# Patient Record
Sex: MALE | Race: WHITE | NOT HISPANIC OR LATINO | URBAN - METROPOLITAN AREA
[De-identification: names, ages, dates, MRNs, and addresses within clinical notes are randomized per-mention and may not be internally consistent; named-entity substitution may affect disease eponyms.]

---

## 2017-07-10 ENCOUNTER — OUTPATIENT (OUTPATIENT)
Dept: OUTPATIENT SERVICES | Facility: HOSPITAL | Age: 72
LOS: 1 days | Discharge: HOME | End: 2017-07-10

## 2017-07-10 DIAGNOSIS — D72.829 ELEVATED WHITE BLOOD CELL COUNT, UNSPECIFIED: ICD-10-CM

## 2017-07-11 ENCOUNTER — RESULT REVIEW (OUTPATIENT)
Age: 72
End: 2017-07-11

## 2017-07-11 ENCOUNTER — OUTPATIENT (OUTPATIENT)
Dept: OUTPATIENT SERVICES | Facility: HOSPITAL | Age: 72
LOS: 1 days | End: 2017-07-11
Payer: MEDICARE

## 2017-07-11 ENCOUNTER — APPOINTMENT (OUTPATIENT)
Dept: HEMATOLOGY ONCOLOGY | Facility: CLINIC | Age: 72
End: 2017-07-11

## 2017-07-11 VITALS
HEIGHT: 70 IN | OXYGEN SATURATION: 96 % | HEART RATE: 72 BPM | WEIGHT: 315 LBS | RESPIRATION RATE: 16 BRPM | TEMPERATURE: 98.2 F | DIASTOLIC BLOOD PRESSURE: 98 MMHG | BODY MASS INDEX: 45.1 KG/M2 | SYSTOLIC BLOOD PRESSURE: 168 MMHG

## 2017-07-11 DIAGNOSIS — D72.820 LYMPHOCYTOSIS (SYMPTOMATIC): ICD-10-CM

## 2017-07-11 LAB
BASOPHILS # BLD AUTO: 0.1 K/UL — SIGNIFICANT CHANGE UP (ref 0–0.2)
BASOPHILS NFR BLD AUTO: 1 % — SIGNIFICANT CHANGE UP (ref 0–2)
BLD GP AB SCN SERPL QL: NEGATIVE — SIGNIFICANT CHANGE UP
EOSINOPHIL # BLD AUTO: 0.3 K/UL — SIGNIFICANT CHANGE UP (ref 0–0.5)
EOSINOPHIL NFR BLD AUTO: 4 % — SIGNIFICANT CHANGE UP (ref 0–6)
HCT VFR BLD CALC: 44.7 % — SIGNIFICANT CHANGE UP (ref 39–50)
HGB BLD-MCNC: 15 G/DL — SIGNIFICANT CHANGE UP (ref 13–17)
LYMPHOCYTES # BLD AUTO: 52 % — HIGH (ref 13–44)
LYMPHOCYTES # BLD AUTO: 7.7 K/UL — HIGH (ref 1–3.3)
MCHC RBC-ENTMCNC: 29.4 PG — SIGNIFICANT CHANGE UP (ref 27–34)
MCHC RBC-ENTMCNC: 33.6 G/DL — SIGNIFICANT CHANGE UP (ref 32–36)
MCV RBC AUTO: 87.4 FL — SIGNIFICANT CHANGE UP (ref 80–100)
MONOCYTES # BLD AUTO: 0.6 K/UL — SIGNIFICANT CHANGE UP (ref 0–0.9)
MONOCYTES NFR BLD AUTO: 5 % — SIGNIFICANT CHANGE UP (ref 2–14)
NEUTROPHILS # BLD AUTO: 5 K/UL — SIGNIFICANT CHANGE UP (ref 1.8–7.4)
NEUTROPHILS NFR BLD AUTO: 34 % — LOW (ref 43–77)
NEUTS BAND # BLD: 4 % — SIGNIFICANT CHANGE UP (ref 0–8)
PLAT MORPH BLD: NORMAL — SIGNIFICANT CHANGE UP
PLATELET # BLD AUTO: 174 K/UL — SIGNIFICANT CHANGE UP (ref 150–400)
RBC # BLD: 5.11 M/UL — SIGNIFICANT CHANGE UP (ref 4.2–5.8)
RBC # FLD: 13 % — SIGNIFICANT CHANGE UP (ref 10.3–14.5)
RBC BLD AUTO: SIGNIFICANT CHANGE UP
RH IG SCN BLD-IMP: POSITIVE — SIGNIFICANT CHANGE UP
WBC # BLD: 13.8 K/UL — HIGH (ref 3.8–10.5)
WBC # FLD AUTO: 13.8 K/UL — HIGH (ref 3.8–10.5)

## 2017-07-11 PROCEDURE — 86901 BLOOD TYPING SEROLOGIC RH(D): CPT

## 2017-07-11 PROCEDURE — 88189 FLOWCYTOMETRY/READ 16 & >: CPT

## 2017-07-11 PROCEDURE — 88271 CYTOGENETICS DNA PROBE: CPT

## 2017-07-11 PROCEDURE — 86900 BLOOD TYPING SEROLOGIC ABO: CPT

## 2017-07-11 PROCEDURE — 86850 RBC ANTIBODY SCREEN: CPT

## 2017-07-11 PROCEDURE — 88275 CYTOGENETICS 100-300: CPT

## 2017-07-11 PROCEDURE — 88237 TISSUE CULTURE BONE MARROW: CPT

## 2017-07-11 PROCEDURE — 88185 FLOWCYTOMETRY/TC ADD-ON: CPT

## 2017-07-11 PROCEDURE — 88184 FLOWCYTOMETRY/ TC 1 MARKER: CPT

## 2017-07-17 LAB — CHROM ANALY INTERPHASE BLD FISH-IMP: SIGNIFICANT CHANGE UP

## 2017-07-18 LAB — TM INTERPRETATION: SIGNIFICANT CHANGE UP

## 2017-07-19 LAB
ALBUMIN SERPL ELPH-MCNC: 4.4 G/DL
ALP BLD-CCNC: 67 U/L
ALT SERPL-CCNC: 26 U/L
ANION GAP SERPL CALC-SCNC: 15 MMOL/L
AST SERPL-CCNC: 19 U/L
B2 MICROGLOB SERPL-MCNC: 2.1 MG/L
BILIRUB SERPL-MCNC: 0.4 MG/DL
BUN SERPL-MCNC: 14 MG/DL
CALCIUM SERPL-MCNC: 10.1 MG/DL
CHLORIDE SERPL-SCNC: 102 MMOL/L
CO2 SERPL-SCNC: 25 MMOL/L
CREAT SERPL-MCNC: 0.86 MG/DL
DEPRECATED KAPPA LC FREE/LAMBDA SER: 1.21 RATIO
DEPRECATED KAPPA LC FREE/LAMBDA SER: 1.21 RATIO
GLUCOSE SERPL-MCNC: 120 MG/DL
IGA SER QL IEP: 85 MG/DL
IGG SER QL IEP: 568 MG/DL
IGM SER QL IEP: 45 MG/DL
KAPPA LC CSF-MCNC: 1.92 MG/DL
KAPPA LC CSF-MCNC: 1.92 MG/DL
KAPPA LC SERPL-MCNC: 2.33 MG/DL
KAPPA LC SERPL-MCNC: 2.33 MG/DL
LDH SERPL-CCNC: 141 U/L
M PROTEIN SPEC IFE-MCNC: NORMAL
POTASSIUM SERPL-SCNC: 5 MMOL/L
PROT SERPL-MCNC: 6.6 G/DL
SODIUM SERPL-SCNC: 142 MMOL/L

## 2017-07-21 DIAGNOSIS — D72.820 LYMPHOCYTOSIS (SYMPTOMATIC): ICD-10-CM

## 2017-08-10 ENCOUNTER — OUTPATIENT (OUTPATIENT)
Dept: OUTPATIENT SERVICES | Facility: HOSPITAL | Age: 72
LOS: 1 days | Discharge: HOME | End: 2017-08-10

## 2017-08-10 DIAGNOSIS — D72.820 LYMPHOCYTOSIS (SYMPTOMATIC): ICD-10-CM

## 2017-08-15 ENCOUNTER — APPOINTMENT (OUTPATIENT)
Dept: HEMATOLOGY ONCOLOGY | Facility: CLINIC | Age: 72
End: 2017-08-15
Payer: MEDICARE

## 2017-08-15 ENCOUNTER — RESULT REVIEW (OUTPATIENT)
Age: 72
End: 2017-08-15

## 2017-08-15 VITALS
DIASTOLIC BLOOD PRESSURE: 81 MMHG | HEIGHT: 71 IN | WEIGHT: 234.99 LBS | TEMPERATURE: 98.2 F | HEART RATE: 76 BPM | BODY MASS INDEX: 32.9 KG/M2 | SYSTOLIC BLOOD PRESSURE: 153 MMHG | RESPIRATION RATE: 17 BRPM | OXYGEN SATURATION: 96 %

## 2017-08-15 LAB
BASOPHILS # BLD AUTO: 0.2 K/UL — SIGNIFICANT CHANGE UP (ref 0–0.2)
BASOPHILS NFR BLD AUTO: 1.2 % — SIGNIFICANT CHANGE UP (ref 0–2)
EOSINOPHIL # BLD AUTO: 0.4 K/UL — SIGNIFICANT CHANGE UP (ref 0–0.5)
EOSINOPHIL NFR BLD AUTO: 3.2 % — SIGNIFICANT CHANGE UP (ref 0–6)
HCT VFR BLD CALC: 42.8 % — SIGNIFICANT CHANGE UP (ref 39–50)
HGB BLD-MCNC: 15.1 G/DL — SIGNIFICANT CHANGE UP (ref 13–17)
LYMPHOCYTES # BLD AUTO: 56.2 % — HIGH (ref 13–44)
LYMPHOCYTES # BLD AUTO: 7.3 K/UL — HIGH (ref 1–3.3)
MCHC RBC-ENTMCNC: 30.4 PG — SIGNIFICANT CHANGE UP (ref 27–34)
MCHC RBC-ENTMCNC: 35.2 G/DL — SIGNIFICANT CHANGE UP (ref 32–36)
MCV RBC AUTO: 86.4 FL — SIGNIFICANT CHANGE UP (ref 80–100)
MONOCYTES # BLD AUTO: 0.6 K/UL — SIGNIFICANT CHANGE UP (ref 0–0.9)
MONOCYTES NFR BLD AUTO: 4.4 % — SIGNIFICANT CHANGE UP (ref 2–14)
NEUTROPHILS # BLD AUTO: 4.5 K/UL — SIGNIFICANT CHANGE UP (ref 1.8–7.4)
NEUTROPHILS NFR BLD AUTO: 34.9 % — LOW (ref 43–77)
PLATELET # BLD AUTO: 168 K/UL — SIGNIFICANT CHANGE UP (ref 150–400)
RBC # BLD: 4.96 M/UL — SIGNIFICANT CHANGE UP (ref 4.2–5.8)
RBC # FLD: 12.9 % — SIGNIFICANT CHANGE UP (ref 10.3–14.5)
WBC # BLD: 12.9 K/UL — HIGH (ref 3.8–10.5)
WBC # FLD AUTO: 12.9 K/UL — HIGH (ref 3.8–10.5)

## 2017-08-15 PROCEDURE — 99215 OFFICE O/P EST HI 40 MIN: CPT

## 2017-09-24 ENCOUNTER — TRANSCRIPTION ENCOUNTER (OUTPATIENT)
Age: 72
End: 2017-09-24

## 2017-11-10 ENCOUNTER — OUTPATIENT (OUTPATIENT)
Dept: OUTPATIENT SERVICES | Facility: HOSPITAL | Age: 72
LOS: 1 days | Discharge: HOME | End: 2017-11-10

## 2017-11-10 DIAGNOSIS — D72.820 LYMPHOCYTOSIS (SYMPTOMATIC): ICD-10-CM

## 2017-11-10 DIAGNOSIS — D64.9 ANEMIA, UNSPECIFIED: ICD-10-CM

## 2017-11-14 ENCOUNTER — APPOINTMENT (OUTPATIENT)
Dept: HEMATOLOGY ONCOLOGY | Facility: CLINIC | Age: 72
End: 2017-11-14
Payer: MEDICARE

## 2017-11-14 VITALS
DIASTOLIC BLOOD PRESSURE: 76 MMHG | BODY MASS INDEX: 32.36 KG/M2 | RESPIRATION RATE: 16 BRPM | SYSTOLIC BLOOD PRESSURE: 146 MMHG | HEART RATE: 79 BPM | OXYGEN SATURATION: 94 % | TEMPERATURE: 98 F | WEIGHT: 231.99 LBS

## 2017-11-14 PROCEDURE — 99215 OFFICE O/P EST HI 40 MIN: CPT

## 2018-03-01 ENCOUNTER — OUTPATIENT (OUTPATIENT)
Dept: OUTPATIENT SERVICES | Facility: HOSPITAL | Age: 73
LOS: 1 days | Discharge: HOME | End: 2018-03-01

## 2018-03-01 DIAGNOSIS — D72.820 LYMPHOCYTOSIS (SYMPTOMATIC): ICD-10-CM

## 2018-03-06 ENCOUNTER — RESULT REVIEW (OUTPATIENT)
Age: 73
End: 2018-03-06

## 2018-03-06 ENCOUNTER — APPOINTMENT (OUTPATIENT)
Dept: HEMATOLOGY ONCOLOGY | Facility: CLINIC | Age: 73
End: 2018-03-06
Payer: MEDICARE

## 2018-03-06 VITALS
HEART RATE: 59 BPM | SYSTOLIC BLOOD PRESSURE: 116 MMHG | DIASTOLIC BLOOD PRESSURE: 67 MMHG | TEMPERATURE: 98 F | RESPIRATION RATE: 16 BRPM | WEIGHT: 233 LBS | OXYGEN SATURATION: 95 % | BODY MASS INDEX: 32.5 KG/M2

## 2018-03-06 LAB
BASOPHILS # BLD AUTO: 0.2 K/UL — SIGNIFICANT CHANGE UP (ref 0–0.2)
BASOPHILS NFR BLD AUTO: 1.6 % — SIGNIFICANT CHANGE UP (ref 0–2)
EOSINOPHIL # BLD AUTO: 0.3 K/UL — SIGNIFICANT CHANGE UP (ref 0–0.5)
EOSINOPHIL NFR BLD AUTO: 2.8 % — SIGNIFICANT CHANGE UP (ref 0–6)
HCT VFR BLD CALC: 40 % — SIGNIFICANT CHANGE UP (ref 39–50)
HGB BLD-MCNC: 13.6 G/DL — SIGNIFICANT CHANGE UP (ref 13–17)
LYMPHOCYTES # BLD AUTO: 5.5 K/UL — HIGH (ref 1–3.3)
LYMPHOCYTES # BLD AUTO: 51 % — HIGH (ref 13–44)
MCHC RBC-ENTMCNC: 29.2 PG — SIGNIFICANT CHANGE UP (ref 27–34)
MCHC RBC-ENTMCNC: 34.1 G/DL — SIGNIFICANT CHANGE UP (ref 32–36)
MCV RBC AUTO: 85.5 FL — SIGNIFICANT CHANGE UP (ref 80–100)
MONOCYTES # BLD AUTO: 0.7 K/UL — SIGNIFICANT CHANGE UP (ref 0–0.9)
MONOCYTES NFR BLD AUTO: 6.7 % — SIGNIFICANT CHANGE UP (ref 2–14)
NEUTROPHILS # BLD AUTO: 4.1 K/UL — SIGNIFICANT CHANGE UP (ref 1.8–7.4)
NEUTROPHILS NFR BLD AUTO: 37.8 % — LOW (ref 43–77)
PLATELET # BLD AUTO: 143 K/UL — LOW (ref 150–400)
RBC # BLD: 4.68 M/UL — SIGNIFICANT CHANGE UP (ref 4.2–5.8)
RBC # FLD: 13.2 % — SIGNIFICANT CHANGE UP (ref 10.3–14.5)
WBC # BLD: 10.8 K/UL — HIGH (ref 3.8–10.5)
WBC # FLD AUTO: 10.8 K/UL — HIGH (ref 3.8–10.5)

## 2018-03-06 PROCEDURE — 99214 OFFICE O/P EST MOD 30 MIN: CPT

## 2018-05-25 ENCOUNTER — OUTPATIENT (OUTPATIENT)
Dept: OUTPATIENT SERVICES | Facility: HOSPITAL | Age: 73
LOS: 1 days | Discharge: HOME | End: 2018-05-25
Payer: MEDICARE

## 2018-05-25 DIAGNOSIS — D72.820 LYMPHOCYTOSIS (SYMPTOMATIC): ICD-10-CM

## 2018-05-31 ENCOUNTER — RESULT REVIEW (OUTPATIENT)
Age: 73
End: 2018-05-31

## 2018-05-31 ENCOUNTER — APPOINTMENT (OUTPATIENT)
Dept: HEMATOLOGY ONCOLOGY | Facility: CLINIC | Age: 73
End: 2018-05-31
Payer: MEDICARE

## 2018-05-31 VITALS
DIASTOLIC BLOOD PRESSURE: 66 MMHG | BODY MASS INDEX: 31.66 KG/M2 | SYSTOLIC BLOOD PRESSURE: 133 MMHG | TEMPERATURE: 98.5 F | RESPIRATION RATE: 16 BRPM | WEIGHT: 227 LBS | OXYGEN SATURATION: 95 % | HEART RATE: 63 BPM

## 2018-05-31 LAB
ALBUMIN SERPL ELPH-MCNC: 4.5 G/DL
ALP BLD-CCNC: 57 U/L
ALT SERPL-CCNC: 22 U/L
ANION GAP SERPL CALC-SCNC: 12 MMOL/L
AST SERPL-CCNC: 18 U/L
BILIRUB SERPL-MCNC: 0.4 MG/DL
BUN SERPL-MCNC: 16 MG/DL
CALCIUM SERPL-MCNC: 10 MG/DL
CHLORIDE SERPL-SCNC: 104 MMOL/L
CO2 SERPL-SCNC: 24 MMOL/L
CREAT SERPL-MCNC: 0.9 MG/DL
EOSINOPHIL # BLD AUTO: 0.4 K/UL — SIGNIFICANT CHANGE UP (ref 0–0.5)
EOSINOPHIL NFR BLD AUTO: 4 % — SIGNIFICANT CHANGE UP (ref 0–6)
GLUCOSE SERPL-MCNC: 122 MG/DL
HCT VFR BLD CALC: 42.4 % — SIGNIFICANT CHANGE UP (ref 39–50)
HGB BLD-MCNC: 14.1 G/DL — SIGNIFICANT CHANGE UP (ref 13–17)
LDH SERPL-CCNC: 128 U/L
LYMPHOCYTES # BLD AUTO: 11.5 K/UL — HIGH (ref 1–3.3)
LYMPHOCYTES # BLD AUTO: 58 % — HIGH (ref 13–44)
MCHC RBC-ENTMCNC: 28.8 PG — SIGNIFICANT CHANGE UP (ref 27–34)
MCHC RBC-ENTMCNC: 33.2 G/DL — SIGNIFICANT CHANGE UP (ref 32–36)
MCV RBC AUTO: 86.6 FL — SIGNIFICANT CHANGE UP (ref 80–100)
MONOCYTES # BLD AUTO: 0.6 K/UL — SIGNIFICANT CHANGE UP (ref 0–0.9)
MONOCYTES NFR BLD AUTO: 5 % — SIGNIFICANT CHANGE UP (ref 2–14)
NEUTROPHILS # BLD AUTO: 4.7 K/UL — SIGNIFICANT CHANGE UP (ref 1.8–7.4)
NEUTROPHILS NFR BLD AUTO: 33 % — LOW (ref 43–77)
PLAT MORPH BLD: NORMAL — SIGNIFICANT CHANGE UP
PLATELET # BLD AUTO: 172 K/UL — SIGNIFICANT CHANGE UP (ref 150–400)
POTASSIUM SERPL-SCNC: 4.5 MMOL/L
PROT SERPL-MCNC: 6.4 G/DL
RBC # BLD: 4.9 M/UL — SIGNIFICANT CHANGE UP (ref 4.2–5.8)
RBC # FLD: 13.1 % — SIGNIFICANT CHANGE UP (ref 10.3–14.5)
RBC BLD AUTO: SIGNIFICANT CHANGE UP
SODIUM SERPL-SCNC: 140 MMOL/L
WBC # BLD: 17.3 K/UL — HIGH (ref 3.8–10.5)
WBC # FLD AUTO: 17.3 K/UL — HIGH (ref 3.8–10.5)

## 2018-05-31 PROCEDURE — 99214 OFFICE O/P EST MOD 30 MIN: CPT

## 2018-06-16 ENCOUNTER — APPOINTMENT (OUTPATIENT)
Dept: ORTHOPEDIC SURGERY | Facility: CLINIC | Age: 73
End: 2018-06-16
Payer: MEDICARE

## 2018-06-16 VITALS
HEART RATE: 62 BPM | WEIGHT: 227 LBS | BODY MASS INDEX: 32.5 KG/M2 | SYSTOLIC BLOOD PRESSURE: 117 MMHG | HEIGHT: 70 IN | DIASTOLIC BLOOD PRESSURE: 71 MMHG

## 2018-06-16 DIAGNOSIS — Z80.9 FAMILY HISTORY OF MALIGNANT NEOPLASM, UNSPECIFIED: ICD-10-CM

## 2018-06-16 DIAGNOSIS — E78.00 PURE HYPERCHOLESTEROLEMIA, UNSPECIFIED: ICD-10-CM

## 2018-06-16 DIAGNOSIS — C80.1 MALIGNANT (PRIMARY) NEOPLASM, UNSPECIFIED: ICD-10-CM

## 2018-06-16 DIAGNOSIS — Z86.39 PERSONAL HISTORY OF OTHER ENDOCRINE, NUTRITIONAL AND METABOLIC DISEASE: ICD-10-CM

## 2018-06-16 DIAGNOSIS — Z87.09 PERSONAL HISTORY OF OTHER DISEASES OF THE RESPIRATORY SYSTEM: ICD-10-CM

## 2018-06-16 DIAGNOSIS — Z82.61 FAMILY HISTORY OF ARTHRITIS: ICD-10-CM

## 2018-06-16 DIAGNOSIS — M19.90 UNSPECIFIED OSTEOARTHRITIS, UNSPECIFIED SITE: ICD-10-CM

## 2018-06-16 PROCEDURE — 99203 OFFICE O/P NEW LOW 30 MIN: CPT

## 2018-07-16 ENCOUNTER — LABORATORY RESULT (OUTPATIENT)
Age: 73
End: 2018-07-16

## 2018-07-16 ENCOUNTER — APPOINTMENT (OUTPATIENT)
Dept: ORTHOPEDIC SURGERY | Facility: CLINIC | Age: 73
End: 2018-07-16
Payer: MEDICARE

## 2018-07-16 ENCOUNTER — APPOINTMENT (OUTPATIENT)
Dept: SURGERY | Facility: CLINIC | Age: 73
End: 2018-07-16
Payer: MEDICARE

## 2018-07-16 VITALS
DIASTOLIC BLOOD PRESSURE: 81 MMHG | HEART RATE: 93 BPM | BODY MASS INDEX: 32.5 KG/M2 | HEIGHT: 70 IN | SYSTOLIC BLOOD PRESSURE: 151 MMHG | WEIGHT: 227 LBS

## 2018-07-16 VITALS — WEIGHT: 227 LBS | HEIGHT: 70 IN | BODY MASS INDEX: 32.5 KG/M2

## 2018-07-16 DIAGNOSIS — Z85.46 PERSONAL HISTORY OF MALIGNANT NEOPLASM OF PROSTATE: ICD-10-CM

## 2018-07-16 DIAGNOSIS — Z83.49 FAMILY HISTORY OF OTHER ENDOCRINE, NUTRITIONAL AND METABOLIC DISEASES: ICD-10-CM

## 2018-07-16 DIAGNOSIS — Z85.528 PERSONAL HISTORY OF OTHER MALIGNANT NEOPLASM OF KIDNEY: ICD-10-CM

## 2018-07-16 DIAGNOSIS — Z80.42 FAMILY HISTORY OF MALIGNANT NEOPLASM OF PROSTATE: ICD-10-CM

## 2018-07-16 PROCEDURE — 36415 COLL VENOUS BLD VENIPUNCTURE: CPT

## 2018-07-16 PROCEDURE — 99205 OFFICE O/P NEW HI 60 MIN: CPT | Mod: 25

## 2018-07-16 PROCEDURE — 99214 OFFICE O/P EST MOD 30 MIN: CPT

## 2018-07-16 RX ORDER — ROSUVASTATIN CALCIUM 40 MG/1
40 TABLET, FILM COATED ORAL
Refills: 0 | Status: ACTIVE | COMMUNITY

## 2018-07-16 RX ORDER — RAMIPRIL 10 MG/1
10 CAPSULE ORAL
Refills: 0 | Status: ACTIVE | COMMUNITY

## 2018-07-16 RX ORDER — METFORMIN HYDROCHLORIDE 500 MG/1
500 TABLET, COATED ORAL
Refills: 0 | Status: ACTIVE | COMMUNITY

## 2018-07-18 LAB
25(OH)D3 SERPL-MCNC: 37.4 NG/ML
CALCIT SERPL-MCNC: 569 PG/ML
CALCIUM SERPL-MCNC: 10.2 MG/DL
CALCIUM SERPL-MCNC: 10.2 MG/DL
CEA SERPL-MCNC: 46.2 NG/ML
PARATHYROID HORMONE INTACT: 41 PG/ML
T3 SERPL-MCNC: 97 NG/DL
T4 FREE SERPL-MCNC: 1.4 NG/DL
THYROGLOB AB SERPL-ACNC: <20 IU/ML
THYROGLOB SERPL-MCNC: 12.5 NG/ML
TSH SERPL-ACNC: 1.74 UIU/ML

## 2018-07-25 ENCOUNTER — RESULT REVIEW (OUTPATIENT)
Age: 73
End: 2018-07-25

## 2018-07-31 ENCOUNTER — FORM ENCOUNTER (OUTPATIENT)
Age: 73
End: 2018-07-31

## 2018-08-01 ENCOUNTER — RESULT REVIEW (OUTPATIENT)
Age: 73
End: 2018-08-01

## 2018-08-01 ENCOUNTER — APPOINTMENT (OUTPATIENT)
Dept: ULTRASOUND IMAGING | Facility: IMAGING CENTER | Age: 73
End: 2018-08-01
Payer: MEDICARE

## 2018-08-01 ENCOUNTER — OUTPATIENT (OUTPATIENT)
Dept: OUTPATIENT SERVICES | Facility: HOSPITAL | Age: 73
LOS: 1 days | End: 2018-08-01
Payer: MEDICARE

## 2018-08-01 DIAGNOSIS — C73 MALIGNANT NEOPLASM OF THYROID GLAND: ICD-10-CM

## 2018-08-01 PROCEDURE — 10022: CPT

## 2018-08-01 PROCEDURE — 88305 TISSUE EXAM BY PATHOLOGIST: CPT | Mod: 26

## 2018-08-01 PROCEDURE — 76942 ECHO GUIDE FOR BIOPSY: CPT | Mod: 26

## 2018-08-01 PROCEDURE — 88188 FLOWCYTOMETRY/READ 9-15: CPT

## 2018-08-01 PROCEDURE — 88172 CYTP DX EVAL FNA 1ST EA SITE: CPT

## 2018-08-01 PROCEDURE — 88173 CYTOPATH EVAL FNA REPORT: CPT | Mod: 26

## 2018-08-01 PROCEDURE — 76942 ECHO GUIDE FOR BIOPSY: CPT

## 2018-08-01 PROCEDURE — 88173 CYTOPATH EVAL FNA REPORT: CPT

## 2018-08-01 PROCEDURE — 88305 TISSUE EXAM BY PATHOLOGIST: CPT

## 2018-08-01 PROCEDURE — 88185 FLOWCYTOMETRY/TC ADD-ON: CPT

## 2018-08-01 PROCEDURE — 88184 FLOWCYTOMETRY/ TC 1 MARKER: CPT

## 2018-08-03 LAB
NON-GYNECOLOGICAL CYTOLOGY STUDY: SIGNIFICANT CHANGE UP
TM INTERPRETATION: SIGNIFICANT CHANGE UP

## 2018-08-06 ENCOUNTER — OUTPATIENT (OUTPATIENT)
Dept: OUTPATIENT SERVICES | Facility: HOSPITAL | Age: 73
LOS: 1 days | End: 2018-08-06
Payer: MEDICARE

## 2018-08-06 VITALS
TEMPERATURE: 98 F | SYSTOLIC BLOOD PRESSURE: 119 MMHG | HEART RATE: 64 BPM | HEIGHT: 68 IN | OXYGEN SATURATION: 97 % | DIASTOLIC BLOOD PRESSURE: 69 MMHG | WEIGHT: 232.37 LBS | RESPIRATION RATE: 14 BRPM

## 2018-08-06 VITALS — WEIGHT: 232.37 LBS | HEIGHT: 68 IN

## 2018-08-06 DIAGNOSIS — Z90.5 ACQUIRED ABSENCE OF KIDNEY: Chronic | ICD-10-CM

## 2018-08-06 DIAGNOSIS — Z98.890 OTHER SPECIFIED POSTPROCEDURAL STATES: Chronic | ICD-10-CM

## 2018-08-06 DIAGNOSIS — Z90.79 ACQUIRED ABSENCE OF OTHER GENITAL ORGAN(S): Chronic | ICD-10-CM

## 2018-08-06 DIAGNOSIS — Z96.651 PRESENCE OF RIGHT ARTIFICIAL KNEE JOINT: Chronic | ICD-10-CM

## 2018-08-06 DIAGNOSIS — C73 MALIGNANT NEOPLASM OF THYROID GLAND: ICD-10-CM

## 2018-08-06 DIAGNOSIS — Z01.818 ENCOUNTER FOR OTHER PREPROCEDURAL EXAMINATION: ICD-10-CM

## 2018-08-06 LAB
ALBUMIN SERPL ELPH-MCNC: 3.7 G/DL — SIGNIFICANT CHANGE UP (ref 3.3–5)
ALP SERPL-CCNC: 63 U/L — SIGNIFICANT CHANGE UP (ref 40–120)
ALT FLD-CCNC: 37 U/L DA — SIGNIFICANT CHANGE UP (ref 10–45)
ANION GAP SERPL CALC-SCNC: 6 MMOL/L — SIGNIFICANT CHANGE UP (ref 5–17)
AST SERPL-CCNC: 27 U/L — SIGNIFICANT CHANGE UP (ref 10–40)
BILIRUB SERPL-MCNC: 0.4 MG/DL — SIGNIFICANT CHANGE UP (ref 0.2–1.2)
BLD GP AB SCN SERPL QL: SIGNIFICANT CHANGE UP
BUN SERPL-MCNC: 16 MG/DL — SIGNIFICANT CHANGE UP (ref 7–23)
CALCIUM SERPL-MCNC: 9.7 MG/DL — SIGNIFICANT CHANGE UP (ref 8.4–10.5)
CHLORIDE SERPL-SCNC: 106 MMOL/L — SIGNIFICANT CHANGE UP (ref 96–108)
CO2 SERPL-SCNC: 27 MMOL/L — SIGNIFICANT CHANGE UP (ref 22–31)
CREAT SERPL-MCNC: 0.85 MG/DL — SIGNIFICANT CHANGE UP (ref 0.5–1.3)
GLUCOSE SERPL-MCNC: 122 MG/DL — HIGH (ref 70–99)
HCT VFR BLD CALC: 43.4 % — SIGNIFICANT CHANGE UP (ref 39–50)
HGB BLD-MCNC: 14 G/DL — SIGNIFICANT CHANGE UP (ref 13–17)
MCHC RBC-ENTMCNC: 28.5 PG — SIGNIFICANT CHANGE UP (ref 27–34)
MCHC RBC-ENTMCNC: 32.2 GM/DL — SIGNIFICANT CHANGE UP (ref 32–36)
MCV RBC AUTO: 88.3 FL — SIGNIFICANT CHANGE UP (ref 80–100)
PLATELET # BLD AUTO: 191 K/UL — SIGNIFICANT CHANGE UP (ref 150–400)
POTASSIUM SERPL-MCNC: 4.5 MMOL/L — SIGNIFICANT CHANGE UP (ref 3.5–5.3)
POTASSIUM SERPL-SCNC: 4.5 MMOL/L — SIGNIFICANT CHANGE UP (ref 3.5–5.3)
PROT SERPL-MCNC: 7 G/DL — SIGNIFICANT CHANGE UP (ref 6–8.3)
RBC # BLD: 4.92 M/UL — SIGNIFICANT CHANGE UP (ref 4.2–5.8)
RBC # FLD: 13.4 % — SIGNIFICANT CHANGE UP (ref 10.3–14.5)
SODIUM SERPL-SCNC: 139 MMOL/L — SIGNIFICANT CHANGE UP (ref 135–145)
WBC # BLD: 19.3 K/UL — HIGH (ref 3.8–10.5)
WBC # FLD AUTO: 19.3 K/UL — HIGH (ref 3.8–10.5)

## 2018-08-06 PROCEDURE — G0463: CPT

## 2018-08-06 PROCEDURE — 86850 RBC ANTIBODY SCREEN: CPT

## 2018-08-06 PROCEDURE — 86901 BLOOD TYPING SEROLOGIC RH(D): CPT

## 2018-08-06 PROCEDURE — 85027 COMPLETE CBC AUTOMATED: CPT

## 2018-08-06 PROCEDURE — 80053 COMPREHEN METABOLIC PANEL: CPT

## 2018-08-06 PROCEDURE — 86900 BLOOD TYPING SEROLOGIC ABO: CPT

## 2018-08-06 PROCEDURE — 36415 COLL VENOUS BLD VENIPUNCTURE: CPT

## 2018-08-06 RX ORDER — SODIUM CHLORIDE 9 MG/ML
1000 INJECTION, SOLUTION INTRAVENOUS
Qty: 0 | Refills: 0 | Status: DISCONTINUED | OUTPATIENT
Start: 2018-08-16 | End: 2018-08-21

## 2018-08-06 NOTE — H&P PST ADULT - PMH
Diabetes mellitus type 2 in obese    Hypertension, unspecified type    Leukocytosis, unspecified type  monitored for CLL  Malignant neoplasm of kidney parenchyma, right  s/p surgery  Malignant neoplasm of thyroid gland    Prostate cancer  2003, s/p prostatectomy, RT 2009 x 40 days  Sleep apnea, unspecified type    Spinal stenosis of lumbar region, unspecified whether neurogenic claudication present    Thyroid nodule

## 2018-08-06 NOTE — H&P PST ADULT - PROBLEM SELECTOR PLAN 1
Scheduled for total thyroidectomy w/central neck dissection on 8/16/18.    Pre-op labs and EKG ordered. Obtain medical clearance.  Follow directions regarding medication intake.

## 2018-08-06 NOTE — ASU PREOP CHECKLIST - HEIGHT IN FEET
5 Alternatives Discussed Intro (Do Not Add Period): I discussed alternative treatments to Mohs surgery and specifically discussed the risks and benefits of

## 2018-08-06 NOTE — H&P PST ADULT - PSH
H/O partial nephrectomy  right, 2009  H/O radical prostatectomy  2003  H/O ventral hernia repair  1997  History of strabismus surgery  b/l 1958  History of total knee replacement, right  2012, scoped 1998  S/P left knee arthroscopy  1998

## 2018-08-06 NOTE — H&P PST ADULT - FAMILY HISTORY
Father  Still living? No  Family history of prostate cancer in father, Age at diagnosis: Age Unknown     Mother  Still living? No  Family history of amyloidosis, Age at diagnosis: Age Unknown

## 2018-08-06 NOTE — H&P PST ADULT - HISTORY OF PRESENT ILLNESS
73 y/o male presents to PST.  Reports history of thyroid nodules. Diagnosed with thyroid cancer last month with routine biopsy. Scheduled for total thyroidectomy w/central neck dissection on 8/16/18.

## 2018-08-07 NOTE — CHART NOTE - NSCHARTNOTEFT_GEN_A_CORE
Abnormal labs result reviewed and discussed with Lesa at Dr Doran's office.  Report faxed over to the office.

## 2018-08-08 ENCOUNTER — CHART COPY (OUTPATIENT)
Age: 73
End: 2018-08-08

## 2018-08-08 NOTE — PROVIDER CONTACT NOTE (OTHER) - SITUATION
Patient WBC is 19.3, as per medical clearance pt has hx of CLL and labs were reviewed and no contraindication to surgery.

## 2018-08-08 NOTE — PROVIDER CONTACT NOTE (OTHER) - ACTION/TREATMENT ORDERED:
Phone call made to Dr. Downey made aware of the above situation and MD stated that medical clearance and pt is good for surgery.

## 2018-08-09 ENCOUNTER — CHART COPY (OUTPATIENT)
Age: 73
End: 2018-08-09

## 2018-08-15 ENCOUNTER — TRANSCRIPTION ENCOUNTER (OUTPATIENT)
Age: 73
End: 2018-08-15

## 2018-08-15 PROBLEM — M48.061 SPINAL STENOSIS, LUMBAR REGION WITHOUT NEUROGENIC CLAUDICATION: Chronic | Status: ACTIVE | Noted: 2018-08-06

## 2018-08-15 PROBLEM — E11.69 TYPE 2 DIABETES MELLITUS WITH OTHER SPECIFIED COMPLICATION: Chronic | Status: ACTIVE | Noted: 2018-08-06

## 2018-08-15 PROBLEM — C73 MALIGNANT NEOPLASM OF THYROID GLAND: Chronic | Status: ACTIVE | Noted: 2018-08-06

## 2018-08-15 PROBLEM — G47.30 SLEEP APNEA, UNSPECIFIED: Chronic | Status: ACTIVE | Noted: 2018-08-06

## 2018-08-15 PROBLEM — D72.829 ELEVATED WHITE BLOOD CELL COUNT, UNSPECIFIED: Chronic | Status: ACTIVE | Noted: 2018-08-06

## 2018-08-15 PROBLEM — I10 ESSENTIAL (PRIMARY) HYPERTENSION: Chronic | Status: ACTIVE | Noted: 2018-08-06

## 2018-08-15 PROBLEM — C64.1 MALIGNANT NEOPLASM OF RIGHT KIDNEY, EXCEPT RENAL PELVIS: Chronic | Status: ACTIVE | Noted: 2018-08-06

## 2018-08-15 PROBLEM — C61 MALIGNANT NEOPLASM OF PROSTATE: Chronic | Status: ACTIVE | Noted: 2018-08-06

## 2018-08-15 PROBLEM — E04.1 NONTOXIC SINGLE THYROID NODULE: Chronic | Status: ACTIVE | Noted: 2018-08-06

## 2018-08-16 ENCOUNTER — APPOINTMENT (OUTPATIENT)
Dept: SURGERY | Facility: HOSPITAL | Age: 73
End: 2018-08-16

## 2018-08-16 ENCOUNTER — OUTPATIENT (OUTPATIENT)
Dept: PREADMISSION | Facility: HOSPITAL | Age: 73
LOS: 1 days | End: 2018-08-16
Payer: MEDICARE

## 2018-08-16 ENCOUNTER — RESULT REVIEW (OUTPATIENT)
Age: 73
End: 2018-08-16

## 2018-08-16 VITALS
RESPIRATION RATE: 16 BRPM | TEMPERATURE: 98 F | HEIGHT: 68 IN | DIASTOLIC BLOOD PRESSURE: 77 MMHG | OXYGEN SATURATION: 97 % | SYSTOLIC BLOOD PRESSURE: 123 MMHG | WEIGHT: 232.37 LBS | HEART RATE: 56 BPM

## 2018-08-16 DIAGNOSIS — Z98.890 OTHER SPECIFIED POSTPROCEDURAL STATES: Chronic | ICD-10-CM

## 2018-08-16 DIAGNOSIS — C73 MALIGNANT NEOPLASM OF THYROID GLAND: ICD-10-CM

## 2018-08-16 DIAGNOSIS — Z90.5 ACQUIRED ABSENCE OF KIDNEY: Chronic | ICD-10-CM

## 2018-08-16 DIAGNOSIS — Z90.79 ACQUIRED ABSENCE OF OTHER GENITAL ORGAN(S): Chronic | ICD-10-CM

## 2018-08-16 DIAGNOSIS — Z96.651 PRESENCE OF RIGHT ARTIFICIAL KNEE JOINT: Chronic | ICD-10-CM

## 2018-08-16 PROCEDURE — 60252 REMOVAL OF THYROID: CPT | Mod: AS

## 2018-08-16 PROCEDURE — 88307 TISSUE EXAM BY PATHOLOGIST: CPT | Mod: 26

## 2018-08-16 PROCEDURE — 60252 REMOVAL OF THYROID: CPT

## 2018-08-16 RX ORDER — DEXTROSE 50 % IN WATER 50 %
12.5 SYRINGE (ML) INTRAVENOUS ONCE
Qty: 0 | Refills: 0 | Status: DISCONTINUED | OUTPATIENT
Start: 2018-08-17 | End: 2018-08-31

## 2018-08-16 RX ORDER — ONDANSETRON 8 MG/1
4 TABLET, FILM COATED ORAL ONCE
Qty: 0 | Refills: 0 | Status: COMPLETED | OUTPATIENT
Start: 2018-08-16 | End: 2018-08-16

## 2018-08-16 RX ORDER — SODIUM CHLORIDE 9 MG/ML
1000 INJECTION, SOLUTION INTRAVENOUS
Qty: 0 | Refills: 0 | Status: DISCONTINUED | OUTPATIENT
Start: 2018-08-17 | End: 2018-08-31

## 2018-08-16 RX ORDER — CALCITRIOL 0.5 UG/1
0.5 CAPSULE ORAL DAILY
Qty: 0 | Refills: 0 | Status: DISCONTINUED | OUTPATIENT
Start: 2018-08-16 | End: 2018-08-31

## 2018-08-16 RX ORDER — ATORVASTATIN CALCIUM 80 MG/1
80 TABLET, FILM COATED ORAL AT BEDTIME
Qty: 0 | Refills: 0 | Status: DISCONTINUED | OUTPATIENT
Start: 2018-08-16 | End: 2018-08-31

## 2018-08-16 RX ORDER — INSULIN LISPRO 100/ML
VIAL (ML) SUBCUTANEOUS
Qty: 0 | Refills: 0 | Status: DISCONTINUED | OUTPATIENT
Start: 2018-08-16 | End: 2018-08-31

## 2018-08-16 RX ORDER — METOCLOPRAMIDE HCL 10 MG
10 TABLET ORAL ONCE
Qty: 0 | Refills: 0 | Status: DISCONTINUED | OUTPATIENT
Start: 2018-08-16 | End: 2018-08-16

## 2018-08-16 RX ORDER — ACETAMINOPHEN 500 MG
1000 TABLET ORAL ONCE
Qty: 0 | Refills: 0 | Status: COMPLETED | OUTPATIENT
Start: 2018-08-16 | End: 2018-08-16

## 2018-08-16 RX ORDER — CHOLECALCIFEROL (VITAMIN D3) 125 MCG
1000 CAPSULE ORAL DAILY
Qty: 0 | Refills: 0 | Status: DISCONTINUED | OUTPATIENT
Start: 2018-08-16 | End: 2018-08-31

## 2018-08-16 RX ORDER — BENZOCAINE AND MENTHOL 5; 1 G/100ML; G/100ML
1 LIQUID ORAL
Qty: 0 | Refills: 0 | Status: DISCONTINUED | OUTPATIENT
Start: 2018-08-17 | End: 2018-08-31

## 2018-08-16 RX ORDER — DEXAMETHASONE 0.5 MG/5ML
8 ELIXIR ORAL ONCE
Qty: 0 | Refills: 0 | Status: COMPLETED | OUTPATIENT
Start: 2018-08-16 | End: 2018-08-16

## 2018-08-16 RX ORDER — SODIUM CHLORIDE 9 MG/ML
1000 INJECTION, SOLUTION INTRAVENOUS
Qty: 0 | Refills: 0 | Status: DISCONTINUED | OUTPATIENT
Start: 2018-08-16 | End: 2018-08-16

## 2018-08-16 RX ORDER — LISINOPRIL 2.5 MG/1
40 TABLET ORAL
Qty: 0 | Refills: 0 | Status: DISCONTINUED | OUTPATIENT
Start: 2018-08-16 | End: 2018-08-31

## 2018-08-16 RX ORDER — ACETAMINOPHEN 500 MG
650 TABLET ORAL EVERY 6 HOURS
Qty: 0 | Refills: 0 | Status: DISCONTINUED | OUTPATIENT
Start: 2018-08-16 | End: 2018-08-31

## 2018-08-16 RX ORDER — DEXTROSE 50 % IN WATER 50 %
15 SYRINGE (ML) INTRAVENOUS ONCE
Qty: 0 | Refills: 0 | Status: DISCONTINUED | OUTPATIENT
Start: 2018-08-17 | End: 2018-08-31

## 2018-08-16 RX ORDER — FUROSEMIDE 40 MG
40 TABLET ORAL DAILY
Qty: 0 | Refills: 0 | Status: DISCONTINUED | OUTPATIENT
Start: 2018-08-16 | End: 2018-08-31

## 2018-08-16 RX ORDER — GLUCAGON INJECTION, SOLUTION 0.5 MG/.1ML
1 INJECTION, SOLUTION SUBCUTANEOUS ONCE
Qty: 0 | Refills: 0 | Status: DISCONTINUED | OUTPATIENT
Start: 2018-08-17 | End: 2018-08-31

## 2018-08-16 RX ORDER — ASPIRIN/CALCIUM CARB/MAGNESIUM 324 MG
1 TABLET ORAL
Qty: 0 | Refills: 0 | COMMUNITY

## 2018-08-16 RX ORDER — TRAMADOL HYDROCHLORIDE 50 MG/1
50 TABLET ORAL EVERY 4 HOURS
Qty: 0 | Refills: 0 | Status: DISCONTINUED | OUTPATIENT
Start: 2018-08-16 | End: 2018-08-16

## 2018-08-16 RX ADMIN — CALCITRIOL 0.5 MICROGRAM(S): 0.5 CAPSULE ORAL at 14:52

## 2018-08-16 RX ADMIN — Medication 400 MILLIGRAM(S): at 19:10

## 2018-08-16 RX ADMIN — Medication 1000 MILLIGRAM(S): at 19:25

## 2018-08-16 RX ADMIN — TRAMADOL HYDROCHLORIDE 50 MILLIGRAM(S): 50 TABLET ORAL at 17:57

## 2018-08-16 RX ADMIN — Medication 40 MILLIGRAM(S): at 18:37

## 2018-08-16 RX ADMIN — TRAMADOL HYDROCHLORIDE 50 MILLIGRAM(S): 50 TABLET ORAL at 22:30

## 2018-08-16 RX ADMIN — Medication 2 TABLET(S): at 14:27

## 2018-08-16 RX ADMIN — Medication 6.25 MILLIGRAM(S): at 15:10

## 2018-08-16 RX ADMIN — Medication 101.6 MILLIGRAM(S): at 13:38

## 2018-08-16 RX ADMIN — ATORVASTATIN CALCIUM 80 MILLIGRAM(S): 80 TABLET, FILM COATED ORAL at 21:38

## 2018-08-16 RX ADMIN — ONDANSETRON 4 MILLIGRAM(S): 8 TABLET, FILM COATED ORAL at 13:10

## 2018-08-16 RX ADMIN — Medication 2 TABLET(S): at 21:37

## 2018-08-16 RX ADMIN — TRAMADOL HYDROCHLORIDE 50 MILLIGRAM(S): 50 TABLET ORAL at 17:00

## 2018-08-16 RX ADMIN — TRAMADOL HYDROCHLORIDE 50 MILLIGRAM(S): 50 TABLET ORAL at 21:38

## 2018-08-16 NOTE — BRIEF OPERATIVE NOTE - PROCEDURE
<<-----Click on this checkbox to enter Procedure Thyroidectomy, total  08/16/2018  with central node dissection  Active  CALLIE

## 2018-08-17 ENCOUNTER — TRANSCRIPTION ENCOUNTER (OUTPATIENT)
Age: 73
End: 2018-08-17

## 2018-08-17 VITALS
DIASTOLIC BLOOD PRESSURE: 68 MMHG | HEART RATE: 95 BPM | WEIGHT: 240.08 LBS | SYSTOLIC BLOOD PRESSURE: 149 MMHG | RESPIRATION RATE: 14 BRPM | TEMPERATURE: 98 F | OXYGEN SATURATION: 95 %

## 2018-08-17 LAB
CALCIUM SERPL-MCNC: 9.5 MG/DL — SIGNIFICANT CHANGE UP (ref 8.4–10.5)
HBA1C BLD-MCNC: 6.5 % — HIGH (ref 4–5.6)

## 2018-08-17 PROCEDURE — 60252 REMOVAL OF THYROID: CPT

## 2018-08-17 PROCEDURE — 83036 HEMOGLOBIN GLYCOSYLATED A1C: CPT

## 2018-08-17 PROCEDURE — 82310 ASSAY OF CALCIUM: CPT

## 2018-08-17 PROCEDURE — 88307 TISSUE EXAM BY PATHOLOGIST: CPT

## 2018-08-17 PROCEDURE — 82962 GLUCOSE BLOOD TEST: CPT

## 2018-08-17 RX ORDER — IBUPROFEN 200 MG
0 TABLET ORAL
Qty: 0 | Refills: 0 | COMMUNITY

## 2018-08-17 RX ORDER — CALCITRIOL 0.5 UG/1
1 CAPSULE ORAL
Qty: 0 | Refills: 0 | COMMUNITY
Start: 2018-08-17

## 2018-08-17 RX ORDER — CALCITRIOL 0.5 UG/1
1 CAPSULE ORAL
Qty: 60 | Refills: 0 | OUTPATIENT
Start: 2018-08-17 | End: 2018-09-15

## 2018-08-17 RX ORDER — ACETAMINOPHEN 500 MG
650 TABLET ORAL EVERY 4 HOURS
Qty: 0 | Refills: 0 | Status: DISCONTINUED | OUTPATIENT
Start: 2018-08-17 | End: 2018-08-31

## 2018-08-17 RX ADMIN — Medication 1000 UNIT(S): at 11:49

## 2018-08-17 RX ADMIN — Medication 650 MILLIGRAM(S): at 09:32

## 2018-08-17 RX ADMIN — Medication 2 TABLET(S): at 06:05

## 2018-08-17 RX ADMIN — Medication 40 MILLIGRAM(S): at 06:05

## 2018-08-17 RX ADMIN — Medication 650 MILLIGRAM(S): at 03:31

## 2018-08-17 RX ADMIN — LISINOPRIL 40 MILLIGRAM(S): 2.5 TABLET ORAL at 06:35

## 2018-08-17 RX ADMIN — Medication 650 MILLIGRAM(S): at 11:35

## 2018-08-17 RX ADMIN — Medication 650 MILLIGRAM(S): at 13:12

## 2018-08-17 RX ADMIN — CALCITRIOL 0.5 MICROGRAM(S): 0.5 CAPSULE ORAL at 12:21

## 2018-08-17 RX ADMIN — Medication 2 TABLET(S): at 13:12

## 2018-08-17 NOTE — DISCHARGE NOTE ADULT - MEDICATION SUMMARY - MEDICATIONS TO STOP TAKING
I will STOP taking the medications listed below when I get home from the hospital:    Motrin 400 mg oral tablet  -- for pain    Aspir 81 oral delayed release tablet  -- 1 tab(s) by mouth once a day

## 2018-08-17 NOTE — DISCHARGE NOTE ADULT - CARE PLAN
Principal Discharge DX:	Malignant neoplasm of thyroid gland  Goal:	to eliminate the disease process  Assessment and plan of treatment:	thyroid cancer s/p total thyroidectomy

## 2018-08-17 NOTE — PROGRESS NOTE ADULT - SUBJECTIVE AND OBJECTIVE BOX
Surgery PA note POD #1    s/p total thyroidectomy  T(C): 36.6 (08-17-18 @ 08:57), Max: 36.7 (08-16-18 @ 13:38)  HR: 95 (08-17-18 @ 08:57) (37 - 95)  BP: 149/68 (08-17-18 @ 08:57) (107/55 - 149/68)  RR: 14 (08-17-18 @ 08:57) (14 - 18)  SpO2: 95% (08-17-18 @ 08:57) (90% - 97%)    PE:  incision site clean and dry. steri strips intact  very little edema present  slight hoarseness of voice  Abdomen- soft, non tender, good bs, pt. tolerating po diet well neg. nausea  lungs - clear P- A  heart - normal sinus   negative calf tenderness, pt. ambulatory  urine output good  serum calcium - 9.5  Shane Marley drain-5ml

## 2018-08-17 NOTE — DISCHARGE NOTE ADULT - CARE PROVIDERS DIRECT ADDRESSES
[email or web address]

## 2018-08-17 NOTE — DISCHARGE NOTE ADULT - PATIENT PORTAL LINK FT
You can access the [facility] Patient Portal, offered by [facility], by registering with the following website: [email or web address]

## 2018-08-17 NOTE — DISCHARGE NOTE ADULT - HOSPITAL COURSE
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 pt. underwent total thyroidectomy with central node dissection. Pt. had uneventful post-op course. Pt. is tolerating po diet,pain managed , voiding and ambulating..Drain removed, calcium level is within normal limits. Pt. is discharged to home today. Post- op instructions given and pt. will follow up with Dr Navarrete on 8/22/18.

## 2018-08-17 NOTE — DISCHARGE NOTE ADULT - MEDICATION SUMMARY - MEDICATIONS TO TAKE
I will START or STAY ON the medications listed below when I get home from the hospital:    traMADol 50 mg oral tablet  -- 1 tab(s) by mouth every 4 hours for back pain  -- Indication: For pain    Tylenol 325 mg oral tablet  -- for back pain  -- Indication: For pain    ramipril 10 mg oral capsule  -- orally 2 times a day  -- Indication: For htn    metFORMIN 500 mg oral tablet  -- orally 4 times a day  -- Indication: For dm    Januvia 100 mg oral tablet  -- 1 tab(s) by mouth once a day  -- Indication: For dm    Crestor 20 mg oral tablet  -- 1 tab(s) by mouth once a day (at bedtime)  -- Indication: For cholesterol    diclofenac sodium 1% topical cream  -- for leg cramps  -- Indication: For leg cramps    furosemide 40 mg oral tablet  -- 1 tab(s) by mouth once a day  -- Indication: For duiretic    Fiber Choice 1.5 g oral tablet, chewable  -- 2 tab(s) by mouth 3 times a day  -- Indication: For supplement    selenium 100 mcg oral tablet  -- 1 tab(s) by mouth once a day  -- Indication: For supplement    Fish Oil 1000 mg oral capsule  -- 1 cap(s) by mouth 3 times a day  -- Indication: For supplement    calcium-vitamin D 500 mg-200 intl units oral tablet  -- 2 tab(s) by mouth 3 times a day  -- Indication: For supplement    calcitriol 0.5 mcg oral capsule  -- 1 cap(s) by mouth 2 times a day MDD:2  -- Indication: For supplement    Vitamin C 500 mg oral tablet  -- 1 tab(s) by mouth once a day  -- Indication: For supplement    Vitamin D3 1000 intl units oral tablet  -- 1 tab(s) by mouth once a day  -- Indication: For supplement

## 2018-08-17 NOTE — DISCHARGE NOTE ADULT - CARE PROVIDER_API CALL
Maite Navarrete), Surgery  [address] [place] [address]  Phone: [phone]  Fax: [phone]

## 2018-08-22 ENCOUNTER — APPOINTMENT (OUTPATIENT)
Dept: SURGERY | Facility: CLINIC | Age: 73
End: 2018-08-22
Payer: MEDICARE

## 2018-08-22 ENCOUNTER — LABORATORY RESULT (OUTPATIENT)
Age: 73
End: 2018-08-22

## 2018-08-22 PROCEDURE — 99024 POSTOP FOLLOW-UP VISIT: CPT

## 2018-08-22 PROCEDURE — 36415 COLL VENOUS BLD VENIPUNCTURE: CPT

## 2018-08-28 LAB
CALCIT SERPL-MCNC: <2 PG/ML
CEA SERPL-MCNC: 30.5 NG/ML
T3 SERPL-MCNC: 69 NG/DL
T4 FREE SERPL-MCNC: 1.1 NG/DL
THYROGLOB AB SERPL-ACNC: <20 IU/ML
THYROGLOB SERPL-MCNC: 12.5 NG/ML
TSH SERPL-ACNC: 4.36 UIU/ML

## 2018-08-29 ENCOUNTER — APPOINTMENT (OUTPATIENT)
Dept: SURGERY | Facility: CLINIC | Age: 73
End: 2018-08-29
Payer: MEDICARE

## 2018-08-29 PROCEDURE — 99024 POSTOP FOLLOW-UP VISIT: CPT

## 2018-09-05 ENCOUNTER — OUTPATIENT (OUTPATIENT)
Dept: OUTPATIENT SERVICES | Facility: HOSPITAL | Age: 73
LOS: 1 days | Discharge: HOME | End: 2018-09-05

## 2018-09-05 DIAGNOSIS — Z90.5 ACQUIRED ABSENCE OF KIDNEY: Chronic | ICD-10-CM

## 2018-09-05 DIAGNOSIS — Z98.890 OTHER SPECIFIED POSTPROCEDURAL STATES: Chronic | ICD-10-CM

## 2018-09-05 DIAGNOSIS — Z96.651 PRESENCE OF RIGHT ARTIFICIAL KNEE JOINT: Chronic | ICD-10-CM

## 2018-09-05 DIAGNOSIS — D72.820 LYMPHOCYTOSIS (SYMPTOMATIC): ICD-10-CM

## 2018-09-05 DIAGNOSIS — Z90.79 ACQUIRED ABSENCE OF OTHER GENITAL ORGAN(S): Chronic | ICD-10-CM

## 2018-09-06 ENCOUNTER — RESULT REVIEW (OUTPATIENT)
Age: 73
End: 2018-09-06

## 2018-09-06 ENCOUNTER — APPOINTMENT (OUTPATIENT)
Dept: HEMATOLOGY ONCOLOGY | Facility: CLINIC | Age: 73
End: 2018-09-06
Payer: MEDICARE

## 2018-09-06 VITALS
WEIGHT: 226.99 LBS | DIASTOLIC BLOOD PRESSURE: 82 MMHG | BODY MASS INDEX: 32.57 KG/M2 | RESPIRATION RATE: 16 BRPM | HEART RATE: 77 BPM | OXYGEN SATURATION: 97 % | SYSTOLIC BLOOD PRESSURE: 129 MMHG | TEMPERATURE: 98.1 F

## 2018-09-06 LAB
BASOPHILS # BLD AUTO: 0.2 K/UL — SIGNIFICANT CHANGE UP (ref 0–0.2)
EOSINOPHIL # BLD AUTO: 0.5 K/UL — SIGNIFICANT CHANGE UP (ref 0–0.5)
EOSINOPHIL NFR BLD AUTO: 2 % — SIGNIFICANT CHANGE UP (ref 0–6)
HCT VFR BLD CALC: 42 % — SIGNIFICANT CHANGE UP (ref 39–50)
HGB BLD-MCNC: 13.9 G/DL — SIGNIFICANT CHANGE UP (ref 13–17)
LYMPHOCYTES # BLD AUTO: 20.8 K/UL — HIGH (ref 1–3.3)
LYMPHOCYTES # BLD AUTO: 78 % — HIGH (ref 13–44)
MCHC RBC-ENTMCNC: 28.1 PG — SIGNIFICANT CHANGE UP (ref 27–34)
MCHC RBC-ENTMCNC: 33.2 G/DL — SIGNIFICANT CHANGE UP (ref 32–36)
MCV RBC AUTO: 84.8 FL — SIGNIFICANT CHANGE UP (ref 80–100)
MONOCYTES # BLD AUTO: 1 K/UL — HIGH (ref 0–0.9)
MONOCYTES NFR BLD AUTO: 5 % — SIGNIFICANT CHANGE UP (ref 2–14)
NEUTROPHILS # BLD AUTO: 6.3 K/UL — SIGNIFICANT CHANGE UP (ref 1.8–7.4)
NEUTROPHILS NFR BLD AUTO: 15 % — LOW (ref 43–77)
PLAT MORPH BLD: NORMAL — SIGNIFICANT CHANGE UP
PLATELET # BLD AUTO: 241 K/UL — SIGNIFICANT CHANGE UP (ref 150–400)
RBC # BLD: 4.95 M/UL — SIGNIFICANT CHANGE UP (ref 4.2–5.8)
RBC # FLD: 12.6 % — SIGNIFICANT CHANGE UP (ref 10.3–14.5)
RBC BLD AUTO: NORMAL — SIGNIFICANT CHANGE UP
WBC # BLD: 28.8 K/UL — HIGH (ref 3.8–10.5)
WBC # FLD AUTO: 28.8 K/UL — HIGH (ref 3.8–10.5)

## 2018-09-06 PROCEDURE — 99214 OFFICE O/P EST MOD 30 MIN: CPT

## 2018-09-11 ENCOUNTER — FORM ENCOUNTER (OUTPATIENT)
Age: 73
End: 2018-09-11

## 2018-09-12 ENCOUNTER — OUTPATIENT (OUTPATIENT)
Dept: OUTPATIENT SERVICES | Facility: HOSPITAL | Age: 73
LOS: 1 days | End: 2018-09-12
Payer: MEDICARE

## 2018-09-12 ENCOUNTER — APPOINTMENT (OUTPATIENT)
Dept: CT IMAGING | Facility: CLINIC | Age: 73
End: 2018-09-12
Payer: MEDICARE

## 2018-09-12 DIAGNOSIS — Z98.890 OTHER SPECIFIED POSTPROCEDURAL STATES: Chronic | ICD-10-CM

## 2018-09-12 DIAGNOSIS — Z90.5 ACQUIRED ABSENCE OF KIDNEY: Chronic | ICD-10-CM

## 2018-09-12 DIAGNOSIS — Z00.8 ENCOUNTER FOR OTHER GENERAL EXAMINATION: ICD-10-CM

## 2018-09-12 DIAGNOSIS — Z90.79 ACQUIRED ABSENCE OF OTHER GENITAL ORGAN(S): Chronic | ICD-10-CM

## 2018-09-12 DIAGNOSIS — Z96.651 PRESENCE OF RIGHT ARTIFICIAL KNEE JOINT: Chronic | ICD-10-CM

## 2018-09-12 PROCEDURE — 76376 3D RENDER W/INTRP POSTPROCES: CPT | Mod: 26

## 2018-09-12 PROCEDURE — 72131 CT LUMBAR SPINE W/O DYE: CPT | Mod: 26

## 2018-09-12 PROCEDURE — 72131 CT LUMBAR SPINE W/O DYE: CPT

## 2018-09-12 PROCEDURE — 76376 3D RENDER W/INTRP POSTPROCES: CPT

## 2018-09-25 ENCOUNTER — APPOINTMENT (OUTPATIENT)
Dept: SPINE | Facility: CLINIC | Age: 73
End: 2018-09-25
Payer: MEDICARE

## 2018-09-25 ENCOUNTER — OUTPATIENT (OUTPATIENT)
Dept: OUTPATIENT SERVICES | Facility: HOSPITAL | Age: 73
LOS: 1 days | End: 2018-09-25
Payer: MEDICARE

## 2018-09-25 ENCOUNTER — APPOINTMENT (OUTPATIENT)
Dept: RADIOLOGY | Facility: CLINIC | Age: 73
End: 2018-09-25
Payer: MEDICARE

## 2018-09-25 VITALS
HEART RATE: 51 BPM | HEIGHT: 70 IN | DIASTOLIC BLOOD PRESSURE: 78 MMHG | SYSTOLIC BLOOD PRESSURE: 145 MMHG | BODY MASS INDEX: 32.5 KG/M2 | WEIGHT: 227 LBS

## 2018-09-25 DIAGNOSIS — Z90.5 ACQUIRED ABSENCE OF KIDNEY: Chronic | ICD-10-CM

## 2018-09-25 DIAGNOSIS — Z98.890 OTHER SPECIFIED POSTPROCEDURAL STATES: Chronic | ICD-10-CM

## 2018-09-25 DIAGNOSIS — Z96.651 PRESENCE OF RIGHT ARTIFICIAL KNEE JOINT: Chronic | ICD-10-CM

## 2018-09-25 DIAGNOSIS — Z00.8 ENCOUNTER FOR OTHER GENERAL EXAMINATION: ICD-10-CM

## 2018-09-25 DIAGNOSIS — Z90.79 ACQUIRED ABSENCE OF OTHER GENITAL ORGAN(S): Chronic | ICD-10-CM

## 2018-09-25 PROCEDURE — 72082 X-RAY EXAM ENTIRE SPI 2/3 VW: CPT

## 2018-09-25 PROCEDURE — 72082 X-RAY EXAM ENTIRE SPI 2/3 VW: CPT | Mod: 26

## 2018-09-25 PROCEDURE — 99204 OFFICE O/P NEW MOD 45 MIN: CPT

## 2018-09-26 ENCOUNTER — APPOINTMENT (OUTPATIENT)
Dept: SPINE | Facility: CLINIC | Age: 73
End: 2018-09-26
Payer: MEDICARE

## 2018-09-26 PROCEDURE — 99214 OFFICE O/P EST MOD 30 MIN: CPT

## 2018-10-02 ENCOUNTER — CLINICAL ADVICE (OUTPATIENT)
Age: 73
End: 2018-10-02

## 2018-10-02 DIAGNOSIS — R11.0 NAUSEA: ICD-10-CM

## 2018-10-03 ENCOUNTER — APPOINTMENT (OUTPATIENT)
Dept: SPINE | Facility: CLINIC | Age: 73
End: 2018-10-03

## 2018-10-04 ENCOUNTER — OUTPATIENT (OUTPATIENT)
Dept: OUTPATIENT SERVICES | Facility: HOSPITAL | Age: 73
LOS: 1 days | End: 2018-10-04
Payer: MEDICARE

## 2018-10-04 VITALS
WEIGHT: 227.08 LBS | RESPIRATION RATE: 14 BRPM | OXYGEN SATURATION: 98 % | SYSTOLIC BLOOD PRESSURE: 161 MMHG | DIASTOLIC BLOOD PRESSURE: 84 MMHG | HEART RATE: 52 BPM | TEMPERATURE: 99 F | HEIGHT: 70 IN

## 2018-10-04 DIAGNOSIS — M51.35 OTHER INTERVERTEBRAL DISC DEGENERATION, THORACOLUMBAR REGION: ICD-10-CM

## 2018-10-04 DIAGNOSIS — Z29.9 ENCOUNTER FOR PROPHYLACTIC MEASURES, UNSPECIFIED: ICD-10-CM

## 2018-10-04 DIAGNOSIS — C73 MALIGNANT NEOPLASM OF THYROID GLAND: ICD-10-CM

## 2018-10-04 DIAGNOSIS — Z98.890 OTHER SPECIFIED POSTPROCEDURAL STATES: Chronic | ICD-10-CM

## 2018-10-04 DIAGNOSIS — E11.69 TYPE 2 DIABETES MELLITUS WITH OTHER SPECIFIED COMPLICATION: ICD-10-CM

## 2018-10-04 DIAGNOSIS — Z01.818 ENCOUNTER FOR OTHER PREPROCEDURAL EXAMINATION: ICD-10-CM

## 2018-10-04 DIAGNOSIS — Z90.79 ACQUIRED ABSENCE OF OTHER GENITAL ORGAN(S): Chronic | ICD-10-CM

## 2018-10-04 DIAGNOSIS — I10 ESSENTIAL (PRIMARY) HYPERTENSION: ICD-10-CM

## 2018-10-04 DIAGNOSIS — M53.2X5: ICD-10-CM

## 2018-10-04 DIAGNOSIS — Z96.651 PRESENCE OF RIGHT ARTIFICIAL KNEE JOINT: Chronic | ICD-10-CM

## 2018-10-04 DIAGNOSIS — E89.0 POSTPROCEDURAL HYPOTHYROIDISM: Chronic | ICD-10-CM

## 2018-10-04 DIAGNOSIS — Z90.5 ACQUIRED ABSENCE OF KIDNEY: Chronic | ICD-10-CM

## 2018-10-04 DIAGNOSIS — G47.30 SLEEP APNEA, UNSPECIFIED: ICD-10-CM

## 2018-10-04 LAB
ANION GAP SERPL CALC-SCNC: 15 MMOL/L — SIGNIFICANT CHANGE UP (ref 5–17)
BLD GP AB SCN SERPL QL: NEGATIVE — SIGNIFICANT CHANGE UP
BUN SERPL-MCNC: 19 MG/DL — SIGNIFICANT CHANGE UP (ref 7–23)
CALCIUM SERPL-MCNC: 10.2 MG/DL — SIGNIFICANT CHANGE UP (ref 8.4–10.5)
CHLORIDE SERPL-SCNC: 102 MMOL/L — SIGNIFICANT CHANGE UP (ref 96–108)
CO2 SERPL-SCNC: 23 MMOL/L — SIGNIFICANT CHANGE UP (ref 22–31)
CREAT SERPL-MCNC: 1 MG/DL — SIGNIFICANT CHANGE UP (ref 0.5–1.3)
GLUCOSE SERPL-MCNC: 137 MG/DL — HIGH (ref 70–99)
HBA1C BLD-MCNC: 6.9 % — HIGH (ref 4–5.6)
HCT VFR BLD CALC: 40 % — SIGNIFICANT CHANGE UP (ref 39–50)
HGB BLD-MCNC: 12.6 G/DL — LOW (ref 13–17)
MCHC RBC-ENTMCNC: 27.7 PG — SIGNIFICANT CHANGE UP (ref 27–34)
MCHC RBC-ENTMCNC: 31.5 GM/DL — LOW (ref 32–36)
MCV RBC AUTO: 87.9 FL — SIGNIFICANT CHANGE UP (ref 80–100)
PLATELET # BLD AUTO: 202 K/UL — SIGNIFICANT CHANGE UP (ref 150–400)
POTASSIUM SERPL-MCNC: 4.2 MMOL/L — SIGNIFICANT CHANGE UP (ref 3.5–5.3)
POTASSIUM SERPL-SCNC: 4.2 MMOL/L — SIGNIFICANT CHANGE UP (ref 3.5–5.3)
RBC # BLD: 4.55 M/UL — SIGNIFICANT CHANGE UP (ref 4.2–5.8)
RBC # FLD: 14.7 % — HIGH (ref 10.3–14.5)
RH IG SCN BLD-IMP: POSITIVE — SIGNIFICANT CHANGE UP
SODIUM SERPL-SCNC: 140 MMOL/L — SIGNIFICANT CHANGE UP (ref 135–145)
WBC # BLD: 20.92 K/UL — HIGH (ref 3.8–10.5)
WBC # FLD AUTO: 20.92 K/UL — HIGH (ref 3.8–10.5)

## 2018-10-04 PROCEDURE — 87641 MR-STAPH DNA AMP PROBE: CPT

## 2018-10-04 PROCEDURE — 87640 STAPH A DNA AMP PROBE: CPT

## 2018-10-04 PROCEDURE — 80048 BASIC METABOLIC PNL TOTAL CA: CPT

## 2018-10-04 PROCEDURE — 86901 BLOOD TYPING SEROLOGIC RH(D): CPT

## 2018-10-04 PROCEDURE — G0463: CPT

## 2018-10-04 PROCEDURE — 83036 HEMOGLOBIN GLYCOSYLATED A1C: CPT

## 2018-10-04 PROCEDURE — 86900 BLOOD TYPING SEROLOGIC ABO: CPT

## 2018-10-04 PROCEDURE — 86850 RBC ANTIBODY SCREEN: CPT

## 2018-10-04 PROCEDURE — 85027 COMPLETE CBC AUTOMATED: CPT

## 2018-10-04 RX ORDER — CEFAZOLIN SODIUM 1 G
2000 VIAL (EA) INJECTION ONCE
Qty: 0 | Refills: 0 | Status: DISCONTINUED | OUTPATIENT
Start: 2018-10-18 | End: 2018-10-20

## 2018-10-04 RX ORDER — ACETAMINOPHEN 500 MG
0 TABLET ORAL
Qty: 0 | Refills: 0 | COMMUNITY

## 2018-10-04 RX ORDER — FIBER
2 TABLET,CHEWABLE ORAL
Qty: 0 | Refills: 0 | COMMUNITY

## 2018-10-04 RX ORDER — SODIUM CHLORIDE 9 MG/ML
3 INJECTION INTRAMUSCULAR; INTRAVENOUS; SUBCUTANEOUS EVERY 8 HOURS
Qty: 0 | Refills: 0 | Status: DISCONTINUED | OUTPATIENT
Start: 2018-10-18 | End: 2018-10-18

## 2018-10-04 RX ORDER — LIDOCAINE HCL 20 MG/ML
0.2 VIAL (ML) INJECTION ONCE
Qty: 0 | Refills: 0 | Status: DISCONTINUED | OUTPATIENT
Start: 2018-10-18 | End: 2018-10-18

## 2018-10-04 RX ORDER — TRAMADOL HYDROCHLORIDE 50 MG/1
1 TABLET ORAL
Qty: 0 | Refills: 0 | COMMUNITY

## 2018-10-04 RX ORDER — METFORMIN HYDROCHLORIDE 850 MG/1
0 TABLET ORAL
Qty: 0 | Refills: 0 | COMMUNITY

## 2018-10-04 RX ORDER — DICLOFENAC SODIUM 30 MG/G
0 GEL TOPICAL
Qty: 0 | Refills: 0 | COMMUNITY

## 2018-10-04 RX ORDER — CHOLECALCIFEROL (VITAMIN D3) 125 MCG
1 CAPSULE ORAL
Qty: 0 | Refills: 0 | COMMUNITY

## 2018-10-04 NOTE — H&P PST ADULT - PROBLEM SELECTOR PLAN 5
A1c pending.  FS upon admission.  Patient instructed not to take Metformin for 24 hrs preop and Januvia last dose will be 10/16/18.

## 2018-10-04 NOTE — H&P PST ADULT - ASSESSMENT
CAPRINI SCORE [CLOT]    AGE RELATED RISK FACTORS                                                       MOBILITY RELATED FACTORS  [ ] Age 41-60 years                                            (1 Point)                  [ ] Bed rest                                                        (1 Point)  [ x] Age: 61-74 years                                           (2 Points)                 [ ] Plaster cast                                                   (2 Points)  [ ] Age= 75 years                                              (3 Points)                 [ ] Bed bound for more than 72 hours                 (2 Points)    DISEASE RELATED RISK FACTORS                                               GENDER SPECIFIC FACTORS  [ x] Edema in the lower extremities                       (1 Point)                  [ ] Pregnancy                                                     (1 Point)  [ ] Varicose veins                                               (1 Point)                  [ ] Post-partum < 6 weeks                                   (1 Point)             [ x] BMI > 25 Kg/m2                                            (1 Point)                  [ ] Hormonal therapy  or oral contraception          (1 Point)                 [ ] Sepsis (in the previous month)                        (1 Point)                  [ ] History of pregnancy complications                 (1 point)  [ ] Pneumonia or serious lung disease                                               [ ] Unexplained or recurrent                     (1 Point)           (in the previous month)                               (1 Point)  [ ] Abnormal pulmonary function test                     (1 Point)                 SURGERY RELATED RISK FACTORS  [ ] Acute myocardial infarction                              (1 Point)                 [ ]  Section                                             (1 Point)  [ ] Congestive heart failure (in the previous month)  (1 Point)               [ ] Minor surgery                                                  (1 Point)   [ ] Inflammatory bowel disease                             (1 Point)                 [ ] Arthroscopic surgery                                        (2 Points)  [ ] Central venous access                                      (2 Points)                [ x] General surgery lasting more than 45 minutes   (2 Points)       [ ] Stroke (in the previous month)                          (5 Points)               [ ] Elective arthroplasty                                         (5 Points)                                                                                                                                               HEMATOLOGY RELATED FACTORS                                                 TRAUMA RELATED RISK FACTORS  [ ] Prior episodes of VTE                                     (3 Points)                 [ ] Fracture of the hip, pelvis, or leg                       (5 Points)  [ ] Positive family history for VTE                         (3 Points)                 [ ] Acute spinal cord injury (in the previous month)  (5 Points)  [ ] Prothrombin 26548 A                                     (3 Points)                 [ ] Paralysis  (less than 1 month)                             (5 Points)  [ ] Factor V Leiden                                             (3 Points)                  [ ] Multiple Trauma within 1 month                        (5 Points)  [ ] Lupus anticoagulants                                     (3 Points)                                                           [ ] Anticardiolipin antibodies                               (3 Points)                                                       [ ] High homocysteine in the blood                      (3 Points)                                             [ ] Other congenital or acquired thrombophilia      (3 Points)                                                [ ] Heparin induced thrombocytopenia                  (3 Points)                                          Total Score [    5      ]

## 2018-10-04 NOTE — H&P PST ADULT - HISTORY OF PRESENT ILLNESS
Mr. Moore is a 72 year old man with PMH T2DM, HTN, HLD, FERNANDO on CPAP, Pneumonia, Prostate Cancer s/p removal 2003, thyroid cancer s/p thyroidectomy 8/2018, renal cancer s/p partial nephrectomy, CLL not active, former smoker Mr. Moore is a 72 year old man with PMH T2DM, HTN, HLD, FERNANDO on CPAP, Pneumonia, Prostate Cancer s/p removal 2003, thyroid cancer s/p thyroidectomy 8/2018, renal cancer s/p partial nephrectomy, CLL (not active), former smoker and osteoarthritis s/p R TKR 2012 who has developed severe left thigh pain which imaging revealed spinal stenosis and scoliosis and is now scheduled for L1-5 posterior lumbar fusion and T10-pelvis instrumentation and fusion on 10/18/2018.

## 2018-10-04 NOTE — H&P PST ADULT - PSH
H/O partial nephrectomy  right, 2009  H/O radical prostatectomy  2003  H/O thyroidectomy  2016  H/O ventral hernia repair  1997  History of strabismus surgery  b/l 1958  History of total knee replacement, right  2012, scoped 1998  S/P left knee arthroscopy  1998

## 2018-10-04 NOTE — H&P PST ADULT - PMH
CLL (chronic lymphocytic leukemia)    Diabetes mellitus type 2 in obese    Former smoker, stopped smoking many years ago    Hypertension, unspecified type    Leukocytosis, unspecified type  monitored for CLL  Malignant neoplasm of kidney parenchyma, right  s/p surgery  Malignant neoplasm of thyroid gland    Prostate cancer  2003, s/p prostatectomy, RT 2009 x 40 days  Sleep apnea, unspecified type    Spinal stenosis of lumbar region, unspecified whether neurogenic claudication present    Thyroid nodule

## 2018-10-05 LAB
MRSA PCR RESULT.: SIGNIFICANT CHANGE UP
S AUREUS DNA NOSE QL NAA+PROBE: DETECTED

## 2018-10-10 ENCOUNTER — MEDICATION RENEWAL (OUTPATIENT)
Age: 73
End: 2018-10-10

## 2018-10-12 ENCOUNTER — OUTPATIENT (OUTPATIENT)
Dept: OUTPATIENT SERVICES | Facility: HOSPITAL | Age: 73
LOS: 1 days | End: 2018-10-12
Payer: MEDICARE

## 2018-10-12 DIAGNOSIS — Z90.5 ACQUIRED ABSENCE OF KIDNEY: Chronic | ICD-10-CM

## 2018-10-12 DIAGNOSIS — Z98.890 OTHER SPECIFIED POSTPROCEDURAL STATES: Chronic | ICD-10-CM

## 2018-10-12 DIAGNOSIS — Z90.79 ACQUIRED ABSENCE OF OTHER GENITAL ORGAN(S): Chronic | ICD-10-CM

## 2018-10-12 DIAGNOSIS — E89.0 POSTPROCEDURAL HYPOTHYROIDISM: Chronic | ICD-10-CM

## 2018-10-12 DIAGNOSIS — Z96.651 PRESENCE OF RIGHT ARTIFICIAL KNEE JOINT: Chronic | ICD-10-CM

## 2018-10-16 ENCOUNTER — OUTPATIENT (OUTPATIENT)
Dept: OUTPATIENT SERVICES | Facility: HOSPITAL | Age: 73
LOS: 1 days | End: 2018-10-16
Payer: MEDICARE

## 2018-10-16 DIAGNOSIS — Z90.79 ACQUIRED ABSENCE OF OTHER GENITAL ORGAN(S): Chronic | ICD-10-CM

## 2018-10-16 DIAGNOSIS — Z98.890 OTHER SPECIFIED POSTPROCEDURAL STATES: Chronic | ICD-10-CM

## 2018-10-16 DIAGNOSIS — Z90.5 ACQUIRED ABSENCE OF KIDNEY: Chronic | ICD-10-CM

## 2018-10-16 DIAGNOSIS — E89.0 POSTPROCEDURAL HYPOTHYROIDISM: Chronic | ICD-10-CM

## 2018-10-16 DIAGNOSIS — Z96.651 PRESENCE OF RIGHT ARTIFICIAL KNEE JOINT: Chronic | ICD-10-CM

## 2018-10-18 ENCOUNTER — INPATIENT (INPATIENT)
Facility: HOSPITAL | Age: 73
LOS: 1 days | Discharge: HOME | DRG: 552 | End: 2018-10-20
Attending: NEUROLOGICAL SURGERY | Admitting: NEUROLOGICAL SURGERY
Payer: MEDICARE

## 2018-10-18 VITALS
TEMPERATURE: 99 F | OXYGEN SATURATION: 97 % | DIASTOLIC BLOOD PRESSURE: 82 MMHG | HEIGHT: 70 IN | SYSTOLIC BLOOD PRESSURE: 136 MMHG | HEART RATE: 72 BPM | WEIGHT: 227.08 LBS | RESPIRATION RATE: 20 BRPM

## 2018-10-18 DIAGNOSIS — Z90.79 ACQUIRED ABSENCE OF OTHER GENITAL ORGAN(S): Chronic | ICD-10-CM

## 2018-10-18 DIAGNOSIS — E89.0 POSTPROCEDURAL HYPOTHYROIDISM: Chronic | ICD-10-CM

## 2018-10-18 DIAGNOSIS — M51.35 OTHER INTERVERTEBRAL DISC DEGENERATION, THORACOLUMBAR REGION: ICD-10-CM

## 2018-10-18 DIAGNOSIS — Z98.890 OTHER SPECIFIED POSTPROCEDURAL STATES: Chronic | ICD-10-CM

## 2018-10-18 DIAGNOSIS — C91.90 LYMPHOID LEUKEMIA, UNSPECIFIED NOT HAVING ACHIEVED REMISSION: ICD-10-CM

## 2018-10-18 DIAGNOSIS — Z96.651 PRESENCE OF RIGHT ARTIFICIAL KNEE JOINT: Chronic | ICD-10-CM

## 2018-10-18 DIAGNOSIS — Z90.5 ACQUIRED ABSENCE OF KIDNEY: Chronic | ICD-10-CM

## 2018-10-18 DIAGNOSIS — R00.1 BRADYCARDIA, UNSPECIFIED: ICD-10-CM

## 2018-10-18 LAB
ANION GAP SERPL CALC-SCNC: 12 MMOL/L — SIGNIFICANT CHANGE UP (ref 5–17)
BUN SERPL-MCNC: 17 MG/DL — SIGNIFICANT CHANGE UP (ref 7–23)
CALCIUM SERPL-MCNC: 9.2 MG/DL — SIGNIFICANT CHANGE UP (ref 8.4–10.5)
CHLORIDE SERPL-SCNC: 105 MMOL/L — SIGNIFICANT CHANGE UP (ref 96–108)
CO2 SERPL-SCNC: 24 MMOL/L — SIGNIFICANT CHANGE UP (ref 22–31)
CREAT SERPL-MCNC: 0.85 MG/DL — SIGNIFICANT CHANGE UP (ref 0.5–1.3)
GLUCOSE BLDC GLUCOMTR-MCNC: 136 MG/DL — HIGH (ref 70–99)
GLUCOSE BLDC GLUCOMTR-MCNC: 142 MG/DL — HIGH (ref 70–99)
GLUCOSE BLDC GLUCOMTR-MCNC: 153 MG/DL — HIGH (ref 70–99)
GLUCOSE BLDC GLUCOMTR-MCNC: 155 MG/DL — HIGH (ref 70–99)
GLUCOSE SERPL-MCNC: 140 MG/DL — HIGH (ref 70–99)
HCT VFR BLD CALC: 34.1 % — LOW (ref 39–50)
HGB BLD-MCNC: 11.3 G/DL — LOW (ref 13–17)
MCHC RBC-ENTMCNC: 28.6 PG — SIGNIFICANT CHANGE UP (ref 27–34)
MCHC RBC-ENTMCNC: 33.2 GM/DL — SIGNIFICANT CHANGE UP (ref 32–36)
MCV RBC AUTO: 86.3 FL — SIGNIFICANT CHANGE UP (ref 80–100)
PLATELET # BLD AUTO: 181 K/UL — SIGNIFICANT CHANGE UP (ref 150–400)
POTASSIUM SERPL-MCNC: 4.2 MMOL/L — SIGNIFICANT CHANGE UP (ref 3.5–5.3)
POTASSIUM SERPL-SCNC: 4.2 MMOL/L — SIGNIFICANT CHANGE UP (ref 3.5–5.3)
RBC # BLD: 3.96 M/UL — LOW (ref 4.2–5.8)
RBC # FLD: 13.2 % — SIGNIFICANT CHANGE UP (ref 10.3–14.5)
SODIUM SERPL-SCNC: 141 MMOL/L — SIGNIFICANT CHANGE UP (ref 135–145)
TROPONIN T, HIGH SENSITIVITY RESULT: 42 NG/L — SIGNIFICANT CHANGE UP (ref 0–51)
WBC # BLD: 19.4 K/UL — HIGH (ref 3.8–10.5)
WBC # FLD AUTO: 19.4 K/UL — HIGH (ref 3.8–10.5)

## 2018-10-18 PROCEDURE — 72148 MRI LUMBAR SPINE W/O DYE: CPT | Mod: 26

## 2018-10-18 PROCEDURE — 93010 ELECTROCARDIOGRAM REPORT: CPT

## 2018-10-18 PROCEDURE — 93010 ELECTROCARDIOGRAM REPORT: CPT | Mod: 77

## 2018-10-18 RX ORDER — SENNA PLUS 8.6 MG/1
2 TABLET ORAL AT BEDTIME
Qty: 0 | Refills: 0 | Status: DISCONTINUED | OUTPATIENT
Start: 2018-10-18 | End: 2018-10-20

## 2018-10-18 RX ORDER — FUROSEMIDE 40 MG
40 TABLET ORAL DAILY
Qty: 0 | Refills: 0 | Status: DISCONTINUED | OUTPATIENT
Start: 2018-10-18 | End: 2018-10-20

## 2018-10-18 RX ORDER — LISINOPRIL 2.5 MG/1
40 TABLET ORAL DAILY
Qty: 0 | Refills: 0 | Status: DISCONTINUED | OUTPATIENT
Start: 2018-10-18 | End: 2018-10-20

## 2018-10-18 RX ORDER — DOCUSATE SODIUM 100 MG
100 CAPSULE ORAL THREE TIMES A DAY
Qty: 0 | Refills: 0 | Status: DISCONTINUED | OUTPATIENT
Start: 2018-10-18 | End: 2018-10-20

## 2018-10-18 RX ORDER — ONDANSETRON 8 MG/1
4 TABLET, FILM COATED ORAL ONCE
Qty: 0 | Refills: 0 | Status: COMPLETED | OUTPATIENT
Start: 2018-10-18 | End: 2018-10-18

## 2018-10-18 RX ORDER — ATORVASTATIN CALCIUM 80 MG/1
80 TABLET, FILM COATED ORAL AT BEDTIME
Qty: 0 | Refills: 0 | Status: DISCONTINUED | OUTPATIENT
Start: 2018-10-18 | End: 2018-10-20

## 2018-10-18 RX ORDER — HYDROMORPHONE HYDROCHLORIDE 2 MG/ML
4 INJECTION INTRAMUSCULAR; INTRAVENOUS; SUBCUTANEOUS EVERY 4 HOURS
Qty: 0 | Refills: 0 | Status: DISCONTINUED | OUTPATIENT
Start: 2018-10-18 | End: 2018-10-20

## 2018-10-18 RX ORDER — DEXTROSE 50 % IN WATER 50 %
25 SYRINGE (ML) INTRAVENOUS ONCE
Qty: 0 | Refills: 0 | Status: DISCONTINUED | OUTPATIENT
Start: 2018-10-18 | End: 2018-10-20

## 2018-10-18 RX ORDER — INSULIN LISPRO 100/ML
VIAL (ML) SUBCUTANEOUS AT BEDTIME
Qty: 0 | Refills: 0 | Status: DISCONTINUED | OUTPATIENT
Start: 2018-10-18 | End: 2018-10-20

## 2018-10-18 RX ORDER — ONDANSETRON 8 MG/1
1 TABLET, FILM COATED ORAL
Qty: 0 | Refills: 0 | COMMUNITY

## 2018-10-18 RX ORDER — HYDRALAZINE HCL 50 MG
5 TABLET ORAL ONCE
Qty: 0 | Refills: 0 | Status: COMPLETED | OUTPATIENT
Start: 2018-10-18 | End: 2018-10-18

## 2018-10-18 RX ORDER — ONDANSETRON 8 MG/1
4 TABLET, FILM COATED ORAL EVERY 6 HOURS
Qty: 0 | Refills: 0 | Status: DISCONTINUED | OUTPATIENT
Start: 2018-10-18 | End: 2018-10-20

## 2018-10-18 RX ORDER — ACETAMINOPHEN 500 MG
650 TABLET ORAL EVERY 6 HOURS
Qty: 0 | Refills: 0 | Status: DISCONTINUED | OUTPATIENT
Start: 2018-10-18 | End: 2018-10-20

## 2018-10-18 RX ORDER — DEXTROSE 50 % IN WATER 50 %
12.5 SYRINGE (ML) INTRAVENOUS ONCE
Qty: 0 | Refills: 0 | Status: DISCONTINUED | OUTPATIENT
Start: 2018-10-18 | End: 2018-10-20

## 2018-10-18 RX ORDER — SODIUM CHLORIDE 9 MG/ML
1000 INJECTION, SOLUTION INTRAVENOUS
Qty: 0 | Refills: 0 | Status: DISCONTINUED | OUTPATIENT
Start: 2018-10-18 | End: 2018-10-20

## 2018-10-18 RX ORDER — OXYCODONE HYDROCHLORIDE 5 MG/1
5 TABLET ORAL EVERY 4 HOURS
Qty: 0 | Refills: 0 | Status: DISCONTINUED | OUTPATIENT
Start: 2018-10-18 | End: 2018-10-20

## 2018-10-18 RX ORDER — DEXTROSE MONOHYDRATE, SODIUM CHLORIDE, AND POTASSIUM CHLORIDE 50; .745; 4.5 G/1000ML; G/1000ML; G/1000ML
1000 INJECTION, SOLUTION INTRAVENOUS
Qty: 0 | Refills: 0 | Status: DISCONTINUED | OUTPATIENT
Start: 2018-10-18 | End: 2018-10-19

## 2018-10-18 RX ORDER — GLUCAGON INJECTION, SOLUTION 0.5 MG/.1ML
1 INJECTION, SOLUTION SUBCUTANEOUS ONCE
Qty: 0 | Refills: 0 | Status: DISCONTINUED | OUTPATIENT
Start: 2018-10-18 | End: 2018-10-20

## 2018-10-18 RX ORDER — DEXTROSE 50 % IN WATER 50 %
15 SYRINGE (ML) INTRAVENOUS ONCE
Qty: 0 | Refills: 0 | Status: DISCONTINUED | OUTPATIENT
Start: 2018-10-18 | End: 2018-10-20

## 2018-10-18 RX ORDER — INSULIN LISPRO 100/ML
VIAL (ML) SUBCUTANEOUS
Qty: 0 | Refills: 0 | Status: DISCONTINUED | OUTPATIENT
Start: 2018-10-18 | End: 2018-10-20

## 2018-10-18 RX ORDER — LEVOTHYROXINE SODIUM 125 MCG
175 TABLET ORAL DAILY
Qty: 0 | Refills: 0 | Status: DISCONTINUED | OUTPATIENT
Start: 2018-10-18 | End: 2018-10-20

## 2018-10-18 RX ADMIN — ONDANSETRON 4 MILLIGRAM(S): 8 TABLET, FILM COATED ORAL at 14:30

## 2018-10-18 RX ADMIN — ATORVASTATIN CALCIUM 80 MILLIGRAM(S): 80 TABLET, FILM COATED ORAL at 22:59

## 2018-10-18 RX ADMIN — ONDANSETRON 4 MILLIGRAM(S): 8 TABLET, FILM COATED ORAL at 16:03

## 2018-10-18 RX ADMIN — ONDANSETRON 4 MILLIGRAM(S): 8 TABLET, FILM COATED ORAL at 12:13

## 2018-10-18 RX ADMIN — Medication 1: at 17:25

## 2018-10-18 RX ADMIN — Medication 650 MILLIGRAM(S): at 10:18

## 2018-10-18 RX ADMIN — Medication 650 MILLIGRAM(S): at 16:56

## 2018-10-18 RX ADMIN — DEXTROSE MONOHYDRATE, SODIUM CHLORIDE, AND POTASSIUM CHLORIDE 75 MILLILITER(S): 50; .745; 4.5 INJECTION, SOLUTION INTRAVENOUS at 09:30

## 2018-10-18 RX ADMIN — HYDROMORPHONE HYDROCHLORIDE 4 MILLIGRAM(S): 2 INJECTION INTRAMUSCULAR; INTRAVENOUS; SUBCUTANEOUS at 20:18

## 2018-10-18 RX ADMIN — Medication 5 MILLIGRAM(S): at 16:53

## 2018-10-18 RX ADMIN — HYDROMORPHONE HYDROCHLORIDE 4 MILLIGRAM(S): 2 INJECTION INTRAMUSCULAR; INTRAVENOUS; SUBCUTANEOUS at 12:00

## 2018-10-18 RX ADMIN — HYDROMORPHONE HYDROCHLORIDE 4 MILLIGRAM(S): 2 INJECTION INTRAMUSCULAR; INTRAVENOUS; SUBCUTANEOUS at 19:48

## 2018-10-18 RX ADMIN — Medication 40 MILLIGRAM(S): at 15:46

## 2018-10-18 RX ADMIN — Medication 650 MILLIGRAM(S): at 11:00

## 2018-10-18 RX ADMIN — Medication 5 MILLIGRAM(S): at 11:45

## 2018-10-18 RX ADMIN — ONDANSETRON 4 MILLIGRAM(S): 8 TABLET, FILM COATED ORAL at 22:12

## 2018-10-18 RX ADMIN — Medication 100 MILLIGRAM(S): at 22:59

## 2018-10-18 RX ADMIN — HYDROMORPHONE HYDROCHLORIDE 4 MILLIGRAM(S): 2 INJECTION INTRAMUSCULAR; INTRAVENOUS; SUBCUTANEOUS at 11:30

## 2018-10-18 RX ADMIN — DEXTROSE MONOHYDRATE, SODIUM CHLORIDE, AND POTASSIUM CHLORIDE 75 MILLILITER(S): 50; .745; 4.5 INJECTION, SOLUTION INTRAVENOUS at 22:12

## 2018-10-18 RX ADMIN — Medication 650 MILLIGRAM(S): at 16:25

## 2018-10-18 RX ADMIN — Medication 1: at 12:11

## 2018-10-18 NOTE — CONSULT NOTE ADULT - SUBJECTIVE AND OBJECTIVE BOX
Bradycardia with possible pause (04 Oct 2018 14:53)      HPI:  Mr. Moore is a 72 year old man with PMH T2DM, HTN, HLD, FERNANDO on CPAP, Pneumonia, Prostate Cancer s/p removal 2003, thyroid cancer s/p thyroidectomy 8/2018, renal cancer s/p partial nephrectomy, CLL (not active), former smoker and osteoarthritis s/p R TKR 2012 who has developed severe left thigh pain which imaging revealed spinal stenosis and scoliosis and is now scheduled for L1-5 posterior lumbar fusion and T10-pelvis instrumentation and fusion on 10/18/2018. After anesthesia induction and while having lines placed, Mr. Moore became bradycardic and may have had a significant pause. The event was not recorded on the monitor strip. He is now in the PACU and feels well with a HR in the 70's and in RSR. (04 Oct 2018 14:53)      PAST MEDICAL & SURGICAL HISTORY:  CLL (chronic lymphocytic leukemia)  Sleep apnea, unspecified type  Spinal stenosis of lumbar region, unspecified whether neurogenic claudication present  Malignant neoplasm of kidney parenchyma, right: s/p surgery  Thyroid nodule  Malignant neoplasm of thyroid gland  Prostate cancer: 2003, s/p prostatectomy, RT 2009 x 40 days  Hypertension, unspecified type  Diabetes mellitus type 2 in obese  H/O thyroidectomy: 2016  History of strabismus surgery: b/l 1958  S/P left knee arthroscopy: 1998  H/O ventral hernia repair: 1997  H/O radical prostatectomy: 2003  H/O partial nephrectomy: right, 2009  History of total knee replacement, right: 2012, scoped 1998      MEDICATIONS  (STANDING):  Crestor 20 mg daily  ceFAZolin   IVPB 2000 milliGRAM(s) IV Intermittent once  dextrose 5%. 1000 milliLiter(s) (50 mL/Hr) IV Continuous <Continuous>  dextrose 50% Injectable 12.5 Gram(s) IV Push once  dextrose 50% Injectable 25 Gram(s) IV Push once  dextrose 50% Injectable 25 Gram(s) IV Push once  docusate sodium 100 milliGRAM(s) Oral three times a day  furosemide    Tablet 40 milliGRAM(s) Oral daily  insulin lispro (HumaLOG) corrective regimen sliding scale   SubCutaneous three times a day before meals  insulin lispro (HumaLOG) corrective regimen sliding scale   SubCutaneous at bedtime  levothyroxine 175 MICROGram(s) Oral daily  Ramipril 10 mg BID  sodium chloride 0.9% with potassium chloride 20 mEq/L 1000 milliLiter(s) (75 mL/Hr) IV Continuous <Continuous>  calcitriol 0.5 mcg daily  Metformin  mg 2 tabs BID  Januvia 100 mg daily    MEDICATIONS  (PRN):  acetaminophen   Tablet .. 650 milliGRAM(s) Oral every 6 hours PRN Temp greater or equal to 38C (100.4F), Mild Pain (1 - 3)  dextrose 40% Gel 15 Gram(s) Oral once PRN Blood Glucose LESS THAN 70 milliGRAM(s)/deciliter  glucagon  Injectable 1 milliGRAM(s) IntraMuscular once PRN Glucose LESS THAN 70 milligrams/deciliter  HYDROmorphone   Tablet 4 milliGRAM(s) Oral every 4 hours PRN Severe Pain (7 - 10)  ondansetron   Disintegrating Tablet 4 milliGRAM(s) Oral every 6 hours PRN Nausea  ondansetron Injectable 4 milliGRAM(s) IV Push once PRN Nausea and/or Vomiting  oxyCODONE    IR 5 milliGRAM(s) Oral every 4 hours PRN Moderate Pain (4 - 6)  senna 2 Tablet(s) Oral at bedtime PRN Constipation      FAMILY HISTORY:  Family history of amyloidosis (Mother)  Family history of prostate cancer in father (Father)      SOCIAL HISTORY:    CIGARETTES: former smoker    REVIEW OF SYSTEMS: As per HPI, rest negative  	  Vital Signs Last 24 Hrs  T(C): 36.4 (18 Oct 2018 08:45), Max: 37 (18 Oct 2018 06:13)  T(F): 97.5 (18 Oct 2018 08:45), Max: 98.6 (18 Oct 2018 06:13)  HR: 76 (18 Oct 2018 10:00) (72 - 83)  BP: 161/83 (18 Oct 2018 10:00) (136/82 - 167/80)  BP(mean): 113 (18 Oct 2018 10:00) (101 - 115)  RR: 13 (18 Oct 2018 10:00) (13 - 20)  SpO2: 95% (18 Oct 2018 10:00) (95% - 97%)    PHYSICAL EXAM:    Constitutional: WD, WN in NAD  Neck: no JVD  Respiratory: clear lungs  Cardiovascular: RRR, no murmurs noted  Gastrointestinal: BS present, no masses, NT, ND  Extremities: no edema noted, Venodynes in place  Neurological: grossly nonfocal    TELEMETRY: RSR    ECG: RSR with first degree AV block    LABS: pending  FSG- 136    I&O's Summary    BNP  RADIOLOGY & ADDITIONAL STUDIES:  Office echo 8/2017- nl EF with mild LVH and mild MR  Office nuclear ETT 7/2017- nl perfusion and EF of 64%  Office Holter 6/2017- RSR with bradycardia to the 30's at night and pauses up to 2.5s at night    IMPRESSION:  Bradycardia with ? sig pause- maybe related to FERNANDO  HTN  DM  HLD  FERNANDO on CPAP  Hypothyroidism post thyroidectomy    RECOMMENDATIONS:  Telemetry  Serial ECG's and CE's  Continue current meds  If continued sig pauses- will need EP evaluation  Treatment for FERNANDO with CPAP

## 2018-10-18 NOTE — PHYSICAL THERAPY INITIAL EVALUATION ADULT - PRECAUTIONS/LIMITATIONS, REHAB EVAL
HISTORY CONTINUED: After anesthesia induction and while having lines placed, pt became bradycardic and may have had a significant pause. The event was not recorded on the monitor strip. Pt seen by cardiology./cardiac precautions HISTORY CONTINUED: After anesthesia induction and while having lines placed, pt became bradycardic and may have had a significant pause. The event was not recorded on the monitor strip. Pt seen by cardiology and EP./cardiac precautions

## 2018-10-18 NOTE — PHYSICAL THERAPY INITIAL EVALUATION ADULT - GENERAL OBSERVATIONS, REHAB EVAL
Pt kelle 35 min eval. Pt agreed to work with PT. Pt rec'd in bed, peripheral IV line, +tele, family at bedside and NAD. PT set up room, and then EP team came to speak with pt and family. Following meeting PT spoke with MD Lanza and KAMARI Barrett. From EP standpoint, pt ok to be OOB/ambulating with PT.

## 2018-10-18 NOTE — PHYSICAL THERAPY INITIAL EVALUATION ADULT - PERTINENT HX OF CURRENT PROBLEM, REHAB EVAL
71 y/o male with PMH T2DM, FERNANDO on CPAP, pneumonia, prostate cancer s/p removal 2003, thyroid cancer s/p thyroidectomy 8/2018, renal cancer s/p partial nephrectomy, CLL (not active), former smoker and osteoarthritis s/p R TKR 2012 who has developed severe L thigh pain which imaging revealed spinal stenosis and scoliosis. Pt scheduled for L1-L5 posterior lumbar fusion and T10-pelvis instrumentation and fusion. 73 y/o male with PMH T2DM, FERNANDO on CPAP, pneumonia, prostate cancer s/p removal 2003, thyroid cancer s/p thyroidectomy 8/2018, renal cancer s/p partial nephrectomy, CLL (not active), former smoker and osteoarthritis s/p R TKR 2012 who has developed severe L thigh pain which imaging revealed spinal stenosis and scoliosis. Pt scheduled for L1-L5 posterior lumbar fusion and T10-pelvis instrumentation and fusion. Surgery was aborted.

## 2018-10-18 NOTE — CONSULT NOTE ADULT - PROBLEM SELECTOR RECOMMENDATION 2
WBC 19.4 (afrebile)  h/o CLL (not active) follow with Dr. Mccabe ([facility])

## 2018-10-18 NOTE — CONSULT NOTE ADULT - SUBJECTIVE AND OBJECTIVE BOX
HPI:  Patient is a 72 year old man with PMH T2DM, HTN, HLD, FERNANDO on CPAP, Pneumonia, Prostate Cancer s/p removal 2003, thyroid cancer s/p thyroidectomy 8/2018, renal cancer s/p partial nephrectomy, CLL (not active),Spinal stenosis and scoliosis who presented today  scheduled for L1-5 posterior lumbar fusion and T10-pelvis instrumentation and fusion today.  After anesthesia induction noted to have episode sinus bradycardia HR 30 with a pause. No strips available for review. Pt seen and examined. Reports feeling dizzy and slight nauseousness who contributes symptoms after receiving narcotic for back pain, denies any chest pain, palpitations, or SOB. Pt is currently NSR, heart rate 70's. EP consulted for further management           PCP Dr. Samson Doran        PAST MEDICAL & SURGICAL HISTORY:  Former smoker, stopped smoking many years ago  CLL (chronic lymphocytic leukemia)  Sleep apnea, unspecified type  Spinal stenosis of lumbar region, unspecified whether neurogenic claudication present  Leukocytosis, unspecified type: monitored for CLL  Malignant neoplasm of kidney parenchyma, right: s/p surgery  Thyroid nodule  Malignant neoplasm of thyroid gland  Prostate cancer: 2003, s/p prostatectomy, RT 2009 x 40 days  Hypertension, unspecified type  Diabetes mellitus type 2 in obese  H/O thyroidectomy: 2016  History of strabismus surgery: b/l 1958  S/P left knee arthroscopy: 1998  H/O ventral hernia repair: 1997  H/O radical prostatectomy: 2003  H/O partial nephrectomy: right, 2009  History of total knee replacement, right: 2012, scoped 1998      ROS:  General: Pt denies recent weight loss/fever/chills  Neurological: denies numbness or  sensation loss  Cardiovascular: denies chest pain/palpitations/leg edema  Respiratory and Thorax: denies SOB/cough/wheezing  Gastrointestinal: denies abdominal pain/diarrhea/constipation/bloody stool  Genitourinary: denies urinary frequency/urgency/ dysuria  Hematologic: denies abnormal bleeding  	    	  	    MEDICATIONS  (STANDING):  atorvastatin 80 milliGRAM(s) Oral at bedtime  ceFAZolin   IVPB 2000 milliGRAM(s) IV Intermittent once  dextrose 5%. 1000 milliLiter(s) (50 mL/Hr) IV Continuous <Continuous>  dextrose 50% Injectable 12.5 Gram(s) IV Push once  dextrose 50% Injectable 25 Gram(s) IV Push once  dextrose 50% Injectable 25 Gram(s) IV Push once  docusate sodium 100 milliGRAM(s) Oral three times a day  furosemide    Tablet 40 milliGRAM(s) Oral daily  insulin lispro (HumaLOG) corrective regimen sliding scale   SubCutaneous three times a day before meals  insulin lispro (HumaLOG) corrective regimen sliding scale   SubCutaneous at bedtime  levothyroxine 175 MICROGram(s) Oral daily  lisinopril 40 milliGRAM(s) Oral daily  sodium chloride 0.9% with potassium chloride 20 mEq/L 1000 milliLiter(s) (75 mL/Hr) IV Continuous <Continuous>    MEDICATIONS  (PRN):  acetaminophen   Tablet .. 650 milliGRAM(s) Oral every 6 hours PRN Temp greater or equal to 38C (100.4F), Mild Pain (1 - 3)  dextrose 40% Gel 15 Gram(s) Oral once PRN Blood Glucose LESS THAN 70 milliGRAM(s)/deciliter  glucagon  Injectable 1 milliGRAM(s) IntraMuscular once PRN Glucose LESS THAN 70 milligrams/deciliter  HYDROmorphone   Tablet 4 milliGRAM(s) Oral every 4 hours PRN Severe Pain (7 - 10)  ondansetron   Disintegrating Tablet 4 milliGRAM(s) Oral every 6 hours PRN Nausea  oxyCODONE    IR 5 milliGRAM(s) Oral every 4 hours PRN Moderate Pain (4 - 6)  senna 2 Tablet(s) Oral at bedtime PRN Constipation      Allergies    codeine (Vomiting; Headache)  dogs, cats (Short breath)  dust (Rhinitis)  mold (Rhinitis)  morphine (Vomiting; Headache)  narcotic analgesics (Vomiting; Headache)    Intolerances        SOCIAL HISTORY:    FAMILY HISTORY:  Family history of amyloidosis (Mother)  Family history of prostate cancer in father (Father)      Vital Signs Last 24 Hrs  T(C): 36.5 (18 Oct 2018 13:15), Max: 37 (18 Oct 2018 06:13)  T(F): 97.7 (18 Oct 2018 13:15), Max: 98.6 (18 Oct 2018 06:13)  HR: 70 (18 Oct 2018 14:00) (68 - 90)  BP: 170/83 (18 Oct 2018 14:00) (136/82 - 180/86)  BP(mean): 119 (18 Oct 2018 14:00) (101 - 126)  RR: 17 (18 Oct 2018 14:00) (12 - 22)  SpO2: 96% (18 Oct 2018 14:00) (91% - 98%)    Physical Exam:  Neuro: Alert and oriented x3  Resp: Bilateral lung sounds clear  Cardiac: S1, S2  Abd: soft, non- tender, obese abdomen + BS x 4  Extremities: No edema noted      LABS:                        11.3   19.4  )-----------( 181      ( 18 Oct 2018 10:24 )             34.1     10-18    141  |  105  |  17  ----------------------------<  140<H>  4.2   |  24  |  0.85    Ca    9.2      18 Oct 2018 10:24 HPI:  Patient is a 72 year old man with PMH T2DM, HTN, HLD, FERNANDO on CPAP, Pneumonia, Prostate Cancer s/p removal 2003, thyroid cancer s/p thyroidectomy 8/2018, renal cancer s/p partial nephrectomy, CLL (not active),Spinal stenosis and scoliosis who presented today  scheduled for L1-5 posterior lumbar fusion and T10-pelvis instrumentation and fusion today.  After anesthesia induction with propofol noted to have episode sinus bradycardia HR 30 with a pause., surgery then aborted. No strips available for review. Pt seen and examined. Reports feeling dizzy and slight nauseousness who contributes symptoms after receiving narcotic for back pain, denies any chest pain, palpitations, or SOB. Per documentation from general cardiology pt had Holter in 201which revealed nocturnal jerry/ pauses episodes.  Pt is currently NSR, heart rate 70's. EP consulted for further management           PCP Dr. Samson Doran        PAST MEDICAL & SURGICAL HISTORY:  Former smoker, stopped smoking many years ago  CLL (chronic lymphocytic leukemia)  Sleep apnea, unspecified type  Spinal stenosis of lumbar region, unspecified whether neurogenic claudication present  Leukocytosis, unspecified type: monitored for CLL  Malignant neoplasm of kidney parenchyma, right: s/p surgery  Thyroid nodule  Malignant neoplasm of thyroid gland  Prostate cancer: 2003, s/p prostatectomy, RT 2009 x 40 days  Hypertension, unspecified type  Diabetes mellitus type 2 in obese  H/O thyroidectomy: 2016  History of strabismus surgery: b/l 1958  S/P left knee arthroscopy: 1998  H/O ventral hernia repair: 1997  H/O radical prostatectomy: 2003  H/O partial nephrectomy: right, 2009  History of total knee replacement, right: 2012, scoped 1998      ROS:  General: Pt denies recent weight loss/fever/chills  Neurological: denies numbness or  sensation loss  Cardiovascular: denies chest pain/palpitations/leg edema  Respiratory and Thorax: denies SOB/cough/wheezing  Gastrointestinal: denies abdominal pain/diarrhea/constipation/bloody stool  Genitourinary: denies urinary frequency/urgency/ dysuria  Hematologic: denies abnormal bleeding  	    	  	    MEDICATIONS  (STANDING):  atorvastatin 80 milliGRAM(s) Oral at bedtime  ceFAZolin   IVPB 2000 milliGRAM(s) IV Intermittent once  dextrose 5%. 1000 milliLiter(s) (50 mL/Hr) IV Continuous <Continuous>  dextrose 50% Injectable 12.5 Gram(s) IV Push once  dextrose 50% Injectable 25 Gram(s) IV Push once  dextrose 50% Injectable 25 Gram(s) IV Push once  docusate sodium 100 milliGRAM(s) Oral three times a day  furosemide    Tablet 40 milliGRAM(s) Oral daily  insulin lispro (HumaLOG) corrective regimen sliding scale   SubCutaneous three times a day before meals  insulin lispro (HumaLOG) corrective regimen sliding scale   SubCutaneous at bedtime  levothyroxine 175 MICROGram(s) Oral daily  lisinopril 40 milliGRAM(s) Oral daily  sodium chloride 0.9% with potassium chloride 20 mEq/L 1000 milliLiter(s) (75 mL/Hr) IV Continuous <Continuous>    MEDICATIONS  (PRN):  acetaminophen   Tablet .. 650 milliGRAM(s) Oral every 6 hours PRN Temp greater or equal to 38C (100.4F), Mild Pain (1 - 3)  dextrose 40% Gel 15 Gram(s) Oral once PRN Blood Glucose LESS THAN 70 milliGRAM(s)/deciliter  glucagon  Injectable 1 milliGRAM(s) IntraMuscular once PRN Glucose LESS THAN 70 milligrams/deciliter  HYDROmorphone   Tablet 4 milliGRAM(s) Oral every 4 hours PRN Severe Pain (7 - 10)  ondansetron   Disintegrating Tablet 4 milliGRAM(s) Oral every 6 hours PRN Nausea  oxyCODONE    IR 5 milliGRAM(s) Oral every 4 hours PRN Moderate Pain (4 - 6)  senna 2 Tablet(s) Oral at bedtime PRN Constipation      Allergies    codeine (Vomiting; Headache)  dogs, cats (Short breath)  dust (Rhinitis)  mold (Rhinitis)  morphine (Vomiting; Headache)  narcotic analgesics (Vomiting; Headache)    Intolerances        SOCIAL HISTORY:    FAMILY HISTORY:  Family history of amyloidosis (Mother)  Family history of prostate cancer in father (Father)      Vital Signs Last 24 Hrs  T(C): 36.5 (18 Oct 2018 13:15), Max: 37 (18 Oct 2018 06:13)  T(F): 97.7 (18 Oct 2018 13:15), Max: 98.6 (18 Oct 2018 06:13)  HR: 70 (18 Oct 2018 14:00) (68 - 90)  BP: 170/83 (18 Oct 2018 14:00) (136/82 - 180/86)  BP(mean): 119 (18 Oct 2018 14:00) (101 - 126)  RR: 17 (18 Oct 2018 14:00) (12 - 22)  SpO2: 96% (18 Oct 2018 14:00) (91% - 98%)    Physical Exam:  Neuro: Alert and oriented x3  Resp: Bilateral lung sounds clear  Cardiac: S1, S2  Abd: soft, non- tender, obese abdomen + BS x 4  Extremities: No edema noted      LABS:                        11.3   19.4  )-----------( 181      ( 18 Oct 2018 10:24 )             34.1     10-18    141  |  105  |  17  ----------------------------<  140<H>  4.2   |  24  |  0.85    Ca    9.2      18 Oct 2018 10:24 HPI:  Patient is a 72 year old man with PMH T2DM, HTN, HLD, FERNANDO on CPAP, Pneumonia, Prostate Cancer s/p removal 2003, thyroid cancer s/p thyroidectomy 8/2018, renal cancer s/p partial nephrectomy, CLL (not active),Spinal stenosis and scoliosis who presented today  scheduled for L1-5 posterior lumbar fusion and T10-pelvis instrumentation and fusion today.  After anesthesia induction with propofol noted to have episode sinus bradycardia HR 30 with a  4 second pause, surgery then aborted. No strips available for review. Pt seen and examined. Reports feeling dizzy and slight nauseousness who contributes symptoms after receiving narcotic for back pain, denies any chest pain, palpitations, or SOB. Per documentation from general cardiology pt had Holter in 201which revealed nocturnal jerry/ pauses episodes.  Pt is currently NSR, heart rate 70's. EP consulted for further management           PCP Dr. Samson Doran        PAST MEDICAL & SURGICAL HISTORY:  Former smoker, stopped smoking many years ago  CLL (chronic lymphocytic leukemia)  Sleep apnea, unspecified type  Spinal stenosis of lumbar region, unspecified whether neurogenic claudication present  Leukocytosis, unspecified type: monitored for CLL  Malignant neoplasm of kidney parenchyma, right: s/p surgery  Thyroid nodule  Malignant neoplasm of thyroid gland  Prostate cancer: 2003, s/p prostatectomy, RT 2009 x 40 days  Hypertension, unspecified type  Diabetes mellitus type 2 in obese  H/O thyroidectomy: 2016  History of strabismus surgery: b/l 1958  S/P left knee arthroscopy: 1998  H/O ventral hernia repair: 1997  H/O radical prostatectomy: 2003  H/O partial nephrectomy: right, 2009  History of total knee replacement, right: 2012, scoped 1998      ROS:  General: Pt denies recent weight loss/fever/chills  Neurological: denies numbness or  sensation loss  Cardiovascular: denies chest pain/palpitations/leg edema  Respiratory and Thorax: denies SOB/cough/wheezing  Gastrointestinal: denies abdominal pain/diarrhea/constipation/bloody stool  Genitourinary: denies urinary frequency/urgency/ dysuria  Hematologic: denies abnormal bleeding  	    	  	    MEDICATIONS  (STANDING):  atorvastatin 80 milliGRAM(s) Oral at bedtime  ceFAZolin   IVPB 2000 milliGRAM(s) IV Intermittent once  dextrose 5%. 1000 milliLiter(s) (50 mL/Hr) IV Continuous <Continuous>  dextrose 50% Injectable 12.5 Gram(s) IV Push once  dextrose 50% Injectable 25 Gram(s) IV Push once  dextrose 50% Injectable 25 Gram(s) IV Push once  docusate sodium 100 milliGRAM(s) Oral three times a day  furosemide    Tablet 40 milliGRAM(s) Oral daily  insulin lispro (HumaLOG) corrective regimen sliding scale   SubCutaneous three times a day before meals  insulin lispro (HumaLOG) corrective regimen sliding scale   SubCutaneous at bedtime  levothyroxine 175 MICROGram(s) Oral daily  lisinopril 40 milliGRAM(s) Oral daily  sodium chloride 0.9% with potassium chloride 20 mEq/L 1000 milliLiter(s) (75 mL/Hr) IV Continuous <Continuous>    MEDICATIONS  (PRN):  acetaminophen   Tablet .. 650 milliGRAM(s) Oral every 6 hours PRN Temp greater or equal to 38C (100.4F), Mild Pain (1 - 3)  dextrose 40% Gel 15 Gram(s) Oral once PRN Blood Glucose LESS THAN 70 milliGRAM(s)/deciliter  glucagon  Injectable 1 milliGRAM(s) IntraMuscular once PRN Glucose LESS THAN 70 milligrams/deciliter  HYDROmorphone   Tablet 4 milliGRAM(s) Oral every 4 hours PRN Severe Pain (7 - 10)  ondansetron   Disintegrating Tablet 4 milliGRAM(s) Oral every 6 hours PRN Nausea  oxyCODONE    IR 5 milliGRAM(s) Oral every 4 hours PRN Moderate Pain (4 - 6)  senna 2 Tablet(s) Oral at bedtime PRN Constipation      Allergies    codeine (Vomiting; Headache)  dogs, cats (Short breath)  dust (Rhinitis)  mold (Rhinitis)  morphine (Vomiting; Headache)  narcotic analgesics (Vomiting; Headache)    Intolerances        SOCIAL HISTORY:    FAMILY HISTORY:  Family history of amyloidosis (Mother)  Family history of prostate cancer in father (Father)      Vital Signs Last 24 Hrs  T(C): 36.5 (18 Oct 2018 13:15), Max: 37 (18 Oct 2018 06:13)  T(F): 97.7 (18 Oct 2018 13:15), Max: 98.6 (18 Oct 2018 06:13)  HR: 70 (18 Oct 2018 14:00) (68 - 90)  BP: 170/83 (18 Oct 2018 14:00) (136/82 - 180/86)  BP(mean): 119 (18 Oct 2018 14:00) (101 - 126)  RR: 17 (18 Oct 2018 14:00) (12 - 22)  SpO2: 96% (18 Oct 2018 14:00) (91% - 98%)    Physical Exam:  Neuro: Alert and oriented x3  Resp: Bilateral lung sounds clear  Cardiac: S1, S2  Abd: soft, non- tender, obese abdomen + BS x 4  Extremities: No edema noted      LABS:                        11.3   19.4  )-----------( 181      ( 18 Oct 2018 10:24 )             34.1     10-18    141  |  105  |  17  ----------------------------<  140<H>  4.2   |  24  |  0.85    Ca    9.2      18 Oct 2018 10:24

## 2018-10-18 NOTE — PHYSICAL THERAPY INITIAL EVALUATION ADULT - ADDITIONAL COMMENTS
Pt states he lives in a condo with spouse with a step to enter and a flight of steps to bedroom/bathroom (+handrail). Pt states prior to admission being independent with all functional mobility and ADLs. Pt states he was working and driving prior to admission. Pt states he owns a straight cane but doesn't use it. Pt states he has a walk in shower with a grab bar.

## 2018-10-18 NOTE — CONSULT NOTE ADULT - PROBLEM SELECTOR RECOMMENDATION 9
Tele: Currently reveals SR, HR 70's  Review of EKG reveals  - Hold any AV renea blocker  - Check TSH in am   - Continue with closely telemetry monitoring. Tele: Currently reveals SR w 1HB, HR 70- 80's, Review of EKG reveals SR w/ 1HB and RBBB   - Hold any AV renea blockers  - Check TSH in am   - Continue with closely telemetry monitoring.  - Discussed with patient and family members at bedside the concern for conduction disease. Keep pt NPO p MN for EP study tomorrow wit Dr. Lanza, risks and benefits explained.

## 2018-10-18 NOTE — PHYSICAL THERAPY INITIAL EVALUATION ADULT - PLANNED THERAPY INTERVENTIONS, PT EVAL
gait training/strengthening/transfer training/GOAL: Pt will negotiate 9 steps with unilateral handrail in a step-to pattern independently in 2 weeks

## 2018-10-18 NOTE — CONSULT NOTE ADULT - ATTENDING COMMENTS
seen and examined with NP. I agree with H & P, A & P.    No pause available for review.  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  I suggested a conduction study to better evaluated.  Should this be OK it would be acceptable to proceed with the surgery with atropine if needed.

## 2018-10-18 NOTE — CONSULT NOTE ADULT - ASSESSMENT
72 year old man with PMH T2DM, HTN, HLD, FERNANDO on CPAP, , Prostate Cancer s/p removal 2003, thyroid cancer s/p thyroidectomy 8/2018, renal cancer s/p partial nephrectomy, CLL (not active),Spinal stenosis and scoliosis who presented for L1-5 posterior lumbar fusion and T10-pelvis instrumentation and fusion today.  After anesthesia induction noted to have episode sinus bradycardia HR 30 with a pause. No strips available for review. EP consulted for further management. 72 year old man with PMH T2DM, HTN, HLD, FERNANDO on CPAP, , Prostate Cancer s/p removal 2003, thyroid cancer s/p thyroidectomy 8/2018, renal cancer s/p partial nephrectomy, CLL (not active),Spinal stenosis and scoliosis who presented for L1-5 posterior lumbar fusion and T10-pelvis instrumentation and fusion today.  After anesthesia induction noted to have episode sinus bradycardia HR 30 with a  4 second pause. No strips available for review. EP consulted for further management.

## 2018-10-18 NOTE — PHYSICAL THERAPY INITIAL EVALUATION ADULT - GAIT TRAINING, PT EVAL
GOAL: Pt will ambulate 200 feet with least restrictive device as appropriate independently in 2 weeks

## 2018-10-19 DIAGNOSIS — I45.10 UNSPECIFIED RIGHT BUNDLE-BRANCH BLOCK: ICD-10-CM

## 2018-10-19 LAB
BLD GP AB SCN SERPL QL: NEGATIVE — SIGNIFICANT CHANGE UP
GLUCOSE BLDC GLUCOMTR-MCNC: 113 MG/DL — HIGH (ref 70–99)
GLUCOSE BLDC GLUCOMTR-MCNC: 122 MG/DL — HIGH (ref 70–99)
GLUCOSE BLDC GLUCOMTR-MCNC: 140 MG/DL — HIGH (ref 70–99)
RH IG SCN BLD-IMP: POSITIVE — SIGNIFICANT CHANGE UP

## 2018-10-19 PROCEDURE — 93602 INTRA-ATRIAL RECORDING: CPT | Mod: 26

## 2018-10-19 PROCEDURE — 93610 INTRA-ATRIAL PACING: CPT | Mod: 26

## 2018-10-19 PROCEDURE — 70450 CT HEAD/BRAIN W/O DYE: CPT | Mod: 26

## 2018-10-19 PROCEDURE — 93600 BUNDLE OF HIS RECORDING: CPT | Mod: 26

## 2018-10-19 RX ORDER — ONDANSETRON 8 MG/1
4 TABLET, FILM COATED ORAL ONCE
Qty: 0 | Refills: 0 | Status: COMPLETED | OUTPATIENT
Start: 2018-10-19 | End: 2018-10-19

## 2018-10-19 RX ORDER — TRAMADOL HYDROCHLORIDE 50 MG/1
25 TABLET ORAL ONCE
Qty: 0 | Refills: 0 | Status: DISCONTINUED | OUTPATIENT
Start: 2018-10-19 | End: 2018-10-19

## 2018-10-19 RX ORDER — DEXTROSE MONOHYDRATE, SODIUM CHLORIDE, AND POTASSIUM CHLORIDE 50; .745; 4.5 G/1000ML; G/1000ML; G/1000ML
1000 INJECTION, SOLUTION INTRAVENOUS
Qty: 0 | Refills: 0 | Status: DISCONTINUED | OUTPATIENT
Start: 2018-10-19 | End: 2018-10-20

## 2018-10-19 RX ORDER — HYDRALAZINE HCL 50 MG
5 TABLET ORAL ONCE
Qty: 0 | Refills: 0 | Status: COMPLETED | OUTPATIENT
Start: 2018-10-19 | End: 2018-10-19

## 2018-10-19 RX ORDER — ACETAMINOPHEN 500 MG
1000 TABLET ORAL ONCE
Qty: 0 | Refills: 0 | Status: COMPLETED | OUTPATIENT
Start: 2018-10-19 | End: 2018-10-19

## 2018-10-19 RX ADMIN — Medication 1000 MILLIGRAM(S): at 09:55

## 2018-10-19 RX ADMIN — TRAMADOL HYDROCHLORIDE 25 MILLIGRAM(S): 50 TABLET ORAL at 10:06

## 2018-10-19 RX ADMIN — Medication 5 MILLIGRAM(S): at 14:25

## 2018-10-19 RX ADMIN — Medication 100 MILLIGRAM(S): at 04:43

## 2018-10-19 RX ADMIN — ONDANSETRON 4 MILLIGRAM(S): 8 TABLET, FILM COATED ORAL at 08:45

## 2018-10-19 RX ADMIN — Medication 5 MILLIGRAM(S): at 15:56

## 2018-10-19 RX ADMIN — Medication 175 MICROGRAM(S): at 04:43

## 2018-10-19 RX ADMIN — LISINOPRIL 40 MILLIGRAM(S): 2.5 TABLET ORAL at 04:43

## 2018-10-19 RX ADMIN — Medication 650 MILLIGRAM(S): at 04:43

## 2018-10-19 RX ADMIN — Medication 40 MILLIGRAM(S): at 04:43

## 2018-10-19 RX ADMIN — TRAMADOL HYDROCHLORIDE 25 MILLIGRAM(S): 50 TABLET ORAL at 15:00

## 2018-10-19 RX ADMIN — Medication 400 MILLIGRAM(S): at 08:45

## 2018-10-19 RX ADMIN — TRAMADOL HYDROCHLORIDE 25 MILLIGRAM(S): 50 TABLET ORAL at 15:56

## 2018-10-19 RX ADMIN — Medication 100 MILLIGRAM(S): at 14:58

## 2018-10-19 RX ADMIN — DEXTROSE MONOHYDRATE, SODIUM CHLORIDE, AND POTASSIUM CHLORIDE 75 MILLILITER(S): 50; .745; 4.5 INJECTION, SOLUTION INTRAVENOUS at 20:57

## 2018-10-19 RX ADMIN — Medication 100 MILLIGRAM(S): at 20:57

## 2018-10-19 RX ADMIN — ONDANSETRON 4 MILLIGRAM(S): 8 TABLET, FILM COATED ORAL at 16:00

## 2018-10-19 RX ADMIN — TRAMADOL HYDROCHLORIDE 25 MILLIGRAM(S): 50 TABLET ORAL at 10:30

## 2018-10-19 RX ADMIN — Medication 650 MILLIGRAM(S): at 05:13

## 2018-10-19 RX ADMIN — Medication 1 TABLET(S): at 14:57

## 2018-10-19 RX ADMIN — Medication 1 TABLET(S): at 15:00

## 2018-10-19 RX ADMIN — ATORVASTATIN CALCIUM 80 MILLIGRAM(S): 80 TABLET, FILM COATED ORAL at 20:57

## 2018-10-19 NOTE — PROGRESS NOTE ADULT - PROBLEM SELECTOR PLAN 1
RBBB > 150 ms with sinus bradycardia HR 30 with a  4 second pause during anesthesia induction, c/b 1st degree AV delay  no pauses noted on telemetry over night, continue to monitor telemetry   NPO today for EPS to assess extent of conduction disease, vs vagally mediated bradycardia.  Hold any AV renea blockers  check TSH  continue NPO for EP study today RBBB > 150 ms with sinus bradycardia HR 30 with a  4 second pause during anesthesia induction, c/b 1st degree AV delay  no pauses noted on telemetry over night, continue to monitor telemetry   NPO today for EPS to assess extent of conduction disease, vs vagally mediated bradycardia.  Hold any AV renea blockers  check TSH RBBB > 150 ms with sinus bradycardia HR 30 with a  4 second pause during anesthesia induction, c/b 1st degree AV delay  no pauses noted on telemetry over night, continue to monitor telemetry   NPO today for EPS to assess extent of conduction disease, vs vagally mediated bradycardia.  Hold any AV renea blockers  check TSH  [id number]

## 2018-10-19 NOTE — PROGRESS NOTE ADULT - ASSESSMENT
Mr. Moore is a 72 year old man with PMH T2DM, HTN, HLD, FERNANDO on CPAP, Pneumonia, Prostate Cancer s/p removal 2003, thyroid cancer s/p thyroidectomy 8/2018, renal cancer s/p partial nephrectomy, CLL (not active), former smoker and osteoarthritis s/p R TKR 2012 who has developed severe left thigh pain which imaging revealed spinal stenosis and scoliosis and is now scheduled for L1-5 posterior lumbar fusion and T10-pelvis instrumentation and fusion on 10/18/2018. (04 Oct 2018 14:53). On 10/18/18 after anesthesia induction and while having lines placed, Mr. Moore became bradycardic and may had a significant pause. The event was not recorded on the monitor strip. He is now in 2 DSU, denies chest pain and vitals are stable, HR in the 70s. Cardiology is following, recommends serial EKGs, continue on Tele, current meds and EP evaluation. Evaluated by EP, tele currently reveals SR w 1HB, Review of EKG reveals SR w/ 1HB and RBBB, hold any AV renea blockers, keep pt NPO p MN for EP study today to assess extent of conduction disease, vs vagally mediated bradycardia.    Plan:  - PRN pain control (Tylenol, Dilaudid, Oxy IR PRN)  - Monitor headaches and nausea --> given IV Tylenol and Zofran  - IVF   - Cardiology following, follow up recommendations and clearance for surgery   - EP following, EP study today to assess extent of conduction disease, vs vagally mediated bradycardia  - Continue home meds (Furosemide, Lisinopril, Atorvastatin and Levothyroxine)  - DM: continue ISS  - Diabetic diet  - Bowel regimen   - DVT ppx   - consider Fioricet if headache persists   - Pending cardiology clearance and EP study and surgery date based on above work up      Will discuss with Dr. Harsh RODRIGUEZC # [id number]

## 2018-10-19 NOTE — PROGRESS NOTE ADULT - SUBJECTIVE AND OBJECTIVE BOX
Subjective: Pt. with a HA since anesthesia yesterday. He has had  nausea and vomiting with the HA. No pauses on telemetry overnight.  He has a first degree AV block and RBBB which are new since his last ECG in  September.     MEDICATIONS  (STANDING):  atorvastatin 80 milliGRAM(s) Oral at bedtime  ceFAZolin   IVPB 2000 milliGRAM(s) IV Intermittent once  dextrose 5%. 1000 milliLiter(s) (50 mL/Hr) IV Continuous <Continuous>  dextrose 50% Injectable 12.5 Gram(s) IV Push once  dextrose 50% Injectable 25 Gram(s) IV Push once  dextrose 50% Injectable 25 Gram(s) IV Push once  docusate sodium 100 milliGRAM(s) Oral three times a day  furosemide    Tablet 40 milliGRAM(s) Oral daily  insulin lispro (HumaLOG) corrective regimen sliding scale   SubCutaneous three times a day before meals  insulin lispro (HumaLOG) corrective regimen sliding scale   SubCutaneous at bedtime  levothyroxine 175 MICROGram(s) Oral daily  lisinopril 40 milliGRAM(s) Oral daily  sodium chloride 0.9% with potassium chloride 20 mEq/L 1000 milliLiter(s) (75 mL/Hr) IV Continuous <Continuous>    MEDICATIONS  (PRN):  acetaminophen   Tablet .. 650 milliGRAM(s) Oral every 6 hours PRN Temp greater or equal to 38C (100.4F), Mild Pain (1 - 3)  dextrose 40% Gel 15 Gram(s) Oral once PRN Blood Glucose LESS THAN 70 milliGRAM(s)/deciliter  glucagon  Injectable 1 milliGRAM(s) IntraMuscular once PRN Glucose LESS THAN 70 milligrams/deciliter  HYDROmorphone   Tablet 4 milliGRAM(s) Oral every 4 hours PRN Severe Pain (7 - 10)  ondansetron   Disintegrating Tablet 4 milliGRAM(s) Oral every 6 hours PRN Nausea  oxyCODONE    IR 5 milliGRAM(s) Oral every 4 hours PRN Moderate Pain (4 - 6)  senna 2 Tablet(s) Oral at bedtime PRN Constipation      Vital Signs Last 24 Hrs  T(C): 36.9 (19 Oct 2018 04:31), Max: 36.9 (19 Oct 2018 04:31)  T(F): 98.4 (19 Oct 2018 04:31), Max: 98.4 (19 Oct 2018 04:31)  HR: 70 (19 Oct 2018 07:34) (66 - 90)  BP: 169/84 (19 Oct 2018 07:34) (137/69 - 180/86)  BP(mean): 120 (18 Oct 2018 15:00) (105 - 126)  RR: 18 (19 Oct 2018 04:31) (12 - 22)  SpO2: 96% (19 Oct 2018 06:20) (91% - 98%)  PHYSICAL EXAM:      Constitutional: WD, WN  Neck: no JVD  Respiratory: clear lungs  Cardiovascular: RRR, 1/6 ALFONSO  Extremities: trace new edema  Neurological: grossly nonfocal    TELEMETRY: RSR with blocked PAC's. no pauses    ECG:< from: 12 Lead ECG (10.18.18 @ 08:57) >  Ventricular Rate 81 BPM    Atrial Rate 81 BPM    P-R Interval 234 ms    QRS Duration 122 ms    Q-T Interval 362 ms    QTC Calculation(Bezet) 420 ms    P Axis 72 degrees    R Axis 13 degrees    T Axis -15 degrees    Diagnosis Line SINUS RHYTHM WITH 1ST DEGREE A-V BLOCK  RIGHT BUNDLE BRANCH BLOCK  Possible left atrial enlargement  CANNOT RULE OUT INFERIOR INFARCT , AGE UNDETERMINED  ABNORMAL ECG    Confirmed by MD AMOS, HAN ([id number]) on 10/18/2018 5:18:59 PM    < end of copied text >        LABS:                        11.3   19.4  )-----------( 181      ( 18 Oct 2018 10:24 )             34.1     10-18    141  |  105  |  17  ----------------------------<  140<H>  4.2   |  24  |  0.85    Ca    9.2      18 Oct 2018 10:24              I&O's Summary    18 Oct 2018 07:01  -  19 Oct 2018 07:00  --------------------------------------------------------  IN: 1595 mL / OUT: 2000 mL / NET: -405 mL      BNP  RADIOLOGY & ADDITIONAL STUDIES:    IMPRESSION:  Possible SSS or high grade AV block with pause seen yesterday  New RBBB and first degree AV block  DM  HLD  Spinal stenosis  HA  FERNANDO    RECOMMENDATIONS:  Appreciate EP consult- will have conduction study today to assess need for PPM  Continue current meds  CPAP for FERNANDO  Pain management for back pain and HA- ? migraine- consider use of Fiorecet

## 2018-10-19 NOTE — PROGRESS NOTE ADULT - SUBJECTIVE AND OBJECTIVE BOX
HPI: c/o of HA, no change since yesterday, missed coffee and needs to have BM, states he gets HA from Pain medications    MEDICATIONS  (STANDING):  atorvastatin 80 milliGRAM(s) Oral at bedtime  ceFAZolin   IVPB 2000 milliGRAM(s) IV Intermittent once  dextrose 5%. 1000 milliLiter(s) (50 mL/Hr) IV Continuous <Continuous>  dextrose 50% Injectable 12.5 Gram(s) IV Push once  dextrose 50% Injectable 25 Gram(s) IV Push once  dextrose 50% Injectable 25 Gram(s) IV Push once  docusate sodium 100 milliGRAM(s) Oral three times a day  furosemide    Tablet 40 milliGRAM(s) Oral daily  insulin lispro (HumaLOG) corrective regimen sliding scale   SubCutaneous three times a day before meals  insulin lispro (HumaLOG) corrective regimen sliding scale   SubCutaneous at bedtime  levothyroxine 175 MICROGram(s) Oral daily  lisinopril 40 milliGRAM(s) Oral daily  sodium chloride 0.9% with potassium chloride 20 mEq/L 1000 milliLiter(s) (75 mL/Hr) IV Continuous <Continuous>    MEDICATIONS  (PRN):  acetaminophen   Tablet .. 650 milliGRAM(s) Oral every 6 hours PRN Temp greater or equal to 38C (100.4F), Mild Pain (1 - 3)  dextrose 40% Gel 15 Gram(s) Oral once PRN Blood Glucose LESS THAN 70 milliGRAM(s)/deciliter  glucagon  Injectable 1 milliGRAM(s) IntraMuscular once PRN Glucose LESS THAN 70 milligrams/deciliter  HYDROmorphone   Tablet 4 milliGRAM(s) Oral every 4 hours PRN Severe Pain (7 - 10)  ondansetron   Disintegrating Tablet 4 milliGRAM(s) Oral every 6 hours PRN Nausea  oxyCODONE    IR 5 milliGRAM(s) Oral every 4 hours PRN Moderate Pain (4 - 6)  senna 2 Tablet(s) Oral at bedtime PRN Constipation    Allergies    codeine (Vomiting; Headache)  dogs, cats (Short breath)  dust (Rhinitis)  mold (Rhinitis)  morphine (Vomiting; Headache)  narcotic analgesics (Vomiting; Headache)    Intolerances    REVIEW OF SYSTEM:    Constitutional: denies fever, chills, fatigue  Neuro: + headache, denies numbness, weakness, dizziness  Resp: denies cough, wheezing, shortness of breath  CVS: denies chest pain, palpitations, leg swelling  GI: denies abdominal pain, nausea, vomiting, diarrhea   : denies dysuria, frequency, incontinence  Skin: denies itching, burning, rashes, or lesions   Msk: + back pain     Vital Signs Last 24 Hrs  T(C): 36.9 (19 Oct 2018 04:31), Max: 36.9 (19 Oct 2018 04:31)  T(F): 98.4 (19 Oct 2018 04:31), Max: 98.4 (19 Oct 2018 04:31)  HR: 72 (19 Oct 2018 10:23) (66 - 77)  BP: 152/67 (19 Oct 2018 09:54) (137/69 - 180/86)  BP(mean): 120 (18 Oct 2018 15:00) (105 - 126)  RR: 18 (19 Oct 2018 04:31) (16 - 20)  SpO2: 96% (19 Oct 2018 10:23) (92% - 98%)    Physical Exam:  General : obese white male  no acute distress  Neuro : Alert and oriented x 3, no focal deficits  HEENT : Sclera clear,  neck supple  Lungs: Clear to Ascultation diminished , no wheezing , rales or rhonchi   Cardiovascular : + 1 +2, RRR, no murmurs, no rubs  GI : obese abdomen soft, NT, + BS   : no suprapubic tenderness  Extremities : No edema, + 2 DP and +2 PT, feet warm   Skin : warm dry    TELE: 60 -70 SR [id number] degree AV delay  EKG:    LABS:                         11.3   19.4  )-----------( 181      ( 18 Oct 2018 10:24 )             34.1     10-18    141  |  105  |  17  ----------------------------<  140<H>  4.2   |  24  |  0.85    Ca    9.2      18 Oct 2018 10:24    RADIOLOGY & ADDITIONAL TESTS:

## 2018-10-19 NOTE — PROGRESS NOTE ADULT - ASSESSMENT
72 year old man with PMH T2DM, HTN, HLD, FERNANDO on CPAP, Prostate Cancer s/p removal 2003, thyroid cancer s/p thyroidectomy 8/2018, renal cancer s/p partial nephrectomy, CLL (not active), Spinal stenosis and scoliosis who presented for L1-5 posterior lumbar fusion and T10-pelvis instrumentation and fusion today.  After anesthesia induction noted to have episode sinus bradycardia HR 30 with a  4 second pause. Surgery canceled, No strips available for review. EP consulted for further management prior to rescheduling lumbar surgery.

## 2018-10-19 NOTE — PROGRESS NOTE ADULT - SUBJECTIVE AND OBJECTIVE BOX
Pre-op Diagnosis:  "bradycardia"    Post-op Diagnosis: same    Procedure: EPS/conduction    Electrophysiologist: Pool    Anesthesia:  local    Access: RFV    Description: HV= 48 ms.  AV block in the AV node at 520 ms    Complications: none    EBL: < 10 cc    Plan:  No PPM.  Bradycardia most likely vagally mediated. 	No further EPS evaluation prior to his noncardiac surgery.

## 2018-10-19 NOTE — PROGRESS NOTE ADULT - SUBJECTIVE AND OBJECTIVE BOX
SUBJECTIVE: Patient seen and examined this morning, no acute events overnight, c/o back pain and headache, mild nausea probably due to narcotics. IV Tylenol, Zofran and IVF ordered. Pt was scheduled for L1-5 posterior lumbar fusion and T10-pelvis instrumentation and fusion on 10/18/2018. After anesthesia induction and while having lines placed, Mr. Moore became bradycardic and may had a significant pause. The event was not recorded on the monitor strip. He is now in 2 DSU, denies chest pain and vitals are stable, HR in the 70s. Cardiology is following, recommends serial EKGs, continue on Tele, current meds and EP evaluation. Evaluated by EP, tele currently reveals SR w 1HB, Review of EKG reveals SR w/ 1HB and RBBB, hold any AV renea blockers, keep pt NPO p MN for EP study today to assess extent of conduction disease, vs vagally mediated bradycardia.      Vital Signs Last 24 Hrs  T(C): 36.9 (19 Oct 2018 04:31), Max: 36.9 (19 Oct 2018 04:31)  T(F): 98.4 (19 Oct 2018 04:31), Max: 98.4 (19 Oct 2018 04:31)  HR: 70 (19 Oct 2018 11:05) (66 - 77)  BP: 157/79 (19 Oct 2018 11:05) (137/69 - 180/86)  BP(mean): 120 (18 Oct 2018 15:00) (105 - 125)  RR: 18 (19 Oct 2018 04:31) (16 - 19)  SpO2: 96% (19 Oct 2018 10:23) (92% - 98%)    PHYSICAL EXAM:    General: No Acute Distress     Neurological: Awake, alert oriented to person, place and time, Following Commands, PERRL, EOMI, Face Symmetrical, Speech Fluent, Moving all extremities, Muscle Strength normal in all four extremities 5/5 except LLE 4/5  No Drift, Sensation to Light Touch Intact    Pulmonary: Clear to Auscultation, No Rales, No Rhonchi, No Wheezes     Cardiovascular: S1, S2, Regular Rate and Rhythm     Gastrointestinal: Soft, Nontender, Nondistended     Incision: None    LABS:                        11.3   19.4  )-----------( 181      ( 18 Oct 2018 10:24 )             34.1    10-18    141  |  105  |  17  ----------------------------<  140<H>  4.2   |  24  |  0.85    Ca    9.2      18 Oct 2018 10:24      Hemoglobin A1C, Whole Blood: 6.9 % (10-04 @ 18:32)      10-18 @ 07:01  -  10-19 @ 07:00  --------------------------------------------------------  IN: 1595 mL / OUT: 2000 mL / NET: -405 mL      DRAINS: None    MEDICATIONS:  Antibiotics:  ceFAZolin   IVPB 2000 milliGRAM(s) IV Intermittent once    Neuro:  acetaminophen   Tablet .. 650 milliGRAM(s) Oral every 6 hours PRN Temp greater or equal to 38C (100.4F), Mild Pain (1 - 3)  HYDROmorphone   Tablet 4 milliGRAM(s) Oral every 4 hours PRN Severe Pain (7 - 10)  ondansetron   Disintegrating Tablet 4 milliGRAM(s) Oral every 6 hours PRN Nausea  oxyCODONE    IR 5 milliGRAM(s) Oral every 4 hours PRN Moderate Pain (4 - 6)    Cardiac:  furosemide    Tablet 40 milliGRAM(s) Oral daily  lisinopril 40 milliGRAM(s) Oral daily    Pulm:    GI/:  docusate sodium 100 milliGRAM(s) Oral three times a day  senna 2 Tablet(s) Oral at bedtime PRN Constipation    Other:   atorvastatin 80 milliGRAM(s) Oral at bedtime  dextrose 40% Gel 15 Gram(s) Oral once PRN Blood Glucose LESS THAN 70 milliGRAM(s)/deciliter  dextrose 5%. 1000 milliLiter(s) IV Continuous <Continuous>  dextrose 50% Injectable 12.5 Gram(s) IV Push once  dextrose 50% Injectable 25 Gram(s) IV Push once  dextrose 50% Injectable 25 Gram(s) IV Push once  glucagon  Injectable 1 milliGRAM(s) IntraMuscular once PRN Glucose LESS THAN 70 milligrams/deciliter  insulin lispro (HumaLOG) corrective regimen sliding scale   SubCutaneous three times a day before meals  insulin lispro (HumaLOG) corrective regimen sliding scale   SubCutaneous at bedtime  levothyroxine 175 MICROGram(s) Oral daily  sodium chloride 0.9% with potassium chloride 20 mEq/L 1000 milliLiter(s) IV Continuous <Continuous>    DIET: [x] Carb controlled diet  [] CCD [] Renal [] Puree [] Dysphagia [] Tube Feeds:     IMAGING:

## 2018-10-20 ENCOUNTER — TRANSCRIPTION ENCOUNTER (OUTPATIENT)
Age: 73
End: 2018-10-20

## 2018-10-20 VITALS
OXYGEN SATURATION: 96 % | DIASTOLIC BLOOD PRESSURE: 72 MMHG | RESPIRATION RATE: 18 BRPM | TEMPERATURE: 98 F | SYSTOLIC BLOOD PRESSURE: 130 MMHG | HEART RATE: 72 BPM

## 2018-10-20 LAB
ANION GAP SERPL CALC-SCNC: 9 MMOL/L — SIGNIFICANT CHANGE UP (ref 5–17)
APTT BLD: 27.1 SEC — LOW (ref 27.5–37.4)
BUN SERPL-MCNC: 12 MG/DL — SIGNIFICANT CHANGE UP (ref 7–23)
CALCIUM SERPL-MCNC: 9.6 MG/DL — SIGNIFICANT CHANGE UP (ref 8.4–10.5)
CHLORIDE SERPL-SCNC: 102 MMOL/L — SIGNIFICANT CHANGE UP (ref 96–108)
CO2 SERPL-SCNC: 26 MMOL/L — SIGNIFICANT CHANGE UP (ref 22–31)
CREAT SERPL-MCNC: 0.89 MG/DL — SIGNIFICANT CHANGE UP (ref 0.5–1.3)
GLUCOSE BLDC GLUCOMTR-MCNC: 125 MG/DL — HIGH (ref 70–99)
GLUCOSE BLDC GLUCOMTR-MCNC: 132 MG/DL — HIGH (ref 70–99)
GLUCOSE SERPL-MCNC: 128 MG/DL — HIGH (ref 70–99)
HCT VFR BLD CALC: 37 % — LOW (ref 39–50)
HGB BLD-MCNC: 11.6 G/DL — LOW (ref 13–17)
INR BLD: 1.06 RATIO — SIGNIFICANT CHANGE UP (ref 0.88–1.16)
MCHC RBC-ENTMCNC: 27.8 PG — SIGNIFICANT CHANGE UP (ref 27–34)
MCHC RBC-ENTMCNC: 31.4 GM/DL — LOW (ref 32–36)
MCV RBC AUTO: 88.7 FL — SIGNIFICANT CHANGE UP (ref 80–100)
PLATELET # BLD AUTO: 208 K/UL — SIGNIFICANT CHANGE UP (ref 150–400)
POTASSIUM SERPL-MCNC: 4.2 MMOL/L — SIGNIFICANT CHANGE UP (ref 3.5–5.3)
POTASSIUM SERPL-SCNC: 4.2 MMOL/L — SIGNIFICANT CHANGE UP (ref 3.5–5.3)
PROTHROM AB SERPL-ACNC: 12 SEC — SIGNIFICANT CHANGE UP (ref 10–13.1)
RBC # BLD: 4.17 M/UL — LOW (ref 4.2–5.8)
RBC # FLD: 14.9 % — HIGH (ref 10.3–14.5)
SODIUM SERPL-SCNC: 137 MMOL/L — SIGNIFICANT CHANGE UP (ref 135–145)
TSH SERPL-MCNC: 1.84 UIU/ML — SIGNIFICANT CHANGE UP (ref 0.27–4.2)
WBC # BLD: 22.12 K/UL — HIGH (ref 3.8–10.5)
WBC # FLD AUTO: 22.12 K/UL — HIGH (ref 3.8–10.5)

## 2018-10-20 PROCEDURE — 93010 ELECTROCARDIOGRAM REPORT: CPT

## 2018-10-20 PROCEDURE — 99232 SBSQ HOSP IP/OBS MODERATE 35: CPT

## 2018-10-20 RX ORDER — HYDROMORPHONE HYDROCHLORIDE 2 MG/ML
1 INJECTION INTRAMUSCULAR; INTRAVENOUS; SUBCUTANEOUS
Qty: 20 | Refills: 0 | OUTPATIENT
Start: 2018-10-20 | End: 2018-10-24

## 2018-10-20 RX ADMIN — HYDROMORPHONE HYDROCHLORIDE 4 MILLIGRAM(S): 2 INJECTION INTRAMUSCULAR; INTRAVENOUS; SUBCUTANEOUS at 03:50

## 2018-10-20 RX ADMIN — HYDROMORPHONE HYDROCHLORIDE 4 MILLIGRAM(S): 2 INJECTION INTRAMUSCULAR; INTRAVENOUS; SUBCUTANEOUS at 04:20

## 2018-10-20 RX ADMIN — DEXTROSE MONOHYDRATE, SODIUM CHLORIDE, AND POTASSIUM CHLORIDE 75 MILLILITER(S): 50; .745; 4.5 INJECTION, SOLUTION INTRAVENOUS at 03:52

## 2018-10-20 RX ADMIN — LISINOPRIL 40 MILLIGRAM(S): 2.5 TABLET ORAL at 05:12

## 2018-10-20 RX ADMIN — Medication 1 TABLET(S): at 03:50

## 2018-10-20 RX ADMIN — Medication 1 TABLET(S): at 04:20

## 2018-10-20 RX ADMIN — Medication 175 MICROGRAM(S): at 05:12

## 2018-10-20 RX ADMIN — Medication 40 MILLIGRAM(S): at 05:12

## 2018-10-20 RX ADMIN — Medication 100 MILLIGRAM(S): at 05:12

## 2018-10-20 NOTE — PROGRESS NOTE ADULT - ASSESSMENT
Mr. Moore is a 72 year old man with PMH T2DM, HTN, HLD, FERNANDO on CPAP, Pneumonia, Prostate Cancer s/p removal 2003, thyroid cancer s/p thyroidectomy 8/2018, renal cancer s/p partial nephrectomy, CLL (not active), former smoker and osteoarthritis s/p R TKR 2012 who has developed severe left thigh pain which imaging revealed spinal stenosis and scoliosis and is now scheduled for L1-5 posterior lumbar fusion and T10-pelvis instrumentation and fusion on 10/18/2018. (04 Oct 2018 14:53). On 10/18/18 after anesthesia induction and while having lines placed, Mr. Moore became bradycardic and may had a significant pause. The event was not recorded on the monitor strip. He is now in 2 DSU, denies chest pain and vitals are stable, HR in the 70s. Cardiology is following, recommends serial EKGs, continue on Tele, current meds and EP evaluation. Evaluated by EP, tele currently reveals SR w 1HB, Review of EKG reveals SR w/ 1HB and RBBB, hold any AV renea blockers, keep pt NPO p MN for EP study today to assess extent of conduction disease, vs vagally mediated bradycardia.    Plan:  - PRN pain control (Tylenol, Dilaudid, Oxy IR PRN)  - Monitor headaches and nausea  - IVF   - Cardiology following, follow up recommendations and clearance for surgery   - EP following- No further EPS evaluation prior to surgery  - Continue home meds (Furosemide, Lisinopril, Atorvastatin and Levothyroxine)  - DM: continue ISS  - Diabetic diet  - Bowel regimen   - DVT ppx   - consider Fioricet if headache persists   - Plan for T10-Pelvis on Monday Mr. Moore is a 72 year old man with PMH T2DM, HTN, HLD, FERNANDO on CPAP, Pneumonia, Prostate Cancer s/p removal 2003, thyroid cancer s/p thyroidectomy 8/2018, renal cancer s/p partial nephrectomy, CLL (not active), former smoker and osteoarthritis s/p R TKR 2012 who has developed severe left thigh pain which imaging revealed spinal stenosis and scoliosis and is now scheduled for L1-5 posterior lumbar fusion and T10-pelvis instrumentation and fusion on 10/18/2018. (04 Oct 2018 14:53). On 10/18/18 after anesthesia induction and while having lines placed, Mr. Moore became bradycardic and may had a significant pause. The event was not recorded on the monitor strip. He is now in 2 DSU, denies chest pain and vitals are stable, HR in the 70s. Cardiology is following, recommends serial EKGs, continue on Tele, current meds and EP evaluation. Evaluated by EP, tele currently reveals SR w 1HB, Review of EKG reveals SR w/ 1HB and RBBB, hold any AV renea blockers, keep pt NPO p MN for EP study today to assess extent of conduction disease, vs vagally mediated bradycardia.    Plan:  - PRN pain control (Tylenol, Dilaudid, Oxy IR PRN)  - Monitor headaches and nausea  - IVF   - Cardiology following, follow up recommendations and clearance for surgery   - EP following- No further EPS evaluation prior to surgery  - Continue home meds (Furosemide, Lisinopril, Atorvastatin and Levothyroxine)  - DM: continue ISS  - Diabetic diet  - Bowel regimen   - DVT ppx   - consider Fioricet if headache persists   - Patient to be discharged today and follow up for surgery on Saturday

## 2018-10-20 NOTE — PROGRESS NOTE ADULT - SUBJECTIVE AND OBJECTIVE BOX
SUBJECTIVE: Patient seen and examined this morning, no acute events overnight, c/o back pain and headache, mild nausea probably due to narcotics. IV Tylenol, Zofran and IVF ordered. Pt was scheduled for L1-5 posterior lumbar fusion and T10-pelvis instrumentation and fusion on 10/18/2018. After anesthesia induction and while having lines placed, Mr. Moore became bradycardic and may had a significant pause. The event was not recorded on the monitor strip. He is now in 2 DSU, denies chest pain and vitals are stable, HR in the 70s. Cardiology is following, recommends serial EKGs, continue on Tele, current meds and EP evaluation. Evaluated by EP, tele currently reveals SR w 1HB, Review of EKG reveals SR w/ 1HB and RBBB, hold any AV renea blockers, keep pt NPO p MN for EP study today to assess extent of conduction disease, vs vagally mediated bradycardia.    Vital Signs Last 24 Hrs  T(C): 36.6 (20 Oct 2018 04:35), Max: 37.1 (19 Oct 2018 20:14)  T(F): 97.8 (20 Oct 2018 04:35), Max: 98.8 (19 Oct 2018 20:14)  HR: 74 (20 Oct 2018 06:53) (70 - 75)  BP: 151/77 (20 Oct 2018 04:35) (123/65 - 164/86)  BP(mean): --  RR: 18 (20 Oct 2018 04:35) (16 - 18)  SpO2: 95% (20 Oct 2018 06:53) (92% - 97%)    PHYSICAL EXAM:    General: No Acute Distress     Neurological: Awake, alert oriented to person, place and time, Following Commands, PERRL, EOMI, Face Symmetrical, Speech Fluent, Moving all extremities, Muscle Strength normal in all four extremities 5/5 except LLE 4/5  No Drift, Sensation to Light Touch Intact    Pulmonary: Clear to Auscultation, No Rales, No Rhonchi, No Wheezes     Cardiovascular: S1, S2, Regular Rate and Rhythm     Gastrointestinal: Soft, Nontender, Nondistended     Incision: None    LABS:                        11.3   19.4  )-----------( 181      ( 18 Oct 2018 10:24 )             34.1    10-18    141  |  105  |  17  ----------------------------<  140<H>  4.2   |  24  |  0.85    Ca    9.2      18 Oct 2018 10:24      Hemoglobin A1C, Whole Blood: 6.9 % (10-04 @ 18:32)      10-18 @ 07:01  -  10-19 @ 07:00  --------------------------------------------------------  IN: 1595 mL / OUT: 2000 mL / NET: -405 mL      DRAINS: None    MEDICATIONS:  Antibiotics:  ceFAZolin   IVPB 2000 milliGRAM(s) IV Intermittent once    Neuro:  acetaminophen   Tablet .. 650 milliGRAM(s) Oral every 6 hours PRN Temp greater or equal to 38C (100.4F), Mild Pain (1 - 3)  HYDROmorphone   Tablet 4 milliGRAM(s) Oral every 4 hours PRN Severe Pain (7 - 10)  ondansetron   Disintegrating Tablet 4 milliGRAM(s) Oral every 6 hours PRN Nausea  oxyCODONE    IR 5 milliGRAM(s) Oral every 4 hours PRN Moderate Pain (4 - 6)    Cardiac:  furosemide    Tablet 40 milliGRAM(s) Oral daily  lisinopril 40 milliGRAM(s) Oral daily    Pulm:    GI/:  docusate sodium 100 milliGRAM(s) Oral three times a day  senna 2 Tablet(s) Oral at bedtime PRN Constipation    Other:   atorvastatin 80 milliGRAM(s) Oral at bedtime  dextrose 40% Gel 15 Gram(s) Oral once PRN Blood Glucose LESS THAN 70 milliGRAM(s)/deciliter  dextrose 5%. 1000 milliLiter(s) IV Continuous <Continuous>  dextrose 50% Injectable 12.5 Gram(s) IV Push once  dextrose 50% Injectable 25 Gram(s) IV Push once  dextrose 50% Injectable 25 Gram(s) IV Push once  glucagon  Injectable 1 milliGRAM(s) IntraMuscular once PRN Glucose LESS THAN 70 milligrams/deciliter  insulin lispro (HumaLOG) corrective regimen sliding scale   SubCutaneous three times a day before meals  insulin lispro (HumaLOG) corrective regimen sliding scale   SubCutaneous at bedtime  levothyroxine 175 MICROGram(s) Oral daily  sodium chloride 0.9% with potassium chloride 20 mEq/L 1000 milliLiter(s) IV Continuous <Continuous>    DIET: [x] Carb controlled diet  [] CCD [] Renal [] Puree [] Dysphagia [] Tube Feeds:     IMAGING:

## 2018-10-20 NOTE — DISCHARGE NOTE ADULT - PATIENT PORTAL LINK FT
You can access the [facility] Portal, offered by [facility], by registering with the following website: [email or web address]

## 2018-10-20 NOTE — DISCHARGE NOTE ADULT - ADDITIONAL INSTRUCTIONS
Your surgery is scheduled for Saturday 10/27 at 7:30am. Please follow up with Dr. House's office to confirm your scheduled operation.

## 2018-10-20 NOTE — DISCHARGE NOTE ADULT - HOSPITAL COURSE
Patient was admitted via SDA on 10/18 for elective lumbar fusion. Upon induction of anesthesia patient experienced 2-4 second pronounced bradycardia and operation was cancelled for further cardiac work up. Patient underwent conduction study with EP which did not reveal any concerning abnormalities. He was discharged on 10/20 in stable condition to follow up with surgery with Dr. House in 1 week.

## 2018-10-20 NOTE — DISCHARGE NOTE ADULT - CARE PROVIDER_API CALL
Cecilia House (DO), Neurological Surgery  [address]  [place] [address]  Phone: [phone]  Fax: [phone]

## 2018-10-20 NOTE — DISCHARGE NOTE ADULT - MEDICATION SUMMARY - MEDICATIONS TO TAKE
I will START or STAY ON the medications listed below when I get home from the hospital:    butalbital/acetaminophen/caffeine 50 mg-325 mg-40 mg oral capsule  -- 1 cap(s) by mouth 3 times a day, As Needed -for headache   -- Do not drink alcoholic beverages when taking this medication.  May cause drowsiness.  Alcohol may intensify this effect.  Use care when operating dangerous machinery.  This product contains acetaminophen.  Do not use  with any other product containing acetaminophen to prevent possible liver damage.    -- Indication: For Migraine    Dilaudid 2 mg oral tablet  -- 1 tab(s) by mouth every 6 hours, As Needed -for moderate pain MDD:4 tabs  -- Caution federal law prohibits the transfer of this drug to any person other  than the person for whom it was prescribed.  May cause drowsiness.  Alcohol may intensify this effect.  Use care when operating dangerous machinery.  This prescription cannot be refilled.  Using more of this medication than prescribed may cause serious breathing problems.    -- Indication: For Back pain    Tylenol 8 Hour 650 mg oral tablet, extended release  -- 2 tab(s) by mouth every 8 hours, As Needed  -- Indication: For Back pain    HYDROmorphone 4 mg oral tablet  -- 1 tab(s) by mouth every 4 hours  -- Indication: For Back pain    aspirin 81 mg oral delayed release tablet  -- 1 tab(s) by mouth once a day  -- Indication: For CAD    ramipril 10 mg oral capsule  -- orally 2 times a day  -- Indication: For Hypertension    Januvia 100 mg oral tablet  -- 1 tab(s) by mouth once a day  -- Indication: For Diabetes    metFORMIN 500 mg oral tablet  -- 1 tab(s) by mouth 2 times a day with breakfast and with lunch  -- Indication: For Diabetes    metFORMIN 500 mg oral tablet  -- 2 tab(s) by mouth once a day at dinner  -- Indication: For Diabetes    Crestor 20 mg oral tablet  -- 1 tab(s) by mouth once a day (at bedtime)  -- Indication: For Hyperlipidemia    furosemide 40 mg oral tablet  -- 1 tab(s) by mouth once a day  -- Indication: For Hypertension    selenium 100 mcg oral tablet  -- 1 tab(s) by mouth once a day  -- Indication: For Nutrition    Fish Oil 1000 mg oral capsule  -- 1 cap(s) by mouth 3 times a day  -- Indication: For Nutrition    Synthroid 175 mcg (0.175 mg) oral tablet  -- 1 tab(s) by mouth once a day  -- Indication: For H/O thyroidectomy    calcium-vitamin D 500 mg-200 intl units oral tablet  -- 2 tab(s) by mouth 3 times a day  -- Indication: For Nutrition    calcitriol 0.5 mcg oral capsule  -- 1 cap(s) by mouth 2 times a day MDD:2  -- Indication: For Nutrition    Vitamin C 500 mg oral tablet  -- 1 tab(s) by mouth once a day  -- Indication: For Nutrition

## 2018-10-20 NOTE — PROGRESS NOTE ADULT - SUBJECTIVE AND OBJECTIVE BOX
72 year old man with PMH T2DM, HTN, HLD, FERNANDO on CPAP, Prostate Cancer s/p removal 2003, thyroid cancer s/p thyroidectomy 8/2018, renal cancer s/p partial nephrectomy, CLL (not active), Spinal stenosis and scoliosis who presented for L1-5 posterior lumbar fusion and T10-pelvis instrumentation and fusion today.  After anesthesia induction noted to have episode sinus bradycardia HR 30 with a  4 second pause. Surgery canceled, No strips available for review. EP consulted for further management prior to rescheduling lumbar surgery.    right Groin flat no hematoma or bleeding   Post procedure teaching done with patient    Plan:  No PPM.  Bradycardia most likely vagally mediated. no further EPS evaluation prior to his noncardiac surgery.  clear from EP perspective for discharge  today  [phone] 72 year old man with PMH T2DM, HTN, HLD, FERNANDO on CPAP, Prostate Cancer s/p removal 2003, thyroid cancer s/p thyroidectomy 8/2018, renal cancer s/p partial nephrectomy, CLL (not active), Spinal stenosis and scoliosis who presented for L1-5 posterior lumbar fusion and T10-pelvis instrumentation and fusion today.  After anesthesia induction noted to have episode sinus bradycardia HR 30 with a  4 second pause. Surgery canceled, No strips available for review. EP consulted for further management prior to rescheduling lumbar surgery.    telemetry SR 1st degree AVB, RBBB 60 -80's   right Groin flat no hematoma or bleeding   Post procedure teaching done with patient    Plan:  No PPM.  Bradycardia most likely vagally mediated. NO further EPS evaluation prior to his noncardiac surgery.  clear from EP perspective for discharge  today  [phone]

## 2018-10-20 NOTE — DISCHARGE NOTE ADULT - CARE PLAN
Principal Discharge DX:	Spinal stenosis of lumbar region, unspecified whether neurogenic claudication present  Goal:	Back surgery  Assessment and plan of treatment:	Follow up on Saturday 10/27 for your scheduled operation

## 2018-10-23 PROBLEM — Z87.891 PERSONAL HISTORY OF NICOTINE DEPENDENCE: Chronic | Status: ACTIVE | Noted: 2018-10-04

## 2018-10-23 PROBLEM — C91.90 LYMPHOID LEUKEMIA, UNSPECIFIED NOT HAVING ACHIEVED REMISSION: Chronic | Status: ACTIVE | Noted: 2018-10-04

## 2018-10-26 ENCOUNTER — TRANSCRIPTION ENCOUNTER (OUTPATIENT)
Age: 73
End: 2018-10-26

## 2018-10-26 RX ORDER — CEFAZOLIN SODIUM 1 G
2000 VIAL (EA) INJECTION ONCE
Qty: 0 | Refills: 0 | Status: DISCONTINUED | OUTPATIENT
Start: 2018-10-27 | End: 2018-10-31

## 2018-10-27 ENCOUNTER — APPOINTMENT (OUTPATIENT)
Dept: SPINE | Facility: HOSPITAL | Age: 73
End: 2018-10-27
Payer: MEDICARE

## 2018-10-27 ENCOUNTER — INPATIENT (INPATIENT)
Facility: HOSPITAL | Age: 73
LOS: 5 days | Discharge: NURSING, REHAB OR LONG-TERM CARE | DRG: 456 | End: 2018-11-02
Attending: NEUROLOGICAL SURGERY | Admitting: NEUROLOGICAL SURGERY
Payer: MEDICARE

## 2018-10-27 VITALS
HEIGHT: 70 IN | SYSTOLIC BLOOD PRESSURE: 148 MMHG | DIASTOLIC BLOOD PRESSURE: 75 MMHG | WEIGHT: 227.08 LBS | RESPIRATION RATE: 20 BRPM | TEMPERATURE: 98 F | OXYGEN SATURATION: 96 % | HEART RATE: 76 BPM

## 2018-10-27 DIAGNOSIS — Z98.890 OTHER SPECIFIED POSTPROCEDURAL STATES: Chronic | ICD-10-CM

## 2018-10-27 DIAGNOSIS — M48.06 SPINAL STENOSIS, LUMBAR REGION: ICD-10-CM

## 2018-10-27 DIAGNOSIS — Z90.79 ACQUIRED ABSENCE OF OTHER GENITAL ORGAN(S): Chronic | ICD-10-CM

## 2018-10-27 DIAGNOSIS — Z90.5 ACQUIRED ABSENCE OF KIDNEY: Chronic | ICD-10-CM

## 2018-10-27 DIAGNOSIS — Z96.651 PRESENCE OF RIGHT ARTIFICIAL KNEE JOINT: Chronic | ICD-10-CM

## 2018-10-27 DIAGNOSIS — E89.0 POSTPROCEDURAL HYPOTHYROIDISM: Chronic | ICD-10-CM

## 2018-10-27 DIAGNOSIS — M48.061 SPINAL STENOSIS, LUMBAR REGION WITHOUT NEUROGENIC CLAUDICATION: ICD-10-CM

## 2018-10-27 LAB
ANION GAP SERPL CALC-SCNC: 17 MMOL/L — SIGNIFICANT CHANGE UP (ref 5–17)
APTT BLD: 22.2 SEC — LOW (ref 27.5–37.4)
BLD GP AB SCN SERPL QL: NEGATIVE — SIGNIFICANT CHANGE UP
BUN SERPL-MCNC: 18 MG/DL — SIGNIFICANT CHANGE UP (ref 7–23)
CALCIUM SERPL-MCNC: 8.5 MG/DL — SIGNIFICANT CHANGE UP (ref 8.4–10.5)
CHLORIDE SERPL-SCNC: 103 MMOL/L — SIGNIFICANT CHANGE UP (ref 96–108)
CO2 SERPL-SCNC: 15 MMOL/L — LOW (ref 22–31)
CREAT SERPL-MCNC: 1.25 MG/DL — SIGNIFICANT CHANGE UP (ref 0.5–1.3)
GAS PNL BLDA: SIGNIFICANT CHANGE UP
GLUCOSE BLDC GLUCOMTR-MCNC: 123 MG/DL — HIGH (ref 70–99)
GLUCOSE SERPL-MCNC: 281 MG/DL — HIGH (ref 70–99)
HCT VFR BLD CALC: 36.7 % — LOW (ref 39–50)
HGB BLD-MCNC: 12 G/DL — LOW (ref 13–17)
INR BLD: 1.24 RATIO — HIGH (ref 0.88–1.16)
MAGNESIUM SERPL-MCNC: 1.7 MG/DL — SIGNIFICANT CHANGE UP (ref 1.6–2.6)
MCHC RBC-ENTMCNC: 28.3 PG — SIGNIFICANT CHANGE UP (ref 27–34)
MCHC RBC-ENTMCNC: 32.7 GM/DL — SIGNIFICANT CHANGE UP (ref 32–36)
MCV RBC AUTO: 86.3 FL — SIGNIFICANT CHANGE UP (ref 80–100)
PHOSPHATE SERPL-MCNC: 4.8 MG/DL — HIGH (ref 2.5–4.5)
PLATELET # BLD AUTO: 228 K/UL — SIGNIFICANT CHANGE UP (ref 150–400)
POTASSIUM SERPL-MCNC: 6.9 MMOL/L — CRITICAL HIGH (ref 3.5–5.3)
POTASSIUM SERPL-SCNC: 6.9 MMOL/L — CRITICAL HIGH (ref 3.5–5.3)
PROTHROM AB SERPL-ACNC: 13.4 SEC — HIGH (ref 9.8–12.7)
RBC # BLD: 4.25 M/UL — SIGNIFICANT CHANGE UP (ref 4.2–5.8)
RBC # FLD: 13.7 % — SIGNIFICANT CHANGE UP (ref 10.3–14.5)
RH IG SCN BLD-IMP: POSITIVE — SIGNIFICANT CHANGE UP
SODIUM SERPL-SCNC: 135 MMOL/L — SIGNIFICANT CHANGE UP (ref 135–145)
WBC # BLD: 67.3 K/UL — CRITICAL HIGH (ref 3.8–10.5)
WBC # FLD AUTO: 67.3 K/UL — CRITICAL HIGH (ref 3.8–10.5)

## 2018-10-27 PROCEDURE — 72131 CT LUMBAR SPINE W/O DYE: CPT | Mod: 26

## 2018-10-27 PROCEDURE — 22633 ARTHRD CMBN 1NTRSPC LUMBAR: CPT

## 2018-10-27 PROCEDURE — 22634 ARTHRD CMBN 1NTRSPC EA ADDL: CPT

## 2018-10-27 PROCEDURE — 71045 X-RAY EXAM CHEST 1 VIEW: CPT | Mod: 26

## 2018-10-27 PROCEDURE — 93010 ELECTROCARDIOGRAM REPORT: CPT

## 2018-10-27 PROCEDURE — 22614 ARTHRD PST TQ 1NTRSPC EA ADD: CPT

## 2018-10-27 PROCEDURE — 99291 CRITICAL CARE FIRST HOUR: CPT

## 2018-10-27 PROCEDURE — 22853 INSJ BIOMECHANICAL DEVICE: CPT

## 2018-10-27 PROCEDURE — 72128 CT CHEST SPINE W/O DYE: CPT | Mod: 26

## 2018-10-27 PROCEDURE — 22848 INSERT PELV FIXATION DEVICE: CPT

## 2018-10-27 PROCEDURE — 22843 INSERT SPINE FIXATION DEVICE: CPT

## 2018-10-27 PROCEDURE — 74018 RADEX ABDOMEN 1 VIEW: CPT | Mod: 26

## 2018-10-27 RX ORDER — SODIUM CHLORIDE 9 MG/ML
1000 INJECTION, SOLUTION INTRAVENOUS
Qty: 0 | Refills: 0 | Status: DISCONTINUED | OUTPATIENT
Start: 2018-10-27 | End: 2018-11-02

## 2018-10-27 RX ORDER — HYDROMORPHONE HYDROCHLORIDE 2 MG/ML
1 INJECTION INTRAMUSCULAR; INTRAVENOUS; SUBCUTANEOUS
Qty: 0 | Refills: 0 | COMMUNITY

## 2018-10-27 RX ORDER — FUROSEMIDE 40 MG
20 TABLET ORAL ONCE
Qty: 0 | Refills: 0 | Status: COMPLETED | OUTPATIENT
Start: 2018-10-27 | End: 2018-10-27

## 2018-10-27 RX ORDER — ATORVASTATIN CALCIUM 80 MG/1
80 TABLET, FILM COATED ORAL AT BEDTIME
Qty: 0 | Refills: 0 | Status: DISCONTINUED | OUTPATIENT
Start: 2018-10-27 | End: 2018-11-02

## 2018-10-27 RX ORDER — GLUCAGON INJECTION, SOLUTION 0.5 MG/.1ML
1 INJECTION, SOLUTION SUBCUTANEOUS ONCE
Qty: 0 | Refills: 0 | Status: DISCONTINUED | OUTPATIENT
Start: 2018-10-27 | End: 2018-11-02

## 2018-10-27 RX ORDER — DEXTROSE 50 % IN WATER 50 %
25 SYRINGE (ML) INTRAVENOUS ONCE
Qty: 0 | Refills: 0 | Status: DISCONTINUED | OUTPATIENT
Start: 2018-10-27 | End: 2018-11-02

## 2018-10-27 RX ORDER — ONDANSETRON 8 MG/1
4 TABLET, FILM COATED ORAL EVERY 6 HOURS
Qty: 0 | Refills: 0 | Status: DISCONTINUED | OUTPATIENT
Start: 2018-10-27 | End: 2018-10-30

## 2018-10-27 RX ORDER — DEXTROSE 50 % IN WATER 50 %
15 SYRINGE (ML) INTRAVENOUS ONCE
Qty: 0 | Refills: 0 | Status: DISCONTINUED | OUTPATIENT
Start: 2018-10-27 | End: 2018-11-02

## 2018-10-27 RX ORDER — FENTANYL CITRATE 50 UG/ML
0.5 INJECTION INTRAVENOUS
Qty: 5000 | Refills: 0 | Status: DISCONTINUED | OUTPATIENT
Start: 2018-10-27 | End: 2018-10-28

## 2018-10-27 RX ORDER — DOCUSATE SODIUM 100 MG
100 CAPSULE ORAL THREE TIMES A DAY
Qty: 0 | Refills: 0 | Status: DISCONTINUED | OUTPATIENT
Start: 2018-10-27 | End: 2018-11-01

## 2018-10-27 RX ORDER — INSULIN HUMAN 100 [IU]/ML
10 INJECTION, SOLUTION SUBCUTANEOUS ONCE
Qty: 0 | Refills: 0 | Status: DISCONTINUED | OUTPATIENT
Start: 2018-10-27 | End: 2018-10-27

## 2018-10-27 RX ORDER — CALCIUM GLUCONATE 100 MG/ML
2 VIAL (ML) INTRAVENOUS ONCE
Qty: 0 | Refills: 0 | Status: DISCONTINUED | OUTPATIENT
Start: 2018-10-27 | End: 2018-10-27

## 2018-10-27 RX ORDER — VASOPRESSIN 20 [USP'U]/ML
0.04 INJECTION INTRAVENOUS
Qty: 100 | Refills: 0 | Status: DISCONTINUED | OUTPATIENT
Start: 2018-10-27 | End: 2018-10-28

## 2018-10-27 RX ORDER — FUROSEMIDE 40 MG
20 TABLET ORAL ONCE
Qty: 0 | Refills: 0 | Status: DISCONTINUED | OUTPATIENT
Start: 2018-10-27 | End: 2018-10-27

## 2018-10-27 RX ORDER — FENTANYL CITRATE 50 UG/ML
0.5 INJECTION INTRAVENOUS
Qty: 2500 | Refills: 0 | Status: DISCONTINUED | OUTPATIENT
Start: 2018-10-27 | End: 2018-10-27

## 2018-10-27 RX ORDER — SODIUM CHLORIDE 9 MG/ML
3 INJECTION INTRAMUSCULAR; INTRAVENOUS; SUBCUTANEOUS EVERY 8 HOURS
Qty: 0 | Refills: 0 | Status: DISCONTINUED | OUTPATIENT
Start: 2018-10-27 | End: 2018-10-27

## 2018-10-27 RX ORDER — DEXTROSE 50 % IN WATER 50 %
12.5 SYRINGE (ML) INTRAVENOUS ONCE
Qty: 0 | Refills: 0 | Status: DISCONTINUED | OUTPATIENT
Start: 2018-10-27 | End: 2018-11-02

## 2018-10-27 RX ORDER — INSULIN LISPRO 100/ML
VIAL (ML) SUBCUTANEOUS
Qty: 0 | Refills: 0 | Status: DISCONTINUED | OUTPATIENT
Start: 2018-10-27 | End: 2018-10-28

## 2018-10-27 RX ORDER — DEXMEDETOMIDINE HYDROCHLORIDE IN 0.9% SODIUM CHLORIDE 4 UG/ML
0.1 INJECTION INTRAVENOUS
Qty: 200 | Refills: 0 | Status: DISCONTINUED | OUTPATIENT
Start: 2018-10-27 | End: 2018-10-28

## 2018-10-27 RX ORDER — CEFAZOLIN SODIUM 1 G
1000 VIAL (EA) INJECTION EVERY 8 HOURS
Qty: 0 | Refills: 0 | Status: COMPLETED | OUTPATIENT
Start: 2018-10-27 | End: 2018-10-28

## 2018-10-27 RX ORDER — ALBUTEROL 90 UG/1
2.5 AEROSOL, METERED ORAL ONCE
Qty: 0 | Refills: 0 | Status: DISCONTINUED | OUTPATIENT
Start: 2018-10-27 | End: 2018-10-27

## 2018-10-27 RX ORDER — SENNA PLUS 8.6 MG/1
2 TABLET ORAL AT BEDTIME
Qty: 0 | Refills: 0 | Status: DISCONTINUED | OUTPATIENT
Start: 2018-10-27 | End: 2018-10-30

## 2018-10-27 RX ORDER — LISINOPRIL 2.5 MG/1
40 TABLET ORAL
Qty: 0 | Refills: 0 | Status: DISCONTINUED | OUTPATIENT
Start: 2018-10-27 | End: 2018-10-28

## 2018-10-27 RX ORDER — SODIUM CHLORIDE 9 MG/ML
500 INJECTION INTRAMUSCULAR; INTRAVENOUS; SUBCUTANEOUS ONCE
Qty: 0 | Refills: 0 | Status: DISCONTINUED | OUTPATIENT
Start: 2018-10-27 | End: 2018-10-28

## 2018-10-27 RX ORDER — DEXTROSE 50 % IN WATER 50 %
25 SYRINGE (ML) INTRAVENOUS ONCE
Qty: 0 | Refills: 0 | Status: DISCONTINUED | OUTPATIENT
Start: 2018-10-27 | End: 2018-10-27

## 2018-10-27 RX ORDER — DEXTROSE MONOHYDRATE, SODIUM CHLORIDE, AND POTASSIUM CHLORIDE 50; .745; 4.5 G/1000ML; G/1000ML; G/1000ML
1000 INJECTION, SOLUTION INTRAVENOUS
Qty: 0 | Refills: 0 | Status: DISCONTINUED | OUTPATIENT
Start: 2018-10-27 | End: 2018-10-28

## 2018-10-27 RX ORDER — HYDROMORPHONE HYDROCHLORIDE 2 MG/ML
0.5 INJECTION INTRAMUSCULAR; INTRAVENOUS; SUBCUTANEOUS
Qty: 0 | Refills: 0 | Status: DISCONTINUED | OUTPATIENT
Start: 2018-10-27 | End: 2018-10-28

## 2018-10-27 RX ORDER — NALOXONE HYDROCHLORIDE 4 MG/.1ML
0.1 SPRAY NASAL
Qty: 0 | Refills: 0 | Status: DISCONTINUED | OUTPATIENT
Start: 2018-10-27 | End: 2018-11-02

## 2018-10-27 RX ORDER — INSULIN LISPRO 100/ML
VIAL (ML) SUBCUTANEOUS
Qty: 0 | Refills: 0 | Status: DISCONTINUED | OUTPATIENT
Start: 2018-10-27 | End: 2018-10-27

## 2018-10-27 RX ORDER — PANTOPRAZOLE SODIUM 20 MG/1
40 TABLET, DELAYED RELEASE ORAL DAILY
Qty: 0 | Refills: 0 | Status: DISCONTINUED | OUTPATIENT
Start: 2018-10-27 | End: 2018-10-28

## 2018-10-27 RX ORDER — LEVOTHYROXINE SODIUM 125 MCG
175 TABLET ORAL DAILY
Qty: 0 | Refills: 0 | Status: DISCONTINUED | OUTPATIENT
Start: 2018-10-27 | End: 2018-11-02

## 2018-10-27 RX ORDER — HYDROMORPHONE HYDROCHLORIDE 2 MG/ML
30 INJECTION INTRAMUSCULAR; INTRAVENOUS; SUBCUTANEOUS
Qty: 0 | Refills: 0 | Status: DISCONTINUED | OUTPATIENT
Start: 2018-10-27 | End: 2018-10-28

## 2018-10-27 RX ORDER — FUROSEMIDE 40 MG
40 TABLET ORAL DAILY
Qty: 0 | Refills: 0 | Status: DISCONTINUED | OUTPATIENT
Start: 2018-10-27 | End: 2018-10-28

## 2018-10-27 RX ORDER — NOREPINEPHRINE BITARTRATE/D5W 8 MG/250ML
0.05 PLASTIC BAG, INJECTION (ML) INTRAVENOUS
Qty: 8 | Refills: 0 | Status: DISCONTINUED | OUTPATIENT
Start: 2018-10-27 | End: 2018-10-30

## 2018-10-27 RX ORDER — ONDANSETRON 8 MG/1
4 TABLET, FILM COATED ORAL EVERY 6 HOURS
Qty: 0 | Refills: 0 | Status: DISCONTINUED | OUTPATIENT
Start: 2018-10-27 | End: 2018-10-29

## 2018-10-27 RX ORDER — ALBUMIN HUMAN 25 %
250 VIAL (ML) INTRAVENOUS ONCE
Qty: 0 | Refills: 0 | Status: COMPLETED | OUTPATIENT
Start: 2018-10-27 | End: 2018-10-28

## 2018-10-27 RX ORDER — ACETAMINOPHEN 500 MG
650 TABLET ORAL EVERY 6 HOURS
Qty: 0 | Refills: 0 | Status: DISCONTINUED | OUTPATIENT
Start: 2018-10-27 | End: 2018-11-02

## 2018-10-27 RX ORDER — CALCIUM GLUCONATE 100 MG/ML
1 VIAL (ML) INTRAVENOUS
Qty: 0 | Refills: 0 | Status: COMPLETED | OUTPATIENT
Start: 2018-10-27 | End: 2018-10-28

## 2018-10-27 RX ADMIN — Medication 20 MILLIGRAM(S): at 23:51

## 2018-10-27 RX ADMIN — FENTANYL CITRATE 50 MICROGRAM(S): 50 INJECTION INTRAVENOUS at 21:30

## 2018-10-27 RX ADMIN — Medication 9.66 MICROGRAM(S)/KG/MIN: at 22:48

## 2018-10-27 RX ADMIN — FENTANYL CITRATE 50 MICROGRAM(S): 50 INJECTION INTRAVENOUS at 20:30

## 2018-10-27 RX ADMIN — Medication 200 GRAM(S): at 22:47

## 2018-10-27 RX ADMIN — FENTANYL CITRATE 50 MICROGRAM(S): 50 INJECTION INTRAVENOUS at 21:45

## 2018-10-27 RX ADMIN — FENTANYL CITRATE 2.58 MICROGRAM(S)/KG/HR: 50 INJECTION INTRAVENOUS at 22:48

## 2018-10-27 RX ADMIN — FENTANYL CITRATE 50 MICROGRAM(S): 50 INJECTION INTRAVENOUS at 20:45

## 2018-10-27 RX ADMIN — Medication 100 MILLIGRAM(S): at 22:51

## 2018-10-27 NOTE — PROGRESS NOTE ADULT - ASSESSMENT
ASSESSMENT/PLAN:    NEURO:  Activity: [] mobilize as tolerated [] Bedrest [] PT [] OT [] PMNR    PULM:    CV:  SBP goal    RENAL:  Fluids:    GI:  Diet:  GI prophylaxis [] not indicated [] PPI [] other:  Bowel regimen [] colace [] senna [] other:    ENDO:   Goal euglycemia (-180)    HEME/ONC:  VTE prophylaxis: [] SCDs [] chemoprophylaxis [] hold chemoprophylaxis due to: [] high risk of DVT/PE on admission due to:    ID:    MISC:    SOCIAL/FAMILY:  [] awaiting [] updated at bedside [] family meeting    CODE STATUS:  [] Full Code [] DNR [] DNI [] Palliative/Comfort Care    DISPOSITION:  [] ICU [] Stroke Unit [] Floor [] EMU [] RCU [] PCU    [] Patient is at high risk of neurologic deterioration/death due to:     Time seen:  Time spent: ___ [] critical care minutes    Contact: [phone] ASSESSMENT/PLAN:    NEURO:  Activity: [] mobilize as tolerated [] Bedrest [] PT [] OT [] PMNR    PULM:    CV:  SBP goal    RENAL:  Fluids:    GI:  Diet:  GI prophylaxis [] not indicated [] PPI [] other:  Bowel regimen [] colace [] senna [] other:    ENDO:   Goal euglycemia (-180)    HEME/ONC:  VTE prophylaxis: [] SCDs [] chemoprophylaxis [] hold chemoprophylaxis due to: [] high risk of DVT/PE on admission due to:    ID:    MISC:    SOCIAL/FAMILY:  [] awaiting [] updated at bedside [] family meeting    CODE STATUS:  [] Full Code [] DNR [] DNI [] Palliative/Comfort Care    DISPOSITION:  [] ICU [] Stroke Unit [] Floor [] EMU [] RCU [] PCU    [] Patient is at high risk of neurologic deterioration/death due to:     Time seen:  Time spent: 35 [x] critical care minutes    Contact: [phone] ASSESSMENT/PLAN:    NEURO:  Pain control  Upright x-rays (on the floor)  Monitor surgical drain output  Cerebrospinal fluid leak - flat [id number]  Activity: [] mobilize as tolerated [x] Bedrest [] PT [] OT [] PMNR    PULM:  Wean to extubate  CXR, ABG    CV:  SBP MAP >65mmHg, SBP <160 mmHg  1st degree AV block; monitor as has had documented pauses    RENAL:  Fluids: Judicious fluid use    GI:  Diet: NPO for possible extubation in AM  GI prophylaxis [] not indicated [x] PPI [] other:  Bowel regimen [x] colace [x] senna [] other:    ENDO:   Goal euglycemia (-180)    HEME/ONC:  VTE prophylaxis: [x] SCDs [] chemoprophylaxis [x] hold chemoprophylaxis due to: fresh post-op [] high risk of DVT/PE on admission due to:    ID:  Machelle-op antibiotics     SOCIAL/FAMILY:  [] awaiting [x] updated at bedside [] family meeting    CODE STATUS:  [x] Full Code [] DNR [] DNI [] Palliative/Comfort Care    DISPOSITION:  [x] ICU [] Stroke Unit [] Floor [] EMU [] RCU [] PCU    [x] Patient is at high risk of neurologic deterioration/death due to: hemorrhagic hock    Time spent: 35 [x] critical care minutes    Contact: [phone] ASSESSMENT/PLAN: spinal stenosis/degenerative scoliosis    NEURO:  Pain control  Upright x-rays (on the floor)  Monitor surgical drain output  Cerebrospinal fluid leak - flat [id number]  Activity: [] mobilize as tolerated [x] Bedrest [] PT [] OT [] PMNR    PULM:  Wean to extubate  CXR, ABG    CV:  SBP MAP >65mmHg, SBP <160 mmHg  1st degree AV block; monitor as has had documented pauses  Wean off pressors    RENAL:  Fluids: Judicious fluid use    GI:  Diet: NPO for possible extubation in AM  GI prophylaxis [] not indicated [x] PPI [] other:  Bowel regimen [x] colace [x] senna [] other:    ENDO:   Goal euglycemia (-180)  Hypothyroidism: continue supplementation    HEME/ONC:  VTE prophylaxis: [x] SCDs [] chemoprophylaxis [x] hold chemoprophylaxis due to: fresh post-op [x] high risk of DVT/PE on admission due to: limited mobility due to spinal disorder    ID:  Machelle-op antibiotics     SOCIAL/FAMILY:  [] awaiting [x] updated at bedside [] family meeting    CODE STATUS:  [x] Full Code [] DNR [] DNI [] Palliative/Comfort Care    DISPOSITION:  [x] ICU [] Stroke Unit [] Floor [] EMU [] RCU [] PCU    [x] Patient is at high risk of neurologic deterioration/death due to: hemorrhagic hock    Time spent: 35 [x] critical care minutes    Contact: [phone]

## 2018-10-27 NOTE — PROVIDER CONTACT NOTE (OTHER) - BACKGROUND
Pt admitted from OR s/p L5-S1 fusion and T10-pelvis fusion with instrumentation. Pt has a history of HTN, DM, FERNANDO, CLL.

## 2018-10-27 NOTE — PROGRESS NOTE ADULT - SUBJECTIVE AND OBJECTIVE BOX
SUMMARY:    OVERNIGHT EVENTS:     ADMISSION SCORES:   GCS: HH: MF: NIHSS: ICH Score:    SEDATION SCORES:  RASS: CAM-ICU:     REVIEW OF SYSTEMS:    VITALS: [] Reviewed    IMAGING/DATA: [] Reviewed    IVF FLUIDS/MEDICATIONS: [] Reviewed    ALLERGIES: Allergies    codeine (Vomiting; Headache)  dogs, cats (Short breath)  dust (Rhinitis)  mold (Rhinitis)  morphine (Vomiting; Headache)  narcotic analgesics (Vomiting; Headache)    Intolerances        DEVICES:   [] Restraints [] PIVs [] ET tube [] central line [] PICC [] arterial line [] spears [] NGT/OGT [] EVD [] LD [] DEANA/HMV [] LiCOX [] ICP monitor [] Trach [] PEG [] Chest Tube [] other:    EXAMINATION:  General: No acute distress  HEENT: Anicteric sclerae  Cardiac: [id number]  Lungs: Clear  Abdomen: Soft, non-tender, +BS  Extremities: No c/c/e  Skin/Incision Site: Clean, dry and intact  Neurologic: Awake, alert, fully oriented, follows commands, PERRL, VFFtc, EOMI, face symmetric, tongue midline, no drift, full strength SUMMARY:  Patient is a 72 year old man with PMH T2DM, HTN, HLD, FERNANDO on CPAP, Pneumonia, Prostate Cancer s/p removal 2003, thyroid cancer s/p thyroidectomy 8/2018, renal cancer s/p partial nephrectomy, CLL (not active),Spinal stenosis and scoliosis who presented today  scheduled for L1-5 posterior lumbar fusion and T10-pelvis instrumentation and fusion today.  After anesthesia induction with propofol noted to have episode sinus bradycardia HR 30 with a  4 second pause, surgery then aborted. No strips available for review. Pt seen and examined. Reports feeling dizzy and slight nauseousness who contributes symptoms after receiving narcotic for back pain, denies any chest pain, palpitations, or SOB. Per documentation from general cardiology pt had Holter in 201which revealed nocturnal jerry/ pauses episodes.  Pt is currently NSR, heart rate 70's. EP consulted for further management   OVERNIGHT EVENTS:     ADMISSION SCORES:   GCS: HH: MF: NIHSS: ICH Score:    SEDATION SCORES:  RASS: CAM-ICU:     REVIEW OF SYSTEMS:    VITALS: [] Reviewed    IMAGING/DATA: [] Reviewed    IVF FLUIDS/MEDICATIONS: [] Reviewed    ALLERGIES: Allergies    codeine (Vomiting; Headache)  dogs, cats (Short breath)  dust (Rhinitis)  mold (Rhinitis)  morphine (Vomiting; Headache)  narcotic analgesics (Vomiting; Headache)    Intolerances        DEVICES:   [] Restraints [] PIVs [] ET tube [] central line [] PICC [] arterial line [] spears [] NGT/OGT [] EVD [] LD [] DEANA/HMV [] LiCOX [] ICP monitor [] Trach [] PEG [] Chest Tube [] other:    EXAMINATION:  General: No acute distress  HEENT: Anicteric sclerae  Cardiac: [id number]  Lungs: Clear  Abdomen: Soft, non-tender, +BS  Extremities: No c/c/e  Skin/Incision Site: Clean, dry and intact  Neurologic: Awake, alert, fully oriented, follows commands, PERRL, VFFtc, EOMI, face symmetric, tongue midline, no drift, full strength SUMMARY:  Patient is a 72 year old man with PMH T2DM, HTN, HLD, FERNANDO on CPAP, Pneumonia, CLL Prostate Cancer s/p removal 2003, thyroid cancer s/p thyroidectomy 8/2018, renal cancer s/p partial nephrectomy, CLL (not active),Spinal stenosis and scoliosis who presented today  scheduled for L1-5 posterior lumbar fusion and T10-pelvis instrumentation and fusion today.  After anesthesia induction with propofol noted to have episode sinus bradycardia HR 30 with a  4 second pause, surgery then aborted. No strips available for review. Pt seen and examined. Reports feeling dizzy and slight nauseousness who contributes symptoms after receiving narcotic for back pain, denies any chest pain, palpitations, or SOB. Per documentation from general cardiology pt had Holter in 201which revealed nocturnal jerry/ pauses episodes.  Pt is currently NSR, heart rate 70's. EP consulted for further management   No pause available for review.  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  I suggested a conduction study to better evaluated.  Should this be OK it would be acceptable to proceed with the surgery with atropine if needed.   OVERNIGHT EVENTS:     ADMISSION SCORES:   GCS: HH: MF: NIHSS: ICH Score:    SEDATION SCORES:  RASS: CAM-ICU:     REVIEW OF SYSTEMS:    VITALS: [] Reviewed    IMAGING/DATA: [] Reviewed    IVF FLUIDS/MEDICATIONS: [] Reviewed    ALLERGIES: Allergies    codeine (Vomiting; Headache)  dogs, cats (Short breath)  dust (Rhinitis)  mold (Rhinitis)  morphine (Vomiting; Headache)  narcotic analgesics (Vomiting; Headache)    Intolerances        DEVICES:   [] Restraints [] PIVs [] ET tube [] central line [] PICC [] arterial line [] spears [] NGT/OGT [] EVD [] LD [] DEANA/HMV [] LiCOX [] ICP monitor [] Trach [] PEG [] Chest Tube [] other:    EXAMINATION:  General: No acute distress  HEENT: Anicteric sclerae  Cardiac: [id number]  Lungs: Clear  Abdomen: Soft, non-tender, +BS  Extremities: No c/c/e  Skin/Incision Site: Clean, dry and intact  Neurologic: Awake, alert, fully oriented, follows commands, PERRL, VFFtc, EOMI, face symmetric, tongue midline, no drift, full strength SUMMARY:  Patient is a 72 year old man with PMH T2DM, HTN, HLD, FERNANDO on CPAP, Pneumonia, CLL Prostate Cancer s/p removal 2003, thyroid cancer s/p thyroidectomy 8/2018, renal cancer s/p partial nephrectomy, CLL (not active),Spinal stenosis and scoliosis who presented today  scheduled for L1-5 posterior lumbar fusion and T10-pelvis instrumentation and fusion 10/18/18.  After anesthesia induction with propofol noted to have episode sinus bradycardia HR 30 with a  4 second pause, surgery then aborted. No strips available for review. Pt seen and examined. Reports feeling dizzy and slight nauseousness who contributes symptoms after receiving narcotic for back pain, denies any chest pain, palpitations, or SOB. Per documentation from general cardiology pt had Holter in 201which revealed nocturnal jerry/ pauses episodes.  Pt is currently NSR, heart rate 70's. EP consulted for further management   No pause available for review.  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  I suggested a conduction study to better evaluated.  Should this be OK it would be acceptable to proceed with the surgery with atropine if needed.   OVERNIGHT EVENTS:     ADMISSION SCORES:   GCS: HH: MF: NIHSS: ICH Score:    SEDATION SCORES:  RASS: CAM-ICU:     REVIEW OF SYSTEMS:    VITALS: [] Reviewed    IMAGING/DATA: [] Reviewed    IVF FLUIDS/MEDICATIONS: [] Reviewed    ALLERGIES: Allergies    codeine (Vomiting; Headache)  dogs, cats (Short breath)  dust (Rhinitis)  mold (Rhinitis)  morphine (Vomiting; Headache)  narcotic analgesics (Vomiting; Headache)    Intolerances        DEVICES:   [] Restraints [] PIVs [] ET tube [] central line [] PICC [] arterial line [] spears [] NGT/OGT [] EVD [] LD [] DEANA/HMV [] LiCOX [] ICP monitor [] Trach [] PEG [] Chest Tube [] other:    EXAMINATION:  General: No acute distress  HEENT: Anicteric sclerae  Cardiac: [id number]  Lungs: Clear  Abdomen: Soft, non-tender, +BS  Extremities: No c/c/e  Skin/Incision Site: Clean, dry and intact  Neurologic: Awake, alert, fully oriented, follows commands, PERRL, VFFtc, EOMI, face symmetric, tongue midline, no drift, full strength SUMMARY:  72 year-old man with history of T2DM, HTN, HLD, FERNANDO on CPAP, Pneumonia, CLL, Prostate Cancer s/p resection 2003, thyroid cancer s/p thyroidectomy 8/2018, renal cancer s/p partial nephrectomy and spinal stenosis with acquired scoliosis who initially presented 10/18/18 for L1-5 posterior lumbar fusion and T10-pelvis instrumentation and fusion but case was aborted due to 4 second pause after induction with propofol who returned 10/27/18 for surgery. Of note, he had a prior Holter monitoring study that revealed nocturnal bradycardia with pauses. 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 On 10/27/18, he underwent placement of temporary pacer and L1-S1 transforaminal interbody fusion, T10-pelvis instrumented fusion and Plastics assisted closure. Operative course was notable for cerebrospinal fluid leak which was primarily repaired and EBL of 1500cc. He received 2 units PRBC intra-op. Post-operatively, he had a ~40 minutes episode of hypotension to SBP in the 50smmHg, for which he was placed on pressor support and given an additional 2 units PRBC. He was taken for CT scan which excluded retroperitoneal hematoma.     OVERNIGHT EVENTS:   Left intubated. Admitted to [facility].     VITALS/IMAGING/DATA/IVF FLUIDS/MEDICATIONS: [x] Reviewed    ALLERGIES: Allergies  codeine (Vomiting; Headache)  dogs, cats (Short breath)  dust (Rhinitis)  mold (Rhinitis)  morphine (Vomiting; Headache)  narcotic analgesics (Vomiting; Headache)    DEVICES:   [] Restraints [] PIVs [] ET tube [] central line [] PICC [] arterial line [] spears [] NGT/OGT [] EVD [] LD [] DEANA/HMV [] LiCOX [] ICP monitor [] Trach [] PEG [] Chest Tube [] other:    EXAMINATION:  General: No acute distress  HEENT: Anicteric sclerae  Cardiac: [id number]  Lungs: Clear  Abdomen: Soft, non-tender, +BS  Extremities: No c/c/e  Skin/Incision Site: Clean, dry and intact  Neurologic: SUMMARY:  72 year-old man with history of T2DM, HTN, HLD, FERNANDO on CPAP, Pneumonia, CLL, Prostate Cancer s/p resection 2003, thyroid cancer s/p thyroidectomy 8/2018, renal cancer s/p partial nephrectomy and spinal stenosis with acquired scoliosis who initially presented 10/18/18 for L1-5 posterior lumbar fusion and T10-pelvis instrumentation and fusion but case was aborted due to 4 second pause after induction with propofol who returned 10/27/18 for surgery. Of note, he had a prior Holter monitoring study that revealed nocturnal bradycardia with pauses. 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 On 10/27/18, he underwent placement of temporary pacer and L1-S1 transforaminal interbody fusion, T10-pelvis instrumented fusion and Plastics assisted closure. Operative course was notable for bradycardia responsive to glycopyrrolate, cerebrospinal fluid leak which was primarily repaired and EBL of 1500cc. He received 2 units PRBC intra-op. Post-operatively, he had a ~40 minutes episode of hypotension to SBP in the 50smmHg, for which he was placed on pressor support and given an additional 2 units PRBC. He was taken for CT scan which excluded retroperitoneal hematoma.     OVERNIGHT EVENTS:   Left intubated. Admitted to [facility].     VITALS/IMAGING/DATA/IVF FLUIDS/MEDICATIONS: [x] Reviewed    ALLERGIES: Allergies  codeine (Vomiting; Headache)  dogs, cats (Short breath)  dust (Rhinitis)  mold (Rhinitis)  morphine (Vomiting; Headache)  narcotic analgesics (Vomiting; Headache)    DEVICES:   [] Restraints [] PIVs [] ET tube [] central line [] PICC [] arterial line [] spears [] NGT/OGT [] EVD [] LD [] DEANA/HMV [] LiCOX [] ICP monitor [] Trach [] PEG [] Chest Tube [] other:    EXAMINATION:  General: No acute distress  HEENT: Anicteric sclerae  Cardiac: [id number]  Lungs: Clear  Abdomen: Soft, non-tender, +BS  Extremities: No c/c/e  Skin/Incision Site: Clean, dry and intact  Neurologic: SUMMARY:  72 year-old man with history of T2DM, HTN, HLD, FERNANDO on CPAP, Pneumonia, CLL, Prostate Cancer s/p resection 2003, thyroid cancer s/p thyroidectomy 8/2018, renal cancer s/p partial nephrectomy and spinal stenosis with acquired scoliosis who initially presented 10/18/18 for L1-5 posterior lumbar fusion and T10-pelvis instrumentation and fusion but case was aborted due to 4 second pause after induction with propofol who returned 10/27/18 for surgery. Of note, he had a prior Holter monitoring study that revealed nocturnal bradycardia with pauses. 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 On 10/27/18, he underwent placement of temporary pacer and L1-S1 transforaminal interbody fusion, T10-pelvis instrumented fusion and Plastics assisted closure. Operative course was notable for bradycardia responsive to glycopyrrolate, cerebrospinal fluid leak which was primarily repaired and EBL of 1500cc. He received 2 units PRBC intra-op. Post-operatively, he had a ~40 minutes episode of hypotension to SBP in the 50smmHg, for which he was placed on pressor support and given an additional 2 units PRBC.    OVERNIGHT EVENTS:   Left intubated. Admitted to [facility]. Hypotensive requiring additional pressor support. Atrial fibrillation.     VITALS/IMAGING/DATA/IVF FLUIDS/MEDICATIONS: [x] Reviewed    DEVICES:   [x] Restraints [] PIVs [x] ET tube [x] R IJ introducer with pacing wires [] PICC [x] arterial line [x] spears [x] OGT [] EVD [] LD [x] DEANA x 3 [] LiCOX [] ICP monitor [] Trach [] PEG [] Chest Tube [] other:    EXAMINATION:  General: In pain but no acute distress  HEENT: Anicteric sclerae  Cardiac: [id number]  Lungs: Decreased BS at bases  Abdomen: Soft, distended, +BS  Extremities: No c/c/e  Skin/Incision Site: Clean, dry and intact  Neurologic: Eyes open spontaneously, regards, follows commands with prompting, quickly agitated, PERRL, breathes above vent set rate, follows commands, arms at least 4/5, legs at least 2/5, unable to cooperate with confrontational testing SUMMARY:  72 year-old man with history of T2DM, HTN, HLD, FERNANDO on CPAP, Pneumonia, CLL, Prostate Cancer s/p resection 2003, thyroid cancer s/p thyroidectomy 8/2018, renal cancer s/p partial nephrectomy and spinal stenosis with acquired scoliosis who initially presented 10/18/18 for L1-5 posterior lumbar fusion and T10-pelvis instrumentation and fusion but case was aborted due to 4 second pause after induction with propofol who returned 10/27/18 for surgery. Of note, he had a prior Holter monitoring study that revealed nocturnal bradycardia with pauses. 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 On 10/27/18, he underwent placement of temporary pacer and L1-S1 transforaminal interbody fusion, T10-pelvis instrumented fusion and Plastics assisted closure. Operative course was notable for bradycardia responsive to glycopyrrolate, cerebrospinal fluid leak which was primarily repaired and EBL of 1500cc. He received 2 units PRBC intra-op. Post-operatively, he had a ~40 minutes episode of hypotension to SBP in the 50smmHg, for which he was placed on pressor support and given an additional 2 units PRBC.    OVERNIGHT EVENTS:   Left intubated. Admitted to [facility]. Hypotensive requiring additional pressor support.     VITALS/IMAGING/DATA/IVF FLUIDS/MEDICATIONS: [x] Reviewed    DEVICES:   [x] Restraints [] PIVs [x] ET tube [x] R IJ introducer with pacing wires [] PICC [x] arterial line [x] spears [x] OGT [] EVD [] LD [x] DEANA x 3 [] LiCOX [] ICP monitor [] Trach [] PEG [] Chest Tube [] other:    EXAMINATION:  General: In pain but no acute distress  HEENT: Anicteric sclerae  Cardiac: [id number]  Lungs: Decreased BS at bases  Abdomen: Soft, distended, +BS  Extremities: No c/c/e  Skin/Incision Site: Clean, dry and intact  Neurologic: Eyes open spontaneously, regards, follows commands with prompting, quickly agitated, PERRL, breathes above vent set rate, follows commands, arms at least 4/5, legs at least 2/5, unable to cooperate with confrontational testing

## 2018-10-28 LAB
ALBUMIN SERPL ELPH-MCNC: 2.9 G/DL — LOW (ref 3.3–5)
ALP SERPL-CCNC: 48 U/L — SIGNIFICANT CHANGE UP (ref 40–120)
ALT FLD-CCNC: 14 U/L — SIGNIFICANT CHANGE UP (ref 10–45)
ANION GAP SERPL CALC-SCNC: 13 MMOL/L — SIGNIFICANT CHANGE UP (ref 5–17)
ANION GAP SERPL CALC-SCNC: 14 MMOL/L — SIGNIFICANT CHANGE UP (ref 5–17)
ANION GAP SERPL CALC-SCNC: 9 MMOL/L — SIGNIFICANT CHANGE UP (ref 5–17)
AST SERPL-CCNC: 34 U/L — SIGNIFICANT CHANGE UP (ref 10–40)
BASE EXCESS BLDA CALC-SCNC: -4.3 MMOL/L — LOW (ref -2–2)
BILIRUB DIRECT SERPL-MCNC: <0.1 MG/DL — SIGNIFICANT CHANGE UP (ref 0–0.2)
BILIRUB INDIRECT FLD-MCNC: >0.2 MG/DL — SIGNIFICANT CHANGE UP (ref 0.2–1)
BILIRUB SERPL-MCNC: 0.3 MG/DL — SIGNIFICANT CHANGE UP (ref 0.2–1.2)
BUN SERPL-MCNC: 21 MG/DL — SIGNIFICANT CHANGE UP (ref 7–23)
BUN SERPL-MCNC: 23 MG/DL — SIGNIFICANT CHANGE UP (ref 7–23)
BUN SERPL-MCNC: 24 MG/DL — HIGH (ref 7–23)
BUN SERPL-MCNC: 24 MG/DL — HIGH (ref 7–23)
BUN SERPL-MCNC: 26 MG/DL — HIGH (ref 7–23)
CALCIUM SERPL-MCNC: 8.6 MG/DL — SIGNIFICANT CHANGE UP (ref 8.4–10.5)
CALCIUM SERPL-MCNC: 8.7 MG/DL — SIGNIFICANT CHANGE UP (ref 8.4–10.5)
CALCIUM SERPL-MCNC: 8.7 MG/DL — SIGNIFICANT CHANGE UP (ref 8.4–10.5)
CALCIUM SERPL-MCNC: 8.9 MG/DL — SIGNIFICANT CHANGE UP (ref 8.4–10.5)
CALCIUM SERPL-MCNC: 9.2 MG/DL — SIGNIFICANT CHANGE UP (ref 8.4–10.5)
CHLORIDE SERPL-SCNC: 106 MMOL/L — SIGNIFICANT CHANGE UP (ref 96–108)
CHLORIDE SERPL-SCNC: 108 MMOL/L — SIGNIFICANT CHANGE UP (ref 96–108)
CHLORIDE SERPL-SCNC: 111 MMOL/L — HIGH (ref 96–108)
CO2 BLDA-SCNC: 21 MMOL/L — LOW (ref 22–30)
CO2 SERPL-SCNC: 18 MMOL/L — LOW (ref 22–31)
CO2 SERPL-SCNC: 18 MMOL/L — LOW (ref 22–31)
CO2 SERPL-SCNC: 19 MMOL/L — LOW (ref 22–31)
CO2 SERPL-SCNC: 20 MMOL/L — LOW (ref 22–31)
CO2 SERPL-SCNC: 21 MMOL/L — LOW (ref 22–31)
CREAT SERPL-MCNC: 1.21 MG/DL — SIGNIFICANT CHANGE UP (ref 0.5–1.3)
CREAT SERPL-MCNC: 1.44 MG/DL — HIGH (ref 0.5–1.3)
CREAT SERPL-MCNC: 1.48 MG/DL — HIGH (ref 0.5–1.3)
CREAT SERPL-MCNC: 1.53 MG/DL — HIGH (ref 0.5–1.3)
CREAT SERPL-MCNC: 1.59 MG/DL — HIGH (ref 0.5–1.3)
GAS PNL BLDA: SIGNIFICANT CHANGE UP
GAS PNL BLDA: SIGNIFICANT CHANGE UP
GLUCOSE BLDC GLUCOMTR-MCNC: 198 MG/DL — HIGH (ref 70–99)
GLUCOSE BLDC GLUCOMTR-MCNC: 210 MG/DL — HIGH (ref 70–99)
GLUCOSE BLDC GLUCOMTR-MCNC: 220 MG/DL — HIGH (ref 70–99)
GLUCOSE BLDC GLUCOMTR-MCNC: 237 MG/DL — HIGH (ref 70–99)
GLUCOSE BLDC GLUCOMTR-MCNC: 238 MG/DL — HIGH (ref 70–99)
GLUCOSE BLDC GLUCOMTR-MCNC: 251 MG/DL — HIGH (ref 70–99)
GLUCOSE BLDC GLUCOMTR-MCNC: 274 MG/DL — HIGH (ref 70–99)
GLUCOSE BLDC GLUCOMTR-MCNC: 285 MG/DL — HIGH (ref 70–99)
GLUCOSE SERPL-MCNC: 233 MG/DL — HIGH (ref 70–99)
GLUCOSE SERPL-MCNC: 261 MG/DL — HIGH (ref 70–99)
GLUCOSE SERPL-MCNC: 261 MG/DL — HIGH (ref 70–99)
GLUCOSE SERPL-MCNC: 295 MG/DL — HIGH (ref 70–99)
GLUCOSE SERPL-MCNC: 310 MG/DL — HIGH (ref 70–99)
HCO3 BLDA-SCNC: 20 MMOL/L — LOW (ref 21–29)
HCT VFR BLD CALC: 30.9 % — LOW (ref 39–50)
HCT VFR BLD CALC: 37.9 % — LOW (ref 39–50)
HGB BLD-MCNC: 10.6 G/DL — LOW (ref 13–17)
HGB BLD-MCNC: 12.4 G/DL — LOW (ref 13–17)
HOROWITZ INDEX BLDA+IHG-RTO: 40 — SIGNIFICANT CHANGE UP
MAGNESIUM SERPL-MCNC: 1.8 MG/DL — SIGNIFICANT CHANGE UP (ref 1.6–2.6)
MAGNESIUM SERPL-MCNC: 1.8 MG/DL — SIGNIFICANT CHANGE UP (ref 1.6–2.6)
MCHC RBC-ENTMCNC: 28 PG — SIGNIFICANT CHANGE UP (ref 27–34)
MCHC RBC-ENTMCNC: 29.7 PG — SIGNIFICANT CHANGE UP (ref 27–34)
MCHC RBC-ENTMCNC: 32.7 GM/DL — SIGNIFICANT CHANGE UP (ref 32–36)
MCHC RBC-ENTMCNC: 34.5 GM/DL — SIGNIFICANT CHANGE UP (ref 32–36)
MCV RBC AUTO: 85.7 FL — SIGNIFICANT CHANGE UP (ref 80–100)
MCV RBC AUTO: 86.2 FL — SIGNIFICANT CHANGE UP (ref 80–100)
PCO2 BLDA: 36 MMHG — SIGNIFICANT CHANGE UP (ref 32–46)
PH BLDA: 7.36 — SIGNIFICANT CHANGE UP (ref 7.35–7.45)
PHOSPHATE SERPL-MCNC: 2.2 MG/DL — LOW (ref 2.5–4.5)
PHOSPHATE SERPL-MCNC: 3.8 MG/DL — SIGNIFICANT CHANGE UP (ref 2.5–4.5)
PLATELET # BLD AUTO: 154 K/UL — SIGNIFICANT CHANGE UP (ref 150–400)
PLATELET # BLD AUTO: 203 K/UL — SIGNIFICANT CHANGE UP (ref 150–400)
PO2 BLDA: 150 MMHG — HIGH (ref 74–108)
POTASSIUM SERPL-MCNC: 4.9 MMOL/L — SIGNIFICANT CHANGE UP (ref 3.5–5.3)
POTASSIUM SERPL-MCNC: 4.9 MMOL/L — SIGNIFICANT CHANGE UP (ref 3.5–5.3)
POTASSIUM SERPL-MCNC: 5.1 MMOL/L — SIGNIFICANT CHANGE UP (ref 3.5–5.3)
POTASSIUM SERPL-MCNC: 5.5 MMOL/L — HIGH (ref 3.5–5.3)
POTASSIUM SERPL-MCNC: 5.5 MMOL/L — HIGH (ref 3.5–5.3)
POTASSIUM SERPL-SCNC: 4.9 MMOL/L — SIGNIFICANT CHANGE UP (ref 3.5–5.3)
POTASSIUM SERPL-SCNC: 4.9 MMOL/L — SIGNIFICANT CHANGE UP (ref 3.5–5.3)
POTASSIUM SERPL-SCNC: 5.1 MMOL/L — SIGNIFICANT CHANGE UP (ref 3.5–5.3)
POTASSIUM SERPL-SCNC: 5.5 MMOL/L — HIGH (ref 3.5–5.3)
POTASSIUM SERPL-SCNC: 5.5 MMOL/L — HIGH (ref 3.5–5.3)
PROT SERPL-MCNC: 5 G/DL — LOW (ref 6–8.3)
RBC # BLD: 3.58 M/UL — LOW (ref 4.2–5.8)
RBC # BLD: 4.42 M/UL — SIGNIFICANT CHANGE UP (ref 4.2–5.8)
RBC # FLD: 13.5 % — SIGNIFICANT CHANGE UP (ref 10.3–14.5)
RBC # FLD: 13.9 % — SIGNIFICANT CHANGE UP (ref 10.3–14.5)
SAO2 % BLDA: 99 % — HIGH (ref 92–96)
SODIUM SERPL-SCNC: 137 MMOL/L — SIGNIFICANT CHANGE UP (ref 135–145)
SODIUM SERPL-SCNC: 138 MMOL/L — SIGNIFICANT CHANGE UP (ref 135–145)
SODIUM SERPL-SCNC: 139 MMOL/L — SIGNIFICANT CHANGE UP (ref 135–145)
SODIUM SERPL-SCNC: 140 MMOL/L — SIGNIFICANT CHANGE UP (ref 135–145)
SODIUM SERPL-SCNC: 141 MMOL/L — SIGNIFICANT CHANGE UP (ref 135–145)
TROPONIN T, HIGH SENSITIVITY RESULT: 76 NG/L — HIGH (ref 0–51)
TROPONIN T, HIGH SENSITIVITY RESULT: 87 NG/L — HIGH (ref 0–51)
WBC # BLD: 48.6 K/UL — CRITICAL HIGH (ref 3.8–10.5)
WBC # BLD: 66.2 K/UL — CRITICAL HIGH (ref 3.8–10.5)
WBC # FLD AUTO: 48.6 K/UL — CRITICAL HIGH (ref 3.8–10.5)
WBC # FLD AUTO: 66.2 K/UL — CRITICAL HIGH (ref 3.8–10.5)

## 2018-10-28 PROCEDURE — 99292 CRITICAL CARE ADDL 30 MIN: CPT

## 2018-10-28 PROCEDURE — 93010 ELECTROCARDIOGRAM REPORT: CPT | Mod: 76

## 2018-10-28 PROCEDURE — 99291 CRITICAL CARE FIRST HOUR: CPT

## 2018-10-28 RX ORDER — ACETAMINOPHEN 500 MG
1000 TABLET ORAL ONCE
Qty: 0 | Refills: 0 | Status: COMPLETED | OUTPATIENT
Start: 2018-10-28 | End: 2018-10-28

## 2018-10-28 RX ORDER — CHLORHEXIDINE GLUCONATE 213 G/1000ML
1 SOLUTION TOPICAL
Qty: 0 | Refills: 0 | Status: DISCONTINUED | OUTPATIENT
Start: 2018-10-28 | End: 2018-11-02

## 2018-10-28 RX ORDER — ENOXAPARIN SODIUM 100 MG/ML
40 INJECTION SUBCUTANEOUS
Qty: 0 | Refills: 0 | Status: DISCONTINUED | OUTPATIENT
Start: 2018-10-28 | End: 2018-11-02

## 2018-10-28 RX ORDER — HYDROMORPHONE HYDROCHLORIDE 2 MG/ML
0.5 INJECTION INTRAMUSCULAR; INTRAVENOUS; SUBCUTANEOUS
Qty: 0 | Refills: 0 | Status: DISCONTINUED | OUTPATIENT
Start: 2018-10-28 | End: 2018-10-29

## 2018-10-28 RX ORDER — PANTOPRAZOLE SODIUM 20 MG/1
40 TABLET, DELAYED RELEASE ORAL DAILY
Qty: 0 | Refills: 0 | Status: DISCONTINUED | OUTPATIENT
Start: 2018-10-28 | End: 2018-10-29

## 2018-10-28 RX ORDER — CALCIUM GLUCONATE 100 MG/ML
2 VIAL (ML) INTRAVENOUS ONCE
Qty: 0 | Refills: 0 | Status: DISCONTINUED | OUTPATIENT
Start: 2018-10-28 | End: 2018-10-28

## 2018-10-28 RX ORDER — SODIUM CHLORIDE 9 MG/ML
500 INJECTION INTRAMUSCULAR; INTRAVENOUS; SUBCUTANEOUS ONCE
Qty: 0 | Refills: 0 | Status: COMPLETED | OUTPATIENT
Start: 2018-10-28 | End: 2018-10-28

## 2018-10-28 RX ORDER — INSULIN HUMAN 100 [IU]/ML
10 INJECTION, SOLUTION SUBCUTANEOUS ONCE
Qty: 0 | Refills: 0 | Status: COMPLETED | OUTPATIENT
Start: 2018-10-28 | End: 2018-10-28

## 2018-10-28 RX ORDER — DIAZEPAM 5 MG
5 TABLET ORAL EVERY 8 HOURS
Qty: 0 | Refills: 0 | Status: DISCONTINUED | OUTPATIENT
Start: 2018-10-28 | End: 2018-10-28

## 2018-10-28 RX ORDER — FENTANYL CITRATE 50 UG/ML
50 INJECTION INTRAVENOUS ONCE
Qty: 0 | Refills: 0 | Status: DISCONTINUED | OUTPATIENT
Start: 2018-10-28 | End: 2018-10-27

## 2018-10-28 RX ORDER — HYDROMORPHONE HYDROCHLORIDE 2 MG/ML
1 INJECTION INTRAMUSCULAR; INTRAVENOUS; SUBCUTANEOUS EVERY 6 HOURS
Qty: 0 | Refills: 0 | Status: DISCONTINUED | OUTPATIENT
Start: 2018-10-28 | End: 2018-10-29

## 2018-10-28 RX ORDER — DEXMEDETOMIDINE HYDROCHLORIDE IN 0.9% SODIUM CHLORIDE 4 UG/ML
0.1 INJECTION INTRAVENOUS
Qty: 200 | Refills: 0 | Status: DISCONTINUED | OUTPATIENT
Start: 2018-10-28 | End: 2018-10-30

## 2018-10-28 RX ORDER — FENTANYL CITRATE 50 UG/ML
25 INJECTION INTRAVENOUS
Qty: 0 | Refills: 0 | Status: DISCONTINUED | OUTPATIENT
Start: 2018-10-28 | End: 2018-10-28

## 2018-10-28 RX ORDER — CALCIUM GLUCONATE 100 MG/ML
2 VIAL (ML) INTRAVENOUS ONCE
Qty: 0 | Refills: 0 | Status: COMPLETED | OUTPATIENT
Start: 2018-10-28 | End: 2018-10-28

## 2018-10-28 RX ORDER — FENTANYL CITRATE 50 UG/ML
50 INJECTION INTRAVENOUS ONCE
Qty: 0 | Refills: 0 | Status: DISCONTINUED | OUTPATIENT
Start: 2018-10-28 | End: 2018-10-28

## 2018-10-28 RX ORDER — SODIUM CHLORIDE 9 MG/ML
1000 INJECTION INTRAMUSCULAR; INTRAVENOUS; SUBCUTANEOUS
Qty: 0 | Refills: 0 | Status: DISCONTINUED | OUTPATIENT
Start: 2018-10-28 | End: 2018-10-28

## 2018-10-28 RX ORDER — HYDROMORPHONE HYDROCHLORIDE 2 MG/ML
0.5 INJECTION INTRAMUSCULAR; INTRAVENOUS; SUBCUTANEOUS ONCE
Qty: 0 | Refills: 0 | Status: DISCONTINUED | OUTPATIENT
Start: 2018-10-28 | End: 2018-10-28

## 2018-10-28 RX ORDER — INSULIN HUMAN 100 [IU]/ML
3 INJECTION, SOLUTION SUBCUTANEOUS
Qty: 100 | Refills: 0 | Status: DISCONTINUED | OUTPATIENT
Start: 2018-10-28 | End: 2018-10-29

## 2018-10-28 RX ORDER — DIAZEPAM 5 MG
5 TABLET ORAL EVERY 8 HOURS
Qty: 0 | Refills: 0 | Status: DISCONTINUED | OUTPATIENT
Start: 2018-10-28 | End: 2018-10-29

## 2018-10-28 RX ORDER — FUROSEMIDE 40 MG
20 TABLET ORAL DAILY
Qty: 0 | Refills: 0 | Status: DISCONTINUED | OUTPATIENT
Start: 2018-10-28 | End: 2018-10-29

## 2018-10-28 RX ORDER — SODIUM CHLORIDE 9 MG/ML
1000 INJECTION INTRAMUSCULAR; INTRAVENOUS; SUBCUTANEOUS
Qty: 0 | Refills: 0 | Status: DISCONTINUED | OUTPATIENT
Start: 2018-10-28 | End: 2018-11-01

## 2018-10-28 RX ORDER — DEXTROSE 50 % IN WATER 50 %
50 SYRINGE (ML) INTRAVENOUS ONCE
Qty: 0 | Refills: 0 | Status: COMPLETED | OUTPATIENT
Start: 2018-10-28 | End: 2018-10-28

## 2018-10-28 RX ADMIN — FENTANYL CITRATE 25 MICROGRAM(S): 50 INJECTION INTRAVENOUS at 14:05

## 2018-10-28 RX ADMIN — Medication 200 GRAM(S): at 09:16

## 2018-10-28 RX ADMIN — HYDROMORPHONE HYDROCHLORIDE 0.5 MILLIGRAM(S): 2 INJECTION INTRAMUSCULAR; INTRAVENOUS; SUBCUTANEOUS at 15:11

## 2018-10-28 RX ADMIN — Medication 100 MILLIGRAM(S): at 13:03

## 2018-10-28 RX ADMIN — SODIUM CHLORIDE 1000 MILLILITER(S): 9 INJECTION INTRAMUSCULAR; INTRAVENOUS; SUBCUTANEOUS at 23:50

## 2018-10-28 RX ADMIN — FENTANYL CITRATE 25 MICROGRAM(S): 50 INJECTION INTRAVENOUS at 12:07

## 2018-10-28 RX ADMIN — HYDROMORPHONE HYDROCHLORIDE 0.5 MILLIGRAM(S): 2 INJECTION INTRAMUSCULAR; INTRAVENOUS; SUBCUTANEOUS at 19:54

## 2018-10-28 RX ADMIN — CHLORHEXIDINE GLUCONATE 1 APPLICATION(S): 213 SOLUTION TOPICAL at 22:13

## 2018-10-28 RX ADMIN — HYDROMORPHONE HYDROCHLORIDE 0.5 MILLIGRAM(S): 2 INJECTION INTRAMUSCULAR; INTRAVENOUS; SUBCUTANEOUS at 23:30

## 2018-10-28 RX ADMIN — INSULIN HUMAN 10 UNIT(S): 100 INJECTION, SOLUTION SUBCUTANEOUS at 09:28

## 2018-10-28 RX ADMIN — Medication 400 MILLIGRAM(S): at 07:30

## 2018-10-28 RX ADMIN — SODIUM CHLORIDE 75 MILLILITER(S): 9 INJECTION INTRAMUSCULAR; INTRAVENOUS; SUBCUTANEOUS at 09:38

## 2018-10-28 RX ADMIN — Medication 100 MILLIGRAM(S): at 05:07

## 2018-10-28 RX ADMIN — Medication 9.66 MICROGRAM(S)/KG/MIN: at 09:19

## 2018-10-28 RX ADMIN — HYDROMORPHONE HYDROCHLORIDE 0.5 MILLIGRAM(S): 2 INJECTION INTRAMUSCULAR; INTRAVENOUS; SUBCUTANEOUS at 20:10

## 2018-10-28 RX ADMIN — FENTANYL CITRATE 2.58 MICROGRAM(S)/KG/HR: 50 INJECTION INTRAVENOUS at 09:16

## 2018-10-28 RX ADMIN — Medication 3: at 06:08

## 2018-10-28 RX ADMIN — HYDROMORPHONE HYDROCHLORIDE 0.5 MILLIGRAM(S): 2 INJECTION INTRAMUSCULAR; INTRAVENOUS; SUBCUTANEOUS at 23:45

## 2018-10-28 RX ADMIN — Medication 50 MILLILITER(S): at 09:15

## 2018-10-28 RX ADMIN — FENTANYL CITRATE 25 MICROGRAM(S): 50 INJECTION INTRAVENOUS at 14:20

## 2018-10-28 RX ADMIN — Medication 175 MICROGRAM(S): at 06:08

## 2018-10-28 RX ADMIN — INSULIN HUMAN 3 UNIT(S)/HR: 100 INJECTION, SOLUTION SUBCUTANEOUS at 21:29

## 2018-10-28 RX ADMIN — Medication 200 GRAM(S): at 00:04

## 2018-10-28 RX ADMIN — SODIUM CHLORIDE 75 MILLILITER(S): 9 INJECTION INTRAMUSCULAR; INTRAVENOUS; SUBCUTANEOUS at 05:06

## 2018-10-28 RX ADMIN — DEXMEDETOMIDINE HYDROCHLORIDE IN 0.9% SODIUM CHLORIDE 2.58 MICROGRAM(S)/KG/HR: 4 INJECTION INTRAVENOUS at 23:51

## 2018-10-28 RX ADMIN — Medication 1000 MILLIGRAM(S): at 07:45

## 2018-10-28 RX ADMIN — Medication 2: at 12:53

## 2018-10-28 RX ADMIN — HYDROMORPHONE HYDROCHLORIDE 0.5 MILLIGRAM(S): 2 INJECTION INTRAMUSCULAR; INTRAVENOUS; SUBCUTANEOUS at 15:26

## 2018-10-28 RX ADMIN — PANTOPRAZOLE SODIUM 40 MILLIGRAM(S): 20 TABLET, DELAYED RELEASE ORAL at 12:56

## 2018-10-28 RX ADMIN — FENTANYL CITRATE 25 MICROGRAM(S): 50 INJECTION INTRAVENOUS at 11:52

## 2018-10-28 RX ADMIN — ENOXAPARIN SODIUM 40 MILLIGRAM(S): 100 INJECTION SUBCUTANEOUS at 17:26

## 2018-10-28 RX ADMIN — Medication 2: at 18:00

## 2018-10-28 RX ADMIN — Medication 125 MILLILITER(S): at 00:03

## 2018-10-28 RX ADMIN — HYDROMORPHONE HYDROCHLORIDE 30 MILLILITER(S): 2 INJECTION INTRAMUSCULAR; INTRAVENOUS; SUBCUTANEOUS at 16:01

## 2018-10-28 NOTE — PROGRESS NOTE ADULT - ASSESSMENT
ASSESSMENT/PLAN: spinal stenosis/degenerative scoliosis    NEURO:  Pain control, anxiolytic  Upright x-rays (on the floor)  Monitor surgical drain output  Activity: [x] mobilize as tolerated [] Bedrest [] PT [] OT [] PMNR    PULM:  Monitor for stridor and aspiration    CV:  SBP MAP >65mmHg, SBP <160 mmHg  1st degree AV block; monitor as has had documented pauses  Wean off pressors    RENAL:  Fluids: Judicious fluid use    GI:  Diet: Dysphagia screen  GI prophylaxis [] not indicated [x] PPI [] other:  Bowel regimen [x] colace [x] senna [] other:    ENDO:   Goal euglycemia (-180)  Hypothyroidism: continue supplementation    HEME/ONC:  VTE prophylaxis: [x] SCDs [x] chemoprophylaxis [x] high risk of DVT/PE on admission due to: limited mobility due to spinal disorder    ID:  Monitor for fever    SOCIAL/FAMILY:  [] awaiting [x] updated at bedside [] family meeting    CODE STATUS:  [x] Full Code [] DNR [] DNI [] Palliative/Comfort Care    DISPOSITION:  [x] ICU [] Stroke Unit [] Floor [] EMU [] RCU [] PCU    [x] Patient is at high risk of neurologic deterioration/death due to: hemorrhagic shock    Time spent: 35 [x] critical care minutes    Contact: [phone] ASSESSMENT/PLAN: spinal stenosis/degenerative scoliosis    NEURO:  Pain control, anxiolytic  Upright x-rays (on the floor)  Monitor surgical drain output  Activity: [x] mobilize as tolerated [] Bedrest [] PT [] OT [] PMNR    PULM:  Monitor for stridor and aspiration    CV:  SBP MAP >65mmHg, SBP <160 mmHg  1st degree AV block; monitor as has had documented pauses  Wean off pressors    RENAL:  Fluids: Judicious fluid use    GI:  Diet: Dysphagia screen  GI prophylaxis [] not indicated [x] PPI [] other:  Bowel regimen [x] colace [x] senna [] other:    ENDO:   Goal euglycemia (-180)  Hypothyroidism: continue supplementation  Hyperglycemia on insulin gtt    HEME/ONC:  VTE prophylaxis: [x] SCDs [x] chemoprophylaxis [x] high risk of DVT/PE on admission due to: limited mobility due to spinal disorder    ID:  Monitor for fever    SOCIAL/FAMILY:  [] awaiting [x] updated at bedside [] family meeting    CODE STATUS:  [x] Full Code [] DNR [] DNI [] Palliative/Comfort Care    DISPOSITION:  [x] ICU [] Stroke Unit [] Floor [] EMU [] RCU [] PCU    [x] Patient is at high risk of neurologic deterioration/death due to: hemorrhagic shock    Time spent: 35 [x] critical care minutes    Contact: [phone]

## 2018-10-28 NOTE — CONSULT NOTE ADULT - SUBJECTIVE AND OBJECTIVE BOX
CHIEF COMPLAINT:Patient is a 72y old  Male who presents with a chief complaint of Scoliosis (28 Oct 2018 00:22)      HISTORY OF PRESENT ILLNESS:    72 male with history as below s/p   T10 to pelvis instrumentation, L1 to S1 TLIFs	  ROS is limited as pt currently sedated on ventilator.      PAST MEDICAL & SURGICAL HISTORY:  Former smoker, stopped smoking many years ago  CLL (chronic lymphocytic leukemia)  Sleep apnea, unspecified type  Spinal stenosis of lumbar region, unspecified whether neurogenic claudication present  Leukocytosis, unspecified type: monitored for CLL  Malignant neoplasm of kidney parenchyma, right: s/p surgery  Thyroid nodule  Malignant neoplasm of thyroid gland  Prostate cancer: 2003, s/p prostatectomy, RT 2009 x 40 days  Hypertension, unspecified type  Diabetes mellitus type 2 in obese  H/O thyroidectomy: 2016  History of strabismus surgery: b/l 1958  S/P left knee arthroscopy: 1998  H/O ventral hernia repair: 1997  H/O radical prostatectomy: 2003  H/O partial nephrectomy: right, 2009  History of total knee replacement, right: 2012, scoped 1998          MEDICATIONS:  furosemide    Tablet 40 milliGRAM(s) Oral daily  norepinephrine Infusion 0.05 MICROgram(s)/kG/Min IV Continuous <Continuous>    ceFAZolin   IVPB 2000 milliGRAM(s) IV Intermittent once  ceFAZolin   IVPB 1000 milliGRAM(s) IV Intermittent every 8 hours      acetaminophen   Tablet .. 650 milliGRAM(s) Oral every 6 hours PRN  acetaminophen 325 mG/butalbital 50 mG/caffeine 40 mG 1 Tablet(s) Oral every 8 hours PRN  fentaNYL   Infusion 0.5 MICROgram(s)/kG/Hr IV Continuous <Continuous>  HYDROmorphone PCA (1 mG/mL) 30 milliLiter(s) PCA Continuous PCA Continuous  HYDROmorphone PCA (1 mG/mL) Rescue Clinician Bolus 0.5 milliGRAM(s) IV Push every 15 minutes PRN  ondansetron Injectable 4 milliGRAM(s) IV Push every 6 hours PRN  ondansetron Injectable 4 milliGRAM(s) IV Push every 6 hours PRN    docusate sodium 100 milliGRAM(s) Oral three times a day  pantoprazole    Tablet 40 milliGRAM(s) Oral daily  senna 2 Tablet(s) Oral at bedtime PRN    atorvastatin 80 milliGRAM(s) Oral at bedtime  dextrose 40% Gel 15 Gram(s) Oral once PRN  dextrose 50% Injectable 12.5 Gram(s) IV Push once  dextrose 50% Injectable 25 Gram(s) IV Push once  dextrose 50% Injectable 25 Gram(s) IV Push once  glucagon  Injectable 1 milliGRAM(s) IntraMuscular once PRN  insulin lispro (HumaLOG) corrective regimen sliding scale   SubCutaneous three times a day before meals  levothyroxine 175 MICROGram(s) Oral daily    chlorhexidine 4% Liquid 1 Application(s) Topical <User Schedule>  dextrose 5%. 1000 milliLiter(s) IV Continuous <Continuous>  sodium chloride 0.9%. 1000 milliLiter(s) IV Continuous <Continuous>      FAMILY HISTORY:  Family history of amyloidosis (Mother)  Family history of prostate cancer in father (Father)      Non-contributory    SOCIAL HISTORY:    No tobacco, drugs or etoh    Allergies    codeine (Vomiting; Headache)  dogs, cats (Short breath)  dust (Rhinitis)  mold (Rhinitis)  morphine (Vomiting; Headache)  narcotic analgesics (Vomiting; Headache)    Intolerances    	    REVIEW OF SYSTEMS:  as above  The rest of the 14 points ROS reviewed and except above they are unremarkable.        PHYSICAL EXAM:  T(C): 37.5 (10-28-18 @ 03:00), Max: 37.5 (10-28-18 @ 01:15)  HR: 83 (10-28-18 @ 07:00) (65 - 98)  BP: 104/71 (10-27-18 @ 20:10) (104/71 - 104/71)  RR: 16 (10-28-18 @ 07:00) (11 - 43)  SpO2: 100% (10-28-18 @ 07:00) (98% - 100%)  Wt(kg): --  I&O's Summary    27 Oct 2018 07:01  -  28 Oct 2018 07:00  --------------------------------------------------------  IN: 1724.1 mL / OUT: 1260 mL / NET: 464.1 mL        Appearance: on vent 	  Cardiovascular: Normal S1 S2,    Murmur:   Neck: JVP limited to be evaluated   Respiratory: Lungs few rhonchi   Gastrointestinal:  Soft  Skin: normal   Neuro: limited as pt on vent     LABS/DATA:    TELEMETRY: 	  junctional rhythm , apcs , occasional sinus   ECG:  	   	  CARDIAC MARKERS:                        87 <<== 10-28-18 @ 03:00                76 <<== 10-28-18 @ 00:05                              12.4   66.2  )-----------( 203      ( 28 Oct 2018 06:22 )             37.9     10-28    138  |  106  |  21  ----------------------------<  310<H>  5.5<H>   |  18<L>  |  1.44<H>    Ca    8.7      28 Oct 2018 03:00  Phos  4.8     10-27  Mg     1.7     10-27      proBNP:   Lipid Profile:   HgA1c:   TSH:

## 2018-10-28 NOTE — AIRWAY REMOVAL NOTE  ADULT & PEDS - ARTIFICAL AIRWAY REMOVAL COMMENTS
[place] order for extubation verified.  The patient was identified by full name and birth day compared to the identification band. Present during the procedure was  Adwoa lagunas RN. Pt successfully extubated and was placed on AM 39% no distress noted.
[facility] order for extubation verified.  The patient was identified by full name and birth day compared to the identification band. Present during the procedure was Adwoa Rico RN. Pt is successfully extubated and placed on AM 30 % no distress noted.

## 2018-10-28 NOTE — PROGRESS NOTE ADULT - SUBJECTIVE AND OBJECTIVE BOX
SUMMARY:  72 year-old man with history of T2DM, HTN, HLD, FERNANDO on CPAP, Pneumonia, CLL, Prostate Cancer s/p resection 2003, thyroid cancer s/p thyroidectomy 8/2018, renal cancer s/p partial nephrectomy and spinal stenosis with acquired scoliosis who initially presented 10/18/18 for L1-5 posterior lumbar fusion and T10-pelvis instrumentation and fusion but case was aborted due to 4 second pause after induction with propofol who returned 10/27/18 for surgery. Of note, he had a prior Holter monitoring study that revealed nocturnal bradycardia with pauses. 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 On 10/27/18, he underwent placement of temporary pacer and L1-S1 transforaminal interbody fusion, T10-pelvis instrumented fusion and Plastics assisted closure. Operative course was notable for bradycardia responsive to glycopyrrolate, cerebrospinal fluid leak which was primarily repaired and EBL of 1500cc. He received 2 units PRBC intra-op. Post-operatively, he had a ~40 minutes episode of hypotension to SBP in the 50smmHg, for which he was placed on pressor support and given an additional 2 units PRBC. Upon admission to [facility], still hypotensive and at one point required triple pressor support. Extubated 10/28/18.    OVERNIGHT EVENTS:   Extubated. Anxious.     VITALS/IMAGING/DATA/IVF FLUIDS/MEDICATIONS: [x] Reviewed    DEVICES:   [x] Restraints [] PIVs [x] ET tube [x] R IJ introducer with pacing wires [] PICC [x] arterial line [x] spears [x] OGT [] EVD [] LD [x] DEANA x 3 [] LiCOX [] ICP monitor [] Trach [] PEG [] Chest Tube [] other:    EXAMINATION:  General: In pain but no acute distress  HEENT: Anicteric sclerae  Cardiac: [id number]  Lungs: Decreased BS at bases  Abdomen: Soft, distended, +BS  Extremities: No c/c/e  Skin/Incision Site: Clean, dry and intact  Neurologic: Eyes open spontaneously, regards, follows commands, anxious, PERRL, follows commands, UE 4/5, LE 3/5 SUMMARY:  72 year-old man with history of T2DM, HTN, HLD, FERNANDO on CPAP, Pneumonia, CLL, Prostate Cancer s/p resection 2003, thyroid cancer s/p thyroidectomy 8/2018, renal cancer s/p partial nephrectomy and spinal stenosis with acquired scoliosis who initially presented 10/18/18 for L1-5 posterior lumbar fusion and T10-pelvis instrumentation and fusion but case was aborted due to 4 second pause after induction with propofol who returned 10/27/18 for surgery. Of note, he had a prior Holter monitoring study that revealed nocturnal bradycardia with pauses. 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 On 10/27/18, he underwent placement of temporary pacer and L1-S1 transforaminal interbody fusion, T10-pelvis instrumented fusion and Plastics assisted closure. Operative course was notable for bradycardia responsive to glycopyrrolate, cerebrospinal fluid leak which was primarily repaired and EBL of 1500cc. He received 2 units PRBC intra-op. Post-operatively, he had a ~40 minutes episode of hypotension to SBP in the 50smmHg, for which he was placed on pressor support and given an additional 2 units PRBC. Upon admission to [facility]SC[facility], still hypotensive and at one point required triple pressor support. Extubated 10/28/18.    OVERNIGHT EVENTS:   Extubated. Anxious. Hyperglycemic, placed on insulin gtt.    VITALS/IMAGING/DATA/IVF FLUIDS/MEDICATIONS: [x] Reviewed    DEVICES:   [x] Restraints [] PIVs [x] ET tube [x] R IJ introducer with pacing wires [] PICC [x] arterial line [x] spears [x] OGT [] EVD [] LD [x] DEANA x 3 [] LiCOX [] ICP monitor [] Trach [] PEG [] Chest Tube [] other:    EXAMINATION:  General: In pain but no acute distress  HEENT: Anicteric sclerae  Cardiac: [id number]  Lungs: Decreased BS at bases  Abdomen: Soft, distended, +BS  Extremities: No c/c/e  Skin/Incision Site: Clean, dry and intact  Neurologic: Eyes open spontaneously, regards, follows commands, anxious, PERRL, follows commands, UE 4/5, LE 3/5

## 2018-10-28 NOTE — PROGRESS NOTE ADULT - ASSESSMENT
ASSESSMENT/PLAN: spinal stenosis/degenerative scoliosis    NEURO:  no longer flat-HOB upright; hold fentanyl; use fentayl prn for pain  Monitor surgical drain output  Activity: [] mobilize as tolerated [x] Bedrest [] PT [] OT [] PMNR    PULM:  Wean to extubate todau; CPAP now; check ABG in 2hrs with RSBI; CXR this am clear    CV:  SBP MAP >65mmHg, SBP <160 mmHg  1st degree AV block; monitor as has had documented pauses  Wean off pressors-hold Lasix for now    RENAL:  Fluids: NS @75hr    GI:  Diet: NPO for possible extubation today  GI prophylaxis [] not indicated [x] PPI [] other:  Bowel regimen [x] colace [x] senna [] other:    ENDO:   Goal euglycemia (-180)  Hypothyroidism: continue supplementation    HEME/ONC:  VTE prophylaxis: [x] SCDs [] chemoprophylaxis-start lovenox [] hold chemoprophylaxis due to:  [x] high risk of DVT/PE on admission due to: limited mobility due to spinal disorder    ID:  febrile this am-postop fever    SOCIAL/FAMILY:  [x] awaiting [] updated at bedside [] family meeting    CODE STATUS:  [x] Full Code [] DNR [] DNI [] Palliative/Comfort Care    DISPOSITION:  [x] ICU [] Stroke Unit [] Floor [] EMU [] RCU [] PCU    [x] Patient is at high risk of neurologic deterioration/death due to: hemorrhagic hock    Time spent: 45 [x] critical care minutes    Contact: [phone]

## 2018-10-28 NOTE — CHART NOTE - NSCHARTNOTEFT_GEN_A_CORE
Pt's cardiologist is Samson Doran  I put a call to their service awaiting call back  please have their team follow up pt   thanks

## 2018-10-28 NOTE — PROGRESS NOTE ADULT - SUBJECTIVE AND OBJECTIVE BOX
Vital Signs Last 24 Hrs  T(C): 36.7 (27 Oct 2018 23:00), Max: 36.9 (27 Oct 2018 06:12)  T(F): 98 (27 Oct 2018 23:00), Max: 98.4 (27 Oct 2018 06:12)  HR: 90 (28 Oct 2018 00:15) (65 - 96)  BP: 104/71 (27 Oct 2018 20:10) (104/71 - 148/75)  BP(mean): 76 (27 Oct 2018 20:10) (76 - 76)  RR: 18 (28 Oct 2018 00:15) (14 - 43)  SpO2: 100% (28 Oct 2018 00:00) (96% - 100%)    EXAMINATION:  Neurologic: Eyes open spontaneously, regards, follows commands with prompting, quickly agitated, PERRL, breathes above vent set rate, follows commands, MAN antigravity but agitated

## 2018-10-28 NOTE — PROGRESS NOTE ADULT - SUBJECTIVE AND OBJECTIVE BOX
HPI:    SURGERY: Laminectomy    PAST MEDICAL HISTORY: Former smoker, stopped smoking many years ago  CLL (chronic lymphocytic leukemia)  Sleep apnea, unspecified type  Spinal stenosis of lumbar region, unspecified whether neurogenic claudication present  Leukocytosis, unspecified type  Malignant neoplasm of kidney parenchyma, right  Thyroid nodule  Malignant neoplasm of thyroid gland  Prostate cancer  Hypertension, unspecified type  Diabetes mellitus type 2 in obese    PAST SURGICAL HISTORY: H/O thyroidectomy  History of strabismus surgery  S/P left knee arthroscopy  H/O ventral hernia repair  H/O radical prostatectomy  H/O partial nephrectomy  History of total knee replacement, right    FAMILY HISTORY:  Family history of amyloidosis (Mother)  Family history of prostate cancer in father (Father)    ALLERGIES: codeine (Vomiting; Headache)  dogs, cats (Short breath)  dust (Rhinitis)  mold (Rhinitis)  morphine (Vomiting; Headache)  narcotic analgesics (Vomiting; Headache)    **************************************  **************************************    OVERNIGHT EVENTS: [x] None    ROS  Unobtainable due to mental status[x] Negative []  Positives:    ADMISSION SCORES: GCS: HH: MF: NIHSS: RASS: CAM-ICU: ICP:    ICU Vital Signs Last 24 Hrs  T(C): 38.2 (28 Oct 2018 09:00), Max: 38.3 (28 Oct 2018 07:00)  T(F): 100.7 (28 Oct 2018 09:00), Max: 101 (28 Oct 2018 07:00)  HR: 88 (28 Oct 2018 11:05) (42 - 193)  BP: 109/56 (28 Oct 2018 09:24) (104/71 - 109/56)  BP(mean): 74 (28 Oct 2018 09:24) (74 - 76)  ABP: 110/57 (28 Oct 2018 11:05) (96/54 - 171/84)  ABP(mean): 73 (28 Oct 2018 11:05) (65 - 112)  RR: 8 (28 Oct 2018 11:05) (8 - 43)  SpO2: 97% (28 Oct 2018 11:05) (97% - 100%)     10-27 @ 07:01  -  10-28 @ 07:00  --------------------------------------------------------  IN: 1724.1 mL / OUT: 1260 mL / NET: 464.1 mL    10-28 @ 07:01  -  10-28 @ 11:17  --------------------------------------------------------  IN: 614.7 mL / OUT: 165 mL / NET: 449.7 mL       Mode: CPAP with PS  FiO2: 30  PEEP: 5  PS: 5  MAP: 7  PIP: 13      DEVICES: [] Restraints [] DEANA/HMV []LD [] ET tube [] Trach [] Chest Tube [] A-line [] Ortiz [] NGT [] Rectal Tube [] EVD [] CVL  [] ICP/LiCOx    NEUROIMAGING:     EEG REPORT:     MEDICATIONS:  acetaminophen   Tablet .. 650 milliGRAM(s) Oral every 6 hours PRN  acetaminophen 325 mG/butalbital 50 mG/caffeine 40 mG 1 Tablet(s) Oral every 8 hours PRN  atorvastatin 80 milliGRAM(s) Oral at bedtime  ceFAZolin   IVPB 2000 milliGRAM(s) IV Intermittent once  ceFAZolin   IVPB 1000 milliGRAM(s) IV Intermittent every 8 hours  chlorhexidine 4% Liquid 1 Application(s) Topical <User Schedule>  dextrose 40% Gel 15 Gram(s) Oral once PRN  dextrose 5%. 1000 milliLiter(s) IV Continuous <Continuous>  dextrose 50% Injectable 12.5 Gram(s) IV Push once  dextrose 50% Injectable 25 Gram(s) IV Push once  dextrose 50% Injectable 25 Gram(s) IV Push once  docusate sodium 100 milliGRAM(s) Oral three times a day  fentaNYL   Infusion 0.5 MICROgram(s)/kG/Hr IV Continuous <Continuous>  furosemide   Injectable 20 milliGRAM(s) IV Push daily  glucagon  Injectable 1 milliGRAM(s) IntraMuscular once PRN  HYDROmorphone PCA (1 mG/mL) 30 milliLiter(s) PCA Continuous PCA Continuous  HYDROmorphone PCA (1 mG/mL) Rescue Clinician Bolus 0.5 milliGRAM(s) IV Push every 15 minutes PRN  insulin lispro (HumaLOG) corrective regimen sliding scale   SubCutaneous three times a day before meals  levothyroxine 175 MICROGram(s) Oral daily  naloxone Injectable 0.1 milliGRAM(s) IV Push every 3 minutes PRN  norepinephrine Infusion 0.05 MICROgram(s)/kG/Min IV Continuous <Continuous>  ondansetron Injectable 4 milliGRAM(s) IV Push every 6 hours PRN  ondansetron Injectable 4 milliGRAM(s) IV Push every 6 hours PRN  pantoprazole  Injectable 40 milliGRAM(s) IV Push daily  senna 2 Tablet(s) Oral at bedtime PRN  sodium chloride 0.9%. 1000 milliLiter(s) IV Continuous <Continuous>      PHYSICAL EXAM:  General: obtuneded, intubated on fentanyl  HEENT: Anicteric sclerae  Cardiac: [id number]  Lungs: Decreased BS at bases  Abdomen: Soft, distended, +BS  Extremities: No c/c/e  Skin/Incision Site: Clean, dry and intact  Neurologic: Eyes open spontaneously, not follwoing commands; no motor on fentanyl      LABS:                        12.4   66.2  )-----------( 203      ( 28 Oct 2018 06:22 )             37.9    10-28    137  |  106  |  24<H>  ----------------------------<  295<H>  5.5<H>   |  18<L>  |  1.53<H>    Ca    8.6      28 Oct 2018 07:58  Phos  3.8     10-28  Mg     1.8     10-28     ABG - ( 27 Oct 2018 22:32 )  pH, Arterial: 7.36  pH, Blood: x     /  pCO2: 33    /  pO2: 104   / HCO3: 18    / Base Excess: -6.0  /  SaO2: 97

## 2018-10-28 NOTE — PROGRESS NOTE ADULT - ASSESSMENT
ASSESSMENT/PLAN:    NEURO:  Pain control  Upright x-rays (on the floor)  Monitor surgical drain output  Cerebrospinal fluid leak - flat [id number]  Activity: [] mobilize as tolerated [x] Bedrest [] PT [] OT [] PMNR    PULM:  Wean to extubate  CXR, ABG    CV:  SBP MAP >65mmHg, SBP <160 mmHg  1st degree AV block; monitor as has had documented pauses    RENAL:  Fluids: Judicious fluid use    GI:  Diet: NPO for possible extubation in AM  GI prophylaxis [] not indicated [x] PPI [] other:  Bowel regimen [x] colace [x] senna [] other:    ENDO:   Goal euglycemia (-180)    HEME/ONC:  VTE prophylaxis: [x] SCDs [] chemoprophylaxis [x] hold chemoprophylaxis due to: fresh post-op [] high risk of DVT/PE on admission due to:    ID:  Machelle-op antibiotics     SOCIAL/FAMILY:  [] awaiting [x] updated at bedside [] family meeting    CODE STATUS:  [x] Full Code [] DNR [] DNI [] Palliative/Comfort Care    DISPOSITION:  [x] ICU [] Stroke Unit [] Floor [] EMU [] RCU [] PCU    [x] Patient is at high risk of neurologic deterioration/death due to: hemorrhagic hock    Time spent: 35 [x] critical care minutes    Contact: [phone]

## 2018-10-29 DIAGNOSIS — I10 ESSENTIAL (PRIMARY) HYPERTENSION: ICD-10-CM

## 2018-10-29 DIAGNOSIS — C73 MALIGNANT NEOPLASM OF THYROID GLAND: ICD-10-CM

## 2018-10-29 DIAGNOSIS — E78.5 HYPERLIPIDEMIA, UNSPECIFIED: ICD-10-CM

## 2018-10-29 DIAGNOSIS — E11.65 TYPE 2 DIABETES MELLITUS WITH HYPERGLYCEMIA: ICD-10-CM

## 2018-10-29 DIAGNOSIS — E03.9 HYPOTHYROIDISM, UNSPECIFIED: ICD-10-CM

## 2018-10-29 DIAGNOSIS — E89.0 POSTPROCEDURAL HYPOTHYROIDISM: ICD-10-CM

## 2018-10-29 LAB
ANION GAP SERPL CALC-SCNC: 9 MMOL/L — SIGNIFICANT CHANGE UP (ref 5–17)
BUN SERPL-MCNC: 19 MG/DL — SIGNIFICANT CHANGE UP (ref 7–23)
CALCIUM SERPL-MCNC: 8.4 MG/DL — SIGNIFICANT CHANGE UP (ref 8.4–10.5)
CHLORIDE SERPL-SCNC: 114 MMOL/L — HIGH (ref 96–108)
CO2 SERPL-SCNC: 20 MMOL/L — LOW (ref 22–31)
CREAT SERPL-MCNC: 0.82 MG/DL — SIGNIFICANT CHANGE UP (ref 0.5–1.3)
GLUCOSE BLDC GLUCOMTR-MCNC: 112 MG/DL — HIGH (ref 70–99)
GLUCOSE BLDC GLUCOMTR-MCNC: 123 MG/DL — HIGH (ref 70–99)
GLUCOSE BLDC GLUCOMTR-MCNC: 123 MG/DL — HIGH (ref 70–99)
GLUCOSE BLDC GLUCOMTR-MCNC: 127 MG/DL — HIGH (ref 70–99)
GLUCOSE BLDC GLUCOMTR-MCNC: 128 MG/DL — HIGH (ref 70–99)
GLUCOSE BLDC GLUCOMTR-MCNC: 141 MG/DL — HIGH (ref 70–99)
GLUCOSE BLDC GLUCOMTR-MCNC: 143 MG/DL — HIGH (ref 70–99)
GLUCOSE BLDC GLUCOMTR-MCNC: 148 MG/DL — HIGH (ref 70–99)
GLUCOSE BLDC GLUCOMTR-MCNC: 154 MG/DL — HIGH (ref 70–99)
GLUCOSE BLDC GLUCOMTR-MCNC: 157 MG/DL — HIGH (ref 70–99)
GLUCOSE BLDC GLUCOMTR-MCNC: 163 MG/DL — HIGH (ref 70–99)
GLUCOSE BLDC GLUCOMTR-MCNC: 163 MG/DL — HIGH (ref 70–99)
GLUCOSE BLDC GLUCOMTR-MCNC: 164 MG/DL — HIGH (ref 70–99)
GLUCOSE BLDC GLUCOMTR-MCNC: 173 MG/DL — HIGH (ref 70–99)
GLUCOSE BLDC GLUCOMTR-MCNC: 177 MG/DL — HIGH (ref 70–99)
GLUCOSE BLDC GLUCOMTR-MCNC: 179 MG/DL — HIGH (ref 70–99)
GLUCOSE SERPL-MCNC: 153 MG/DL — HIGH (ref 70–99)
HCT VFR BLD CALC: 24.9 % — LOW (ref 39–50)
HGB BLD-MCNC: 8.2 G/DL — LOW (ref 13–17)
MAGNESIUM SERPL-MCNC: 2 MG/DL — SIGNIFICANT CHANGE UP (ref 1.6–2.6)
MCHC RBC-ENTMCNC: 29 PG — SIGNIFICANT CHANGE UP (ref 27–34)
MCHC RBC-ENTMCNC: 33.1 GM/DL — SIGNIFICANT CHANGE UP (ref 32–36)
MCV RBC AUTO: 87.6 FL — SIGNIFICANT CHANGE UP (ref 80–100)
PHOSPHATE SERPL-MCNC: 2.1 MG/DL — LOW (ref 2.5–4.5)
PLATELET # BLD AUTO: 117 K/UL — LOW (ref 150–400)
POTASSIUM SERPL-MCNC: 4.9 MMOL/L — SIGNIFICANT CHANGE UP (ref 3.5–5.3)
POTASSIUM SERPL-SCNC: 4.9 MMOL/L — SIGNIFICANT CHANGE UP (ref 3.5–5.3)
RBC # BLD: 2.84 M/UL — LOW (ref 4.2–5.8)
RBC # FLD: 13.7 % — SIGNIFICANT CHANGE UP (ref 10.3–14.5)
SODIUM SERPL-SCNC: 143 MMOL/L — SIGNIFICANT CHANGE UP (ref 135–145)
WBC # BLD: 20.6 K/UL — HIGH (ref 3.8–10.5)
WBC # FLD AUTO: 20.6 K/UL — HIGH (ref 3.8–10.5)

## 2018-10-29 PROCEDURE — 99223 1ST HOSP IP/OBS HIGH 75: CPT | Mod: GC

## 2018-10-29 PROCEDURE — 99291 CRITICAL CARE FIRST HOUR: CPT

## 2018-10-29 PROCEDURE — 99292 CRITICAL CARE ADDL 30 MIN: CPT

## 2018-10-29 RX ORDER — HYDROMORPHONE HYDROCHLORIDE 2 MG/ML
1 INJECTION INTRAMUSCULAR; INTRAVENOUS; SUBCUTANEOUS
Qty: 0 | Refills: 0 | Status: DISCONTINUED | OUTPATIENT
Start: 2018-10-29 | End: 2018-10-30

## 2018-10-29 RX ORDER — POLYETHYLENE GLYCOL 3350 17 G/17G
17 POWDER, FOR SOLUTION ORAL
Qty: 0 | Refills: 0 | Status: DISCONTINUED | OUTPATIENT
Start: 2018-10-29 | End: 2018-11-01

## 2018-10-29 RX ORDER — HYDROMORPHONE HYDROCHLORIDE 2 MG/ML
1 INJECTION INTRAMUSCULAR; INTRAVENOUS; SUBCUTANEOUS EVERY 4 HOURS
Qty: 0 | Refills: 0 | Status: DISCONTINUED | OUTPATIENT
Start: 2018-10-29 | End: 2018-10-29

## 2018-10-29 RX ORDER — DIAZEPAM 5 MG
5 TABLET ORAL EVERY 8 HOURS
Qty: 0 | Refills: 0 | Status: DISCONTINUED | OUTPATIENT
Start: 2018-10-29 | End: 2018-10-30

## 2018-10-29 RX ORDER — HYDROMORPHONE HYDROCHLORIDE 2 MG/ML
0.5 INJECTION INTRAMUSCULAR; INTRAVENOUS; SUBCUTANEOUS
Qty: 0 | Refills: 0 | Status: DISCONTINUED | OUTPATIENT
Start: 2018-10-29 | End: 2018-10-30

## 2018-10-29 RX ORDER — HYDROMORPHONE HYDROCHLORIDE 2 MG/ML
2 INJECTION INTRAMUSCULAR; INTRAVENOUS; SUBCUTANEOUS EVERY 4 HOURS
Qty: 0 | Refills: 0 | Status: DISCONTINUED | OUTPATIENT
Start: 2018-10-29 | End: 2018-10-29

## 2018-10-29 RX ORDER — INSULIN LISPRO 100/ML
VIAL (ML) SUBCUTANEOUS EVERY 6 HOURS
Qty: 0 | Refills: 0 | Status: DISCONTINUED | OUTPATIENT
Start: 2018-10-29 | End: 2018-10-30

## 2018-10-29 RX ORDER — HYDROMORPHONE HYDROCHLORIDE 2 MG/ML
2 INJECTION INTRAMUSCULAR; INTRAVENOUS; SUBCUTANEOUS
Qty: 0 | Refills: 0 | Status: DISCONTINUED | OUTPATIENT
Start: 2018-10-29 | End: 2018-10-29

## 2018-10-29 RX ORDER — INSULIN GLARGINE 100 [IU]/ML
20 INJECTION, SOLUTION SUBCUTANEOUS AT BEDTIME
Qty: 0 | Refills: 0 | Status: DISCONTINUED | OUTPATIENT
Start: 2018-10-29 | End: 2018-10-31

## 2018-10-29 RX ORDER — INSULIN LISPRO 100/ML
VIAL (ML) SUBCUTANEOUS EVERY 4 HOURS
Qty: 0 | Refills: 0 | Status: DISCONTINUED | OUTPATIENT
Start: 2018-10-29 | End: 2018-10-29

## 2018-10-29 RX ORDER — ACETAMINOPHEN 500 MG
1000 TABLET ORAL ONCE
Qty: 0 | Refills: 0 | Status: COMPLETED | OUTPATIENT
Start: 2018-10-29 | End: 2018-10-29

## 2018-10-29 RX ORDER — CLONAZEPAM 1 MG
0.5 TABLET ORAL AT BEDTIME
Qty: 0 | Refills: 0 | Status: DISCONTINUED | OUTPATIENT
Start: 2018-10-29 | End: 2018-10-30

## 2018-10-29 RX ADMIN — ATORVASTATIN CALCIUM 80 MILLIGRAM(S): 80 TABLET, FILM COATED ORAL at 22:10

## 2018-10-29 RX ADMIN — HYDROMORPHONE HYDROCHLORIDE 1 MILLIGRAM(S): 2 INJECTION INTRAMUSCULAR; INTRAVENOUS; SUBCUTANEOUS at 18:15

## 2018-10-29 RX ADMIN — Medication 5 MILLIGRAM(S): at 22:10

## 2018-10-29 RX ADMIN — ENOXAPARIN SODIUM 40 MILLIGRAM(S): 100 INJECTION SUBCUTANEOUS at 18:12

## 2018-10-29 RX ADMIN — HYDROMORPHONE HYDROCHLORIDE 1 MILLIGRAM(S): 2 INJECTION INTRAMUSCULAR; INTRAVENOUS; SUBCUTANEOUS at 20:25

## 2018-10-29 RX ADMIN — Medication 2: at 23:54

## 2018-10-29 RX ADMIN — Medication 9.66 MICROGRAM(S)/KG/MIN: at 18:52

## 2018-10-29 RX ADMIN — HYDROMORPHONE HYDROCHLORIDE 0.5 MILLIGRAM(S): 2 INJECTION INTRAMUSCULAR; INTRAVENOUS; SUBCUTANEOUS at 08:48

## 2018-10-29 RX ADMIN — HYDROMORPHONE HYDROCHLORIDE 1 MILLIGRAM(S): 2 INJECTION INTRAMUSCULAR; INTRAVENOUS; SUBCUTANEOUS at 18:00

## 2018-10-29 RX ADMIN — HYDROMORPHONE HYDROCHLORIDE 0.5 MILLIGRAM(S): 2 INJECTION INTRAMUSCULAR; INTRAVENOUS; SUBCUTANEOUS at 03:15

## 2018-10-29 RX ADMIN — Medication 650 MILLIGRAM(S): at 22:52

## 2018-10-29 RX ADMIN — DEXMEDETOMIDINE HYDROCHLORIDE IN 0.9% SODIUM CHLORIDE 2.58 MICROGRAM(S)/KG/HR: 4 INJECTION INTRAVENOUS at 18:54

## 2018-10-29 RX ADMIN — HYDROMORPHONE HYDROCHLORIDE 2 MILLIGRAM(S): 2 INJECTION INTRAMUSCULAR; INTRAVENOUS; SUBCUTANEOUS at 13:15

## 2018-10-29 RX ADMIN — HYDROMORPHONE HYDROCHLORIDE 0.5 MILLIGRAM(S): 2 INJECTION INTRAMUSCULAR; INTRAVENOUS; SUBCUTANEOUS at 08:33

## 2018-10-29 RX ADMIN — CHLORHEXIDINE GLUCONATE 1 APPLICATION(S): 213 SOLUTION TOPICAL at 22:10

## 2018-10-29 RX ADMIN — Medication 62.5 MILLIMOLE(S): at 00:51

## 2018-10-29 RX ADMIN — SODIUM CHLORIDE 75 MILLILITER(S): 9 INJECTION INTRAMUSCULAR; INTRAVENOUS; SUBCUTANEOUS at 05:34

## 2018-10-29 RX ADMIN — HYDROMORPHONE HYDROCHLORIDE 0.5 MILLIGRAM(S): 2 INJECTION INTRAMUSCULAR; INTRAVENOUS; SUBCUTANEOUS at 16:05

## 2018-10-29 RX ADMIN — HYDROMORPHONE HYDROCHLORIDE 0.5 MILLIGRAM(S): 2 INJECTION INTRAMUSCULAR; INTRAVENOUS; SUBCUTANEOUS at 03:30

## 2018-10-29 RX ADMIN — Medication 175 MICROGRAM(S): at 05:33

## 2018-10-29 RX ADMIN — Medication 400 MILLIGRAM(S): at 14:20

## 2018-10-29 RX ADMIN — HYDROMORPHONE HYDROCHLORIDE 1 MILLIGRAM(S): 2 INJECTION INTRAMUSCULAR; INTRAVENOUS; SUBCUTANEOUS at 20:40

## 2018-10-29 RX ADMIN — Medication 1000 MILLIGRAM(S): at 14:45

## 2018-10-29 RX ADMIN — HYDROMORPHONE HYDROCHLORIDE 0.5 MILLIGRAM(S): 2 INJECTION INTRAMUSCULAR; INTRAVENOUS; SUBCUTANEOUS at 15:47

## 2018-10-29 RX ADMIN — Medication 5 MILLIGRAM(S): at 02:55

## 2018-10-29 RX ADMIN — INSULIN GLARGINE 20 UNIT(S): 100 INJECTION, SOLUTION SUBCUTANEOUS at 22:47

## 2018-10-29 RX ADMIN — Medication 2: at 14:37

## 2018-10-29 RX ADMIN — HYDROMORPHONE HYDROCHLORIDE 2 MILLIGRAM(S): 2 INJECTION INTRAMUSCULAR; INTRAVENOUS; SUBCUTANEOUS at 12:54

## 2018-10-29 RX ADMIN — Medication 9.66 MICROGRAM(S)/KG/MIN: at 05:34

## 2018-10-29 NOTE — CONSULT NOTE ADULT - SUBJECTIVE AND OBJECTIVE BOX
Endocrine Hx :  A 72 year old man with PMH T2DM (HbA1c 6.9) HTN, HLD, FERNANDO on CPAP, Pneumonia, Prostate Cancer s/p removal 2003, thyroid cancer s/p thyroidectomy 8/2018, renal cancer s/p partial nephrectomy, CLL (not active), former smoker and osteoarthritis s/p R TKR 2012, spinal stenosis with acquired scoliosis who initially presented 10/18/18 for L1-5 posterior lumbar fusion and T10-pelvis instrumentation and fusion but case was aborted due to 4 second pause after induction with propofol. On 10/27/18, he underwent placement of temporary pacer and L1-S1 transforaminal interbody fusion, T10-pelvis instrumented fusion and plastics assisted closure.  Post-operatively, Pt requires pressor support, on Precedex Extubated 10/28/18.      DM  o/p [facility] - Nikolas Rodriguez, endocrine [phone]      Thyroid cancer      PAST MEDICAL & SURGICAL HISTORY:  Former smoker, stopped smoking many years ago  CLL (chronic lymphocytic leukemia)  Sleep apnea, unspecified type  Spinal stenosis of lumbar region, unspecified whether neurogenic claudication present  Leukocytosis, unspecified type: monitored for CLL  Malignant neoplasm of kidney parenchyma, right: s/p surgery  Thyroid nodule  Malignant neoplasm of thyroid gland  Prostate cancer: 2003, s/p prostatectomy, RT 2009 x 40 days  Hypertension, unspecified type  Diabetes mellitus type 2 in obese  H/O thyroidectomy: 2016  History of strabismus surgery: b/l 1958  S/P left knee arthroscopy: 1998  H/O ventral hernia repair: 1997  H/O radical prostatectomy: 2003  H/O partial nephrectomy: right, 2009  History of total knee replacement, right: 2012, scoped 1998      FAMILY HISTORY:  Family history of amyloidosis (Mother)  Family history of prostate cancer in father (Father)      Social History:  Tobacco  ETOH  Illicits  Occupation    Outpatient Medications:    MEDICATIONS  (STANDING):  acetaminophen  IVPB .. 1000 milliGRAM(s) IV Intermittent once  atorvastatin 80 milliGRAM(s) Oral at bedtime  ceFAZolin   IVPB 2000 milliGRAM(s) IV Intermittent once  chlorhexidine 4% Liquid 1 Application(s) Topical <User Schedule>  dexmedetomidine Infusion 0.1 MICROgram(s)/kG/Hr (2.575 mL/Hr) IV Continuous <Continuous>  dextrose 5%. 1000 milliLiter(s) (50 mL/Hr) IV Continuous <Continuous>  dextrose 50% Injectable 12.5 Gram(s) IV Push once  dextrose 50% Injectable 25 Gram(s) IV Push once  dextrose 50% Injectable 25 Gram(s) IV Push once  diazepam    Tablet 5 milliGRAM(s) Oral every 8 hours  docusate sodium 100 milliGRAM(s) Oral three times a day  enoxaparin Injectable 40 milliGRAM(s) SubCutaneous <User Schedule>  insulin lispro (HumaLOG) corrective regimen sliding scale   SubCutaneous every 4 hours  levothyroxine 175 MICROGram(s) Oral daily  norepinephrine Infusion 0.05 MICROgram(s)/kG/Min (9.656 mL/Hr) IV Continuous <Continuous>  sodium chloride 0.9%. 1000 milliLiter(s) (75 mL/Hr) IV Continuous <Continuous>    MEDICATIONS  (PRN):  acetaminophen   Tablet .. 650 milliGRAM(s) Oral every 6 hours PRN Temp greater or equal to 38C (100.4F), Mild Pain (1 - 3)  acetaminophen 325 mG/butalbital 50 mG/caffeine 40 mG 1 Tablet(s) Oral every 8 hours PRN HA  dextrose 40% Gel 15 Gram(s) Oral once PRN Blood Glucose LESS THAN 70 milliGRAM(s)/deciliter  glucagon  Injectable 1 milliGRAM(s) IntraMuscular once PRN Glucose LESS THAN 70 milligrams/deciliter  HYDROmorphone  Injectable 1 milliGRAM(s) IV Push every 2 hours PRN Severe Pain (7 - 10)  HYDROmorphone  Injectable 0.5 milliGRAM(s) IV Push every 2 hours PRN Moderate Pain (4 - 6)  naloxone Injectable 0.1 milliGRAM(s) IV Push every 3 minutes PRN For ANY of the following changes in patient status:  A. RR LESS THAN 10 breaths per minute, B. Oxygen saturation LESS THAN 90%, C. Sedation score of 6  ondansetron Injectable 4 milliGRAM(s) IV Push every 6 hours PRN Nausea and/or Vomiting  senna 2 Tablet(s) Oral at bedtime PRN Constipation      Allergies    codeine (Vomiting; Headache)  dogs, cats (Short breath)  dust (Rhinitis)  mold (Rhinitis)  morphine (Vomiting; Headache)  narcotic analgesics (Vomiting; Headache)    Intolerances      Review of Systems:  Constitutional: No fever, good appetite/po intake  Eyes: No blurry vision, diplopia  Neuro: No tremors  HEENT: No pain  Cardiovascular: No chest pain, palpitations  Respiratory: No SOB, no cough  GI: No nausea, vomiting,   : No dysuria, hematuria  Skin: no rash  Psych: no depression  Endocrine: no polyuria, polydipsia  Hem/lymph: no swelling  Osteoporosis: no fractures    ALL OTHER SYSTEMS REVIEWED AND NEGATIVE    UNABLE TO OBTAIN    PHYSICAL EXAM:  VITALS: T(C): 37.8 (10-29-18 @ 11:00)  T(F): 100.1 (10-29-18 @ 11:00), Max: 100.1 (10-29-18 @ 11:00)  HR: 73 (10-29-18 @ 13:30) (72 - 111)  BP: --  RR:  (10 - 22)  SpO2:  (93% - 100%)  Wt(kg): -- 103kg  GENERAL: sedated, minimally responsive to verbal stimuli  HEENT:  Atraumatic, Normocephalic  Back ; surgical dressing  RESPIRATORY: Clear to auscultation bilaterally; No rales, rhonchi, wheezing, or rubs  CARDIOVASCULAR: Regular rate and rhythm; No murmurs; no peripheral edema, 2+ peripheral pulses  GI: Soft, nontender, non distended, normal bowel sounds  Ext : No cyanosis, no clubbing  SKIN: Dry, intact, No rashes or lesions  NEURO: sedated        POCT Blood Glucose.: 163 mg/dL (10-29-18 @ 14:14)  POCT Blood Glucose.: 127 mg/dL (10-29-18 @ 12:07)  POCT Blood Glucose.: 123 mg/dL (10-29-18 @ 11:04)  POCT Blood Glucose.: 123 mg/dL (10-29-18 @ 10:03)  POCT Blood Glucose.: 112 mg/dL (10-29-18 @ 09:01)  POCT Blood Glucose.: 128 mg/dL (10-29-18 @ 08:04)  POCT Blood Glucose.: 143 mg/dL (10-29-18 @ 06:58)  POCT Blood Glucose.: 141 mg/dL (10-29-18 @ 06:18)  POCT Blood Glucose.: 148 mg/dL (10-29-18 @ 05:19)  POCT Blood Glucose.: 157 mg/dL (10-29-18 @ 04:13)  POCT Blood Glucose.: 154 mg/dL (10-29-18 @ 03:01)  POCT Blood Glucose.: 163 mg/dL (10-29-18 @ 02:15)  POCT Blood Glucose.: 173 mg/dL (10-29-18 @ 01:09)  POCT Blood Glucose.: 179 mg/dL (10-29-18 @ 00:14)    POCT Blood Glucose.: 198 mg/dL (10-28-18 @ 23:05)  POCT Blood Glucose.: 210 mg/dL (10-28-18 @ 22:03)  POCT Blood Glucose.: 220 mg/dL (10-28-18 @ 21:26)  POCT Blood Glucose.: 238 mg/dL (10-28-18 @ 17:24)  POCT Blood Glucose.: 237 mg/dL (10-28-18 @ 12:44)  POCT Blood Glucose.: 251 mg/dL (10-28-18 @ 09:31)  POCT Blood Glucose.: 285 mg/dL (10-28-18 @ 06:05)  POCT Blood Glucose.: 274 mg/dL (10-28-18 @ 06:03)    POCT Blood Glucose.: 123 mg/dL (10-27-18 @ 05:47)                            10.6   48.6  )-----------( 154      ( 28 Oct 2018 21:46 )             30.9       10-28    141  |  111<H>  |  23  ----------------------------<  233<H>  4.9   |  21<L>  |  1.21    EGFR if : 69  EGFR if non : 60    Ca    8.7      10-28  Mg     1.8     10-28  Phos  2.2     10-28    TPro  5.0<L>  /  Alb  2.9<L>  /  TBili  0.3  /  DBili  <0.1  /  AST  34  /  ALT  14  /  AlkPhos  48  10-28      Thyroid Function Tests:  10-20 @ 07:08 TSH 1.84 FreeT4 -- T3 -- Anti TPO -- Anti Thyroglobulin Ab -- TSI --      Hemoglobin A1C, Whole Blood: 6.9 % <H> [4.0 - 5.6] (10-04-18 @ 18:32)  Hemoglobin A1C, Whole Blood: 6.5 % <H> [4.0 - 5.6] (08-17-18 @ 06:47) Hospital course :  A 72 year old man with PMH T2DM (HbA1c 6.9) HTN, HLD, FERNANDO on CPAP, Pneumonia, Prostate Cancer s/p removal 2003, thyroid cancer s/p thyroidectomy 8/2018, renal cancer s/p partial nephrectomy, CLL (not active), former smoker and osteoarthritis s/p R TKR 2012, spinal stenosis with acquired scoliosis who initially presented 10/18/18 for L1-5 posterior lumbar fusion and T10-pelvis instrumentation and fusion but case was aborted due to 4 second pause after induction with propofol. On 10/27/18, he underwent placement of temporary pacer and L1-S1 transforaminal interbody fusion, T10-pelvis instrumented fusion and plastics assisted closure.  Post-operatively, Pt requires pressor support, on Precedex, Extubated 10/28/18.    Endocrine Hx:  Pt is currently sedated, called family member Bobo [phone], left a VM. Called outpatient Endocrinology office - Dr. Nikolas Rodriguez [phone] to obtain information. Pt with T2DM (HbA1c 6.9, c/n DM nephropathy) for unknown duration. His current regimen includes Metformin 1000 mg BID, Januvia 100 mg OD. He takes crestor 20 mg PO QHS for HLD, takes ramipril 10 mg BID PO and HCTZ 50 mg PO OD for HTN.  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 He is currently on LT4 175 mcg PO OD for post operative hypothyroidism.     PAST MEDICAL & SURGICAL HISTORY:  Former smoker, stopped smoking many years ago  CLL (chronic lymphocytic leukemia)  Sleep apnea, unspecified type  Spinal stenosis of lumbar region, unspecified whether neurogenic claudication present  Leukocytosis, unspecified type: monitored for CLL  Malignant neoplasm of kidney parenchyma, right: s/p surgery  Thyroid nodule  Malignant neoplasm of thyroid gland  Prostate cancer: 2003, s/p prostatectomy, RT 2009 x 40 days  Hypertension, unspecified type  Diabetes mellitus type 2 in obese  H/O thyroidectomy: 2016  History of strabismus surgery: b/l 1958  S/P left knee arthroscopy: 1998  H/O ventral hernia repair: 1997  H/O radical prostatectomy: 2003  H/O partial nephrectomy: right, 2009  History of total knee replacement, right: 2012, scoped 1998      FAMILY HISTORY:  Family history of amyloidosis (Mother)  Family history of prostate cancer in father (Father)      Social History:  as per outpatient records  Tobacco - Former smoker, stopped smoking many years ago  ETOH- occasional use     MEDICATIONS  (STANDING):  acetaminophen  IVPB .. 1000 milliGRAM(s) IV Intermittent once  atorvastatin 80 milliGRAM(s) Oral at bedtime  ceFAZolin   IVPB 2000 milliGRAM(s) IV Intermittent once  chlorhexidine 4% Liquid 1 Application(s) Topical <User Schedule>  dexmedetomidine Infusion 0.1 MICROgram(s)/kG/Hr (2.575 mL/Hr) IV Continuous <Continuous>  dextrose 5%. 1000 milliLiter(s) (50 mL/Hr) IV Continuous <Continuous>  dextrose 50% Injectable 12.5 Gram(s) IV Push once  dextrose 50% Injectable 25 Gram(s) IV Push once  dextrose 50% Injectable 25 Gram(s) IV Push once  diazepam    Tablet 5 milliGRAM(s) Oral every 8 hours  docusate sodium 100 milliGRAM(s) Oral three times a day  enoxaparin Injectable 40 milliGRAM(s) SubCutaneous <User Schedule>  insulin lispro (HumaLOG) corrective regimen sliding scale   SubCutaneous every 4 hours  levothyroxine 175 MICROGram(s) Oral daily  norepinephrine Infusion 0.05 MICROgram(s)/kG/Min (9.656 mL/Hr) IV Continuous <Continuous>  sodium chloride 0.9%. 1000 milliLiter(s) (75 mL/Hr) IV Continuous <Continuous>    MEDICATIONS  (PRN):  acetaminophen   Tablet .. 650 milliGRAM(s) Oral every 6 hours PRN Temp greater or equal to 38C (100.4F), Mild Pain (1 - 3)  acetaminophen 325 mG/butalbital 50 mG/caffeine 40 mG 1 Tablet(s) Oral every 8 hours PRN HA  dextrose 40% Gel 15 Gram(s) Oral once PRN Blood Glucose LESS THAN 70 milliGRAM(s)/deciliter  glucagon  Injectable 1 milliGRAM(s) IntraMuscular once PRN Glucose LESS THAN 70 milligrams/deciliter  HYDROmorphone  Injectable 1 milliGRAM(s) IV Push every 2 hours PRN Severe Pain (7 - 10)  HYDROmorphone  Injectable 0.5 milliGRAM(s) IV Push every 2 hours PRN Moderate Pain (4 - 6)  naloxone Injectable 0.1 milliGRAM(s) IV Push every 3 minutes PRN For ANY of the following changes in patient status:  A. RR LESS THAN 10 breaths per minute, B. Oxygen saturation LESS THAN 90%, C. Sedation score of 6  ondansetron Injectable 4 milliGRAM(s) IV Push every 6 hours PRN Nausea and/or Vomiting  senna 2 Tablet(s) Oral at bedtime PRN Constipation      Allergies  codeine (Vomiting; Headache)  dogs, cats (Short breath)  dust (Rhinitis)  mold (Rhinitis)  morphine (Vomiting; Headache)  narcotic analgesics (Vomiting; Headache)    Review of Systems:  UNABLE TO OBTAIN    PHYSICAL EXAM:  VITALS: T(C): 37.8 (10-29-18 @ 11:00)  T(F): 100.1 (10-29-18 @ 11:00), Max: 100.1 (10-29-18 @ 11:00)  HR: 73 (10-29-18 @ 13:30) (72 - 111)  BP: -- 102/70  RR:  (10 - 22)  SpO2:  (93% - 100%)  Wt(kg): -- 103kg  GENERAL: sedated, minimally responsive to verbal stimuli  HEENT:  Atraumatic, Normocephalic  Thyroid - well healed thyroidectomy scar without palpable tissue or LN  Back ; surgical dressing  RESPIRATORY: Clear to auscultation bilaterally; No rales, rhonchi, wheezing, or rubs  CARDIOVASCULAR: Regular rate and rhythm; No murmurs; no peripheral edema, 2+ peripheral pulses  GI: Soft, nontender, non distended, normal bowel sounds  Ext : No cyanosis, no clubbing  SKIN: Dry, intact, No rashes or lesions  NEURO: sedated        POCT Blood Glucose.: 163 mg/dL (10-29-18 @ 14:14)  POCT Blood Glucose.: 127 mg/dL (10-29-18 @ 12:07)  POCT Blood Glucose.: 123 mg/dL (10-29-18 @ 11:04)  POCT Blood Glucose.: 123 mg/dL (10-29-18 @ 10:03)  POCT Blood Glucose.: 112 mg/dL (10-29-18 @ 09:01)  POCT Blood Glucose.: 128 mg/dL (10-29-18 @ 08:04)  POCT Blood Glucose.: 143 mg/dL (10-29-18 @ 06:58)  POCT Blood Glucose.: 141 mg/dL (10-29-18 @ 06:18)  POCT Blood Glucose.: 148 mg/dL (10-29-18 @ 05:19)  POCT Blood Glucose.: 157 mg/dL (10-29-18 @ 04:13)  POCT Blood Glucose.: 154 mg/dL (10-29-18 @ 03:01)  POCT Blood Glucose.: 163 mg/dL (10-29-18 @ 02:15)  POCT Blood Glucose.: 173 mg/dL (10-29-18 @ 01:09)  POCT Blood Glucose.: 179 mg/dL (10-29-18 @ 00:14)    POCT Blood Glucose.: 198 mg/dL (10-28-18 @ 23:05)  POCT Blood Glucose.: 210 mg/dL (10-28-18 @ 22:03)  POCT Blood Glucose.: 220 mg/dL (10-28-18 @ 21:26)  POCT Blood Glucose.: 238 mg/dL (10-28-18 @ 17:24)  POCT Blood Glucose.: 237 mg/dL (10-28-18 @ 12:44)  POCT Blood Glucose.: 251 mg/dL (10-28-18 @ 09:31)  POCT Blood Glucose.: 285 mg/dL (10-28-18 @ 06:05)  POCT Blood Glucose.: 274 mg/dL (10-28-18 @ 06:03)    POCT Blood Glucose.: 123 mg/dL (10-27-18 @ 05:47)                            10.6   48.6  )-----------( 154      ( 28 Oct 2018 21:46 )             30.9       10-28    141  |  111<H>  |  23  ----------------------------<  233<H>  4.9   |  21<L>  |  1.21    EGFR if : 69  EGFR if non : 60    Ca    8.7      10-28  Mg     1.8     10-28  Phos  2.2     10-28    TPro  5.0<L>  /  Alb  2.9<L>  /  TBili  0.3  /  DBili  <0.1  /  AST  34  /  ALT  14  /  AlkPhos  48  10-28      Thyroid Function Tests:  10-20 @ 07:08 TSH 1.84 FreeT4 -- T3 -- Anti TPO -- Anti Thyroglobulin Ab -- TSI --      Hemoglobin A1C, Whole Blood: 6.9 % <H> [4.0 - 5.6] (10-04-18 @ 18:32)  Hemoglobin A1C, Whole Blood: 6.5 % <H> [4.0 - 5.6] (08-17-18 @ 06:47) Hospital course :  A 72 year old man with PMH T2DM (HbA1c 6.9) HTN, HLD, FERNANDO on CPAP, Pneumonia, Prostate Cancer s/p removal 2003, thyroid cancer s/p thyroidectomy 8/2018, renal cancer s/p partial nephrectomy, CLL (not active), former smoker and osteoarthritis s/p R TKR 2012, spinal stenosis with acquired scoliosis who initially presented 10/18/18 for L1-5 posterior lumbar fusion and T10-pelvis instrumentation and fusion but case was aborted due to 4 second pause after induction with propofol. On 10/27/18, he underwent placement of temporary pacer and L1-S1 transforaminal interbody fusion, T10-pelvis instrumented fusion and plastics assisted closure.  Post-operatively, Pt requires pressor support, on Precedex, Extubated 10/28/18.    Endocrine Hx:  Pt is currently sedated, called family member Bobo [phone], left a VM. Called outpatient Endocrinology office - Dr. Nikolas Rodriguez [phone] to obtain information. Pt with T2DM (HbA1c 6.9, c/n DM nephropathy) for unknown duration. His current regimen includes Metformin 1000 mg BID, Januvia 100 mg OD. He takes crestor 20 mg PO QHS for HLD, takes ramipril 10 mg BID PO and HCTZ 50 mg PO OD for HTN.  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 He is currently on LT4 175 mcg PO OD for post operative hypothyroidism.     PAST MEDICAL & SURGICAL HISTORY:  Former smoker, stopped smoking many years ago  CLL (chronic lymphocytic leukemia)  Sleep apnea, unspecified type  Spinal stenosis of lumbar region, unspecified whether neurogenic claudication present  Leukocytosis, unspecified type: monitored for CLL  Malignant neoplasm of kidney parenchyma, right: s/p surgery  Thyroid nodule  Malignant neoplasm of thyroid gland  Prostate cancer: 2003, s/p prostatectomy, RT 2009 x 40 days  Hypertension, unspecified type  Diabetes mellitus type 2 in obese  H/O thyroidectomy: 2016  History of strabismus surgery: b/l 1958  S/P left knee arthroscopy: 1998  H/O ventral hernia repair: 1997  H/O radical prostatectomy: 2003  H/O partial nephrectomy: right, 2009  History of total knee replacement, right: 2012, scoped 1998      FAMILY HISTORY:  Family history of amyloidosis (Mother)  Family history of prostate cancer in father (Father)      Social History:  as per outpatient records  Tobacco - Former smoker, stopped smoking many years ago  ETOH- occasional use     MEDICATIONS  (STANDING):  acetaminophen  IVPB .. 1000 milliGRAM(s) IV Intermittent once  atorvastatin 80 milliGRAM(s) Oral at bedtime  ceFAZolin   IVPB 2000 milliGRAM(s) IV Intermittent once  chlorhexidine 4% Liquid 1 Application(s) Topical <User Schedule>  dexmedetomidine Infusion 0.1 MICROgram(s)/kG/Hr (2.575 mL/Hr) IV Continuous <Continuous>  dextrose 5%. 1000 milliLiter(s) (50 mL/Hr) IV Continuous <Continuous>  dextrose 50% Injectable 12.5 Gram(s) IV Push once  dextrose 50% Injectable 25 Gram(s) IV Push once  dextrose 50% Injectable 25 Gram(s) IV Push once  diazepam    Tablet 5 milliGRAM(s) Oral every 8 hours  docusate sodium 100 milliGRAM(s) Oral three times a day  enoxaparin Injectable 40 milliGRAM(s) SubCutaneous <User Schedule>  insulin lispro (HumaLOG) corrective regimen sliding scale   SubCutaneous every 4 hours  levothyroxine 175 MICROGram(s) Oral daily  norepinephrine Infusion 0.05 MICROgram(s)/kG/Min (9.656 mL/Hr) IV Continuous <Continuous>  sodium chloride 0.9%. 1000 milliLiter(s) (75 mL/Hr) IV Continuous <Continuous>    MEDICATIONS  (PRN):  acetaminophen   Tablet .. 650 milliGRAM(s) Oral every 6 hours PRN Temp greater or equal to 38C (100.4F), Mild Pain (1 - 3)  acetaminophen 325 mG/butalbital 50 mG/caffeine 40 mG 1 Tablet(s) Oral every 8 hours PRN HA  dextrose 40% Gel 15 Gram(s) Oral once PRN Blood Glucose LESS THAN 70 milliGRAM(s)/deciliter  glucagon  Injectable 1 milliGRAM(s) IntraMuscular once PRN Glucose LESS THAN 70 milligrams/deciliter  HYDROmorphone  Injectable 1 milliGRAM(s) IV Push every 2 hours PRN Severe Pain (7 - 10)  HYDROmorphone  Injectable 0.5 milliGRAM(s) IV Push every 2 hours PRN Moderate Pain (4 - 6)  naloxone Injectable 0.1 milliGRAM(s) IV Push every 3 minutes PRN For ANY of the following changes in patient status:  A. RR LESS THAN 10 breaths per minute, B. Oxygen saturation LESS THAN 90%, C. Sedation score of 6  ondansetron Injectable 4 milliGRAM(s) IV Push every 6 hours PRN Nausea and/or Vomiting  senna 2 Tablet(s) Oral at bedtime PRN Constipation      Allergies  codeine (Vomiting; Headache)  dogs, cats (Short breath)  dust (Rhinitis)  mold (Rhinitis)  morphine (Vomiting; Headache)  narcotic analgesics (Vomiting; Headache)    Review of Systems:  UNABLE TO OBTAIN    PHYSICAL EXAM:  VITALS: T(C): 37.8 (10-29-18 @ 11:00)  T(F): 100.1 (10-29-18 @ 11:00), Max: 100.1 (10-29-18 @ 11:00)  HR: 73 (10-29-18 @ 13:30) (72 - 111)  BP: -- 102/70  RR:  (10 - 22)  SpO2:  (93% - 100%)  Wt(kg): -- 103kg  GENERAL: sedated, minimally responsive to verbal stimuli  HEENT:  Atraumatic, Normocephalic  Thyroid - well healed thyroidectomy scar without palpable tissue or LN  Back ; surgical bandage   RESPIRATORY: Clear to auscultation bilaterally; No rales, rhonchi, wheezing, or rubs  CARDIOVASCULAR: Regular rate and rhythm; No murmurs; no peripheral edema, 2+ peripheral pulses  GI: Soft, nontender, non distended, normal bowel sounds  Ext : No cyanosis, no clubbing  SKIN: Dry, intact, No rashes or lesions  NEURO: sedated        POCT Blood Glucose.: 163 mg/dL (10-29-18 @ 14:14)  POCT Blood Glucose.: 127 mg/dL (10-29-18 @ 12:07)  POCT Blood Glucose.: 123 mg/dL (10-29-18 @ 11:04)  POCT Blood Glucose.: 123 mg/dL (10-29-18 @ 10:03)  POCT Blood Glucose.: 112 mg/dL (10-29-18 @ 09:01)  POCT Blood Glucose.: 128 mg/dL (10-29-18 @ 08:04)  POCT Blood Glucose.: 143 mg/dL (10-29-18 @ 06:58)  POCT Blood Glucose.: 141 mg/dL (10-29-18 @ 06:18)  POCT Blood Glucose.: 148 mg/dL (10-29-18 @ 05:19)  POCT Blood Glucose.: 157 mg/dL (10-29-18 @ 04:13)  POCT Blood Glucose.: 154 mg/dL (10-29-18 @ 03:01)  POCT Blood Glucose.: 163 mg/dL (10-29-18 @ 02:15)  POCT Blood Glucose.: 173 mg/dL (10-29-18 @ 01:09)  POCT Blood Glucose.: 179 mg/dL (10-29-18 @ 00:14)    POCT Blood Glucose.: 198 mg/dL (10-28-18 @ 23:05)  POCT Blood Glucose.: 210 mg/dL (10-28-18 @ 22:03)  POCT Blood Glucose.: 220 mg/dL (10-28-18 @ 21:26)  POCT Blood Glucose.: 238 mg/dL (10-28-18 @ 17:24)  POCT Blood Glucose.: 237 mg/dL (10-28-18 @ 12:44)  POCT Blood Glucose.: 251 mg/dL (10-28-18 @ 09:31)  POCT Blood Glucose.: 285 mg/dL (10-28-18 @ 06:05)  POCT Blood Glucose.: 274 mg/dL (10-28-18 @ 06:03)    POCT Blood Glucose.: 123 mg/dL (10-27-18 @ 05:47)                            10.6   48.6  )-----------( 154      ( 28 Oct 2018 21:46 )             30.9       10-28    141  |  111<H>  |  23  ----------------------------<  233<H>  4.9   |  21<L>  |  1.21    EGFR if : 69  EGFR if non : 60    Ca    8.7      10-28  Mg     1.8     10-28  Phos  2.2     10-28    TPro  5.0<L>  /  Alb  2.9<L>  /  TBili  0.3  /  DBili  <0.1  /  AST  34  /  ALT  14  /  AlkPhos  48  10-28      Thyroid Function Tests:  10-20 @ 07:08 TSH 1.84 FreeT4 -- T3 -- Anti TPO -- Anti Thyroglobulin Ab -- TSI --      Hemoglobin A1C, Whole Blood: 6.9 % <H> [4.0 - 5.6] (10-04-18 @ 18:32)  Hemoglobin A1C, Whole Blood: 6.5 % <H> [4.0 - 5.6] (08-17-18 @ 06:47) Hospital course :  A 72 year old man with PMH T2DM (HbA1c 6.9) HTN, HLD, FERNANDO on CPAP, Pneumonia, Prostate Cancer s/p removal 2003, thyroid cancer s/p thyroidectomy 8/2018, renal cancer s/p partial nephrectomy, CLL (not active), former smoker and osteoarthritis s/p R TKR 2012, spinal stenosis with acquired scoliosis who initially presented 10/18/18 for L1-5 posterior lumbar fusion and T10-pelvis instrumentation and fusion but case was aborted due to 4 second pause after induction with propofol. On 10/27/18, he underwent placement of temporary pacer and L1-S1 transforaminal interbody fusion, T10-pelvis instrumented fusion and plastics assisted closure.  Post-operatively, Pt requires pressor support, on Precedex, Extubated 10/28/18.    Endocrine Hx:  Pt is currently sedated, called family member Bobo [phone], left a VM. Called outpatient Endocrinology office - Dr. Nikolas Rodriguez [phone] to obtain information. Pt with T2DM (HbA1c 6.9, c/n DM nephropathy) for unknown duration. His current regimen includes Metformin 1000 mg BID, Januvia 100 mg OD. He takes crestor 20 mg PO QHS for HLD, takes ramipril 10 mg BID PO and HCTZ 50 mg PO OD for HTN.  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 He is currently on LT4 175 mcg PO OD for post operative hypothyroidism.     PAST MEDICAL & SURGICAL HISTORY:  Former smoker, stopped smoking many years ago  CLL (chronic lymphocytic leukemia)  Sleep apnea, unspecified type  Spinal stenosis of lumbar region, unspecified whether neurogenic claudication present  Leukocytosis, unspecified type: monitored for CLL  Malignant neoplasm of kidney parenchyma, right: s/p surgery  Thyroid nodule  Malignant neoplasm of thyroid gland  Prostate cancer: 2003, s/p prostatectomy, RT 2009 x 40 days  Hypertension, unspecified type  Diabetes mellitus type 2 in obese  H/O thyroidectomy: 2016  History of strabismus surgery: b/l 1958  S/P left knee arthroscopy: 1998  H/O ventral hernia repair: 1997  H/O radical prostatectomy: 2003  H/O partial nephrectomy: right, 2009  History of total knee replacement, right: 2012, scoped 1998      FAMILY HISTORY:  Family history of amyloidosis (Mother)  Family history of prostate cancer in father (Father)      Social History:  as per outpatient records  Tobacco - Former smoker, stopped smoking many years ago  ETOH- occasional use     MEDICATIONS  (STANDING):  acetaminophen  IVPB .. 1000 milliGRAM(s) IV Intermittent once  atorvastatin 80 milliGRAM(s) Oral at bedtime  ceFAZolin   IVPB 2000 milliGRAM(s) IV Intermittent once  chlorhexidine 4% Liquid 1 Application(s) Topical <User Schedule>  dexmedetomidine Infusion 0.1 MICROgram(s)/kG/Hr (2.575 mL/Hr) IV Continuous <Continuous>  dextrose 5%. 1000 milliLiter(s) (50 mL/Hr) IV Continuous <Continuous>  dextrose 50% Injectable 12.5 Gram(s) IV Push once  dextrose 50% Injectable 25 Gram(s) IV Push once  dextrose 50% Injectable 25 Gram(s) IV Push once  diazepam    Tablet 5 milliGRAM(s) Oral every 8 hours  docusate sodium 100 milliGRAM(s) Oral three times a day  enoxaparin Injectable 40 milliGRAM(s) SubCutaneous <User Schedule>  insulin lispro (HumaLOG) corrective regimen sliding scale   SubCutaneous every 4 hours  levothyroxine 175 MICROGram(s) Oral daily  norepinephrine Infusion 0.05 MICROgram(s)/kG/Min (9.656 mL/Hr) IV Continuous <Continuous>  sodium chloride 0.9%. 1000 milliLiter(s) (75 mL/Hr) IV Continuous <Continuous>    MEDICATIONS  (PRN):  acetaminophen   Tablet .. 650 milliGRAM(s) Oral every 6 hours PRN Temp greater or equal to 38C (100.4F), Mild Pain (1 - 3)  acetaminophen 325 mG/butalbital 50 mG/caffeine 40 mG 1 Tablet(s) Oral every 8 hours PRN HA  dextrose 40% Gel 15 Gram(s) Oral once PRN Blood Glucose LESS THAN 70 milliGRAM(s)/deciliter  glucagon  Injectable 1 milliGRAM(s) IntraMuscular once PRN Glucose LESS THAN 70 milligrams/deciliter  HYDROmorphone  Injectable 1 milliGRAM(s) IV Push every 2 hours PRN Severe Pain (7 - 10)  HYDROmorphone  Injectable 0.5 milliGRAM(s) IV Push every 2 hours PRN Moderate Pain (4 - 6)  naloxone Injectable 0.1 milliGRAM(s) IV Push every 3 minutes PRN For ANY of the following changes in patient status:  A. RR LESS THAN 10 breaths per minute, B. Oxygen saturation LESS THAN 90%, C. Sedation score of 6  ondansetron Injectable 4 milliGRAM(s) IV Push every 6 hours PRN Nausea and/or Vomiting  senna 2 Tablet(s) Oral at bedtime PRN Constipation      Allergies  codeine (Vomiting; Headache)  dogs, cats (Short breath)  dust (Rhinitis)  mold (Rhinitis)  morphine (Vomiting; Headache)  narcotic analgesics (Vomiting; Headache)    Review of Systems:  UNABLE TO OBTAIN    PHYSICAL EXAM:  VITALS: T(C): 37.8 (10-29-18 @ 11:00)  T(F): 100.1 (10-29-18 @ 11:00), Max: 100.1 (10-29-18 @ 11:00)  HR: 73 (10-29-18 @ 13:30) (72 - 111)  BP: -- 102/70  RR:  (10 - 22)  SpO2:  (93% - 100%)  Wt(kg): -- 103kg  GENERAL: sedated, minimally responsive to verbal stimuli  HEENT:  Atraumatic, Normocephalic  Thyroid - well healed thyroidectomy scar without palpable tissue or LN  Back ; surgical bandage   RESPIRATORY: Clear to auscultation bilaterally; No rales, rhonchi, wheezing, or rubs  CARDIOVASCULAR: Regular rate and rhythm; No murmurs; no peripheral edema, 2+ peripheral pulses  GI: Soft, nontender, non distended, normal bowel sounds  Ext : No cyanosis, no clubbing  SKIN: Dry, intact, No rashes or lesions  NEURO: sedated        POCT Blood Glucose.: 163 mg/dL (10-29-18 @ 14:14)  POCT Blood Glucose.: 127 mg/dL (10-29-18 @ 12:07)  POCT Blood Glucose.: 123 mg/dL (10-29-18 @ 11:04)  POCT Blood Glucose.: 123 mg/dL (10-29-18 @ 10:03)  POCT Blood Glucose.: 112 mg/dL (10-29-18 @ 09:01)  POCT Blood Glucose.: 128 mg/dL (10-29-18 @ 08:04)  POCT Blood Glucose.: 143 mg/dL (10-29-18 @ 06:58)  POCT Blood Glucose.: 141 mg/dL (10-29-18 @ 06:18)  POCT Blood Glucose.: 148 mg/dL (10-29-18 @ 05:19)  POCT Blood Glucose.: 157 mg/dL (10-29-18 @ 04:13)  POCT Blood Glucose.: 154 mg/dL (10-29-18 @ 03:01)  POCT Blood Glucose.: 163 mg/dL (10-29-18 @ 02:15)  POCT Blood Glucose.: 173 mg/dL (10-29-18 @ 01:09)  POCT Blood Glucose.: 179 mg/dL (10-29-18 @ 00:14)    POCT Blood Glucose.: 198 mg/dL (10-28-18 @ 23:05)  POCT Blood Glucose.: 210 mg/dL (10-28-18 @ 22:03)  POCT Blood Glucose.: 220 mg/dL (10-28-18 @ 21:26)  POCT Blood Glucose.: 238 mg/dL (10-28-18 @ 17:24)  POCT Blood Glucose.: 237 mg/dL (10-28-18 @ 12:44)  POCT Blood Glucose.: 251 mg/dL (10-28-18 @ 09:31)  POCT Blood Glucose.: 285 mg/dL (10-28-18 @ 06:05)  POCT Blood Glucose.: 274 mg/dL (10-28-18 @ 06:03)    POCT Blood Glucose.: 123 mg/dL (10-27-18 @ 05:47)                            10.6   48.6  )-----------( 154      ( 28 Oct 2018 21:46 )             30.9       10-28    141  |  111<H>  |  23  ----------------------------<  233<H>  4.9   |  21<L>  |  1.21    EGFR if : 69  EGFR if non : 60    Ca    8.7      10-28  Mg     1.8     10-28  Phos  2.2     10-28    TPro  5.0<L>  /  Alb  2.9<L>  /  TBili  0.3  /  DBili  <0.1  /  AST  34  /  ALT  14  /  AlkPhos  48  10-28      Thyroid Function Tests:  10-20 @ 07:08 TSH 1.84   Hemoglobin A1C, Whole Blood: 6.9 % <H> [4.0 - 5.6] (10-04-18 @ 18:32)  Hemoglobin A1C, Whole Blood: 6.5 % <H> [4.0 - 5.6] (08-17-18 @ 06:47)

## 2018-10-29 NOTE — PHYSICAL THERAPY INITIAL EVALUATION ADULT - PRECAUTIONS/LIMITATIONS, REHAB EVAL
spinal precautions/Operative course was notable for bradycardia responsive to glycopyrrolate, cerebrospinal fluid leak which was primarily repaired and EBL of 1500cc. Post-operatively, he had a ~40 minutes episode of hypotension to SBP in the 50smmHg, for which he was placed on pressor support and given an additional 2 units PRBC. Upon admission to NSCU, still hypotensive and at one point required triple pressor support. Extubated 10/28/18./fall precautions

## 2018-10-29 NOTE — PROVIDER CONTACT NOTE (CRITICAL VALUE NOTIFICATION) - ACTION/TREATMENT ORDERED:
Pt has CLL continue to monitor.
Continue to monitor.
Pt has CLL. Continue to monitor the pt.
Repeat in one hour
Retest potassium with an ABG. Continue to monitor.

## 2018-10-29 NOTE — PROVIDER CONTACT NOTE (CHANGE IN STATUS NOTIFICATION) - ASSESSMENT
Patient able to follow commands, but does not answer orientation questions. Patient perseverates and is very restless.

## 2018-10-29 NOTE — PROGRESS NOTE ADULT - SUBJECTIVE AND OBJECTIVE BOX
SUMMARY:  72 year-old man with history of T2DM, HTN, HLD, FERNANDO on CPAP, Pneumonia, CLL, Prostate Cancer s/p resection 2003, thyroid cancer s/p thyroidectomy 8/2018, renal cancer s/p partial nephrectomy and spinal stenosis with acquired scoliosis who initially presented 10/18/18 for L1-5 posterior lumbar fusion and T10-pelvis instrumentation and fusion but case was aborted due to 4 second pause after induction with propofol who returned 10/27/18 for surgery. Of note, he had a prior Holter monitoring study that revealed nocturnal bradycardia with pauses. 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 On 10/27/18, he underwent placement of temporary pacer and L1-S1 transforaminal interbody fusion, T10-pelvis instrumented fusion and Plastics assisted closure. Operative course was notable for bradycardia responsive to glycopyrrolate, cerebrospinal fluid leak which was primarily repaired and EBL of 1500cc. He received 2 units PRBC intra-op. Post-operatively, he had a ~40 minutes episode of hypotension to SBP in the 50smmHg, for which he was placed on pressor support and given an additional 2 units PRBC. Upon admission to NSCU, still hypotensive and at one point required triple pressor support. Extubated 10/28/18.    OVERNIGHT EVENTS:   aggitated/delerious     VITALS/IMAGING/DATA/IVF FLUIDS/MEDICATIONS: [x] Reviewed    DEVICES:   [x] Restraints [] PIVs [x] ET tube [x] R IJ introducer with pacing wires [] PICC [x] arterial line [x] spears [x] OGT [] EVD [] LD [x] DEANA x 3 [] LiCOX [] ICP monitor [] Trach [] PEG [] Chest Tube [] other:    EXAMINATION:  General: In pain but no acute distress  HEENT: Anicteric sclerae  Cardiac: [id number]  Lungs: Decreased BS at bases  Abdomen: Soft, distended, +BS  Extremities: No c/c/e  Skin/Incision Site: Clean, dry and intact  Neurologic: Eyes open spontaneously, regards, follows commands, anxious, PERRL, follows commands, UE 4/5, LE 3/5

## 2018-10-29 NOTE — PROGRESS NOTE ADULT - SUBJECTIVE AND OBJECTIVE BOX
SUMMARY:  72 year-old man with history of T2DM, HTN, HLD, FERNANDO on CPAP, Pneumonia, CLL, Prostate Cancer s/p resection 2003, thyroid cancer s/p thyroidectomy 8/2018, renal cancer s/p partial nephrectomy and spinal stenosis with acquired scoliosis who initially presented 10/18/18 for L1-5 posterior lumbar fusion and T10-pelvis instrumentation and fusion but case was aborted due to 4 second pause after induction with propofol who returned 10/27/18 for surgery. Of note, he had a prior Holter monitoring study that revealed nocturnal bradycardia with pauses. 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 On 10/27/18, he underwent placement of temporary pacer and L1-S1 transforaminal interbody fusion, T10-pelvis instrumented fusion and Plastics assisted closure. Operative course was notable for bradycardia responsive to glycopyrrolate, cerebrospinal fluid leak which was primarily repaired and EBL of 1500cc. He received 2 units PRBC intra-op. Post-operatively, he had a ~40 minutes episode of hypotension to SBP in the 50smmHg, for which he was placed on pressor support and given an additional 2 units PRBC. Upon admission to NSCU, still hypotensive and at one point required triple pressor support. Extubated 10/28/18.    OVERNIGHT EVENTS:   Transitioned off insulin gtt but maintains on pressor support/     VITALS/IMAGING/DATA/IVF FLUIDS/MEDICATIONS: [x] Reviewed    EXAMINATION:  General: In pain but no acute distress  HEENT: Anicteric sclerae  Cardiac: [id number]  Lungs: Decreased BS at bases  Abdomen: Soft, distended, +BS  Extremities: No c/c/e  Skin/Incision Site: Clean, dry and intact  Neurologic: Eyes open spontaneously, regards, follows commands, anxious, PERRL, follows commands, UE 4/5, LE 3/5

## 2018-10-29 NOTE — PROGRESS NOTE ADULT - ASSESSMENT
ASSESSMENT/PLAN: spinal stenosis/degenerative scoliosis    NEURO:  Pain control, anxiolytic  Upright x-rays (on the floor)  Monitor surgical drain output  Agitation, delirium precautions, trial of clonazepam  Activity: [x] mobilize as tolerated [] Bedrest [] PT [] OT [] PMNR    PULM:  Monitor for stridor and aspiration    CV:  SBP MAP >65mmHg, SBP <160 mmHg  1st degree AV block; monitor as has had documented pauses  Wean off pressors    RENAL:  Fluids: Judicious fluid use    GI:  Diet: Dysphagia screen  GI prophylaxis [] not indicated [x] PPI [] other:  Bowel regimen [x] colace [x] senna [] other:    ENDO:   Goal euglycemia (-180)  Hypothyroidism: continue supplementation  Lantus    HEME/ONC:  Drop in H/H, will transfuse 2 more units PRBC which will also provide volume and aid in weaning of pressors  VTE prophylaxis: [x] SCDs [x] chemoprophylaxis [x] high risk of DVT/PE on admission due to: limited mobility due to spinal disorder    ID:  Monitor for fever    SOCIAL/FAMILY:  [] awaiting [x] updated at bedside [] family meeting    CODE STATUS:  [x] Full Code [] DNR [] DNI [] Palliative/Comfort Care    DISPOSITION:  [x] ICU [] Stroke Unit [] Floor [] EMU [] RCU [] PCU    [x] Patient is at high risk of neurologic deterioration/death due to: hemorrhagic shock    Time spent: 35 [x] critical care minutes    Contact: [phone]

## 2018-10-29 NOTE — PROGRESS NOTE ADULT - ASSESSMENT
72M s/p T10-pelvis fusion     - Q1hr neurocheck  - Pacer per cardiology  - trend h/h, plts, caogs  - Standing xray when to floor  - PT and aggresive mobilization  - Monitor drain output  - pain control

## 2018-10-29 NOTE — PHYSICAL THERAPY INITIAL EVALUATION ADULT - GENERAL OBSERVATIONS, REHAB EVAL
Pt rec'd supine in bed lethargic & drowsy, +IV, +tele, +BP cuff, +cont pulse ox, + DEANA drain x 2 and +4L of O2 via NC

## 2018-10-29 NOTE — PROVIDER CONTACT NOTE (CRITICAL VALUE NOTIFICATION) - BACKGROUND
L5-S5 fusion T10 pelms instrumentation and fusion
Pt admitted for spinal fusion. Pt has CLL.
Pt admitted from OR s/p L5-S1 fusion and T10-pelvis instrumentation and fusion. Pt has CLL, htn, DM, FERNANDO.
Pt admitted to NSCU from OR s/p L5-S1 fusion and T10 - pelvis instrumentation and fusion. Pt has DM, HTN, FERNANDO, CLL.
Pt has CLL

## 2018-10-29 NOTE — PROGRESS NOTE ADULT - SUBJECTIVE AND OBJECTIVE BOX
Pt seen and examined  AVSS NAD    thoraco/lumbar incision healing well CDI with nylons  no erythema, no drainage  JPx 3 ss    A/P: COntinue to monitor drain outputs  antibiotics while drains are in place  will follow

## 2018-10-29 NOTE — PROVIDER CONTACT NOTE (CRITICAL VALUE NOTIFICATION) - RECOMMENDATIONS
Pt has CLL continue to monitor.
Continue to monitor the pt.
Continue to monitor.
Repeat
Retest potassium with an ABG. Continue to monitor.

## 2018-10-29 NOTE — PHYSICAL THERAPY INITIAL EVALUATION ADULT - PERTINENT HX OF CURRENT PROBLEM, REHAB EVAL
72 year-old man who initially presented 10/18/18 for L1-5 posterior lumbar fusion and T10-pelvis instrumentation and fusion but case was aborted due to 4 second pause after induction with propofol. Evaluation after aborted surgery revealed significant conduction disease with a wide RBBB and first degree AV delay. On 10/27/18, he underwent placement of temporary pacer and L1-S1 transforaminal interbody fusion, T10-pelvis instrumented fusion and Plastics assisted closure.

## 2018-10-29 NOTE — PROVIDER CONTACT NOTE (CRITICAL VALUE NOTIFICATION) - ASSESSMENT
Patient VSS, no chest pain noted. No change in ECG. Patient is resting comfortably.
Pt remains intubated on full vent support. Opens eyes to voice and follows commands on all four extremities.
Pt remains on full vent support and follows commands.
Pt remains on full vent support. Pt is able to follow commands.
Pt admitted for spinal fusion.

## 2018-10-29 NOTE — PROGRESS NOTE ADULT - SUBJECTIVE AND OBJECTIVE BOX
Visit Summary: Patient seen and evaluated at bedside.    Overnight Events: none    Exam:  T(C): 36.7 (10-28-18 @ 19:00), Max: 38.3 (10-28-18 @ 07:00)  HR: 91 (10-29-18 @ 00:45) (42 - 193)  BP: 109/56 (10-28-18 @ 09:24) (109/56 - 109/56)  RR: 15 (10-29-18 @ 00:45) (2 - 113)  SpO2: 93% (10-29-18 @ 00:45) (93% - 100%)  Wt(kg): --    AOx3, FC, PERRL, EOMI, no facial   BUE 4+/5 no drift, BLE 4-/5 exam limited by pain  SILT  no clonus                          10.6   48.6  )-----------( 154      ( 28 Oct 2018 21:46 )             30.9     10-28    141  |  111<H>  |  23  ----------------------------<  233<H>  4.9   |  21<L>  |  1.21    Ca    8.7      28 Oct 2018 21:46  Phos  2.2     10-28  Mg     1.8     10-28    TPro  5.0<L>  /  Alb  2.9<L>  /  TBili  0.3  /  DBili  <0.1  /  AST  34  /  ALT  14  /  AlkPhos  48  10-28  PT/INR - ( 27 Oct 2018 21:34 )   PT: 13.4 sec;   INR: 1.24 ratio         PTT - ( 27 Oct 2018 21:34 )  PTT:22.2 sec

## 2018-10-29 NOTE — CHART NOTE - NSCHARTNOTEFT_GEN_A_CORE
rhythm in sinus now   junctional rhythm likely due to hyperkalemia  pt's cardiolgist dr [facility] is to take over today   will sign off

## 2018-10-29 NOTE — CONSULT NOTE ADULT - PROBLEM SELECTOR RECOMMENDATION 2
c/w LT4 175 mcg daily PO  TSH - 1.84  TSH goal ~ 0.1-0.2  outpatient TFTs every 3 months  pt will follow outpatient Endo c/w LT4 175 mcg daily PO  TSH - 1.84  outpatient TFTs every 3 months  pt will follow outpatient Endo

## 2018-10-29 NOTE — PHYSICAL THERAPY INITIAL EVALUATION ADULT - ADDITIONAL COMMENTS
Pt lives in [place] w/ wife, 1 SHANICE, 3-4 steps to main floor +HR, 7 steps to bedroom +HR. PLOF: Independent w/ all functional mobility and ADL's w/ no AD.

## 2018-10-29 NOTE — PROGRESS NOTE ADULT - ASSESSMENT
ASSESSMENT/PLAN: spinal stenosis/degenerative scoliosis    NEURO:  Pain control, anxiolytic, will add Dilaudid 1 mg IV PRN q 2 hrs for pain, and Morphine 20 mg ER   Upright x-rays (on the floor)  Monitor surgical drain output  Activity: [x] mobilize as tolerated [] Bedrest [] PT [] OT [] PMNR    PULM:  Monitor for stridor and aspiration    CV:  SBP MAP >65mmHg, SBP <160 mmHg  1st degree AV block; monitor as has had documented pauses  Wean off pressors    RENAL:  Fluids: Judicious fluid use    GI:  Diet: Dysphagia screen  GI prophylaxis [] not indicated [x] PPI [] other:  Bowel regimen [x] colace [x] senna [] other:    ENDO:   Goal euglycemia (-180)  Hypothyroidism: continue supplementation  Hyperglycemia on insulin gtt    HEME/ONC:  VTE prophylaxis: [x] SCDs [x] chemoprophylaxis [x] high risk of DVT/PE on admission due to: limited mobility due to spinal disorder    ID:  Monitor for fever    SOCIAL/FAMILY:  [] awaiting [x] updated at bedside [] family meeting    CODE STATUS:  [x] Full Code [] DNR [] DNI [] Palliative/Comfort Care    DISPOSITION:  [x] ICU [] Stroke Unit [] Floor [] EMU [] RCU [] PCU    [x] Patient is at high risk of neurologic deterioration/death due to: hemorrhagic shock    Time spent: 35 [x] critical care minutes    Contact: [phone]

## 2018-10-30 DIAGNOSIS — M48.061 SPINAL STENOSIS, LUMBAR REGION WITHOUT NEUROGENIC CLAUDICATION: ICD-10-CM

## 2018-10-30 DIAGNOSIS — Z51.5 ENCOUNTER FOR PALLIATIVE CARE: ICD-10-CM

## 2018-10-30 DIAGNOSIS — K59.03 DRUG INDUCED CONSTIPATION: ICD-10-CM

## 2018-10-30 DIAGNOSIS — R52 PAIN, UNSPECIFIED: ICD-10-CM

## 2018-10-30 LAB
GLUCOSE BLDC GLUCOMTR-MCNC: 118 MG/DL — HIGH (ref 70–99)
GLUCOSE BLDC GLUCOMTR-MCNC: 118 MG/DL — HIGH (ref 70–99)
GLUCOSE BLDC GLUCOMTR-MCNC: 123 MG/DL — HIGH (ref 70–99)
GLUCOSE BLDC GLUCOMTR-MCNC: 148 MG/DL — HIGH (ref 70–99)
GLUCOSE BLDC GLUCOMTR-MCNC: 177 MG/DL — HIGH (ref 70–99)
HCT VFR BLD CALC: 28.1 % — LOW (ref 39–50)
HCT VFR BLD CALC: 28.9 % — LOW (ref 39–50)
HGB BLD-MCNC: 9.3 G/DL — LOW (ref 13–17)
HGB BLD-MCNC: 9.6 G/DL — LOW (ref 13–17)
MCHC RBC-ENTMCNC: 29.6 PG — SIGNIFICANT CHANGE UP (ref 27–34)
MCHC RBC-ENTMCNC: 29.9 PG — SIGNIFICANT CHANGE UP (ref 27–34)
MCHC RBC-ENTMCNC: 33.1 GM/DL — SIGNIFICANT CHANGE UP (ref 32–36)
MCHC RBC-ENTMCNC: 33.3 GM/DL — SIGNIFICANT CHANGE UP (ref 32–36)
MCV RBC AUTO: 88.9 FL — SIGNIFICANT CHANGE UP (ref 80–100)
MCV RBC AUTO: 90.1 FL — SIGNIFICANT CHANGE UP (ref 80–100)
PLATELET # BLD AUTO: 105 K/UL — LOW (ref 150–400)
PLATELET # BLD AUTO: 148 K/UL — LOW (ref 150–400)
RBC # BLD: 3.11 M/UL — LOW (ref 4.2–5.8)
RBC # BLD: 3.26 M/UL — LOW (ref 4.2–5.8)
RBC # FLD: 13.5 % — SIGNIFICANT CHANGE UP (ref 10.3–14.5)
RBC # FLD: 14 % — SIGNIFICANT CHANGE UP (ref 10.3–14.5)
WBC # BLD: 17.3 K/UL — HIGH (ref 3.8–10.5)
WBC # BLD: 22.8 K/UL — HIGH (ref 3.8–10.5)
WBC # FLD AUTO: 17.3 K/UL — HIGH (ref 3.8–10.5)
WBC # FLD AUTO: 22.8 K/UL — HIGH (ref 3.8–10.5)

## 2018-10-30 PROCEDURE — 99223 1ST HOSP IP/OBS HIGH 75: CPT

## 2018-10-30 PROCEDURE — 99233 SBSQ HOSP IP/OBS HIGH 50: CPT | Mod: GC

## 2018-10-30 PROCEDURE — 99291 CRITICAL CARE FIRST HOUR: CPT

## 2018-10-30 PROCEDURE — 99292 CRITICAL CARE ADDL 30 MIN: CPT

## 2018-10-30 RX ORDER — OXYCODONE HYDROCHLORIDE 5 MG/1
40 TABLET ORAL EVERY 12 HOURS
Qty: 0 | Refills: 0 | Status: DISCONTINUED | OUTPATIENT
Start: 2018-10-30 | End: 2018-10-30

## 2018-10-30 RX ORDER — HYDROMORPHONE HYDROCHLORIDE 2 MG/ML
1 INJECTION INTRAMUSCULAR; INTRAVENOUS; SUBCUTANEOUS
Qty: 0 | Refills: 0 | Status: DISCONTINUED | OUTPATIENT
Start: 2018-10-30 | End: 2018-11-02

## 2018-10-30 RX ORDER — CLONAZEPAM 1 MG
1 TABLET ORAL
Qty: 0 | Refills: 0 | Status: DISCONTINUED | OUTPATIENT
Start: 2018-10-30 | End: 2018-10-30

## 2018-10-30 RX ORDER — INSULIN LISPRO 100/ML
VIAL (ML) SUBCUTANEOUS
Qty: 0 | Refills: 0 | Status: DISCONTINUED | OUTPATIENT
Start: 2018-10-30 | End: 2018-11-02

## 2018-10-30 RX ORDER — DIAZEPAM 5 MG
5 TABLET ORAL EVERY 8 HOURS
Qty: 0 | Refills: 0 | Status: DISCONTINUED | OUTPATIENT
Start: 2018-10-30 | End: 2018-11-02

## 2018-10-30 RX ORDER — ONDANSETRON 8 MG/1
4 TABLET, FILM COATED ORAL EVERY 6 HOURS
Qty: 0 | Refills: 0 | Status: DISCONTINUED | OUTPATIENT
Start: 2018-10-30 | End: 2018-10-31

## 2018-10-30 RX ORDER — HYDROMORPHONE HYDROCHLORIDE 2 MG/ML
4 INJECTION INTRAMUSCULAR; INTRAVENOUS; SUBCUTANEOUS
Qty: 0 | Refills: 0 | Status: DISCONTINUED | OUTPATIENT
Start: 2018-10-30 | End: 2018-11-01

## 2018-10-30 RX ORDER — OXYCODONE HYDROCHLORIDE 5 MG/1
10 TABLET ORAL EVERY 4 HOURS
Qty: 0 | Refills: 0 | Status: DISCONTINUED | OUTPATIENT
Start: 2018-10-30 | End: 2018-10-30

## 2018-10-30 RX ORDER — METOCLOPRAMIDE HCL 10 MG
10 TABLET ORAL ONCE
Qty: 0 | Refills: 0 | Status: COMPLETED | OUTPATIENT
Start: 2018-10-30 | End: 2018-10-30

## 2018-10-30 RX ORDER — INSULIN LISPRO 100/ML
VIAL (ML) SUBCUTANEOUS AT BEDTIME
Qty: 0 | Refills: 0 | Status: DISCONTINUED | OUTPATIENT
Start: 2018-10-30 | End: 2018-11-02

## 2018-10-30 RX ORDER — ACETAMINOPHEN 500 MG
1000 TABLET ORAL ONCE
Qty: 0 | Refills: 0 | Status: COMPLETED | OUTPATIENT
Start: 2018-10-30 | End: 2018-10-30

## 2018-10-30 RX ORDER — HYDROMORPHONE HYDROCHLORIDE 2 MG/ML
10 INJECTION INTRAMUSCULAR; INTRAVENOUS; SUBCUTANEOUS EVERY 6 HOURS
Qty: 0 | Refills: 0 | Status: DISCONTINUED | OUTPATIENT
Start: 2018-10-30 | End: 2018-10-31

## 2018-10-30 RX ORDER — HYDROMORPHONE HYDROCHLORIDE 2 MG/ML
2 INJECTION INTRAMUSCULAR; INTRAVENOUS; SUBCUTANEOUS
Qty: 0 | Refills: 0 | Status: DISCONTINUED | OUTPATIENT
Start: 2018-10-30 | End: 2018-11-02

## 2018-10-30 RX ORDER — OXYCODONE HYDROCHLORIDE 5 MG/1
5 TABLET ORAL EVERY 4 HOURS
Qty: 0 | Refills: 0 | Status: DISCONTINUED | OUTPATIENT
Start: 2018-10-30 | End: 2018-10-30

## 2018-10-30 RX ORDER — INSULIN LISPRO 100/ML
3 VIAL (ML) SUBCUTANEOUS
Qty: 0 | Refills: 0 | Status: DISCONTINUED | OUTPATIENT
Start: 2018-10-30 | End: 2018-10-31

## 2018-10-30 RX ORDER — SENNA PLUS 8.6 MG/1
2 TABLET ORAL AT BEDTIME
Qty: 0 | Refills: 0 | Status: DISCONTINUED | OUTPATIENT
Start: 2018-10-30 | End: 2018-11-01

## 2018-10-30 RX ADMIN — HYDROMORPHONE HYDROCHLORIDE 1 MILLIGRAM(S): 2 INJECTION INTRAMUSCULAR; INTRAVENOUS; SUBCUTANEOUS at 12:00

## 2018-10-30 RX ADMIN — SODIUM CHLORIDE 75 MILLILITER(S): 9 INJECTION INTRAMUSCULAR; INTRAVENOUS; SUBCUTANEOUS at 06:09

## 2018-10-30 RX ADMIN — ONDANSETRON 4 MILLIGRAM(S): 8 TABLET, FILM COATED ORAL at 17:18

## 2018-10-30 RX ADMIN — HYDROMORPHONE HYDROCHLORIDE 1 MILLIGRAM(S): 2 INJECTION INTRAMUSCULAR; INTRAVENOUS; SUBCUTANEOUS at 07:45

## 2018-10-30 RX ADMIN — HYDROMORPHONE HYDROCHLORIDE 10 MILLIGRAM(S): 2 INJECTION INTRAMUSCULAR; INTRAVENOUS; SUBCUTANEOUS at 17:17

## 2018-10-30 RX ADMIN — Medication 1000 MILLIGRAM(S): at 04:15

## 2018-10-30 RX ADMIN — HYDROMORPHONE HYDROCHLORIDE 1 MILLIGRAM(S): 2 INJECTION INTRAMUSCULAR; INTRAVENOUS; SUBCUTANEOUS at 10:45

## 2018-10-30 RX ADMIN — Medication 1 MILLIGRAM(S): at 05:40

## 2018-10-30 RX ADMIN — POLYETHYLENE GLYCOL 3350 17 GRAM(S): 17 POWDER, FOR SOLUTION ORAL at 17:18

## 2018-10-30 RX ADMIN — Medication 5 MILLIGRAM(S): at 13:56

## 2018-10-30 RX ADMIN — HYDROMORPHONE HYDROCHLORIDE 0.5 MILLIGRAM(S): 2 INJECTION INTRAMUSCULAR; INTRAVENOUS; SUBCUTANEOUS at 00:30

## 2018-10-30 RX ADMIN — Medication 100 MILLIGRAM(S): at 13:56

## 2018-10-30 RX ADMIN — Medication 175 MICROGRAM(S): at 05:41

## 2018-10-30 RX ADMIN — Medication 100 MILLIGRAM(S): at 22:37

## 2018-10-30 RX ADMIN — ONDANSETRON 4 MILLIGRAM(S): 8 TABLET, FILM COATED ORAL at 05:40

## 2018-10-30 RX ADMIN — Medication 62.5 MILLIMOLE(S): at 01:00

## 2018-10-30 RX ADMIN — SENNA PLUS 2 TABLET(S): 8.6 TABLET ORAL at 22:37

## 2018-10-30 RX ADMIN — HYDROMORPHONE HYDROCHLORIDE 1 MILLIGRAM(S): 2 INJECTION INTRAMUSCULAR; INTRAVENOUS; SUBCUTANEOUS at 15:00

## 2018-10-30 RX ADMIN — ATORVASTATIN CALCIUM 80 MILLIGRAM(S): 80 TABLET, FILM COATED ORAL at 22:37

## 2018-10-30 RX ADMIN — INSULIN GLARGINE 20 UNIT(S): 100 INJECTION, SOLUTION SUBCUTANEOUS at 23:11

## 2018-10-30 RX ADMIN — Medication 5 MILLIGRAM(S): at 23:10

## 2018-10-30 RX ADMIN — HYDROMORPHONE HYDROCHLORIDE 1 MILLIGRAM(S): 2 INJECTION INTRAMUSCULAR; INTRAVENOUS; SUBCUTANEOUS at 14:00

## 2018-10-30 RX ADMIN — HYDROMORPHONE HYDROCHLORIDE 10 MILLIGRAM(S): 2 INJECTION INTRAMUSCULAR; INTRAVENOUS; SUBCUTANEOUS at 18:00

## 2018-10-30 RX ADMIN — ONDANSETRON 4 MILLIGRAM(S): 8 TABLET, FILM COATED ORAL at 23:18

## 2018-10-30 RX ADMIN — ENOXAPARIN SODIUM 40 MILLIGRAM(S): 100 INJECTION SUBCUTANEOUS at 17:57

## 2018-10-30 RX ADMIN — HYDROMORPHONE HYDROCHLORIDE 1 MILLIGRAM(S): 2 INJECTION INTRAMUSCULAR; INTRAVENOUS; SUBCUTANEOUS at 09:02

## 2018-10-30 RX ADMIN — Medication 10 MILLIGRAM(S): at 19:00

## 2018-10-30 RX ADMIN — Medication 400 MILLIGRAM(S): at 04:00

## 2018-10-30 RX ADMIN — ONDANSETRON 4 MILLIGRAM(S): 8 TABLET, FILM COATED ORAL at 12:23

## 2018-10-30 RX ADMIN — Medication 2: at 06:08

## 2018-10-30 RX ADMIN — POLYETHYLENE GLYCOL 3350 17 GRAM(S): 17 POWDER, FOR SOLUTION ORAL at 05:41

## 2018-10-30 RX ADMIN — CHLORHEXIDINE GLUCONATE 1 APPLICATION(S): 213 SOLUTION TOPICAL at 23:10

## 2018-10-30 RX ADMIN — HYDROMORPHONE HYDROCHLORIDE 0.5 MILLIGRAM(S): 2 INJECTION INTRAMUSCULAR; INTRAVENOUS; SUBCUTANEOUS at 00:15

## 2018-10-30 NOTE — PROGRESS NOTE ADULT - ASSESSMENT
ASSESSMENT/PLAN: spinal stenosis/degenerative scoliosis    NEURO:  Pain control, valium, palliative care following for pain management  Upright x-rays (on the floor)  Monitor surgical drain output  Agitation, delirium precautions  Activity: [x] mobilize as tolerated [] Bedrest [] PT [] OT [] PMNR    PULM:  Monitor for stridor and aspiration    CV:  SBP MAP >65mmHg, SBP <160 mmHg  1st degree AV block; monitor as has had documented pauses    RENAL:  Fluids: IVF    GI:  Diet: Dysphagia screen, advance as tolerated  GI prophylaxis [] not indicated [x] PPI [] other:  Bowel regimen [x] colace [x] senna [] other:    ENDO:   Goal euglycemia (-180)  Hypothyroidism: continue supplementation  Lantus    HEME/ONC:  monitor H/H, s/p 2U intra op, total 5U post op pRBC transfusion  VTE prophylaxis: [x] SCDs [x] chemoprophylaxis [x] high risk of DVT/PE on admission due to: limited mobility due to spinal disorder    ID:  Monitor for fever    SOCIAL/FAMILY:  [] awaiting [x] updated at bedside [] family meeting    CODE STATUS:  [x] Full Code [] DNR [] DNI [] Palliative/Comfort Care    DISPOSITION:  [x] ICU [] Stroke Unit [] Floor [] EMU [] RCU [] PCU    [x] Patient is at high risk of neurologic deterioration/death due to: hemorrhagic shock    Time spent: 35 [x] critical care minutes    Contact: [phone]

## 2018-10-30 NOTE — PROGRESS NOTE ADULT - PROBLEM SELECTOR PLAN 5
c/w atorvastatin 80 mg PO QHS.    Nataliia Bills  Endocrinology Fellow   Pager from 9am-5pm- [phone]; from 5pm-9am call [phone].

## 2018-10-30 NOTE — CONSULT NOTE ADULT - ASSESSMENT
junctional rhythm   likely induced by hyperkalemia  repeat stat labs and treat hyperkalemia  monitor on tele  echo  consider EP eval     DM  Monitor finger stick. Insulin coverage. Diabetic education and Diabetic diet. Consider nutrition consultation.    DM  Monitor finger stick. Insulin coverage.
72 year old male with uncontrolled T2DM (HbA1c - 6.9, c/b DM nephropathy + microalbuminuria, HTN, HLD, underwent placement of temporary pacer and L1-S1 transforaminal interbody fusion, T10-pelvis instrumented fusion and plastics assisted closure.  POD -2, Post-operatively, Pt requires pressor support, on Precedex, Extubated 10/28/18.
73 y/o male s/p  L1-S1 transforaminal interbody fusion, T10-pelvis instrumented fusion with Plastics assist with closure called for acute post op spinal pain.

## 2018-10-30 NOTE — CONSULT NOTE ADULT - PROBLEM SELECTOR RECOMMENDATION 3
Monitor BM's  Patient states BM today  Add Miralax PRN Monitor BM's  Patient states BM today but none recorded since admission  Add Miralax   QD/PRN

## 2018-10-30 NOTE — CONSULT NOTE ADULT - PROBLEM SELECTOR RECOMMENDATION 4
Met with patient and wife.   Opioid history taken,  [facility]Stop checked ref# [id number]  Pain better controlled, will continue to follow for efficacy of  PO conversion.
pt will follow outpatient Endo   outpatient TFTs, Calcitonin, US thyroid

## 2018-10-30 NOTE — PROGRESS NOTE ADULT - SUBJECTIVE AND OBJECTIVE BOX
SUMMARY:  72 year-old man with history of T2DM, HTN, HLD, FERNANDO on CPAP, Pneumonia, CLL, Prostate Cancer s/p resection 2003, thyroid cancer s/p thyroidectomy 8/2018, renal cancer s/p partial nephrectomy and spinal stenosis with acquired scoliosis who initially presented 10/18/18 for L1-5 posterior lumbar fusion and T10-pelvis instrumentation and fusion but case was aborted due to 4 second pause after induction with propofol who returned 10/27/18 for surgery. Of note, he had a prior Holter monitoring study that revealed nocturnal bradycardia with pauses. 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 On 10/27/18, he underwent placement of temporary pacer and L1-S1 transforaminal interbody fusion, T10-pelvis instrumented fusion and Plastics assisted closure. Operative course was notable for bradycardia responsive to glycopyrrolate, cerebrospinal fluid leak which was primarily repaired and EBL of 1500cc. He received 2 units PRBC intra-op. Post-operatively, he had a ~40 minutes episode of hypotension to SBP in the 50smmHg, for which he was placed on pressor support and given an additional 2 units PRBC. Upon admission to NSCU, still hypotensive and at one point required triple pressor support. Extubated 10/28/18.    OVERNIGHT EVENTS:   aggitated/delerious     Overnight Events:     ROS: negative [] unable to obtain as patient is comatose/intubated/aphasic []     VITALS:   T(C): 36.7 (10-30-18 @ 02:20), Max: 38.2 (10-29-18 @ 19:00)  HR: 59 (10-30-18 @ 06:45) (55 - 92)  BP: 106/58 (10-30-18 @ 06:45) (103/65 - 133/95)  RR: 17 (10-30-18 @ 06:45) (9 - 28)  SpO2: 97% (10-30-18 @ 06:45) (94% - 100%)    10-29-18 @ 07:01  -  10-30-18 @ 07:00  --------------------------------------------------------  IN: 3609.8 mL / OUT: 2070 mL / NET: 1539.8 mL      LABS:  ABG - ( 28 Oct 2018 13:35 )  pH, Arterial: 7.36  pH, Blood: x     /  pCO2: 36    /  pO2: 150   / HCO3: 20    / Base Excess: -4.3  /  SaO2: 99                 9.3    17.3  )-----------( 105      ( 30 Oct 2018 04:11 )             28.1     10-29    143  |  114<H>  |  19  ----------------------------<  153<H>  4.9   |  20<L>  |  0.82    Ca    8.4      29 Oct 2018 20:21  Phos  2.1     10-29  Mg     2.0     10-29    TPro  5.0<L>  /  Alb  2.9<L>  /  TBili  0.3  /  DBili  <0.1  /  AST  34  /  ALT  14  /  AlkPhos  48  10-28      MEDS:  MEDICATIONS  (STANDING):  atorvastatin 80 milliGRAM(s) Oral at bedtime  ceFAZolin   IVPB 2000 milliGRAM(s) IV Intermittent once  chlorhexidine 4% Liquid 1 Application(s) Topical <User Schedule>  dexmedetomidine Infusion 0.1 MICROgram(s)/kG/Hr (2.575 mL/Hr) IV Continuous <Continuous>  dextrose 5%. 1000 milliLiter(s) (50 mL/Hr) IV Continuous <Continuous>  dextrose 50% Injectable 12.5 Gram(s) IV Push once  dextrose 50% Injectable 25 Gram(s) IV Push once  dextrose 50% Injectable 25 Gram(s) IV Push once  diazepam    Tablet 5 milliGRAM(s) Oral every 8 hours  docusate sodium 100 milliGRAM(s) Oral three times a day  enoxaparin Injectable 40 milliGRAM(s) SubCutaneous <User Schedule>  insulin glargine Injectable (LANTUS) 20 Unit(s) SubCutaneous at bedtime  insulin lispro (HumaLOG) corrective regimen sliding scale   SubCutaneous every 6 hours  levothyroxine 175 MICROGram(s) Oral daily  norepinephrine Infusion 0.05 MICROgram(s)/kG/Min (9.656 mL/Hr) IV Continuous <Continuous>  polyethylene glycol 3350 17 Gram(s) Oral two times a day  sodium chloride 0.9%. 1000 milliLiter(s) (75 mL/Hr) IV Continuous <Continuous>    EXAMINATION:  General: In pain but no acute distress  HEENT: Anicteric sclerae  Cardiac: [id number]  Lungs: Decreased BS at bases  Abdomen: Soft, distended, +BS  Extremities: No c/c/e  Skin/Incision Site: Clean, dry and intact  Neurologic: Eyes open spontaneously, regards, follows commands, anxious, PERRL, follows commands, UE 4/5, LE 3/5 SUMMARY:  72 year-old man with history of T2DM, HTN, HLD, FERNANDO on CPAP, Pneumonia, CLL, Prostate Cancer s/p resection 2003, thyroid cancer s/p thyroidectomy 8/2018, renal cancer s/p partial nephrectomy and spinal stenosis with acquired scoliosis who initially presented 10/18/18 for L1-5 posterior lumbar fusion and T10-pelvis instrumentation and fusion but case was aborted due to 4 second pause after induction with propofol who returned 10/27/18 for surgery. Of note, he had a prior Holter monitoring study that revealed nocturnal bradycardia with pauses. 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 On 10/27/18, he underwent placement of temporary pacer and L1-S1 transforaminal interbody fusion, T10-pelvis instrumented fusion and Plastics assisted closure. Operative course was notable for bradycardia responsive to glycopyrrolate, cerebrospinal fluid leak which was primarily repaired and EBL of 1500cc. He received 2 units PRBC intra-op. Post-operatively, he had a ~40 minutes episode of hypotension to SBP in the 50smmHg, for which he was placed on pressor support and given an additional 2 units PRBC. Upon admission to NSCU, still hypotensive and at one point required triple pressor support. Extubated 10/28/18.    Overnight Events:  Was able to be titrated off levophed, and Klonopin.     ROS: Negative unless specified.     VITALS:   T(C): 36.7 (10-30-18 @ 02:20), Max: 38.2 (10-29-18 @ 19:00)  HR: 59 (10-30-18 @ 06:45) (55 - 92)  BP: 106/58 (10-30-18 @ 06:45) (103/65 - 133/95)  RR: 17 (10-30-18 @ 06:45) (9 - 28)  SpO2: 97% (10-30-18 @ 06:45) (94% - 100%)    10-29-18 @ 07:01  -  10-30-18 @ 07:00  --------------------------------------------------------  IN: 3609.8 mL / OUT: 2070 mL / NET: 1539.8 mL      LABS:  ABG - ( 28 Oct 2018 13:35 )  pH, Arterial: 7.36  pH, Blood: x     /  pCO2: 36    /  pO2: 150   / HCO3: 20    / Base Excess: -4.3  /  SaO2: 99                 9.3    17.3  )-----------( 105      ( 30 Oct 2018 04:11 )             28.1     10-29    143  |  114<H>  |  19  ----------------------------<  153<H>  4.9   |  20<L>  |  0.82    Ca    8.4      29 Oct 2018 20:21  Phos  2.1     10-29  Mg     2.0     10-29    TPro  5.0<L>  /  Alb  2.9<L>  /  TBili  0.3  /  DBili  <0.1  /  AST  34  /  ALT  14  /  AlkPhos  48  10-28      MEDS:  MEDICATIONS  (STANDING):  atorvastatin 80 milliGRAM(s) Oral at bedtime  ceFAZolin   IVPB 2000 milliGRAM(s) IV Intermittent once  chlorhexidine 4% Liquid 1 Application(s) Topical <User Schedule>  dexmedetomidine Infusion 0.1 MICROgram(s)/kG/Hr (2.575 mL/Hr) IV Continuous <Continuous>  dextrose 5%. 1000 milliLiter(s) (50 mL/Hr) IV Continuous <Continuous>  dextrose 50% Injectable 12.5 Gram(s) IV Push once  dextrose 50% Injectable 25 Gram(s) IV Push once  dextrose 50% Injectable 25 Gram(s) IV Push once  diazepam    Tablet 5 milliGRAM(s) Oral every 8 hours  docusate sodium 100 milliGRAM(s) Oral three times a day  enoxaparin Injectable 40 milliGRAM(s) SubCutaneous <User Schedule>  insulin glargine Injectable (LANTUS) 20 Unit(s) SubCutaneous at bedtime  insulin lispro (HumaLOG) corrective regimen sliding scale   SubCutaneous every 6 hours  levothyroxine 175 MICROGram(s) Oral daily  norepinephrine Infusion 0.05 MICROgram(s)/kG/Min (9.656 mL/Hr) IV Continuous <Continuous>  polyethylene glycol 3350 17 Gram(s) Oral two times a day  sodium chloride 0.9%. 1000 milliLiter(s) (75 mL/Hr) IV Continuous <Continuous>    EXAMINATION:  General: In pain but no acute distress  HEENT: Anicteric sclerae  Cardiac: [id number]  Lungs: Decreased BS at bases  Abdomen: Soft, distended, +BS  Extremities: No c/c/e  Skin/Incision Site: Clean, dry and intact  Neurologic: Eyes open spontaneously, regards, follows commands, anxious, PERRL, follows commands, UE 4/5, LE 3/5

## 2018-10-30 NOTE — PROGRESS NOTE ADULT - SUBJECTIVE AND OBJECTIVE BOX
Chief Complaint/Follow-up on:     Subjective:    MEDICATIONS  (STANDING):  atorvastatin 80 milliGRAM(s) Oral at bedtime  ceFAZolin   IVPB 2000 milliGRAM(s) IV Intermittent once  chlorhexidine 4% Liquid 1 Application(s) Topical <User Schedule>  dextrose 5%. 1000 milliLiter(s) (50 mL/Hr) IV Continuous <Continuous>  dextrose 50% Injectable 12.5 Gram(s) IV Push once  dextrose 50% Injectable 25 Gram(s) IV Push once  dextrose 50% Injectable 25 Gram(s) IV Push once  diazepam    Tablet 5 milliGRAM(s) Oral every 8 hours  docusate sodium 100 milliGRAM(s) Oral three times a day  enoxaparin Injectable 40 milliGRAM(s) SubCutaneous <User Schedule>  insulin glargine Injectable (LANTUS) 20 Unit(s) SubCutaneous at bedtime  insulin lispro (HumaLOG) corrective regimen sliding scale   SubCutaneous every 6 hours  levothyroxine 175 MICROGram(s) Oral daily  polyethylene glycol 3350 17 Gram(s) Oral two times a day  senna 2 Tablet(s) Oral at bedtime  sodium chloride 0.9%. 1000 milliLiter(s) (75 mL/Hr) IV Continuous <Continuous>    MEDICATIONS  (PRN):  acetaminophen   Tablet .. 650 milliGRAM(s) Oral every 6 hours PRN Temp greater or equal to 38C (100.4F), Mild Pain (1 - 3)  acetaminophen 325 mG/butalbital 50 mG/caffeine 40 mG 1 Tablet(s) Oral every 8 hours PRN HA  clonazePAM Tablet 1 milliGRAM(s) Oral two times a day PRN agitation/anxiety  dextrose 40% Gel 15 Gram(s) Oral once PRN Blood Glucose LESS THAN 70 milliGRAM(s)/deciliter  glucagon  Injectable 1 milliGRAM(s) IntraMuscular once PRN Glucose LESS THAN 70 milligrams/deciliter  HYDROmorphone  Injectable 1 milliGRAM(s) IV Push every 2 hours PRN Severe Pain (7 - 10)  HYDROmorphone  Injectable 0.5 milliGRAM(s) IV Push every 2 hours PRN Moderate Pain (4 - 6)  naloxone Injectable 0.1 milliGRAM(s) IV Push every 3 minutes PRN For ANY of the following changes in patient status:  A. RR LESS THAN 10 breaths per minute, B. Oxygen saturation LESS THAN 90%, C. Sedation score of 6  ondansetron Injectable 4 milliGRAM(s) IV Push every 6 hours PRN Nausea and/or Vomiting      PHYSICAL EXAM:  VITALS: T(C): 36.6 (10-30-18 @ 11:00)  T(F): 97.8 (10-30-18 @ 11:00), Max: 100.8 (10-29-18 @ 19:00)  HR: 84 (10-30-18 @ 11:00) (55 - 84)  BP: 122/68 (10-30-18 @ 11:00) (100/59 - 133/95)  RR:  (9 - 28)  SpO2:  (94% - 100%)  Wt(kg): --  GENERAL: NAD, well-groomed, well-developed  EYES: No proptosis, no injection  HEENT:  Atraumatic, Normocephalic, moist mucous membranes  THYROID: Normal size, no palpable nodules  RESPIRATORY: Clear to auscultation bilaterally; No rales, rhonchi, wheezing, or rubs  CARDIOVASCULAR: Regular rate and rhythm; No murmurs; no peripheral edema  GI: Soft, nontender, non distended, normal bowel sounds  CUSHING'S SIGNS: no striae    POCT Blood Glucose.: 118 mg/dL (10-30-18 @ 11:35)  POCT Blood Glucose.: 177 mg/dL (10-30-18 @ 06:01)  POCT Blood Glucose.: 177 mg/dL (10-29-18 @ 23:52)  POCT Blood Glucose.: 164 mg/dL (10-29-18 @ 22:40)  POCT Blood Glucose.: 148 mg/dL (10-29-18 @ 17:53)  POCT Blood Glucose.: 163 mg/dL (10-29-18 @ 14:14)  POCT Blood Glucose.: 127 mg/dL (10-29-18 @ 12:07)  POCT Blood Glucose.: 123 mg/dL (10-29-18 @ 11:04)  POCT Blood Glucose.: 123 mg/dL (10-29-18 @ 10:03)  POCT Blood Glucose.: 112 mg/dL (10-29-18 @ 09:01)  POCT Blood Glucose.: 128 mg/dL (10-29-18 @ 08:04)  POCT Blood Glucose.: 143 mg/dL (10-29-18 @ 06:58)  POCT Blood Glucose.: 141 mg/dL (10-29-18 @ 06:18)  POCT Blood Glucose.: 148 mg/dL (10-29-18 @ 05:19)  POCT Blood Glucose.: 157 mg/dL (10-29-18 @ 04:13)  POCT Blood Glucose.: 154 mg/dL (10-29-18 @ 03:01)  POCT Blood Glucose.: 163 mg/dL (10-29-18 @ 02:15)  POCT Blood Glucose.: 173 mg/dL (10-29-18 @ 01:09)  POCT Blood Glucose.: 179 mg/dL (10-29-18 @ 00:14)  POCT Blood Glucose.: 198 mg/dL (10-28-18 @ 23:05)  POCT Blood Glucose.: 210 mg/dL (10-28-18 @ 22:03)  POCT Blood Glucose.: 220 mg/dL (10-28-18 @ 21:26)  POCT Blood Glucose.: 238 mg/dL (10-28-18 @ 17:24)  POCT Blood Glucose.: 237 mg/dL (10-28-18 @ 12:44)  POCT Blood Glucose.: 251 mg/dL (10-28-18 @ 09:31)  POCT Blood Glucose.: 285 mg/dL (10-28-18 @ 06:05)  POCT Blood Glucose.: 274 mg/dL (10-28-18 @ 06:03)    10-29    143  |  114<H>  |  19  ----------------------------<  153<H>  4.9   |  20<L>  |  0.82    EGFR if : 103  EGFR if non : 89    Ca    8.4      10-29  Mg     2.0     10-29  Phos  2.1     10-29    TPro  5.0<L>  /  Alb  2.9<L>  /  TBili  0.3  /  DBili  <0.1  /  AST  34  /  ALT  14  /  AlkPhos  48  10-28          Thyroid Function Tests:  10-20 @ 07:08 TSH 1.84 FreeT4 -- T3 -- Anti TPO -- Anti Thyroglobulin Ab -- TSI --      Hemoglobin A1C, Whole Blood: 6.9 % <H> [4.0 - 5.6] (10-04-18 @ 18:32)  Hemoglobin A1C, Whole Blood: 6.5 % <H> [4.0 - 5.6] (08-17-18 @ 06:47) Chief Complaint/Follow-up on:   f/u on T2DM, Post operative hypothyroidism, Thyroid cancer    Subjective:  Pt was seen and examined at bedside, Pt now more awake, off sedation, off pressors. Pt's BG are at goal today, He is going start eating his meals as he can tolerate.     ROS   Gen : + back pain, diaphoresis  RS- No cough, No SOB  GI - + nausea, + constipation, No vomiting, no diarrhea  CVS - no chest pain, no palpitations    MEDICATIONS  (STANDING):  atorvastatin 80 milliGRAM(s) Oral at bedtime  ceFAZolin   IVPB 2000 milliGRAM(s) IV Intermittent once  chlorhexidine 4% Liquid 1 Application(s) Topical <User Schedule>  dextrose 5%. 1000 milliLiter(s) (50 mL/Hr) IV Continuous <Continuous>  dextrose 50% Injectable 12.5 Gram(s) IV Push once  dextrose 50% Injectable 25 Gram(s) IV Push once  dextrose 50% Injectable 25 Gram(s) IV Push once  diazepam    Tablet 5 milliGRAM(s) Oral every 8 hours  docusate sodium 100 milliGRAM(s) Oral three times a day  enoxaparin Injectable 40 milliGRAM(s) SubCutaneous <User Schedule>  insulin glargine Injectable (LANTUS) 20 Unit(s) SubCutaneous at bedtime  insulin lispro (HumaLOG) corrective regimen sliding scale   SubCutaneous every 6 hours  levothyroxine 175 MICROGram(s) Oral daily  polyethylene glycol 3350 17 Gram(s) Oral two times a day  senna 2 Tablet(s) Oral at bedtime  sodium chloride 0.9%. 1000 milliLiter(s) (75 mL/Hr) IV Continuous <Continuous>    MEDICATIONS  (PRN):  acetaminophen   Tablet .. 650 milliGRAM(s) Oral every 6 hours PRN Temp greater or equal to 38C (100.4F), Mild Pain (1 - 3)  acetaminophen 325 mG/butalbital 50 mG/caffeine 40 mG 1 Tablet(s) Oral every 8 hours PRN HA  clonazePAM Tablet 1 milliGRAM(s) Oral two times a day PRN agitation/anxiety  dextrose 40% Gel 15 Gram(s) Oral once PRN Blood Glucose LESS THAN 70 milliGRAM(s)/deciliter  glucagon  Injectable 1 milliGRAM(s) IntraMuscular once PRN Glucose LESS THAN 70 milligrams/deciliter  HYDROmorphone  Injectable 1 milliGRAM(s) IV Push every 2 hours PRN Severe Pain (7 - 10)  HYDROmorphone  Injectable 0.5 milliGRAM(s) IV Push every 2 hours PRN Moderate Pain (4 - 6)  naloxone Injectable 0.1 milliGRAM(s) IV Push every 3 minutes PRN For ANY of the following changes in patient status:  A. RR LESS THAN 10 breaths per minute, B. Oxygen saturation LESS THAN 90%, C. Sedation score of 6  ondansetron Injectable 4 milliGRAM(s) IV Push every 6 hours PRN Nausea and/or Vomiting      PHYSICAL EXAM:  VITALS: T(C): 36.6 (10-30-18 @ 11:00)  T(F): 97.8 (10-30-18 @ 11:00), Max: 100.8 (10-29-18 @ 19:00)  HR: 84 (10-30-18 @ 11:00) (55 - 84)  BP: 122/68 (10-30-18 @ 11:00) (100/59 - 133/95)  RR:  (9 - 28)  SpO2:  (94% - 100%)  Wt(kg): -- 103kg  GENERAL: In mild distress due to back pain  RESPIRATORY: Clear to auscultation bilaterally; No rales, rhonchi, wheezing, or rubs  CARDIOVASCULAR: Regular rate and rhythm; No murmurs; no peripheral edema, 2+ peripheral pulses  GI: Soft, nontender, non distended, normal bowel sounds      POCT Blood Glucose.: 118 mg/dL (10-30-18 @ 11:35)  POCT Blood Glucose.: 177 mg/dL (10-30-18 @ 06:01) 2 units humalog    POCT Blood Glucose.: 177 mg/dL (10-29-18 @ 23:52) 2 units humalog  POCT Blood Glucose.: 164 mg/dL (10-29-18 @ 22:40) 20 units lantus   POCT Blood Glucose.: 148 mg/dL (10-29-18 @ 17:53)  POCT Blood Glucose.: 163 mg/dL (10-29-18 @ 14:14) 2 units humalog  POCT Blood Glucose.: 127 mg/dL (10-29-18 @ 12:07)  POCT Blood Glucose.: 123 mg/dL (10-29-18 @ 11:04)  POCT Blood Glucose.: 123 mg/dL (10-29-18 @ 10:03)  POCT Blood Glucose.: 112 mg/dL (10-29-18 @ 09:01)  POCT Blood Glucose.: 128 mg/dL (10-29-18 @ 08:04)  POCT Blood Glucose.: 143 mg/dL (10-29-18 @ 06:58)  POCT Blood Glucose.: 141 mg/dL (10-29-18 @ 06:18)  POCT Blood Glucose.: 148 mg/dL (10-29-18 @ 05:19)  POCT Blood Glucose.: 157 mg/dL (10-29-18 @ 04:13)  POCT Blood Glucose.: 154 mg/dL (10-29-18 @ 03:01)  POCT Blood Glucose.: 163 mg/dL (10-29-18 @ 02:15)  POCT Blood Glucose.: 173 mg/dL (10-29-18 @ 01:09)  POCT Blood Glucose.: 179 mg/dL (10-29-18 @ 00:14)  POCT Blood Glucose.: 198 mg/dL (10-28-18 @ 23:05)  POCT Blood Glucose.: 210 mg/dL (10-28-18 @ 22:03)  POCT Blood Glucose.: 220 mg/dL (10-28-18 @ 21:26)  POCT Blood Glucose.: 238 mg/dL (10-28-18 @ 17:24)  POCT Blood Glucose.: 237 mg/dL (10-28-18 @ 12:44)  POCT Blood Glucose.: 251 mg/dL (10-28-18 @ 09:31)  POCT Blood Glucose.: 285 mg/dL (10-28-18 @ 06:05)  POCT Blood Glucose.: 274 mg/dL (10-28-18 @ 06:03)    10-29    143  |  114<H>  |  19  ----------------------------<  153<H>  4.9   |  20<L>  |  0.82    EGFR if : 103  EGFR if non : 89    Ca    8.4      10-29  Mg     2.0     10-29  Phos  2.1     10-29    TPro  5.0<L>  /  Alb  2.9<L>  /  TBili  0.3  /  DBili  <0.1  /  AST  34  /  ALT  14  /  AlkPhos  48  10-28    Thyroid Function Tests:  10-20 @ 07:08 TSH 1.84    Hemoglobin A1C, Whole Blood: 6.9 % <H> [4.0 - 5.6] (10-04-18 @ 18:32)  Hemoglobin A1C, Whole Blood: 6.5 % <H> [4.0 - 5.6] (08-17-18 @ 06:47) Chief Complaint/Follow-up on:   f/u on T2DM, Post operative hypothyroidism, Thyroid cancer    Subjective:  Pt was seen and examined at bedside, Pt now more awake, off sedation, off pressors. Pt's BG are at goal today, He is going start eating his meals as he can tolerate.     ROS   Gen : + back pain, diaphoresis  RS- No cough, No SOB  GI - + nausea, + constipation, No vomiting, no diarrhea  CVS - no chest pain, no palpitations    MEDICATIONS  (STANDING):  atorvastatin 80 milliGRAM(s) Oral at bedtime  ceFAZolin   IVPB 2000 milliGRAM(s) IV Intermittent once  chlorhexidine 4% Liquid 1 Application(s) Topical <User Schedule>  dextrose 5%. 1000 milliLiter(s) (50 mL/Hr) IV Continuous <Continuous>  dextrose 50% Injectable 12.5 Gram(s) IV Push once  dextrose 50% Injectable 25 Gram(s) IV Push once  dextrose 50% Injectable 25 Gram(s) IV Push once  diazepam    Tablet 5 milliGRAM(s) Oral every 8 hours  docusate sodium 100 milliGRAM(s) Oral three times a day  enoxaparin Injectable 40 milliGRAM(s) SubCutaneous <User Schedule>  insulin glargine Injectable (LANTUS) 20 Unit(s) SubCutaneous at bedtime  insulin lispro (HumaLOG) corrective regimen sliding scale   SubCutaneous every 6 hours  levothyroxine 175 MICROGram(s) Oral daily  polyethylene glycol 3350 17 Gram(s) Oral two times a day  senna 2 Tablet(s) Oral at bedtime  sodium chloride 0.9%. 1000 milliLiter(s) (75 mL/Hr) IV Continuous <Continuous>    MEDICATIONS  (PRN):  acetaminophen   Tablet .. 650 milliGRAM(s) Oral every 6 hours PRN Temp greater or equal to 38C (100.4F), Mild Pain (1 - 3)  acetaminophen 325 mG/butalbital 50 mG/caffeine 40 mG 1 Tablet(s) Oral every 8 hours PRN HA  clonazePAM Tablet 1 milliGRAM(s) Oral two times a day PRN agitation/anxiety  dextrose 40% Gel 15 Gram(s) Oral once PRN Blood Glucose LESS THAN 70 milliGRAM(s)/deciliter  glucagon  Injectable 1 milliGRAM(s) IntraMuscular once PRN Glucose LESS THAN 70 milligrams/deciliter  HYDROmorphone  Injectable 1 milliGRAM(s) IV Push every 2 hours PRN Severe Pain (7 - 10)  HYDROmorphone  Injectable 0.5 milliGRAM(s) IV Push every 2 hours PRN Moderate Pain (4 - 6)  naloxone Injectable 0.1 milliGRAM(s) IV Push every 3 minutes PRN For ANY of the following changes in patient status:  A. RR LESS THAN 10 breaths per minute, B. Oxygen saturation LESS THAN 90%, C. Sedation score of 6  ondansetron Injectable 4 milliGRAM(s) IV Push every 6 hours PRN Nausea and/or Vomiting      PHYSICAL EXAM:  VITALS: T(C): 36.6 (10-30-18 @ 11:00)  T(F): 97.8 (10-30-18 @ 11:00), Max: 100.8 (10-29-18 @ 19:00)  HR: 84 (10-30-18 @ 11:00) (55 - 84)  BP: 122/68 (10-30-18 @ 11:00) (100/59 - 133/95)  RR:  (9 - 28)  SpO2:  (94% - 100%)  Wt(kg): -- 103kg  GENERAL: In mild distress due to back pain  HEENT: NC/AT, +wearing glasses  RESPIRATORY: Clear to auscultation bilaterally; No rales, rhonchi, wheezing, or rubs  CARDIOVASCULAR: Regular rate and rhythm; No murmurs; no peripheral edema, 2+ peripheral pulses  GI: Soft, nontender, non distended, normal bowel sounds      POCT Blood Glucose.: 118 mg/dL (10-30-18 @ 11:35)  POCT Blood Glucose.: 177 mg/dL (10-30-18 @ 06:01) 2 units humalog    POCT Blood Glucose.: 177 mg/dL (10-29-18 @ 23:52) 2 units humalog  POCT Blood Glucose.: 164 mg/dL (10-29-18 @ 22:40) 20 units lantus   POCT Blood Glucose.: 148 mg/dL (10-29-18 @ 17:53)  POCT Blood Glucose.: 163 mg/dL (10-29-18 @ 14:14) 2 units humalog  POCT Blood Glucose.: 127 mg/dL (10-29-18 @ 12:07)  POCT Blood Glucose.: 123 mg/dL (10-29-18 @ 11:04)  POCT Blood Glucose.: 123 mg/dL (10-29-18 @ 10:03)  POCT Blood Glucose.: 112 mg/dL (10-29-18 @ 09:01)  POCT Blood Glucose.: 128 mg/dL (10-29-18 @ 08:04)  POCT Blood Glucose.: 143 mg/dL (10-29-18 @ 06:58)  POCT Blood Glucose.: 141 mg/dL (10-29-18 @ 06:18)  POCT Blood Glucose.: 148 mg/dL (10-29-18 @ 05:19)  POCT Blood Glucose.: 157 mg/dL (10-29-18 @ 04:13)  POCT Blood Glucose.: 154 mg/dL (10-29-18 @ 03:01)  POCT Blood Glucose.: 163 mg/dL (10-29-18 @ 02:15)  POCT Blood Glucose.: 173 mg/dL (10-29-18 @ 01:09)  POCT Blood Glucose.: 179 mg/dL (10-29-18 @ 00:14)  POCT Blood Glucose.: 198 mg/dL (10-28-18 @ 23:05)  POCT Blood Glucose.: 210 mg/dL (10-28-18 @ 22:03)  POCT Blood Glucose.: 220 mg/dL (10-28-18 @ 21:26)  POCT Blood Glucose.: 238 mg/dL (10-28-18 @ 17:24)  POCT Blood Glucose.: 237 mg/dL (10-28-18 @ 12:44)  POCT Blood Glucose.: 251 mg/dL (10-28-18 @ 09:31)  POCT Blood Glucose.: 285 mg/dL (10-28-18 @ 06:05)  POCT Blood Glucose.: 274 mg/dL (10-28-18 @ 06:03)    10-29    143  |  114<H>  |  19  ----------------------------<  153<H>  4.9   |  20<L>  |  0.82    EGFR if : 103  EGFR if non : 89    Ca    8.4      10-29  Mg     2.0     10-29  Phos  2.1     10-29    TPro  5.0<L>  /  Alb  2.9<L>  /  TBili  0.3  /  DBili  <0.1  /  AST  34  /  ALT  14  /  AlkPhos  48  10-28    Thyroid Function Tests:  10-20 @ 07:08 TSH 1.84    Hemoglobin A1C, Whole Blood: 6.9 % <H> [4.0 - 5.6] (10-04-18 @ 18:32)  Hemoglobin A1C, Whole Blood: 6.5 % <H> [4.0 - 5.6] (08-17-18 @ 06:47)

## 2018-10-30 NOTE — CONSULT NOTE ADULT - PROBLEM SELECTOR RECOMMENDATION 9
Met with patient for the first time on 10/29, post op day 2 with intractable pain.  Began IV dilaudid 1 mg q 2 hours severe pain, .5 mg iv dilaudid for mild/mod pain with good results.  Utilized total of 7.5mg IV dilaudid over 24 hours.  Converted today to PO dilaudid 10 mg q 6 hours with  prn available.   Will leave IV dilaudid for total 4 doses while PO is being titrated.   I Stop reviewed,  has utilized Dilaudid PO and Tramadol in the past.   Tramadol may be restarted upon clearance from NSX team.  Pt reports severe headaches and intractable nausea with OXY/MOrphine.  Please avoid these agents
While inpatient   - BG goal 100-180  - currently BG at goal  - Pt was briefly on insulin drip yesterday night   - consider changing to  moderate dose Humalog correction scale q 6 h (currently pt is on q 4 h)  - will consider adding basal insulin when if BG goes above goal and bolus insulin when pt starts eating and more awake  discharge plan   Pt will continue with oral medication metformin 1000 mg BID PO, Januvia 100 mg PO OD and will follow up  with outpatient endo Dr. Nikolas Bills  Endocrinology Fellow   Pager from 9am-5pm- [phone]; from 5pm-9am call [phone]

## 2018-10-30 NOTE — CONSULT NOTE ADULT - SUBJECTIVE AND OBJECTIVE BOX
Patient is a 72y old  Male who presents with a chief complaint of Scoliosis (30 Oct 2018 07:15)      HPI:  71 yo male HTN DM2, FERNANDO on CPAP, post op L1-5 posterior lumbar fusion and T10 pelvis instrumentation.  Course was complicated by hypotenson  which required pressor support (initally with norepinephrine, neosynephrine and vaspressin), which were tapered off (norepi dc'd last night) and a junctional rhythm  most likely 2/2 hyperkalemia.  Pt now in RSR last K 5.5.  Pt also with delirium now oriented x 2.  No c/o cp or sob  PAST MEDICAL & SURGICAL HISTORY:  Former smoker, stopped smoking many years ago  CLL (chronic lymphocytic leukemia)  Sleep apnea, unspecified type  Spinal stenosis of lumbar region, unspecified whether neurogenic claudication present  Leukocytosis, unspecified type: monitored for CLL  Malignant neoplasm of kidney parenchyma, right: s/p surgery  Thyroid nodule  Malignant neoplasm of thyroid gland  Prostate cancer: , s/p prostatectomy, RT 2009 x 40 days  Hypertension, unspecified type  Diabetes mellitus type 2 in obese  H/O thyroidectomy:   History of strabismus surgery: b/l   S/P left knee arthroscopy:   H/O ventral hernia repair:   H/O radical prostatectomy:   H/O partial nephrectomy: right,   History of total knee replacement, right: , scoped       Allergies    codeine (Vomiting; Headache)  dogs, cats (Short breath)  dust (Rhinitis)  mold (Rhinitis)  morphine (Vomiting; Headache)  narcotic analgesics (Vomiting; Headache)    Intolerances    Home Medications:   * Patient Currently Takes Medications as of 04-Oct-2018 16:20 documented in Structured Notes  · 	calcitriol 0.5 mcg oral capsule: Last Dose Taken:  , 1 cap(s) orally 2 times a day MDD:2  · 	calcium-vitamin D 500 mg-200 intl units oral tablet: Last Dose Taken:  , 2 tab(s) orally 3 times a day  · 	metFORMIN 500 mg oral tablet: 1 tab(s) orally 2 times a day with breakfast and with lunch  · 	metFORMIN 500 mg oral tablet: 2 tab(s) orally once a day at dinner  · 	Synthroid 175 mcg (0.175 mg) oral tablet: 1 tab(s) orally once a day  · 	Tylenol 8 Hour 650 mg oral tablet, extended release: 2 tab(s) orally every 8 hours, As Needed  · 	HYDROmorphone 4 mg oral tablet: Last Dose Taken:  , 1 tab(s) orally every 4 hours  · 	ondansetron 4 mg oral tablet, disintegratin tab(s) orally 3 times a day, As Needed  · 	aspirin 81 mg oral delayed release tablet: Last Dose Taken:  , 1 tab(s) orally once a day  · 	Januvia 100 mg oral tablet: Last Dose Taken:  , 1 tab(s) orally once a day  · 	Crestor 20 mg oral tablet: Last Dose Taken:  , 1 tab(s) orally once a day (at bedtime)  · 	ramipril 10 mg oral capsule: Last Dose Taken:  , orally 2 times a day  · 	furosemide 40 mg oral tablet: Last Dose Taken:  , 1 tab(s) orally once a day  · 	Fish Oil 1000 mg oral capsule: Last Dose Taken:  , 1 cap(s) orally 3 times a day  · 	selenium 100 mcg oral tablet: Last Dose Taken:  , 1 tab(s) orally once a day  · 	Vitamin C 500 mg oral tablet: Last Dose Taken:  , 1 tab(s) orally once a day        MEDICATIONS  (STANDING):  atorvastatin 80 milliGRAM(s) Oral at bedtime  ceFAZolin   IVPB 2000 milliGRAM(s) IV Intermittent once  chlorhexidine 4% Liquid 1 Application(s) Topical <User Schedule>  dexmedetomidine Infusion 0.1 MICROgram(s)/kG/Hr (2.575 mL/Hr) IV Continuous <Continuous>  dextrose 5%. 1000 milliLiter(s) (50 mL/Hr) IV Continuous <Continuous>  dextrose 50% Injectable 12.5 Gram(s) IV Push once  dextrose 50% Injectable 25 Gram(s) IV Push once  dextrose 50% Injectable 25 Gram(s) IV Push once  diazepam    Tablet 5 milliGRAM(s) Oral every 8 hours  docusate sodium 100 milliGRAM(s) Oral three times a day  enoxaparin Injectable 40 milliGRAM(s) SubCutaneous <User Schedule>  insulin glargine Injectable (LANTUS) 20 Unit(s) SubCutaneous at bedtime  insulin lispro (HumaLOG) corrective regimen sliding scale   SubCutaneous every 6 hours  levothyroxine 175 MICROGram(s) Oral daily  norepinephrine Infusion 0.05 MICROgram(s)/kG/Min (9.656 mL/Hr) IV Continuous <Continuous>  polyethylene glycol 3350 17 Gram(s) Oral two times a day  sodium chloride 0.9%. 1000 milliLiter(s) (75 mL/Hr) IV Continuous <Continuous>    MEDICATIONS  (PRN):  acetaminophen   Tablet .. 650 milliGRAM(s) Oral every 6 hours PRN Temp greater or equal to 38C (100.4F), Mild Pain (1 - 3)  acetaminophen 325 mG/butalbital 50 mG/caffeine 40 mG 1 Tablet(s) Oral every 8 hours PRN HA  clonazePAM Tablet 1 milliGRAM(s) Oral two times a day PRN agitation/anxiety  dextrose 40% Gel 15 Gram(s) Oral once PRN Blood Glucose LESS THAN 70 milliGRAM(s)/deciliter  glucagon  Injectable 1 milliGRAM(s) IntraMuscular once PRN Glucose LESS THAN 70 milligrams/deciliter  HYDROmorphone  Injectable 1 milliGRAM(s) IV Push every 2 hours PRN Severe Pain (7 - 10)  HYDROmorphone  Injectable 0.5 milliGRAM(s) IV Push every 2 hours PRN Moderate Pain (4 - 6)  naloxone Injectable 0.1 milliGRAM(s) IV Push every 3 minutes PRN For ANY of the following changes in patient status:  A. RR LESS THAN 10 breaths per minute, B. Oxygen saturation LESS THAN 90%, C. Sedation score of 6  ondansetron Injectable 4 milliGRAM(s) IV Push every 6 hours PRN Nausea and/or Vomiting  senna 2 Tablet(s) Oral at bedtime PRN Constipation      Social History:  · Marital Status	Domestic partner	  · Occupation	semi-retired,  CPA	  · Lives With	significant other	    Substance Use History:  · Substance Use	caffeine	  · Caffeine Type	coffee	  · Caffeine Amount/Frequency	1-2 cups/cans per day	  · Caffeine Withdrawal Pattern	na	    Tobacco Usage:  · Tobacco Usage: Former smoker	  · Tobacco Type: cigarettes	  · Last Tobacco Use (dd-mmm-yy): 1993	  · Number of Packs per Day: 2	  · Number of yrs: 25	  · Pack yrs: 51	      FAMILY HISTORY:  Family history of amyloidosis (Mother)  Family history of prostate cancer in father (Father)    ROS as above other systems neg        Vital Signs Last 24 Hrs  T(C): 36.4 (30 Oct 2018 07:00), Max: 38.2 (29 Oct 2018 19:00)  T(F): 97.5 (30 Oct 2018 07:00), Max: 100.8 (29 Oct 2018 19:00)  HR: 79 (30 Oct 2018 10:00) (55 - 81)  BP: 118/64 (30 Oct 2018 09:00) (100/59 - 133/95)  BP(mean): 79 (30 Oct 2018 09:00) (69 - 108)  RR: 15 (30 Oct 2018 10:00) (9 - 28)  SpO2: 99% (30 Oct 2018 10:00) (94% - 100%)  Daily     Daily   I&O's Detail    29 Oct 2018 07:01  -  30 Oct 2018 07:00  --------------------------------------------------------  IN:    dexmedetomidine Infusion: 471.3 mL    insulin Infusion: 4.5 mL    IV PiggyBack: 250 mL    norepinephrine Infusion: 159 mL    Oral Fluid: 400 mL    Packed Red Blood Cells: 600 mL    sodium chloride 0.9%.: 1725 mL  Total IN: 3609.8 mL    OUT:    Bulb: 190 mL    Bulb: 190 mL    Bulb: 140 mL    Indwelling Catheter - Urethral: 450 mL    Intermittent Catheterization - Urethral: 1100 mL  Total OUT: 2070 mL    Total NET: 1539.8 mL      30 Oct 2018 07:01  -  30 Oct 2018 10:33  --------------------------------------------------------  IN:    dexmedetomidine Infusion: 5.2 mL    norepinephrine Infusion: 19.3 mL    Oral Fluid: 100 mL    sodium chloride 0.9%.: 150 mL  Total IN: 274.5 mL    OUT:  Total OUT: 0 mL    Total NET: 274.5 mL          Physical Exam  Pt c/o back pain, no cp or sob, Ox@  Neck without JVD  Lungs dec BS bases  Cor s1s2 2/6 taina rusb  Abd soft  Ext without edema venodynes  Pulses +2 DP\  Neuro without focal deficit    LABS          CBC Full  -  ( 30 Oct 2018 04:11 )  WBC Count : 17.3 K/uL  Hemoglobin : 9.3 g/dL  Hematocrit : 28.1 %  Platelet Count - Automated : 105 K/uL  Mean Cell Volume : 90.1 fl  Mean Cell Hemoglobin : 29.9 pg  Mean Cell Hemoglobin Concentration : 33.1 gm/dL  Auto Neutrophil # : x  Auto Lymphocyte # : x  Auto Monocyte # : x  Auto Eosinophil # : x  Auto Basophil # : x  Auto Neutrophil % : x  Auto Lymphocyte % : x  Auto Monocyte % : x  Auto Eosinophil % : x  Auto Basophil % : x    10-29    143  |  114<H>  |  19  ----------------------------<  153<H>  4.9   |  20<L>  |  0.82    Ca    8.4      29 Oct 2018 20:21  Phos  2.1     10-29  Mg     2.0     10-29    TPro  5.0<L>  /  Alb  2.9<L>  /  TBili  0.3  /  DBili  <0.1  /  AST  34  /  ALT  14  /  AlkPhos  48  10-28    CM RSR          Assessment and Plan  71 yo male HTN DM2, FERNANDO on CPAP, post op L1-5 posterior lumbar fusion and T10 pelvis instrumentation.  Course was complicated by hypotenson  which required pressor support (initally with norepinephrine, neosynephrine and vaspressin), which were tapered off (norepi dc'd last night) and a junctional rhythm  most likely 2/2 hyperkalemia.  Pt now in RSR last K 5.5.  Pt also with delirium now oriented x 2.  No c/o cp or sob  Pt now Hemodynamically stable, normokalemic in RSR  Agree with current rx

## 2018-10-30 NOTE — CONSULT NOTE ADULT - SUBJECTIVE AND OBJECTIVE BOX
HPI:    72 year-old man with history of T2DM, HTN, HLD, FERNANDO on CPAP, Pneumonia, CLL, Prostate Cancer s/p resection 2003, thyroid cancer s/p thyroidectomy 8/2018, renal cancer s/p partial nephrectomy and spinal stenosis with acquired scoliosis who initially presented 10/18/18 for L1-5 posterior lumbar fusion and T10-pelvis instrumentation and fusion but case was aborted due to 4 second pause after induction with propofol who returned 10/27/18 for surgery. Of note, he had a prior Holter monitoring study that revealed nocturnal bradycardia with pauses. 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 On 10/27/18, he underwent placement of temporary pacer and L1-S1 transforaminal interbody fusion, T10-pelvis instrumented fusion and Plastics assisted closure. Operative course was notable for bradycardia responsive to glycopyrrolate, cerebrospinal fluid leak which was primarily repaired and EBL of 1500cc. He received 2 units PRBC intra-op. Post-operatively, he had a ~40 minutes episode of hypotension to SBP in the 50smmHg, for which he was placed on pressor support and given an additional 2 units PRBC.      PERTINENT PM/SXH:   Former smoker, stopped smoking many years ago  CLL (chronic lymphocytic leukemia)  Sleep apnea, unspecified type  Spinal stenosis of lumbar region, unspecified whether neurogenic claudication present  Leukocytosis, unspecified type  Malignant neoplasm of kidney parenchyma, right  Thyroid nodule  Malignant neoplasm of thyroid gland  Prostate cancer  Hypertension, unspecified type  Diabetes mellitus type 2 in obese    H/O thyroidectomy  History of strabismus surgery  S/P left knee arthroscopy  H/O ventral hernia repair  H/O radical prostatectomy  H/O partial nephrectomy  History of total knee replacement, right    FAMILY HISTORY:  Family history of amyloidosis (Mother)  Family history of prostate cancer in father (Father)    ITEMS NOT CHECKED ARE NOT PRESENT    SOCIAL HISTORY:   Significant other/partner:  [x ]  Children:  [ ]  Anabaptist/Spirituality: Voodoo  Substance hx:  [ ]   Tobacco hx:  [ ]   Alcohol hx: [ ]   Home Opioid hx:  [x ] I-Stop Reference No: [id number]  Living Situation: [x ]Home  [ ]Long term care  [ ]Rehab [ ]Other    ADVANCE DIRECTIVES:    DNR  MOLST  [ ]  Living Will  [ ]   DECISION MAKER(s):  [ ] Health Care Proxy(s)  [x ] Surrogate(s)  [ ] Guardian           Name(s): Phone Number(s):  Yana Broussard [phone]  BASELINE (I)ADL(s) (prior to admission):  [place]: [ ]Total  [ x] Moderate [ ]Dependent    Allergies    codeine (Vomiting; Headache)  dogs, cats (Short breath)  dust (Rhinitis)  mold (Rhinitis)  morphine (Vomiting; Headache)  narcotic analgesics (Vomiting; Headache)    Intolerances    MEDICATIONS  (STANDING):  atorvastatin 80 milliGRAM(s) Oral at bedtime  ceFAZolin   IVPB 2000 milliGRAM(s) IV Intermittent once  chlorhexidine 4% Liquid 1 Application(s) Topical <User Schedule>  dextrose 5%. 1000 milliLiter(s) (50 mL/Hr) IV Continuous <Continuous>  dextrose 50% Injectable 12.5 Gram(s) IV Push once  dextrose 50% Injectable 25 Gram(s) IV Push once  dextrose 50% Injectable 25 Gram(s) IV Push once  diazepam    Tablet 5 milliGRAM(s) Oral every 8 hours  docusate sodium 100 milliGRAM(s) Oral three times a day  enoxaparin Injectable 40 milliGRAM(s) SubCutaneous <User Schedule>  HYDROmorphone   Tablet 10 milliGRAM(s) Oral every 6 hours  insulin glargine Injectable (LANTUS) 20 Unit(s) SubCutaneous at bedtime  insulin lispro (HumaLOG) corrective regimen sliding scale   SubCutaneous every 6 hours  levothyroxine 175 MICROGram(s) Oral daily  polyethylene glycol 3350 17 Gram(s) Oral two times a day  senna 2 Tablet(s) Oral at bedtime  sodium chloride 0.9%. 1000 milliLiter(s) (75 mL/Hr) IV Continuous <Continuous>    MEDICATIONS  (PRN):  acetaminophen   Tablet .. 650 milliGRAM(s) Oral every 6 hours PRN Temp greater or equal to 38C (100.4F), Mild Pain (1 - 3)  acetaminophen 325 mG/butalbital 50 mG/caffeine 40 mG 1 Tablet(s) Oral every 8 hours PRN HA  clonazePAM Tablet 1 milliGRAM(s) Oral two times a day PRN agitation/anxiety  dextrose 40% Gel 15 Gram(s) Oral once PRN Blood Glucose LESS THAN 70 milliGRAM(s)/deciliter  glucagon  Injectable 1 milliGRAM(s) IntraMuscular once PRN Glucose LESS THAN 70 milligrams/deciliter  HYDROmorphone   Tablet 4 milliGRAM(s) Oral every 3 hours PRN moderate severe pain  HYDROmorphone   Tablet 2 milliGRAM(s) Oral every 3 hours PRN miild mod pain  HYDROmorphone  Injectable 1 milliGRAM(s) IV Push every 2 hours PRN breakthrough severe pain  naloxone Injectable 0.1 milliGRAM(s) IV Push every 3 minutes PRN For ANY of the following changes in patient status:  A. RR LESS THAN 10 breaths per minute, B. Oxygen saturation LESS THAN 90%, C. Sedation score of 6  ondansetron Injectable 4 milliGRAM(s) IV Push every 6 hours PRN Nausea and/or Vomiting    PRESENT SYMPTOMS: [ ]Unable to obtain due to poor mentation   Source if other than patient:  [ ]Family   [ ]Team     Pain (Impact on QOL):  8-10  Location -         Minimal acceptable level (0-10 scale):                    Aggrevating factors -movement	  Quality - ranging from sharp, burning to dull and achy post op  Radiation - down buttocks  Severity (0-10 scale) -  8-10  Timing -    PAIN AD Score:     [email or web address] (press ctrl +  left click to view)    Dyspnea:                           [ ]Mild [ ]Moderate [ ]Severe  Anxiety:                             [ ]Mild [ x]Moderate [ ]Severe  Fatigue:                             [ ]Mild [x ]Moderate [ ]Severe  Nausea:                             [ ]Mild [ ]Moderate [ ]Severe  Loss of appetite:              [ ]Mild [ ]Moderate [ ]Severe  Constipation:                    [ ]Mild [ ]Moderate [ ]Severe    Other Symptoms:  [ ]All other review of systems negative     Karnofsky Performance Score/Palliative Performance Status Version 2:    40     %  PHYSICAL EXAM:  Vital Signs Last 24 Hrs  T(C): 36.6 (30 Oct 2018 11:00), Max: 38.2 (29 Oct 2018 19:00)  T(F): 97.8 (30 Oct 2018 11:00), Max: 100.8 (29 Oct 2018 19:00)  HR: 85 (30 Oct 2018 12:00) (55 - 85)  BP: 128/72 (30 Oct 2018 12:00) (100/59 - 133/95)  BP(mean): 84 (30 Oct 2018 12:00) (69 - 108)  RR: 17 (30 Oct 2018 12:00) (9 - 28)  SpO2: 99% (30 Oct 2018 12:00) (94% - 100%) I&O's Summary    29 Oct 2018 07:01  -  30 Oct 2018 07:00  --------------------------------------------------------  IN: 3609.8 mL / OUT: 2070 mL / NET: 1539.8 mL    30 Oct 2018 07:01  -  30 Oct 2018 13:07  --------------------------------------------------------  IN: 889.5 mL / OUT: 425 mL / NET: 464.5 mL    GENERAL:  [ ]Alert  [ ]Oriented x   [ ]Lethargic  [ ]Cachexia  [ ]Unarousable  [ ]Verbal  [ ]Non-Verbal  Behavioral:   [ ] Anxiety  [ ] Delirium [ ] Agitation [ ] Other  HEENT:  [ ]Normal   [ ]Dry mouth   [ ]ET Tube/Trach  [ ]Oral lesions  PULMONARY:   [ ]Clear [ ]Tachypnea  [ ]Audible excessive secretions   [ ]Rhonchi        [ ]Right [ ]Left [ ]Bilateral  [ ]Crackles        [ ]Right [ ]Left [ ]Bilateral  [ ]Wheezing     [ ]Right [ ]Left [ ]Bilateral  CARDIOVASCULAR:    [ ]Regular [ ]Irregular [ ]Tachy  [ ]Odilon [ ]Murmur [ ]Other  GASTROINTESTINAL:  [ ]Soft  [ ]Distended   [ ]+BS  [ ]Non tender [ ]Tender  [ ]PEG [ ]OGT/ NGT  Last BM:   GENITOURINARY:  [ ]Normal [ ] Incontinent   [ ]Oliguria/Anuria   [ ][place]  MUSCULOSKELETAL:   [ ]Normal   [ ]Weakness  [ ]Bed/Wheelchair bound [ ]Edema  NEUROLOGIC:   [ ]No focal deficits  [ ] Cognitive impairment  [ ] Dysphagia [ ]Dysarthria [ ] Paresis [ ]Other   SKIN:   [ ]Normal   [ ]Pressure ulcer(s)  [ ]Rash    CRITICAL CARE:	  [ ] Shock Present  [ ]Septic [ ]Cardiogenic [ ]Neurologic [ ]Hypovolemic  [ ]  Vasopressors [ ]  Inotropes   [ ] Respiratory failure present  [ ] Acute  [ ] Chronic [ ] Hypoxic  [ ] Hypercarbic [ ] Other  [ ] Other organ failure     LABS:                        9.3    17.3  )-----------( 105      ( 30 Oct 2018 04:11 )             28.1   10-29    143  |  114<H>  |  19  ----------------------------<  153<H>  4.9   |  20<L>  |  0.82    Ca    8.4      29 Oct 2018 20:21  Phos  2.1     10-29  Mg     2.0     10-29    TPro  5.0<L>  /  Alb  2.9<L>  /  TBili  0.3  /  DBili  <0.1  /  AST  34  /  ALT  14  /  AlkPhos  48  10-28        RADIOLOGY & ADDITIONAL STUDIES:    PROTEIN CALORIE MALNUTRITION:   [ ] PPSV2 < or = to 30% [ ] significant weight loss  [ ] poor nutritional intake [ ] catabolic state [ ] anasarca     Albumin, Serum: 2.9 g/dL (10-28-18 @ 21:46)  Artificial Nutrition [ ]     REFERRALS:   [ ]Chaplaincy  [ ] Hospice  [ ]Child Life  [ ]Social Work  [ ]Case management [ ]Holistic Therapy   Goals of Care Discussion Document:

## 2018-10-30 NOTE — PROGRESS NOTE ADULT - PROBLEM SELECTOR PLAN 1
While inpatient   - BG goal 100-180  - currently BG at goal  - Pt will start PO intake, will add bolus insulin based on how much he is tolerating and how much is his requirements through correction scale  - c/w moderate dose Humalog correction scale q 6 h   -c/w 20 units of lantus sq QHS  discharge plan -  Pt will continue with oral medication metformin 1000 mg BID PO, Januvia 100 mg PO OD and will follow up  with outpatient endo Dr. Nikolas Rodriguez While inpatient   - BG goal 100-180  - currently BG at goal  - start humalog sq 3 units TID ac as pt started tolerating PO   - c/w moderate dose Humalog correction scale ac & hs  -c/w 20 units of lantus sq QHS  discharge plan -  Pt will continue with oral medication metformin 1000 mg BID PO, Januvia 100 mg PO OD and will follow up  with outpatient endo Dr. Nikolas Rodriguez

## 2018-10-30 NOTE — PROGRESS NOTE ADULT - ASSESSMENT
72 year old male with uncontrolled T2DM (HbA1c - 6.9, c/b DM nephropathy + microalbuminuria, HTN, HLD, underwent placement of temporary pacer and L1-S1 transforaminal interbody fusion, T10-pelvis instrumented fusion and plastics assisted closure.  POD -3, Extubated 10/28/18, off pressors and sedation. BG are at goal today, will start eating from today lunch.

## 2018-10-30 NOTE — PROGRESS NOTE ADULT - SUBJECTIVE AND OBJECTIVE BOX
Visit Summary: Patient seen and evaluated at bedside.    Overnight Events: none    Exam:  T(C): 36.7 (10-30-18 @ 01:15), Max: 38.2 (10-29-18 @ 19:00)  HR: 61 (10-30-18 @ 01:30) (61 - 93)  BP: 117/67 (10-29-18 @ 23:05) (117/67 - 117/67)  RR: 18 (10-30-18 @ 01:30) (9 - 28)  SpO2: 97% (10-30-18 @ 01:30) (94% - 100%)  Wt(kg): --    Awake, Alert, just stating he is anxious and in pain, regards, follows commands, anxious, PERRL, follows commands, UE 4/5, LE 3/5, pain limited and limited by patient compliance                        8.2    20.6  )-----------( 117      ( 29 Oct 2018 20:21 )             24.9     10-29    143  |  114<H>  |  19  ----------------------------<  153<H>  4.9   |  20<L>  |  0.82    Ca    8.4      29 Oct 2018 20:21  Phos  2.1     10-29  Mg     2.0     10-29    TPro  5.0<L>  /  Alb  2.9<L>  /  TBili  0.3  /  DBili  <0.1  /  AST  34  /  ALT  14  /  AlkPhos  48  10-28

## 2018-10-31 DIAGNOSIS — E11.29 TYPE 2 DIABETES MELLITUS WITH OTHER DIABETIC KIDNEY COMPLICATION: ICD-10-CM

## 2018-10-31 LAB
ANION GAP SERPL CALC-SCNC: 8 MMOL/L — SIGNIFICANT CHANGE UP (ref 5–17)
APTT BLD: 26.4 SEC — LOW (ref 27.5–36.3)
BUN SERPL-MCNC: 19 MG/DL — SIGNIFICANT CHANGE UP (ref 7–23)
CALCIUM SERPL-MCNC: 8.6 MG/DL — SIGNIFICANT CHANGE UP (ref 8.4–10.5)
CHLORIDE SERPL-SCNC: 112 MMOL/L — HIGH (ref 96–108)
CO2 SERPL-SCNC: 23 MMOL/L — SIGNIFICANT CHANGE UP (ref 22–31)
CREAT SERPL-MCNC: 0.68 MG/DL — SIGNIFICANT CHANGE UP (ref 0.5–1.3)
GLUCOSE BLDC GLUCOMTR-MCNC: 117 MG/DL — HIGH (ref 70–99)
GLUCOSE BLDC GLUCOMTR-MCNC: 123 MG/DL — HIGH (ref 70–99)
GLUCOSE BLDC GLUCOMTR-MCNC: 95 MG/DL — SIGNIFICANT CHANGE UP (ref 70–99)
GLUCOSE BLDC GLUCOMTR-MCNC: 95 MG/DL — SIGNIFICANT CHANGE UP (ref 70–99)
GLUCOSE SERPL-MCNC: 126 MG/DL — HIGH (ref 70–99)
INR BLD: 1.1 RATIO — SIGNIFICANT CHANGE UP (ref 0.88–1.16)
MAGNESIUM SERPL-MCNC: 2 MG/DL — SIGNIFICANT CHANGE UP (ref 1.6–2.6)
PHOSPHATE SERPL-MCNC: 1.9 MG/DL — LOW (ref 2.5–4.5)
POTASSIUM SERPL-MCNC: 4.1 MMOL/L — SIGNIFICANT CHANGE UP (ref 3.5–5.3)
POTASSIUM SERPL-SCNC: 4.1 MMOL/L — SIGNIFICANT CHANGE UP (ref 3.5–5.3)
PROTHROM AB SERPL-ACNC: 12.6 SEC — SIGNIFICANT CHANGE UP (ref 10–12.9)
SODIUM SERPL-SCNC: 143 MMOL/L — SIGNIFICANT CHANGE UP (ref 135–145)

## 2018-10-31 PROCEDURE — 71045 X-RAY EXAM CHEST 1 VIEW: CPT | Mod: 26

## 2018-10-31 PROCEDURE — 99221 1ST HOSP IP/OBS SF/LOW 40: CPT

## 2018-10-31 PROCEDURE — 99233 SBSQ HOSP IP/OBS HIGH 50: CPT

## 2018-10-31 PROCEDURE — 99232 SBSQ HOSP IP/OBS MODERATE 35: CPT

## 2018-10-31 RX ORDER — LACTULOSE 10 G/15ML
20 SOLUTION ORAL EVERY 4 HOURS
Qty: 0 | Refills: 0 | Status: DISCONTINUED | OUTPATIENT
Start: 2018-10-31 | End: 2018-11-01

## 2018-10-31 RX ORDER — FUROSEMIDE 40 MG
20 TABLET ORAL ONCE
Qty: 0 | Refills: 0 | Status: COMPLETED | OUTPATIENT
Start: 2018-10-31 | End: 2018-10-31

## 2018-10-31 RX ORDER — INSULIN GLARGINE 100 [IU]/ML
16 INJECTION, SOLUTION SUBCUTANEOUS AT BEDTIME
Qty: 0 | Refills: 0 | Status: DISCONTINUED | OUTPATIENT
Start: 2018-10-31 | End: 2018-11-02

## 2018-10-31 RX ORDER — HYDROMORPHONE HYDROCHLORIDE 2 MG/ML
6 INJECTION INTRAMUSCULAR; INTRAVENOUS; SUBCUTANEOUS EVERY 6 HOURS
Qty: 0 | Refills: 0 | Status: DISCONTINUED | OUTPATIENT
Start: 2018-10-31 | End: 2018-11-01

## 2018-10-31 RX ADMIN — Medication 175 MICROGRAM(S): at 05:19

## 2018-10-31 RX ADMIN — HYDROMORPHONE HYDROCHLORIDE 10 MILLIGRAM(S): 2 INJECTION INTRAMUSCULAR; INTRAVENOUS; SUBCUTANEOUS at 00:23

## 2018-10-31 RX ADMIN — Medication 85 MILLIMOLE(S): at 01:23

## 2018-10-31 RX ADMIN — ENOXAPARIN SODIUM 40 MILLIGRAM(S): 100 INJECTION SUBCUTANEOUS at 17:44

## 2018-10-31 RX ADMIN — Medication 100 MILLIGRAM(S): at 13:58

## 2018-10-31 RX ADMIN — HYDROMORPHONE HYDROCHLORIDE 6 MILLIGRAM(S): 2 INJECTION INTRAMUSCULAR; INTRAVENOUS; SUBCUTANEOUS at 18:27

## 2018-10-31 RX ADMIN — ATORVASTATIN CALCIUM 80 MILLIGRAM(S): 80 TABLET, FILM COATED ORAL at 22:37

## 2018-10-31 RX ADMIN — ONDANSETRON 4 MILLIGRAM(S): 8 TABLET, FILM COATED ORAL at 12:24

## 2018-10-31 RX ADMIN — HYDROMORPHONE HYDROCHLORIDE 10 MILLIGRAM(S): 2 INJECTION INTRAMUSCULAR; INTRAVENOUS; SUBCUTANEOUS at 12:15

## 2018-10-31 RX ADMIN — HYDROMORPHONE HYDROCHLORIDE 10 MILLIGRAM(S): 2 INJECTION INTRAMUSCULAR; INTRAVENOUS; SUBCUTANEOUS at 00:53

## 2018-10-31 RX ADMIN — LACTULOSE 20 GRAM(S): 10 SOLUTION ORAL at 17:44

## 2018-10-31 RX ADMIN — HYDROMORPHONE HYDROCHLORIDE 10 MILLIGRAM(S): 2 INJECTION INTRAMUSCULAR; INTRAVENOUS; SUBCUTANEOUS at 09:30

## 2018-10-31 RX ADMIN — ONDANSETRON 4 MILLIGRAM(S): 8 TABLET, FILM COATED ORAL at 05:19

## 2018-10-31 RX ADMIN — POLYETHYLENE GLYCOL 3350 17 GRAM(S): 17 POWDER, FOR SOLUTION ORAL at 05:19

## 2018-10-31 RX ADMIN — Medication 100 MILLIGRAM(S): at 05:19

## 2018-10-31 RX ADMIN — LACTULOSE 20 GRAM(S): 10 SOLUTION ORAL at 13:57

## 2018-10-31 RX ADMIN — HYDROMORPHONE HYDROCHLORIDE 10 MILLIGRAM(S): 2 INJECTION INTRAMUSCULAR; INTRAVENOUS; SUBCUTANEOUS at 12:45

## 2018-10-31 RX ADMIN — INSULIN GLARGINE 16 UNIT(S): 100 INJECTION, SOLUTION SUBCUTANEOUS at 22:38

## 2018-10-31 RX ADMIN — Medication 20 MILLIGRAM(S): at 10:45

## 2018-10-31 RX ADMIN — SODIUM CHLORIDE 75 MILLILITER(S): 9 INJECTION INTRAMUSCULAR; INTRAVENOUS; SUBCUTANEOUS at 12:26

## 2018-10-31 RX ADMIN — HYDROMORPHONE HYDROCHLORIDE 6 MILLIGRAM(S): 2 INJECTION INTRAMUSCULAR; INTRAVENOUS; SUBCUTANEOUS at 18:57

## 2018-10-31 RX ADMIN — CHLORHEXIDINE GLUCONATE 1 APPLICATION(S): 213 SOLUTION TOPICAL at 22:37

## 2018-10-31 RX ADMIN — ONDANSETRON 4 MILLIGRAM(S): 8 TABLET, FILM COATED ORAL at 17:44

## 2018-10-31 RX ADMIN — POLYETHYLENE GLYCOL 3350 17 GRAM(S): 17 POWDER, FOR SOLUTION ORAL at 18:36

## 2018-10-31 RX ADMIN — HYDROMORPHONE HYDROCHLORIDE 10 MILLIGRAM(S): 2 INJECTION INTRAMUSCULAR; INTRAVENOUS; SUBCUTANEOUS at 08:45

## 2018-10-31 NOTE — PROGRESS NOTE ADULT - PROBLEM SELECTOR PLAN 1
patient on ATC dilaudid, but currently lethargic.  Recommend decreasing dose to 6mg PO dilaudid q6h ATC (please ensure when ordering that this is scheduled for when next dose would be due, i.e. 6 hours after last dose provided).  Continue current PRN regimen  Will monitor breakthrough needs and titrate as needed. Discussed with patient, NSCU team, and partner at bedside. Contact information provided to family

## 2018-10-31 NOTE — DIETITIAN INITIAL EVALUATION ADULT. - OTHER INFO
Pt seen for length of stay on NSCU. Reports UBW of 227 pounds, denied any recent changes in weight PTA. Noted admit weight 226.6 pounds. Denies history of chewing/swallowing difficulties. Denies nausea/emesis currently reports does report pain. No BM thus far

## 2018-10-31 NOTE — PROGRESS NOTE ADULT - SUBJECTIVE AND OBJECTIVE BOX
SUBJECTIVE AND OBJECTIVE: Patient lethargic today, barely able to hold conversation. States still has pain at times, but not able to describe quality, location, radiation or other aspects.  He has not required any breakthrough doses of medication in the last 24 hours. He continues on dilaudid 10mg q6h ATC.     INTERVAL HPI/OVERNIGHT EVENTS: no acute overnight events.      DNR on chart:   Allergies    codeine (Vomiting; Headache)  dogs, cats (Short breath)  dust (Rhinitis)  mold (Rhinitis)  morphine (Vomiting; Headache)  narcotic analgesics (Vomiting; Headache)    Intolerances    MEDICATIONS  (STANDING):  atorvastatin 80 milliGRAM(s) Oral at bedtime  ceFAZolin   IVPB 2000 milliGRAM(s) IV Intermittent once  chlorhexidine 4% Liquid 1 Application(s) Topical <User Schedule>  dextrose 5%. 1000 milliLiter(s) (50 mL/Hr) IV Continuous <Continuous>  dextrose 50% Injectable 12.5 Gram(s) IV Push once  dextrose 50% Injectable 25 Gram(s) IV Push once  dextrose 50% Injectable 25 Gram(s) IV Push once  docusate sodium 100 milliGRAM(s) Oral three times a day  enoxaparin Injectable 40 milliGRAM(s) SubCutaneous <User Schedule>  HYDROmorphone   Tablet 10 milliGRAM(s) Oral every 6 hours  insulin glargine Injectable (LANTUS) 16 Unit(s) SubCutaneous at bedtime  insulin lispro (HumaLOG) corrective regimen sliding scale   SubCutaneous three times a day before meals  insulin lispro (HumaLOG) corrective regimen sliding scale   SubCutaneous at bedtime  lactulose Syrup 20 Gram(s) Oral every 4 hours  levothyroxine 175 MICROGram(s) Oral daily  ondansetron Injectable 4 milliGRAM(s) IV Push every 6 hours  polyethylene glycol 3350 17 Gram(s) Oral two times a day  senna 2 Tablet(s) Oral at bedtime  sodium chloride 0.9%. 1000 milliLiter(s) (75 mL/Hr) IV Continuous <Continuous>    MEDICATIONS  (PRN):  acetaminophen   Tablet .. 650 milliGRAM(s) Oral every 6 hours PRN Temp greater or equal to 38C (100.4F), Mild Pain (1 - 3)  acetaminophen 325 mG/butalbital 50 mG/caffeine 40 mG 1 Tablet(s) Oral every 8 hours PRN HA  dextrose 40% Gel 15 Gram(s) Oral once PRN Blood Glucose LESS THAN 70 milliGRAM(s)/deciliter  diazepam    Tablet 5 milliGRAM(s) Oral every 8 hours PRN muscle spasm  glucagon  Injectable 1 milliGRAM(s) IntraMuscular once PRN Glucose LESS THAN 70 milligrams/deciliter  HYDROmorphone   Tablet 4 milliGRAM(s) Oral every 3 hours PRN moderate severe pain  HYDROmorphone   Tablet 2 milliGRAM(s) Oral every 3 hours PRN miild mod pain  HYDROmorphone  Injectable 1 milliGRAM(s) IV Push every 2 hours PRN breakthrough severe pain  naloxone Injectable 0.1 milliGRAM(s) IV Push every 3 minutes PRN For ANY of the following changes in patient status:  A. RR LESS THAN 10 breaths per minute, B. Oxygen saturation LESS THAN 90%, C. Sedation score of 6      ITEMS UNCHECKED ARE NOT PRESENT    PRESENT SYMPTOMS: [ ]Unable to obtain due to poor mentation   Source if other than patient:  [x ]Family   [ ]Team     Pain (Impact on QOL):  yes  Location: back  Minimal acceptable level (0-10 scale):                   Aggrevating factors: movement, PT  Quality:  Radiation:  Severity (0-10 scale):    Timing:    Dyspnea:                           [ ]Mild [ ]Moderate [ ]Severe  Anxiety:                             [ ]Mild [ ]Moderate [ ]Severe  Fatigue:                             [ ]Mild [ ]Moderate [ ]Severe  Nausea:                             [ ]Mild [ ]Moderate [ ]Severe  Loss of appetite:              [ ]Mild [ ]Moderate [ ]Severe  Constipation:                    [ ]Mild [ ]Moderate [ ]Severe    PAIN AD Score:	  [email or web address] (Ctrl + left click to view)    Other Symptoms:  [ ]All other review of systems negative     Karnofsky Performance Score/Palliative Performance Status Version 2:      30   %  PHYSICAL EXAM:  Vital Signs Last 24 Hrs  T(C): 36.4 (31 Oct 2018 11:00), Max: 36.8 (30 Oct 2018 15:00)  T(F): 97.5 (31 Oct 2018 11:00), Max: 98.2 (30 Oct 2018 15:00)  HR: 74 (31 Oct 2018 11:46) (70 - 88)  BP: 122/76 (31 Oct 2018 11:46) (122/76 - 156/95)  BP(mean): 91 (31 Oct 2018 11:46) (83 - 105)  RR: 14 (31 Oct 2018 11:00) (12 - 18)  SpO2: 100% (31 Oct 2018 11:46) (96% - 100%) I&O's Summary    30 Oct 2018 07:01  -  31 Oct 2018 07:00  --------------------------------------------------------  IN: 2974.5 mL / OUT: 1770 mL / NET: 1204.5 mL    31 Oct 2018 07:01  -  31 Oct 2018 14:54  --------------------------------------------------------  IN: 690 mL / OUT: 675 mL / NET: 15 mL     GENERAL:  [ ]Alert  [ ]Oriented x   [ x]Lethargic  [ ]Cachexia  [ ]Unarousable  [ ]Verbal  [ ]Non-Verbal  Behavioral:   [ ] Anxiety  [ ] Delirium [ ] Agitation [ ] Other  HEENT:  [ x]Normal   [ ]Dry mouth   [ ]ET Tube/Trach  [ ]Oral lesions  PULMONARY:   [ x]Clear [ ]Tachypnea  [ ]Audible excessive secretions   [ ]Rhonchi        [ ]Right [ ]Left [ ]Bilateral  [ ]Crackles        [ ]Right [ ]Left [ ]Bilateral  [ ]Wheezing     [ ]Right [ ]Left [ ]Bilateral  CARDIOVASCULAR:    [x ]Regular [ ]Irregular [ ]Tachy  [ ]Odilon [ ]Murmur [ ]Other  GASTROINTESTINAL:  [x ]Soft  [ ]Distended   [x ]+BS  [ ]Non tender [ ]Tender  [ ]PEG [ ]OGT/ NGT   Last BM: RN documentation reviewed   GENITOURINARY:  [x ]Normal [ ] Incontinent   [ ]Oliguria/Anuria   [ ][place]  MUSCULOSKELETAL:   [ ]Normal   [x ]Weakness  [ ]Bed/Wheelchair bound [ ]Edema  NEUROLOGIC:   [x ]No focal deficits  [ ] Cognitive impairment  [ ] Dysphagia [ ]Dysarthria [ ] Paresis [ ]Other   SKIN:   [x ]Normal   [ ]Pressure ulcer(s)  [ ]Rash    CRITICAL CARE:  [ ] Shock Present  [ ]Septic [ ]Cardiogenic [ ]Neurologic [ ]Hypovolemic  [ ]  Vasopressors [ ]  Inotropes   [ ] Respiratory failure present  [ ] Acute  [ ] Chronic [ ] Hypoxic  [ ] Hypercarbic [ ] Other  [ ] Other organ failure     LABS:                        9.6    22.8  )-----------( 148      ( 30 Oct 2018 23:39 )             28.9   10-30    143  |  112<H>  |  19  ----------------------------<  126<H>  4.1   |  23  |  0.68    Ca    8.6      30 Oct 2018 23:39  Phos  1.9     10-30  Mg     2.0     10-30    PT/INR - ( 30 Oct 2018 23:39 )   PT: 12.6 sec;   INR: 1.10 ratio    PTT - ( 30 Oct 2018 23:39 )  PTT:26.4 sec      RADIOLOGY & ADDITIONAL STUDIES: no new images for review    Protein Calorie Malnutrition:  [x ] PPSV2 < or = 30%  [ ] significant weight loss [x ] poor nutritional intake [ ] anasarca [ ] catabolic state Albumin, Serum: 2.9 g/dL (10-28-18 @ 21:46)  Artificial Nutrition [ ]     REFERRALS:   [ ]Chaplaincy  [ ] Hospice  [ ]Child Life  [ x]Social Work  [ ]Case management [ ]Holistic Therapy   Goals of Care Document: none

## 2018-10-31 NOTE — DIETITIAN INITIAL EVALUATION ADULT. - DIET TYPE
consistent carbohydrate clear liquid/renal replacement pts:no protein restr,no conc K & phos, low sodium

## 2018-10-31 NOTE — DIETITIAN INITIAL EVALUATION ADULT. - FACTORS AFF FOOD INTAKE
persistent nausea/vomiting/Pt passed dysphagia screen on 10/29. Per discussion with RN, pt started on diet last night (consistent, renal diet per chart); however, was experiencing nausea & vomited x2 therefore made NPO. Diet to be advanced to clears this morning. Discussed sipping on clears slowly once pt receives tray and reviewed usual diet progression.

## 2018-10-31 NOTE — PROGRESS NOTE ADULT - SUBJECTIVE AND OBJECTIVE BOX
Pt seen and examined. Deep DEANA was removed by neuro sx  NAD    thoraco/lumbar incision healing well CDI with nylons  no erythema, no palpable collections, no drainage  JPx2 cc ss    A/P: Continue to monitor drain outputs  abx while drains are in place  will follow

## 2018-10-31 NOTE — PROGRESS NOTE ADULT - SUBJECTIVE AND OBJECTIVE BOX
PRINCESS [id number]      Drain type: [ ]SD []SG [x] DEANA [] HMV [] Lumbar drain [] EVD [] ICP [place] [] Abd drain (x)Located in the deep tissue area    Patient's position while drain removed: On his right side with HOB about 10 degrees    [ x ] Patient tolerated well [x] No complications [] complications:     Exit Site secured with: [] __ staples [] __ suture (please specify how many of each)     Additional Info: No suture or staple needed, new dressing was applied

## 2018-10-31 NOTE — DIETITIAN INITIAL EVALUATION ADULT. - NS AS NUTRI INTERV ED CONTENT
Encouraged pt to sip on clears slowly to promote tolerance & discussed usual diet progression. Follow-up with diet education as appropriate

## 2018-10-31 NOTE — CONSULT NOTE ADULT - ATTENDING COMMENTS
Seen and examined with resident. Agree with note.   Patient with gait dysfunction S/P lumbar fusion.  Patient will need acute rehabilitation when stable.
Pt seen and examined. Agree with note as above with addendum: defer MTC care to the outpatient. Per his partner, he had negative genetic testing. His insulin gtt was stopped without overlapping basal insulin. Give Lantus 20 units sq qhs now and cover with humalog mod scale q6.  Nicole Carpenter MD  [phone]

## 2018-10-31 NOTE — PROGRESS NOTE ADULT - ASSESSMENT
72 year old male with uncontrolled T2DM (HbA1c - 6.9, c/b DM nephropathy + microalbuminuria, HTN, HLD, underwent placement of temporary pacer and L1-S1 transforaminal interbody fusion, T10-pelvis instrumented fusion and plastics assisted closure (10/27). BG values tightly controlled on current regimen w/poor PO intake. Team cut premeal insulin and decreased Lantus today prior to my visit. BG goal (100-180mg/dl).

## 2018-10-31 NOTE — CONSULT NOTE ADULT - SUBJECTIVE AND OBJECTIVE BOX
HPI:  72M with history of T2DM, HTN, HLD, FERNANDO on CPAP, Pneumonia, CLL, Prostate Cancer s/p resection 2003, thyroid cancer s/p thyroidectomy 8/2018, renal cancer s/p partial nephrectomy and spinal stenosis with acquired scoliosis who initially presented 10/18/18 for L1-5 posterior lumbar fusion and T10-pelvis instrumentation and fusion but case was aborted due to 4 second pause after induction with propofol who returned 10/27/18 for surgery. Of note, he had a prior Holter monitoring study that revealed nocturnal bradycardia with pauses. 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 On 10/27/18, he underwent placement of temporary pacer and L1-S1 transforaminal interbody fusion, T10-pelvis instrumented fusion and Plastics assisted closure. Operative course was notable for bradycardia responsive to glycopyrrolate, cerebrospinal fluid leak which was primarily repaired and EBL of 1500cc. He received 2 units PRBC intra-op. Post-operatively, he had a ~40 minutes episode of hypotension to SBP in the 50smmHg, for which he was placed on pressor support and given an additional 2 units PRBC.      REVIEW OF SYSTEMS  Constitutional - No fever, No weight loss, No fatigue  HEENT - No eye pain, No visual disturbances, No difficulty hearing, No tinnitus, No vertigo, No neck pain  Respiratory - No cough, No wheezing, No shortness of breath  Cardiovascular - No chest pain, No palpitations  Gastrointestinal - No abdominal pain, No nausea, No vomiting, No diarrhea, No constipation  Genitourinary - No dysuria, No frequency, No hematuria, No incontinence  Neurological - No headaches, No memory loss, No loss of strength, No numbness, No tremors  Skin - No itching, No rashes, No lesions   Endocrine - No temperature intolerance  Musculoskeletal - No joint pain, No joint swelling, No muscle pain  Psychiatric - No depression, No anxiety    PAST MEDICAL & SURGICAL HISTORY  Former smoker, stopped smoking many years ago  CLL (chronic lymphocytic leukemia)  Sleep apnea, unspecified type  Spinal stenosis of lumbar region, unspecified whether neurogenic claudication present  Leukocytosis, unspecified type  Malignant neoplasm of kidney parenchyma, right  Thyroid nodule  Malignant neoplasm of thyroid gland  Prostate cancer  Hypertension, unspecified type  Diabetes mellitus type 2 in obese  H/O thyroidectomy  History of strabismus surgery  S/P left knee arthroscopy  H/O ventral hernia repair  H/O radical prostatectomy  H/O partial nephrectomy  History of total knee replacement, right      FUNCTIONAL HISTORY  Pt lives in [place] w/ wife, 1 SHANICE, 3-4 steps to main floor +HR, 7 steps to bedroom +HR. PLOF: Independent w/ all functional mobility and ADL's w/ no AD.    CURRENT FUNCTIONAL STATUS  Bed mobility: mod assist  Transfers: mod assist  Ambulation mod A    FAMILY HISTORY   Family history of amyloidosis (Mother)  Family history of prostate cancer in father (Father)      RECENT LABS/IMAGING    MRI 10/18  FINDINGS:   Redemonstration of a mild to moderate dextroscoliosis of the lumbar   spine, the apex is at L2-L3. Grade 1 retrolistheses of L1 on L2, L2 on   L3, L3 and L4, and L4 and L5. Near diffuse disc space narrowing in the   lumbar region spares the L5-S1 disc space. The conus is normal in size,   position, and signal characteristics, ending at T12.    T7-T8: Disc bulge deforms the ventral thecal sac. Bilateral facet   hypertrophy. Mild bilateral neural foraminal narrowing.    T8-T9: Mild broad-based disc protrusion deforms the ventral thecal sac.   Bilateral facet hypertrophy and mild bilateral neural foraminal narrowing.    T9-T10: Disc bulge deforms the ventral thecal sac. Bilateral facet   hypertrophy. Moderate right and mild left neural foraminal narrowing.    T10-T11: Bilateral facet hypertrophy. No spinal canal stenosis or neural   foraminal narrowing.    T11-T12: Bilateral facet hypertrophy. No spinal canal stenosis or neural   foraminal narrowing.    T12-L1: Bilateral facet hypertrophy. No spinal canal stenosis or neural   foraminal narrowing.    L1-L2: Uncovering of the intervertebral disc. Left greater than right   facet hypertrophy. Mild to moderate thecal sac compression and effacement   of the left lateral recess. Mild right and severe left neural foraminal   narrowing.    L2-L3: Uncovering of the intervertebral disc. Left greater than right   facet hypertrophy. Mild thecal sac compression. Effacement of the left   lateral recess. Mild right and moderate left neural foraminal narrowing.    L3-L4: Uncovering of the intervertebral disc and bilateral   facet/ligamentous hypertrophy. Mild to moderate thecal sac compression.   Effacement of the bilateral lateral recesses. Moderate to severe left and   mild to moderate right neural foraminal narrowing.    L4-L5: Uncovering of the intervertebral disc and bilateral facet   hypertrophy. Mild thecal sac compression. Mild to moderate left and   moderate right neural foraminal narrowing.    L5-S1: Disc bulge asymmetric to the right. Bilateral facet hypertrophy.   Posterior displacement of the descending right S1 nerve root and mild   thecal sac compression. Moderate to severe right neural foraminal   narrowing.    CBC Full  -  ( 30 Oct 2018 23:39 )  WBC Count : 22.8 K/uL  Hemoglobin : 9.6 g/dL  Hematocrit : 28.9 %  Platelet Count - Automated : 148 K/uL  Mean Cell Volume : 88.9 fl  Mean Cell Hemoglobin : 29.6 pg  Mean Cell Hemoglobin Concentration : 33.3 gm/dL    10-30    143  |  112<H>  |  19  ----------------------------<  126<H>  4.1   |  23  |  0.68    Ca    8.6      30 Oct 2018 23:39  Phos  1.9     10-30  Mg     2.0     10-30          VITALS  T(C): 36.4 (10-31-18 @ 07:00), Max: 36.8 (10-30-18 @ 15:00)  HR: 74 (10-31-18 @ 11:46) (70 - 88)  BP: 122/76 (10-31-18 @ 11:46) (122/76 - 156/95)  RR: 14 (10-31-18 @ 11:00) (12 - 18)  SpO2: 100% (10-31-18 @ 11:46) (96% - 100%)  Wt(kg): --    ALLERGIES  codeine (Vomiting; Headache)  dogs, cats (Short breath)  dust (Rhinitis)  mold (Rhinitis)  morphine (Vomiting; Headache)  narcotic analgesics (Vomiting; Headache)      MEDICATIONS   acetaminophen   Tablet .. 650 milliGRAM(s) Oral every 6 hours PRN  acetaminophen 325 mG/butalbital 50 mG/caffeine 40 mG 1 Tablet(s) Oral every 8 hours PRN  atorvastatin 80 milliGRAM(s) Oral at bedtime  ceFAZolin   IVPB 2000 milliGRAM(s) IV Intermittent once  chlorhexidine 4% Liquid 1 Application(s) Topical <User Schedule>  dextrose 40% Gel 15 Gram(s) Oral once PRN  dextrose 5%. 1000 milliLiter(s) IV Continuous <Continuous>  dextrose 50% Injectable 12.5 Gram(s) IV Push once  dextrose 50% Injectable 25 Gram(s) IV Push once  dextrose 50% Injectable 25 Gram(s) IV Push once  diazepam    Tablet 5 milliGRAM(s) Oral every 8 hours PRN  docusate sodium 100 milliGRAM(s) Oral three times a day  enoxaparin Injectable 40 milliGRAM(s) SubCutaneous <User Schedule>  glucagon  Injectable 1 milliGRAM(s) IntraMuscular once PRN  HYDROmorphone   Tablet 10 milliGRAM(s) Oral every 6 hours  HYDROmorphone   Tablet 4 milliGRAM(s) Oral every 3 hours PRN  HYDROmorphone   Tablet 2 milliGRAM(s) Oral every 3 hours PRN  HYDROmorphone  Injectable 1 milliGRAM(s) IV Push every 2 hours PRN  insulin glargine Injectable (LANTUS) 16 Unit(s) SubCutaneous at bedtime  insulin lispro (HumaLOG) corrective regimen sliding scale   SubCutaneous three times a day before meals  insulin lispro (HumaLOG) corrective regimen sliding scale   SubCutaneous at bedtime  lactulose Syrup 20 Gram(s) Oral every 4 hours  levothyroxine 175 MICROGram(s) Oral daily  naloxone Injectable 0.1 milliGRAM(s) IV Push every 3 minutes PRN  ondansetron Injectable 4 milliGRAM(s) IV Push every 6 hours  polyethylene glycol 3350 17 Gram(s) Oral two times a day  senna 2 Tablet(s) Oral at bedtime  sodium chloride 0.9%. 1000 milliLiter(s) IV Continuous <Continuous>      ----------------------------------------------------------------------------------------  PHYSICAL EXAM  Constitutional - NAD, Comfortable  HEENT - NCAT, EOMI  Neck - Supple, No limited ROM  Chest - CTA bilaterally, No wheeze, No rhonchi, No crackles  Cardiovascular - RRR, S1S2, No murmurs  Abdomen - BS+, Soft, NTND  Extremities - No C/C/E, No calf tenderness   Neurologic Exam -                    Cognitive - Awake, Alert, AAO to self, place, date, year, situation     Communication - Fluent, No dysarthria     Cranial Nerves - CN 2-12 intact     Motor - No focal deficits                    LEFT    UE - ShAB 5/5, EF 5/5, EE 5/5, WE 5/5,  5/5                    RIGHT UE - ShAB 5/5, EF 5/5, EE 5/5, WE 5/5,  5/5                    LEFT    LE - HF 5/5, KE 5/5, DF 5/5, PF 5/5                    RIGHT LE - HF 5/5, KE 5/5, DF 5/5, PF 5/5        Sensory - Intact to LT     Reflexes - DTR Intact, No primitive reflexive     Coordination - FTN intact     OculoVestibular - No saccades, No nystagmus, VOR         Balance - WNL Static  Psychiatric - Mood stable, Affect WNL  ----------------------------------------------------------------------------------------  ASSESSMENT/PLAN HPI:  72M with history of T2DM, HTN, HLD, FERNANDO on CPAP, Pneumonia, CLL, Prostate Cancer s/p resection 2003, thyroid cancer s/p thyroidectomy 8/2018, renal cancer s/p partial nephrectomy and spinal stenosis with acquired scoliosis who initially presented 10/18/18 for L1-5 posterior lumbar fusion and T10-pelvis instrumentation and fusion but case was aborted due to 4 second pause after induction with propofol who returned 10/27/18 for surgery. Of note, he had a prior Holter monitoring study that revealed nocturnal bradycardia with pauses. 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 On 10/27/18, he underwent placement of temporary pacer and L1-S1 transforaminal interbody fusion, T10-pelvis instrumented fusion and Plastics assisted closure. Operative course was notable for bradycardia responsive to glycopyrrolate, cerebrospinal fluid leak which was primarily repaired and EBL of 1500cc. He received 2 units PRBC intra-op. Post-operatively, he had a ~40 minutes episode of hypotension to SBP in the 50smmHg, for which he was placed on pressor support and given an additional 2 units PRBC.  On current encounter patient complains of fatigue and back pain and weakness.  Patient denies bowel or bladder dysfunction, sensory impairment.      REVIEW OF SYSTEMS  Constitutional - No fever, No weight loss, + fatigue  HEENT - No eye pain, No visual disturbances, No difficulty hearing, No tinnitus, No vertigo, no  neck pain  Respiratory - No cough, No wheezing, No shortness of breath  Cardiovascular - No chest pain, No palpitations  Gastrointestinal - No abdominal pain, No nausea, No vomiting, No diarrhea, No constipation  Genitourinary - No dysuria, No frequency, No hematuria, No incontinence  Neurological - No headaches, No memory loss, + loss of strength, No numbness, No tremors  Skin - No itching, No rashes, No lesions   Endocrine - No temperature intolerance  Musculoskeletal - + joint pain, No joint swelling, + muscle pain  Psychiatric - No depression, No anxiety    PAST MEDICAL & SURGICAL HISTORY  Former smoker, stopped smoking many years ago  CLL (chronic lymphocytic leukemia)  Sleep apnea, unspecified type  Spinal stenosis of lumbar region, unspecified whether neurogenic claudication present  Leukocytosis, unspecified type  Malignant neoplasm of kidney parenchyma, right  Thyroid nodule  Malignant neoplasm of thyroid gland  Prostate cancer  Hypertension, unspecified type  Diabetes mellitus type 2 in obese  H/O thyroidectomy  History of strabismus surgery  S/P left knee arthroscopy  H/O ventral hernia repair  H/O radical prostatectomy  H/O partial nephrectomy  History of total knee replacement, right      FUNCTIONAL HISTORY  Pt lives in [place] w/ wife, 1 SHANICE, 3-4 steps to main floor +HR, 7 steps to bedroom +HR. PLOF: Independent w/ all functional mobility and ADL's w/ no AD.    CURRENT FUNCTIONAL STATUS  Bed mobility: mod assist  Transfers: mod assist  Ambulation mod A    FAMILY HISTORY   Family history of amyloidosis (Mother)  Family history of prostate cancer in father (Father)      RECENT LABS/IMAGING    MRI 10/18  FINDINGS:   Redemonstration of a mild to moderate dextroscoliosis of the lumbar   spine, the apex is at L2-L3. Grade 1 retrolistheses of L1 on L2, L2 on   L3, L3 and L4, and L4 and L5. Near diffuse disc space narrowing in the   lumbar region spares the L5-S1 disc space. The conus is normal in size,   position, and signal characteristics, ending at T12.    T7-T8: Disc bulge deforms the ventral thecal sac. Bilateral facet   hypertrophy. Mild bilateral neural foraminal narrowing.    T8-T9: Mild broad-based disc protrusion deforms the ventral thecal sac.   Bilateral facet hypertrophy and mild bilateral neural foraminal narrowing.    T9-T10: Disc bulge deforms the ventral thecal sac. Bilateral facet   hypertrophy. Moderate right and mild left neural foraminal narrowing.    T10-T11: Bilateral facet hypertrophy. No spinal canal stenosis or neural   foraminal narrowing.    T11-T12: Bilateral facet hypertrophy. No spinal canal stenosis or neural   foraminal narrowing.    T12-L1: Bilateral facet hypertrophy. No spinal canal stenosis or neural   foraminal narrowing.    L1-L2: Uncovering of the intervertebral disc. Left greater than right   facet hypertrophy. Mild to moderate thecal sac compression and effacement   of the left lateral recess. Mild right and severe left neural foraminal   narrowing.    L2-L3: Uncovering of the intervertebral disc. Left greater than right   facet hypertrophy. Mild thecal sac compression. Effacement of the left   lateral recess. Mild right and moderate left neural foraminal narrowing.    L3-L4: Uncovering of the intervertebral disc and bilateral   facet/ligamentous hypertrophy. Mild to moderate thecal sac compression.   Effacement of the bilateral lateral recesses. Moderate to severe left and   mild to moderate right neural foraminal narrowing.    L4-L5: Uncovering of the intervertebral disc and bilateral facet   hypertrophy. Mild thecal sac compression. Mild to moderate left and   moderate right neural foraminal narrowing.    L5-S1: Disc bulge asymmetric to the right. Bilateral facet hypertrophy.   Posterior displacement of the descending right S1 nerve root and mild   thecal sac compression. Moderate to severe right neural foraminal   narrowing.    CBC Full  -  ( 30 Oct 2018 23:39 )  WBC Count : 22.8 K/uL  Hemoglobin : 9.6 g/dL  Hematocrit : 28.9 %  Platelet Count - Automated : 148 K/uL  Mean Cell Volume : 88.9 fl  Mean Cell Hemoglobin : 29.6 pg  Mean Cell Hemoglobin Concentration : 33.3 gm/dL    10-30    143  |  112<H>  |  19  ----------------------------<  126<H>  4.1   |  23  |  0.68    Ca    8.6      30 Oct 2018 23:39  Phos  1.9     10-30  Mg     2.0     10-30          VITALS  T(C): 36.4 (10-31-18 @ 07:00), Max: 36.8 (10-30-18 @ 15:00)  HR: 74 (10-31-18 @ 11:46) (70 - 88)  BP: 122/76 (10-31-18 @ 11:46) (122/76 - 156/95)  RR: 14 (10-31-18 @ 11:00) (12 - 18)  SpO2: 100% (10-31-18 @ 11:46) (96% - 100%)  Wt(kg): --    ALLERGIES  codeine (Vomiting; Headache)  dogs, cats (Short breath)  dust (Rhinitis)  mold (Rhinitis)  morphine (Vomiting; Headache)  narcotic analgesics (Vomiting; Headache)      MEDICATIONS   acetaminophen   Tablet .. 650 milliGRAM(s) Oral every 6 hours PRN  acetaminophen 325 mG/butalbital 50 mG/caffeine 40 mG 1 Tablet(s) Oral every 8 hours PRN  atorvastatin 80 milliGRAM(s) Oral at bedtime  ceFAZolin   IVPB 2000 milliGRAM(s) IV Intermittent once  chlorhexidine 4% Liquid 1 Application(s) Topical <User Schedule>  dextrose 40% Gel 15 Gram(s) Oral once PRN  dextrose 5%. 1000 milliLiter(s) IV Continuous <Continuous>  dextrose 50% Injectable 12.5 Gram(s) IV Push once  dextrose 50% Injectable 25 Gram(s) IV Push once  dextrose 50% Injectable 25 Gram(s) IV Push once  diazepam    Tablet 5 milliGRAM(s) Oral every 8 hours PRN  docusate sodium 100 milliGRAM(s) Oral three times a day  enoxaparin Injectable 40 milliGRAM(s) SubCutaneous <User Schedule>  glucagon  Injectable 1 milliGRAM(s) IntraMuscular once PRN  HYDROmorphone   Tablet 10 milliGRAM(s) Oral every 6 hours  HYDROmorphone   Tablet 4 milliGRAM(s) Oral every 3 hours PRN  HYDROmorphone   Tablet 2 milliGRAM(s) Oral every 3 hours PRN  HYDROmorphone  Injectable 1 milliGRAM(s) IV Push every 2 hours PRN  insulin glargine Injectable (LANTUS) 16 Unit(s) SubCutaneous at bedtime  insulin lispro (HumaLOG) corrective regimen sliding scale   SubCutaneous three times a day before meals  insulin lispro (HumaLOG) corrective regimen sliding scale   SubCutaneous at bedtime  lactulose Syrup 20 Gram(s) Oral every 4 hours  levothyroxine 175 MICROGram(s) Oral daily  naloxone Injectable 0.1 milliGRAM(s) IV Push every 3 minutes PRN  ondansetron Injectable 4 milliGRAM(s) IV Push every 6 hours  polyethylene glycol 3350 17 Gram(s) Oral two times a day  senna 2 Tablet(s) Oral at bedtime  sodium chloride 0.9%. 1000 milliLiter(s) IV Continuous <Continuous>      ----------------------------------------------------------------------------------------  PHYSICAL EXAM  Constitutional - NAD, Comfortable, nasal cannula  HEENT - NCAT, EOMI  Neck - Supple, No limited ROM  Chest - CTA bilaterally, No wheeze, No rhonchi, No crackles  Cardiovascular - RRR, S1S2, No murmurs  Abdomen - BS+, Soft, NTND  Extremities - No C/C/E, No calf tenderness   Neurologic Exam -                    Cognitive - Awake, Alert, AAO to self, place, date, year, situation     Communication - Fluent, No dysarthria     Cranial Nerves - CN 2-12 intact     Motor -                     LEFT    UE - 4/5                    RIGHT UE - 4/5                    LEFT    LE - HF 4/5, KE 4/5, DF 5/5, PF 5/5                    RIGHT LE - HF 4/5, KE 4/5, DF 5/5, PF 5/5        Sensory - Intact to LT     Reflexes - DTR Intact, No primitive reflexive     Coordination - FTN poor bilaterally       ----------------------------------------------------------------------------------------  ASSESSMENT/PLAN  72 M with acquired scoliosis, spinal stenosis s/p L1-S1 transforaminal interbody fusion now with weakness, gait abnormality, balance impairment    PT - Bed mobility, transfers, ambulation, assistive device use, rom strengthening, stretching, rom  OT - ADLs, fine motor  Precautions - fall, spinal  dvt PPX - lovenox as per primary team  Dispo - Acute rehab, patient will tolerate 3 hours of PT/OT/SLP with 24 hour nursing and physician care.  Plan discussed with family at bedside. Cc: Difficulty walking    HPI:  72M with history of T2DM, HTN, HLD, FERNANDO on CPAP, Pneumonia, CLL, Prostate Cancer s/p resection 2003, thyroid cancer s/p thyroidectomy 8/2018, renal cancer s/p partial nephrectomy and spinal stenosis with acquired scoliosis who initially presented 10/18/18 for L1-5 posterior lumbar fusion and T10-pelvis instrumentation and fusion but case was aborted due to 4 second pause after induction with propofol who returned 10/27/18 for surgery. Of note, he had a prior Holter monitoring study that revealed nocturnal bradycardia with pauses. 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 On 10/27/18, he underwent placement of temporary pacer and L1-S1 transforaminal interbody fusion, T10-pelvis instrumented fusion and Plastics assisted closure. Operative course was notable for bradycardia responsive to glycopyrrolate, cerebrospinal fluid leak which was primarily repaired and EBL of 1500cc. He received 2 units PRBC intra-op. Post-operatively, he had a ~40 minutes episode of hypotension to SBP in the 50smmHg, for which he was placed on pressor support and given an additional 2 units PRBC.  On current encounter patient complains of fatigue and back pain and weakness.  Patient denies bowel or bladder dysfunction, sensory impairment.      REVIEW OF SYSTEMS  Constitutional - No fever, No weight loss, + fatigue  HEENT - No eye pain, No visual disturbances, No difficulty hearing, No tinnitus, No vertigo, no  neck pain  Respiratory - No cough, No wheezing, No shortness of breath  Cardiovascular - No chest pain, No palpitations  Gastrointestinal - No abdominal pain, No nausea, No vomiting, No diarrhea, No constipation  Genitourinary - No dysuria, No frequency, No hematuria, No incontinence  Neurological - No headaches, No memory loss, + loss of strength, No numbness, No tremors  Skin - No itching, No rashes, No lesions   Endocrine - No temperature intolerance  Musculoskeletal - + joint pain, No joint swelling, + muscle pain  Psychiatric - No depression, No anxiety    PAST MEDICAL & SURGICAL HISTORY  Former smoker, stopped smoking many years ago  CLL (chronic lymphocytic leukemia)  Sleep apnea, unspecified type  Spinal stenosis of lumbar region, unspecified whether neurogenic claudication present  Leukocytosis, unspecified type  Malignant neoplasm of kidney parenchyma, right  Thyroid nodule  Malignant neoplasm of thyroid gland  Prostate cancer  Hypertension, unspecified type  Diabetes mellitus type 2 in obese  H/O thyroidectomy  History of strabismus surgery  S/P left knee arthroscopy  H/O ventral hernia repair  H/O radical prostatectomy  H/O partial nephrectomy  History of total knee replacement, right      FUNCTIONAL HISTORY  Pt lives in  w/ wife, 1 SHANICE, 3-4 steps to main floor +HR, 7 steps to bedroom +HR. PLOF: Independent w/ all functional mobility and ADL's w/ no AD.    CURRENT FUNCTIONAL STATUS  Bed mobility: mod assist  Transfers: mod assist  Ambulation mod A    FAMILY HISTORY   Family history of amyloidosis (Mother)  Family history of prostate cancer in father (Father)      RECENT LABS/IMAGING    MRI 10/18  FINDINGS:   Redemonstration of a mild to moderate dextroscoliosis of the lumbar   spine, the apex is at L2-L3. Grade 1 retrolistheses of L1 on L2, L2 on   L3, L3 and L4, and L4 and L5. Near diffuse disc space narrowing in the   lumbar region spares the L5-S1 disc space. The conus is normal in size,   position, and signal characteristics, ending at T12.    T7-T8: Disc bulge deforms the ventral thecal sac. Bilateral facet   hypertrophy. Mild bilateral neural foraminal narrowing.    T8-T9: Mild broad-based disc protrusion deforms the ventral thecal sac.   Bilateral facet hypertrophy and mild bilateral neural foraminal narrowing.    T9-T10: Disc bulge deforms the ventral thecal sac. Bilateral facet   hypertrophy. Moderate right and mild left neural foraminal narrowing.    T10-T11: Bilateral facet hypertrophy. No spinal canal stenosis or neural   foraminal narrowing.    T11-T12: Bilateral facet hypertrophy. No spinal canal stenosis or neural   foraminal narrowing.    T12-L1: Bilateral facet hypertrophy. No spinal canal stenosis or neural   foraminal narrowing.    L1-L2: Uncovering of the intervertebral disc. Left greater than right   facet hypertrophy. Mild to moderate thecal sac compression and effacement   of the left lateral recess. Mild right and severe left neural foraminal   narrowing.    L2-L3: Uncovering of the intervertebral disc. Left greater than right   facet hypertrophy. Mild thecal sac compression. Effacement of the left   lateral recess. Mild right and moderate left neural foraminal narrowing.    L3-L4: Uncovering of the intervertebral disc and bilateral   facet/ligamentous hypertrophy. Mild to moderate thecal sac compression.   Effacement of the bilateral lateral recesses. Moderate to severe left and   mild to moderate right neural foraminal narrowing.    L4-L5: Uncovering of the intervertebral disc and bilateral facet   hypertrophy. Mild thecal sac compression. Mild to moderate left and   moderate right neural foraminal narrowing.    L5-S1: Disc bulge asymmetric to the right. Bilateral facet hypertrophy.   Posterior displacement of the descending right S1 nerve root and mild   thecal sac compression. Moderate to severe right neural foraminal   narrowing.    CBC Full  -  ( 30 Oct 2018 23:39 )  WBC Count : 22.8 K/uL  Hemoglobin : 9.6 g/dL  Hematocrit : 28.9 %  Platelet Count - Automated : 148 K/uL  Mean Cell Volume : 88.9 fl  Mean Cell Hemoglobin : 29.6 pg  Mean Cell Hemoglobin Concentration : 33.3 gm/dL    10-30    143  |  112<H>  |  19  ----------------------------<  126<H>  4.1   |  23  |  0.68    Ca    8.6      30 Oct 2018 23:39  Phos  1.9     10-30  Mg     2.0     10-30          VITALS  T(C): 36.4 (10-31-18 @ 07:00), Max: 36.8 (10-30-18 @ 15:00)  HR: 74 (10-31-18 @ 11:46) (70 - 88)  BP: 122/76 (10-31-18 @ 11:46) (122/76 - 156/95)  RR: 14 (10-31-18 @ 11:00) (12 - 18)  SpO2: 100% (10-31-18 @ 11:46) (96% - 100%)  Wt(kg): --    ALLERGIES  codeine (Vomiting; Headache)  dogs, cats (Short breath)  dust (Rhinitis)  mold (Rhinitis)  morphine (Vomiting; Headache)  narcotic analgesics (Vomiting; Headache)      MEDICATIONS   acetaminophen   Tablet .. 650 milliGRAM(s) Oral every 6 hours PRN  acetaminophen 325 mG/butalbital 50 mG/caffeine 40 mG 1 Tablet(s) Oral every 8 hours PRN  atorvastatin 80 milliGRAM(s) Oral at bedtime  ceFAZolin   IVPB 2000 milliGRAM(s) IV Intermittent once  chlorhexidine 4% Liquid 1 Application(s) Topical <User Schedule>  dextrose 40% Gel 15 Gram(s) Oral once PRN  dextrose 5%. 1000 milliLiter(s) IV Continuous <Continuous>  dextrose 50% Injectable 12.5 Gram(s) IV Push once  dextrose 50% Injectable 25 Gram(s) IV Push once  dextrose 50% Injectable 25 Gram(s) IV Push once  diazepam    Tablet 5 milliGRAM(s) Oral every 8 hours PRN  docusate sodium 100 milliGRAM(s) Oral three times a day  enoxaparin Injectable 40 milliGRAM(s) SubCutaneous <User Schedule>  glucagon  Injectable 1 milliGRAM(s) IntraMuscular once PRN  HYDROmorphone   Tablet 10 milliGRAM(s) Oral every 6 hours  HYDROmorphone   Tablet 4 milliGRAM(s) Oral every 3 hours PRN  HYDROmorphone   Tablet 2 milliGRAM(s) Oral every 3 hours PRN  HYDROmorphone  Injectable 1 milliGRAM(s) IV Push every 2 hours PRN  insulin glargine Injectable (LANTUS) 16 Unit(s) SubCutaneous at bedtime  insulin lispro (HumaLOG) corrective regimen sliding scale   SubCutaneous three times a day before meals  insulin lispro (HumaLOG) corrective regimen sliding scale   SubCutaneous at bedtime  lactulose Syrup 20 Gram(s) Oral every 4 hours  levothyroxine 175 MICROGram(s) Oral daily  naloxone Injectable 0.1 milliGRAM(s) IV Push every 3 minutes PRN  ondansetron Injectable 4 milliGRAM(s) IV Push every 6 hours  polyethylene glycol 3350 17 Gram(s) Oral two times a day  senna 2 Tablet(s) Oral at bedtime  sodium chloride 0.9%. 1000 milliLiter(s) IV Continuous <Continuous>      ----------------------------------------------------------------------------------------  PHYSICAL EXAM  Constitutional - NAD, Comfortable, nasal cannula  HEENT - NCAT, EOMI  Neck - Supple, No limited ROM  Chest - CTA bilaterally, No wheeze, No rhonchi, No crackles  Cardiovascular - RRR, S1S2, No murmurs  Abdomen - BS+, Soft, NTND  Extremities - No C/C/E, No calf tenderness   Neurologic Exam -                    Cognitive - Awake, Alert, AAO to self, place, date, year, situation     Communication - Fluent, No dysarthria     Cranial Nerves - CN 2-12 intact     Motor -                     LEFT    UE - 4/5                    RIGHT UE - 4/5                    LEFT    LE - HF 4/5, KE 4/5, DF 5/5, PF 5/5                    RIGHT LE - HF 4/5, KE 4/5, DF 5/5, PF 5/5        Sensory - Intact to LT     Reflexes - DTR Intact, No primitive reflexive     Coordination - FTN poor bilaterally       ----------------------------------------------------------------------------------------  ASSESSMENT/PLAN  72 M with acquired scoliosis, spinal stenosis s/p L1-S1 transforaminal interbody fusion now with weakness, gait abnormality, balance impairment    PT - Bed mobility, transfers, ambulation, assistive device use, rom strengthening, stretching, rom  OT - ADLs, fine motor  Precautions - fall, spinal  Pain- hydromorphone, valium  dvt PPX - lovenox as per primary team  Dispo - Acute rehab, patient will tolerate 3 hours of PT/OT/SLP with 24 hour nursing and physician care.  Plan discussed with family at bedside.

## 2018-10-31 NOTE — CONSULT NOTE ADULT - CONSULT REASON
Assess for rehab needs
Junctional rhythm, hyperkalemi
T2DM, Post operative hypothyroidism, Thyroid cancer
abnormal rhythm
ACUTE POST OP SPINAL PAIN

## 2018-10-31 NOTE — PROGRESS NOTE ADULT - ASSESSMENT
ASSESSMENT/PLAN: spinal stenosis/degenerative scoliosis    NEURO:  Pain management consult  Upright x-rays (on the floor)  Monitor surgical drain output  Activity: [x] mobilize as tolerated [] Bedrest [] PT [] OT [] PMNR    PULM:  Monitor for stridor and aspiration    CV:  SBP MAP >65mmHg, SBP <160 mmHg  1st degree AV block; monitor as has had documented pauses  off pressors    RENAL:  Fluids: On NS @ 75 ml/hr    GI:  Diet: Dysphagia screen, and advance as tolerated  GI prophylaxis [] not indicated [x] PPI [] other:  Bowel regimen [x] colace [x] senna [] other:    ENDO:   Goal euglycemia (-180)  Hypothyroidism: continue supplementation  Hyperglycemia on insulin gtt    HEME/ONC:  VTE prophylaxis: [x] SCDs [x] chemoprophylaxis [x] high risk of DVT/PE on admission due to: limited mobility due to spinal disorder    ID:  Monitor for fever    SOCIAL/FAMILY:  [] awaiting [x] updated at bedside [] family meeting    CODE STATUS:  [x] Full Code [] DNR [] DNI [] Palliative/Comfort Care    DISPOSITION:  [] ICU [] Stroke Unit [x] Floor [] EMU [] RCU [] PCU    [x] Patient is at high risk of neurologic deterioration/death due to: hemorrhagic shock    Time spent: 45 [x] critical care minutes    Contact: [phone] ASSESSMENT/PLAN: spinal stenosis/degenerative scoliosis    NEURO:  Pain management consult  Upright x-rays (on the floor)  Monitor surgical drain output  Activity: [x] mobilize as tolerated [] Bedrest [] PT [] OT [] PMNR    PULM:  Monitor for stridor and aspiration    CV:  SBP MAP >65mmHg, SBP <160 mmHg  1st degree AV block; monitor as has had documented pauses  off pressors    RENAL:  Fluids: On NS @ 75 ml/hr    GI:  Diet: Dysphagia screen, and advance as tolerated  GI prophylaxis [] not indicated [x] PPI [] other:  Bowel regimen [x] colace [x] senna [] other:    ENDO:   Goal euglycemia (-180)  Hypothyroidism: continue supplementation  Hyperglycemia on insulin gtt    HEME/ONC:  VTE prophylaxis: [x] SCDs [x] chemoprophylaxis [x] high risk of DVT/PE on admission due to: limited mobility due to spinal disorder    ID:  Monitor for fever    SOCIAL/FAMILY:  [] awaiting [x] updated at bedside [] family meeting    CODE STATUS:  [x] Full Code [] DNR [] DNI [] Palliative/Comfort Care    DISPOSITION:  [] ICU [] Stroke Unit [x] Floor [] EMU [] RCU [] PCU

## 2018-10-31 NOTE — DIETITIAN INITIAL EVALUATION ADULT. - ADHERENCE
fair/PMH includes T2DM, HTN, renal CA S/P partial nephrectomy (noted elevated K+ on admission). States he tries to avoid sweetened beverages for DM management & checks his fingersticks 1x daily in the morning with usual BG readings ranging from 113-123mg/dL. Noted HgbA1c 6.9% (10/4/18). States he takes metformin & januvia for BG management. States he also tries to watch his salt intake & avoids high K foods but could not recall any foods high in K. States his wife knows what foods he needs to avoid. Reports NKFA. States he takes fish oil, vitamin C, selenium, Ca + vitamin D, and a fiber supplement daily.

## 2018-10-31 NOTE — PROGRESS NOTE ADULT - SUBJECTIVE AND OBJECTIVE BOX
Diabetes Follow up note:  Interval Hx:  72 year old male w/controlled T2DM (on orals PTA)     Review of Systems:  General:  GI: Tolerating POs without any N/V/D/ABD PAIN.  CV: No CP/SOB  ENDO: No S&Sx of hypoglycemia  MEDS:  atorvastatin 80 milliGRAM(s) Oral at bedtime    insulin glargine Injectable (LANTUS) 16 Unit(s) SubCutaneous at bedtime  insulin lispro (HumaLOG) corrective regimen sliding scale   SubCutaneous three times a day before meals  insulin lispro (HumaLOG) corrective regimen sliding scale   SubCutaneous at bedtime  levothyroxine 175 MICROGram(s) Oral daily    ceFAZolin   IVPB 2000 milliGRAM(s) IV Intermittent once    Allergies    codeine (Vomiting; Headache)  dogs, cats (Short breath)  dust (Rhinitis)  mold (Rhinitis)  morphine (Vomiting; Headache)  narcotic analgesics (Vomiting; Headache)      PE:  General:  Vital Signs Last 24 Hrs  T(C): 36.7 (31 Oct 2018 15:00), Max: 36.7 (30 Oct 2018 19:00)  T(F): 98 (31 Oct 2018 15:00), Max: 98.1 (30 Oct 2018 19:00)  HR: 74 (31 Oct 2018 15:00) (70 - 88)  BP: 143/71 (31 Oct 2018 15:00) (122/76 - 156/95)  BP(mean): 91 (31 Oct 2018 11:46) (91 - 105)  RR: 15 (31 Oct 2018 15:00) (12 - 18)  SpO2: 98% (31 Oct 2018 15:00) (97% - 100%)  Abd: Soft, NT,ND,   Extremities: Warm  Neuro: A&O X3    LABS:    POCT Blood Glucose.: 95 mg/dL (10-31-18 @ 12:21)  POCT Blood Glucose.: 95 mg/dL (10-31-18 @ 08:35)  POCT Blood Glucose.: 123 mg/dL (10-30-18 @ 22:39)  POCT Blood Glucose.: 118 mg/dL (10-30-18 @ 17:21)  POCT Blood Glucose.: 118 mg/dL (10-30-18 @ 11:35)  POCT Blood Glucose.: 177 mg/dL (10-30-18 @ 06:01)  POCT Blood Glucose.: 177 mg/dL (10-29-18 @ 23:52)  POCT Blood Glucose.: 164 mg/dL (10-29-18 @ 22:40)  POCT Blood Glucose.: 148 mg/dL (10-29-18 @ 17:53)  POCT Blood Glucose.: 163 mg/dL (10-29-18 @ 14:14)  POCT Blood Glucose.: 127 mg/dL (10-29-18 @ 12:07)  POCT Blood Glucose.: 123 mg/dL (10-29-18 @ 11:04)  POCT Blood Glucose.: 123 mg/dL (10-29-18 @ 10:03)  POCT Blood Glucose.: 112 mg/dL (10-29-18 @ 09:01)  POCT Blood Glucose.: 128 mg/dL (10-29-18 @ 08:04)  POCT Blood Glucose.: 143 mg/dL (10-29-18 @ 06:58)  POCT Blood Glucose.: 141 mg/dL (10-29-18 @ 06:18)  POCT Blood Glucose.: 148 mg/dL (10-29-18 @ 05:19)  POCT Blood Glucose.: 157 mg/dL (10-29-18 @ 04:13)  POCT Blood Glucose.: 154 mg/dL (10-29-18 @ 03:01)  POCT Blood Glucose.: 163 mg/dL (10-29-18 @ 02:15)  POCT Blood Glucose.: 173 mg/dL (10-29-18 @ 01:09)  POCT Blood Glucose.: 179 mg/dL (10-29-18 @ 00:14)  POCT Blood Glucose.: 198 mg/dL (10-28-18 @ 23:05)  POCT Blood Glucose.: 210 mg/dL (10-28-18 @ 22:03)  POCT Blood Glucose.: 220 mg/dL (10-28-18 @ 21:26)  POCT Blood Glucose.: 238 mg/dL (10-28-18 @ 17:24)                            9.6    22.8  )-----------( 148      ( 30 Oct 2018 23:39 )             28.9       10-30    143  |  112<H>  |  19  ----------------------------<  126<H>  4.1   |  23  |  0.68    Ca    8.6      30 Oct 2018 23:39  Phos  1.9     10-30  Mg     2.0     10-30        Thyroid Function Tests:  10-20 @ 07:08 TSH 1.84 FreeT4 -- T3 -- Anti TPO -- Anti Thyroglobulin Ab -- TSI --      Hemoglobin A1C, Whole Blood: 6.9 % <H> [4.0 - 5.6] (10-04-18 @ 18:32)  Hemoglobin A1C, Whole Blood: 6.5 % <H> [4.0 - 5.6] (08-17-18 @ 06:47)            Contact number: licha [phone] or [phone] Diabetes Follow up note:  Interval Hx:  72 year old male w/controlled T2DM (on orals PTA) s/p placement of temporary pacer and L1-S1 transforaminal interbody fusion, T10-pelvis instrumented fusion and Plastics assisted closure (10/27) c/b post-op pain management    Review of Systems:  General:  GI: Tolerating POs without any N/V/D/ABD PAIN.  CV: No CP/SOB  ENDO: No S&Sx of hypoglycemia  MEDS:  atorvastatin 80 milliGRAM(s) Oral at bedtime    insulin glargine Injectable (LANTUS) 16 Unit(s) SubCutaneous at bedtime  insulin lispro (HumaLOG) corrective regimen sliding scale   SubCutaneous three times a day before meals  insulin lispro (HumaLOG) corrective regimen sliding scale   SubCutaneous at bedtime  levothyroxine 175 MICROGram(s) Oral daily    ceFAZolin   IVPB 2000 milliGRAM(s) IV Intermittent once    Allergies    codeine (Vomiting; Headache)  dogs, cats (Short breath)  dust (Rhinitis)  mold (Rhinitis)  morphine (Vomiting; Headache)  narcotic analgesics (Vomiting; Headache)      PE:  General:  Vital Signs Last 24 Hrs  T(C): 36.7 (31 Oct 2018 15:00), Max: 36.7 (30 Oct 2018 19:00)  T(F): 98 (31 Oct 2018 15:00), Max: 98.1 (30 Oct 2018 19:00)  HR: 74 (31 Oct 2018 15:00) (70 - 88)  BP: 143/71 (31 Oct 2018 15:00) (122/76 - 156/95)  BP(mean): 91 (31 Oct 2018 11:46) (91 - 105)  RR: 15 (31 Oct 2018 15:00) (12 - 18)  SpO2: 98% (31 Oct 2018 15:00) (97% - 100%)  Abd: Soft, NT,ND,   Extremities: Warm  Neuro: A&O X3    LABS:    POCT Blood Glucose.: 95 mg/dL (10-31-18 @ 12:21)  POCT Blood Glucose.: 95 mg/dL (10-31-18 @ 08:35)  POCT Blood Glucose.: 123 mg/dL (10-30-18 @ 22:39)  POCT Blood Glucose.: 118 mg/dL (10-30-18 @ 17:21)  POCT Blood Glucose.: 118 mg/dL (10-30-18 @ 11:35)  POCT Blood Glucose.: 177 mg/dL (10-30-18 @ 06:01)  POCT Blood Glucose.: 177 mg/dL (10-29-18 @ 23:52)  POCT Blood Glucose.: 164 mg/dL (10-29-18 @ 22:40)  POCT Blood Glucose.: 148 mg/dL (10-29-18 @ 17:53)  POCT Blood Glucose.: 163 mg/dL (10-29-18 @ 14:14)  POCT Blood Glucose.: 127 mg/dL (10-29-18 @ 12:07)  POCT Blood Glucose.: 123 mg/dL (10-29-18 @ 11:04)  POCT Blood Glucose.: 123 mg/dL (10-29-18 @ 10:03)  POCT Blood Glucose.: 112 mg/dL (10-29-18 @ 09:01)  POCT Blood Glucose.: 128 mg/dL (10-29-18 @ 08:04)  POCT Blood Glucose.: 143 mg/dL (10-29-18 @ 06:58)  POCT Blood Glucose.: 141 mg/dL (10-29-18 @ 06:18)  POCT Blood Glucose.: 148 mg/dL (10-29-18 @ 05:19)  POCT Blood Glucose.: 157 mg/dL (10-29-18 @ 04:13)  POCT Blood Glucose.: 154 mg/dL (10-29-18 @ 03:01)  POCT Blood Glucose.: 163 mg/dL (10-29-18 @ 02:15)  POCT Blood Glucose.: 173 mg/dL (10-29-18 @ 01:09)  POCT Blood Glucose.: 179 mg/dL (10-29-18 @ 00:14)  POCT Blood Glucose.: 198 mg/dL (10-28-18 @ 23:05)  POCT Blood Glucose.: 210 mg/dL (10-28-18 @ 22:03)  POCT Blood Glucose.: 220 mg/dL (10-28-18 @ 21:26)  POCT Blood Glucose.: 238 mg/dL (10-28-18 @ 17:24)                            9.6    22.8  )-----------( 148      ( 30 Oct 2018 23:39 )             28.9       10-30    143  |  112<H>  |  19  ----------------------------<  126<H>  4.1   |  23  |  0.68    Ca    8.6      30 Oct 2018 23:39  Phos  1.9     10-30  Mg     2.0     10-30        Thyroid Function Tests:  10-20 @ 07:08 TSH 1.84 FreeT4 -- T3 -- Anti TPO -- Anti Thyroglobulin Ab -- TSI --      Hemoglobin A1C, Whole Blood: 6.9 % <H> [4.0 - 5.6] (10-04-18 @ 18:32)  Hemoglobin A1C, Whole Blood: 6.5 % <H> [4.0 - 5.6] (08-17-18 @ 06:47)            Contact number: licha [phone] or [phone] Diabetes Follow up note:  Interval Hx:  72 year old male w/controlled T2DM (on orals PTA) s/p placement of temporary pacer and L1-S1 transforaminal interbody fusion, T10-pelvis instrumented fusion and Plastics assisted closure (10/27) c/b post-op pain management. BG at breakfast and lunch in 90's today. Pt seen at bedside w/wife present. Sleepy at time of visit after pain meds. Wife endorses only having some juice today on clear liquid diet and poor appetite.     Review of Systems:  General: no complaints.   GI: Tolerating POs without any N/V/D/ABD PAIN.  CV: No CP/SOB  ENDO: No S&Sx of hypoglycemia  MEDS:  atorvastatin 80 milliGRAM(s) Oral at bedtime    insulin glargine Injectable (LANTUS) 16 Unit(s) SubCutaneous at bedtime  insulin lispro (HumaLOG) corrective regimen sliding scale   SubCutaneous three times a day before meals  insulin lispro (HumaLOG) corrective regimen sliding scale   SubCutaneous at bedtime  levothyroxine 175 MICROGram(s) Oral daily    ceFAZolin   IVPB 2000 milliGRAM(s) IV Intermittent once    Allergies    codeine (Vomiting; Headache)  dogs, cats (Short breath)  dust (Rhinitis)  mold (Rhinitis)  morphine (Vomiting; Headache)  narcotic analgesics (Vomiting; Headache)      PE:  General: Male sitting in chair. NAD.   Vital Signs Last 24 Hrs  T(C): 36.7 (31 Oct 2018 15:00), Max: 36.7 (30 Oct 2018 19:00)  T(F): 98 (31 Oct 2018 15:00), Max: 98.1 (30 Oct 2018 19:00)  HR: 74 (31 Oct 2018 15:00) (70 - 88)  BP: 143/71 (31 Oct 2018 15:00) (122/76 - 156/95)  BP(mean): 91 (31 Oct 2018 11:46) (91 - 105)  RR: 15 (31 Oct 2018 15:00) (12 - 18)  SpO2: 98% (31 Oct 2018 15:00) (97% - 100%)  CV: S1, S2. NSR on monitor.   Abd: Soft, NT,ND, Obese.   Extremities: Warm. no edema noted.   Neuro: A&O X3    LABS:    POCT Blood Glucose.: 95 mg/dL (10-31-18 @ 12:21)  POCT Blood Glucose.: 95 mg/dL (10-31-18 @ 08:35)  POCT Blood Glucose.: 123 mg/dL (10-30-18 @ 22:39)  POCT Blood Glucose.: 118 mg/dL (10-30-18 @ 17:21)  POCT Blood Glucose.: 118 mg/dL (10-30-18 @ 11:35)  POCT Blood Glucose.: 177 mg/dL (10-30-18 @ 06:01)  POCT Blood Glucose.: 177 mg/dL (10-29-18 @ 23:52)  POCT Blood Glucose.: 164 mg/dL (10-29-18 @ 22:40)  POCT Blood Glucose.: 148 mg/dL (10-29-18 @ 17:53)  POCT Blood Glucose.: 163 mg/dL (10-29-18 @ 14:14)  POCT Blood Glucose.: 127 mg/dL (10-29-18 @ 12:07)  POCT Blood Glucose.: 123 mg/dL (10-29-18 @ 11:04)  POCT Blood Glucose.: 123 mg/dL (10-29-18 @ 10:03)  POCT Blood Glucose.: 112 mg/dL (10-29-18 @ 09:01)  POCT Blood Glucose.: 128 mg/dL (10-29-18 @ 08:04)  POCT Blood Glucose.: 143 mg/dL (10-29-18 @ 06:58)  POCT Blood Glucose.: 141 mg/dL (10-29-18 @ 06:18)  POCT Blood Glucose.: 148 mg/dL (10-29-18 @ 05:19)  POCT Blood Glucose.: 157 mg/dL (10-29-18 @ 04:13)  POCT Blood Glucose.: 154 mg/dL (10-29-18 @ 03:01)  POCT Blood Glucose.: 163 mg/dL (10-29-18 @ 02:15)  POCT Blood Glucose.: 173 mg/dL (10-29-18 @ 01:09)  POCT Blood Glucose.: 179 mg/dL (10-29-18 @ 00:14)  POCT Blood Glucose.: 198 mg/dL (10-28-18 @ 23:05)  POCT Blood Glucose.: 210 mg/dL (10-28-18 @ 22:03)  POCT Blood Glucose.: 220 mg/dL (10-28-18 @ 21:26)  POCT Blood Glucose.: 238 mg/dL (10-28-18 @ 17:24)                            9.6    22.8  )-----------( 148      ( 30 Oct 2018 23:39 )             28.9       10-30    143  |  112<H>  |  19  ----------------------------<  126<H>  4.1   |  23  |  0.68    Ca    8.6      30 Oct 2018 23:39  Phos  1.9     10-30  Mg     2.0     10-30        Thyroid Function Tests:  10-20 @ 07:08 TSH 1.84 FreeT4 -- T3 -- Anti TPO -- Anti Thyroglobulin Ab -- TSI --      Hemoglobin A1C, Whole Blood: 6.9 % <H> [4.0 - 5.6] (10-04-18 @ 18:32)  Hemoglobin A1C, Whole Blood: 6.5 % <H> [4.0 - 5.6] (08-17-18 @ 06:47)            Contact number: licha [phone] or [phone]

## 2018-10-31 NOTE — DIETITIAN INITIAL EVALUATION ADULT. - ENERGY NEEDS
Ht 70 inches Wt 226.6 pounds BMI 32.5 Kg/m^2   pounds +/- 10%; 137% IBW  Edema: none Skin: no pressure injuries per nursing flow sheet   Other pertinent information: 73 yo male with PMH of T2DM, HTN, HLD, FERNANDO on cPAP, PNA, prostate CA S/P removal, thyroid CA S/P thyroidectomy, renal CA S/P partial nephrectomy, CLL (not active), former smoker, OA S/P R TKR admitted for planned procedure 2/2 spinal stenosis + scoliosis. Now S/P "placement of temporary pacer and L1-S1 transforaminal interbody fusion, T10-pelvis instrumented fusion and Plastics assisted closure" on 10/27. Post-op course notable for pain management

## 2018-10-31 NOTE — PROGRESS NOTE ADULT - SUBJECTIVE AND OBJECTIVE BOX
SUMMARY:  72 year-old man with history of T2DM, HTN, HLD, FERNANDO on CPAP, Pneumonia, CLL, Prostate Cancer s/p resection 2003, thyroid cancer s/p thyroidectomy 8/2018, renal cancer s/p partial nephrectomy and spinal stenosis with acquired scoliosis who initially presented 10/18/18 for L1-5 posterior lumbar fusion and T10-pelvis instrumentation and fusion but case was aborted due to 4 second pause after induction with propofol who returned 10/27/18 for surgery. Of note, he had a prior Holter monitoring study that revealed nocturnal bradycardia with pauses. 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 On 10/27/18, he underwent placement of temporary pacer and L1-S1 transforaminal interbody fusion, T10-pelvis instrumented fusion and Plastics assisted closure. Operative course was notable for bradycardia responsive to glycopyrrolate, cerebrospinal fluid leak which was primarily repaired and EBL of 1500cc. He received 2 units PRBC intra-op. Post-operatively, he had a ~40 minutes episode of hypotension to SBP in the 50smmHg, for which he was placed on pressor support and given an additional 2 units PRBC. Upon admission to [facility], still hypotensive and at one point required triple pressor support. Extubated 10/28/18.    Overnight Events:  pain controlled with PO Dilaudid     ROS: Negative unless specified.     VITALS/LABS/MEDICATIONS REVIEWED     EXAMINATION:  General: In pain but no acute distress  HEENT: Anicteric sclerae  Cardiac: [id number]  Lungs: Decreased BS at bases  Abdomen: Soft, distended, +BS  Extremities: No c/c/e  Skin/Incision Site: Clean, dry and intact  Neurologic: Eyes open spontaneously, regards, follows commands, anxious, PERRL, follows commands, UE 4/5, LE 3/5

## 2018-10-31 NOTE — DIETITIAN INITIAL EVALUATION ADULT. - ORAL INTAKE PTA
Pt endorsed a very good appetite & PO intakes PTA. States his wife typically does the food shopping & meal preparation./good

## 2018-10-31 NOTE — PROGRESS NOTE ADULT - ASSESSMENT
73 y/o male s/p  L1-S1 transforaminal interbody fusion, T10-pelvis instrumented fusion with Plastics assist with closure called for acute post op spinal pain.

## 2018-10-31 NOTE — PROGRESS NOTE ADULT - SUBJECTIVE AND OBJECTIVE BOX
Visit Summary: Patient seen and evaluated at bedside.    Overnight Events: none    Exam:  T(C): 36.6 (10-30-18 @ 23:00), Max: 36.8 (10-30-18 @ 15:00)  HR: 79 (10-30-18 @ 23:00) (55 - 88)  BP: 150/73 (10-30-18 @ 23:00) (100/59 - 150/73)  RR: 16 (10-30-18 @ 23:00) (13 - 22)  SpO2: 96% (10-30-18 @ 15:00) (94% - 99%)  Wt(kg): --    EOS, complaining of pain and anxious, FC  BUE 4/5  BLE 4-/5 pain and effort limited                        9.6    22.8  )-----------( 148      ( 30 Oct 2018 23:39 )             28.9     10-30    143  |  112<H>  |  19  ----------------------------<  126<H>  4.1   |  23  |  0.68    Ca    8.6      30 Oct 2018 23:39  Phos  1.9     10-30  Mg     2.0     10-30    PT/INR - ( 30 Oct 2018 23:39 )   PT: 12.6 sec;   INR: 1.10 ratio         PTT - ( 30 Oct 2018 23:39 )  PTT:26.4 sec

## 2018-10-31 NOTE — DIETITIAN INITIAL EVALUATION ADULT. - PERTINENT MEDS FT
NaCl 0.9% @ 75cc/hr, dilaudid, Humalog Corrective Scale, humalog 3units @ meals, zofran, senna, lantus 20units, miralax, colace, synthroid

## 2018-10-31 NOTE — PROGRESS NOTE ADULT - PROBLEM SELECTOR PLAN 1
-test BG AC/HS  -Agree w/decrease of Lantus 16 units QHS. If BG less than 120mg/dl at bedtime, may further reduce to 12 units tonight  -c/w Humalog moderate correction scale AC and Mod HS scale for now. Will likely require low dose premeal once PO intake improves.   Pt will continue with oral medication metformin 1000 mg BID PO, Januvia 100 mg PO OD and will follow up  with outpatient endo Dr. Nikolas Rodriguez.

## 2018-10-31 NOTE — PROGRESS NOTE ADULT - ASSESSMENT
72M s/p T10-pelvis fusion     - Q4hr neurocheck  - Standing xray when to floor  - PT and aggresive mobilization  - Monitor drain output  - pain control per palliative pain team

## 2018-11-01 DIAGNOSIS — R11.0 NAUSEA: ICD-10-CM

## 2018-11-01 LAB
GLUCOSE BLDC GLUCOMTR-MCNC: 104 MG/DL — HIGH (ref 70–99)
GLUCOSE BLDC GLUCOMTR-MCNC: 112 MG/DL — HIGH (ref 70–99)
GLUCOSE BLDC GLUCOMTR-MCNC: 114 MG/DL — HIGH (ref 70–99)
GLUCOSE BLDC GLUCOMTR-MCNC: 134 MG/DL — HIGH (ref 70–99)
GLUCOSE BLDC GLUCOMTR-MCNC: 169 MG/DL — HIGH (ref 70–99)

## 2018-11-01 PROCEDURE — 99233 SBSQ HOSP IP/OBS HIGH 50: CPT

## 2018-11-01 PROCEDURE — 99232 SBSQ HOSP IP/OBS MODERATE 35: CPT

## 2018-11-01 RX ORDER — ONDANSETRON 8 MG/1
4 TABLET, FILM COATED ORAL EVERY 6 HOURS
Qty: 0 | Refills: 0 | Status: DISCONTINUED | OUTPATIENT
Start: 2018-11-01 | End: 2018-11-02

## 2018-11-01 RX ORDER — HYDROMORPHONE HYDROCHLORIDE 2 MG/ML
4 INJECTION INTRAMUSCULAR; INTRAVENOUS; SUBCUTANEOUS EVERY 4 HOURS
Qty: 0 | Refills: 0 | Status: DISCONTINUED | OUTPATIENT
Start: 2018-11-01 | End: 2018-11-02

## 2018-11-01 RX ORDER — INSULIN GLARGINE 100 [IU]/ML
8 INJECTION, SOLUTION SUBCUTANEOUS AT BEDTIME
Qty: 0 | Refills: 0 | Status: DISCONTINUED | OUTPATIENT
Start: 2018-11-01 | End: 2018-11-02

## 2018-11-01 RX ORDER — HYDROMORPHONE HYDROCHLORIDE 2 MG/ML
4 INJECTION INTRAMUSCULAR; INTRAVENOUS; SUBCUTANEOUS EVERY 6 HOURS
Qty: 0 | Refills: 0 | Status: DISCONTINUED | OUTPATIENT
Start: 2018-11-01 | End: 2018-11-02

## 2018-11-01 RX ORDER — TAMSULOSIN HYDROCHLORIDE 0.4 MG/1
0.4 CAPSULE ORAL AT BEDTIME
Qty: 0 | Refills: 0 | Status: DISCONTINUED | OUTPATIENT
Start: 2018-11-01 | End: 2018-11-02

## 2018-11-01 RX ADMIN — HYDROMORPHONE HYDROCHLORIDE 6 MILLIGRAM(S): 2 INJECTION INTRAMUSCULAR; INTRAVENOUS; SUBCUTANEOUS at 00:33

## 2018-11-01 RX ADMIN — INSULIN GLARGINE 8 UNIT(S): 100 INJECTION, SOLUTION SUBCUTANEOUS at 23:57

## 2018-11-01 RX ADMIN — HYDROMORPHONE HYDROCHLORIDE 6 MILLIGRAM(S): 2 INJECTION INTRAMUSCULAR; INTRAVENOUS; SUBCUTANEOUS at 06:57

## 2018-11-01 RX ADMIN — ENOXAPARIN SODIUM 40 MILLIGRAM(S): 100 INJECTION SUBCUTANEOUS at 17:29

## 2018-11-01 RX ADMIN — HYDROMORPHONE HYDROCHLORIDE 6 MILLIGRAM(S): 2 INJECTION INTRAMUSCULAR; INTRAVENOUS; SUBCUTANEOUS at 06:27

## 2018-11-01 RX ADMIN — HYDROMORPHONE HYDROCHLORIDE 4 MILLIGRAM(S): 2 INJECTION INTRAMUSCULAR; INTRAVENOUS; SUBCUTANEOUS at 11:57

## 2018-11-01 RX ADMIN — HYDROMORPHONE HYDROCHLORIDE 4 MILLIGRAM(S): 2 INJECTION INTRAMUSCULAR; INTRAVENOUS; SUBCUTANEOUS at 12:30

## 2018-11-01 RX ADMIN — HYDROMORPHONE HYDROCHLORIDE 4 MILLIGRAM(S): 2 INJECTION INTRAMUSCULAR; INTRAVENOUS; SUBCUTANEOUS at 18:35

## 2018-11-01 RX ADMIN — ONDANSETRON 4 MILLIGRAM(S): 8 TABLET, FILM COATED ORAL at 18:34

## 2018-11-01 RX ADMIN — TAMSULOSIN HYDROCHLORIDE 0.4 MILLIGRAM(S): 0.4 CAPSULE ORAL at 21:18

## 2018-11-01 RX ADMIN — Medication 2: at 11:59

## 2018-11-01 RX ADMIN — ATORVASTATIN CALCIUM 80 MILLIGRAM(S): 80 TABLET, FILM COATED ORAL at 21:18

## 2018-11-01 RX ADMIN — Medication 175 MICROGRAM(S): at 06:32

## 2018-11-01 RX ADMIN — HYDROMORPHONE HYDROCHLORIDE 6 MILLIGRAM(S): 2 INJECTION INTRAMUSCULAR; INTRAVENOUS; SUBCUTANEOUS at 01:03

## 2018-11-01 RX ADMIN — HYDROMORPHONE HYDROCHLORIDE 4 MILLIGRAM(S): 2 INJECTION INTRAMUSCULAR; INTRAVENOUS; SUBCUTANEOUS at 18:23

## 2018-11-01 RX ADMIN — HYDROMORPHONE HYDROCHLORIDE 4 MILLIGRAM(S): 2 INJECTION INTRAMUSCULAR; INTRAVENOUS; SUBCUTANEOUS at 23:58

## 2018-11-01 RX ADMIN — ONDANSETRON 4 MILLIGRAM(S): 8 TABLET, FILM COATED ORAL at 23:57

## 2018-11-01 NOTE — PROGRESS NOTE ADULT - SUBJECTIVE AND OBJECTIVE BOX
HPI:  Pt. feeling well without c/o cp or sob, Ortiz placed for urinary retention  PAST MEDICAL & SURGICAL HISTORY:  Former smoker, stopped smoking many years ago  CLL (chronic lymphocytic leukemia)  Sleep apnea, unspecified type  Spinal stenosis of lumbar region, unspecified whether neurogenic claudication present  Leukocytosis, unspecified type: monitored for CLL  Malignant neoplasm of kidney parenchyma, right: s/p surgery  Thyroid nodule  Malignant neoplasm of thyroid gland  Prostate cancer: 2003, s/p prostatectomy, RT 2009 x 40 days  Hypertension, unspecified type  Diabetes mellitus type 2 in obese  H/O thyroidectomy: 2016  History of strabismus surgery: b/l 1958  S/P left knee arthroscopy: 1998  H/O ventral hernia repair: 1997  H/O radical prostatectomy: 2003  H/O partial nephrectomy: right, 2009  History of total knee replacement, right: 2012, scoped 1998      Allergies    codeine (Vomiting; Headache)  dogs, cats (Short breath)  dust (Rhinitis)  mold (Rhinitis)  morphine (Vomiting; Headache)  narcotic analgesics (Vomiting; Headache)    Intolerances          MEDICATIONS  (STANDING):  atorvastatin 80 milliGRAM(s) Oral at bedtime  chlorhexidine 4% Liquid 1 Application(s) Topical <User Schedule>  dextrose 5%. 1000 milliLiter(s) (50 mL/Hr) IV Continuous <Continuous>  dextrose 50% Injectable 12.5 Gram(s) IV Push once  dextrose 50% Injectable 25 Gram(s) IV Push once  dextrose 50% Injectable 25 Gram(s) IV Push once  docusate sodium 100 milliGRAM(s) Oral three times a day  enoxaparin Injectable 40 milliGRAM(s) SubCutaneous <User Schedule>  HYDROmorphone   Tablet 6 milliGRAM(s) Oral every 6 hours  insulin glargine Injectable (LANTUS) 16 Unit(s) SubCutaneous at bedtime  insulin lispro (HumaLOG) corrective regimen sliding scale   SubCutaneous three times a day before meals  insulin lispro (HumaLOG) corrective regimen sliding scale   SubCutaneous at bedtime  lactulose Syrup 20 Gram(s) Oral every 4 hours  levothyroxine 175 MICROGram(s) Oral daily  polyethylene glycol 3350 17 Gram(s) Oral two times a day  senna 2 Tablet(s) Oral at bedtime  sodium chloride 0.9%. 1000 milliLiter(s) (75 mL/Hr) IV Continuous <Continuous>  tamsulosin 0.4 milliGRAM(s) Oral at bedtime    MEDICATIONS  (PRN):  acetaminophen   Tablet .. 650 milliGRAM(s) Oral every 6 hours PRN Temp greater or equal to 38C (100.4F), Mild Pain (1 - 3)  acetaminophen 325 mG/butalbital 50 mG/caffeine 40 mG 1 Tablet(s) Oral every 8 hours PRN HA  dextrose 40% Gel 15 Gram(s) Oral once PRN Blood Glucose LESS THAN 70 milliGRAM(s)/deciliter  diazepam    Tablet 5 milliGRAM(s) Oral every 8 hours PRN muscle spasm  glucagon  Injectable 1 milliGRAM(s) IntraMuscular once PRN Glucose LESS THAN 70 milligrams/deciliter  HYDROmorphone   Tablet 4 milliGRAM(s) Oral every 3 hours PRN moderate severe pain  HYDROmorphone   Tablet 2 milliGRAM(s) Oral every 3 hours PRN miild mod pain  HYDROmorphone  Injectable 1 milliGRAM(s) IV Push every 2 hours PRN breakthrough severe pain  naloxone Injectable 0.1 milliGRAM(s) IV Push every 3 minutes PRN For ANY of the following changes in patient status:  A. RR LESS THAN 10 breaths per minute, B. Oxygen saturation LESS THAN 90%, C. Sedation score of 6            Vital Signs Last 24 Hrs  T(C): 36.6 (01 Nov 2018 08:41), Max: 36.7 (31 Oct 2018 15:00)  T(F): 97.9 (01 Nov 2018 08:41), Max: 98 (31 Oct 2018 15:00)  HR: 79 (01 Nov 2018 08:41) (70 - 79)  BP: 156/81 (01 Nov 2018 08:41) (122/76 - 156/81)  BP(mean): 100 (31 Oct 2018 19:00) (91 - 100)  RR: 18 (01 Nov 2018 08:41) (13 - 18)  SpO2: 97% (01 Nov 2018 08:41) (92% - 100%)  Daily     Daily   I&O's Detail    31 Oct 2018 07:01  -  01 Nov 2018 07:00  --------------------------------------------------------  IN:    Oral Fluid: 360 mL    sodium chloride 0.9%.: 1125 mL  Total IN: 1485 mL    OUT:    Bulb: 95 mL    Bulb: 210 mL    Indwelling Catheter - Urethral: 550 mL    Intermittent Catheterization - Urethral: 650 mL    Voided: 600 mL  Total OUT: 2105 mL    Total NET: -620 mL        Physical Exam  Pt c/o back pain, no cp or sob, Ox@  Neck without JVD  Lungs dec BS bases  Cor s1s2 2/6 taina rusb  Abd soft  Ext without edema venodynes  Pulses +2 DP\parNeuro without focal deficit    LABS  PT/INR - ( 30 Oct 2018 23:39 )   PT: 12.6 sec;   INR: 1.10 ratio         PTT - ( 30 Oct 2018 23:39 )  PTT:26.4 sec        CBC Full  -  ( 30 Oct 2018 23:39 )  WBC Count : 22.8 K/uL  Hemoglobin : 9.6 g/dL  Hematocrit : 28.9 %  Platelet Count - Automated : 148 K/uL  Mean Cell Volume : 88.9 fl  Mean Cell Hemoglobin : 29.6 pg  Mean Cell Hemoglobin Concentration : 33.3 gm/dL  Auto Neutrophil # : x  Auto Lymphocyte # : x  Auto Monocyte # : x  Auto Eosinophil # : x  Auto Basophil # : x  Auto Neutrophil % : x  Auto Lymphocyte % : x  Auto Monocyte % : x  Auto Eosinophil % : x  Auto BasophPil % : x    10-30    143  |  112<H>  |  19  ----------------------------<  126<H>  4.1   |  23  |  0.68    Ca    8.6      30 Oct 2018 23:39  Phos  1.9     10-30  Mg     2.0     10-30      Assessment and Plan:  71 yo male HTN DM2, FERNANDO on CPAP, post op L1-5 posterior lumbar fusion and T10 pelvis instrumentation.  Course was complicated by hypotenson  which required pressor support (initally with norepinephrine, neosynephrine and vaspressin), which were tapered off (norepi dc'd last night) and a junctional rhythm  most likely 2/2 hyperkalemia.  Pt now in RSR last K 5.5.  Pt also with delirium now oriented x 2.  No c/o cp or sob  Pt now Hemodynamically stable, normokalemic in RSR  Agree with current rx

## 2018-11-01 NOTE — PROGRESS NOTE ADULT - SUBJECTIVE AND OBJECTIVE BOX
SUBJECTIVE:  Doing well w/o complaints, except sl HA.  Cooperative, no confusion. +Loose BM.    OVERNIGHT EVENTS: Loose stool    Vital Signs Last 24 Hrs  T(C): 36.6 (01 Nov 2018 08:41), Max: 36.7 (31 Oct 2018 15:00)  T(F): 97.9 (01 Nov 2018 08:41), Max: 98 (31 Oct 2018 15:00)  HR: 79 (01 Nov 2018 08:41) (70 - 79)  BP: 156/81 (01 Nov 2018 08:41) (122/76 - 156/81)  BP(mean): 100 (31 Oct 2018 19:00) (91 - 100)  RR: 18 (01 Nov 2018 08:41) (13 - 18)  SpO2: 97% (01 Nov 2018 08:41) (92% - 100%)  IVF: [ X] IVL [ ] NS+K@   DIET: [ ] Regular [ X] CCD Clears [ ] Renal [ ] Puree [ ] Dysphagia [ ] Tube Feeds:   PCA: [ ] YES [ X] NO   CAMILO: [ X] YES-Replaced 11/1  BM: [X ] YES-mult loose stool     DRAINS: [ X] JPx2 =95 & 210cc clear to pinkish fluid (cc/24h) [ ] HMV (cc/24h)    PHYSICAL EXAM:    Constitutional: No Acute Distress     Neurological: AAOx3, Following Commands, UE 4/5, LE 4+/5.                                        Sensation: [X] intact to light touch  [] decreased:     Pulmonary: Clear to Auscultation, No rales, No rhonchi, No wheezes     Cardiovascular: S1, S2, Regular rate and rhythm     Gastrointestinal: Soft, Non-tender, Non-distended     Extremities: No calf tenderness     Incision: DEANA x2 with clear to pinkish output. Aqualcel dsg on.  CDI. Flat.    LABS:                        9.6    22.8  )-----------( 148      ( 30 Oct 2018 23:39 )             28.9    10-30    143  |  112<H>  |  19  ----------------------------<  126<H>  4.1   |  23  |  0.68    Ca    8.6      30 Oct 2018 23:39  Phos  1.9     10-30  Mg     2.0     10-30    PT/INR - ( 30 Oct 2018 23:39 )   PT: 12.6 sec;   INR: 1.10 ratio    PTT - ( 30 Oct 2018 23:39 )  PTT:26.4 sec    IMAGING: < from: Xray Chest 1 View- PORTABLE-Urgent (10.31.18 @ 13:15) >  Poor inspiratory effort. The heart is slightly enlarged. The lungs appear   to be clear. Endotracheal tube and NG tube were removed. A central line   seen on the right and the tip is in the superior vena cava. No   pneumothorax. Orthopedic hardware is seen in the thoracolumbar spine.    MEDICATIONS  (STANDING):  atorvastatin 80 milliGRAM(s) Oral at bedtime  chlorhexidine 4% Liquid 1 Application(s) Topical <User Schedule>  dextrose 5%. 1000 milliLiter(s) (50 mL/Hr) IV Continuous <Continuous>  dextrose 50% Injectable 12.5 Gram(s) IV Push once  dextrose 50% Injectable 25 Gram(s) IV Push once  dextrose 50% Injectable 25 Gram(s) IV Push once  enoxaparin Injectable 40 milliGRAM(s) SubCutaneous <User Schedule>  HYDROmorphone   Tablet 6 milliGRAM(s) Oral every 6 hours  insulin glargine Injectable (LANTUS) 16 Unit(s) SubCutaneous at bedtime  insulin lispro (HumaLOG) corrective regimen sliding scale   SubCutaneous three times a day before meals  insulin lispro (HumaLOG) corrective regimen sliding scale   SubCutaneous at bedtime  levothyroxine 175 MICROGram(s) Oral daily  sodium chloride 0.9%. 1000 milliLiter(s) (75 mL/Hr) IV Continuous <Continuous>  tamsulosin 0.4 milliGRAM(s) Oral at bedtime    MEDICATIONS  (PRN):  acetaminophen   Tablet .. 650 milliGRAM(s) Oral every 6 hours PRN Temp greater or equal to 38C (100.4F), Mild Pain (1 - 3)  acetaminophen 325 mG/butalbital 50 mG/caffeine 40 mG 1 Tablet(s) Oral every 8 hours PRN HA  dextrose 40% Gel 15 Gram(s) Oral once PRN Blood Glucose LESS THAN 70 milliGRAM(s)/deciliter  diazepam    Tablet 5 milliGRAM(s) Oral every 8 hours PRN muscle spasm  glucagon  Injectable 1 milliGRAM(s) IntraMuscular once PRN Glucose LESS THAN 70 milligrams/deciliter  HYDROmorphone   Tablet 4 milliGRAM(s) Oral every 3 hours PRN moderate severe pain  HYDROmorphone   Tablet 2 milliGRAM(s) Oral every 3 hours PRN miild mod pain  HYDROmorphone  Injectable 1 milliGRAM(s) IV Push every 2 hours PRN breakthrough severe pain  naloxone Injectable 0.1 milliGRAM(s) IV Push every 3 minutes PRN For ANY of the following changes in patient status:  A. RR LESS THAN 10 breaths per minute, B. Oxygen saturation LESS THAN 90%, C. Sedation score of 6

## 2018-11-01 NOTE — PROGRESS NOTE ADULT - PROBLEM SELECTOR PLAN 1
patient on ATC dilaudid, but remained lethargic despite decreasing dose.  Recommend decreasing dose to 4mg PO dilaudid q6h ATC (please ensure when ordering that this is scheduled for when next dose would be due, i.e. 6 hours after last dose provided). Hold for sedation.  Continue current PRN regimen  Will monitor breakthrough needs and titrate as needed. Discussed with patient, NSCU team, RN and partner at bedside.

## 2018-11-01 NOTE — PROGRESS NOTE ADULT - ASSESSMENT
PROCEDURE: Adm 10/27 L1-S1 TLIF, T10-pelvis instrum and fusion, CSF leak repaired     POD#5    PLAN:  Neuro: Tx from NSCU 10/31. DEANA Drains x2 DC'd 11/1 AM. Loose BM-hold stool softeners. IVL Now. Ortiz Replaced 11/1-monitor and DC to TOV. Start Flomax now. Will DC Dilaudid 6mg per family request-pt sleepy. Standing Xray-P.  Anemia improving-Ck CBC, BMP AM.  Inc activity/OOB.     Cardio/K. Doran-Course was complicated by hypotension which required pressor support (initally with norepinephrine, neosynephrine and vaspressin), which were tapered off (norepi dc'd last night) and a junctional rhythm most likely 2/2 hyperkalemia.  Pt now in RSR last K 4.1. No c/o cp or sob. Pt now Hemodynamically stable, normokalemic in RSR. Agree with current rx.    Plastic surg-following.    Palliative care- Problem: Acute pain.  Plan: patient on ATC dilaudid, but currently lethargic.  Recommend decreasing dose to 6mg PO dilaudid q6h ATC (please ensure when ordering that this is scheduled for when next dose would be due, i.e. 6 hours after last dose provided). Continue current PRN regimen Will monitor breakthrough needs and titrate as needed. Discussed with patient, NSCU team, and partner at bedside. Contact information provided to family.  Problem: Constipation due to opioid therapy.  Plan: Would increase senna to 2 tabs BID. Continue miralax at current dose. If not contraindicated, if no BM would provide dulcolax suppository. Problem: Palliative care encounter.  Plan: ongoing pain management. will f/u 11/1. Please call with questions.     Respiratory: Patient instructed to use incentive spirometer [ X] YES [ ] NO              DVT ppx: [X ] SQL [ ] SQH and Venodynes [ ] Left [ ] Right [ X] Bilateral    Discharge Planning:  The patient was evaluated by PT/PMR and recommended S. Acute Rehab.  OT eval-P.      Assessment:  Please Check When Present   []  GCS  E   V  M     Heart Failure: []Acute, [] acute on chronic , []chronic  Heart Failure:  [] Diastolic (HFpEF), [] Systolic (HFrEF), []Combined (HFpEF and HFrEF), [] RHF, [] Pulm HTN, [] Other    [] CLIVE, [] ATN, [] AIN, [] other  [] CKD1, [] CKD2, [] CKD 3, [] CKD 4, [] CKD 5, []ESRD    Encephalopathy: [] Metabolic, [] Hepatic, [] toxic, [] Neurological, [] Other    Abnormal Nurtitional Status: [] malnurtition (see nutrition note), [ ]underweight: BMI < 19, [] morbid obesity: BMI >40, [] Cachexia    [] Sepsis  [] hypovolemic shock,[] cardiogenic shock, [] hemorrhagic shock, [] neuogenic shock  [] Acute Respiratory Failure  []Cerebral edema, [] Brain compression/ herniation,   [] Functional quadriplegia  [X] Acute blood loss anemia

## 2018-11-01 NOTE — PROGRESS NOTE ADULT - SUBJECTIVE AND OBJECTIVE BOX
Diabetes Follow up note:  Interval Hx:  72 year old male w/controlled T2DM (on orals PTA) s/p placement of temporary pacer and L1-S1 transforaminal interbody fusion, T10-pelvis instrumented fusion and Plastics assisted closure (10/27) c/b post-op pain management and CSF leak. BG values at goal on present insulin regimen. Pt seen at bedside w/wife present. Endorses improved pain and PO intake today. Had multiple large BM yesterday. Plan is for discharge to acute rehab when cleared.     Review of Systems:  General: no complaints. Sleepy on/off on pain meds.   GI: Tolerating POs without any N/V/D/ABD PAIN.  CV: No CP/SOB  ENDO: No S&Sx of hypoglycemia  MEDS:  atorvastatin 80 milliGRAM(s) Oral at bedtime    insulin glargine Injectable (LANTUS) 16 Unit(s) SubCutaneous at bedtime  insulin lispro (HumaLOG) corrective regimen sliding scale   SubCutaneous three times a day before meals  insulin lispro (HumaLOG) corrective regimen sliding scale   SubCutaneous at bedtime  levothyroxine 175 MICROGram(s) Oral daily      Allergies    codeine (Vomiting; Headache)  dogs, cats (Short breath)  dust (Rhinitis)  mold (Rhinitis)  morphine (Vomiting; Headache)  narcotic analgesics (Vomiting; Headache)        PE:  General: Male lying in bed. NAD.   Vital Signs Last 24 Hrs  T(C): 36.7 (01 Nov 2018 14:12), Max: 36.7 (01 Nov 2018 14:12)  T(F): 98 (01 Nov 2018 14:12), Max: 98 (01 Nov 2018 14:12)  HR: 71 (01 Nov 2018 14:12) (71 - 79)  BP: 149/70 (01 Nov 2018 14:12) (132/76 - 156/81)  BP(mean): 100 (31 Oct 2018 19:00) (100 - 100)  RR: 18 (01 Nov 2018 14:12) (13 - 18)  SpO2: 93% (01 Nov 2018 14:12) (92% - 100%)  CV: S1, S2. no murmures  Abd: Soft, NT,ND, Central adiposity  Extremities: Warm. + 1 LE pedal/ankle edema  Neuro: Awake. Appropriate to situation.     LABS:    POCT Blood Glucose.: 169 mg/dL (11-01-18 @ 11:52)  POCT Blood Glucose.: 112 mg/dL (11-01-18 @ 09:02)  POCT Blood Glucose.: 117 mg/dL (10-31-18 @ 22:36)  POCT Blood Glucose.: 123 mg/dL (10-31-18 @ 17:41)  POCT Blood Glucose.: 95 mg/dL (10-31-18 @ 12:21)  POCT Blood Glucose.: 95 mg/dL (10-31-18 @ 08:35)  POCT Blood Glucose.: 123 mg/dL (10-30-18 @ 22:39)  POCT Blood Glucose.: 118 mg/dL (10-30-18 @ 17:21)  POCT Blood Glucose.: 118 mg/dL (10-30-18 @ 11:35)  POCT Blood Glucose.: 177 mg/dL (10-30-18 @ 06:01)  POCT Blood Glucose.: 177 mg/dL (10-29-18 @ 23:52)  POCT Blood Glucose.: 164 mg/dL (10-29-18 @ 22:40)  POCT Blood Glucose.: 148 mg/dL (10-29-18 @ 17:53)                            9.6    22.8  )-----------( 148      ( 30 Oct 2018 23:39 )             28.9       10-30    143  |  112<H>  |  19  ----------------------------<  126<H>  4.1   |  23  |  0.68    Ca    8.6      30 Oct 2018 23:39  Phos  1.9     10-30  Mg     2.0     10-30        Thyroid Function Tests:  10-20 @ 07:08 TSH 1.84 FreeT4 -- T3 -- Anti TPO -- Anti Thyroglobulin Ab -- TSI --      Hemoglobin A1C, Whole Blood: 6.9 % <H> [4.0 - 5.6] (10-04-18 @ 18:32)  Hemoglobin A1C, Whole Blood: 6.5 % <H> [4.0 - 5.6] (08-17-18 @ 06:47)            Contact number: licha [phone] or [phone]

## 2018-11-01 NOTE — PROGRESS NOTE ADULT - PROBLEM SELECTOR PLAN 1
-test BG AC/HS  -c/w Lantus 16 units QHS  -c/w Humalog moderate correction scale AC and Mod HS scale  -Discharge plan: Can restart Metformin 1gm BID and Januvia 100mg daily at rehab. No need for insulin at rehab facility.   follow up  with outpatient endo Dr. Nikolas Rodriguez.

## 2018-11-01 NOTE — PROGRESS NOTE ADULT - ASSESSMENT
72 year old male with uncontrolled T2DM (HbA1c - 6.9, c/b DM nephropathy + microalbuminuria, HTN, HLD, underwent placement of temporary pacer and L1-S1 transforaminal interbody fusion, T10-pelvis instrumented fusion and plastics assisted closure (10/27). BG values at goal on present insulin regimen. Tolerating POs w/improving intake. Discharge planning to acute rehab. Creatinine stabilized during hospitalization-no contraindication to restarting Oral DM meds upon discharge. BG goal (100-180mg/dl).

## 2018-11-01 NOTE — PROGRESS NOTE ADULT - SUBJECTIVE AND OBJECTIVE BOX
SUBJECTIVE AND OBJECTIVE:  INTERVAL HPI/OVERNIGHT EVENTS:    DNR on chart:   Allergies    codeine (Vomiting; Headache)  dogs, cats (Short breath)  dust (Rhinitis)  mold (Rhinitis)  morphine (Vomiting; Headache)  narcotic analgesics (Vomiting; Headache)    Intolerances    MEDICATIONS  (STANDING):  atorvastatin 80 milliGRAM(s) Oral at bedtime  chlorhexidine 4% Liquid 1 Application(s) Topical <User Schedule>  dextrose 5%. 1000 milliLiter(s) (50 mL/Hr) IV Continuous <Continuous>  dextrose 50% Injectable 12.5 Gram(s) IV Push once  dextrose 50% Injectable 25 Gram(s) IV Push once  dextrose 50% Injectable 25 Gram(s) IV Push once  enoxaparin Injectable 40 milliGRAM(s) SubCutaneous <User Schedule>  HYDROmorphone   Tablet 4 milliGRAM(s) Oral every 6 hours  insulin glargine Injectable (LANTUS) 16 Unit(s) SubCutaneous at bedtime  insulin lispro (HumaLOG) corrective regimen sliding scale   SubCutaneous three times a day before meals  insulin lispro (HumaLOG) corrective regimen sliding scale   SubCutaneous at bedtime  levothyroxine 175 MICROGram(s) Oral daily  ondansetron    Tablet 4 milliGRAM(s) Oral every 6 hours  tamsulosin 0.4 milliGRAM(s) Oral at bedtime    MEDICATIONS  (PRN):  acetaminophen   Tablet .. 650 milliGRAM(s) Oral every 6 hours PRN Temp greater or equal to 38C (100.4F), Mild Pain (1 - 3)  acetaminophen 325 mG/butalbital 50 mG/caffeine 40 mG 1 Tablet(s) Oral every 8 hours PRN HA  dextrose 40% Gel 15 Gram(s) Oral once PRN Blood Glucose LESS THAN 70 milliGRAM(s)/deciliter  diazepam    Tablet 5 milliGRAM(s) Oral every 8 hours PRN muscle spasm  glucagon  Injectable 1 milliGRAM(s) IntraMuscular once PRN Glucose LESS THAN 70 milligrams/deciliter  HYDROmorphone   Tablet 2 milliGRAM(s) Oral every 3 hours PRN miild mod pain  HYDROmorphone   Tablet 4 milliGRAM(s) Oral every 4 hours PRN Severe Pain (7 - 10)  HYDROmorphone  Injectable 1 milliGRAM(s) IV Push every 2 hours PRN breakthrough severe pain  naloxone Injectable 0.1 milliGRAM(s) IV Push every 3 minutes PRN For ANY of the following changes in patient status:  A. RR LESS THAN 10 breaths per minute, B. Oxygen saturation LESS THAN 90%, C. Sedation score of 6      ITEMS UNCHECKED ARE NOT PRESENT    PRESENT SYMPTOMS: [ ]Unable to obtain due to poor mentation   Source if other than patient:  [ ]Family   [ ]Team     Pain (Impact on QOL):    Location:  Minimal acceptable level (0-10 scale):                   Aggrevating factors:  Quality:  Radiation:  Severity (0-10 scale):    Timing:    Dyspnea:                           [ ]Mild [ ]Moderate [ ]Severe  Anxiety:                             [ ]Mild [ ]Moderate [ ]Severe  Fatigue:                             [ ]Mild [ ]Moderate [ ]Severe  Nausea:                             [ ]Mild [ ]Moderate [ ]Severe  Loss of appetite:              [ ]Mild [ ]Moderate [ ]Severe  Constipation:                    [ ]Mild [ ]Moderate [ ]Severe    PAIN AD Score:	  [email or web address] (Ctrl + left click to view)    Other Symptoms:  [ ]All other review of systems negative     Karnofsky Performance Score/Palliative Performance Status Version 2:         %  PHYSICAL EXAM:  Vital Signs Last 24 Hrs  T(C): 36.6 (01 Nov 2018 08:41), Max: 36.7 (31 Oct 2018 15:00)  T(F): 97.9 (01 Nov 2018 08:41), Max: 98 (31 Oct 2018 15:00)  HR: 79 (01 Nov 2018 08:41) (74 - 79)  BP: 156/81 (01 Nov 2018 08:41) (132/76 - 156/81)  BP(mean): 100 (31 Oct 2018 19:00) (100 - 100)  RR: 18 (01 Nov 2018 08:41) (13 - 18)  SpO2: 97% (01 Nov 2018 08:41) (92% - 100%) I&O's Summary    31 Oct 2018 07:01  -  01 Nov 2018 07:00  --------------------------------------------------------  IN: 1485 mL / OUT: 2105 mL / NET: -620 mL    01 Nov 2018 07:01  -  01 Nov 2018 13:47  --------------------------------------------------------  IN: 200 mL / OUT: 0 mL / NET: 200 mL     GENERAL:  [ ]Alert  [ ]Oriented x   [ ]Lethargic  [ ]Cachexia  [ ]Unarousable  [ ]Verbal  [ ]Non-Verbal  Behavioral:   [ ] Anxiety  [ ] Delirium [ ] Agitation [ ] Other  HEENT:  [ ]Normal   [ ]Dry mouth   [ ]ET Tube/Trach  [ ]Oral lesions  PULMONARY:   [ ]Clear [ ]Tachypnea  [ ]Audible excessive secretions   [ ]Rhonchi        [ ]Right [ ]Left [ ]Bilateral  [ ]Crackles        [ ]Right [ ]Left [ ]Bilateral  [ ]Wheezing     [ ]Right [ ]Left [ ]Bilateral  CARDIOVASCULAR:    [ ]Regular [ ]Irregular [ ]Tachy  [ ]Odilon [ ]Murmur [ ]Other  GASTROINTESTINAL:  [ ]Soft  [ ]Distended   [ ]+BS  [ ]Non tender [ ]Tender  [ ]PEG [ ]OGT/ NGT   Last BM:    GENITOURINARY:  [ ]Normal [ ] Incontinent   [ ]Oliguria/Anuria   [ ]Ortiz  MUSCULOSKELETAL:   [ ]Normal   [ ]Weakness  [ ]Bed/Wheelchair bound [ ]Edema  NEUROLOGIC:   [ ]No focal deficits  [ ] Cognitive impairment  [ ] Dysphagia [ ]Dysarthria [ ] Paresis [ ]Other   SKIN:   [ ]Normal   [ ]Pressure ulcer(s)  [ ]Rash    CRITICAL CARE:  [ ] Shock Present  [ ]Septic [ ]Cardiogenic [ ]Neurologic [ ]Hypovolemic  [ ]  Vasopressors [ ]  Inotropes   [ ] Respiratory failure present  [ ] Acute  [ ] Chronic [ ] Hypoxic  [ ] Hypercarbic [ ] Other  [ ] Other organ failure     LABS:                        9.6    22.8  )-----------( 148      ( 30 Oct 2018 23:39 )             28.9   10-30    143  |  112<H>  |  19  ----------------------------<  126<H>  4.1   |  23  |  0.68    Ca    8.6      30 Oct 2018 23:39  Phos  1.9     10-30  Mg     2.0     10-30    PT/INR - ( 30 Oct 2018 23:39 )   PT: 12.6 sec;   INR: 1.10 ratio         PTT - ( 30 Oct 2018 23:39 )  PTT:26.4 sec      RADIOLOGY & ADDITIONAL STUDIES:    Protein Calorie Malnutrition:  [ ] PPSV2 < or = 30%  [ ] significant weight loss [ ] poor nutritional intake [ ] amasarca [ ] catabolic state Albumin, Serum: 2.9 g/dL (10-28-18 @ 21:46)  Artificial Nutrition [ ]     REFERRALS:   [ ]Chaplaincy  [ ] Hospice  [ ]Child Life  [ ]Social Work  [ ]Case management [ ]Holistic Therapy   Goals of Care Document: SUBJECTIVE AND OBJECTIVE: patient now complaining of abdominal pain, worse than back pain, and only has pain in back at surgical site. He was still somnolent overnight despite decreasing dose of dilaudid from 10mg PO Q6H ATC to 6MG PO Q6H ATC. ATC dosing discontinued by primary team this morning. patient has received x1 breakthrough dose of 4 milligrams. Patient also complaining of being on liquid diet and is hoping that it can be advanced.   Had BMs yesterday.    INTERVAL HPI/OVERNIGHT EVENTS: overnight remained somnolent per girlfriend at bedside.     DNR on chart:   Allergies    codeine (Vomiting; Headache)  dogs, cats (Short breath)  dust (Rhinitis)  mold (Rhinitis)  morphine (Vomiting; Headache)  narcotic analgesics (Vomiting; Headache)    Intolerances    MEDICATIONS  (STANDING):  atorvastatin 80 milliGRAM(s) Oral at bedtime  chlorhexidine 4% Liquid 1 Application(s) Topical <User Schedule>  dextrose 5%. 1000 milliLiter(s) (50 mL/Hr) IV Continuous <Continuous>  dextrose 50% Injectable 12.5 Gram(s) IV Push once  dextrose 50% Injectable 25 Gram(s) IV Push once  dextrose 50% Injectable 25 Gram(s) IV Push once  enoxaparin Injectable 40 milliGRAM(s) SubCutaneous <User Schedule>  HYDROmorphone   Tablet 4 milliGRAM(s) Oral every 6 hours  insulin glargine Injectable (LANTUS) 16 Unit(s) SubCutaneous at bedtime  insulin lispro (HumaLOG) corrective regimen sliding scale   SubCutaneous three times a day before meals  insulin lispro (HumaLOG) corrective regimen sliding scale   SubCutaneous at bedtime  levothyroxine 175 MICROGram(s) Oral daily  ondansetron    Tablet 4 milliGRAM(s) Oral every 6 hours  tamsulosin 0.4 milliGRAM(s) Oral at bedtime    MEDICATIONS  (PRN):  acetaminophen   Tablet .. 650 milliGRAM(s) Oral every 6 hours PRN Temp greater or equal to 38C (100.4F), Mild Pain (1 - 3)  acetaminophen 325 mG/butalbital 50 mG/caffeine 40 mG 1 Tablet(s) Oral every 8 hours PRN HA  dextrose 40% Gel 15 Gram(s) Oral once PRN Blood Glucose LESS THAN 70 milliGRAM(s)/deciliter  diazepam    Tablet 5 milliGRAM(s) Oral every 8 hours PRN muscle spasm  glucagon  Injectable 1 milliGRAM(s) IntraMuscular once PRN Glucose LESS THAN 70 milligrams/deciliter  HYDROmorphone   Tablet 2 milliGRAM(s) Oral every 3 hours PRN miild mod pain  HYDROmorphone   Tablet 4 milliGRAM(s) Oral every 4 hours PRN Severe Pain (7 - 10)  HYDROmorphone  Injectable 1 milliGRAM(s) IV Push every 2 hours PRN breakthrough severe pain  naloxone Injectable 0.1 milliGRAM(s) IV Push every 3 minutes PRN For ANY of the following changes in patient status:  A. RR LESS THAN 10 breaths per minute, B. Oxygen saturation LESS THAN 90%, C. Sedation score of 6      ITEMS UNCHECKED ARE NOT PRESENT    PRESENT SYMPTOMS: [ ]Unable to obtain due to poor mentation   Source if other than patient:  [ ]Family   [ ]Team     Pain (Impact on QOL):  abdominal  Location: diffuse, mainly LLQ  Minimal acceptable level (0-10 scale):          5         Aggrevating factors: movement  Quality: sharp  Radiation: none  Severity (0-10 scale):  10  Timing: constant    Dyspnea:                           [ ]Mild [ ]Moderate [ ]Severe  Anxiety:                             [ ]Mild [ ]Moderate [ ]Severe  Fatigue:                             [ x]Mild [ ]Moderate [ ]Severe  Nausea:                             [x ]Mild [ ]Moderate [ ]Severe  Loss of appetite:              [ ]Mild [ ]Moderate [ ]Severe  Constipation:                    [ ]Mild [ ]Moderate [ ]Severe    PAIN AD Score:	  [email or web address] (Ctrl + left click to view)    Other Symptoms:  [x ]All other review of systems negative     Karnofsky Performance Score/Palliative Performance Status Version 2:    40-50  %  PHYSICAL EXAM:  Vital Signs Last 24 Hrs  T(C): 36.6 (01 Nov 2018 08:41), Max: 36.7 (31 Oct 2018 15:00)  T(F): 97.9 (01 Nov 2018 08:41), Max: 98 (31 Oct 2018 15:00)  HR: 79 (01 Nov 2018 08:41) (74 - 79)  BP: 156/81 (01 Nov 2018 08:41) (132/76 - 156/81)  BP(mean): 100 (31 Oct 2018 19:00) (100 - 100)  RR: 18 (01 Nov 2018 08:41) (13 - 18)  SpO2: 97% (01 Nov 2018 08:41) (92% - 100%) I&O's Summary    31 Oct 2018 07:01  -  01 Nov 2018 07:00  --------------------------------------------------------  IN: 1485 mL / OUT: 2105 mL / NET: -620 mL    01 Nov 2018 07:01  -  01 Nov 2018 13:47  --------------------------------------------------------  IN: 200 mL / OUT: 0 mL / NET: 200 mL     GENERAL:  [x ]Alert  [x ]Oriented x3   [ ]Lethargic  [ ]Cachexia  [ ]Unarousable  [ ]Verbal  [ ]Non-Verbal  Behavioral:   [ ] Anxiety  [ ] Delirium [ ] Agitation [ ] Other  HEENT:  [x ]Normal   [ ]Dry mouth   [ ]ET Tube/Trach  [ ]Oral lesions  PULMONARY:   [x ]Clear [ ]Tachypnea  [ ]Audible excessive secretions   [ ]Rhonchi        [ ]Right [ ]Left [ ]Bilateral  [ ]Crackles        [ ]Right [ ]Left [ ]Bilateral  [ ]Wheezing     [ ]Right [ ]Left [ ]Bilateral  CARDIOVASCULAR:    [x ]Regular [ ]Irregular [ ]Tachy  [ ]Odilon [ ]Murmur [ ]Other  GASTROINTESTINAL:  [ ]Soft  [x ]Distended   [x ]+BS  [ ]Non tender [ ]Tender  [ ]PEG [ ]OGT/ NGT   Last BM: 10/31   GENITOURINARY: retaining  [ ]Normal [ ] Incontinent   [ ]Oliguria/Anuria   [ ]Ortiz  MUSCULOSKELETAL:   [ ]Normal   [ x]Weakness  [ ]Bed/Wheelchair bound [ ]Edema  NEUROLOGIC:   [x ]No focal deficits  [ ] Cognitive impairment  [ ] Dysphagia [ ]Dysarthria [ ] Paresis [ ]Other   SKIN: surgical scar on back  [x ]Normal   [ ]Pressure ulcer(s)  [ ]Rash    CRITICAL CARE: none  [ ] Shock Present  [ ]Septic [ ]Cardiogenic [ ]Neurologic [ ]Hypovolemic  [ ]  Vasopressors [ ]  Inotropes   [ ] Respiratory failure present  [ ] Acute  [ ] Chronic [ ] Hypoxic  [ ] Hypercarbic [ ] Other  [ ] Other organ failure     LABS:                        9.6    22.8  )-----------( 148      ( 30 Oct 2018 23:39 )             28.9   10-30    143  |  112<H>  |  19  ----------------------------<  126<H>  4.1   |  23  |  0.68    Ca    8.6      30 Oct 2018 23:39  Phos  1.9     10-30  Mg     2.0     10-30    PT/INR - ( 30 Oct 2018 23:39 )   PT: 12.6 sec;   INR: 1.10 ratio    PTT - ( 30 Oct 2018 23:39 )  PTT:26.4 sec    RADIOLOGY & ADDITIONAL STUDIES: no new imaging studies    Protein Calorie Malnutrition:  [ ] PPSV2 < or = 30%  [ ] significant weight loss [x ] poor nutritional intake [ ] anasarca [ ] catabolic state Albumin, Serum: 2.9 g/dL (10-28-18 @ 21:46)  Artificial Nutrition [ ]     REFERRALS:   [ ]Chaplaincy  [ ] Hospice  [ ]Child Life  [ ]Social Work  [x ]Case management [ ]Holistic Therapy   Goals of Care Document: none

## 2018-11-02 ENCOUNTER — INPATIENT (INPATIENT)
Facility: HOSPITAL | Age: 73
LOS: 13 days | Discharge: HOME HEALTH | DRG: 949 | End: 2018-11-16
Attending: PHYSICAL MEDICINE & REHABILITATION | Admitting: PHYSICAL MEDICINE & REHABILITATION
Payer: MEDICARE

## 2018-11-02 ENCOUNTER — TRANSCRIPTION ENCOUNTER (OUTPATIENT)
Age: 73
End: 2018-11-02

## 2018-11-02 VITALS
SYSTOLIC BLOOD PRESSURE: 158 MMHG | HEART RATE: 84 BPM | TEMPERATURE: 98 F | RESPIRATION RATE: 18 BRPM | DIASTOLIC BLOOD PRESSURE: 82 MMHG | OXYGEN SATURATION: 96 %

## 2018-11-02 VITALS
DIASTOLIC BLOOD PRESSURE: 83 MMHG | OXYGEN SATURATION: 95 % | TEMPERATURE: 98 F | HEART RATE: 75 BPM | RESPIRATION RATE: 15 BRPM | SYSTOLIC BLOOD PRESSURE: 163 MMHG

## 2018-11-02 DIAGNOSIS — E89.0 POSTPROCEDURAL HYPOTHYROIDISM: Chronic | ICD-10-CM

## 2018-11-02 DIAGNOSIS — M48.061 SPINAL STENOSIS, LUMBAR REGION WITHOUT NEUROGENIC CLAUDICATION: ICD-10-CM

## 2018-11-02 DIAGNOSIS — Z90.79 ACQUIRED ABSENCE OF OTHER GENITAL ORGAN(S): Chronic | ICD-10-CM

## 2018-11-02 DIAGNOSIS — Z98.890 OTHER SPECIFIED POSTPROCEDURAL STATES: Chronic | ICD-10-CM

## 2018-11-02 DIAGNOSIS — Z90.5 ACQUIRED ABSENCE OF KIDNEY: Chronic | ICD-10-CM

## 2018-11-02 DIAGNOSIS — Z96.651 PRESENCE OF RIGHT ARTIFICIAL KNEE JOINT: Chronic | ICD-10-CM

## 2018-11-02 LAB
ANION GAP SERPL CALC-SCNC: 11 MMOL/L — SIGNIFICANT CHANGE UP (ref 5–17)
BUN SERPL-MCNC: 9 MG/DL — SIGNIFICANT CHANGE UP (ref 7–23)
CALCIUM SERPL-MCNC: 9.1 MG/DL — SIGNIFICANT CHANGE UP (ref 8.4–10.5)
CHLORIDE SERPL-SCNC: 105 MMOL/L — SIGNIFICANT CHANGE UP (ref 96–108)
CO2 SERPL-SCNC: 26 MMOL/L — SIGNIFICANT CHANGE UP (ref 22–31)
CREAT SERPL-MCNC: 0.69 MG/DL — SIGNIFICANT CHANGE UP (ref 0.5–1.3)
GLUCOSE BLDC GLUCOMTR-MCNC: 126 MG/DL — HIGH (ref 70–99)
GLUCOSE BLDC GLUCOMTR-MCNC: 214 MG/DL — HIGH (ref 70–99)
GLUCOSE SERPL-MCNC: 135 MG/DL — HIGH (ref 70–99)
HCT VFR BLD CALC: 36.2 % — LOW (ref 39–50)
HGB BLD-MCNC: 10.3 G/DL — LOW (ref 13–17)
MCHC RBC-ENTMCNC: 28.5 GM/DL — LOW (ref 32–36)
MCHC RBC-ENTMCNC: 28.9 PG — SIGNIFICANT CHANGE UP (ref 27–34)
MCV RBC AUTO: 101.7 FL — HIGH (ref 80–100)
PLATELET # BLD AUTO: 191 K/UL — SIGNIFICANT CHANGE UP (ref 150–400)
POTASSIUM SERPL-MCNC: 4 MMOL/L — SIGNIFICANT CHANGE UP (ref 3.5–5.3)
POTASSIUM SERPL-SCNC: 4 MMOL/L — SIGNIFICANT CHANGE UP (ref 3.5–5.3)
RBC # BLD: 3.56 M/UL — LOW (ref 4.2–5.8)
RBC # FLD: 16.9 % — HIGH (ref 10.3–14.5)
SODIUM SERPL-SCNC: 142 MMOL/L — SIGNIFICANT CHANGE UP (ref 135–145)
WBC # BLD: 20.08 K/UL — HIGH (ref 3.8–10.5)
WBC # FLD AUTO: 20.08 K/UL — HIGH (ref 3.8–10.5)

## 2018-11-02 PROCEDURE — 99232 SBSQ HOSP IP/OBS MODERATE 35: CPT

## 2018-11-02 PROCEDURE — 99233 SBSQ HOSP IP/OBS HIGH 50: CPT

## 2018-11-02 RX ORDER — ASPIRIN/CALCIUM CARB/MAGNESIUM 324 MG
1 TABLET ORAL
Qty: 0 | Refills: 0 | COMMUNITY

## 2018-11-02 RX ORDER — INSULIN LISPRO 100/ML
VIAL (ML) SUBCUTANEOUS AT BEDTIME
Qty: 0 | Refills: 0 | Status: DISCONTINUED | OUTPATIENT
Start: 2018-11-02 | End: 2018-11-11

## 2018-11-02 RX ORDER — TAMSULOSIN HYDROCHLORIDE 0.4 MG/1
1 CAPSULE ORAL
Qty: 0 | Refills: 0 | DISCHARGE
Start: 2018-11-02

## 2018-11-02 RX ORDER — DIAZEPAM 5 MG
5 TABLET ORAL EVERY 8 HOURS
Qty: 0 | Refills: 0 | Status: DISCONTINUED | OUTPATIENT
Start: 2018-11-02 | End: 2018-11-02

## 2018-11-02 RX ORDER — HYDROMORPHONE HYDROCHLORIDE 2 MG/ML
4 INJECTION INTRAMUSCULAR; INTRAVENOUS; SUBCUTANEOUS EVERY 6 HOURS
Qty: 0 | Refills: 0 | Status: DISCONTINUED | OUTPATIENT
Start: 2018-11-02 | End: 2018-11-09

## 2018-11-02 RX ORDER — METFORMIN HYDROCHLORIDE 850 MG/1
2 TABLET ORAL
Qty: 0 | Refills: 0 | COMMUNITY

## 2018-11-02 RX ORDER — SODIUM CHLORIDE 9 MG/ML
1000 INJECTION, SOLUTION INTRAVENOUS
Qty: 0 | Refills: 0 | Status: DISCONTINUED | OUTPATIENT
Start: 2018-11-02 | End: 2018-11-16

## 2018-11-02 RX ORDER — DEXTROSE 50 % IN WATER 50 %
15 SYRINGE (ML) INTRAVENOUS ONCE
Qty: 0 | Refills: 0 | Status: DISCONTINUED | OUTPATIENT
Start: 2018-11-02 | End: 2018-11-16

## 2018-11-02 RX ORDER — LEVOTHYROXINE SODIUM 125 MCG
175 TABLET ORAL DAILY
Qty: 0 | Refills: 0 | Status: DISCONTINUED | OUTPATIENT
Start: 2018-11-02 | End: 2018-11-16

## 2018-11-02 RX ORDER — LISINOPRIL 2.5 MG/1
10 TABLET ORAL DAILY
Qty: 0 | Refills: 0 | Status: DISCONTINUED | OUTPATIENT
Start: 2018-11-02 | End: 2018-11-02

## 2018-11-02 RX ORDER — ACETAMINOPHEN 500 MG
2 TABLET ORAL
Qty: 0 | Refills: 0 | COMMUNITY
Start: 2018-11-02

## 2018-11-02 RX ORDER — DEXTROSE 50 % IN WATER 50 %
25 SYRINGE (ML) INTRAVENOUS ONCE
Qty: 0 | Refills: 0 | Status: DISCONTINUED | OUTPATIENT
Start: 2018-11-02 | End: 2018-11-16

## 2018-11-02 RX ORDER — SELENIOUS ACID 40 UG/ML
1 INJECTION, SOLUTION INTRAVENOUS
Qty: 0 | Refills: 0 | COMMUNITY

## 2018-11-02 RX ORDER — GLUCAGON INJECTION, SOLUTION 0.5 MG/.1ML
1 INJECTION, SOLUTION SUBCUTANEOUS ONCE
Qty: 0 | Refills: 0 | Status: DISCONTINUED | OUTPATIENT
Start: 2018-11-02 | End: 2018-11-16

## 2018-11-02 RX ORDER — CYCLOBENZAPRINE HYDROCHLORIDE 10 MG/1
5 TABLET, FILM COATED ORAL THREE TIMES A DAY
Qty: 0 | Refills: 0 | Status: DISCONTINUED | OUTPATIENT
Start: 2018-11-02 | End: 2018-11-13

## 2018-11-02 RX ORDER — ACETAMINOPHEN 500 MG
2 TABLET ORAL
Qty: 0 | Refills: 0 | COMMUNITY

## 2018-11-02 RX ORDER — INSULIN LISPRO 100/ML
VIAL (ML) SUBCUTANEOUS
Qty: 0 | Refills: 0 | Status: DISCONTINUED | OUTPATIENT
Start: 2018-11-02 | End: 2018-11-11

## 2018-11-02 RX ORDER — HYDROMORPHONE HYDROCHLORIDE 2 MG/ML
1 INJECTION INTRAMUSCULAR; INTRAVENOUS; SUBCUTANEOUS
Qty: 0 | Refills: 0 | COMMUNITY
Start: 2018-11-02

## 2018-11-02 RX ORDER — TAMSULOSIN HYDROCHLORIDE 0.4 MG/1
0.4 CAPSULE ORAL AT BEDTIME
Qty: 0 | Refills: 0 | Status: DISCONTINUED | OUTPATIENT
Start: 2018-11-02 | End: 2018-11-16

## 2018-11-02 RX ORDER — ONDANSETRON 8 MG/1
4 TABLET, FILM COATED ORAL EVERY 6 HOURS
Qty: 0 | Refills: 0 | Status: DISCONTINUED | OUTPATIENT
Start: 2018-11-02 | End: 2018-11-16

## 2018-11-02 RX ORDER — ACETAMINOPHEN 500 MG
650 TABLET ORAL EVERY 6 HOURS
Qty: 0 | Refills: 0 | Status: DISCONTINUED | OUTPATIENT
Start: 2018-11-02 | End: 2018-11-16

## 2018-11-02 RX ORDER — CHLORHEXIDINE GLUCONATE 213 G/1000ML
1 SOLUTION TOPICAL
Qty: 0 | Refills: 0 | Status: DISCONTINUED | OUTPATIENT
Start: 2018-11-02 | End: 2018-11-02

## 2018-11-02 RX ORDER — INSULIN GLARGINE 100 [IU]/ML
12 INJECTION, SOLUTION SUBCUTANEOUS AT BEDTIME
Qty: 0 | Refills: 0 | Status: DISCONTINUED | OUTPATIENT
Start: 2018-11-02 | End: 2018-11-16

## 2018-11-02 RX ORDER — LISINOPRIL 2.5 MG/1
1 TABLET ORAL
Qty: 0 | Refills: 0 | DISCHARGE
Start: 2018-11-02

## 2018-11-02 RX ORDER — ENOXAPARIN SODIUM 100 MG/ML
40 INJECTION SUBCUTANEOUS
Qty: 0 | Refills: 0 | Status: DISCONTINUED | OUTPATIENT
Start: 2018-11-02 | End: 2018-11-16

## 2018-11-02 RX ORDER — LISINOPRIL 2.5 MG/1
10 TABLET ORAL DAILY
Qty: 0 | Refills: 0 | Status: DISCONTINUED | OUTPATIENT
Start: 2018-11-02 | End: 2018-11-16

## 2018-11-02 RX ORDER — METFORMIN HYDROCHLORIDE 850 MG/1
1 TABLET ORAL
Qty: 0 | Refills: 0 | COMMUNITY

## 2018-11-02 RX ORDER — ASCORBIC ACID 60 MG
1 TABLET,CHEWABLE ORAL
Qty: 0 | Refills: 0 | COMMUNITY

## 2018-11-02 RX ORDER — RAMIPRIL 5 MG
0 CAPSULE ORAL
Qty: 0 | Refills: 0 | COMMUNITY

## 2018-11-02 RX ORDER — DIAZEPAM 5 MG
1 TABLET ORAL
Qty: 0 | Refills: 0 | COMMUNITY
Start: 2018-11-02

## 2018-11-02 RX ORDER — OMEGA-3 ACID ETHYL ESTERS 1 G
1 CAPSULE ORAL
Qty: 0 | Refills: 0 | COMMUNITY

## 2018-11-02 RX ORDER — METFORMIN HYDROCHLORIDE 850 MG/1
1 TABLET ORAL
Qty: 30 | Refills: 0
Start: 2018-11-02 | End: 2018-12-01

## 2018-11-02 RX ORDER — ATORVASTATIN CALCIUM 80 MG/1
80 TABLET, FILM COATED ORAL AT BEDTIME
Qty: 0 | Refills: 0 | Status: DISCONTINUED | OUTPATIENT
Start: 2018-11-02 | End: 2018-11-16

## 2018-11-02 RX ORDER — DEXTROSE 50 % IN WATER 50 %
12.5 SYRINGE (ML) INTRAVENOUS ONCE
Qty: 0 | Refills: 0 | Status: DISCONTINUED | OUTPATIENT
Start: 2018-11-02 | End: 2018-11-16

## 2018-11-02 RX ADMIN — HYDROMORPHONE HYDROCHLORIDE 4 MILLIGRAM(S): 2 INJECTION INTRAMUSCULAR; INTRAVENOUS; SUBCUTANEOUS at 12:37

## 2018-11-02 RX ADMIN — TAMSULOSIN HYDROCHLORIDE 0.4 MILLIGRAM(S): 0.4 CAPSULE ORAL at 22:12

## 2018-11-02 RX ADMIN — ONDANSETRON 4 MILLIGRAM(S): 8 TABLET, FILM COATED ORAL at 12:36

## 2018-11-02 RX ADMIN — Medication 4: at 12:37

## 2018-11-02 RX ADMIN — HYDROMORPHONE HYDROCHLORIDE 4 MILLIGRAM(S): 2 INJECTION INTRAMUSCULAR; INTRAVENOUS; SUBCUTANEOUS at 20:30

## 2018-11-02 RX ADMIN — ONDANSETRON 4 MILLIGRAM(S): 8 TABLET, FILM COATED ORAL at 19:58

## 2018-11-02 RX ADMIN — HYDROMORPHONE HYDROCHLORIDE 4 MILLIGRAM(S): 2 INJECTION INTRAMUSCULAR; INTRAVENOUS; SUBCUTANEOUS at 00:28

## 2018-11-02 RX ADMIN — HYDROMORPHONE HYDROCHLORIDE 4 MILLIGRAM(S): 2 INJECTION INTRAMUSCULAR; INTRAVENOUS; SUBCUTANEOUS at 05:07

## 2018-11-02 RX ADMIN — INSULIN GLARGINE 12 UNIT(S): 100 INJECTION, SOLUTION SUBCUTANEOUS at 22:12

## 2018-11-02 RX ADMIN — ATORVASTATIN CALCIUM 80 MILLIGRAM(S): 80 TABLET, FILM COATED ORAL at 22:12

## 2018-11-02 RX ADMIN — HYDROMORPHONE HYDROCHLORIDE 4 MILLIGRAM(S): 2 INJECTION INTRAMUSCULAR; INTRAVENOUS; SUBCUTANEOUS at 05:37

## 2018-11-02 RX ADMIN — Medication 175 MICROGRAM(S): at 05:07

## 2018-11-02 RX ADMIN — LISINOPRIL 10 MILLIGRAM(S): 2.5 TABLET ORAL at 22:12

## 2018-11-02 RX ADMIN — ONDANSETRON 4 MILLIGRAM(S): 8 TABLET, FILM COATED ORAL at 05:07

## 2018-11-02 RX ADMIN — HYDROMORPHONE HYDROCHLORIDE 4 MILLIGRAM(S): 2 INJECTION INTRAMUSCULAR; INTRAVENOUS; SUBCUTANEOUS at 19:57

## 2018-11-02 RX ADMIN — ENOXAPARIN SODIUM 40 MILLIGRAM(S): 100 INJECTION SUBCUTANEOUS at 22:12

## 2018-11-02 NOTE — H&P ADULT - NSHPLABSRESULTS_GEN_ALL_CORE
10.3   20.08 )-----------( 191      ( 02 Nov 2018 10:23 )             36.2   11-02    142  |  105  |  9   ----------------------------<  135<H>  4.0   |  26  |  0.69    Ca    9.1      02 Nov 2018 07:40        MRI 10/18  FINDINGS:   Redemonstration of a mild to moderate dextroscoliosis of the lumbar   spine, the apex is at L2-L3. Grade 1 retrolistheses of L1 on L2, L2 on   L3, L3 and L4, and L4 and L5. Near diffuse disc space narrowing in the   lumbar region spares the L5-S1 disc space. The conus is normal in size,   position, and signal characteristics, ending at T12.    T7-T8: Disc bulge deforms the ventral thecal sac. Bilateral facet   hypertrophy. Mild bilateral neural foraminal narrowing.    T8-T9: Mild broad-based disc protrusion deforms the ventral thecal sac.   Bilateral facet hypertrophy and mild bilateral neural foraminal narrowing.    T9-T10: Disc bulge deforms the ventral thecal sac. Bilateral facet   hypertrophy. Moderate right and mild left neural foraminal narrowing.    T10-T11: Bilateral facet hypertrophy. No spinal canal stenosis or neural   foraminal narrowing.    T11-T12: Bilateral facet hypertrophy. No spinal canal stenosis or neural   foraminal narrowing.    T12-L1: Bilateral facet hypertrophy. No spinal canal stenosis or neural   foraminal narrowing.    L1-L2: Uncovering of the intervertebral disc. Left greater than right   facet hypertrophy. Mild to moderate thecal sac compression and effacement   of the left lateral recess. Mild right and severe left neural foraminal   narrowing.    L2-L3: Uncovering of the intervertebral disc. Left greater than right   facet hypertrophy. Mild thecal sac compression. Effacement of the left   lateral recess. Mild right and moderate left neural foraminal narrowing.    L3-L4: Uncovering of the intervertebral disc and bilateral   facet/ligamentous hypertrophy. Mild to moderate thecal sac compression.   Effacement of the bilateral lateral recesses. Moderate to severe left and   mild to moderate right neural foraminal narrowing.    L4-L5: Uncovering of the intervertebral disc and bilateral facet   hypertrophy. Mild thecal sac compression. Mild to moderate left and   moderate right neural foraminal narrowing.    L5-S1: Disc bulge asymmetric to the right. Bilateral facet hypertrophy.   Posterior displacement of the descending right S1 nerve root and mild   thecal sac compression. Moderate to severe right neural foraminal   narrowing. 10.3   20.08 )-----------( 191      ( 02 Nov 2018 10:23 )             36.2   11-02    142  |  105  |  9   ----------------------------<  135<H>  4.0   |  26  |  0.69    Ca    9.1      02 Nov 2018 07:40                          10.1   25.1  )-----------( 224      ( 03 Nov 2018 06:50 )             31.8   11-03    141  |  105  |  13  ----------------------------<  145<H>  4.1   |  31  |  0.79    Ca    8.6      03 Nov 2018 06:50    TPro  5.2<L>  /  Alb  2.0<L>  /  TBili  0.3  /  DBili  x   /  AST  22  /  ALT  34  /  AlkPhos  65  11-03    CAPILLARY BLOOD GLUCOSE      POCT Blood Glucose.: 193 mg/dL (03 Nov 2018 11:48)  POCT Blood Glucose.: 139 mg/dL (03 Nov 2018 08:11)  POCT Blood Glucose.: 160 mg/dL (02 Nov 2018 21:59)        MRI 10/18  FINDINGS:   Redemonstration of a mild to moderate dextroscoliosis of the lumbar   spine, the apex is at L2-L3. Grade 1 retrolistheses of L1 on L2, L2 on   L3, L3 and L4, and L4 and L5. Near diffuse disc space narrowing in the   lumbar region spares the L5-S1 disc space. The conus is normal in size,   position, and signal characteristics, ending at T12.    T7-T8: Disc bulge deforms the ventral thecal sac. Bilateral facet   hypertrophy. Mild bilateral neural foraminal narrowing.    T8-T9: Mild broad-based disc protrusion deforms the ventral thecal sac.   Bilateral facet hypertrophy and mild bilateral neural foraminal narrowing.    T9-T10: Disc bulge deforms the ventral thecal sac. Bilateral facet   hypertrophy. Moderate right and mild left neural foraminal narrowing.    T10-T11: Bilateral facet hypertrophy. No spinal canal stenosis or neural   foraminal narrowing.    T11-T12: Bilateral facet hypertrophy. No spinal canal stenosis or neural   foraminal narrowing.    T12-L1: Bilateral facet hypertrophy. No spinal canal stenosis or neural   foraminal narrowing.    L1-L2: Uncovering of the intervertebral disc. Left greater than right   facet hypertrophy. Mild to moderate thecal sac compression and effacement   of the left lateral recess. Mild right and severe left neural foraminal   narrowing.    L2-L3: Uncovering of the intervertebral disc. Left greater than right   facet hypertrophy. Mild thecal sac compression. Effacement of the left   lateral recess. Mild right and moderate left neural foraminal narrowing.    L3-L4: Uncovering of the intervertebral disc and bilateral   facet/ligamentous hypertrophy. Mild to moderate thecal sac compression.   Effacement of the bilateral lateral recesses. Moderate to severe left and   mild to moderate right neural foraminal narrowing.    L4-L5: Uncovering of the intervertebral disc and bilateral facet   hypertrophy. Mild thecal sac compression. Mild to moderate left and   moderate right neural foraminal narrowing.    L5-S1: Disc bulge asymmetric to the right. Bilateral facet hypertrophy.   Posterior displacement of the descending right S1 nerve root and mild   thecal sac compression. Moderate to severe right neural foraminal   narrowing.

## 2018-11-02 NOTE — PROGRESS NOTE ADULT - SUBJECTIVE AND OBJECTIVE BOX
Diabetes Follow up note:  Interval Hx:  72 year old male w/controlled T2DM (on orals PTA) s/p placement of temporary pacer and L1-S1 transforaminal interbody fusion, T10-pelvis instrumented fusion and Plastics assisted closure (10/27) c/b post-op pain management and CSF leak.      Review of Systems:  General:  GI: Tolerating POs without any N/V/D/ABD PAIN.  CV: No CP/SOB  ENDO: No S&Sx of hypoglycemia  MEDS:  atorvastatin 80 milliGRAM(s) Oral at bedtime    insulin glargine Injectable (LANTUS) 16 Unit(s) SubCutaneous at bedtime  insulin glargine Injectable (LANTUS) 8 Unit(s) SubCutaneous at bedtime  insulin lispro (HumaLOG) corrective regimen sliding scale   SubCutaneous three times a day before meals  insulin lispro (HumaLOG) corrective regimen sliding scale   SubCutaneous at bedtime  levothyroxine 175 MICROGram(s) Oral daily      Allergies    codeine (Vomiting; Headache)  dogs, cats (Short breath)  dust (Rhinitis)  mold (Rhinitis)  morphine (Vomiting; Headache)  narcotic analgesics (Vomiting; Headache)        PE:  General:  Vital Signs Last 24 Hrs  T(C): 36.7 (02 Nov 2018 08:42), Max: 36.8 (01 Nov 2018 21:07)  T(F): 98.1 (02 Nov 2018 08:42), Max: 98.3 (01 Nov 2018 21:07)  HR: 87 (02 Nov 2018 11:42) (70 - 87)  BP: 147/78 (02 Nov 2018 11:42) (147/78 - 169/77)  BP(mean): --  RR: 18 (02 Nov 2018 08:42) (18 - 18)  SpO2: 97% (02 Nov 2018 11:42) (92% - 98%)  Abd: Soft, NT,ND,   Extremities: Warm  Neuro: A&O X3    LABS:    POCT Blood Glucose.: 214 mg/dL (11-02-18 @ 11:57)  POCT Blood Glucose.: 126 mg/dL (11-02-18 @ 08:09)  POCT Blood Glucose.: 114 mg/dL (11-01-18 @ 23:35)  POCT Blood Glucose.: 104 mg/dL (11-01-18 @ 22:03)  POCT Blood Glucose.: 134 mg/dL (11-01-18 @ 17:00)  POCT Blood Glucose.: 169 mg/dL (11-01-18 @ 11:52)  POCT Blood Glucose.: 112 mg/dL (11-01-18 @ 09:02)  POCT Blood Glucose.: 117 mg/dL (10-31-18 @ 22:36)  POCT Blood Glucose.: 123 mg/dL (10-31-18 @ 17:41)  POCT Blood Glucose.: 95 mg/dL (10-31-18 @ 12:21)  POCT Blood Glucose.: 95 mg/dL (10-31-18 @ 08:35)  POCT Blood Glucose.: 123 mg/dL (10-30-18 @ 22:39)  POCT Blood Glucose.: 118 mg/dL (10-30-18 @ 17:21)                            10.3   20.08 )-----------( 191      ( 02 Nov 2018 10:23 )             36.2       11-02    142  |  105  |  9   ----------------------------<  135<H>  4.0   |  26  |  0.69    Ca    9.1      02 Nov 2018 07:40        Thyroid Function Tests:  10-20 @ 07:08 TSH 1.84 FreeT4 -- T3 -- Anti TPO -- Anti Thyroglobulin Ab -- TSI --      Hemoglobin A1C, Whole Blood: 6.9 % <H> [4.0 - 5.6] (10-04-18 @ 18:32)  Hemoglobin A1C, Whole Blood: 6.5 % <H> [4.0 - 5.6] (08-17-18 @ 06:47)            Contact number: licha [phone] or [phone] Diabetes Follow up note:  Interval Hx:  72 year old male w/controlled T2DM (on orals PTA) s/p placement of temporary pacer and L1-S1 transforaminal interbody fusion, T10-pelvis instrumented fusion and Plastics assisted closure (10/27) c/b post-op pain management and CSF leak. BG values running mostly at goal. Lantus cut by 50% last night by team. Pt having dressing changed at bedside when present. Pt going to rehab today per team. Reports good appetite, oob to chair and was able to ambulate earlier w/OT.       Review of Systems:  General: No complaints.   GI: Tolerating POs without any N/V/D/ABD PAIN.  CV: No CP/SOB  ENDO: No S&Sx of hypoglycemia  MEDS:  atorvastatin 80 milliGRAM(s) Oral at bedtime    insulin glargine Injectable (LANTUS) 16 Unit(s) SubCutaneous at bedtime  insulin glargine Injectable (LANTUS) 8 Unit(s) SubCutaneous at bedtime  insulin lispro (HumaLOG) corrective regimen sliding scale   SubCutaneous three times a day before meals  insulin lispro (HumaLOG) corrective regimen sliding scale   SubCutaneous at bedtime  levothyroxine 175 MICROGram(s) Oral daily      Allergies    codeine (Vomiting; Headache)  dogs, cats (Short breath)  dust (Rhinitis)  mold (Rhinitis)  morphine (Vomiting; Headache)  narcotic analgesics (Vomiting; Headache)        PE:  General: Male sitting in chair. NAD.   Vital Signs Last 24 Hrs  T(C): 36.7 (02 Nov 2018 08:42), Max: 36.8 (01 Nov 2018 21:07)  T(F): 98.1 (02 Nov 2018 08:42), Max: 98.3 (01 Nov 2018 21:07)  HR: 87 (02 Nov 2018 11:42) (70 - 87)  BP: 147/78 (02 Nov 2018 11:42) (147/78 - 169/77)  BP(mean): --  RR: 18 (02 Nov 2018 08:42) (18 - 18)  SpO2: 97% (02 Nov 2018 11:42) (92% - 98%)  Abd: Soft, NT,ND,   Extremities: Warm  Neuro: A&O X3    LABS:    POCT Blood Glucose.: 214 mg/dL (11-02-18 @ 11:57)  POCT Blood Glucose.: 126 mg/dL (11-02-18 @ 08:09)  POCT Blood Glucose.: 114 mg/dL (11-01-18 @ 23:35)  POCT Blood Glucose.: 104 mg/dL (11-01-18 @ 22:03)  POCT Blood Glucose.: 134 mg/dL (11-01-18 @ 17:00)  POCT Blood Glucose.: 169 mg/dL (11-01-18 @ 11:52)  POCT Blood Glucose.: 112 mg/dL (11-01-18 @ 09:02)  POCT Blood Glucose.: 117 mg/dL (10-31-18 @ 22:36)  POCT Blood Glucose.: 123 mg/dL (10-31-18 @ 17:41)  POCT Blood Glucose.: 95 mg/dL (10-31-18 @ 12:21)  POCT Blood Glucose.: 95 mg/dL (10-31-18 @ 08:35)  POCT Blood Glucose.: 123 mg/dL (10-30-18 @ 22:39)  POCT Blood Glucose.: 118 mg/dL (10-30-18 @ 17:21)                            10.3   20.08 )-----------( 191      ( 02 Nov 2018 10:23 )             36.2       11-02    142  |  105  |  9   ----------------------------<  135<H>  4.0   |  26  |  0.69    Ca    9.1      02 Nov 2018 07:40        Thyroid Function Tests:  10-20 @ 07:08 TSH 1.84 FreeT4 -- T3 -- Anti TPO -- Anti Thyroglobulin Ab -- TSI --      Hemoglobin A1C, Whole Blood: 6.9 % <H> [4.0 - 5.6] (10-04-18 @ 18:32)  Hemoglobin A1C, Whole Blood: 6.5 % <H> [4.0 - 5.6] (08-17-18 @ 06:47)            Contact number: licha [phone] or [phone]

## 2018-11-02 NOTE — PROGRESS NOTE ADULT - PROBLEM SELECTOR PLAN 3
off pressors, does not require home BP meds, monitor BP closely
Would increase senna to 2 tabs BID  Continue miralax at current dose  If not contraindicated, if no BM would provide dulcolax suppository
continue bowel regimen while on opiate therapy
Would increase senna to 2 tabs BID  Continue miralax at current dose  If not contraindicated, if no BM would provide dulcolax suppository.

## 2018-11-02 NOTE — PROGRESS NOTE ADULT - REASON FOR ADMISSION
S/p T10-Pelvis fusion
spinal surgery
spinal surgery
Scoliosis
surgical intervention
spinal surgery

## 2018-11-02 NOTE — PROGRESS NOTE ADULT - SUBJECTIVE AND OBJECTIVE BOX
Pt seen and examined. States he is feeling well. Incisional pain well controlled. Denies pain radiating into B/L LE. Denies numbness or tingling. Denies Fever, chills, CP, SOB, abdominal pain or N/V/D. Denies h/a. OOB and ambulated with PT.     OVERNIGHT EVENTS:  Vital Signs Last 24 Hrs  T(C): 36.7 (02 Nov 2018 08:42), Max: 36.8 (01 Nov 2018 21:07)  T(F): 98.1 (02 Nov 2018 08:42), Max: 98.3 (01 Nov 2018 21:07)  HR: 87 (02 Nov 2018 11:42) (70 - 87)  BP: 147/78 (02 Nov 2018 11:42) (147/78 - 169/77)  BP(mean): --  RR: 18 (02 Nov 2018 08:42) (18 - 18)  SpO2: 97% (02 Nov 2018 11:42) (92% - 98%)    PHYSICAL EXAM:  Gen: A&0x3. NAD Speech clear and coherent  CVS: RRR  Lungs: non labored respirations  Dressing removed. Throacolumbar incision with sutures C/D/i, no erythema, no fluctuance, no induration, no drainage, no signs of infection. Aquacell dressing applied.   B/L LE: warm. no edema. calves soft and NTTP   Neurological: FC     Motor exam:         [x] Upper extremity              Delt        Bicep      Tricep     HG                                               R         5/5        5/5         5/5          5/5                                               L          5/5        5/5         5/5          5/5         [x] Lower extremity             HF         KF          KE         PF          DF                                               R        5/5        5/5        5/5       5/5        5/5                                               L         5/5        5/5       5/5        5/5        5/5                                                   Sensation: [x] intact to light touch  [] decreased:                           10.3   20.08 )-----------( 191      ( 02 Nov 2018 10:23 )             36.2     11-02    142  |  105  |  9   ----------------------------<  135<H>  4.0   |  26  |  0.69    Ca    9.1      02 Nov 2018 07:40              CAPILLARY BLOOD GLUCOSE      POCT Blood Glucose.: 214 mg/dL (02 Nov 2018 11:57)  POCT Blood Glucose.: 126 mg/dL (02 Nov 2018 08:09)  POCT Blood Glucose.: 114 mg/dL (01 Nov 2018 23:35)  POCT Blood Glucose.: 104 mg/dL (01 Nov 2018 22:03)  POCT Blood Glucose.: 134 mg/dL (01 Nov 2018 17:00)    Assessment:  72F POD # 6 s/p L1-S1 TLIF with T10-pelvis instrumentation and fusion c/s CSF leak with intraop repair and plastic surgery closure, stable for discharge to acute rehab     Please Check When Present   []  GCS  E   V  M     Heart Failure: []Acute, [] acute on chronic , []chronic  Heart Failure:  [] Diastolic (HFpEF), [] Systolic (HFrEF), []Combined (HFpEF and HFrEF), [] RHF, [] Pulm HTN, [] Other    [] CLIVE, [] ATN, [] AIN, [] other  [] CKD1, [] CKD2, [] CKD 3, [] CKD 4, [] CKD 5, []ESRD    Encephalopathy: [] Metabolic, [] Hepatic, [] toxic, [] Neurological, [] Other    Abnormal Nurtitional Status: [] malnurtition (see nutrition note), [ ]underweight: BMI < 19, [] morbid obesity: BMI >40, [] Cachexia    [] Sepsis  [] hypovolemic shock,[] cardiogenic shock, [] hemorrhagic shock, [] neuogenic shock  [] Acute Respiratory Failure  []Cerebral edema, [] Brain compression/ herniation,   [] Functional quadriplegia  [] Acute blood loss anemia    1. Patient is stable for discharge to acute rehab. Patient baseline WBC is about 20 per patient , patient has been afebrile and asymptomatic. Discussed with Dr. House that patient is unable to stand for standing Xrays per patient and physical therapy. Per Dr. House ok to discharge to acute rehab without xrays. Patient to follow up outpatient with Dr. House and can resume home ASA. Patient to follow up [facility] plastic surgery for suture removal. Patient to have TOV performed at rehab once on flomax for 5-7 days started on 11/1, d/c with spears. Discussed discharge with Dr. Doran ( cards) who recommended to continue lisinopril 10mg once daily and to have patient follow up with him upon discharge from rehab. Discussed discharge with endocrine and palliative, recs appreciated.     above D/w DR. House

## 2018-11-02 NOTE — DISCHARGE NOTE ADULT - HOSPITAL COURSE
Patient presented for elective spinal surgery. Upon induction of anesthesia patient became bradycardic and a 4 second pause. The event was not recorded on the monitor strip. Cardiology was consulted and the patient was asymptomatic with normal HR. 70s. Cardiology followed the patient and  recommended serial EKGs, to continue on Tele, current meds and EP evaluation. Patient was evaluated by EP, Review of EKG revealed SR w/ first degree AV delay and RBBB, it was recommended to hold any AV renea blockers, and to keep pt NPO p MN for EP study today to assess extent of conduction disease, vs vagally mediated bradycardia. Pt underwent conduction study which revealed that patient bradycardia was likely vagally mediated and did not require PPM. Pt was cleared for OR by EP. Pt was taken tot he OR on 10/27, underwent L1-S1 TLIF and T10-pelvis instrumentation and fusion c/b CSF leak with intraop repair and plastic surgery closure of incision. Received 1u PRBC intraop for EBL 1500cc. Post op in the NSCU patient was hypotensive, placed on pressor support and transfused an additional 2u PRBC. Pt was subsequently weaned off pressor support.  Pt was extubated on post op day one and subsequently transferred from the NSCU POD # 4. both surgical DEANA drains were removed once outputs decreased.  Pt laid flat x 24 hours secondary to the CSF. Patient was treated for hyperkalemia postop. Pt outpatient cardiologist Dr. Doran followed the patient post operatively. on POD # 6 patient was started on Lisinopril 10mg once daily which he will titate in his office upon follow up. Endocrine was also following the patient for adequate glucose control. Palliative care was consulted for pain management with change in pain regimen and improvement in patients pain control. Patient leukocytosis continued to downtrend and patient remained afebrile without signs of infection. At baseline patient WBC is around 20. Patient was evaluated by PT and OT and recommended for acute rehab. Pt failed TOV on 11/1 , spears was replaced and patient was started on flomax, patient will need a TOV in acute rehab in 5-7 days from starting flomax. On day of discharge patient is OOB and ambulating with assistance, incisional pain well controlled and VSS.

## 2018-11-02 NOTE — PROGRESS NOTE ADULT - PROBLEM SELECTOR PLAN 5
patient pain well controlled. discussed with patient, girlfriend and PA. We will sign off as patient going to rehab. Please ensure discharge paperwork highlights that patient is on around the clock dilaudid that will likely need to be off titrated as pain improves. Hold for sedation.

## 2018-11-02 NOTE — PROGRESS NOTE ADULT - PROBLEM SELECTOR PLAN 2
c/w LT4 175 mcg daily PO  TSH - 1.84  outpatient TFTs every 3 months  pt will follow outpatient Endo.   -discussed w/wife and Neuro soo PURI  pager: [phone].
management per NSCU; possible transfer to post surgical floor for ongoing PT.  Being evaluated for inpatient acute rehab
management per neurosurgical team.  likely d/c to acute rehab
c/w LT4 175 mcg daily PO  TSH - 1.84  outpatient TFTs every 3 months  discussed w/pt and team  pager: [phone]
management per neurosurgical team.  Being evaluated for inpatient acute rehab
c/w LT4 175 mcg daily PO  TSH - 1.84  outpatient TFTs every 3 months  pt will follow outpatient Endo.   -discussed w/wife and Neuro soo PURI  pager: [phone]
c/w LT4 175 mcg daily PO  TSH - 1.84  outpatient TFTs every 3 months  pt will follow outpatient Endo.

## 2018-11-02 NOTE — OCCUPATIONAL THERAPY INITIAL EVALUATION ADULT - ADL RETRAINING, OT EVAL
Patient will dress lower body with minimal assistance, AE as needed in 2 weeks. Patient will dress upper body independently within 2 weeks

## 2018-11-02 NOTE — OCCUPATIONAL THERAPY INITIAL EVALUATION ADULT - LIVES WITH, PROFILE
spouse/Pt lives in a pvt home with spouse. 1 flight to bed and bath. 1 step to enter. +Stall. Owns straight cane

## 2018-11-02 NOTE — PROGRESS NOTE ADULT - PROBLEM SELECTOR PROBLEM 1
Acute pain
Acute pain
Spinal stenosis of lumbar region, unspecified whether neurogenic claudication present
Type 2 diabetes mellitus with microalbuminuria, without long-term current use of insulin
Acute pain
Type 2 diabetes mellitus with microalbuminuria, without long-term current use of insulin
Type 2 diabetes mellitus with microalbuminuria, without long-term current use of insulin
Type 2 diabetes mellitus with hyperglycemia, unspecified whether long term insulin use

## 2018-11-02 NOTE — DISCHARGE NOTE ADULT - PLAN OF CARE
increase mobility 1. Participate in physical therapy program to increase mobility.  2. Patient is on standing pain medication, titrate pain medication regimen as patients pain improves. Hold for sedation.   3. Return to the ER for worsening pain, new extremity weakness, fever or chills, redness around your incision, pus draining from your incision.  4. Keep the aquacell dressing clean, dry and intact to your back until Monday. On Monday remove the dressing and you may shower, avoiding direct streams of water onto your incision, do not scrub, do not soak in water, pat dry when finished. Do not apply any lotions or creams to your incision.   4. Outpatient follow up with Dr. Dunn ( plastic surgery) [phone] within one week for suture removal.   Outpatient follow up with Dr. House ( neurosurgery) in two weeks.  5. patient failed trial of void and spears catheter was reinserted on 11/1 and patient was started on flomax. Please remove spears in 5-7 days post starting flomax for another trial of void. 1. Resume Metformin and Januvia  2. monitor fingersticks with meals and bedtime  3. outpatient follow up with Dr. Rodriguez upon discharge from [facility] for your diabetes and your thyroid stable. normal WBC is around 20. Patient has been afebrile with no signs of infection. Continue Lisinopril 10mg daily as blood pressure allows. Outpatient follow up with cardiologist Dr. Doran upon discharge from [facility]. 1. Participate in physical therapy program to increase mobility.  2. Patient is on standing pain medication, titrate pain medication regimen as patients pain improves. Hold for sedation.   3. Return to the ER for worsening pain, new extremity weakness, fever or chills, redness around your incision, pus draining from your incision.  4. Keep the aquacell dressing clean, dry and intact to your back until Monday. On Monday remove the dressing and you may shower, avoiding direct streams of water onto your incision, do not scrub, do not soak in water, pat dry when finished. Do not apply any lotions or creams to your incision.   4. Outpatient follow up with Dr. Dunn ( plastic surgery) [phone] within one week for suture removal.   Outpatient follow up with Dr. House ( neurosurgery) in two weeks.  5. patient failed trial of void and spears catheter was reinserted on 11/1 and patient was started on flomax. Please remove spears in 5-7 days post starting flomax for another trial of void.  6. Follow up with Dr. House when to resume home aspirin 81mg. 1. Participate in physical therapy program to increase mobility.  2. Patient is on standing pain medication, titrate pain medication regimen as patients pain improves. Hold for sedation.   3. Return to the ER for worsening pain, new extremity weakness, fever or chills, redness around your incision, pus draining from your incision.  4. Keep the aquacell dressing clean, dry and intact to your back until Monday. On Monday remove the dressing and you may shower, avoiding direct streams of water onto your incision, do not scrub, do not soak in water, pat dry when finished. Do not apply any lotions or creams to your incision.   4. Outpatient follow up with Dr. Dunn ( plastic surgery) [phone] within one week for suture removal.   Outpatient follow up with Dr. House ( neurosurgery) in two weeks.  5. patient failed trial of void and spears catheter was reinserted on 11/1 and patient was started on flomax. Please remove spears in 5-7 days post starting flomax for another trial of void.  6. resume home aspirin 81mg.

## 2018-11-02 NOTE — PROGRESS NOTE ADULT - PROBLEM SELECTOR PROBLEM 3
Hypertension, unspecified type
Constipation due to opioid therapy

## 2018-11-02 NOTE — DISCHARGE NOTE ADULT - CARE PROVIDERS DIRECT ADDRESSES
[email or web address],DirectAddress_Unknown,DirectAddress_Unknown,DirectAddress_Unknown

## 2018-11-02 NOTE — OCCUPATIONAL THERAPY INITIAL EVALUATION ADULT - PRECAUTIONS/LIMITATIONS, REHAB EVAL
Operative course was notable for bradycardia responsive to glycopyrrolate, cerebrospinal fluid leak which was primarily repaired and EBL of 1500cc. Post-operatively, he had a ~40 minutes episode of hypotension to SBP in the 50smmHg, for which he was placed on pressor support and given an additional 2 units PRBC. Upon admission to NSCU, still hypotensive and at one point required triple pressor support. Extubated 10/28/18/spinal precautions/fall precautions/surgical precautions

## 2018-11-02 NOTE — H&P ADULT - ASSESSMENT
73 yo male HTN DM2, FERNANDO on CPAP, post op L1-5 posterior lumbar fusion and T10 pelvis instrumentation.  Course was complicated by hypotension  which required pressor support (initally with norepinephrine, neosynephrine and vaspressin)  with Gait Instability, ADL impairments and Functional impairments.      COMORBIDITES/ACTIVE MEDICAL ISSUES   #L1-L5 posterior lumbar fusion and T10 pelvis instrumentation  -Pain control with dilaudid  -Cyclobenzaprine and diazepam PRN  -WBAT    #Hypotension and bradycardia post-op, junctional rhythm likely 2/2 hyperkalemia resolved, nos NSR  - Lisinopril    #Hypothyroidism  -c/w levothyroxine    #DM  -Glargine  -SNEHAL    #BPH  -tamsulosin        Gait Instability, ADL impairments and Functional impairments: start Comprehensive Rehab Program of PT/OT       Pain Mgmt - Tylenol PRN, Oxycodone PRN  GI/Bowel Mgmt -  Continent c/w Colace, Senna,  Dulcolax PRN, Miralax PRN,  /Bladder Mgmt - Continent, PVR    FEN   - Diet - Consistent Carb  [CCHO, DASH/TLC]      Precautions / PROPHYLAXIS:   - Falls, Cardiac, Spinal,   - ortho: Weight bearing status: WBAT  - Lungs: Aspiration, Incentive Spirometer   - Pressure injury/Skin: Turn Q2hrs while in bed, OOB to Chair, PT/OT    - DVT: Lovenox, SCDs, TEDs     MEDICAL PROGNOSIS: GOOD            REHAB POTENTIAL: GOOD             ESTIMATED DISPOSITION: HOME WITH HOME CARE            ELOS: 10-14 Days   EXPECTED THERAPY:     P.T. 1hr/day       O.T. 1hr/day      S.L.P. 1hr/day     P&O Unnecessary     EXP FREQUENCY: 5 days per 7 day period     PRESCREEN COMPARISION:   I have reviewed the prescreen information and I have found no relevant changes between the preadmission screening and my post admission evaluation     RATIONALE FOR INPATIENT ADMISSION - Patient demonstrates the following: (check all that apply)  [X] Medically appropriate for rehabilitation admission  [X] Has attainable rehab goals with an appropriate initial discharge plan  [X] Has rehabilitation potential (expected to make a significant improvement within a reasonable period of time)   [X] Requires close medical management by a rehab physician, rehab nursing care, Hospitalist and comprehensive interdisciplinary team (including PT, OT, & or SLP, Prosthetics and Orthotics) 73 yo male HTN DM2, FERNANDO on CPAP, post op L1-5 posterior lumbar fusion and T10 pelvis instrumentation.  Course was complicated by hypotension  which required pressor support (initally with norepinephrine, neosynephrine and vaspressin)  with Gait Instability, ADL impairments and Functional impairments.      COMORBIDITES/ACTIVE MEDICAL ISSUES   #L1-L5 posterior lumbar fusion and T10 pelvis instrumentation  -Pain control with dilaudid  -Cyclobenzaprine and diazepam PRN  -WBAT    #Hypotension and bradycardia post-op, junctional rhythm likely 2/2 hyperkalemia resolved, nos NSR  - Lisinopril    #Hypothyroidism  -c/w levothyroxine    #DM  -Glargine  -SNEHAL    #BPH  -tamsulosin    #Urinary retention  -spears in place, remove spears in AM; straight cath > 350ml        Gait Instability, ADL impairments and Functional impairments: start Comprehensive Rehab Program of PT/OT       Pain Mgmt - Tylenol PRN, Dilaudid  GI/Bowel Mgmt -  Continent c/w Colace, Senna,  Dulcolax suppository PRN, Miralax,  /Bladder Mgmt - Continent, PVR q6h after removing spears in AM- straight cath > 350ml    FEN   - Diet - Consistent Carb  [CCHO, DASH/TLC]    Vitamin C 500 mg bid    Precautions / PROPHYLAXIS:   - Falls, Cardiac, Spinal,   - ortho: Weight bearing status: WBAT  - Lungs: Aspiration, Incentive Spirometer   - Pressure injury/Skin: Turn Q2hrs while in bed, OOB to Chair, PT/OT    - DVT: Lovenox, SCDs, TEDs     MEDICAL PROGNOSIS: GOOD            REHAB POTENTIAL: GOOD             ESTIMATED DISPOSITION: HOME WITH HOME CARE            ELOS: 10-14 Days   EXPECTED THERAPY:     P.T. 1.5 hr/day       O.T. 1.5 hr/day      S.L.P. 0     P&O Unnecessary     EXP FREQUENCY: 5 days per 7 day period     PRESCREEN COMPARISION:   I have reviewed the prescreen information and I have found no relevant changes between the preadmission screening and my post admission evaluation     RATIONALE FOR INPATIENT ADMISSION - Patient demonstrates the following: (check all that apply)  [X] Medically appropriate for rehabilitation admission  [X] Has attainable rehab goals with an appropriate initial discharge plan  [X] Has rehabilitation potential (expected to make a significant improvement within a reasonable period of time)   [X] Requires close medical management by a rehab physician, rehab nursing care, Hospitalist and comprehensive interdisciplinary team (including PT, OT, & or SLP, Prosthetics and Orthotics)      Case discussed with Dr. Gonzalez 71 yo male HTN DM2, FERNANDO on CPAP, post op L1-5 posterior lumbar fusion and T10 pelvis instrumentation.  Course was complicated by hypotension  which required pressor support (initially with norepinephrine, neosynephrine and vasopressin)  with Gait Instability, ADL impairments and Functional impairments.      COMORBIDITIES/ACTIVE MEDICAL ISSUES   #L1-L5 posterior lumbar fusion and T10 pelvis instrumentation  -Pain control with dilaudid  -Cyclobenzaprine and diazepam PRN  -WBAT  -spinal precautions  -Outpatient follow up with Dr. Dunn ( plastic surgery) [phone] within one week for suture removal.     #HTN.  Hypotension and bradycardia post-op, junctional rhythm likely 2/2 hyperkalemia resolved, nos NSR  - Lisinopril    #Hypothyroidism  -c/w levothyroxine    #DM-HbA1C 6.9 on 10/4  -Glargine  -SNEHAL  -resume home dose of Metformin 1000mg bid (also was on Januvia 100mg daily)  -Consistent carb diet    #BPH  -tamsulosin    #Urinary retention  -spears was in place, removed  spears this AM; straight cath > 350ml.  PVR this am 2cc    #Bowel regimen  -Colace, Miralax, Senna.  Dulc supp prn.  Add prunes daily        Gait Instability, ADL impairments and Functional impairments: start Comprehensive Rehab Program of PT/OT       Pain Mgmt - Tylenol PRN, Dilaudid  GI/Bowel Mgmt -  Continent c/w Colace, Senna,  Dulcolax suppository PRN, Miralax,  /Bladder Mgmt - Continent, PVR q6h after removing spears in AM- straight cath > 350ml    FEN   - Diet - Consistent Carb  [CCHO, DASH/TLC]    Vitamin C 500 mg bid    Precautions / PROPHYLAXIS:   - Falls, Cardiac, Spinal,   - ortho: Weight bearing status: WBAT  - Lungs: Aspiration, Incentive Spirometer   - Pressure injury/Skin: Turn Q2hrs while in bed, OOB to Chair, PT/OT    - DVT: Lovenox, SCDs, TEDs     MEDICAL PROGNOSIS: GOOD            REHAB POTENTIAL: GOOD             ESTIMATED DISPOSITION: HOME WITH HOME CARE            ELOS: 10-14 Days   EXPECTED THERAPY:     P.T. 1.5 hr/day       O.T. 1.5 hr/day      S.L.P. 0     P&O Unnecessary     EXP FREQUENCY: 5 days per 7 day period     PRESCREEN COMPARISION:   I have reviewed the prescreen information and I have found no relevant changes between the preadmission screening and my post admission evaluation     RATIONALE FOR INPATIENT ADMISSION - Patient demonstrates the following: (check all that apply)  [X] Medically appropriate for rehabilitation admission  [X] Has attainable rehab goals with an appropriate initial discharge plan  [X] Has rehabilitation potential (expected to make a significant improvement within a reasonable period of time)   [X] Requires close medical management by a rehab physician, rehab nursing care, Hospitalist and comprehensive interdisciplinary team (including PT, OT, & or SLP, Prosthetics and Orthotics)      Case discussed with Dr. Gonzalez 73 yo male HTN DM2, FERNANDO on CPAP, post op L1-5 posterior lumbar fusion and T10 pelvis instrumentation.  Course was complicated by hypotension  which required pressor support (initially with norepinephrine, neosynephrine and vasopressin)  with Gait Instability, ADL impairments and Functional impairments.      COMORBIDITIES/ACTIVE MEDICAL ISSUES   #L1-L5 posterior lumbar fusion and T10 pelvis instrumentation  -Pain control with dilaudid  -Cyclobenzaprine and diazepam PRN  -WBAT  -spinal precautions  -Outpatient follow up with Dr. Dunn ( plastic surgery) [phone] within one week for suture removal.   - 11/5 remove the dressing and may shower, avoiding direct streams of water onto your incision, do not scrub, do not soak in water, pat dry when finished    #HTN.  Hypotension and bradycardia post-op, junctional rhythm likely 2/2 hyperkalemia resolved, nos NSR  - Lisinopril    #Hypothyroidism  -c/w levothyroxine    #DM-HbA1C 6.9 on 10/4  -Glargine  -SNEHAL  -resume home dose of Metformin 1000mg bid (also was on Januvia 100mg daily)  -Consistent carb diet    #BPH  -tamsulosin    #Urinary retention  -spears was in place, removed  spears this AM; straight cath > 350ml.  PVR this am 2cc    #Leukocytosis-Known; monitored for CLL  +h/o CA of kidney, prostate, thyroid  -Afebrile  -monitor cbc.  CBC 19.3 on 8/6  -consider heme consult    #Bowel regimen  -Colace, Miralax, Senna.  Dulc supp prn.  Add prunes daily        Gait Instability, ADL impairments and Functional impairments: start Comprehensive Rehab Program of PT/OT       Pain Mgmt - Tylenol PRN, Dilaudid  GI/Bowel Mgmt -  Continent c/w Colace, Senna,  Dulcolax suppository PRN, Miralax,  /Bladder Mgmt - Continent, PVR q6h after removing spears in AM- straight cath > 350ml    FEN   - Diet - Consistent Carb  [CCHO, DASH/TLC]    Vitamin C 500 mg bid    Precautions / PROPHYLAXIS:   - Falls, Cardiac, Spinal,   - ortho: Weight bearing status: WBAT  - Lungs: Aspiration, Incentive Spirometer   - Pressure injury/Skin: Turn Q2hrs while in bed, OOB to Chair, PT/OT    - DVT: Lovenox, SCDs, TEDs     MEDICAL PROGNOSIS: GOOD            REHAB POTENTIAL: GOOD             ESTIMATED DISPOSITION: HOME WITH HOME CARE            ELOS: 10-14 Days   EXPECTED THERAPY:     P.T. 1.5 hr/day       O.T. 1.5 hr/day      S.L.P. 0     P&O Unnecessary     EXP FREQUENCY: 5 days per 7 day period     PRESCREEN COMPARISION:   I have reviewed the prescreen information and I have found no relevant changes between the preadmission screening and my post admission evaluation     RATIONALE FOR INPATIENT ADMISSION - Patient demonstrates the following: (check all that apply)  [X] Medically appropriate for rehabilitation admission  [X] Has attainable rehab goals with an appropriate initial discharge plan  [X] Has rehabilitation potential (expected to make a significant improvement within a reasonable period of time)   [X] Requires close medical management by a rehab physician, rehab nursing care, Hospitalist and comprehensive interdisciplinary team (including PT, OT, & or SLP, Prosthetics and Orthotics)      Case discussed with Dr. Gonzalez

## 2018-11-02 NOTE — PROGRESS NOTE ADULT - PROBLEM SELECTOR PLAN 1
Patient pain well controlled on current regimen. dilaudid 4mg po q6h ATC and has prn doses available. Would continue same regimen; will likely need to be off titrated while at rehab as post operative pain improves and mobility improves. Discussed with patient, girlfriend and PA.

## 2018-11-02 NOTE — H&P ADULT - NSHPPHYSICALEXAM_GEN_ALL_CORE
Vital Signs Last 24 Hrs  T(C): 36.7 (02 Nov 2018 18:49), Max: 36.8 (01 Nov 2018 21:07)  T(F): 98 (02 Nov 2018 18:49), Max: 98.3 (01 Nov 2018 21:07)  HR: 75 (02 Nov 2018 18:49) (70 - 87)  BP: 163/83 (02 Nov 2018 18:49) (147/78 - 169/77)  BP(mean): --  RR: 15 (02 Nov 2018 18:49) (15 - 18)  SpO2: 95% (02 Nov 2018 18:49) (92% - 98%)      PHYSICAL EXAM  Constitutional - NAD, Comfortable, nasal cannula  HEENT - NCAT, EOMI  Neck - Supple, No limited ROM  Chest - CTA bilaterally, No wheeze, No rhonchi, No crackles  Cardiovascular - RRR, S1S2, No murmurs  Abdomen - BS+, Soft, NTND  Extremities - No C/C/E, No calf tenderness   Neurologic Exam -                    Cognitive - Awake, Alert, AAO to self, place, date, year, situation     Communication - Fluent, No dysarthria     Cranial Nerves - CN 2-12 intact     Motor -                     LEFT    UE - 4/5                    RIGHT UE - 4/5                    LEFT    LE - HF 4/5, KE 4/5, DF 5/5, PF 5/5                    RIGHT LE - HF 4/5, KE 4/5, DF 5/5, PF 5/5        Sensory - Intact to LT     Reflexes - DTR Intact, No primitive reflexive     Coordination - FTN poor bilaterally Vital Signs Last 24 Hrs  T(C): 36.7 (02 Nov 2018 18:49), Max: 36.8 (01 Nov 2018 21:07)  T(F): 98 (02 Nov 2018 18:49), Max: 98.3 (01 Nov 2018 21:07)  HR: 75 (02 Nov 2018 18:49) (70 - 87)  BP: 163/83 (02 Nov 2018 18:49) (147/78 - 169/77)  BP(mean): --  RR: 15 (02 Nov 2018 18:49) (15 - 18)  SpO2: 95% (02 Nov 2018 18:49) (92% - 98%)    Vital Signs Last 24 Hrs  T(C): 37 (03 Nov 2018 08:48), Max: 37.3 (02 Nov 2018 21:33)  T(F): 98.6 (03 Nov 2018 08:48), Max: 99.1 (02 Nov 2018 21:33)  HR: 63 (03 Nov 2018 08:48) (63 - 80)  BP: 149/72 (03 Nov 2018 08:48) (149/72 - 163/83)  BP(mean): --  RR: 14 (03 Nov 2018 08:48) (14 - 15)  SpO2: 100% (03 Nov 2018 08:48) (95% - 100%)      PHYSICAL EXAM  Constitutional - NAD, Comfortable, nasal cannula  HEENT - NCAT, EOMI  Neck - Supple, No limited ROM  Chest - CTA bilaterally, No wheeze, No rhonchi, No crackles  Cardiovascular - RRR, S1S2, No murmurs  Abdomen - BS+, Soft, NTND  Extremities - No C/C/E, No calf tenderness   Neurologic Exam -                    Cognitive - Awake, Alert, AAO to self, place, date, year, situation     Communication - Fluent, No dysarthria     Cranial Nerves - CN 2-12 intact     Motor -                     LEFT    UE - 4/5                    RIGHT UE - 4/5                    LEFT    LE - HF 4/5, KE 4/5, DF 5/5, PF 5/5                    RIGHT LE - HF 4/5, KE 4/5, DF 5/5, PF 5/5        Sensory - Intact to LT     Reflexes - DTR Intact, No primitive reflexive     Coordination - FTN poor bilaterally  Back incision with aquacel dressings CDI.  No erythema or edema noted

## 2018-11-02 NOTE — PROGRESS NOTE ADULT - SUBJECTIVE AND OBJECTIVE BOX
Pt seen and examined. DEANA x2  removed by neuro sx-- per pt and pts wife  NAD    thoraco/lumbar incision healing well CDI with nylons  no erythema, no palpable collections, no drainage    A/P: Continue care per primary team  Cont dressing changes prn drainage  F/u with Dr. Clinton in 3 weeks for suture removal. Call for appt [phone]

## 2018-11-02 NOTE — DISCHARGE NOTE ADULT - CARE PLAN
Principal Discharge DX:	Spinal stenosis of lumbar region, unspecified whether neurogenic claudication present  Goal:	increase mobility  Assessment and plan of treatment:	1. Participate in physical therapy program to increase mobility.  2. Patient is on standing pain medication, titrate pain medication regimen as patients pain improves. Hold for sedation.   3. Return to the ER for worsening pain, new extremity weakness, fever or chills, redness around your incision, pus draining from your incision.  4. Keep the aquacell dressing clean, dry and intact to your back until Monday. On Monday remove the dressing and you may shower, avoiding direct streams of water onto your incision, do not scrub, do not soak in water, pat dry when finished. Do not apply any lotions or creams to your incision.   4. Outpatient follow up with Dr. Dunn ( plastic surgery) [phone] within one week for suture removal.   Outpatient follow up with Dr. House ( neurosurgery) in two weeks.  5. patient failed trial of void and spears catheter was reinserted on 11/1 and patient was started on flomax. Please remove spears in 5-7 days post starting flomax for another trial of void.  Secondary Diagnosis:	Type 2 diabetes mellitus with hyperglycemia, unspecified whether long term insulin use  Assessment and plan of treatment:	1. Resume Metformin and Januvia  2. monitor fingersticks with meals and bedtime  3. outpatient follow up with Dr. Rodriguez upon discharge from [facility] for your diabetes and your thyroid  Secondary Diagnosis:	CLL (chronic lymphocytic leukemia)  Assessment and plan of treatment:	stable. normal WBC is around 20. Patient has been afebrile with no signs of infection.  Secondary Diagnosis:	Hypertension, unspecified type  Assessment and plan of treatment:	Continue Lisinopril 10mg daily as blood pressure allows. Outpatient follow up with cardiologist Dr. Doran upon discharge from [facility]. Principal Discharge DX:	Spinal stenosis of lumbar region, unspecified whether neurogenic claudication present  Goal:	increase mobility  Assessment and plan of treatment:	1. Participate in physical therapy program to increase mobility.  2. Patient is on standing pain medication, titrate pain medication regimen as patients pain improves. Hold for sedation.   3. Return to the ER for worsening pain, new extremity weakness, fever or chills, redness around your incision, pus draining from your incision.  4. Keep the aquacell dressing clean, dry and intact to your back until Monday. On Monday remove the dressing and you may shower, avoiding direct streams of water onto your incision, do not scrub, do not soak in water, pat dry when finished. Do not apply any lotions or creams to your incision.   4. Outpatient follow up with Dr. Dunn ( plastic surgery) [phone] within one week for suture removal.   Outpatient follow up with Dr. House ( neurosurgery) in two weeks.  5. patient failed trial of void and spears catheter was reinserted on 11/1 and patient was started on flomax. Please remove spears in 5-7 days post starting flomax for another trial of void.  6. Follow up with Dr. House when to resume home aspirin 81mg.  Secondary Diagnosis:	Type 2 diabetes mellitus with hyperglycemia, unspecified whether long term insulin use  Assessment and plan of treatment:	1. Resume Metformin and Januvia  2. monitor fingersticks with meals and bedtime  3. outpatient follow up with Dr. Rodriguez upon discharge from [facility] for your diabetes and your thyroid  Secondary Diagnosis:	CLL (chronic lymphocytic leukemia)  Assessment and plan of treatment:	stable. normal WBC is around 20. Patient has been afebrile with no signs of infection.  Secondary Diagnosis:	Hypertension, unspecified type  Assessment and plan of treatment:	Continue Lisinopril 10mg daily as blood pressure allows. Outpatient follow up with cardiologist Dr. Doran upon discharge from [facility]. Principal Discharge DX:	Spinal stenosis of lumbar region, unspecified whether neurogenic claudication present  Goal:	increase mobility  Assessment and plan of treatment:	1. Participate in physical therapy program to increase mobility.  2. Patient is on standing pain medication, titrate pain medication regimen as patients pain improves. Hold for sedation.   3. Return to the ER for worsening pain, new extremity weakness, fever or chills, redness around your incision, pus draining from your incision.  4. Keep the aquacell dressing clean, dry and intact to your back until Monday. On Monday remove the dressing and you may shower, avoiding direct streams of water onto your incision, do not scrub, do not soak in water, pat dry when finished. Do not apply any lotions or creams to your incision.   4. Outpatient follow up with Dr. Dunn ( plastic surgery) [phone] within one week for suture removal.   Outpatient follow up with Dr. House ( neurosurgery) in two weeks.  5. patient failed trial of void and spears catheter was reinserted on 11/1 and patient was started on flomax. Please remove spears in 5-7 days post starting flomax for another trial of void.  6. resume home aspirin 81mg.  Secondary Diagnosis:	Type 2 diabetes mellitus with hyperglycemia, unspecified whether long term insulin use  Assessment and plan of treatment:	1. Resume Metformin and Januvia  2. monitor fingersticks with meals and bedtime  3. outpatient follow up with Dr. Rodriguez upon discharge from [facility] for your diabetes and your thyroid  Secondary Diagnosis:	CLL (chronic lymphocytic leukemia)  Assessment and plan of treatment:	stable. normal WBC is around 20. Patient has been afebrile with no signs of infection.  Secondary Diagnosis:	Hypertension, unspecified type  Assessment and plan of treatment:	Continue Lisinopril 10mg daily as blood pressure allows. Outpatient follow up with cardiologist Dr. Doran upon discharge from [facility].

## 2018-11-02 NOTE — PROGRESS NOTE ADULT - PROBLEM SELECTOR PLAN 4
pt will follow outpatient Endo   outpatient TFTs, Calcitonin, TG, CEA, US thyroid.
continue zofran with administration of ATC dilaudid to prevent nausea.
ongoing pain management. will f/u 11/1. Please call with questions
patient requesting advancement of diet which may help with taking opiates.  Recommend addition of zofran q6h with dilaudid to help.

## 2018-11-02 NOTE — PROGRESS NOTE ADULT - SUBJECTIVE AND OBJECTIVE BOX
SUBJECTIVE AND OBJECTIVE:  INTERVAL HPI/OVERNIGHT EVENTS:    DNR on chart:   Allergies    codeine (Vomiting; Headache)  dogs, cats (Short breath)  dust (Rhinitis)  mold (Rhinitis)  morphine (Vomiting; Headache)  narcotic analgesics (Vomiting; Headache)    Intolerances    MEDICATIONS  (STANDING):  atorvastatin 80 milliGRAM(s) Oral at bedtime  chlorhexidine 4% Liquid 1 Application(s) Topical <User Schedule>  dextrose 5%. 1000 milliLiter(s) (50 mL/Hr) IV Continuous <Continuous>  dextrose 50% Injectable 12.5 Gram(s) IV Push once  dextrose 50% Injectable 25 Gram(s) IV Push once  dextrose 50% Injectable 25 Gram(s) IV Push once  enoxaparin Injectable 40 milliGRAM(s) SubCutaneous <User Schedule>  HYDROmorphone   Tablet 4 milliGRAM(s) Oral every 6 hours  insulin glargine Injectable (LANTUS) 16 Unit(s) SubCutaneous at bedtime  insulin glargine Injectable (LANTUS) 8 Unit(s) SubCutaneous at bedtime  insulin lispro (HumaLOG) corrective regimen sliding scale   SubCutaneous three times a day before meals  insulin lispro (HumaLOG) corrective regimen sliding scale   SubCutaneous at bedtime  levothyroxine 175 MICROGram(s) Oral daily  lisinopril 10 milliGRAM(s) Oral daily  ondansetron    Tablet 4 milliGRAM(s) Oral every 6 hours  tamsulosin 0.4 milliGRAM(s) Oral at bedtime    MEDICATIONS  (PRN):  acetaminophen   Tablet .. 650 milliGRAM(s) Oral every 6 hours PRN Temp greater or equal to 38C (100.4F), Mild Pain (1 - 3)  acetaminophen 325 mG/butalbital 50 mG/caffeine 40 mG 1 Tablet(s) Oral every 8 hours PRN HA  dextrose 40% Gel 15 Gram(s) Oral once PRN Blood Glucose LESS THAN 70 milliGRAM(s)/deciliter  diazepam    Tablet 5 milliGRAM(s) Oral every 8 hours PRN muscle spasm  glucagon  Injectable 1 milliGRAM(s) IntraMuscular once PRN Glucose LESS THAN 70 milligrams/deciliter  HYDROmorphone   Tablet 2 milliGRAM(s) Oral every 3 hours PRN miild mod pain  HYDROmorphone   Tablet 4 milliGRAM(s) Oral every 4 hours PRN Severe Pain (7 - 10)  HYDROmorphone  Injectable 1 milliGRAM(s) IV Push every 2 hours PRN breakthrough severe pain  naloxone Injectable 0.1 milliGRAM(s) IV Push every 3 minutes PRN For ANY of the following changes in patient status:  A. RR LESS THAN 10 breaths per minute, B. Oxygen saturation LESS THAN 90%, C. Sedation score of 6      ITEMS UNCHECKED ARE NOT PRESENT    PRESENT SYMPTOMS: [ ]Unable to obtain due to poor mentation   Source if other than patient:  [ ]Family   [ ]Team     Pain (Impact on QOL):    Location:  Minimal acceptable level (0-10 scale):                   Aggrevating factors:  Quality:  Radiation:  Severity (0-10 scale):    Timing:    Dyspnea:                           [ ]Mild [ ]Moderate [ ]Severe  Anxiety:                             [ ]Mild [ ]Moderate [ ]Severe  Fatigue:                             [ ]Mild [ ]Moderate [ ]Severe  Nausea:                             [ ]Mild [ ]Moderate [ ]Severe  Loss of appetite:              [ ]Mild [ ]Moderate [ ]Severe  Constipation:                    [ ]Mild [ ]Moderate [ ]Severe    PAIN AD Score:	  [email or web address] (Ctrl + left click to view)    Other Symptoms:  [ ]All other review of systems negative     Karnofsky Performance Score/Palliative Performance Status Version 2:         %  PHYSICAL EXAM:  Vital Signs Last 24 Hrs  T(C): 36.7 (02 Nov 2018 08:42), Max: 36.8 (01 Nov 2018 21:07)  T(F): 98.1 (02 Nov 2018 08:42), Max: 98.3 (01 Nov 2018 21:07)  HR: 87 (02 Nov 2018 11:42) (70 - 87)  BP: 147/78 (02 Nov 2018 11:42) (147/78 - 169/77)  BP(mean): --  RR: 18 (02 Nov 2018 08:42) (18 - 18)  SpO2: 97% (02 Nov 2018 11:42) (92% - 98%) I&O's Summary    01 Nov 2018 07:01  -  02 Nov 2018 07:00  --------------------------------------------------------  IN: 750 mL / OUT: 900 mL / NET: -150 mL    02 Nov 2018 07:01  -  02 Nov 2018 12:34  --------------------------------------------------------  IN: 180 mL / OUT: 0 mL / NET: 180 mL     GENERAL:  [ ]Alert  [ ]Oriented x   [ ]Lethargic  [ ]Cachexia  [ ]Unarousable  [ ]Verbal  [ ]Non-Verbal  Behavioral:   [ ] Anxiety  [ ] Delirium [ ] Agitation [ ] Other  HEENT:  [ ]Normal   [ ]Dry mouth   [ ]ET Tube/Trach  [ ]Oral lesions  PULMONARY:   [ ]Clear [ ]Tachypnea  [ ]Audible excessive secretions   [ ]Rhonchi        [ ]Right [ ]Left [ ]Bilateral  [ ]Crackles        [ ]Right [ ]Left [ ]Bilateral  [ ]Wheezing     [ ]Right [ ]Left [ ]Bilateral  CARDIOVASCULAR:    [ ]Regular [ ]Irregular [ ]Tachy  [ ]Odilon [ ]Murmur [ ]Other  GASTROINTESTINAL:  [ ]Soft  [ ]Distended   [ ]+BS  [ ]Non tender [ ]Tender  [ ]PEG [ ]OGT/ NGT   Last BM:    GENITOURINARY:  [ ]Normal [ ] Incontinent   [ ]Oliguria/Anuria   [ ]Ortiz  MUSCULOSKELETAL:   [ ]Normal   [ ]Weakness  [ ]Bed/Wheelchair bound [ ]Edema  NEUROLOGIC:   [ ]No focal deficits  [ ] Cognitive impairment  [ ] Dysphagia [ ]Dysarthria [ ] Paresis [ ]Other   SKIN:   [ ]Normal   [ ]Pressure ulcer(s)  [ ]Rash    CRITICAL CARE:  [ ] Shock Present  [ ]Septic [ ]Cardiogenic [ ]Neurologic [ ]Hypovolemic  [ ]  Vasopressors [ ]  Inotropes   [ ] Respiratory failure present  [ ] Acute  [ ] Chronic [ ] Hypoxic  [ ] Hypercarbic [ ] Other  [ ] Other organ failure     LABS:                        10.3   20.08 )-----------( 191      ( 02 Nov 2018 10:23 )             36.2   11-02    142  |  105  |  9   ----------------------------<  135<H>  4.0   |  26  |  0.69    Ca    9.1      02 Nov 2018 07:40          RADIOLOGY & ADDITIONAL STUDIES:    Protein Calorie Malnutrition:  [ ] PPSV2 < or = 30%  [ ] significant weight loss [ ] poor nutritional intake [ ] amasarca [ ] catabolic state Albumin, Serum: 2.9 g/dL (10-28-18 @ 21:46)  Artificial Nutrition [ ]     REFERRALS:   [ ]Chaplaincy  [ ] Hospice  [ ]Child Life  [ ]Social Work  [ ]Case management [ ]Holistic Therapy   Goals of Care Document: SUBJECTIVE AND OBJECTIVE: Patient more awake, alert, sitting in chair waiting for lunch. In good spirits. states pain is well controlled     INTERVAL HPI/OVERNIGHT EVENTS: no acute overnight events.     DNR on chart:   Allergies    codeine (Vomiting; Headache)  dogs, cats (Short breath)  dust (Rhinitis)  mold (Rhinitis)  morphine (Vomiting; Headache)  narcotic analgesics (Vomiting; Headache)    Intolerances    MEDICATIONS  (STANDING):  atorvastatin 80 milliGRAM(s) Oral at bedtime  chlorhexidine 4% Liquid 1 Application(s) Topical <User Schedule>  dextrose 5%. 1000 milliLiter(s) (50 mL/Hr) IV Continuous <Continuous>  dextrose 50% Injectable 12.5 Gram(s) IV Push once  dextrose 50% Injectable 25 Gram(s) IV Push once  dextrose 50% Injectable 25 Gram(s) IV Push once  enoxaparin Injectable 40 milliGRAM(s) SubCutaneous <User Schedule>  HYDROmorphone   Tablet 4 milliGRAM(s) Oral every 6 hours  insulin glargine Injectable (LANTUS) 16 Unit(s) SubCutaneous at bedtime  insulin glargine Injectable (LANTUS) 8 Unit(s) SubCutaneous at bedtime  insulin lispro (HumaLOG) corrective regimen sliding scale   SubCutaneous three times a day before meals  insulin lispro (HumaLOG) corrective regimen sliding scale   SubCutaneous at bedtime  levothyroxine 175 MICROGram(s) Oral daily  lisinopril 10 milliGRAM(s) Oral daily  ondansetron    Tablet 4 milliGRAM(s) Oral every 6 hours  tamsulosin 0.4 milliGRAM(s) Oral at bedtime    MEDICATIONS  (PRN):  acetaminophen   Tablet .. 650 milliGRAM(s) Oral every 6 hours PRN Temp greater or equal to 38C (100.4F), Mild Pain (1 - 3)  acetaminophen 325 mG/butalbital 50 mG/caffeine 40 mG 1 Tablet(s) Oral every 8 hours PRN HA  dextrose 40% Gel 15 Gram(s) Oral once PRN Blood Glucose LESS THAN 70 milliGRAM(s)/deciliter  diazepam    Tablet 5 milliGRAM(s) Oral every 8 hours PRN muscle spasm  glucagon  Injectable 1 milliGRAM(s) IntraMuscular once PRN Glucose LESS THAN 70 milligrams/deciliter  HYDROmorphone   Tablet 2 milliGRAM(s) Oral every 3 hours PRN miild mod pain  HYDROmorphone   Tablet 4 milliGRAM(s) Oral every 4 hours PRN Severe Pain (7 - 10)  HYDROmorphone  Injectable 1 milliGRAM(s) IV Push every 2 hours PRN breakthrough severe pain  naloxone Injectable 0.1 milliGRAM(s) IV Push every 3 minutes PRN For ANY of the following changes in patient status:  A. RR LESS THAN 10 breaths per minute, B. Oxygen saturation LESS THAN 90%, C. Sedation score of 6      ITEMS UNCHECKED ARE NOT PRESENT    PRESENT SYMPTOMS: [ ]Unable to obtain due to poor mentation   Source if other than patient:  [ ]Family   [ ]Team     Pain (Impact on QOL):  at surgical site  Location:back  Minimal acceptable level (0-10 scale):   5                Aggrevating factors: in and out of bed  Quality: sharp  Radiation:  Severity (0-10 scale):  8  Timing: with transfers    Dyspnea:                           [ ]Mild [ ]Moderate [ ]Severe  Anxiety:                             [ ]Mild [ ]Moderate [ ]Severe  Fatigue:                             [ ]Mild [ ]Moderate [ ]Severe  Nausea:                             [ x]Mild [ ]Moderate [ ]Severe  Loss of appetite:              [ ]Mild [ ]Moderate [ ]Severe  Constipation:                    [ ]Mild [ ]Moderate [ ]Severe    PAIN AD Score:	  [email or web address] (Ctrl + left click to view)    Other Symptoms:  [ ]All other review of systems negative     Karnofsky Performance Score/Palliative Performance Status Version 2:       40-50  %  PHYSICAL EXAM:  Vital Signs Last 24 Hrs  T(C): 36.7 (02 Nov 2018 08:42), Max: 36.8 (01 Nov 2018 21:07)  T(F): 98.1 (02 Nov 2018 08:42), Max: 98.3 (01 Nov 2018 21:07)  HR: 87 (02 Nov 2018 11:42) (70 - 87)  BP: 147/78 (02 Nov 2018 11:42) (147/78 - 169/77)  BP(mean): --  RR: 18 (02 Nov 2018 08:42) (18 - 18)  SpO2: 97% (02 Nov 2018 11:42) (92% - 98%) I&O's Summary    01 Nov 2018 07:01  -  02 Nov 2018 07:00  --------------------------------------------------------  IN: 750 mL / OUT: 900 mL / NET: -150 mL    02 Nov 2018 07:01  -  02 Nov 2018 12:34  --------------------------------------------------------  IN: 180 mL / OUT: 0 mL / NET: 180 mL     GENERAL:  [ x]Alert  [x]Oriented x3   [ ]Lethargic  [ ]Cachexia  [ ]Unarousable  [ ]Verbal  [ ]Non-Verbal  Behavioral:   [ ] Anxiety  [ ] Delirium [ ] Agitation [ ] Other  HEENT:  [ x]Normal   [ ]Dry mouth   [ ]ET Tube/Trach  [ ]Oral lesions  PULMONARY:   [x ]Clear [ ]Tachypnea  [ ]Audible excessive secretions   [ ]Rhonchi        [ ]Right [ ]Left [ ]Bilateral  [ ]Crackles        [ ]Right [ ]Left [ ]Bilateral  [ ]Wheezing     [ ]Right [ ]Left [ ]Bilateral  CARDIOVASCULAR:    [x ]Regular [ ]Irregular [ ]Tachy  [ ]Odilon [ ]Murmur [ ]Other  GASTROINTESTINAL:  [x ]Soft  [ ]Distended   [ x]+BS  [ ]Non tender [ ]Tender  [ ]PEG [ ]OGT/ NGT   Last BM: 10/31   GENITOURINARY:  [x ]Normal [ ] Incontinent   [ ]Oliguria/Anuria   [ ]Ortiz  MUSCULOSKELETAL:   [ ]Normal   [ x]Weakness  [ ]Bed/Wheelchair bound [ ]Edema  NEUROLOGIC:   [ x]No focal deficits  [ ] Cognitive impairment  [ ] Dysphagia [ ]Dysarthria [ ] Paresis [ ]Other   SKIN: healing surgical scar on back  [ x]Normal   [ ]Pressure ulcer(s)  [ ]Rash    CRITICAL CARE: none  [ ] Shock Present  [ ]Septic [ ]Cardiogenic [ ]Neurologic [ ]Hypovolemic  [ ]  Vasopressors [ ]  Inotropes   [ ] Respiratory failure present  [ ] Acute  [ ] Chronic [ ] Hypoxic  [ ] Hypercarbic [ ] Other  [ ] Other organ failure     LABS:                        10.3   20.08 )-----------( 191      ( 02 Nov 2018 10:23 )             36.2   11-02    142  |  105  |  9   ----------------------------<  135<H>  4.0   |  26  |  0.69    Ca    9.1      02 Nov 2018 07:40      RADIOLOGY & ADDITIONAL STUDIES: no new imaging studies    Protein Calorie Malnutrition: none  [ ] PPSV2 < or = 30%  [ ] significant weight loss [ ] poor nutritional intake [ ] anasarca [ ] catabolic state Albumin, Serum: 2.9 g/dL (10-28-18 @ 21:46)  Artificial Nutrition [ ]     REFERRALS:  none  Goals of Care Document: none

## 2018-11-02 NOTE — H&P ADULT - NSHPREVIEWOFSYSTEMS_GEN_ALL_CORE
REVIEW OF SYSTEMS  Constitutional: No fever, No Chills, No fatigue  HEENT: No eye pain, No visual disturbances, No difficulty hearing  Pulm: No cough,  No shortness of breath  Cardio: No chest pain, No palpitations  GI:  No abdominal pain, No nausea, No vomiting, No diarrhea, No constipation  : No dysuria, No frequency, No hematuria  Neuro: No headaches, No memory loss, No loss of strength, No numbness, No tremors  Skin: No itching, No rashes, No lesions   Endo: No temperature intolerance  MSK: No joint pain, No joint swelling, No muscle pain, No Neck or back pain  Psych:  No depression, No anxiety REVIEW OF SYSTEMS  Constitutional: No fever, No Chills, No fatigue  HEENT: No eye pain, No visual disturbances, No difficulty hearing  Pulm: No cough,  No shortness of breath  Cardio: No chest pain, No palpitations  GI:  No abdominal pain, No nausea, No vomiting, No diarrhea, No constipation  Last BM 11/1  : No dysuria, No frequency, No hematuria  Ortiz removed today-voiding   Neuro: No headaches, No memory loss, No loss of strength, No numbness, No tremors  Skin: No itching, No rashes, No lesions   Endo: No temperature intolerance  MSK: No joint pain, No joint swelling, No muscle pain, No Neck or back pain  Psych:  No depression, No anxiety

## 2018-11-02 NOTE — H&P ADULT - HISTORY OF PRESENT ILLNESS
72M with history of T2DM, HTN, HLD, FERNANDO on CPAP, Pneumonia, CLL, Prostate Cancer s/p resection 2003, thyroid cancer s/p thyroidectomy 8/2018, renal cancer s/p partial nephrectomy and spinal stenosis with acquired scoliosis who initially presented 10/18/18 for L1-5 posterior lumbar fusion and T10-pelvis instrumentation and fusion but case was aborted due to 4 second pause after induction with propofol who returned 10/27/18 for surgery. Of note, he had a prior Holter monitoring study that revealed nocturnal bradycardia with pauses. 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 On 10/27/18, he underwent placement of temporary pacer and L1-S1 transforaminal interbody fusion, T10-pelvis instrumented fusion and Plastics assisted closure. Operative course was notable for bradycardia responsive to glycopyrrolate, cerebrospinal fluid leak which was primarily repaired and EBL of 1500cc. He received 2 units PRBC intra-op. Post-operatively, he had a ~40 minutes episode of hypotension to SBP in the 50smmHg, for which he was placed on pressor support and given an additional 2 units PRBC.  On current encounter patient complains of fatigue and back pain and weakness.  Patient denies bowel or bladder dysfunction, sensory impairment.

## 2018-11-02 NOTE — H&P ADULT - ATTENDING COMMENTS
Agree with above.  Pt is a 71 yo male HTN DM2, FERNANDO on CPAP, post op L1-5 posterior lumbar fusion and T10 pelvis instrumentation presenting with Gait Instability, ADL impairments and Functional impairments.  Pt requires comprehensive rehab with physician management of comorbidities to achieve mod I and for safe discharge to home

## 2018-11-02 NOTE — DISCHARGE NOTE ADULT - PATIENT PORTAL LINK FT
You can access the [facility] Patient Portal, offered by [facility], by registering with the following website: [email or web address]

## 2018-11-02 NOTE — DISCHARGE NOTE ADULT - SECONDARY DIAGNOSIS.
Type 2 diabetes mellitus with hyperglycemia, unspecified whether long term insulin use CLL (chronic lymphocytic leukemia) Hypertension, unspecified type

## 2018-11-02 NOTE — PROGRESS NOTE ADULT - SUBJECTIVE AND OBJECTIVE BOX
HPI:  Pt feeling well without complaint  PAST MEDICAL & SURGICAL HISTORY:  Former smoker, stopped smoking many years ago  CLL (chronic lymphocytic leukemia)  Sleep apnea, unspecified type  Spinal stenosis of lumbar region, unspecified whether neurogenic claudication present  Leukocytosis, unspecified type: monitored for CLL  Malignant neoplasm of kidney parenchyma, right: s/p surgery  Thyroid nodule  Malignant neoplasm of thyroid gland  Prostate cancer: 2003, s/p prostatectomy, RT 2009 x 40 days  Hypertension, unspecified type  Diabetes mellitus type 2 in obese  H/O thyroidectomy: 2016  History of strabismus surgery: b/l 1958  S/P left knee arthroscopy: 1998  H/O ventral hernia repair: 1997  H/O radical prostatectomy: 2003  H/O partial nephrectomy: right, 2009  History of total knee replacement, right: 2012, scoped 1998      Allergies    codeine (Vomiting; Headache)  dogs, cats (Short breath)  dust (Rhinitis)  mold (Rhinitis)  morphine (Vomiting; Headache)  narcotic analgesics (Vomiting; Headache)    Intolerances          MEDICATIONS  (STANDING):  atorvastatin 80 milliGRAM(s) Oral at bedtime  chlorhexidine 4% Liquid 1 Application(s) Topical <User Schedule>  dextrose 5%. 1000 milliLiter(s) (50 mL/Hr) IV Continuous <Continuous>  dextrose 50% Injectable 12.5 Gram(s) IV Push once  dextrose 50% Injectable 25 Gram(s) IV Push once  dextrose 50% Injectable 25 Gram(s) IV Push once  enoxaparin Injectable 40 milliGRAM(s) SubCutaneous <User Schedule>  HYDROmorphone   Tablet 4 milliGRAM(s) Oral every 6 hours  insulin glargine Injectable (LANTUS) 16 Unit(s) SubCutaneous at bedtime  insulin glargine Injectable (LANTUS) 8 Unit(s) SubCutaneous at bedtime  insulin lispro (HumaLOG) corrective regimen sliding scale   SubCutaneous three times a day before meals  insulin lispro (HumaLOG) corrective regimen sliding scale   SubCutaneous at bedtime  levothyroxine 175 MICROGram(s) Oral daily  ondansetron    Tablet 4 milliGRAM(s) Oral every 6 hours  tamsulosin 0.4 milliGRAM(s) Oral at bedtime    MEDICATIONS  (PRN):  acetaminophen   Tablet .. 650 milliGRAM(s) Oral every 6 hours PRN Temp greater or equal to 38C (100.4F), Mild Pain (1 - 3)  acetaminophen 325 mG/butalbital 50 mG/caffeine 40 mG 1 Tablet(s) Oral every 8 hours PRN HA  dextrose 40% Gel 15 Gram(s) Oral once PRN Blood Glucose LESS THAN 70 milliGRAM(s)/deciliter  diazepam    Tablet 5 milliGRAM(s) Oral every 8 hours PRN muscle spasm  glucagon  Injectable 1 milliGRAM(s) IntraMuscular once PRN Glucose LESS THAN 70 milligrams/deciliter  HYDROmorphone   Tablet 2 milliGRAM(s) Oral every 3 hours PRN miild mod pain  HYDROmorphone   Tablet 4 milliGRAM(s) Oral every 4 hours PRN Severe Pain (7 - 10)  HYDROmorphone  Injectable 1 milliGRAM(s) IV Push every 2 hours PRN breakthrough severe pain  naloxone Injectable 0.1 milliGRAM(s) IV Push every 3 minutes PRN For ANY of the following changes in patient status:  A. RR LESS THAN 10 breaths per minute, B. Oxygen saturation LESS THAN 90%, C. Sedation score of 6            Vital Signs Last 24 Hrs  T(C): 36.7 (02 Nov 2018 08:42), Max: 36.8 (01 Nov 2018 21:07)  T(F): 98.1 (02 Nov 2018 08:42), Max: 98.3 (01 Nov 2018 21:07)  HR: 70 (02 Nov 2018 08:42) (70 - 83)  BP: 156/84 (02 Nov 2018 08:42) (149/70 - 169/77)  BP(mean): --  RR: 18 (02 Nov 2018 08:42) (18 - 18)  SpO2: 98% (02 Nov 2018 08:42) (92% - 98%)  Daily     Daily   I&O's Detail    01 Nov 2018 07:01  -  02 Nov 2018 07:00  --------------------------------------------------------  IN:    Oral Fluid: 750 mL  Total IN: 750 mL    OUT:    Indwelling Catheter - Urethral: 900 mL  Total OUT: 900 mL    Total NET: -150 mL          Physical Exam  Pt without c/o nad  Neck without JVD  Lungs dec BS bases  Cor s1s2 2/6 taina rusb  Abd soft  Ext without edema   Pulses +2 DPwithout focal deficit  LABS            11-02    142  |  105  |  9   ----------------------------<  135<H>  4.0   |  26  |  0.69    Ca    9.1      02 Nov 2018 07:40          Assessment and Plan:  73 yo male HTN DM2, FERNANDO on CPAP, post op L1-5 posterior lumbar fusion and T10 pelvis instrumentation.  Course was complicated by hypotenson  which required pressor support (initally with norepinephrine, neosynephrine and vaspressin), which were tapered off (norepi dc'd last night) and a junctional rhythm  most likely 2/2 hyperkalemia.  Pt now in RSR No c/o cp or sob  Pt now Hemodynamically stable, normokalemic in RSR  restart ACE inhibitor lisinopril at 10mg daily and will titrate as tolerated and check bmp

## 2018-11-02 NOTE — OCCUPATIONAL THERAPY INITIAL EVALUATION ADULT - ADDITIONAL COMMENTS
CT Thoracic spine 10/27- Status post lower thoracic and lumbar spine fusion with hardware. Expected overlying soft tissue surgical changes.  CT Lumbar 10/27- Patient is status post lower thoracic and lumbar spine fusion with rods and interpedicular screws and intervertebral disc spacers from L1 through S1 with pelvic stabilization. There are L1-L4 laminectomies. The hardware is intact with no periprosthetic lucency or fracture. There is multilevel intervertebral disc space loss and anterior endplate osteophyte formation. There is sclerosis of the L2-L4 vertebral bodies, likely due to degenerative disease. There is no acute compression deformity of the thoracolumbar spine.

## 2018-11-02 NOTE — DISCHARGE NOTE ADULT - NS AS ACTIVITY OBS
Walking-Outdoors allowed/Walking-Indoors allowed/Participate in physical therapy program. Stairs and ambulating with assistance/Stairs allowed/Do not drive or operate machinery/No Heavy lifting/straining

## 2018-11-02 NOTE — OCCUPATIONAL THERAPY INITIAL EVALUATION ADULT - DIAGNOSIS, OT EVAL
Patient with decreased ADL status and impairments with functional mobility due to Patient with decreased ADL status and impairments with functional mobility due to decreased balance, ROM, strength

## 2018-11-02 NOTE — DISCHARGE NOTE ADULT - CARE PROVIDER_API CALL
Cecilia House (DO), Neurological Surgery  [address]  [address]  [place] [address]  Phone: [phone]  Fax: [phone]    Samson Doran (MD), Cardiovascular Disease; Internal Medicine  [address]  [place] [address]  Phone: [phone]  Fax: [phone]    Nikolas Rodriguez (DO), EndocrinologyMetabDiabetes; Internal Medicine  [address] [address]  Phone: [phone]  Fax: [phone]    Abelardo Lozoya), Surgery  [address] [place] [address]  Phone: [phone]  Fax: [phone]

## 2018-11-02 NOTE — PROGRESS NOTE ADULT - PROVIDER SPECIALTY LIST ADULT
Cardiology
Cardiology
Endocrinology
NSICU
Neurosurgery
Palliative Care
Palliative Care
Plastic Surgery
Endocrinology
Neurosurgery
Palliative Care

## 2018-11-02 NOTE — H&P ADULT - NSHPSOCIALHISTORY_GEN_ALL_CORE
FUNCTIONAL HISTORY  Pt lives in [place] w/ wife, 1 SHANICE, 3-4 steps to main floor +HR, 7 steps to bedroom +HR. PLOF: Independent w/ all functional mobility and ADL's w/ no AD.    CURRENT FUNCTIONAL STATUS  Bed mobility: mod assist  Transfers: mod assist  Ambulation mod A

## 2018-11-02 NOTE — PROGRESS NOTE ADULT - PROBLEM SELECTOR PLAN 1
If remains Inpatient:  -test BG AC/HS  -Give Lantus 12 units QHS  -c/w Humalog low correction scale AC and Low HS scale  -Discharge plan: Can restart Metformin 1gm BID and Januvia 100mg daily at rehab. No need for insulin at rehab facility.   follow up  with outpatient endo Dr. Nikolas Rodriguez in January

## 2018-11-02 NOTE — PROGRESS NOTE ADULT - PROBLEM SELECTOR PROBLEM 2
Spinal stenosis of lumbar region, unspecified whether neurogenic claudication present
Postoperative hypothyroidism
Spinal stenosis of lumbar region, unspecified whether neurogenic claudication present
Postoperative hypothyroidism
Spinal stenosis of lumbar region, unspecified whether neurogenic claudication present
Postoperative hypothyroidism
Postoperative hypothyroidism

## 2018-11-02 NOTE — DISCHARGE NOTE ADULT - PAIN PRESENT
7/23/2018         RE: Markell Ramsey  [address] [id number]        Dear Colleague,    Thank you for referring your patient, Markell Rmasey, to the [facility]. Please see a copy of my visit note below.    SUBJECTIVE:                                                               Markell Ramsey presents today to our Allergy Clinic at [facility]  for a follow up visit.    As you know, he is a 57-year-old male with moderate persistent asthma, allergic rhinitis, recurrent sinus infection/bronchitis and decreased IgM.      November 2017:Normal CH 50. Normal total IgE.  Normal total IgG.  Normal IgG subclasses.   Normal total IgA level. Decreased IgM level on multiple occasions. Normal T and B cells (flow cytometry).  20/22 protective pneumococcal antibody levels. Protective tetanus antibody level.      SPT in 2007 was positive to molds, tree pollen, grass mix, and ragweed. IDs were positive for cat, roaches,  dog, mite. Aspergillus mold, and marsh elder. Started by Dr. Whittington on allergen IT in 2007,  [facility] Whittington [facility] to practice before. He received allergen immunotherapy on and off until 2010, which he found partially helpful.       Sinus CT in 2013, with mild-moderate sinus disease, and DNS on the left. The patient confirms nasal congestion L>R.      IMPRESSION:  Mild/moderate mucosal thickening in the paranasal  sinuses as described above.  Slight worsening of the right maxillary  sinus and worsening of the right frontal sinus since the previous  exam, but clearing of the left sphenoid sinus.    His rhinitis symptoms were pretty well controlled with flunisolide 1-2 sprays twice daily, azelastine as needed and montelukast 10 mg by mouth daily.   Yesterday he had a nasal bleed that lasted 40 minutes. No trauma. He has this problem couple of times a month.   Otherwise, has occasional nasal drip.   The patient denies clear rhinorrhea,  nasal itch, stuffiness, sneezing or interval sinusitis symptoms of fever, facial pain or purulent rhinorrhea.    Regarding his asthma, he has been  7/7 days compliant with his controller medications (breo 200/25 mcg 1 puff once daily and montelukast 10 mg by mouth daily). Markell  is using albuterol HFA  less than twice per week for rescue from chest symptoms. He is waking up less than twice per month due to chest symptoms.  There has been no use of oral steroids since last visit. No ED/PCP/urgent care/other specialist visits for asthma flare since the last visit. The patient denies current chest tightness, cough, wheeze or SOB.      Patient Active Problem List   Diagnosis     Gastroesophageal reflux disease without esophagitis     Non morbid obesity due to excess calories     CARDIOVASCULAR SCREENING; LDL GOAL LESS THAN 160     IgM deficiency (H)     Moderate persistent asthma, uncomplicated     Essential hypertension     Hyperlipidemia LDL goal <130     DNS (deviated nasal septum)     Other chronic rhinitis       Past Medical History:   Diagnosis Date     Acute bronchitis      Allergic rhinitis, cause unspecified 9/23/2008     Allergic rhinitis, unspecified allergic rhinitis type 9/23/2008     Problem list name updated by automated process. Provider to review     Esophageal reflux      Pneumonia 2/24/2013    MRSA, required hospitalization     Unspecified asthma(493.90)       Problem (# of Occurrences) Relation (Name,Age of Onset)    Allergies (1) Mother: asthma    Cancer (1) Father: lung    Respiratory (3) Sister: asthma, Son (Wisam): Upper Resp infections, Son (David): asthma        Past Surgical History:   Procedure Laterality Date     C APPENDECTOMY  08/2006     SURGICAL HISTORY OF -   2/23/06    L4-5 microdiscectomy     TONSILLECTOMY & ADENOIDECTOMY       Social History     Social History     Marital status:      Spouse name: N/A     Number of children: N/A     Years of education: N/A     Social History Main  Topics     Smoking status: Never Smoker     Smokeless tobacco: Never Used     Alcohol use Yes      Comment: rare     Drug use: No     Sexual activity: Not Asked     Other Topics Concern     None     Social History Narrative    November 3, 2017        ENVIRONMENTAL HISTORY: [place] The home is heated with a forced air and gas furnace. They does have central air conditioning. The patient's bedroom is furnished with hard naseem in bedroom, allergen mattress cover and allergen pillowcase cover. No pets inside the house. There is no history of cockroach or mice infestation. There are no smokers in the house.  The house does not have a damp basement.                Review of Systems   Constitutional: Negative for activity change, fatigue and fever.   HENT: Positive for nosebleeds and postnasal drip. Negative for congestion, dental problem, ear pain, facial swelling, rhinorrhea, sinus pressure and sneezing.    Eyes: Negative for discharge, redness and itching.   Respiratory: Negative for cough, chest tightness, shortness of breath and wheezing.    Cardiovascular: Negative for chest pain.   Gastrointestinal: Negative for diarrhea, nausea and vomiting.   Musculoskeletal: Negative for arthralgias, joint swelling and myalgias.   Skin: Negative for rash.   Neurological: Negative for headaches.   Hematological: Negative for adenopathy.   Psychiatric/Behavioral: Negative for behavioral problems and self-injury.           Current Outpatient Prescriptions:      albuterol (2.5 MG/3ML) 0.083% nebulizer solution, Take 1 vial (2.5 mg) by nebulization every 4 hours as needed for shortness of breath / dyspnea, Disp: 30 vial, Rfl: 11     albuterol (VENTOLIN HFA) 108 (90 BASE) MCG/ACT Inhaler, Inhale 2 puffs into the lungs every 4 hours as needed Take for shortness of breath, cough or wheeze. Take before exercise., Disp: 1 Inhaler, Rfl: 11     atorvastatin (LIPITOR) 10 MG tablet, TAKE 1  TABLET BY MOUTH DAILY, Disp: 90 tablet, Rfl: 0     azelastine (ASTELIN) 0.1 % spray, Spray 1 spray into both nostrils 2 times daily, Disp: 30 mL, Rfl: 3     flunisolide (NASALIDE) 25 MCG/ACT (0.025%) SOLN spray, Spray 2 sprays into both nostrils 2 times daily, Disp: 1 Bottle, Rfl: 3     fluticasone-vilanterol (BREO ELLIPTA) 100-25 MCG/INH oral inhaler, Inhale 1 puff into the lungs daily, Disp: 1 Inhaler, Rfl: 3     fluticasone-vilanterol (BREO ELLIPTA) 200-25 MCG/INH oral inhaler, Inhale 1 puff into the lungs daily, Disp: 1 Inhaler, Rfl: 3     ipratropium - albuterol 0.5 mg/2.5 mg/3 mL (DUONEB) 0.5-2.5 (3) MG/3ML neb solution, Take 1 vial (3 mLs) by nebulization every 6 hours as needed for shortness of breath / dyspnea or wheezing, Disp: 30 vial, Rfl: 1     lisinopril-hydrochlorothiazide (PRINZIDE/ZESTORETIC) 10-12.5 MG per tablet, Take 1 tablet by mouth daily, Disp: 90 tablet, Rfl: 1     montelukast (SINGULAIR) 10 MG tablet, Take 1 tablet (10 mg) by mouth At Bedtime, Disp: 90 tablet, Rfl: 3     omeprazole (PRILOSEC) 20 MG capsule, Take 1 capsule by mouth 2 times daily. (Patient taking differently: Take 20 mg by mouth daily ), Disp: 180 capsule, Rfl: 3     Respiratory Therapy Supplies (NEBULIZER/TUBING/MOUTHPIECE) KIT, use with nebulizer, Disp: 1 kit, Rfl: 11     beclomethasone HFA (QVAR REDIHALER) 80 MCG/ACT AERB inhaler, Inhale 2 puffs into the lungs 3 times daily In the yellow and red zones only of asthma action plan (Patient not taking: Reported on 7/23/2018), Disp: 1 Inhaler, Rfl: 3     [DISCONTINUED] montelukast (SINGULAIR) 10 MG tablet, Take 1 tablet (10 mg) by mouth At Bedtime, Disp: 90 tablet, Rfl: 3  Immunization History   Administered Date(s) Administered     Influenza (H1N1) 01/11/2010     Influenza (IIV3) PF 10/29/2008, 08/26/2009, 10/05/2010     Influenza Vaccine IM 3yrs+ 4 Valent IIV4 10/23/2015, 09/21/2017     Pneumococcal 23 valent 01/29/2016     TDAP Vaccine (Adacel) 11/18/2008     No Known  Allergies  OBJECTIVE:                                                                 /70 (BP Location: Left arm, Patient Position: Sitting, Cuff Size: Adult Large)  Pulse 80  Temp 97.1  F (36.2  C) (Oral)  Resp 16  Wt 105.4 kg (232 lb 5.8 oz)  SpO2 97%  BMI 34.3 kg/m2        Physical Exam   Constitutional: No distress.   obese   HENT:   Head: Normocephalic and atraumatic.   Right Ear: Tympanic membrane, external ear and ear canal normal.   Left Ear: Tympanic membrane, external ear and ear canal normal.   Nose: Rhinorrhea (mucoid) and septal deviation (Septal spur on the left noted.) present. No mucosal edema.   Mouth/Throat: Oropharynx is clear and moist and mucous membranes are normal. No oropharyngeal exudate, posterior oropharyngeal edema or posterior oropharyngeal erythema.   Superficial scab on the left side of the septum   Eyes: Conjunctivae are normal. Right eye exhibits no discharge. Left eye exhibits no discharge.   Neck: Normal range of motion.   Cardiovascular: Normal rate, regular rhythm and normal heart sounds.    No murmur heard.  Pulmonary/Chest: Effort normal. No respiratory distress. He has no wheezes. He has no rales.        Musculoskeletal: Normal range of motion.   Neurological: He is alert.   Skin: Skin is warm. He is not diaphoretic.   Psychiatric: Affect normal.   Nursing note and vitals reviewed.      WORKUP:   SPIROMETRY       FVC 4.08L (88% of predicted).     FEV1 3.30L (91% of predicted).     FEV1/FVC 81%     FEF 25%-75%  2.94L/s (93% of predicted).    Asthma Control Test (ACT) total score: 25     ASSESSMENT/PLAN:     Problem List Items Addressed This Visit        Respiratory    1. Moderate persistent asthma, uncomplicated - Primary (Chronic)  Currently well controlled with Breo Ellipta 200/25 mcg 1 puff once daily, montelukast 10 mg by mouth once daily and albuterol inhaler on as needed basis.  -Stepdown to Breo Ellipta 100/25 mcg 1 puff once daily.  Continue montelukast and  albuterol as is.  Will follow up in 6 weeks to monitor symptom control.    Relevant Medications    fluticasone-vilanterol (BREO ELLIPTA) 100-25 MCG/INH oral inhaler    montelukast (SINGULAIR) 10 MG tablet    Other Relevant Orders    Spirometry, Breathing Capacity (Completed)    2. Other chronic rhinitis  Currently well controlled with flunisolide, azelastine, and montelukast.  -Continue as is.    Relevant Medications        montelukast (SINGULAIR) 10 MG tablet      Other Visit Diagnoses     3. Epistaxis      --Point the tip of the nasal sprays to the same side eye.  If he continues having it, consider ENT evaluation, and recommend PCP to evaluate for bleeding disorder.          Follow up in 6 weeks or sooner if needed.    Thank you for allowing us to participate in the care of this patient. Please feel free to contact us if there are any questions or concerns about the patient.    Disclaimer: This note consists of symbols derived from keyboarding, dictation and/or voice recognition software. As a result, there may be errors in the script that have gone undetected. Please consider this when interpreting information found in this chart.    Jose Angel Leiva MD, FACI  Allergy, Asthma and Immunology  [place], [place] and [facility]      Again, thank you for allowing me to participate in the care of your patient.        Sincerely,        Jose Angel Leiva MD     No

## 2018-11-02 NOTE — PATIENT PROFILE ADULT - STATED REASON FOR ADMISSION
Patient is 72 y.o male; came from [facility] with PMH of spinal stenosis and s/p lami

## 2018-11-02 NOTE — PROGRESS NOTE ADULT - ASSESSMENT
72 year old male with uncontrolled T2DM (HbA1c - 6.9, c/b DM nephropathy + microalbuminuria, HTN, HLD, underwent placement of temporary pacer and L1-S1 transforaminal interbody fusion, T10-pelvis instrumented fusion and plastics assisted closure (10/27). Tolerating POs. Pt to be discharged to acute rehab on oral DM meds.

## 2018-11-02 NOTE — DISCHARGE NOTE ADULT - MEDICATION SUMMARY - MEDICATIONS TO TAKE
I will START or STAY ON the medications listed below when I get home from the hospital:    acetaminophen 325 mg oral tablet  -- 2 tab(s) by mouth every 6 hours, As needed, Temp greater or equal to 38C (100.4F), Mild Pain (1 - 3)  -- Indication: For Mild pain     butalbital/acetaminophen/caffeine 50 mg-325 mg-40 mg oral tablet  -- 1 tab(s) by mouth every 8 hours, As needed, HA  -- Indication: For Headache    HYDROmorphone 4 mg oral tablet  -- 1 tab(s) by mouth every 6 hours  -- Indication: For incisional pain     lisinopril 10 mg oral tablet  -- 1 tab(s) by mouth once a day  -- Indication: For HTN    tamsulosin 0.4 mg oral capsule  -- 1 cap(s) by mouth once a day (at bedtime)  -- Indication: For URINARY RETENTION    diazePAM 5 mg oral tablet  -- 1 tab(s) by mouth every 8 hours, As needed, muscle spasm  -- Indication: For MUSCLE SPASM    metFORMIN 1000 mg oral tablet  -- 1 tab(s) by mouth 2 times a day -for dizziness DM  -- Indication: For dm    Januvia 100 mg oral tablet  -- 1 tab(s) by mouth once a day  -- Indication: For dm    Crestor 20 mg oral tablet  -- 1 tab(s) by mouth once a day (at bedtime)  -- Indication: For HLD (hyperlipidemia)    furosemide 40 mg oral tablet  -- 1 tab(s) by mouth once a day  -- Indication: For HTN    Synthroid 175 mcg (0.175 mg) oral tablet  -- 1 tab(s) by mouth once a day  -- Indication: For Thyroid cancer I will START or STAY ON the medications listed below when I get home from the hospital:    acetaminophen 325 mg oral tablet  -- 2 tab(s) by mouth every 6 hours, As needed, Temp greater or equal to 38C (100.4F), Mild Pain (1 - 3)  -- Indication: For Mild pain     butalbital/acetaminophen/caffeine 50 mg-325 mg-40 mg oral tablet  -- 1 tab(s) by mouth every 8 hours, As needed, HA  -- Indication: For Headache    HYDROmorphone 4 mg oral tablet  -- 1 tab(s) by mouth every 6 hours  -- Indication: For incisional pain     aspirin 81 mg oral tablet  -- 1 tab(s) by mouth once a day  indication: CAD  -- Indication: For Cad    lisinopril 10 mg oral tablet  -- 1 tab(s) by mouth once a day  -- Indication: For HTN    tamsulosin 0.4 mg oral capsule  -- 1 cap(s) by mouth once a day (at bedtime)  -- Indication: For URINARY RETENTION    diazePAM 5 mg oral tablet  -- 1 tab(s) by mouth every 8 hours, As needed, muscle spasm  -- Indication: For MUSCLE SPASM    metFORMIN 1000 mg oral tablet  -- 1 tab(s) by mouth 2 times a day -for dizziness DM  -- Indication: For dm    Januvia 100 mg oral tablet  -- 1 tab(s) by mouth once a day  -- Indication: For dm    Crestor 20 mg oral tablet  -- 1 tab(s) by mouth once a day (at bedtime)  -- Indication: For HLD (hyperlipidemia)    furosemide 40 mg oral tablet  -- 1 tab(s) by mouth once a day  -- Indication: For HTN    Synthroid 175 mcg (0.175 mg) oral tablet  -- 1 tab(s) by mouth once a day  -- Indication: For Thyroid cancer

## 2018-11-03 LAB
ALBUMIN SERPL ELPH-MCNC: 2 G/DL — LOW (ref 3.3–5)
ALP SERPL-CCNC: 65 U/L — SIGNIFICANT CHANGE UP (ref 40–120)
ALT FLD-CCNC: 34 U/L DA — SIGNIFICANT CHANGE UP (ref 10–45)
ANION GAP SERPL CALC-SCNC: 5 MMOL/L — SIGNIFICANT CHANGE UP (ref 5–17)
AST SERPL-CCNC: 22 U/L — SIGNIFICANT CHANGE UP (ref 10–40)
BASOPHILS NFR BLD AUTO: 1 % — SIGNIFICANT CHANGE UP (ref 0–2)
BILIRUB SERPL-MCNC: 0.3 MG/DL — SIGNIFICANT CHANGE UP (ref 0.2–1.2)
BUN SERPL-MCNC: 13 MG/DL — SIGNIFICANT CHANGE UP (ref 7–23)
CALCIUM SERPL-MCNC: 8.6 MG/DL — SIGNIFICANT CHANGE UP (ref 8.4–10.5)
CHLORIDE SERPL-SCNC: 105 MMOL/L — SIGNIFICANT CHANGE UP (ref 96–108)
CO2 SERPL-SCNC: 31 MMOL/L — SIGNIFICANT CHANGE UP (ref 22–31)
CREAT SERPL-MCNC: 0.79 MG/DL — SIGNIFICANT CHANGE UP (ref 0.5–1.3)
EOSINOPHIL NFR BLD AUTO: 3 % — SIGNIFICANT CHANGE UP (ref 0–6)
GLUCOSE SERPL-MCNC: 145 MG/DL — HIGH (ref 70–99)
HCT VFR BLD CALC: 31.8 % — LOW (ref 39–50)
HGB BLD-MCNC: 10.1 G/DL — LOW (ref 13–17)
LYMPHOCYTES # BLD AUTO: 19 % — SIGNIFICANT CHANGE UP (ref 13–44)
MCHC RBC-ENTMCNC: 28.8 PG — SIGNIFICANT CHANGE UP (ref 27–34)
MCHC RBC-ENTMCNC: 31.9 GM/DL — LOW (ref 32–36)
MCV RBC AUTO: 90.3 FL — SIGNIFICANT CHANGE UP (ref 80–100)
MONOCYTES NFR BLD AUTO: 6 % — SIGNIFICANT CHANGE UP (ref 2–14)
NEUTROPHILS NFR BLD AUTO: 64 % — SIGNIFICANT CHANGE UP (ref 43–77)
PLATELET # BLD AUTO: 224 K/UL — SIGNIFICANT CHANGE UP (ref 150–400)
POTASSIUM SERPL-MCNC: 4.1 MMOL/L — SIGNIFICANT CHANGE UP (ref 3.5–5.3)
POTASSIUM SERPL-SCNC: 4.1 MMOL/L — SIGNIFICANT CHANGE UP (ref 3.5–5.3)
PREALB SERPL-MCNC: 13 MG/DL — LOW (ref 20–40)
PROT SERPL-MCNC: 5.2 G/DL — LOW (ref 6–8.3)
RBC # BLD: 3.52 M/UL — LOW (ref 4.2–5.8)
RBC # FLD: 14.1 % — SIGNIFICANT CHANGE UP (ref 10.3–14.5)
SODIUM SERPL-SCNC: 141 MMOL/L — SIGNIFICANT CHANGE UP (ref 135–145)
WBC # BLD: 25.1 K/UL — HIGH (ref 3.8–10.5)
WBC # FLD AUTO: 25.1 K/UL — HIGH (ref 3.8–10.5)

## 2018-11-03 PROCEDURE — 99222 1ST HOSP IP/OBS MODERATE 55: CPT | Mod: AI

## 2018-11-03 RX ORDER — POLYETHYLENE GLYCOL 3350 17 G/17G
17 POWDER, FOR SOLUTION ORAL AT BEDTIME
Qty: 0 | Refills: 0 | Status: DISCONTINUED | OUTPATIENT
Start: 2018-11-03 | End: 2018-11-16

## 2018-11-03 RX ORDER — METFORMIN HYDROCHLORIDE 850 MG/1
1000 TABLET ORAL EVERY 12 HOURS
Qty: 0 | Refills: 0 | Status: DISCONTINUED | OUTPATIENT
Start: 2018-11-03 | End: 2018-11-16

## 2018-11-03 RX ORDER — POLYETHYLENE GLYCOL 3350 17 G/17G
17 POWDER, FOR SOLUTION ORAL DAILY
Qty: 0 | Refills: 0 | Status: DISCONTINUED | OUTPATIENT
Start: 2018-11-03 | End: 2018-11-03

## 2018-11-03 RX ORDER — ASCORBIC ACID 60 MG
500 TABLET,CHEWABLE ORAL
Qty: 0 | Refills: 0 | Status: DISCONTINUED | OUTPATIENT
Start: 2018-11-03 | End: 2018-11-16

## 2018-11-03 RX ORDER — SENNA PLUS 8.6 MG/1
2 TABLET ORAL AT BEDTIME
Qty: 0 | Refills: 0 | Status: DISCONTINUED | OUTPATIENT
Start: 2018-11-03 | End: 2018-11-16

## 2018-11-03 RX ORDER — DOCUSATE SODIUM 100 MG
100 CAPSULE ORAL THREE TIMES A DAY
Qty: 0 | Refills: 0 | Status: DISCONTINUED | OUTPATIENT
Start: 2018-11-03 | End: 2018-11-16

## 2018-11-03 RX ADMIN — HYDROMORPHONE HYDROCHLORIDE 4 MILLIGRAM(S): 2 INJECTION INTRAMUSCULAR; INTRAVENOUS; SUBCUTANEOUS at 18:27

## 2018-11-03 RX ADMIN — INSULIN GLARGINE 12 UNIT(S): 100 INJECTION, SOLUTION SUBCUTANEOUS at 21:11

## 2018-11-03 RX ADMIN — TAMSULOSIN HYDROCHLORIDE 0.4 MILLIGRAM(S): 0.4 CAPSULE ORAL at 21:13

## 2018-11-03 RX ADMIN — LISINOPRIL 10 MILLIGRAM(S): 2.5 TABLET ORAL at 06:46

## 2018-11-03 RX ADMIN — Medication 1: at 12:08

## 2018-11-03 RX ADMIN — Medication 500 MILLIGRAM(S): at 06:46

## 2018-11-03 RX ADMIN — ENOXAPARIN SODIUM 40 MILLIGRAM(S): 100 INJECTION SUBCUTANEOUS at 18:28

## 2018-11-03 RX ADMIN — METFORMIN HYDROCHLORIDE 1000 MILLIGRAM(S): 850 TABLET ORAL at 17:16

## 2018-11-03 RX ADMIN — POLYETHYLENE GLYCOL 3350 17 GRAM(S): 17 POWDER, FOR SOLUTION ORAL at 21:14

## 2018-11-03 RX ADMIN — HYDROMORPHONE HYDROCHLORIDE 4 MILLIGRAM(S): 2 INJECTION INTRAMUSCULAR; INTRAVENOUS; SUBCUTANEOUS at 02:18

## 2018-11-03 RX ADMIN — Medication 650 MILLIGRAM(S): at 17:15

## 2018-11-03 RX ADMIN — HYDROMORPHONE HYDROCHLORIDE 4 MILLIGRAM(S): 2 INJECTION INTRAMUSCULAR; INTRAVENOUS; SUBCUTANEOUS at 09:15

## 2018-11-03 RX ADMIN — Medication 100 MILLIGRAM(S): at 21:11

## 2018-11-03 RX ADMIN — HYDROMORPHONE HYDROCHLORIDE 4 MILLIGRAM(S): 2 INJECTION INTRAMUSCULAR; INTRAVENOUS; SUBCUTANEOUS at 03:00

## 2018-11-03 RX ADMIN — ONDANSETRON 4 MILLIGRAM(S): 8 TABLET, FILM COATED ORAL at 12:39

## 2018-11-03 RX ADMIN — HYDROMORPHONE HYDROCHLORIDE 4 MILLIGRAM(S): 2 INJECTION INTRAMUSCULAR; INTRAVENOUS; SUBCUTANEOUS at 08:48

## 2018-11-03 RX ADMIN — Medication 650 MILLIGRAM(S): at 00:15

## 2018-11-03 RX ADMIN — Medication 500 MILLIGRAM(S): at 17:16

## 2018-11-03 RX ADMIN — Medication 175 MICROGRAM(S): at 06:46

## 2018-11-03 RX ADMIN — SENNA PLUS 2 TABLET(S): 8.6 TABLET ORAL at 21:13

## 2018-11-03 RX ADMIN — Medication 650 MILLIGRAM(S): at 16:51

## 2018-11-03 RX ADMIN — HYDROMORPHONE HYDROCHLORIDE 4 MILLIGRAM(S): 2 INJECTION INTRAMUSCULAR; INTRAVENOUS; SUBCUTANEOUS at 12:39

## 2018-11-03 RX ADMIN — ONDANSETRON 4 MILLIGRAM(S): 8 TABLET, FILM COATED ORAL at 02:18

## 2018-11-03 RX ADMIN — Medication 100 MILLIGRAM(S): at 06:46

## 2018-11-03 RX ADMIN — ONDANSETRON 4 MILLIGRAM(S): 8 TABLET, FILM COATED ORAL at 08:47

## 2018-11-03 RX ADMIN — ATORVASTATIN CALCIUM 80 MILLIGRAM(S): 80 TABLET, FILM COATED ORAL at 21:11

## 2018-11-03 RX ADMIN — Medication 650 MILLIGRAM(S): at 00:59

## 2018-11-03 RX ADMIN — HYDROMORPHONE HYDROCHLORIDE 4 MILLIGRAM(S): 2 INJECTION INTRAMUSCULAR; INTRAVENOUS; SUBCUTANEOUS at 13:00

## 2018-11-03 RX ADMIN — ONDANSETRON 4 MILLIGRAM(S): 8 TABLET, FILM COATED ORAL at 18:27

## 2018-11-03 RX ADMIN — Medication 100 MILLIGRAM(S): at 12:39

## 2018-11-04 DIAGNOSIS — G47.30 SLEEP APNEA, UNSPECIFIED: ICD-10-CM

## 2018-11-04 DIAGNOSIS — C91.90 LYMPHOID LEUKEMIA, UNSPECIFIED NOT HAVING ACHIEVED REMISSION: ICD-10-CM

## 2018-11-04 DIAGNOSIS — E11.9 TYPE 2 DIABETES MELLITUS WITHOUT COMPLICATIONS: ICD-10-CM

## 2018-11-04 DIAGNOSIS — M48.061 SPINAL STENOSIS, LUMBAR REGION WITHOUT NEUROGENIC CLAUDICATION: ICD-10-CM

## 2018-11-04 DIAGNOSIS — I10 ESSENTIAL (PRIMARY) HYPERTENSION: ICD-10-CM

## 2018-11-04 DIAGNOSIS — M43.26 FUSION OF SPINE, LUMBAR REGION: ICD-10-CM

## 2018-11-04 PROCEDURE — 99232 SBSQ HOSP IP/OBS MODERATE 35: CPT | Mod: GC

## 2018-11-04 RX ADMIN — HYDROMORPHONE HYDROCHLORIDE 4 MILLIGRAM(S): 2 INJECTION INTRAMUSCULAR; INTRAVENOUS; SUBCUTANEOUS at 11:25

## 2018-11-04 RX ADMIN — ONDANSETRON 4 MILLIGRAM(S): 8 TABLET, FILM COATED ORAL at 05:58

## 2018-11-04 RX ADMIN — Medication 650 MILLIGRAM(S): at 08:20

## 2018-11-04 RX ADMIN — HYDROMORPHONE HYDROCHLORIDE 4 MILLIGRAM(S): 2 INJECTION INTRAMUSCULAR; INTRAVENOUS; SUBCUTANEOUS at 01:25

## 2018-11-04 RX ADMIN — ONDANSETRON 4 MILLIGRAM(S): 8 TABLET, FILM COATED ORAL at 17:13

## 2018-11-04 RX ADMIN — Medication 500 MILLIGRAM(S): at 17:13

## 2018-11-04 RX ADMIN — ATORVASTATIN CALCIUM 80 MILLIGRAM(S): 80 TABLET, FILM COATED ORAL at 21:06

## 2018-11-04 RX ADMIN — INSULIN GLARGINE 12 UNIT(S): 100 INJECTION, SOLUTION SUBCUTANEOUS at 21:06

## 2018-11-04 RX ADMIN — HYDROMORPHONE HYDROCHLORIDE 4 MILLIGRAM(S): 2 INJECTION INTRAMUSCULAR; INTRAVENOUS; SUBCUTANEOUS at 00:25

## 2018-11-04 RX ADMIN — Medication 100 MILLIGRAM(S): at 05:56

## 2018-11-04 RX ADMIN — Medication 650 MILLIGRAM(S): at 06:25

## 2018-11-04 RX ADMIN — Medication 650 MILLIGRAM(S): at 16:27

## 2018-11-04 RX ADMIN — HYDROMORPHONE HYDROCHLORIDE 4 MILLIGRAM(S): 2 INJECTION INTRAMUSCULAR; INTRAVENOUS; SUBCUTANEOUS at 17:12

## 2018-11-04 RX ADMIN — Medication 650 MILLIGRAM(S): at 11:39

## 2018-11-04 RX ADMIN — HYDROMORPHONE HYDROCHLORIDE 4 MILLIGRAM(S): 2 INJECTION INTRAMUSCULAR; INTRAVENOUS; SUBCUTANEOUS at 23:18

## 2018-11-04 RX ADMIN — ONDANSETRON 4 MILLIGRAM(S): 8 TABLET, FILM COATED ORAL at 11:25

## 2018-11-04 RX ADMIN — HYDROMORPHONE HYDROCHLORIDE 4 MILLIGRAM(S): 2 INJECTION INTRAMUSCULAR; INTRAVENOUS; SUBCUTANEOUS at 08:20

## 2018-11-04 RX ADMIN — HYDROMORPHONE HYDROCHLORIDE 4 MILLIGRAM(S): 2 INJECTION INTRAMUSCULAR; INTRAVENOUS; SUBCUTANEOUS at 05:58

## 2018-11-04 RX ADMIN — Medication 175 MICROGRAM(S): at 05:57

## 2018-11-04 RX ADMIN — HYDROMORPHONE HYDROCHLORIDE 4 MILLIGRAM(S): 2 INJECTION INTRAMUSCULAR; INTRAVENOUS; SUBCUTANEOUS at 18:04

## 2018-11-04 RX ADMIN — Medication 100 MILLIGRAM(S): at 14:07

## 2018-11-04 RX ADMIN — Medication 650 MILLIGRAM(S): at 18:04

## 2018-11-04 RX ADMIN — Medication 500 MILLIGRAM(S): at 05:56

## 2018-11-04 RX ADMIN — METFORMIN HYDROCHLORIDE 1000 MILLIGRAM(S): 850 TABLET ORAL at 05:57

## 2018-11-04 RX ADMIN — HYDROMORPHONE HYDROCHLORIDE 4 MILLIGRAM(S): 2 INJECTION INTRAMUSCULAR; INTRAVENOUS; SUBCUTANEOUS at 11:26

## 2018-11-04 RX ADMIN — ONDANSETRON 4 MILLIGRAM(S): 8 TABLET, FILM COATED ORAL at 00:25

## 2018-11-04 RX ADMIN — ENOXAPARIN SODIUM 40 MILLIGRAM(S): 100 INJECTION SUBCUTANEOUS at 17:13

## 2018-11-04 RX ADMIN — Medication 100 MILLIGRAM(S): at 21:07

## 2018-11-04 RX ADMIN — ONDANSETRON 4 MILLIGRAM(S): 8 TABLET, FILM COATED ORAL at 23:18

## 2018-11-04 RX ADMIN — TAMSULOSIN HYDROCHLORIDE 0.4 MILLIGRAM(S): 0.4 CAPSULE ORAL at 21:06

## 2018-11-04 RX ADMIN — SENNA PLUS 2 TABLET(S): 8.6 TABLET ORAL at 21:06

## 2018-11-04 RX ADMIN — METFORMIN HYDROCHLORIDE 1000 MILLIGRAM(S): 850 TABLET ORAL at 17:13

## 2018-11-04 NOTE — CONSULT NOTE ADULT - SUBJECTIVE AND OBJECTIVE BOX
Patient is a 72y old  Male who presents with a chief complaint of Spinal Fusion (04 Nov 2018 10:29)      HPI:  HPI:  72M with history of T2DM, HTN, HLD, FERNANDO on CPAP, Pneumonia, CLL, Prostate Cancer s/p resection 2003, thyroid cancer s/p thyroidectomy 8/2018, renal cancer s/p partial nephrectomy and spinal stenosis with acquired scoliosis who initially presented 10/18/18 for L1-5 posterior lumbar fusion and T10-pelvis instrumentation and fusion but case was aborted due to 4 second pause after induction with propofol who returned 10/27/18 for surgery. Of note, he had a prior Holter monitoring study that revealed nocturnal bradycardia with pauses. 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 On 10/27/18, he underwent placement of temporary pacer and L1-S1 transforaminal interbody fusion, T10-pelvis instrumented fusion and Plastics assisted closure. Operative course was notable for bradycardia responsive to glycopyrrolate, cerebrospinal fluid leak which was primarily repaired and EBL of 1500cc. He received 2 units PRBC intra-op. Post-operatively, he had a ~40 minutes episode of hypotension to SBP in the 50smmHg, for which he was placed on pressor support and given an additional 2 units PRBC.  On current encounter patient complains of fatigue and back pain and weakness.  Patient denies bowel or bladder dysfunction, sensory impairment. (02 Nov 2018 19:14)    In Summary: 72M DM2, HTN, HLD, FERNANDO on Cpap, CLL, Prostate cancer s/p resection in 2003, thyroid cancer s/p thyroidectomy 8/18, renal cancer s/p partial nephrectomy presents after L1-L5 lumbar fusion.  Initially surgery aborted secondary to 4 sec pause and patient found to have wide complex rhythm and 1st degree AV delay.  He underwent temporary pacer placement and had his L1-S1 interbody fusion and T10-pelvis fusion.  Patient required multiple transfusions for hypotension and blood loss of 1.5 liters.  Patient transferred to [facility] on 11/2/18 for rehab.         PAST MEDICAL & SURGICAL HISTORY:  Former smoker, stopped smoking many years ago  CLL (chronic lymphocytic leukemia)  Sleep apnea, unspecified type  Spinal stenosis of lumbar region, unspecified whether neurogenic claudication present  Leukocytosis, unspecified type: monitored for CLL  Malignant neoplasm of kidney parenchyma, right: s/p surgery  Thyroid nodule  Malignant neoplasm of thyroid gland  Prostate cancer: 2003, s/p prostatectomy, RT 2009 x 40 days  Hypertension, unspecified type  Diabetes mellitus type 2 in obese  H/O thyroidectomy: 2016  History of strabismus surgery: b/l 1958  S/P left knee arthroscopy: 1998  H/O ventral hernia repair: 1997  H/O radical prostatectomy: 2003  H/O partial nephrectomy: right, 2009  History of total knee replacement, right: 2012, scoped 1998    family history is reviewed and non-contributory to current admission.      Substance Use (street drugs): ( X ) never used  (  ) other:  Tobacco Usage:  ( X  ) never smoked   (   ) former smoker   (   ) current smoker  (     ) pack year  Alcohol Usage: no  Sexual History:  no  Recent Travel: none      Allergies    codeine (Vomiting; Headache)  dogs, cats (Short breath)  dust (Rhinitis)  mold (Rhinitis)  morphine (Vomiting; Headache)  narcotic analgesics (Vomiting; Headache)    Intolerances        acetaminophen   Tablet .. 650 milliGRAM(s) Oral every 6 hours PRN  ascorbic acid 500 milliGRAM(s) Oral two times a day  atorvastatin 80 milliGRAM(s) Oral at bedtime  bisacodyl Suppository 10 milliGRAM(s) Rectal daily PRN  dextrose 40% Gel 15 Gram(s) Oral once PRN  dextrose 5%. 1000 milliLiter(s) IV Continuous <Continuous>  dextrose 50% Injectable 12.5 Gram(s) IV Push once  dextrose 50% Injectable 25 Gram(s) IV Push once  dextrose 50% Injectable 25 Gram(s) IV Push once  docusate sodium 100 milliGRAM(s) Oral three times a day  glucagon  Injectable 1 milliGRAM(s) IntraMuscular once PRN  insulin glargine Injectable (LANTUS) 12 Unit(s) SubCutaneous at bedtime  insulin lispro (HumaLOG) corrective regimen sliding scale   SubCutaneous three times a day before meals  insulin lispro (HumaLOG) corrective regimen sliding scale   SubCutaneous at bedtime  levothyroxine 175 MICROGram(s) Oral daily  metFORMIN 1000 milliGRAM(s) Oral every 12 hours  polyethylene glycol 3350 17 Gram(s) Oral at bedtime PRN  senna 2 Tablet(s) Oral at bedtime      REVIEW OF SYSTEMS:  CONSTITUTIONAL: No fever, weight loss, or fatigue  EYES: No eye pain, visual disturbances, or discharge  ENMT:  No difficulty hearing, tinnitus, vertigo; No sinus or throat pain  NECK: No pain or stiffness  BREASTS: No pain, masses, or nipple discharge  RESPIRATORY: No cough, wheezing, chills or hemoptysis; No shortness of breath  CARDIOVASCULAR: No chest pain, palpitations, dizziness, or leg swelling  GASTROINTESTINAL: No abdominal or epigastric pain. No nausea, vomiting, or hematemesis; No diarrhea or constipation. No melena or hematochezia.  GENITOURINARY: No dysuria, frequency, hematuria, or incontinence  NEUROLOGICAL: No headaches, memory loss, loss of strength, numbness, or tremors  SKIN: No itching, burning, rashes, or lesions   LYMPH NODES: No enlarged glands  ENDOCRINE: No heat or cold intolerance; No hair loss  MUSCULOSKELETAL: No joint pain or swelling; No muscle, back, or extremity pain  PSYCHIATRIC: No depression, anxiety, mood swings, or difficulty sleeping  HEME/LYMPH: No easy bruising, or bleeding gums  ALLERY AND IMMUNOLOGIC: No hives or eczema    ALL ROS REVIEWED AND NORMAL EXCEPT AS STATED ABOVE    T(C): 36.7 (11-04-18 @ 12:19), Max: 36.9 (11-03-18 @ 20:50)  HR: 70 (11-04-18 @ 12:19) (70 - 81)  BP: 124/70 (11-04-18 @ 12:19) (105/65 - 136/71)  RR: 14 (11-04-18 @ 12:19) (14 - 14)  SpO2: 100% (11-04-18 @ 12:19) (97% - 100%)  Wt(kg): --Vital Signs Last 24 Hrs  T(C): 36.7 (04 Nov 2018 12:19), Max: 36.9 (03 Nov 2018 20:50)  T(F): 98 (04 Nov 2018 12:19), Max: 98.4 (03 Nov 2018 20:50)  HR: 70 (04 Nov 2018 12:19) (70 - 81)  BP: 124/70 (04 Nov 2018 12:19) (105/65 - 136/71)  BP(mean): --  RR: 14 (04 Nov 2018 12:19) (14 - 14)  SpO2: 100% (04 Nov 2018 12:19) (97% - 100%)    PHYSICAL EXAM:  GENERAL: NAD, well-groomed, well-developed  HEAD:  Atraumatic, Normocephalic  EYES: EOMI, PERRLA, conjunctiva and sclera clear  ENMT: No tonsillar erythema, exudates, or enlargement; Moist mucous membranes, Good dentition, No lesions  NECK: Supple, No JVD, Normal thyroid  NERVOUS SYSTEM:  Alert & Oriented X3, Good concentration; Motor Strength 5/5 B/L upper and lower extremities; DTRs 2+ intact and symmetric  CHEST/LUNG: Clear to percussion bilaterally; No rales, rhonchi, wheezing, or rubs  HEART: Regular rate and rhythm; No murmurs, rubs, or gallops  ABDOMEN: Soft, Nontender, Nondistended; Bowel sounds present  EXTREMITIES:  2+ Peripheral Pulses, No clubbing, cyanosis, or edema  LYMPH: No lymphadenopathy noted  SKIN: No rashes or lesions    LABS:                        10.1   25.1  )-----------( 224      ( 03 Nov 2018 06:50 )             31.8     11-03    141  |  105  |  13  ----------------------------<  145<H>  4.1   |  31  |  0.79    Ca    8.6      03 Nov 2018 06:50    TPro  5.2<L>  /  Alb  2.0<L>  /  TBili  0.3  /  DBili  x   /  AST  22  /  ALT  34  /  AlkPhos  65  11-03    CAPILLARY BLOOD GLUCOSE    POCT Blood Glucose.: 122 mg/dL (04 Nov 2018 11:36)  POCT Blood Glucose.: 133 mg/dL (04 Nov 2018 07:35)  POCT Blood Glucose.: 153 mg/dL (03 Nov 2018 21:09)  POCT Blood Glucose.: 141 mg/dL (03 Nov 2018 17:07)    RADIOLOGY & ADDITIONAL TESTS:    Consultant(s) Notes Reviewed:  [x ] YES  [ ] NO  Care Discussed with Consultants/Other Providers [ x] YES  [ ] NO  Imaging Personally Reviewed:  [ ] YES  [ ] NO HPI:  72M with history of T2DM, HTN, HLD, FERNANDO on CPAP, Pneumonia, CLL, Prostate Cancer s/p resection 2003, thyroid cancer s/p thyroidectomy 8/2018, renal cancer s/p partial nephrectomy and spinal stenosis with acquired scoliosis who initially presented 10/18/18 for L1-5 posterior lumbar fusion and T10-pelvis instrumentation and fusion but case was aborted due to 4 second pause after induction with propofol who returned 10/27/18 for surgery. Of note, he had a prior Holter monitoring study that revealed nocturnal bradycardia with pauses. 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 On 10/27/18, he underwent placement of temporary pacer and L1-S1 transforaminal interbody fusion, T10-pelvis instrumented fusion and Plastics assisted closure. Operative course was notable for bradycardia responsive to glycopyrrolate, cerebrospinal fluid leak which was primarily repaired and EBL of 1500cc. He received 2 units PRBC intra-op. Post-operatively, he had a ~40 minutes episode of hypotension to SBP in the 50smmHg, for which he was placed on pressor support and given an additional 2 units PRBC.  On current encounter patient complains of fatigue and back pain and weakness.  Patient denies bowel or bladder dysfunction, sensory impairment. (02 Nov 2018 19:14)    In Summary: 72M DM2, HTN, HLD, FERNANDO on Cpap, CLL, Prostate cancer s/p resection in 2003, thyroid cancer s/p thyroidectomy 8/18, renal cancer s/p partial nephrectomy presents after L1-L5 lumbar fusion.  Initially surgery aborted secondary to 4 sec pause and patient found to have wide complex rhythm and 1st degree AV delay.  He underwent temporary pacer placement and had his L1-S1 interbody fusion and T10-pelvis fusion.  Patient required multiple transfusions for hypotension and blood loss of 1.5 liters.  Patient transferred to [facility] on 11/2/18 for rehab.     Patient feels pretty well. Is complaining of discomfort in lumbar area.   Denies chest pain, sob, nausea, vomiting.        PAST MEDICAL & SURGICAL HISTORY:  Former smoker, stopped smoking many years ago  CLL (chronic lymphocytic leukemia)  Sleep apnea, unspecified type  Spinal stenosis of lumbar region, unspecified whether neurogenic claudication present  Leukocytosis, unspecified type: monitored for CLL  Malignant neoplasm of kidney parenchyma, right: s/p surgery  Thyroid nodule  Malignant neoplasm of thyroid gland  Prostate cancer: 2003, s/p prostatectomy, RT 2009 x 40 days  Hypertension, unspecified type  Diabetes mellitus type 2 in obese  H/O thyroidectomy: 2016  History of strabismus surgery: b/l 1958  S/P left knee arthroscopy: 1998  H/O ventral hernia repair: 1997  H/O radical prostatectomy: 2003  H/O partial nephrectomy: right, 2009  History of total knee replacement, right: 2012, scoped 1998    family history is reviewed and non-contributory to current admission.      Substance Use (street drugs): ( X ) never used  (  ) other:  Tobacco Usage:  ( X  ) never smoked   (   ) former smoker   (   ) current smoker  (     ) pack year  Alcohol Usage: no  Sexual History:  no  Recent Travel: none      Allergies    codeine (Vomiting; Headache)  dogs, cats (Short breath)  dust (Rhinitis)  mold (Rhinitis)  morphine (Vomiting; Headache)  narcotic analgesics (Vomiting; Headache)    Intolerances        acetaminophen   Tablet .. 650 milliGRAM(s) Oral every 6 hours PRN  ascorbic acid 500 milliGRAM(s) Oral two times a day  atorvastatin 80 milliGRAM(s) Oral at bedtime  bisacodyl Suppository 10 milliGRAM(s) Rectal daily PRN  dextrose 40% Gel 15 Gram(s) Oral once PRN  dextrose 5%. 1000 milliLiter(s) IV Continuous <Continuous>  dextrose 50% Injectable 12.5 Gram(s) IV Push once  dextrose 50% Injectable 25 Gram(s) IV Push once  dextrose 50% Injectable 25 Gram(s) IV Push once  docusate sodium 100 milliGRAM(s) Oral three times a day  glucagon  Injectable 1 milliGRAM(s) IntraMuscular once PRN  insulin glargine Injectable (LANTUS) 12 Unit(s) SubCutaneous at bedtime  insulin lispro (HumaLOG) corrective regimen sliding scale   SubCutaneous three times a day before meals  insulin lispro (HumaLOG) corrective regimen sliding scale   SubCutaneous at bedtime  levothyroxine 175 MICROGram(s) Oral daily  metFORMIN 1000 milliGRAM(s) Oral every 12 hours  polyethylene glycol 3350 17 Gram(s) Oral at bedtime PRN  senna 2 Tablet(s) Oral at bedtime      REVIEW OF SYSTEMS:  CONSTITUTIONAL: No fever, weight loss, or fatigue  EYES: No eye pain, visual disturbances, or discharge  ENMT:  No difficulty hearing, tinnitus, vertigo; No sinus or throat pain  NECK: No pain or stiffness  BREASTS: No pain, masses, or nipple discharge  RESPIRATORY: No cough, wheezing, chills or hemoptysis; No shortness of breath  CARDIOVASCULAR: No chest pain, palpitations, dizziness, or leg swelling  GASTROINTESTINAL: No abdominal or epigastric pain. No nausea, vomiting, or hematemesis; No diarrhea or constipation. No melena or hematochezia.  GENITOURINARY: No dysuria, frequency, hematuria, or incontinence  NEUROLOGICAL: No headaches, memory loss, loss of strength, numbness, or tremors  SKIN: No itching, burning, rashes, or lesions   LYMPH NODES: No enlarged glands  ENDOCRINE: No heat or cold intolerance; No hair loss  MUSCULOSKELETAL: No joint pain or swelling; No muscle, back, or extremity pain  PSYCHIATRIC: No depression, anxiety, mood swings, or difficulty sleeping  HEME/LYMPH: No easy bruising, or bleeding gums  ALLERY AND IMMUNOLOGIC: No hives or eczema    ALL ROS REVIEWED AND NORMAL EXCEPT AS STATED ABOVE    T(C): 36.7 (11-04-18 @ 12:19), Max: 36.9 (11-03-18 @ 20:50)  HR: 70 (11-04-18 @ 12:19) (70 - 81)  BP: 124/70 (11-04-18 @ 12:19) (105/65 - 136/71)  RR: 14 (11-04-18 @ 12:19) (14 - 14)  SpO2: 100% (11-04-18 @ 12:19) (97% - 100%)  Wt(kg): --Vital Signs Last 24 Hrs  T(C): 36.7 (04 Nov 2018 12:19), Max: 36.9 (03 Nov 2018 20:50)  T(F): 98 (04 Nov 2018 12:19), Max: 98.4 (03 Nov 2018 20:50)  HR: 70 (04 Nov 2018 12:19) (70 - 81)  BP: 124/70 (04 Nov 2018 12:19) (105/65 - 136/71)  BP(mean): --  RR: 14 (04 Nov 2018 12:19) (14 - 14)  SpO2: 100% (04 Nov 2018 12:19) (97% - 100%)    PHYSICAL EXAM:  GENERAL: NAD, well-groomed, well-developed  HEAD:  Atraumatic, Normocephalic  EYES: EOMI, PERRLA, conjunctiva and sclera clear  ENMT: No tonsillar erythema, exudates, or enlargement; Moist mucous membranes, Good dentition, No lesions  NECK: Supple, No JVD, Normal thyroid  NERVOUS SYSTEM:  Alert & Oriented X3, Good concentration; Motor Strength 5/5 B/L upper and lower extremities; DTRs 2+ intact and symmetric  CHEST/LUNG: Clear to percussion bilaterally; No rales, rhonchi, wheezing, or rubs  HEART: Regular rate and rhythm; No murmurs, rubs, or gallops  ABDOMEN: Soft, Nontender, Nondistended; Bowel sounds present  EXTREMITIES:  2+ Peripheral Pulses, bilateral pitting edema to mid calves  LYMPH: No lymphadenopathy noted  SKIN: No rashes or lesions  BACK: two small open wounds, healing, no drainage on left and right buttock near midline.  I redressed them with gauze.    LABS:                        10.1   25.1  )-----------( 224      ( 03 Nov 2018 06:50 )             31.8     11-03    141  |  105  |  13  ----------------------------<  145<H>  4.1   |  31  |  0.79    Ca    8.6      03 Nov 2018 06:50    TPro  5.2<L>  /  Alb  2.0<L>  /  TBili  0.3  /  DBili  x   /  AST  22  /  ALT  34  /  AlkPhos  65  11-03    CAPILLARY BLOOD GLUCOSE    POCT Blood Glucose.: 122 mg/dL (04 Nov 2018 11:36)  POCT Blood Glucose.: 133 mg/dL (04 Nov 2018 07:35)  POCT Blood Glucose.: 153 mg/dL (03 Nov 2018 21:09)  POCT Blood Glucose.: 141 mg/dL (03 Nov 2018 17:07)    RADIOLOGY & ADDITIONAL TESTS:    Consultant(s) Notes Reviewed:  [x ] YES  [ ] NO  Care Discussed with Consultants/Other Providers [ x] YES  [ ] NO  Imaging Personally Reviewed:  [ ] YES  [ ] NO

## 2018-11-04 NOTE — CONSULT NOTE ADULT - PROBLEM SELECTOR RECOMMENDATION 4
Home Care Instructions                                                                                                                July Haynes   MRN: [id number]    Department:  [facility], Emergency Dept   Date of Visit:  8/3/2017            [facility], Emergency Dept    [address] [address]    Phone:  [phone]      You were seen by     Adam Rubio M.D.      Your Diagnosis Was     , threatened     O20.0       Follow-up Information     1. Follow up with SAMI Denny.    Specialty:  Family Medicine    Contact information    [address] [address] [phone]          2. Follow up with [facility], M.D..    Specialty:  OB/Gyn    Why:  stay well hydrated. use tylenol for pain. continue prenatal vitamins.     Contact information    [address] [address] [phone]        Medication Information     Review all of your home medications and newly ordered medications with your primary doctor and/or pharmacist as soon as possible. Follow medication instructions as directed by your doctor and/or pharmacist.     Please keep your complete medication list with you and share with your physician. Update the information when medications are discontinued, doses are changed, or new medications (including over-the-counter products) are added; and carry medication information at all times in the event of emergency situations.               Medication List      Notice     You have not been prescribed any medications.            Procedures and tests performed during your visit     CARDIAC MONITORING    CBC WITH DIFFERENTIAL    COMP METABOLIC PANEL    ESTIMATED GFR    HCG QUANTITATIVE SERUM    O2 Protocol    RH TYPE FOR RHOGAM FROM E.D.    SALINE LOCK    US-OB 1ST TRIMESTER SINGLE GEST Is the patient pregnant?: Unknown bchg pending        Discharge Instructions       Threatened Miscarriage  A threatened miscarriage is  when you have vaginal bleeding during your first 20 weeks of pregnancy but the pregnancy has not ended. Your doctor will do tests to make sure you are still pregnant. The cause of the bleeding may not be known. This condition does not mean your pregnancy will end. It does increase the risk of it ending (complete miscarriage).  HOME CARE   · Make sure you keep all your doctor visits for prenatal care.  · Get plenty of rest.  · Do not have sex or use tampons if you have vaginal bleeding.  · Do not douche.  · Do not smoke or use drugs.  · Do not drink alcohol.  · Avoid caffeine.  GET HELP IF:  · You have light bleeding from your vagina.  · You have belly pain or cramping.  · You have a fever.  GET HELP RIGHT AWAY IF:   · You have heavy bleeding from your vagina.  · You have clots of blood coming from your vagina.  · You have bad pain or cramps in your low back or belly.  · You have fever, chills, and bad belly pain.  MAKE SURE YOU:   · Understand these instructions.  · Will watch your condition.  · Will get help right away if you are not doing well or get worse.     This information is not intended to replace advice given to you by your health care provider. Make sure you discuss any questions you have with your health care provider.     Document Released: 2009 Document Revised: 2014 Document Reviewed: 10/14/2014  [employer] Interactive Patient Education ©2016 [employer]    Pelvic Rest  Pelvic rest is sometimes recommended for women when:   · The placenta is partially or completely covering the opening of the cervix (placenta previa).  · There is bleeding between the uterine wall and the amniotic sac in the first trimester (subchorionic hemorrhage).  · The cervix begins to open without labor starting (incompetent cervix, cervical insufficiency).  · The labor is too early ( labor).  HOME CARE INSTRUCTIONS  · Do not have sexual intercourse, stimulation, or an orgasm.  · Do not use tampons, douche, or  put anything in the vagina.  · Do not lift anything over 10 pounds (4.5 kg).  · Avoid strenuous activity or straining your pelvic muscles.  SEEK MEDICAL CARE IF:   · You have any vaginal bleeding during pregnancy. Treat this as a potential emergency.  · You have cramping pain felt low in the stomach (stronger than menstrual cramps).  · You notice vaginal discharge (watery, mucus, or bloody).  · You have a low, dull backache.  · There are regular contractions or uterine tightening.  SEEK IMMEDIATE MEDICAL CARE IF:  You have vaginal bleeding and have placenta previa.      This information is not intended to replace advice given to you by your health care provider. Make sure you discuss any questions you have with your health care provider.     Document Released: 04/13/2012 Document Revised: 03/11/2013 Document Reviewed: 04/13/2012  [employer] Interactive Patient Education ©2016 [employer]    Vaginal Bleeding During Pregnancy, First Trimester  A small amount of bleeding (spotting) from the vagina is relatively common in early pregnancy. It usually stops on its own. Various things may cause bleeding or spotting in early pregnancy. Some bleeding may be related to the pregnancy, and some may not. In most cases, the bleeding is normal and is not a problem. However, bleeding can also be a sign of something serious. Be sure to tell your health care provider about any vaginal bleeding right away.  Some possible causes of vaginal bleeding during the first trimester include:  · Infection or inflammation of the cervix.  · Growths (polyps) on the cervix.  · Miscarriage or threatened miscarriage.  · Pregnancy tissue has developed outside of the uterus and in a fallopian tube (tubal pregnancy).  · Tiny cysts have developed in the uterus instead of pregnancy tissue (molar pregnancy).  HOME CARE INSTRUCTIONS   Watch your condition for any changes. The following actions may help to lessen any discomfort you are feeling:  · Follow  your health care provider's instructions for limiting your activity. If your health care provider orders bed rest, you may need to stay in bed and only get up to use the bathroom. However, your health care provider may allow you to continue light activity.  · If needed, make plans for someone to help with your regular activities and responsibilities while you are on bed rest.  · Keep track of the number of pads you use each day, how often you change pads, and how soaked (saturated) they are. Write this down.  · Do not use tampons. Do not douche.  · Do not have sexual intercourse or orgasms until approved by your health care provider.  · If you pass any tissue from your vagina, save the tissue so you can show it to your health care provider.  · Only take over-the-counter or prescription medicines as directed by your health care provider.  · Do not take aspirin because it can make you bleed.  · Keep all follow-up appointments as directed by your health care provider.  SEEK MEDICAL CARE IF:  · You have any vaginal bleeding during any part of your pregnancy.  · You have cramps or labor pains.  · You have a fever, not controlled by medicine.  SEEK IMMEDIATE MEDICAL CARE IF:   · You have severe cramps in your back or belly (abdomen).  · You pass large clots or tissue from your vagina.  · Your bleeding increases.  · You feel light-headed or weak, or you have fainting episodes.  · You have chills.  · You are leaking fluid or have a gush of fluid from your vagina.  · You pass out while having a bowel movement.  MAKE SURE YOU:  · Understand these instructions.  · Will watch your condition.  · Will get help right away if you are not doing well or get worse.     This information is not intended to replace advice given to you by your health care provider. Make sure you discuss any questions you have with your health care provider.     Document Released: 09/27/2006 Document Revised: 12/23/2014 Document Reviewed:  08/25/2014  [employer] Interactive Patient Education ©2016 [employer]            Patient Information     Patient Information    Following emergency treatment: all patient requiring follow-up care must return either to a private physician or a clinic if your condition worsens before you are able to obtain further medical attention, please return to the emergency room.     Billing Information    At [facility], we work to make the billing process streamlined for our patients.  Our Representatives are here to answer any questions you may have regarding your hospital bill.  If you have insurance coverage and have supplied your insurance information to us, we will submit a claim to your insurer on your behalf.  Should you have any questions regarding your bill, we can be reached online or by phone as follows:  Online: You are able pay your bills online or live chat with our representatives about any billing questions you may have. We are here to help Monday - Friday from 8:00am to 7:30pm and 9:00am - 12:00pm on Saturdays.  Please visit [email or web address]  for more information.   Phone:  [phone] or [phone]    Please note that your emergency physician, surgeon, pathologist, radiologist, anesthesiologist, and other specialists are not employed by [facility] and will therefore bill separately for their services.  Please contact them directly for any questions concerning their bills at the numbers below:     Emergency Physician Services:  [phone]  [facility]:  [phone]  [facility]:  [phone]  [facility]:  [phone]    1. Your final bill may vary from the amount quoted upon discharge if all procedures are not complete at that time, or if your doctor has additional procedures of which we are not aware. You will receive an additional bill if you return to the Emergency Department at [facility] for suture removal  regardless of the facility of which the sutures were placed.     2. Please arrange for settlement of this account at the emergency registration.    3. All self-pay accounts are due in full at the time of treatment.  If you are unable to meet this obligation then payment is expected within 4-5 days.     4. If you have had radiology studies (CT, X-ray, Ultrasound, MRI), you have received a preliminary result during your emergency department visit. Please contact the radiology department [phone] to receive a copy of your final result. Please discuss the Final result with your primary physician or with the follow up physician provided.     Crisis Hotline:  [place] Crisis Hotline:  0-378-WFDKVMB or 1-285.612.6625  [place] Crisis Hotline:    1-643.141.6269 or 032-573-3239         ED Discharge Follow Up Questions    1. In order to provide you with very good care, we would like to follow up with a phone call in the next few days.  May we have your permission to contact you?     YES /  NO    2. What is the best phone number to call you? (       )_____-__________    3. What is the best time to call you?      Morning  /  Afternoon  /  Evening                   Patient Signature:  ____________________________________________________________    Date:  ____________________________________________________________                cpap at night

## 2018-11-04 NOTE — PROGRESS NOTE ADULT - SUBJECTIVE AND OBJECTIVE BOX
Patient is a 72y old  Male who presents with a chief complaint of Spinal Fusion (02 Nov 2018 19:14)    HPI:  72M with history of T2DM, HTN, HLD, FERNANDO on CPAP, Pneumonia, CLL, Prostate Cancer s/p resection 2003, thyroid cancer s/p thyroidectomy 8/2018, renal cancer s/p partial nephrectomy and spinal stenosis with acquired scoliosis who initially presented 10/18/18 for L1-5 posterior lumbar fusion and T10-pelvis instrumentation and fusion but case was aborted due to 4 second pause after induction with propofol who returned 10/27/18 for surgery. Of note, he had a prior Holter monitoring study that revealed nocturnal bradycardia with pauses. 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 On 10/27/18, he underwent placement of temporary pacer and L1-S1 transforaminal interbody fusion, T10-pelvis instrumented fusion and Plastics assisted closure. Operative course was notable for bradycardia responsive to glycopyrrolate, cerebrospinal fluid leak which was primarily repaired and EBL of 1500cc. He received 2 units PRBC intra-op. Post-operatively, he had a ~40 minutes episode of hypotension to SBP in the 50smmHg, for which he was placed on pressor support and given an additional 2 units PRBC.  On current encounter patient complains of fatigue and back pain and weakness.  Patient denies bowel or bladder dysfunction, sensory impairment. (02 Nov 2018 19:14)      PAST MEDICAL & SURGICAL HISTORY:  Former smoker, stopped smoking many years ago  CLL (chronic lymphocytic leukemia)  Sleep apnea, unspecified type  Spinal stenosis of lumbar region, unspecified whether neurogenic claudication present  Leukocytosis, unspecified type: monitored for CLL  Malignant neoplasm of kidney parenchyma, right: s/p surgery  Thyroid nodule  Malignant neoplasm of thyroid gland  Prostate cancer: 2003, s/p prostatectomy, RT 2009 x 40 days  Hypertension, unspecified type  Diabetes mellitus type 2 in obese  H/O thyroidectomy: 2016  History of strabismus surgery: b/l 1958  S/P left knee arthroscopy: 1998  H/O ventral hernia repair: 1997  H/O radical prostatectomy: 2003  H/O partial nephrectomy: right, 2009  History of total knee replacement, right: 2012, scoped 1998      MEDICATIONS  (STANDING):  ascorbic acid 500 milliGRAM(s) Oral two times a day  atorvastatin 80 milliGRAM(s) Oral at bedtime  dextrose 5%. 1000 milliLiter(s) (50 mL/Hr) IV Continuous <Continuous>  dextrose 50% Injectable 12.5 Gram(s) IV Push once  dextrose 50% Injectable 25 Gram(s) IV Push once  dextrose 50% Injectable 25 Gram(s) IV Push once  docusate sodium 100 milliGRAM(s) Oral three times a day  enoxaparin Injectable 40 milliGRAM(s) SubCutaneous <User Schedule>  HYDROmorphone   Tablet 4 milliGRAM(s) Oral every 6 hours  insulin glargine Injectable (LANTUS) 12 Unit(s) SubCutaneous at bedtime  insulin lispro (HumaLOG) corrective regimen sliding scale   SubCutaneous three times a day before meals  insulin lispro (HumaLOG) corrective regimen sliding scale   SubCutaneous at bedtime  levothyroxine 175 MICROGram(s) Oral daily  lisinopril 10 milliGRAM(s) Oral daily  metFORMIN 1000 milliGRAM(s) Oral every 12 hours  ondansetron    Tablet 4 milliGRAM(s) Oral every 6 hours  senna 2 Tablet(s) Oral at bedtime  tamsulosin 0.4 milliGRAM(s) Oral at bedtime    MEDICATIONS  (PRN):  acetaminophen   Tablet .. 650 milliGRAM(s) Oral every 6 hours PRN Temp greater or equal to 38C (100.4F), Mild Pain (1 - 3)  bisacodyl Suppository 10 milliGRAM(s) Rectal daily PRN Constipation  cyclobenzaprine 5 milliGRAM(s) Oral three times a day PRN Muscle Spasm  dextrose 40% Gel 15 Gram(s) Oral once PRN Blood Glucose LESS THAN 70 milliGRAM(s)/deciliter  glucagon  Injectable 1 milliGRAM(s) IntraMuscular once PRN Glucose LESS THAN 70 milligrams/deciliter  polyethylene glycol 3350 17 Gram(s) Oral at bedtime PRN Constipation      Allergies    codeine (Vomiting; Headache)  dogs, cats (Short breath)  dust (Rhinitis)  mold (Rhinitis)  morphine (Vomiting; Headache)  narcotic analgesics (Vomiting; Headache)    Intolerances        TODAY'S SUBJECTIVE & REVIEW OF SYMPTOMS:  TODAY'S REVIEW OF SYMPTOMS  [  ] Constiutional WNL            [X] Cardio WNL               [X] Resp WNL  [X] GI WNL                            [X]  WNL                    [X] Heme WNL  [X] Endo WNL                       [X] Skin WNL                   [  ] MSK WNL  [  ] Neuro WNL                     [X] Cognitive WNL           [X] Psych WNL    Patient was seen and examined at bedside. Pt had no acute events overnight. Pt endorses some muscle soreness after therapy session yesterday. Pain well controlled. Tolerating current diet.       PHYSICAL EXAM  72y  Vital Signs Last 24 Hrs  T(C): 36.9 (03 Nov 2018 20:50), Max: 36.9 (03 Nov 2018 20:50)  T(F): 98.4 (03 Nov 2018 20:50), Max: 98.4 (03 Nov 2018 20:50)  HR: 71 (04 Nov 2018 06:03) (71 - 81)  BP: 105/65 (04 Nov 2018 06:03) (105/65 - 136/71)  BP(mean): --  RR: 14 (03 Nov 2018 20:50) (14 - 14)  SpO2: 97% (03 Nov 2018 20:50) (97% - 97%)    Constitutional - NAD, Comfortable  	HEENT - NCAT  	Neck - Supple, No limited ROM  	Chest - CTA bilaterally  	Cardiovascular - RRR, S1S2  	Abdomen - BS+, Soft, NTND  	Extremities - No C/C/E  	Neurologic Exam -                 	   Cognitive - Awake, Alert, AAO to self, place, date, year, situation  	   Cranial Nerves - CN 2-12 grossly intact  	   Motor -   	                  LEFT    UE - 4/5  	                  RIGHT UE - 4/5  	                  LEFT    LE - HF 4/5, KE 4/5, DF 5/5, PF 5/5  	                  RIGHT LE - HF 4/5, KE 4/5, DF 5/5, PF 5/5     Skin -  Back incision with aquacel dressings (not assessed today)      RECENT LABS:                          10.1   25.1  )-----------( 224      ( 03 Nov 2018 06:50 )             31.8     11-03    141  |  105  |  13  ----------------------------<  145<H>  4.1   |  31  |  0.79    Ca    8.6      03 Nov 2018 06:50    TPro  5.2<L>  /  Alb  2.0<L>  /  TBili  0.3  /  DBili  x   /  AST  22  /  ALT  34  /  AlkPhos  65  11-03    LIVER FUNCTIONS - ( 03 Nov 2018 06:50 )  Alb: 2.0 g/dL / Pro: 5.2 g/dL / ALK PHOS: 65 U/L / ALT: 34 U/L DA / AST: 22 U/L / GGT: x                   CAPILLARY BLOOD GLUCOSE      POCT Blood Glucose.: 133 mg/dL (04 Nov 2018 07:35)  POCT Blood Glucose.: 153 mg/dL (03 Nov 2018 21:09)  POCT Blood Glucose.: 141 mg/dL (03 Nov 2018 17:07)  POCT Blood Glucose.: 193 mg/dL (03 Nov 2018 11:48)    IMPRESSION AND PLAN: Patient is a 72y old  Male who presents with a chief complaint of Spinal Fusion (02 Nov 2018 19:14)    HPI:  72M with history of T2DM, HTN, HLD, FERNANDO on CPAP, Pneumonia, CLL, Prostate Cancer s/p resection 2003, thyroid cancer s/p thyroidectomy 8/2018, renal cancer s/p partial nephrectomy and spinal stenosis with acquired scoliosis who initially presented 10/18/18 for L1-5 posterior lumbar fusion and T10-pelvis instrumentation and fusion but case was aborted due to 4 second pause after induction with propofol who returned 10/27/18 for surgery. Of note, he had a prior Holter monitoring study that revealed nocturnal bradycardia with pauses. 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 On 10/27/18, he underwent placement of temporary pacer and L1-S1 transforaminal interbody fusion, T10-pelvis instrumented fusion and Plastics assisted closure. Operative course was notable for bradycardia responsive to glycopyrrolate, cerebrospinal fluid leak which was primarily repaired and EBL of 1500cc. He received 2 units PRBC intra-op. Post-operatively, he had a ~40 minutes episode of hypotension to SBP in the 50smmHg, for which he was placed on pressor support and given an additional 2 units PRBC.  On current encounter patient complains of fatigue and back pain and weakness.  Patient denies bowel or bladder dysfunction, sensory impairment. (02 Nov 2018 19:14)      PAST MEDICAL & SURGICAL HISTORY:  Former smoker, stopped smoking many years ago  CLL (chronic lymphocytic leukemia)  Sleep apnea, unspecified type  Spinal stenosis of lumbar region, unspecified whether neurogenic claudication present  Leukocytosis, unspecified type: monitored for CLL  Malignant neoplasm of kidney parenchyma, right: s/p surgery  Thyroid nodule  Malignant neoplasm of thyroid gland  Prostate cancer: 2003, s/p prostatectomy, RT 2009 x 40 days  Hypertension, unspecified type  Diabetes mellitus type 2 in obese  H/O thyroidectomy: 2016  History of strabismus surgery: b/l 1958  S/P left knee arthroscopy: 1998  H/O ventral hernia repair: 1997  H/O radical prostatectomy: 2003  H/O partial nephrectomy: right, 2009  History of total knee replacement, right: 2012, scoped 1998      MEDICATIONS  (STANDING):  ascorbic acid 500 milliGRAM(s) Oral two times a day  atorvastatin 80 milliGRAM(s) Oral at bedtime  dextrose 5%. 1000 milliLiter(s) (50 mL/Hr) IV Continuous <Continuous>  dextrose 50% Injectable 12.5 Gram(s) IV Push once  dextrose 50% Injectable 25 Gram(s) IV Push once  dextrose 50% Injectable 25 Gram(s) IV Push once  docusate sodium 100 milliGRAM(s) Oral three times a day  enoxaparin Injectable 40 milliGRAM(s) SubCutaneous <User Schedule>  HYDROmorphone   Tablet 4 milliGRAM(s) Oral every 6 hours  insulin glargine Injectable (LANTUS) 12 Unit(s) SubCutaneous at bedtime  insulin lispro (HumaLOG) corrective regimen sliding scale   SubCutaneous three times a day before meals  insulin lispro (HumaLOG) corrective regimen sliding scale   SubCutaneous at bedtime  levothyroxine 175 MICROGram(s) Oral daily  lisinopril 10 milliGRAM(s) Oral daily  metFORMIN 1000 milliGRAM(s) Oral every 12 hours  ondansetron    Tablet 4 milliGRAM(s) Oral every 6 hours  senna 2 Tablet(s) Oral at bedtime  tamsulosin 0.4 milliGRAM(s) Oral at bedtime    MEDICATIONS  (PRN):  acetaminophen   Tablet .. 650 milliGRAM(s) Oral every 6 hours PRN Temp greater or equal to 38C (100.4F), Mild Pain (1 - 3)  bisacodyl Suppository 10 milliGRAM(s) Rectal daily PRN Constipation  cyclobenzaprine 5 milliGRAM(s) Oral three times a day PRN Muscle Spasm  dextrose 40% Gel 15 Gram(s) Oral once PRN Blood Glucose LESS THAN 70 milliGRAM(s)/deciliter  glucagon  Injectable 1 milliGRAM(s) IntraMuscular once PRN Glucose LESS THAN 70 milligrams/deciliter  polyethylene glycol 3350 17 Gram(s) Oral at bedtime PRN Constipation      Allergies    codeine (Vomiting; Headache)  dogs, cats (Short breath)  dust (Rhinitis)  mold (Rhinitis)  morphine (Vomiting; Headache)  narcotic analgesics (Vomiting; Headache)    Intolerances        TODAY'S SUBJECTIVE & REVIEW OF SYMPTOMS:  TODAY'S REVIEW OF SYMPTOMS  [  ] Constiutional WNL            [X] Cardio WNL               [X] Resp WNL  [X] GI WNL                            [X]  WNL                    [X] Heme WNL  [X] Endo WNL                       [X] Skin WNL                   [  ] MSK WNL  [  ] Neuro WNL                     [X] Cognitive WNL           [X] Psych WNL    Patient was seen and examined at bedside. Pt had no acute events overnight. Pt endorses some muscle soreness after therapy session yesterday. Pain well controlled. Tolerating current diet.       PHYSICAL EXAM  72y  Vital Signs Last 24 Hrs  T(C): 36.9 (03 Nov 2018 20:50), Max: 36.9 (03 Nov 2018 20:50)  T(F): 98.4 (03 Nov 2018 20:50), Max: 98.4 (03 Nov 2018 20:50)  HR: 71 (04 Nov 2018 06:03) (71 - 81)  BP: 105/65 (04 Nov 2018 06:03) (105/65 - 136/71)  BP(mean): --  RR: 14 (03 Nov 2018 20:50) (14 - 14)  SpO2: 97% (03 Nov 2018 20:50) (97% - 97%)    Constitutional - NAD, Comfortable  	HEENT - NCAT  	Neck - Supple, No limited ROM  	Chest - CTA bilaterally  	Cardiovascular - RRR, S1S2  	Abdomen - BS+, Soft, NTND  	Extremities - No C/C/E  	Neurologic Exam -                 	   Cognitive - Awake, Alert, AAO to self, place, date, year, situation  	   Cranial Nerves - CN 2-12 grossly intact  	   Motor -   	                  LEFT    UE - 4/5  	                  RIGHT UE - 4/5  	                  LEFT    LE - HF 4/5, KE 4/5, DF 5/5, PF 5/5  	                  RIGHT LE - HF 4/5, KE 4/5, DF 5/5, PF 5/5     Skin -  Back incision with aquacel dressings CDI  No erythema or edema noted      RECENT LABS:                          10.1   25.1  )-----------( 224      ( 03 Nov 2018 06:50 )             31.8     11-03    141  |  105  |  13  ----------------------------<  145<H>  4.1   |  31  |  0.79    Ca    8.6      03 Nov 2018 06:50    TPro  5.2<L>  /  Alb  2.0<L>  /  TBili  0.3  /  DBili  x   /  AST  22  /  ALT  34  /  AlkPhos  65  11-03    LIVER FUNCTIONS - ( 03 Nov 2018 06:50 )  Alb: 2.0 g/dL / Pro: 5.2 g/dL / ALK PHOS: 65 U/L / ALT: 34 U/L DA / AST: 22 U/L / GGT: x                   CAPILLARY BLOOD GLUCOSE      POCT Blood Glucose.: 133 mg/dL (04 Nov 2018 07:35)  POCT Blood Glucose.: 153 mg/dL (03 Nov 2018 21:09)  POCT Blood Glucose.: 141 mg/dL (03 Nov 2018 17:07)  POCT Blood Glucose.: 193 mg/dL (03 Nov 2018 11:48)

## 2018-11-04 NOTE — CONSULT NOTE ADULT - ASSESSMENT
72M DM2, HTN, HLD, FERNANDO on Cpap, CLL, Prostate cancer s/p resection in 2003, thyroid cancer s/p thyroidectomy 8/18, renal cancer s/p partial nephrectomy presents after L1-L5 lumbar fusion.

## 2018-11-04 NOTE — PROGRESS NOTE ADULT - ASSESSMENT
73 yo male HTN DM2, FERNANDO on CPAP, post op L1-5 posterior lumbar fusion and T10 pelvis instrumentation.  Course was complicated by hypotension  which required pressor support (initially with norepinephrine, neosynephrine and vasopressin)  with Gait Instability, ADL impairments and Functional impairments.    COMORBIDITIES/ACTIVE MEDICAL ISSUES   #L1-L5 posterior lumbar fusion and T10 pelvis instrumentation  -Pain control with dilaudid  -Cyclobenzaprine and diazepam PRN  -WBAT  -spinal precautions  -Outpatient follow up with Dr. Dunn ( plastic surgery) [phone] within one week for suture removal.   - 11/5 remove the dressing and may shower, avoiding direct streams of water onto your incision, do not scrub, do not soak in water, pat dry when finished    #HTN.  Hypotension and bradycardia post-op, junctional rhythm likely 2/2 hyperkalemia resolved, nos NSR  - Lisinopril    #Hypothyroidism  -c/w levothyroxine    #DM-HbA1C 6.9 on 10/4  -Glargine  -SNEHAL  -Consistent carb diet    #BPH  -tamsulosin    #Urinary retention  - straight cath > 350ml.  PVRs, toileting program    #Leukocytosis-Known; monitored for CLL  +h/o CA of kidney, prostate, thyroid  -Afebrile  -monitor cbc.   -consider heme consult    #Bowel regimen  -Colace, Miralax, Senna.  Dulc supp prn.      Pain Mgmt - Tylenol PRN, Dilaudid  GI/Bowel Mgmt -  Continent c/w Colace, Senna,  Dulcolax suppository PRN, Miralax,  /Bladder Mgmt - Continent, PVR q6h- straight cath > 350ml    FEN   - Diet - Consistent Carb  [CCHO, DASH/TLC]    Vitamin C 500 mg bid    Precautions / PROPHYLAXIS:   - Falls, Cardiac, Spinal,   - ortho: Weight bearing status: WBAT  - Lungs: Aspiration, Incentive Spirometer   - Pressure injury/Skin: Turn Q2hrs while in bed, OOB to Chair, PT/OT    - DVT: Lovenox, SCDs, TEDs 73 yo male HTN DM2, FERNANDO on CPAP, post op L1-5 posterior lumbar fusion and T10 pelvis instrumentation.  Course was complicated by hypotension  which required pressor support (initially with norepinephrine, neosynephrine and vasopressin)  with Gait Instability, ADL impairments and Functional impairments.    COMORBIDITIES/ACTIVE MEDICAL ISSUES   #L1-L5 posterior lumbar fusion and T10 pelvis instrumentation  -Pain control with dilaudid  -Cyclobenzaprine and diazepam PRN  -WBAT  -spinal precautions  -Outpatient follow up with Dr. Dunn ( plastic surgery) [phone] within one week for suture removal.   - 11/5 remove the dressing and may shower, avoiding direct streams of water onto your incision, do not scrub, do not soak in water, pat dry when finished    #HTN.  Hypotension and bradycardia post-op, junctional rhythm likely 2/2 hyperkalemia resolved, nos NSR  - Lisinopril    #Hypothyroidism  -c/w levothyroxine    #DM-HbA1C 6.9 on 10/4  -Glargine  -SNEHAL  -Consistent carb diet    #BPH  -tamsulosin    #Urinary retention  -Resolved  PVRs 2, 79, 100  D/C bladder scans    #Leukocytosis-Known; monitored for CLL  +h/o CA of kidney, prostate, thyroid  -Afebrile  -monitor cbc.   -consider heme consult    #Bowel regimen  -Colace, Miralax, Senna.  Dulc supp prn.      Pain Mgmt - Tylenol PRN, Dilaudid  GI/Bowel Mgmt -  Continent c/w Colace, Senna,  Dulcolax suppository PRN, Miralax,  /Bladder Mgmt - Continent.  Successful TOV    FEN   - Diet - Consistent Carb  [CCHO, DASH/TLC]    Vitamin C 500 mg bid    Precautions / PROPHYLAXIS:   - Falls, Cardiac, Spinal,   - ortho: Weight bearing status: WBAT  - Lungs: Aspiration, Incentive Spirometer   - Pressure injury/Skin: Turn Q2hrs while in bed, OOB to Chair, PT/OT    - DVT: Lovenox, SCDs, TEDs

## 2018-11-05 PROCEDURE — 99232 SBSQ HOSP IP/OBS MODERATE 35: CPT | Mod: GC

## 2018-11-05 PROCEDURE — 99232 SBSQ HOSP IP/OBS MODERATE 35: CPT

## 2018-11-05 RX ORDER — HYDROMORPHONE HYDROCHLORIDE 2 MG/ML
2 INJECTION INTRAMUSCULAR; INTRAVENOUS; SUBCUTANEOUS EVERY 4 HOURS
Qty: 0 | Refills: 0 | Status: DISCONTINUED | OUTPATIENT
Start: 2018-11-05 | End: 2018-11-10

## 2018-11-05 RX ADMIN — CYCLOBENZAPRINE HYDROCHLORIDE 5 MILLIGRAM(S): 10 TABLET, FILM COATED ORAL at 00:16

## 2018-11-05 RX ADMIN — HYDROMORPHONE HYDROCHLORIDE 2 MILLIGRAM(S): 2 INJECTION INTRAMUSCULAR; INTRAVENOUS; SUBCUTANEOUS at 16:56

## 2018-11-05 RX ADMIN — HYDROMORPHONE HYDROCHLORIDE 4 MILLIGRAM(S): 2 INJECTION INTRAMUSCULAR; INTRAVENOUS; SUBCUTANEOUS at 11:09

## 2018-11-05 RX ADMIN — Medication 100 MILLIGRAM(S): at 21:21

## 2018-11-05 RX ADMIN — Medication 650 MILLIGRAM(S): at 01:16

## 2018-11-05 RX ADMIN — Medication 650 MILLIGRAM(S): at 09:59

## 2018-11-05 RX ADMIN — HYDROMORPHONE HYDROCHLORIDE 4 MILLIGRAM(S): 2 INJECTION INTRAMUSCULAR; INTRAVENOUS; SUBCUTANEOUS at 05:46

## 2018-11-05 RX ADMIN — HYDROMORPHONE HYDROCHLORIDE 4 MILLIGRAM(S): 2 INJECTION INTRAMUSCULAR; INTRAVENOUS; SUBCUTANEOUS at 06:23

## 2018-11-05 RX ADMIN — Medication 100 MILLIGRAM(S): at 13:03

## 2018-11-05 RX ADMIN — HYDROMORPHONE HYDROCHLORIDE 2 MILLIGRAM(S): 2 INJECTION INTRAMUSCULAR; INTRAVENOUS; SUBCUTANEOUS at 15:39

## 2018-11-05 RX ADMIN — TAMSULOSIN HYDROCHLORIDE 0.4 MILLIGRAM(S): 0.4 CAPSULE ORAL at 21:21

## 2018-11-05 RX ADMIN — Medication 650 MILLIGRAM(S): at 15:28

## 2018-11-05 RX ADMIN — Medication 650 MILLIGRAM(S): at 08:34

## 2018-11-05 RX ADMIN — HYDROMORPHONE HYDROCHLORIDE 4 MILLIGRAM(S): 2 INJECTION INTRAMUSCULAR; INTRAVENOUS; SUBCUTANEOUS at 17:54

## 2018-11-05 RX ADMIN — HYDROMORPHONE HYDROCHLORIDE 4 MILLIGRAM(S): 2 INJECTION INTRAMUSCULAR; INTRAVENOUS; SUBCUTANEOUS at 00:18

## 2018-11-05 RX ADMIN — CYCLOBENZAPRINE HYDROCHLORIDE 5 MILLIGRAM(S): 10 TABLET, FILM COATED ORAL at 08:34

## 2018-11-05 RX ADMIN — Medication 175 MICROGRAM(S): at 05:46

## 2018-11-05 RX ADMIN — Medication 500 MILLIGRAM(S): at 05:46

## 2018-11-05 RX ADMIN — Medication 100 MILLIGRAM(S): at 05:46

## 2018-11-05 RX ADMIN — ENOXAPARIN SODIUM 40 MILLIGRAM(S): 100 INJECTION SUBCUTANEOUS at 17:54

## 2018-11-05 RX ADMIN — LISINOPRIL 10 MILLIGRAM(S): 2.5 TABLET ORAL at 05:46

## 2018-11-05 RX ADMIN — ATORVASTATIN CALCIUM 80 MILLIGRAM(S): 80 TABLET, FILM COATED ORAL at 21:21

## 2018-11-05 RX ADMIN — SENNA PLUS 2 TABLET(S): 8.6 TABLET ORAL at 21:21

## 2018-11-05 RX ADMIN — Medication 650 MILLIGRAM(S): at 00:16

## 2018-11-05 RX ADMIN — ONDANSETRON 4 MILLIGRAM(S): 8 TABLET, FILM COATED ORAL at 11:06

## 2018-11-05 RX ADMIN — Medication 500 MILLIGRAM(S): at 17:54

## 2018-11-05 RX ADMIN — HYDROMORPHONE HYDROCHLORIDE 4 MILLIGRAM(S): 2 INJECTION INTRAMUSCULAR; INTRAVENOUS; SUBCUTANEOUS at 11:06

## 2018-11-05 RX ADMIN — Medication 650 MILLIGRAM(S): at 15:01

## 2018-11-05 RX ADMIN — CYCLOBENZAPRINE HYDROCHLORIDE 5 MILLIGRAM(S): 10 TABLET, FILM COATED ORAL at 13:03

## 2018-11-05 RX ADMIN — ONDANSETRON 4 MILLIGRAM(S): 8 TABLET, FILM COATED ORAL at 05:46

## 2018-11-05 RX ADMIN — METFORMIN HYDROCHLORIDE 1000 MILLIGRAM(S): 850 TABLET ORAL at 08:14

## 2018-11-05 RX ADMIN — INSULIN GLARGINE 12 UNIT(S): 100 INJECTION, SOLUTION SUBCUTANEOUS at 21:21

## 2018-11-05 RX ADMIN — METFORMIN HYDROCHLORIDE 1000 MILLIGRAM(S): 850 TABLET ORAL at 17:52

## 2018-11-05 RX ADMIN — ONDANSETRON 4 MILLIGRAM(S): 8 TABLET, FILM COATED ORAL at 17:54

## 2018-11-05 NOTE — PROGRESS NOTE ADULT - ASSESSMENT
Pt is a 73yo M admitted to acute rehab s/p spinal fusion complicated with RBBB and first degree AV block, intra op complicated by bradycardia s/p glycopyrrolate, cerebrospinal fluid leak, and post op hypotension s/p pressor support and anemia.     *L1-L5 posterior lumbar fusion and T10 instrumentation   Cont acute rehab PT/OT  Pain management w dilaudid, still no controlled  Flexril prn   f/u out pt plastic post suture removal   Dressing removal today     *HTN  s/p hypotension and bradycardia resolved   Cont lisinopril     *DM  ISS  BGM controlled    *CLL  at baseline  Continue monitor    *DVT ppx   Cont lonevox

## 2018-11-05 NOTE — DIETITIAN INITIAL EVALUATION ADULT. - OTHER INFO
Initial Assessment - 72yr Old Male Dx Spinal Stenosis Consistent Carbohydrate DASH-TLC Diet, Denies Food Allergy, Tolerates Diet, No Chewing/Swallowing Complications (Per Pt), No Pressure Ulcers, +1 Edema to Bilat L/E, No Recent N/VC

## 2018-11-05 NOTE — PROGRESS NOTE ADULT - SUBJECTIVE AND OBJECTIVE BOX
HPI:  72M with history of T2DM, HTN, HLD, FERNANDO on CPAP, Pneumonia, CLL, Prostate Cancer s/p resection 2003, thyroid cancer s/p thyroidectomy 8/2018, renal cancer s/p partial nephrectomy and spinal stenosis with acquired scoliosis who initially presented 10/18/18 for L1-5 posterior lumbar fusion and T10-pelvis instrumentation and fusion but case was aborted due to 4 second pause after induction with propofol who returned 10/27/18 for surgery. Of note, he had a prior Holter monitoring study that revealed nocturnal bradycardia with pauses. 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 On 10/27/18, he underwent placement of temporary pacer and L1-S1 transforaminal interbody fusion, T10-pelvis instrumented fusion and Plastics assisted closure. Operative course was notable for bradycardia responsive to glycopyrrolate, cerebrospinal fluid leak which was primarily repaired and EBL of 1500cc. He received 2 units PRBC intra-op. Post-operatively, he had a ~40 minutes episode of hypotension to SBP in the 50smmHg, for which he was placed on pressor support and given an additional 2 units PRBC.  Pt was medically optimized and discharged to [facility] for AIR 11/2/18      PAST MEDICAL & SURGICAL HISTORY:  Former smoker, stopped smoking many years ago  CLL (chronic lymphocytic leukemia)  Sleep apnea, unspecified type  Spinal stenosis of lumbar region, unspecified whether neurogenic claudication present  Leukocytosis, unspecified type: monitored for CLL  Malignant neoplasm of kidney parenchyma, right: s/p surgery  Thyroid nodule  Malignant neoplasm of thyroid gland  Prostate cancer: 2003, s/p prostatectomy, RT 2009 x 40 days  Hypertension, unspecified type  Diabetes mellitus type 2 in obese  H/O thyroidectomy: 2016  History of strabismus surgery: b/l 1958  S/P left knee arthroscopy: 1998  H/O ventral hernia repair: 1997  H/O radical prostatectomy: 2003  H/O partial nephrectomy: right, 2009  History of total knee replacement, right: 2012, scoped 1998  MEDICATIONS  (STANDING):  ascorbic acid 500 milliGRAM(s) Oral two times a day  atorvastatin 80 milliGRAM(s) Oral at bedtime  dextrose 5%. 1000 milliLiter(s) (50 mL/Hr) IV Continuous <Continuous>  dextrose 50% Injectable 12.5 Gram(s) IV Push once  dextrose 50% Injectable 25 Gram(s) IV Push once  dextrose 50% Injectable 25 Gram(s) IV Push once  docusate sodium 100 milliGRAM(s) Oral three times a day  enoxaparin Injectable 40 milliGRAM(s) SubCutaneous <User Schedule>  HYDROmorphone   Tablet 4 milliGRAM(s) Oral every 6 hours  insulin glargine Injectable (LANTUS) 12 Unit(s) SubCutaneous at bedtime  insulin lispro (HumaLOG) corrective regimen sliding scale   SubCutaneous three times a day before meals  insulin lispro (HumaLOG) corrective regimen sliding scale   SubCutaneous at bedtime  levothyroxine 175 MICROGram(s) Oral daily  lisinopril 10 milliGRAM(s) Oral daily  metFORMIN 1000 milliGRAM(s) Oral every 12 hours  ondansetron    Tablet 4 milliGRAM(s) Oral every 6 hours  senna 2 Tablet(s) Oral at bedtime  tamsulosin 0.4 milliGRAM(s) Oral at bedtime    MEDICATIONS  (PRN):  acetaminophen   Tablet .. 650 milliGRAM(s) Oral every 6 hours PRN Temp greater or equal to 38C (100.4F), Mild Pain (1 - 3)  bisacodyl Suppository 10 milliGRAM(s) Rectal daily PRN Constipation  cyclobenzaprine 5 milliGRAM(s) Oral three times a day PRN Muscle Spasm  dextrose 40% Gel 15 Gram(s) Oral once PRN Blood Glucose LESS THAN 70 milliGRAM(s)/deciliter  glucagon  Injectable 1 milliGRAM(s) IntraMuscular once PRN Glucose LESS THAN 70 milligrams/deciliter  polyethylene glycol 3350 17 Gram(s) Oral at bedtime PRN Constipation    Allergies    codeine (Vomiting; Headache)  dogs, cats (Short breath)  dust (Rhinitis)  mold (Rhinitis)  morphine (Vomiting; Headache)  narcotic analgesics (Vomiting; Headache)        TODAY'S SUBJECTIVE & REVIEW OF SYMPTOMS:  TODAY'S REVIEW OF SYMPTOMS  [ x ] Constiutional WNL            [X] Cardio WNL               [X] Resp WNL  [X] GI WNL                            [X]  WNL                    [X] Heme WNL  [X] Endo WNL                       [X] Skin WNL                   [  ] MSK WNL  [  ] Neuro WNL                     [X] Cognitive WNL           [X] Psych WNL    Patient was seen and examined at bedside. Pt has had difficulty sleeping in the bed and has since switched to sleeping on a recliner.  Pt reports that he is improved today.  Less pain in the back which is non-radiating.  +BM yesterday.  Denies dysuria, frequency or urgency.       PHYSICAL EXAM  Vital Signs Last 24 Hrs  T(C): 36.5 (05 Nov 2018 08:18), Max: 36.7 (04 Nov 2018 20:11)  T(F): 97.7 (05 Nov 2018 08:18), Max: 98.1 (04 Nov 2018 20:11)  HR: 76 (05 Nov 2018 08:18) (61 - 76)  BP: 118/97 (05 Nov 2018 08:18) (101/60 - 124/69)  BP(mean): --  RR: 15 (05 Nov 2018 08:18) (14 - 15)  SpO2: 100% (05 Nov 2018 08:18) (97% - 100%)    Constitutional - NAD, Comfortable  	HEENT - NCAT  	Neck - Supple, No limited ROM  	Chest - CTA bilaterally  	Cardiovascular - RRR, S1S2  	Abdomen - BS+, Soft, NTND  	Extremities - No C/C/E  	Neurologic Exam -                 	   Cognitive - Awake, Alert, AAO to self, place, date, year, situation  	   Cranial Nerves - CN 2-12 grossly intact  	   Motor -   	                  LEFT    UE - 4/5  	                  RIGHT UE - 4/5  	                  LEFT    LE - HF 4/5, KE 4/5, DF 5/5, PF 5/5  	                  RIGHT LE - HF 4/5, KE 4/5, DF 5/5, PF 5/5     Skin -  Back incision with aquacel dressings removed; Incision with sutures CDI  No erythema or edema noted    Functional Status:  Transfers: Min A  Gait: Amb 50' RW Min A  ADLs: Grooming sup, UE dress min A, LE dress total A      RECENT LABS:                          10.1   25.1  )-----------( 224      ( 03 Nov 2018 06:50 )             31.8     11-03    141  |  105  |  13  ----------------------------<  145<H>  4.1   |  31  |  0.79    Ca    8.6      03 Nov 2018 06:50    TPro  5.2<L>  /  Alb  2.0<L>  /  TBili  0.3  /  DBili  x   /  AST  22  /  ALT  34  /  AlkPhos  65  11-03    LIVER FUNCTIONS - ( 03 Nov 2018 06:50 )  Alb: 2.0 g/dL / Pro: 5.2 g/dL / ALK PHOS: 65 U/L / ALT: 34 U/L DA / AST: 22 U/L / GGT: x             CAPILLARY BLOOD GLUCOSE      POCT Blood Glucose.: 123 mg/dL (05 Nov 2018 11:08)  POCT Blood Glucose.: 140 mg/dL (05 Nov 2018 07:42)  POCT Blood Glucose.: 171 mg/dL (04 Nov 2018 21:05)  POCT Blood Glucose.: 126 mg/dL (04 Nov 2018 16:41)

## 2018-11-05 NOTE — PROGRESS NOTE ADULT - SUBJECTIVE AND OBJECTIVE BOX
HPI  Pt is a 71yo M admitted to acute rehab s/p spinal fusion complicated with RBBB and first degree AV block, intra op complicated by bradycardia s/p glycopyrrolate, cerebrospinal fluid leak, and post op hypotension s/p pressor support and anemia.   Pt was seen and examined in wheelchair. Pt states he has sever back pain around incisional site. pt denies of any chest pain, SOB, palpitation, dizziness. Hemodynamically stable     Vital Signs Last 24 Hrs  T(C): 36.5 (05 Nov 2018 08:18), Max: 36.7 (04 Nov 2018 12:19)  T(F): 97.7 (05 Nov 2018 08:18), Max: 98.1 (04 Nov 2018 20:11)  HR: 76 (05 Nov 2018 08:18) (61 - 76)  BP: 118/97 (05 Nov 2018 08:18) (101/60 - 124/70)  BP(mean): --  RR: 15 (05 Nov 2018 08:18) (14 - 15)  SpO2: 100% (05 Nov 2018 08:18) (97% - 100%)    I&O's Summary      CAPILLARY BLOOD GLUCOSE      POCT Blood Glucose.: 140 mg/dL (05 Nov 2018 07:42)  POCT Blood Glucose.: 171 mg/dL (04 Nov 2018 21:05)  POCT Blood Glucose.: 126 mg/dL (04 Nov 2018 16:41)  POCT Blood Glucose.: 122 mg/dL (04 Nov 2018 11:36)      PHYSICAL EXAM:    Constitutional: NAD,   HEENT: PERR, EOMI,   Neck: Soft   Respiratory: Breath sounds are clear bilaterally,   Cardiovascular: S1 and S2  Gastrointestinal: Bowel Sounds present, soft, nontender,  Extremities: No peripheral edema  Neurological: A/O x 3  Musculoskeletal: 4/5 strength b/l upper and lower extremities  Skin: linear vertical incision covered by dressing. No sign of infection in the surrounding area     MEDICATIONS:  MEDICATIONS  (STANDING):  ascorbic acid 500 milliGRAM(s) Oral two times a day  atorvastatin 80 milliGRAM(s) Oral at bedtime  dextrose 5%. 1000 milliLiter(s) (50 mL/Hr) IV Continuous <Continuous>  dextrose 50% Injectable 12.5 Gram(s) IV Push once  dextrose 50% Injectable 25 Gram(s) IV Push once  dextrose 50% Injectable 25 Gram(s) IV Push once  docusate sodium 100 milliGRAM(s) Oral three times a day  enoxaparin Injectable 40 milliGRAM(s) SubCutaneous <User Schedule>  HYDROmorphone   Tablet 4 milliGRAM(s) Oral every 6 hours  insulin glargine Injectable (LANTUS) 12 Unit(s) SubCutaneous at bedtime  insulin lispro (HumaLOG) corrective regimen sliding scale   SubCutaneous three times a day before meals  insulin lispro (HumaLOG) corrective regimen sliding scale   SubCutaneous at bedtime  levothyroxine 175 MICROGram(s) Oral daily  lisinopril 10 milliGRAM(s) Oral daily  metFORMIN 1000 milliGRAM(s) Oral every 12 hours  ondansetron    Tablet 4 milliGRAM(s) Oral every 6 hours  senna 2 Tablet(s) Oral at bedtime  tamsulosin 0.4 milliGRAM(s) Oral at bedtime      LABS: All Labs Reviewed:                Blood Culture:     RADIOLOGY/EKG:    DVT PPX:    ADVANCED DIRECTIVE:    DISPOSITION:

## 2018-11-05 NOTE — DIETITIAN INITIAL EVALUATION ADULT. - DIET TYPE
consistent carbohydrate (evening snack)/Declines Glucerna Supplement/DASH/TLC (sodium and cholesterol restricted diet)

## 2018-11-05 NOTE — PROGRESS NOTE ADULT - ASSESSMENT
71 yo male HTN DM2, FERNANDO on CPAP, post op L1-5 posterior lumbar fusion and T10 pelvis instrumentation.  Course was complicated by hypotension  which required pressor support (initially with norepinephrine, neosynephrine and vasopressin)  with Gait Instability, ADL impairments and Functional impairments.    COMORBIDITIES/ACTIVE MEDICAL ISSUES   #L1-L5 posterior lumbar fusion and T10 pelvis instrumentation  -Pain control with dilaudid  -Cyclobenzaprine  PRN  -WBAT  -spinal precautions  -Outpatient follow up with Dr. Dunn ( plastic surgery) [phone] within one week for suture removal.   - 11/5 removed the dressing as per instructions.  Pt may shower, avoiding direct streams of water onto your incision, do not scrub, do not soak in water, pat dry when finished    #HTN.  Hypotension and bradycardia post-op, junctional rhythm likely 2/2 hyperkalemia resolved, NSR  - Lisinopril    #Hypothyroidism  -c/w levothyroxine    #DM-HbA1C 6.9 on 10/4  -Glargine  -SNEHAL  -Consistent carb diet    #BPH  -tamsulosin    #Urinary retention  -Resolved  PVRs 2, 79, 100  D/Cd bladder scans    #Leukocytosis-Known; monitored for CLL  +h/o CA of kidney, prostate, thyroid  -Afebrile  -monitor cbc.   -consider heme consult    #Bowel regimen  -Colace, Miralax, Senna.  Dulc supp prn.      Pain Mgmt - Tylenol PRN, Dilaudid  GI/Bowel Mgmt -  Continent c/w Colace, Senna,  Dulcolax suppository PRN, Miralax,  /Bladder Mgmt - Continent.  Successful TOV    FEN   - Diet - Consistent Carb  [CCHO, DASH/TLC]    Vitamin C 500 mg bid    Precautions / PROPHYLAXIS:   - Falls, Cardiac, Spinal,   - ortho: Weight bearing status: WBAT  - Lungs: Aspiration, Incentive Spirometer   - Pressure injury/Skin: Turn Q2hrs while in bed, OOB to Chair, PT/OT    - DVT: Lovenox, SCDs, TEDs

## 2018-11-05 NOTE — DIETITIAN INITIAL EVALUATION ADULT. - ADHERENCE
Patient States Follows Low Sugar, Low Sodium Diet @Home & Takes Multiples Supplements, Declines Glucerna Supplement/good

## 2018-11-05 NOTE — CHART NOTE - NSCHARTNOTEFT_GEN_A_CORE
[facility] Interdisciplinary Plan of Care    REHABILITATION DIAGNOSIS:  Spinal stenosis of lumbar region, unspecified whether neurogenic claudication present        COMORBIDITIES/COMPLICATING CONDITIONS IMPACTING REHABILITATION:  HEALTH ISSUES - PROBLEM Dx:  Type 2 diabetes mellitus without complication, unspecified whether long term insulin use: Type 2 diabetes mellitus without complication, unspecified whether long term insulin use  Hypertension, unspecified type: Hypertension, unspecified type  Sleep apnea, unspecified type: Sleep apnea, unspecified type  CLL (chronic lymphocytic leukemia): CLL (chronic lymphocytic leukemia)  Fusion of spine of lumbar region: Fusion of spine of lumbar region  Spinal stenosis of lumbar region, unspecified whether neurogenic claudication present: Spinal stenosis of lumbar region, unspecified whether neurogenic claudication present        PAST MEDICAL & SURGICAL HISTORY:  Former smoker, stopped smoking many years ago  CLL (chronic lymphocytic leukemia)  Sleep apnea, unspecified type  Spinal stenosis of lumbar region, unspecified whether neurogenic claudication present  Leukocytosis, unspecified type: monitored for CLL  Malignant neoplasm of kidney parenchyma, right: s/p surgery  Thyroid nodule  Malignant neoplasm of thyroid gland  Prostate cancer: 2003, s/p prostatectomy, RT 2009 x 40 days  Hypertension, unspecified type  Diabetes mellitus type 2 in obese  H/O thyroidectomy: 2016  History of strabismus surgery: b/l 1958  S/P left knee arthroscopy: 1998  H/O ventral hernia repair: 1997  H/O radical prostatectomy: 2003  H/O partial nephrectomy: right, 2009  History of total knee replacement, right: 2012, scoped 1998      Based upon consideration of the patient's impairments, functional status, complicating conditions and any other contributing factors and after information garnered from the assessments of all therapy disciplines involved in treating the patient and other pertinent clinicians:    INTERDISCIPLINARY REHABILITATION INTERVENTIONS:    [  x ] Transfer Training  [ x  ] Bed Mobility  [  x ] Therapeutic Exercise  [ x  ] Balance/Coordination Exercises  [  x ] Locomotion retraining  [ x  ] Stairs  [  x ] Functional Transfer Training  [  x ] Bowel/Bladder program  [  x ] Pain Management  [ x  ] Skin/Wound Care  [   ] Visual/Perceptual Training  [   ] Therapeutic Recreation Activities  [   ] Neuromuscular Re-education  [   ] Activities of Daily Living  [   ] Speech Exercise  [   ] Swallowing Exercises  [   ] Vital Stim  [  x ] Dietary Supplements  [   ] Calorie Count  [   ] Cognitive Exercises  [   ] Cognitive/Linguistic Treatment  [   ] Behavior Program  [   ] Neuropsych Therapy  [ x  ] Patient/Family Counseling  [ x  ] Family Training  [   x] Community Re-entry  [   ] Orthotic Evaluation  [   ] Prosthetic Eval/Training    MEDICAL PROGNOSIS:  Good    REHAB POTENTIAL:  Good  EXPECTED DAILY THERAPY:         PT:1.5hrs       OT:1.5hr       ST:0       P&O:0    EXPECTED INTENSITY OF PROGRAM:  3 hrs / Day    EXPECTED FREQUENCY OF PROGRAM: 5 Days/ Week    ESTIMATED LOS:  [  ] 5-7 Days  [  ] 7-10 Days  [  ] 10- 14 Days  [ x ] 14- 18 Days  [  ] 18- 21 Days    ESTIMATED DISPOSITION:  [  ] Home   [  ] Home with Outpatient Therapies  [  x] Home with Home Therapies  [  ] Assisted Living  [  ] Nursing Home  [  ] Long Term Acute Care    INTERDISCIPLINARY FUNCTIONAL OUTCOMES/GOALS:         Gait/Mobility:6       Transfers:6       ADLs:6       Functional Transfers:6       Medication Management:7       Communication:na       Cognitive:na       Dysphagia:na       Bladder:7       Bowel:7     Functional Independent Measures:   7 = Independent  6 = Modified Independent  5 = Supervision  4 = Minimal Assist/ Contact Guard  3 = Moderate Assistance  2 = Maximum Assistance  1 = Total Assistance  0 = Unable to assess

## 2018-11-06 PROCEDURE — 99232 SBSQ HOSP IP/OBS MODERATE 35: CPT | Mod: GC

## 2018-11-06 PROCEDURE — 99232 SBSQ HOSP IP/OBS MODERATE 35: CPT

## 2018-11-06 RX ADMIN — INSULIN GLARGINE 12 UNIT(S): 100 INJECTION, SOLUTION SUBCUTANEOUS at 21:18

## 2018-11-06 RX ADMIN — CYCLOBENZAPRINE HYDROCHLORIDE 5 MILLIGRAM(S): 10 TABLET, FILM COATED ORAL at 10:50

## 2018-11-06 RX ADMIN — HYDROMORPHONE HYDROCHLORIDE 4 MILLIGRAM(S): 2 INJECTION INTRAMUSCULAR; INTRAVENOUS; SUBCUTANEOUS at 13:00

## 2018-11-06 RX ADMIN — HYDROMORPHONE HYDROCHLORIDE 4 MILLIGRAM(S): 2 INJECTION INTRAMUSCULAR; INTRAVENOUS; SUBCUTANEOUS at 06:17

## 2018-11-06 RX ADMIN — HYDROMORPHONE HYDROCHLORIDE 2 MILLIGRAM(S): 2 INJECTION INTRAMUSCULAR; INTRAVENOUS; SUBCUTANEOUS at 11:36

## 2018-11-06 RX ADMIN — CYCLOBENZAPRINE HYDROCHLORIDE 5 MILLIGRAM(S): 10 TABLET, FILM COATED ORAL at 03:12

## 2018-11-06 RX ADMIN — ONDANSETRON 4 MILLIGRAM(S): 8 TABLET, FILM COATED ORAL at 00:14

## 2018-11-06 RX ADMIN — Medication 100 MILLIGRAM(S): at 21:17

## 2018-11-06 RX ADMIN — HYDROMORPHONE HYDROCHLORIDE 2 MILLIGRAM(S): 2 INJECTION INTRAMUSCULAR; INTRAVENOUS; SUBCUTANEOUS at 21:18

## 2018-11-06 RX ADMIN — ONDANSETRON 4 MILLIGRAM(S): 8 TABLET, FILM COATED ORAL at 06:17

## 2018-11-06 RX ADMIN — METFORMIN HYDROCHLORIDE 1000 MILLIGRAM(S): 850 TABLET ORAL at 17:51

## 2018-11-06 RX ADMIN — HYDROMORPHONE HYDROCHLORIDE 2 MILLIGRAM(S): 2 INJECTION INTRAMUSCULAR; INTRAVENOUS; SUBCUTANEOUS at 04:05

## 2018-11-06 RX ADMIN — LISINOPRIL 10 MILLIGRAM(S): 2.5 TABLET ORAL at 06:17

## 2018-11-06 RX ADMIN — METFORMIN HYDROCHLORIDE 1000 MILLIGRAM(S): 850 TABLET ORAL at 08:27

## 2018-11-06 RX ADMIN — HYDROMORPHONE HYDROCHLORIDE 2 MILLIGRAM(S): 2 INJECTION INTRAMUSCULAR; INTRAVENOUS; SUBCUTANEOUS at 22:11

## 2018-11-06 RX ADMIN — Medication 100 MILLIGRAM(S): at 12:14

## 2018-11-06 RX ADMIN — HYDROMORPHONE HYDROCHLORIDE 2 MILLIGRAM(S): 2 INJECTION INTRAMUSCULAR; INTRAVENOUS; SUBCUTANEOUS at 10:48

## 2018-11-06 RX ADMIN — Medication 500 MILLIGRAM(S): at 17:50

## 2018-11-06 RX ADMIN — ONDANSETRON 4 MILLIGRAM(S): 8 TABLET, FILM COATED ORAL at 12:13

## 2018-11-06 RX ADMIN — Medication 175 MICROGRAM(S): at 06:17

## 2018-11-06 RX ADMIN — ENOXAPARIN SODIUM 40 MILLIGRAM(S): 100 INJECTION SUBCUTANEOUS at 17:50

## 2018-11-06 RX ADMIN — Medication 650 MILLIGRAM(S): at 17:52

## 2018-11-06 RX ADMIN — HYDROMORPHONE HYDROCHLORIDE 4 MILLIGRAM(S): 2 INJECTION INTRAMUSCULAR; INTRAVENOUS; SUBCUTANEOUS at 12:12

## 2018-11-06 RX ADMIN — HYDROMORPHONE HYDROCHLORIDE 4 MILLIGRAM(S): 2 INJECTION INTRAMUSCULAR; INTRAVENOUS; SUBCUTANEOUS at 00:14

## 2018-11-06 RX ADMIN — Medication 100 MILLIGRAM(S): at 06:17

## 2018-11-06 RX ADMIN — HYDROMORPHONE HYDROCHLORIDE 4 MILLIGRAM(S): 2 INJECTION INTRAMUSCULAR; INTRAVENOUS; SUBCUTANEOUS at 17:50

## 2018-11-06 RX ADMIN — CYCLOBENZAPRINE HYDROCHLORIDE 5 MILLIGRAM(S): 10 TABLET, FILM COATED ORAL at 00:14

## 2018-11-06 RX ADMIN — HYDROMORPHONE HYDROCHLORIDE 4 MILLIGRAM(S): 2 INJECTION INTRAMUSCULAR; INTRAVENOUS; SUBCUTANEOUS at 01:04

## 2018-11-06 RX ADMIN — SENNA PLUS 2 TABLET(S): 8.6 TABLET ORAL at 21:17

## 2018-11-06 RX ADMIN — Medication 650 MILLIGRAM(S): at 15:52

## 2018-11-06 RX ADMIN — HYDROMORPHONE HYDROCHLORIDE 4 MILLIGRAM(S): 2 INJECTION INTRAMUSCULAR; INTRAVENOUS; SUBCUTANEOUS at 07:00

## 2018-11-06 RX ADMIN — HYDROMORPHONE HYDROCHLORIDE 4 MILLIGRAM(S): 2 INJECTION INTRAMUSCULAR; INTRAVENOUS; SUBCUTANEOUS at 18:45

## 2018-11-06 RX ADMIN — TAMSULOSIN HYDROCHLORIDE 0.4 MILLIGRAM(S): 0.4 CAPSULE ORAL at 21:18

## 2018-11-06 RX ADMIN — Medication 500 MILLIGRAM(S): at 06:17

## 2018-11-06 RX ADMIN — HYDROMORPHONE HYDROCHLORIDE 2 MILLIGRAM(S): 2 INJECTION INTRAMUSCULAR; INTRAVENOUS; SUBCUTANEOUS at 03:12

## 2018-11-06 RX ADMIN — ATORVASTATIN CALCIUM 80 MILLIGRAM(S): 80 TABLET, FILM COATED ORAL at 21:17

## 2018-11-06 RX ADMIN — ONDANSETRON 4 MILLIGRAM(S): 8 TABLET, FILM COATED ORAL at 17:52

## 2018-11-06 NOTE — PROGRESS NOTE ADULT - ASSESSMENT
Pt is a 71yo M admitted to acute rehab s/p spinal fusion complicated with RBBB and first degree AV block, intra op complicated by bradycardia s/p glycopyrrolate, cerebrospinal fluid leak, and post op hypotension s/p pressor support and anemia.     *L1-L5 posterior lumbar fusion and T10 instrumentation   Cont acute rehab PT/OT  Pain management: better controlled   Flexeril prn   f/u out pt plastic post suture removal   no sign of infection at surgical site     *HTN  s/p hypotension and bradycardia resolved   Cont lisinopril     *DM  ISS  BGM controlled    *CLL  at baseline  Continue monitor    *DVT ppx   Cont lonevox

## 2018-11-06 NOTE — PROGRESS NOTE ADULT - SUBJECTIVE AND OBJECTIVE BOX
HPI  Pt is a 73yo M admitted to acute rehab s/p spinal fusion complicated with RBBB and first degree AV block, intra op complicated by bradycardia s/p glycopyrrolate, cerebrospinal fluid leak, and post op hypotension s/p pressor support and anemia.   Pt was seen and examined in the wheelchair. Pt states newly adjust pain med improves pain a lot. Denies of any CP, SOB, palpitation. Hemodynamically stable.       Vital Signs Last 24 Hrs  T(C): 36.3 (06 Nov 2018 08:31), Max: 36.6 (05 Nov 2018 20:36)  T(F): 97.3 (06 Nov 2018 08:31), Max: 97.9 (05 Nov 2018 20:36)  HR: 70 (06 Nov 2018 08:31) (67 - 70)  BP: 100/70 (06 Nov 2018 08:31) (100/70 - 134/64)  BP(mean): --  RR: 15 (06 Nov 2018 08:31) (14 - 15)  SpO2: 94% (06 Nov 2018 08:31) (94% - 98%)    I&O's Summary    05 Nov 2018 07:01  -  06 Nov 2018 07:00  --------------------------------------------------------  IN: 0 mL / OUT: 600 mL / NET: -600 mL        CAPILLARY BLOOD GLUCOSE      POCT Blood Glucose.: 117 mg/dL (06 Nov 2018 07:42)  POCT Blood Glucose.: 136 mg/dL (05 Nov 2018 21:00)  POCT Blood Glucose.: 145 mg/dL (05 Nov 2018 16:55)  POCT Blood Glucose.: 123 mg/dL (05 Nov 2018 11:08)      PHYSICAL EXAM:    Constitutional: NAD,   HEENT: PERR, EOMI,   Neck: Soft   Respiratory: Breath sounds are clear bilaterally,   Cardiovascular: S1 and S2  Gastrointestinal: Bowel Sounds present, soft, nontender,  Extremities: No peripheral edema  Neurological: A/O x 3  Musculoskeletal: 4/5 strength b/l upper and lower extremities  Skin: linear vertical healing scar noted on the back, suture in place, no sign of infection     MEDICATIONS:  MEDICATIONS  (STANDING):  ascorbic acid 500 milliGRAM(s) Oral two times a day  atorvastatin 80 milliGRAM(s) Oral at bedtime  dextrose 5%. 1000 milliLiter(s) (50 mL/Hr) IV Continuous <Continuous>  dextrose 50% Injectable 12.5 Gram(s) IV Push once  dextrose 50% Injectable 25 Gram(s) IV Push once  dextrose 50% Injectable 25 Gram(s) IV Push once  docusate sodium 100 milliGRAM(s) Oral three times a day  enoxaparin Injectable 40 milliGRAM(s) SubCutaneous <User Schedule>  HYDROmorphone   Tablet 4 milliGRAM(s) Oral every 6 hours  insulin glargine Injectable (LANTUS) 12 Unit(s) SubCutaneous at bedtime  insulin lispro (HumaLOG) corrective regimen sliding scale   SubCutaneous three times a day before meals  insulin lispro (HumaLOG) corrective regimen sliding scale   SubCutaneous at bedtime  levothyroxine 175 MICROGram(s) Oral daily  lisinopril 10 milliGRAM(s) Oral daily  metFORMIN 1000 milliGRAM(s) Oral every 12 hours  ondansetron    Tablet 4 milliGRAM(s) Oral every 6 hours  senna 2 Tablet(s) Oral at bedtime  tamsulosin 0.4 milliGRAM(s) Oral at bedtime      LABS: All Labs Reviewed:                Blood Culture:     RADIOLOGY/EKG:    DVT PPX:    ADVANCED DIRECTIVE:    DISPOSITION:

## 2018-11-06 NOTE — PROGRESS NOTE ADULT - SUBJECTIVE AND OBJECTIVE BOX
HPI:  Pt is a 72M with history of T2DM, HTN, HLD, FERNANDO on CPAP, Pneumonia, CLL, Prostate Cancer s/p resection 2003, thyroid cancer s/p thyroidectomy 8/2018, renal cancer s/p partial nephrectomy and spinal stenosis with acquired scoliosis who initially presented 10/18/18 for L1-5 posterior lumbar fusion and T10-pelvis instrumentation and fusion but case was aborted due to 4 second pause after induction with propofol who returned 10/27/18 for surgery. Of note, he had a prior Holter monitoring study that revealed nocturnal bradycardia with pauses. 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 On 10/27/18, he underwent placement of temporary pacer and L1-S1 transforaminal interbody fusion, T10-pelvis instrumented fusion and Plastics assisted closure. Operative course was notable for bradycardia responsive to glycopyrrolate, cerebrospinal fluid leak which was primarily repaired and EBL of 1500cc. He received 2 units PRBC intra-op. Post-operatively, he had a ~40 minutes episode of hypotension to SBP in the 50smmHg, for which he was placed on pressor support and given an additional 2 units PRBC.  Pt was medically optimized and discharged to [facility] for AIR 11/2/18      PAST MEDICAL & SURGICAL HISTORY:  Former smoker, stopped smoking many years ago  CLL (chronic lymphocytic leukemia)  Sleep apnea, unspecified type  Spinal stenosis of lumbar region, unspecified whether neurogenic claudication present  Leukocytosis, unspecified type: monitored for CLL  Malignant neoplasm of kidney parenchyma, right: s/p surgery  Thyroid nodule  Malignant neoplasm of thyroid gland  Prostate cancer: 2003, s/p prostatectomy, RT 2009 x 40 days  Hypertension, unspecified type  Diabetes mellitus type 2 in obese  H/O thyroidectomy: 2016  History of strabismus surgery: b/l 1958  S/P left knee arthroscopy: 1998  H/O ventral hernia repair: 1997  H/O radical prostatectomy: 2003  H/O partial nephrectomy: right, 2009  History of total knee replacement, right: 2012, scoped 1998  MEDICATIONS  (STANDING):  ascorbic acid 500 milliGRAM(s) Oral two times a day  atorvastatin 80 milliGRAM(s) Oral at bedtime  dextrose 5%. 1000 milliLiter(s) (50 mL/Hr) IV Continuous <Continuous>  dextrose 50% Injectable 12.5 Gram(s) IV Push once  dextrose 50% Injectable 25 Gram(s) IV Push once  dextrose 50% Injectable 25 Gram(s) IV Push once  docusate sodium 100 milliGRAM(s) Oral three times a day  enoxaparin Injectable 40 milliGRAM(s) SubCutaneous <User Schedule>  HYDROmorphone   Tablet 4 milliGRAM(s) Oral every 6 hours  insulin glargine Injectable (LANTUS) 12 Unit(s) SubCutaneous at bedtime  insulin lispro (HumaLOG) corrective regimen sliding scale   SubCutaneous three times a day before meals  insulin lispro (HumaLOG) corrective regimen sliding scale   SubCutaneous at bedtime  levothyroxine 175 MICROGram(s) Oral daily  lisinopril 10 milliGRAM(s) Oral daily  metFORMIN 1000 milliGRAM(s) Oral every 12 hours  ondansetron    Tablet 4 milliGRAM(s) Oral every 6 hours  senna 2 Tablet(s) Oral at bedtime  tamsulosin 0.4 milliGRAM(s) Oral at bedtime    MEDICATIONS  (PRN):  acetaminophen   Tablet .. 650 milliGRAM(s) Oral every 6 hours PRN Temp greater or equal to 38C (100.4F), Mild Pain (1 - 3)  bisacodyl Suppository 10 milliGRAM(s) Rectal daily PRN Constipation  cyclobenzaprine 5 milliGRAM(s) Oral three times a day PRN Muscle Spasm  dextrose 40% Gel 15 Gram(s) Oral once PRN Blood Glucose LESS THAN 70 milliGRAM(s)/deciliter  glucagon  Injectable 1 milliGRAM(s) IntraMuscular once PRN Glucose LESS THAN 70 milligrams/deciliter  polyethylene glycol 3350 17 Gram(s) Oral at bedtime PRN Constipation    Allergies    codeine (Vomiting; Headache)  dogs, cats (Short breath)  dust (Rhinitis)  mold (Rhinitis)  morphine (Vomiting; Headache)  narcotic analgesics (Vomiting; Headache)        TODAY'S SUBJECTIVE & REVIEW OF SYMPTOMS:  TODAY'S REVIEW OF SYMPTOMS  [ x ] Constiutional WNL            [X] Cardio WNL               [X] Resp WNL  [X] GI WNL                            [X]  WNL                    [X] Heme WNL  [X] Endo WNL                       [X] Skin WNL                   [  ] MSK WNL  [  ] Neuro WNL                     [X] Cognitive WNL           [X] Psych WNL    Patient was seen and examined.  Pt reports that he slept well after having had received break-through med.  Pt reports that he is improved today.  Less pain in the back which is non-radiating.  +BM 11/4.  Denies dysuria, frequency or urgency.       PHYSICAL EXAM  Vital Signs Last 24 Hrs  T(C): 36.3 (06 Nov 2018 08:31), Max: 36.6 (05 Nov 2018 20:36)  T(F): 97.3 (06 Nov 2018 08:31), Max: 97.9 (05 Nov 2018 20:36)  HR: 70 (06 Nov 2018 08:31) (67 - 70)  BP: 100/70 (06 Nov 2018 08:31) (100/70 - 134/64)  BP(mean): --  RR: 15 (06 Nov 2018 08:31) (14 - 15)  SpO2: 94% (06 Nov 2018 08:31) (94% - 98%)    Constitutional - NAD, Comfortable  	HEENT - NCAT  	Neck - Supple, No limited ROM  	Chest - CTA bilaterally  	Cardiovascular - RRR, S1S2  	Abdomen - BS+, Soft, NTND  	Extremities - No C/C/E  	Neurologic Exam -                 	   Cognitive - Awake, Alert, AAO to self, place, date, year, situation  	   Cranial Nerves - CN 2-12 grossly intact  	   Motor -   	                  LEFT    UE - 4/5  	                  RIGHT UE - 4/5  	                  LEFT    LE - HF 4/5, KE 4/5, DF 5/5, PF 5/5  	                  RIGHT LE - HF 4/5, KE 4/5, DF 5/5, PF 5/5     Skin -  Back incision with sutures CDI  No erythema or edema noted    Functional Status:  Transfers: Min A  Gait: Amb 70' RW CG  Stairs: 4 6" steps 2HR Min A  ADLs: Grooming sup, UE dress min A, LE dress total A      RECENT LABS:                          10.1   25.1  )-----------( 224      ( 03 Nov 2018 06:50 )             31.8     11-03    141  |  105  |  13  ----------------------------<  145<H>  4.1   |  31  |  0.79    Ca    8.6      03 Nov 2018 06:50    TPro  5.2<L>  /  Alb  2.0<L>  /  TBili  0.3  /  DBili  x   /  AST  22  /  ALT  34  /  AlkPhos  65  11-03    LIVER FUNCTIONS - ( 03 Nov 2018 06:50 )  Alb: 2.0 g/dL / Pro: 5.2 g/dL / ALK PHOS: 65 U/L / ALT: 34 U/L DA / AST: 22 U/L / GGT: x             CAPILLARY BLOOD GLUCOSE      POCT Blood Glucose.: 117 mg/dL (06 Nov 2018 07:42)  POCT Blood Glucose.: 136 mg/dL (05 Nov 2018 21:00)  POCT Blood Glucose.: 145 mg/dL (05 Nov 2018 16:55)

## 2018-11-06 NOTE — PROGRESS NOTE ADULT - ASSESSMENT
73 yo male HTN DM2, FERNANDO on CPAP, post op L1-5 posterior lumbar fusion and T10 pelvis instrumentation.  Course was complicated by hypotension  which required pressor support (initially with norepinephrine, neosynephrine and vasopressin)  with Gait Instability, ADL impairments and Functional impairments.    COMORBIDITIES/ACTIVE MEDICAL ISSUES   #L1-L5 posterior lumbar fusion and T10 pelvis instrumentation  -Pain control with dilaudid  -Cyclobenzaprine  PRN  -WBAT  -spinal precautions  -Outpatient follow up with Dr. Dunn ( plastic surgery) [phone] within one week for suture removal.   - 11/5 removed the dressing as per instructions.  Pt may shower, avoiding direct streams of water onto your incision, do not scrub, do not soak in water, pat dry when finished    #HTN.  Hypotension and bradycardia post-op, junctional rhythm likely 2/2 hyperkalemia resolved, NSR  - Lisinopril    #Hypothyroidism  -c/w levothyroxine    #DM-HbA1C 6.9 on 10/4  -Glargine  -SNEHAL  -Consistent carb diet    #BPH  -tamsulosin    #Urinary retention  -Resolved  PVRs 2, 79, 100  D/Cd bladder scans    #Leukocytosis-Known; monitored for CLL  +h/o CA of kidney, prostate, thyroid  -Afebrile  -monitor cbc.   -consider heme consult    #Bowel regimen  -Colace, Miralax, Senna.  Dulc supp prn.      Pain Mgmt - Tylenol PRN, Dilaudid  GI/Bowel Mgmt -  Continent c/w Colace, Senna,  Dulcolax suppository PRN, Miralax,  /Bladder Mgmt - Continent.  Successful TOV    FEN   - Diet - Consistent Carb  [CCHO, DASH/TLC]    Vitamin C 500 mg bid    Precautions / PROPHYLAXIS:   - Falls, Cardiac, Spinal,   - ortho: Weight bearing status: WBAT  - Lungs: Aspiration, Incentive Spirometer   - Pressure injury/Skin: Turn Q2hrs while in bed, OOB to Chair, PT/OT    - DVT: Lovenox, SCDs, TEDs

## 2018-11-07 PROCEDURE — 99232 SBSQ HOSP IP/OBS MODERATE 35: CPT

## 2018-11-07 PROCEDURE — 99233 SBSQ HOSP IP/OBS HIGH 50: CPT

## 2018-11-07 RX ORDER — FUROSEMIDE 40 MG
20 TABLET ORAL DAILY
Qty: 0 | Refills: 0 | Status: DISCONTINUED | OUTPATIENT
Start: 2018-11-07 | End: 2018-11-16

## 2018-11-07 RX ADMIN — HYDROMORPHONE HYDROCHLORIDE 4 MILLIGRAM(S): 2 INJECTION INTRAMUSCULAR; INTRAVENOUS; SUBCUTANEOUS at 00:50

## 2018-11-07 RX ADMIN — HYDROMORPHONE HYDROCHLORIDE 2 MILLIGRAM(S): 2 INJECTION INTRAMUSCULAR; INTRAVENOUS; SUBCUTANEOUS at 08:19

## 2018-11-07 RX ADMIN — Medication 20 MILLIGRAM(S): at 15:30

## 2018-11-07 RX ADMIN — Medication 100 MILLIGRAM(S): at 13:28

## 2018-11-07 RX ADMIN — LISINOPRIL 10 MILLIGRAM(S): 2.5 TABLET ORAL at 06:26

## 2018-11-07 RX ADMIN — HYDROMORPHONE HYDROCHLORIDE 4 MILLIGRAM(S): 2 INJECTION INTRAMUSCULAR; INTRAVENOUS; SUBCUTANEOUS at 11:30

## 2018-11-07 RX ADMIN — TAMSULOSIN HYDROCHLORIDE 0.4 MILLIGRAM(S): 0.4 CAPSULE ORAL at 21:36

## 2018-11-07 RX ADMIN — ONDANSETRON 4 MILLIGRAM(S): 8 TABLET, FILM COATED ORAL at 11:55

## 2018-11-07 RX ADMIN — ONDANSETRON 4 MILLIGRAM(S): 8 TABLET, FILM COATED ORAL at 00:09

## 2018-11-07 RX ADMIN — ATORVASTATIN CALCIUM 80 MILLIGRAM(S): 80 TABLET, FILM COATED ORAL at 21:36

## 2018-11-07 RX ADMIN — HYDROMORPHONE HYDROCHLORIDE 2 MILLIGRAM(S): 2 INJECTION INTRAMUSCULAR; INTRAVENOUS; SUBCUTANEOUS at 21:28

## 2018-11-07 RX ADMIN — Medication 500 MILLIGRAM(S): at 17:04

## 2018-11-07 RX ADMIN — HYDROMORPHONE HYDROCHLORIDE 2 MILLIGRAM(S): 2 INJECTION INTRAMUSCULAR; INTRAVENOUS; SUBCUTANEOUS at 22:14

## 2018-11-07 RX ADMIN — CYCLOBENZAPRINE HYDROCHLORIDE 5 MILLIGRAM(S): 10 TABLET, FILM COATED ORAL at 21:27

## 2018-11-07 RX ADMIN — HYDROMORPHONE HYDROCHLORIDE 2 MILLIGRAM(S): 2 INJECTION INTRAMUSCULAR; INTRAVENOUS; SUBCUTANEOUS at 03:12

## 2018-11-07 RX ADMIN — Medication 100 MILLIGRAM(S): at 06:27

## 2018-11-07 RX ADMIN — HYDROMORPHONE HYDROCHLORIDE 2 MILLIGRAM(S): 2 INJECTION INTRAMUSCULAR; INTRAVENOUS; SUBCUTANEOUS at 04:16

## 2018-11-07 RX ADMIN — HYDROMORPHONE HYDROCHLORIDE 4 MILLIGRAM(S): 2 INJECTION INTRAMUSCULAR; INTRAVENOUS; SUBCUTANEOUS at 06:26

## 2018-11-07 RX ADMIN — Medication 175 MICROGRAM(S): at 06:26

## 2018-11-07 RX ADMIN — HYDROMORPHONE HYDROCHLORIDE 4 MILLIGRAM(S): 2 INJECTION INTRAMUSCULAR; INTRAVENOUS; SUBCUTANEOUS at 11:54

## 2018-11-07 RX ADMIN — METFORMIN HYDROCHLORIDE 1000 MILLIGRAM(S): 850 TABLET ORAL at 17:30

## 2018-11-07 RX ADMIN — HYDROMORPHONE HYDROCHLORIDE 4 MILLIGRAM(S): 2 INJECTION INTRAMUSCULAR; INTRAVENOUS; SUBCUTANEOUS at 17:03

## 2018-11-07 RX ADMIN — METFORMIN HYDROCHLORIDE 1000 MILLIGRAM(S): 850 TABLET ORAL at 08:18

## 2018-11-07 RX ADMIN — HYDROMORPHONE HYDROCHLORIDE 2 MILLIGRAM(S): 2 INJECTION INTRAMUSCULAR; INTRAVENOUS; SUBCUTANEOUS at 13:30

## 2018-11-07 RX ADMIN — ENOXAPARIN SODIUM 40 MILLIGRAM(S): 100 INJECTION SUBCUTANEOUS at 17:04

## 2018-11-07 RX ADMIN — ONDANSETRON 4 MILLIGRAM(S): 8 TABLET, FILM COATED ORAL at 17:06

## 2018-11-07 RX ADMIN — Medication 500 MILLIGRAM(S): at 06:26

## 2018-11-07 RX ADMIN — INSULIN GLARGINE 12 UNIT(S): 100 INJECTION, SOLUTION SUBCUTANEOUS at 21:39

## 2018-11-07 RX ADMIN — HYDROMORPHONE HYDROCHLORIDE 4 MILLIGRAM(S): 2 INJECTION INTRAMUSCULAR; INTRAVENOUS; SUBCUTANEOUS at 00:09

## 2018-11-07 RX ADMIN — Medication 100 MILLIGRAM(S): at 21:36

## 2018-11-07 RX ADMIN — ONDANSETRON 4 MILLIGRAM(S): 8 TABLET, FILM COATED ORAL at 06:26

## 2018-11-07 RX ADMIN — HYDROMORPHONE HYDROCHLORIDE 2 MILLIGRAM(S): 2 INJECTION INTRAMUSCULAR; INTRAVENOUS; SUBCUTANEOUS at 08:45

## 2018-11-07 NOTE — PROGRESS NOTE ADULT - SUBJECTIVE AND OBJECTIVE BOX
Pt is a 71yo M admitted to acute rehab s/p spinal fusion complicated with RBBB and first degree AV block, intra op complicated by bradycardia s/p glycopyrrolate, cerebrospinal fluid leak, and post op hypotension s/p pressor support and anemia.     Pt was seen and examined by the bedside. C/o swelling in both legs since 2-3 days. Denies any CP, SOB, palpitation.       Patient seen and examined at bedside.    ALLERGIES:  codeine (Vomiting; Headache)  dogs, cats (Short breath)  dust (Rhinitis)  mold (Rhinitis)  morphine (Vomiting; Headache)  narcotic analgesics (Vomiting; Headache)    MEDICATIONS:  acetaminophen   Tablet .. 650 milliGRAM(s) Oral every 6 hours PRN  ascorbic acid 500 milliGRAM(s) Oral two times a day  atorvastatin 80 milliGRAM(s) Oral at bedtime  bisacodyl Suppository 10 milliGRAM(s) Rectal daily PRN  dextrose 40% Gel 15 Gram(s) Oral once PRN  dextrose 5%. 1000 milliLiter(s) IV Continuous <Continuous>  dextrose 50% Injectable 12.5 Gram(s) IV Push once  dextrose 50% Injectable 25 Gram(s) IV Push once  dextrose 50% Injectable 25 Gram(s) IV Push once  docusate sodium 100 milliGRAM(s) Oral three times a day  glucagon  Injectable 1 milliGRAM(s) IntraMuscular once PRN  HYDROmorphone   Tablet 2 milliGRAM(s) Oral every 4 hours PRN  insulin glargine Injectable (LANTUS) 12 Unit(s) SubCutaneous at bedtime  insulin lispro (HumaLOG) corrective regimen sliding scale   SubCutaneous three times a day before meals  insulin lispro (HumaLOG) corrective regimen sliding scale   SubCutaneous at bedtime  levothyroxine 175 MICROGram(s) Oral daily  metFORMIN 1000 milliGRAM(s) Oral every 12 hours  polyethylene glycol 3350 17 Gram(s) Oral at bedtime PRN  senna 2 Tablet(s) Oral at bedtime    Vital Signs Last 24 Hrs  T(F): 98 (07 Nov 2018 08:14), Max: 98.2 (06 Nov 2018 20:35)  HR: 61 (07 Nov 2018 08:14) (61 - 73)  BP: 93/58 (07 Nov 2018 08:14) (93/58 - 128/70)  RR: 14 (07 Nov 2018 08:14) (14 - 15)  SpO2: 96% (07 Nov 2018 08:14) (96% - 96%)  I&O's Summary      PHYSICAL EXAM:  General: NAD  ENT: MMM  Neck: Supple, No JVD  Lungs: Clear to auscultation bilaterally, No weehze/rocnhi  Cardio: RRR, S1/S2, No murmurs  Abdomen: Soft, Nontender, Nondistended; Bowel sounds present  Extremities: No clubbing, cyanosis. 2+ pitting edema upto knees.   Psych: A/O x 3    LABS:                            CAPILLARY BLOOD GLUCOSE      POCT Blood Glucose.: 102 mg/dL (07 Nov 2018 07:31)  POCT Blood Glucose.: 123 mg/dL (06 Nov 2018 20:59)  POCT Blood Glucose.: 98 mg/dL (06 Nov 2018 16:37)  POCT Blood Glucose.: 128 mg/dL (06 Nov 2018 11:34)    10-04 [id number] 6.9  08-17 [id number] 6.5          RADIOLOGY & ADDITIONAL TESTS:    Care Discussed with Consultants/Other Providers:

## 2018-11-07 NOTE — PROGRESS NOTE ADULT - ASSESSMENT
Pt is a 73yo M admitted to acute rehab s/p spinal fusion complicated with RBBB and first degree AV block, intra op complicated by bradycardia s/p glycopyrrolate, cerebrospinal fluid leak, and post op hypotension s/p pressor support and anemia.     B/L leg edema: Consider echo. Start Lasix 20 mg OD     *L1-L5 posterior lumbar fusion and T10 instrumentation   Cont acute rehab PT/OT  Pain management: controlled.    Flexeril prn   Surgical scar healing well.      *HTN  s/p hypotension and bradycardia resolved   Cont lisinopril     *DM  c/w Sliding scale insulin.   Hypoglycemia protocol. Accucheks.     *CLL  at baseline  Continue monitor    *DVT ppx   Cont lonevox

## 2018-11-08 PROCEDURE — 99232 SBSQ HOSP IP/OBS MODERATE 35: CPT

## 2018-11-08 PROCEDURE — 99233 SBSQ HOSP IP/OBS HIGH 50: CPT

## 2018-11-08 RX ADMIN — Medication 500 MILLIGRAM(S): at 06:20

## 2018-11-08 RX ADMIN — HYDROMORPHONE HYDROCHLORIDE 4 MILLIGRAM(S): 2 INJECTION INTRAMUSCULAR; INTRAVENOUS; SUBCUTANEOUS at 23:52

## 2018-11-08 RX ADMIN — INSULIN GLARGINE 12 UNIT(S): 100 INJECTION, SOLUTION SUBCUTANEOUS at 21:01

## 2018-11-08 RX ADMIN — LISINOPRIL 10 MILLIGRAM(S): 2.5 TABLET ORAL at 06:20

## 2018-11-08 RX ADMIN — HYDROMORPHONE HYDROCHLORIDE 2 MILLIGRAM(S): 2 INJECTION INTRAMUSCULAR; INTRAVENOUS; SUBCUTANEOUS at 08:45

## 2018-11-08 RX ADMIN — Medication 500 MILLIGRAM(S): at 17:47

## 2018-11-08 RX ADMIN — Medication 175 MICROGRAM(S): at 06:20

## 2018-11-08 RX ADMIN — HYDROMORPHONE HYDROCHLORIDE 2 MILLIGRAM(S): 2 INJECTION INTRAMUSCULAR; INTRAVENOUS; SUBCUTANEOUS at 16:06

## 2018-11-08 RX ADMIN — HYDROMORPHONE HYDROCHLORIDE 4 MILLIGRAM(S): 2 INJECTION INTRAMUSCULAR; INTRAVENOUS; SUBCUTANEOUS at 12:39

## 2018-11-08 RX ADMIN — HYDROMORPHONE HYDROCHLORIDE 4 MILLIGRAM(S): 2 INJECTION INTRAMUSCULAR; INTRAVENOUS; SUBCUTANEOUS at 18:20

## 2018-11-08 RX ADMIN — ONDANSETRON 4 MILLIGRAM(S): 8 TABLET, FILM COATED ORAL at 00:10

## 2018-11-08 RX ADMIN — ATORVASTATIN CALCIUM 80 MILLIGRAM(S): 80 TABLET, FILM COATED ORAL at 21:01

## 2018-11-08 RX ADMIN — HYDROMORPHONE HYDROCHLORIDE 4 MILLIGRAM(S): 2 INJECTION INTRAMUSCULAR; INTRAVENOUS; SUBCUTANEOUS at 06:20

## 2018-11-08 RX ADMIN — CYCLOBENZAPRINE HYDROCHLORIDE 5 MILLIGRAM(S): 10 TABLET, FILM COATED ORAL at 16:07

## 2018-11-08 RX ADMIN — Medication 20 MILLIGRAM(S): at 06:20

## 2018-11-08 RX ADMIN — METFORMIN HYDROCHLORIDE 1000 MILLIGRAM(S): 850 TABLET ORAL at 08:45

## 2018-11-08 RX ADMIN — ONDANSETRON 4 MILLIGRAM(S): 8 TABLET, FILM COATED ORAL at 06:20

## 2018-11-08 RX ADMIN — ONDANSETRON 4 MILLIGRAM(S): 8 TABLET, FILM COATED ORAL at 12:01

## 2018-11-08 RX ADMIN — HYDROMORPHONE HYDROCHLORIDE 2 MILLIGRAM(S): 2 INJECTION INTRAMUSCULAR; INTRAVENOUS; SUBCUTANEOUS at 10:04

## 2018-11-08 RX ADMIN — HYDROMORPHONE HYDROCHLORIDE 2 MILLIGRAM(S): 2 INJECTION INTRAMUSCULAR; INTRAVENOUS; SUBCUTANEOUS at 03:18

## 2018-11-08 RX ADMIN — METFORMIN HYDROCHLORIDE 1000 MILLIGRAM(S): 850 TABLET ORAL at 17:47

## 2018-11-08 RX ADMIN — Medication 100 MILLIGRAM(S): at 21:01

## 2018-11-08 RX ADMIN — ENOXAPARIN SODIUM 40 MILLIGRAM(S): 100 INJECTION SUBCUTANEOUS at 17:46

## 2018-11-08 RX ADMIN — HYDROMORPHONE HYDROCHLORIDE 4 MILLIGRAM(S): 2 INJECTION INTRAMUSCULAR; INTRAVENOUS; SUBCUTANEOUS at 12:01

## 2018-11-08 RX ADMIN — TAMSULOSIN HYDROCHLORIDE 0.4 MILLIGRAM(S): 0.4 CAPSULE ORAL at 21:01

## 2018-11-08 RX ADMIN — HYDROMORPHONE HYDROCHLORIDE 2 MILLIGRAM(S): 2 INJECTION INTRAMUSCULAR; INTRAVENOUS; SUBCUTANEOUS at 20:53

## 2018-11-08 RX ADMIN — Medication 100 MILLIGRAM(S): at 06:20

## 2018-11-08 RX ADMIN — HYDROMORPHONE HYDROCHLORIDE 2 MILLIGRAM(S): 2 INJECTION INTRAMUSCULAR; INTRAVENOUS; SUBCUTANEOUS at 21:30

## 2018-11-08 RX ADMIN — ONDANSETRON 4 MILLIGRAM(S): 8 TABLET, FILM COATED ORAL at 17:47

## 2018-11-08 RX ADMIN — HYDROMORPHONE HYDROCHLORIDE 4 MILLIGRAM(S): 2 INJECTION INTRAMUSCULAR; INTRAVENOUS; SUBCUTANEOUS at 17:47

## 2018-11-08 RX ADMIN — HYDROMORPHONE HYDROCHLORIDE 4 MILLIGRAM(S): 2 INJECTION INTRAMUSCULAR; INTRAVENOUS; SUBCUTANEOUS at 01:00

## 2018-11-08 RX ADMIN — ONDANSETRON 4 MILLIGRAM(S): 8 TABLET, FILM COATED ORAL at 23:52

## 2018-11-08 RX ADMIN — HYDROMORPHONE HYDROCHLORIDE 4 MILLIGRAM(S): 2 INJECTION INTRAMUSCULAR; INTRAVENOUS; SUBCUTANEOUS at 00:10

## 2018-11-08 RX ADMIN — HYDROMORPHONE HYDROCHLORIDE 2 MILLIGRAM(S): 2 INJECTION INTRAMUSCULAR; INTRAVENOUS; SUBCUTANEOUS at 15:11

## 2018-11-08 RX ADMIN — Medication 100 MILLIGRAM(S): at 12:43

## 2018-11-08 NOTE — PROGRESS NOTE ADULT - SUBJECTIVE AND OBJECTIVE BOX
Patient is a 72y old  Male who presents with a chief complaint of Spinal Fusion (07 Nov 2018 11:28)      Patient seen and examined at bedside. No new complaints. Denies any HA, CP, Dizziness.     ALLERGIES:  codeine (Vomiting; Headache)  dogs, cats (Short breath)  dust (Rhinitis)  mold (Rhinitis)  morphine (Vomiting; Headache)  narcotic analgesics (Vomiting; Headache)    MEDICATIONS:  acetaminophen   Tablet .. 650 milliGRAM(s) Oral every 6 hours PRN  ascorbic acid 500 milliGRAM(s) Oral two times a day  atorvastatin 80 milliGRAM(s) Oral at bedtime  bisacodyl Suppository 10 milliGRAM(s) Rectal daily PRN  dextrose 40% Gel 15 Gram(s) Oral once PRN  dextrose 5%. 1000 milliLiter(s) IV Continuous <Continuous>  dextrose 50% Injectable 12.5 Gram(s) IV Push once  dextrose 50% Injectable 25 Gram(s) IV Push once  dextrose 50% Injectable 25 Gram(s) IV Push once  docusate sodium 100 milliGRAM(s) Oral three times a day  furosemide    Tablet 20 milliGRAM(s) Oral daily  glucagon  Injectable 1 milliGRAM(s) IntraMuscular once PRN  HYDROmorphone   Tablet 2 milliGRAM(s) Oral every 4 hours PRN  insulin glargine Injectable (LANTUS) 12 Unit(s) SubCutaneous at bedtime  insulin lispro (HumaLOG) corrective regimen sliding scale   SubCutaneous three times a day before meals  insulin lispro (HumaLOG) corrective regimen sliding scale   SubCutaneous at bedtime  levothyroxine 175 MICROGram(s) Oral daily  metFORMIN 1000 milliGRAM(s) Oral every 12 hours  polyethylene glycol 3350 17 Gram(s) Oral at bedtime PRN  senna 2 Tablet(s) Oral at bedtime    Vital Signs Last 24 Hrs  T(F): 98.2 (08 Nov 2018 07:30), Max: 98.3 (07 Nov 2018 20:30)  HR: 82 (08 Nov 2018 07:30) (74 - 82)  BP: 103/68 (08 Nov 2018 07:30) (103/67 - 121/71)  RR: 14 (08 Nov 2018 07:30) (14 - 14)  SpO2: 96% (08 Nov 2018 07:30) (96% - 97%)  I&O's Summary    07 Nov 2018 07:01  -  08 Nov 2018 07:00  --------------------------------------------------------  IN: 550 mL / OUT: 2 mL / NET: 548 mL        PHYSICAL EXAM:  General: NAD  ENT: MMM  Neck: Supple, No JVD  Lungs: Clear to auscultation bilaterally  Cardio: RRR, S1/S2, No murmurs  Abdomen: Soft, Nontender, Nondistended; Bowel sounds present  Extremities: No clubbing, cyanosis. 2+ pitting edema B/L lower extremities.    Psych: A/O x 3    LABS:                            CAPILLARY BLOOD GLUCOSE      POCT Blood Glucose.: 114 mg/dL (08 Nov 2018 07:26)  POCT Blood Glucose.: 117 mg/dL (07 Nov 2018 21:34)  POCT Blood Glucose.: 88 mg/dL (07 Nov 2018 16:29)  POCT Blood Glucose.: 93 mg/dL (07 Nov 2018 11:53)    10-04 [id number] 6.9  08-17 [id number] 6.5          RADIOLOGY & ADDITIONAL TESTS:    Care Discussed with Consultants/Other Providers:

## 2018-11-08 NOTE — PROGRESS NOTE ADULT - SUBJECTIVE AND OBJECTIVE BOX
HPI:  Pt is a 72M with history of T2DM, HTN, HLD, FERNANDO on CPAP, Pneumonia, CLL, Prostate Cancer s/p resection 2003, thyroid cancer s/p thyroidectomy 8/2018, renal cancer s/p partial nephrectomy and spinal stenosis with acquired scoliosis who initially presented 10/18/18 for L1-5 posterior lumbar fusion and T10-pelvis instrumentation and fusion but case was aborted due to 4 second pause after induction with propofol who returned 10/27/18 for surgery. Of note, he had a prior Holter monitoring study that revealed nocturnal bradycardia with pauses. 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 On 10/27/18, he underwent placement of temporary pacer and L1-S1 transforaminal interbody fusion, T10-pelvis instrumented fusion and Plastics assisted closure. Operative course was notable for bradycardia responsive to glycopyrrolate, cerebrospinal fluid leak which was primarily repaired and EBL of 1500cc. He received 2 units PRBC intra-op. Post-operatively, he had a ~40 minutes episode of hypotension to SBP in the 50smmHg, for which he was placed on pressor support and given an additional 2 units PRBC.  Pt was medically optimized and discharged to [facility] for AIR 11/2/18      PAST MEDICAL & SURGICAL HISTORY:  Former smoker, stopped smoking many years ago  CLL (chronic lymphocytic leukemia)  Sleep apnea, unspecified type  Spinal stenosis of lumbar region, unspecified whether neurogenic claudication present  Leukocytosis, unspecified type: monitored for CLL  Malignant neoplasm of kidney parenchyma, right: s/p surgery  Thyroid nodule  Malignant neoplasm of thyroid gland  Prostate cancer: 2003, s/p prostatectomy, RT 2009 x 40 days  Hypertension, unspecified type  Diabetes mellitus type 2 in obese  H/O thyroidectomy: 2016  History of strabismus surgery: b/l 1958  S/P left knee arthroscopy: 1998  H/O ventral hernia repair: 1997  H/O radical prostatectomy: 2003  H/O partial nephrectomy: right, 2009  History of total knee replacement, right: 2012, scoped 1998  MEDICATIONS  (STANDING):  ascorbic acid 500 milliGRAM(s) Oral two times a day  atorvastatin 80 milliGRAM(s) Oral at bedtime  dextrose 5%. 1000 milliLiter(s) (50 mL/Hr) IV Continuous <Continuous>  dextrose 50% Injectable 12.5 Gram(s) IV Push once  dextrose 50% Injectable 25 Gram(s) IV Push once  dextrose 50% Injectable 25 Gram(s) IV Push once  docusate sodium 100 milliGRAM(s) Oral three times a day  enoxaparin Injectable 40 milliGRAM(s) SubCutaneous <User Schedule>  HYDROmorphone   Tablet 4 milliGRAM(s) Oral every 6 hours  insulin glargine Injectable (LANTUS) 12 Unit(s) SubCutaneous at bedtime  insulin lispro (HumaLOG) corrective regimen sliding scale   SubCutaneous three times a day before meals  insulin lispro (HumaLOG) corrective regimen sliding scale   SubCutaneous at bedtime  levothyroxine 175 MICROGram(s) Oral daily  lisinopril 10 milliGRAM(s) Oral daily  metFORMIN 1000 milliGRAM(s) Oral every 12 hours  ondansetron    Tablet 4 milliGRAM(s) Oral every 6 hours  senna 2 Tablet(s) Oral at bedtime  tamsulosin 0.4 milliGRAM(s) Oral at bedtime    MEDICATIONS  (PRN):  acetaminophen   Tablet .. 650 milliGRAM(s) Oral every 6 hours PRN Temp greater or equal to 38C (100.4F), Mild Pain (1 - 3)  bisacodyl Suppository 10 milliGRAM(s) Rectal daily PRN Constipation  cyclobenzaprine 5 milliGRAM(s) Oral three times a day PRN Muscle Spasm  dextrose 40% Gel 15 Gram(s) Oral once PRN Blood Glucose LESS THAN 70 milliGRAM(s)/deciliter  glucagon  Injectable 1 milliGRAM(s) IntraMuscular once PRN Glucose LESS THAN 70 milligrams/deciliter  polyethylene glycol 3350 17 Gram(s) Oral at bedtime PRN Constipation    Allergies    codeine (Vomiting; Headache)  dogs, cats (Short breath)  dust (Rhinitis)  mold (Rhinitis)  morphine (Vomiting; Headache)  narcotic analgesics (Vomiting; Headache)        TODAY'S SUBJECTIVE & REVIEW OF SYMPTOMS:  TODAY'S REVIEW OF SYMPTOMS  [ x ] Constiutional WNL            [X] Cardio WNL               [X] Resp WNL  [X] GI WNL                            [X]  WNL                    [X] Heme WNL  [X] Endo WNL                       [X] Skin WNL                   [  ] MSK WNL  [  ] Neuro WNL                     [X] Cognitive WNL           [X] Psych WNL    Patient was seen and examined.  Pt reports that he slept well.  Pt c/o moderate non-radiating back pain.  Pleased with medication regimen at this time for pain.     +BM 11/7.  Denies dysuria, frequency or urgency.   Notes less LE edema with Lasix      PHYSICAL EXAM  Vital Signs Last 24 Hrs  T(C): 36.8 (08 Nov 2018 07:30), Max: 36.8 (07 Nov 2018 20:30)  T(F): 98.2 (08 Nov 2018 07:30), Max: 98.3 (07 Nov 2018 20:30)  HR: 82 (08 Nov 2018 07:30) (74 - 82)  BP: 103/68 (08 Nov 2018 07:30) (103/67 - 121/71)  BP(mean): --  RR: 14 (08 Nov 2018 07:30) (14 - 14)  SpO2: 96% (08 Nov 2018 07:30) (96% - 97%)    Constitutional - NAD, Comfortable  	HEENT - NCAT  	Neck - Supple, No limited ROM  	Chest - CTA bilaterally  	Cardiovascular - RRR, S1S2  	Abdomen - BS+, Soft, NTND  	Extremities - No C/C/E  	Neurologic Exam -                 	   Cognitive - Awake, Alert, AAO to self, place, date, year, situation  	   Cranial Nerves - CN 2-12 grossly intact  	   Motor -   	                  LEFT    UE - 4/5  	                  RIGHT UE - 4/5  	                  LEFT    LE - HF 4/5, KE 4/5, DF 5/5, PF 5/5  	                  RIGHT LE - HF 4/5, KE 4/5, DF 5/5, PF 5/5     Skin -  Back incision with sutures CDI  No erythema or edema noted    Functional Status:  Bed mobility: Mod A  Transfers: CS/CG  Gait: Amb 90' RW CS  Stairs: 8 6" steps 1HR CS  ADLs: Grooming sup, UE dress min A, LE dress total A      RECENT LABS:                          10.1   25.1  )-----------( 224      ( 03 Nov 2018 06:50 )             31.8     11-03    141  |  105  |  13  ----------------------------<  145<H>  4.1   |  31  |  0.79    Ca    8.6      03 Nov 2018 06:50    TPro  5.2<L>  /  Alb  2.0<L>  /  TBili  0.3  /  DBili  x   /  AST  22  /  ALT  34  /  AlkPhos  65  11-03    LIVER FUNCTIONS - ( 03 Nov 2018 06:50 )  Alb: 2.0 g/dL / Pro: 5.2 g/dL / ALK PHOS: 65 U/L / ALT: 34 U/L DA / AST: 22 U/L / GGT: x           CAPILLARY BLOOD GLUCOSE      POCT Blood Glucose.: 94 mg/dL (08 Nov 2018 12:00)  POCT Blood Glucose.: 114 mg/dL (08 Nov 2018 07:26)  POCT Blood Glucose.: 117 mg/dL (07 Nov 2018 21:34)  POCT Blood Glucose.: 88 mg/dL (07 Nov 2018 16:29)

## 2018-11-08 NOTE — PROGRESS NOTE ADULT - ASSESSMENT
Pt is a 73yo M admitted to acute rehab s/p spinal fusion complicated with RBBB and first degree AV block, intra op complicated by bradycardia s/p glycopyrrolate, cerebrospinal fluid leak, and post op hypotension s/p pressor support and anemia.     1. B/L leg edema: c/w Lasix 20 mg OD . Monitor BP     2. L1-L5 posterior lumbar fusion and T10 instrumentation   Cont acute rehab PT/OT  Pain management: controlled.    Surgical scar healing well.      3. HTN  s/p hypotension and bradycardia resolved   Cont lisinopril     4. DM  c/w Sliding scale insulin. , c/w Metformin, c/w Statin   Hypoglycemia protocol.   Glucometer check      5. CLL  at baseline  Continue monitor    *DVT ppx   Cont lonevox

## 2018-11-08 NOTE — PROGRESS NOTE ADULT - ASSESSMENT
73 yo male HTN DM2, FERNANDO on CPAP, post op L1-5 posterior lumbar fusion and T10 pelvis instrumentation.  Course was complicated by hypotension  which required pressor support (initially with norepinephrine, neosynephrine and vasopressin)  with Gait Instability, ADL impairments and Functional impairments.    COMORBIDITIES/ACTIVE MEDICAL ISSUES   #L1-L5 posterior lumbar fusion and T10 pelvis instrumentation  -Pain control with dilaudid  -Cyclobenzaprine  PRN  -WBAT  -spinal precautions  -Outpatient follow up with Dr. Dunn ( plastic surgery) [phone] within one week for suture removal.   - 11/5 removed the dressing as per instructions.  Pt may shower, avoiding direct streams of water onto your incision, do not scrub, do not soak in water, pat dry when finished    #HTN.  Hypotension and bradycardia post-op, junctional rhythm likely 2/2 hyperkalemia resolved, NSR  - Lisinopril.  Lasix 20mg daily added 11/7 (home dose 40mg lasix daily) due to BLE edema with improvement and without lowering of BP    #Hypothyroidism  -c/w levothyroxine    #DM-HbA1C 6.9 on 10/4  -Glargine  -SNEHAL  -Consistent carb diet    #BPH  -tamsulosin    #Urinary retention  -Resolved  PVRs 2, 79, 100  D/Cd bladder scans    #Leukocytosis-Known; monitored for CLL  +h/o CA of kidney, prostate, thyroid  -Afebrile  -monitor cbc.   -consider heme consult    #Bowel regimen  -Colace, Miralax, Senna.  Dulc supp prn.      Pain Mgmt - Tylenol PRN, Dilaudid  GI/Bowel Mgmt -  Continent c/w Colace, Senna,  Dulcolax suppository PRN, Miralax,  /Bladder Mgmt - Continent.  Successful TOV    FEN   - Diet - Consistent Carb  [CCHO, DASH/TLC]    Vitamin C 500 mg bid    Precautions / PROPHYLAXIS:   - Falls, Cardiac, Spinal,   - ortho: Weight bearing status: WBAT  - Lungs: Aspiration, Incentive Spirometer   - Pressure injury/Skin: Turn Q2hrs while in bed, OOB to Chair, PT/OT    - DVT: Lovenox, SCDs, TEDs

## 2018-11-09 PROCEDURE — 99232 SBSQ HOSP IP/OBS MODERATE 35: CPT

## 2018-11-09 RX ADMIN — CYCLOBENZAPRINE HYDROCHLORIDE 5 MILLIGRAM(S): 10 TABLET, FILM COATED ORAL at 02:55

## 2018-11-09 RX ADMIN — METFORMIN HYDROCHLORIDE 1000 MILLIGRAM(S): 850 TABLET ORAL at 17:54

## 2018-11-09 RX ADMIN — INSULIN GLARGINE 12 UNIT(S): 100 INJECTION, SOLUTION SUBCUTANEOUS at 21:29

## 2018-11-09 RX ADMIN — TAMSULOSIN HYDROCHLORIDE 0.4 MILLIGRAM(S): 0.4 CAPSULE ORAL at 21:28

## 2018-11-09 RX ADMIN — HYDROMORPHONE HYDROCHLORIDE 2 MILLIGRAM(S): 2 INJECTION INTRAMUSCULAR; INTRAVENOUS; SUBCUTANEOUS at 03:40

## 2018-11-09 RX ADMIN — ONDANSETRON 4 MILLIGRAM(S): 8 TABLET, FILM COATED ORAL at 12:13

## 2018-11-09 RX ADMIN — HYDROMORPHONE HYDROCHLORIDE 4 MILLIGRAM(S): 2 INJECTION INTRAMUSCULAR; INTRAVENOUS; SUBCUTANEOUS at 12:13

## 2018-11-09 RX ADMIN — Medication 500 MILLIGRAM(S): at 06:03

## 2018-11-09 RX ADMIN — HYDROMORPHONE HYDROCHLORIDE 2 MILLIGRAM(S): 2 INJECTION INTRAMUSCULAR; INTRAVENOUS; SUBCUTANEOUS at 22:20

## 2018-11-09 RX ADMIN — Medication 500 MILLIGRAM(S): at 17:54

## 2018-11-09 RX ADMIN — Medication 20 MILLIGRAM(S): at 06:03

## 2018-11-09 RX ADMIN — HYDROMORPHONE HYDROCHLORIDE 4 MILLIGRAM(S): 2 INJECTION INTRAMUSCULAR; INTRAVENOUS; SUBCUTANEOUS at 06:50

## 2018-11-09 RX ADMIN — ATORVASTATIN CALCIUM 80 MILLIGRAM(S): 80 TABLET, FILM COATED ORAL at 21:28

## 2018-11-09 RX ADMIN — METFORMIN HYDROCHLORIDE 1000 MILLIGRAM(S): 850 TABLET ORAL at 06:02

## 2018-11-09 RX ADMIN — CYCLOBENZAPRINE HYDROCHLORIDE 5 MILLIGRAM(S): 10 TABLET, FILM COATED ORAL at 15:06

## 2018-11-09 RX ADMIN — LISINOPRIL 10 MILLIGRAM(S): 2.5 TABLET ORAL at 06:03

## 2018-11-09 RX ADMIN — ONDANSETRON 4 MILLIGRAM(S): 8 TABLET, FILM COATED ORAL at 06:03

## 2018-11-09 RX ADMIN — HYDROMORPHONE HYDROCHLORIDE 2 MILLIGRAM(S): 2 INJECTION INTRAMUSCULAR; INTRAVENOUS; SUBCUTANEOUS at 02:55

## 2018-11-09 RX ADMIN — HYDROMORPHONE HYDROCHLORIDE 2 MILLIGRAM(S): 2 INJECTION INTRAMUSCULAR; INTRAVENOUS; SUBCUTANEOUS at 15:06

## 2018-11-09 RX ADMIN — HYDROMORPHONE HYDROCHLORIDE 4 MILLIGRAM(S): 2 INJECTION INTRAMUSCULAR; INTRAVENOUS; SUBCUTANEOUS at 13:15

## 2018-11-09 RX ADMIN — HYDROMORPHONE HYDROCHLORIDE 4 MILLIGRAM(S): 2 INJECTION INTRAMUSCULAR; INTRAVENOUS; SUBCUTANEOUS at 00:45

## 2018-11-09 RX ADMIN — ONDANSETRON 4 MILLIGRAM(S): 8 TABLET, FILM COATED ORAL at 17:54

## 2018-11-09 RX ADMIN — Medication 100 MILLIGRAM(S): at 06:03

## 2018-11-09 RX ADMIN — Medication 100 MILLIGRAM(S): at 12:13

## 2018-11-09 RX ADMIN — Medication 175 MICROGRAM(S): at 06:03

## 2018-11-09 RX ADMIN — Medication 100 MILLIGRAM(S): at 21:28

## 2018-11-09 RX ADMIN — HYDROMORPHONE HYDROCHLORIDE 4 MILLIGRAM(S): 2 INJECTION INTRAMUSCULAR; INTRAVENOUS; SUBCUTANEOUS at 18:35

## 2018-11-09 RX ADMIN — SENNA PLUS 2 TABLET(S): 8.6 TABLET ORAL at 21:29

## 2018-11-09 RX ADMIN — HYDROMORPHONE HYDROCHLORIDE 2 MILLIGRAM(S): 2 INJECTION INTRAMUSCULAR; INTRAVENOUS; SUBCUTANEOUS at 08:51

## 2018-11-09 RX ADMIN — HYDROMORPHONE HYDROCHLORIDE 4 MILLIGRAM(S): 2 INJECTION INTRAMUSCULAR; INTRAVENOUS; SUBCUTANEOUS at 17:54

## 2018-11-09 RX ADMIN — HYDROMORPHONE HYDROCHLORIDE 4 MILLIGRAM(S): 2 INJECTION INTRAMUSCULAR; INTRAVENOUS; SUBCUTANEOUS at 06:03

## 2018-11-09 RX ADMIN — HYDROMORPHONE HYDROCHLORIDE 2 MILLIGRAM(S): 2 INJECTION INTRAMUSCULAR; INTRAVENOUS; SUBCUTANEOUS at 09:30

## 2018-11-09 RX ADMIN — HYDROMORPHONE HYDROCHLORIDE 2 MILLIGRAM(S): 2 INJECTION INTRAMUSCULAR; INTRAVENOUS; SUBCUTANEOUS at 21:21

## 2018-11-09 RX ADMIN — HYDROMORPHONE HYDROCHLORIDE 2 MILLIGRAM(S): 2 INJECTION INTRAMUSCULAR; INTRAVENOUS; SUBCUTANEOUS at 15:44

## 2018-11-09 RX ADMIN — ENOXAPARIN SODIUM 40 MILLIGRAM(S): 100 INJECTION SUBCUTANEOUS at 17:54

## 2018-11-09 NOTE — PROGRESS NOTE ADULT - SUBJECTIVE AND OBJECTIVE BOX
HPI:  Pt is a 72M with history of T2DM, HTN, HLD, FERNANDO on CPAP, Pneumonia, CLL, Prostate Cancer s/p resection 2003, thyroid cancer s/p thyroidectomy 8/2018, renal cancer s/p partial nephrectomy and spinal stenosis with acquired scoliosis who initially presented 10/18/18 for L1-5 posterior lumbar fusion and T10-pelvis instrumentation and fusion but case was aborted due to 4 second pause after induction with propofol who returned 10/27/18 for surgery. Of note, he had a prior Holter monitoring study that revealed nocturnal bradycardia with pauses. 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 On 10/27/18, he underwent placement of temporary pacer and L1-S1 transforaminal interbody fusion, T10-pelvis instrumented fusion and Plastics assisted closure. Operative course was notable for bradycardia responsive to glycopyrrolate, cerebrospinal fluid leak which was primarily repaired and EBL of 1500cc. He received 2 units PRBC intra-op. Post-operatively, he had a ~40 minutes episode of hypotension to SBP in the 50smmHg, for which he was placed on pressor support and given an additional 2 units PRBC.  Pt was medically optimized and discharged to [facility] for AIR 11/2/18      PAST MEDICAL & SURGICAL HISTORY:  Former smoker, stopped smoking many years ago  CLL (chronic lymphocytic leukemia)  Sleep apnea, unspecified type  Spinal stenosis of lumbar region, unspecified whether neurogenic claudication present  Leukocytosis, unspecified type: monitored for CLL  Malignant neoplasm of kidney parenchyma, right: s/p surgery  Thyroid nodule  Malignant neoplasm of thyroid gland  Prostate cancer: 2003, s/p prostatectomy, RT 2009 x 40 days  Hypertension, unspecified type  Diabetes mellitus type 2 in obese  H/O thyroidectomy: 2016  History of strabismus surgery: b/l 1958  S/P left knee arthroscopy: 1998  H/O ventral hernia repair: 1997  H/O radical prostatectomy: 2003  H/O partial nephrectomy: right, 2009  History of total knee replacement, right: 2012, scoped 1998  MEDICATIONS  (STANDING):  ascorbic acid 500 milliGRAM(s) Oral two times a day  atorvastatin 80 milliGRAM(s) Oral at bedtime  dextrose 5%. 1000 milliLiter(s) (50 mL/Hr) IV Continuous <Continuous>  dextrose 50% Injectable 12.5 Gram(s) IV Push once  dextrose 50% Injectable 25 Gram(s) IV Push once  dextrose 50% Injectable 25 Gram(s) IV Push once  docusate sodium 100 milliGRAM(s) Oral three times a day  enoxaparin Injectable 40 milliGRAM(s) SubCutaneous <User Schedule>  HYDROmorphone   Tablet 4 milliGRAM(s) Oral every 6 hours  insulin glargine Injectable (LANTUS) 12 Unit(s) SubCutaneous at bedtime  insulin lispro (HumaLOG) corrective regimen sliding scale   SubCutaneous three times a day before meals  insulin lispro (HumaLOG) corrective regimen sliding scale   SubCutaneous at bedtime  levothyroxine 175 MICROGram(s) Oral daily  lisinopril 10 milliGRAM(s) Oral daily  metFORMIN 1000 milliGRAM(s) Oral every 12 hours  ondansetron    Tablet 4 milliGRAM(s) Oral every 6 hours  senna 2 Tablet(s) Oral at bedtime  tamsulosin 0.4 milliGRAM(s) Oral at bedtime    MEDICATIONS  (PRN):  acetaminophen   Tablet .. 650 milliGRAM(s) Oral every 6 hours PRN Temp greater or equal to 38C (100.4F), Mild Pain (1 - 3)  bisacodyl Suppository 10 milliGRAM(s) Rectal daily PRN Constipation  cyclobenzaprine 5 milliGRAM(s) Oral three times a day PRN Muscle Spasm  dextrose 40% Gel 15 Gram(s) Oral once PRN Blood Glucose LESS THAN 70 milliGRAM(s)/deciliter  glucagon  Injectable 1 milliGRAM(s) IntraMuscular once PRN Glucose LESS THAN 70 milligrams/deciliter  polyethylene glycol 3350 17 Gram(s) Oral at bedtime PRN Constipation    Allergies    codeine (Vomiting; Headache)  dogs, cats (Short breath)  dust (Rhinitis)  mold (Rhinitis)  morphine (Vomiting; Headache)  narcotic analgesics (Vomiting; Headache)        TODAY'S SUBJECTIVE & REVIEW OF SYMPTOMS:  TODAY'S REVIEW OF SYMPTOMS  [ x ] Constiutional WNL            [X] Cardio WNL               [X] Resp WNL  [X] GI WNL                            [X]  WNL                    [X] Heme WNL  [X] Endo WNL                       [X] Skin WNL                   [  ] MSK WNL  [  ] Neuro WNL                     [X] Cognitive WNL           [X] Psych WNL    Patient was seen and examined.  Pt reports that he slept well.  Pt reports non-radiating back pain has lessened.  Pleased with medication regimen at this time for pain.     +BM 11/8.  Denies dysuria, frequency or urgency.   Notes less LE edema with Lasix      PHYSICAL EXAM  Vital Signs Last 24 Hrs  T(C): 36.4 (09 Nov 2018 08:00), Max: 36.7 (08 Nov 2018 20:10)  T(F): 97.6 (09 Nov 2018 08:00), Max: 98 (08 Nov 2018 20:10)  HR: 64 (09 Nov 2018 08:00) (64 - 80)  BP: 130/67 (09 Nov 2018 08:00) (122/59 - 130/67)  BP(mean): --  RR: 14 (09 Nov 2018 08:00) (14 - 14)  SpO2: 99% (09 Nov 2018 08:00) (97% - 99%)    Constitutional - NAD, Comfortable  	HEENT - NCAT  	Neck - Supple, No limited ROM  	Chest - CTA bilaterally  	Cardiovascular - RRR, S1S2  	Abdomen - BS+, Soft, NTND  	Extremities - No C/C/E  	Neurologic Exam -                 	   Cognitive - Awake, Alert, AAO to self, place, date, year, situation  	   Cranial Nerves - CN 2-12 grossly intact  	   Motor -   	                  LEFT    UE - 4/5  	                  RIGHT UE - 4/5  	                  LEFT    LE - HF 4/5, KE 4/5, DF 5/5, PF 5/5  	                  RIGHT LE - HF 4/5, KE 4/5, DF 5/5, PF 5/5     Skin -  Back incision with sutures CDI  No erythema or edema noted    Functional Status:  Bed mobility: Mod A  Transfers: CS  Gait: Amb 90' RW CS  Stairs: 8 6" steps 1HR CS  ADLs: Grooming sup, UE dress min A, LE dress total A      RECENT LABS:                          10.1   25.1  )-----------( 224      ( 03 Nov 2018 06:50 )             31.8     11-03    141  |  105  |  13  ----------------------------<  145<H>  4.1   |  31  |  0.79    Ca    8.6      03 Nov 2018 06:50    TPro  5.2<L>  /  Alb  2.0<L>  /  TBili  0.3  /  DBili  x   /  AST  22  /  ALT  34  /  AlkPhos  65  11-03    LIVER FUNCTIONS - ( 03 Nov 2018 06:50 )  Alb: 2.0 g/dL / Pro: 5.2 g/dL / ALK PHOS: 65 U/L / ALT: 34 U/L DA / AST: 22 U/L / GGT: x           CAPILLARY BLOOD GLUCOSE      POCT Blood Glucose.: 99 mg/dL (09 Nov 2018 12:09)  POCT Blood Glucose.: 102 mg/dL (09 Nov 2018 07:39)  POCT Blood Glucose.: 144 mg/dL (08 Nov 2018 20:24)  POCT Blood Glucose.: 118 mg/dL (08 Nov 2018 16:29)

## 2018-11-09 NOTE — PROGRESS NOTE ADULT - ASSESSMENT
71 yo male HTN DM2, FERNANDO on CPAP, post op L1-5 posterior lumbar fusion and T10 pelvis instrumentation.  Course was complicated by hypotension  which required pressor support (initially with norepinephrine, neosynephrine and vasopressin)  with Gait Instability, ADL impairments and Functional impairments.    COMORBIDITIES/ACTIVE MEDICAL ISSUES   #L1-L5 posterior lumbar fusion and T10 pelvis instrumentation  -Pain control with dilaudid  -Cyclobenzaprine  PRN  -WBAT  -spinal precautions  -Outpatient follow up with Dr. Dunn ( plastic surgery) [phone] within one week for suture removal.   - 11/5 removed the dressing as per instructions.  Pt may shower, avoiding direct streams of water onto your incision, do not scrub, do not soak in water, pat dry when finished    #HTN.  Hypotension and bradycardia post-op, junctional rhythm likely 2/2 hyperkalemia resolved, NSR  - Lisinopril.  Lasix 20mg daily added 11/7 (home dose 40mg lasix daily) due to BLE edema with improvement and without lowering of BP    #Hypothyroidism  -c/w levothyroxine    #DM-HbA1C 6.9 on 10/4  -Glargine  -SNEHAL  -Consistent carb diet    #BPH  -tamsulosin    #Urinary retention  -Resolved  PVRs 2, 79, 100  D/Cd bladder scans    #Leukocytosis-Known; monitored for CLL  +h/o CA of kidney, prostate, thyroid  -Afebrile  -monitor cbc.  F/U 11/12   -consider heme consult    #Bowel regimen  -Colace, Miralax, Senna.  Dulc supp prn.      Pain Mgmt - Tylenol PRN, Dilaudid  GI/Bowel Mgmt -  Continent c/w Colace, Senna,  Dulcolax suppository PRN, Miralax,  /Bladder Mgmt - Continent.  Successful TOV    FEN   - Diet - Consistent Carb  [CCHO, DASH/TLC]    Vitamin C 500 mg bid    Precautions / PROPHYLAXIS:   - Falls, Cardiac, Spinal,   - ortho: Weight bearing status: WBAT  - Lungs: Aspiration, Incentive Spirometer   - Pressure injury/Skin: Turn Q2hrs while in bed, OOB to Chair, PT/OT    - DVT: Lovenox, SCDs, TEDs

## 2018-11-10 LAB
ANION GAP SERPL CALC-SCNC: 2 MMOL/L — LOW (ref 5–17)
BUN SERPL-MCNC: 17 MG/DL — SIGNIFICANT CHANGE UP (ref 7–23)
CALCIUM SERPL-MCNC: 9.1 MG/DL — SIGNIFICANT CHANGE UP (ref 8.4–10.5)
CHLORIDE SERPL-SCNC: 101 MMOL/L — SIGNIFICANT CHANGE UP (ref 96–108)
CO2 SERPL-SCNC: 33 MMOL/L — HIGH (ref 22–31)
CREAT SERPL-MCNC: 0.96 MG/DL — SIGNIFICANT CHANGE UP (ref 0.5–1.3)
GLUCOSE SERPL-MCNC: 121 MG/DL — HIGH (ref 70–99)
POTASSIUM SERPL-MCNC: 4.8 MMOL/L — SIGNIFICANT CHANGE UP (ref 3.5–5.3)
POTASSIUM SERPL-SCNC: 4.8 MMOL/L — SIGNIFICANT CHANGE UP (ref 3.5–5.3)
SODIUM SERPL-SCNC: 136 MMOL/L — SIGNIFICANT CHANGE UP (ref 135–145)

## 2018-11-10 PROCEDURE — 99232 SBSQ HOSP IP/OBS MODERATE 35: CPT

## 2018-11-10 RX ORDER — HYDROMORPHONE HYDROCHLORIDE 2 MG/ML
4 INJECTION INTRAMUSCULAR; INTRAVENOUS; SUBCUTANEOUS EVERY 6 HOURS
Qty: 0 | Refills: 0 | Status: DISCONTINUED | OUTPATIENT
Start: 2018-11-10 | End: 2018-11-12

## 2018-11-10 RX ORDER — HYDROMORPHONE HYDROCHLORIDE 2 MG/ML
2 INJECTION INTRAMUSCULAR; INTRAVENOUS; SUBCUTANEOUS EVERY 4 HOURS
Qty: 0 | Refills: 0 | Status: DISCONTINUED | OUTPATIENT
Start: 2018-11-10 | End: 2018-11-16

## 2018-11-10 RX ORDER — HYDROMORPHONE HYDROCHLORIDE 2 MG/ML
4 INJECTION INTRAMUSCULAR; INTRAVENOUS; SUBCUTANEOUS ONCE
Qty: 0 | Refills: 0 | Status: DISCONTINUED | OUTPATIENT
Start: 2018-11-10 | End: 2018-11-10

## 2018-11-10 RX ADMIN — Medication 500 MILLIGRAM(S): at 17:13

## 2018-11-10 RX ADMIN — ONDANSETRON 4 MILLIGRAM(S): 8 TABLET, FILM COATED ORAL at 00:22

## 2018-11-10 RX ADMIN — HYDROMORPHONE HYDROCHLORIDE 4 MILLIGRAM(S): 2 INJECTION INTRAMUSCULAR; INTRAVENOUS; SUBCUTANEOUS at 01:20

## 2018-11-10 RX ADMIN — HYDROMORPHONE HYDROCHLORIDE 4 MILLIGRAM(S): 2 INJECTION INTRAMUSCULAR; INTRAVENOUS; SUBCUTANEOUS at 18:50

## 2018-11-10 RX ADMIN — ONDANSETRON 4 MILLIGRAM(S): 8 TABLET, FILM COATED ORAL at 12:21

## 2018-11-10 RX ADMIN — Medication 175 MICROGRAM(S): at 05:48

## 2018-11-10 RX ADMIN — HYDROMORPHONE HYDROCHLORIDE 2 MILLIGRAM(S): 2 INJECTION INTRAMUSCULAR; INTRAVENOUS; SUBCUTANEOUS at 14:51

## 2018-11-10 RX ADMIN — ATORVASTATIN CALCIUM 80 MILLIGRAM(S): 80 TABLET, FILM COATED ORAL at 21:02

## 2018-11-10 RX ADMIN — TAMSULOSIN HYDROCHLORIDE 0.4 MILLIGRAM(S): 0.4 CAPSULE ORAL at 21:01

## 2018-11-10 RX ADMIN — HYDROMORPHONE HYDROCHLORIDE 2 MILLIGRAM(S): 2 INJECTION INTRAMUSCULAR; INTRAVENOUS; SUBCUTANEOUS at 17:17

## 2018-11-10 RX ADMIN — HYDROMORPHONE HYDROCHLORIDE 4 MILLIGRAM(S): 2 INJECTION INTRAMUSCULAR; INTRAVENOUS; SUBCUTANEOUS at 00:21

## 2018-11-10 RX ADMIN — Medication 500 MILLIGRAM(S): at 05:48

## 2018-11-10 RX ADMIN — Medication 100 MILLIGRAM(S): at 21:01

## 2018-11-10 RX ADMIN — INSULIN GLARGINE 12 UNIT(S): 100 INJECTION, SOLUTION SUBCUTANEOUS at 21:04

## 2018-11-10 RX ADMIN — Medication 1: at 17:09

## 2018-11-10 RX ADMIN — HYDROMORPHONE HYDROCHLORIDE 2 MILLIGRAM(S): 2 INJECTION INTRAMUSCULAR; INTRAVENOUS; SUBCUTANEOUS at 08:43

## 2018-11-10 RX ADMIN — HYDROMORPHONE HYDROCHLORIDE 2 MILLIGRAM(S): 2 INJECTION INTRAMUSCULAR; INTRAVENOUS; SUBCUTANEOUS at 21:01

## 2018-11-10 RX ADMIN — METFORMIN HYDROCHLORIDE 1000 MILLIGRAM(S): 850 TABLET ORAL at 05:48

## 2018-11-10 RX ADMIN — Medication 100 MILLIGRAM(S): at 05:49

## 2018-11-10 RX ADMIN — HYDROMORPHONE HYDROCHLORIDE 4 MILLIGRAM(S): 2 INJECTION INTRAMUSCULAR; INTRAVENOUS; SUBCUTANEOUS at 18:04

## 2018-11-10 RX ADMIN — HYDROMORPHONE HYDROCHLORIDE 2 MILLIGRAM(S): 2 INJECTION INTRAMUSCULAR; INTRAVENOUS; SUBCUTANEOUS at 03:10

## 2018-11-10 RX ADMIN — ENOXAPARIN SODIUM 40 MILLIGRAM(S): 100 INJECTION SUBCUTANEOUS at 17:10

## 2018-11-10 RX ADMIN — HYDROMORPHONE HYDROCHLORIDE 4 MILLIGRAM(S): 2 INJECTION INTRAMUSCULAR; INTRAVENOUS; SUBCUTANEOUS at 07:00

## 2018-11-10 RX ADMIN — CYCLOBENZAPRINE HYDROCHLORIDE 5 MILLIGRAM(S): 10 TABLET, FILM COATED ORAL at 03:10

## 2018-11-10 RX ADMIN — HYDROMORPHONE HYDROCHLORIDE 4 MILLIGRAM(S): 2 INJECTION INTRAMUSCULAR; INTRAVENOUS; SUBCUTANEOUS at 05:48

## 2018-11-10 RX ADMIN — HYDROMORPHONE HYDROCHLORIDE 4 MILLIGRAM(S): 2 INJECTION INTRAMUSCULAR; INTRAVENOUS; SUBCUTANEOUS at 13:45

## 2018-11-10 RX ADMIN — SENNA PLUS 2 TABLET(S): 8.6 TABLET ORAL at 21:01

## 2018-11-10 RX ADMIN — CYCLOBENZAPRINE HYDROCHLORIDE 5 MILLIGRAM(S): 10 TABLET, FILM COATED ORAL at 00:13

## 2018-11-10 RX ADMIN — ONDANSETRON 4 MILLIGRAM(S): 8 TABLET, FILM COATED ORAL at 17:14

## 2018-11-10 RX ADMIN — HYDROMORPHONE HYDROCHLORIDE 4 MILLIGRAM(S): 2 INJECTION INTRAMUSCULAR; INTRAVENOUS; SUBCUTANEOUS at 13:30

## 2018-11-10 RX ADMIN — HYDROMORPHONE HYDROCHLORIDE 2 MILLIGRAM(S): 2 INJECTION INTRAMUSCULAR; INTRAVENOUS; SUBCUTANEOUS at 21:45

## 2018-11-10 RX ADMIN — ONDANSETRON 4 MILLIGRAM(S): 8 TABLET, FILM COATED ORAL at 05:48

## 2018-11-10 RX ADMIN — Medication 20 MILLIGRAM(S): at 06:36

## 2018-11-10 RX ADMIN — HYDROMORPHONE HYDROCHLORIDE 2 MILLIGRAM(S): 2 INJECTION INTRAMUSCULAR; INTRAVENOUS; SUBCUTANEOUS at 09:45

## 2018-11-10 RX ADMIN — CYCLOBENZAPRINE HYDROCHLORIDE 5 MILLIGRAM(S): 10 TABLET, FILM COATED ORAL at 14:51

## 2018-11-10 RX ADMIN — METFORMIN HYDROCHLORIDE 1000 MILLIGRAM(S): 850 TABLET ORAL at 17:13

## 2018-11-10 RX ADMIN — Medication 100 MILLIGRAM(S): at 12:21

## 2018-11-10 NOTE — PROGRESS NOTE ADULT - SUBJECTIVE AND OBJECTIVE BOX
HPI:  72M with history of T2DM(a1c 6.9% Oct 2018), HTN, HLD, FERNANDO on CPAP, Pneumonia, CLL, Prostate Cancer s/p resection 2003, thyroid cancer s/p thyroidectomy 8/2018, renal cancer s/p partial nephrectomy and spinal stenosis with acquired scoliosis who initially presented 10/18/18 for L1-5 posterior lumbar fusion and T10-pelvis instrumentation and fusion but case was aborted due to 4 second pause after induction with propofol who returned 10/27/18 for surgery. Of note, he had a prior Holter monitoring study that revealed nocturnal bradycardia with pauses. 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 On 10/27/18, he underwent placement of temporary pacer and L1-S1 transforaminal interbody fusion, T10-pelvis instrumented fusion and Plastics assisted closure. Operative course was notable for bradycardia responsive to glycopyrrolate, cerebrospinal fluid leak which was primarily repaired and EBL of 1500cc. He received 2 units PRBC intra-op. Post-operatively, he had a ~40 minutes episode of hypotension to SBP in the 50smmHg, for which he was placed on pressor support and given an additional 2 units PRBC.  Transferred to acute rehab on 11/2    Subjective    No [id number]. LE swelling about same.  Pain is tolerable.       PAST MEDICAL & SURGICAL HISTORY:  Former smoker, stopped smoking many years ago  CLL (chronic lymphocytic leukemia)  Sleep apnea, unspecified type  Spinal stenosis of lumbar region, unspecified whether neurogenic claudication present  Leukocytosis, unspecified type: monitored for CLL  Malignant neoplasm of kidney parenchyma, right: s/p surgery  Thyroid nodule  Malignant neoplasm of thyroid gland  Prostate cancer: 2003, s/p prostatectomy, RT 2009 x 40 days  Hypertension, unspecified type  Diabetes mellitus type 2 in obese  H/O thyroidectomy: 2016  History of strabismus surgery: b/l 1958  S/P left knee arthroscopy: 1998  H/O ventral hernia repair: 1997  H/O radical prostatectomy: 2003  H/O partial nephrectomy: right, 2009  History of total knee replacement, right: 2012, scoped 1998      MedsMEDICATIONS  (STANDING):  ascorbic acid 500 milliGRAM(s) Oral two times a day  atorvastatin 80 milliGRAM(s) Oral at bedtime  dextrose 5%. 1000 milliLiter(s) (50 mL/Hr) IV Continuous <Continuous>  dextrose 50% Injectable 12.5 Gram(s) IV Push once  dextrose 50% Injectable 25 Gram(s) IV Push once  dextrose 50% Injectable 25 Gram(s) IV Push once  docusate sodium 100 milliGRAM(s) Oral three times a day  enoxaparin Injectable 40 milliGRAM(s) SubCutaneous <User Schedule>  furosemide    Tablet 20 milliGRAM(s) Oral daily  HYDROmorphone   Tablet 4 milliGRAM(s) Oral every 6 hours  insulin glargine Injectable (LANTUS) 12 Unit(s) SubCutaneous at bedtime  insulin lispro (HumaLOG) corrective regimen sliding scale   SubCutaneous three times a day before meals  insulin lispro (HumaLOG) corrective regimen sliding scale   SubCutaneous at bedtime  levothyroxine 175 MICROGram(s) Oral daily  lisinopril 10 milliGRAM(s) Oral daily  metFORMIN 1000 milliGRAM(s) Oral every 12 hours  ondansetron    Tablet 4 milliGRAM(s) Oral every 6 hours  senna 2 Tablet(s) Oral at bedtime  tamsulosin 0.4 milliGRAM(s) Oral at bedtime    MEDICATIONS  (PRN):  acetaminophen   Tablet .. 650 milliGRAM(s) Oral every 6 hours PRN Temp greater or equal to 38C (100.4F), Mild Pain (1 - 3)  bisacodyl Suppository 10 milliGRAM(s) Rectal daily PRN Constipation  cyclobenzaprine 5 milliGRAM(s) Oral three times a day PRN Muscle Spasm  dextrose 40% Gel 15 Gram(s) Oral once PRN Blood Glucose LESS THAN 70 milliGRAM(s)/deciliter  glucagon  Injectable 1 milliGRAM(s) IntraMuscular once PRN Glucose LESS THAN 70 milligrams/deciliter  HYDROmorphone   Tablet 2 milliGRAM(s) Oral every 4 hours PRN Severe Pain (7 - 10)  polyethylene glycol 3350 17 Gram(s) Oral at bedtime PRN Constipation      Vital Signs Last 24 Hrs  T(C): 37.2 (10 Nov 2018 08:13), Max: 37.2 (10 Nov 2018 08:13)  T(F): 99 (10 Nov 2018 08:13), Max: 99 (10 Nov 2018 08:13)  HR: 66 (10 Nov 2018 08:13) (66 - 79)  BP: 113/67 (10 Nov 2018 08:13) (99/56 - 117/54)  BP(mean): --  RR: 15 (10 Nov 2018 08:13) (14 - 15)  SpO2: 96% (10 Nov 2018 08:13) (95% - 97%)  I&O's Summary    09 Nov 2018 07:01  -  10 Nov 2018 07:00  --------------------------------------------------------  IN: 0 mL / OUT: 750 mL / NET: -750 mL        PHYSICAL EXAM:  GENERAL: NAD  NECK: Supple  NERVOUS SYSTEM:  awake and alert  HEART: S1s2 NL , RRR  CHEST/LUNG: Clear to percussion bilaterally  ABDOMEN: Soft, Nontender, Nondistended; Bowel sounds present  EXTREMITIES:  +1-2 edema      LABS:  |11-10-18 @ 06:15            --  -->--------------<--            --      136  |  101  |  17  --------------------------<121<H>  4.8  |  9.1  |  0.96    Mg -- / Phos--    PT -- / INR --    PTT --    Lipase --  11-10-18 @ 06:15  CAPILLARY BLOOD GLUCOSE      POCT Blood Glucose.: 118 mg/dL (10 Nov 2018 07:27)  POCT Blood Glucose.: 155 mg/dL (09 Nov 2018 21:27)  POCT Blood Glucose.: 113 mg/dL (09 Nov 2018 16:26)  POCT Blood Glucose.: 99 mg/dL (09 Nov 2018 12:09)    Imaging Personally Reviewed:  [ ] YES  [ ] NO      HEALTH ISSUES - PROBLEM Dx:  s/p  L1-L5 posterior lumbar fusion and T10 instrumentation   PT/OT per rehab  Pain control    LE edema-chronic per pt. Cont Lasix, elev legs, ace bandages wrap/compression stockings if pt can tolerate    HTN-Cont lisinopril     DM-Cont Lantus/Humalog/Metformin                  Care Discussed with Consultants/Other Providers [ x] YES  [ ] NO

## 2018-11-10 NOTE — PROGRESS NOTE ADULT - SUBJECTIVE AND OBJECTIVE BOX
72 year old male seen in follow up after recent rehab admission following spinal surgery    INTERVAL SUBJECTIVE & REVIEW OF SYMPTOMS:  Patient seated in chair, states that he feels well. Wants to " catch up on his sleep' Pain appears more post -operative versus pre - surgery back pain. Pain controlled with current regimen  Denies  constipation or urinary issues       MEDICATIONS:  acetaminophen   Tablet .. 650 milliGRAM(s) Oral every 6 hours PRN  ascorbic acid 500 milliGRAM(s) Oral two times a day  atorvastatin 80 milliGRAM(s) Oral at bedtime  bisacodyl Suppository 10 milliGRAM(s) Rectal daily PRN  cyclobenzaprine 5 milliGRAM(s) Oral three times a day PRN  dextrose 40% Gel 15 Gram(s) Oral once PRN  dextrose 5%. 1000 milliLiter(s) IV Continuous <Continuous>  dextrose 50% Injectable 12.5 Gram(s) IV Push once  dextrose 50% Injectable 25 Gram(s) IV Push once  dextrose 50% Injectable 25 Gram(s) IV Push once  docusate sodium 100 milliGRAM(s) Oral three times a day  enoxaparin Injectable 40 milliGRAM(s) SubCutaneous <User Schedule>  furosemide    Tablet 20 milliGRAM(s) Oral daily  glucagon  Injectable 1 milliGRAM(s) IntraMuscular once PRN  HYDROmorphone   Tablet 4 milliGRAM(s) Oral every 6 hours  HYDROmorphone   Tablet 2 milliGRAM(s) Oral every 4 hours PRN  insulin glargine Injectable (LANTUS) 12 Unit(s) SubCutaneous at bedtime  insulin lispro (HumaLOG) corrective regimen sliding scale   SubCutaneous three times a day before meals  insulin lispro (HumaLOG) corrective regimen sliding scale   SubCutaneous at bedtime  levothyroxine 175 MICROGram(s) Oral daily  lisinopril 10 milliGRAM(s) Oral daily  metFORMIN 1000 milliGRAM(s) Oral every 12 hours  ondansetron    Tablet 4 milliGRAM(s) Oral every 6 hours  polyethylene glycol 3350 17 Gram(s) Oral at bedtime PRN  senna 2 Tablet(s) Oral at bedtime  tamsulosin 0.4 milliGRAM(s) Oral at bedtime      REVIEW OF SYSTEMS  [ x  ] Constitutional WNL  [  x ] Cardio WNL  [ x  ] Resp WNL  [ x  ] GI WNL  [ x  ]  WNL  [    VITAL SIGNS  Vital Signs Last 24 Hrs  T(C): 37.2 (10 Nov 2018 08:13), Max: 37.2 (10 Nov 2018 08:13)  T(F): 99 (10 Nov 2018 08:13), Max: 99 (10 Nov 2018 08:13)  HR: 66 (10 Nov 2018 08:13) (66 - 79)  BP: 113/67 (10 Nov 2018 08:13) (99/56 - 117/54)  BP(mean): --  RR: 15 (10 Nov 2018 08:13) (14 - 15)  SpO2: 96% (10 Nov 2018 08:13) (95% - 97%)    PHYSICAL EXAM  General: NAD  Cardio: s1s2+  Resp: Clear  Abdomen: soft, NT  Extrem: Bilateral pedal edema + - per patient chronic, No calf tenderness  Motor 5/5  Skin: Back incision- extensive- sutures+, no drainage, no tenderness or swelling noted  Functional Exam:     RECENT LABS:    11-10    136  |  101  |  17  ----------------------------<  121<H>  4.8   |  33<H>  |  0.96    Ca    9.1      10 Nov 2018 06:15      CAPILLARY BLOOD GLUCOSE      POCT Blood Glucose.: 151 mg/dL (10 Nov 2018 17:02)  POCT Blood Glucose.: 130 mg/dL (10 Nov 2018 11:51)  POCT Blood Glucose.: 118 mg/dL (10 Nov 2018 07:27)  POCT Blood Glucose.: 155 mg/dL (09 Nov 2018 21:27)      A/P:    72 year old male with medical history including HTN, DM-II, FERNANDO, now s/p extensive spinal surgery T10 - pelvis instrumentation and L1-5 fusion    Continue full rehab program :PT/OT 3 hrs/day 5  days/week    DVT prophylaxis: on Lovenox    Pain : on hydromorphone prn and standing. Continue current bowel regimen    LE edema: on furosemide    Medical fu noted

## 2018-11-11 PROCEDURE — C1769: CPT

## 2018-11-11 PROCEDURE — 93005 ELECTROCARDIOGRAM TRACING: CPT

## 2018-11-11 PROCEDURE — 36430 TRANSFUSION BLD/BLD COMPNT: CPT

## 2018-11-11 PROCEDURE — C1894: CPT

## 2018-11-11 PROCEDURE — 80076 HEPATIC FUNCTION PANEL: CPT

## 2018-11-11 PROCEDURE — 97116 GAIT TRAINING THERAPY: CPT

## 2018-11-11 PROCEDURE — 97165 OT EVAL LOW COMPLEX 30 MIN: CPT

## 2018-11-11 PROCEDURE — 85730 THROMBOPLASTIN TIME PARTIAL: CPT

## 2018-11-11 PROCEDURE — 84132 ASSAY OF SERUM POTASSIUM: CPT

## 2018-11-11 PROCEDURE — 86901 BLOOD TYPING SEROLOGIC RH(D): CPT

## 2018-11-11 PROCEDURE — 86920 COMPATIBILITY TEST SPIN: CPT

## 2018-11-11 PROCEDURE — 84295 ASSAY OF SERUM SODIUM: CPT

## 2018-11-11 PROCEDURE — 80048 BASIC METABOLIC PNL TOTAL CA: CPT

## 2018-11-11 PROCEDURE — 70450 CT HEAD/BRAIN W/O DYE: CPT

## 2018-11-11 PROCEDURE — 86923 COMPATIBILITY TEST ELECTRIC: CPT

## 2018-11-11 PROCEDURE — 99232 SBSQ HOSP IP/OBS MODERATE 35: CPT

## 2018-11-11 PROCEDURE — 82803 BLOOD GASES ANY COMBINATION: CPT

## 2018-11-11 PROCEDURE — 84484 ASSAY OF TROPONIN QUANT: CPT

## 2018-11-11 PROCEDURE — 85610 PROTHROMBIN TIME: CPT

## 2018-11-11 PROCEDURE — 72128 CT CHEST SPINE W/O DYE: CPT

## 2018-11-11 PROCEDURE — 82330 ASSAY OF CALCIUM: CPT

## 2018-11-11 PROCEDURE — 83735 ASSAY OF MAGNESIUM: CPT

## 2018-11-11 PROCEDURE — 97162 PT EVAL MOD COMPLEX 30 MIN: CPT

## 2018-11-11 PROCEDURE — 82947 ASSAY GLUCOSE BLOOD QUANT: CPT

## 2018-11-11 PROCEDURE — 93602 INTRA-ATRIAL RECORDING: CPT

## 2018-11-11 PROCEDURE — 86900 BLOOD TYPING SEROLOGIC ABO: CPT

## 2018-11-11 PROCEDURE — 82565 ASSAY OF CREATININE: CPT

## 2018-11-11 PROCEDURE — 82962 GLUCOSE BLOOD TEST: CPT

## 2018-11-11 PROCEDURE — 85027 COMPLETE CBC AUTOMATED: CPT

## 2018-11-11 PROCEDURE — 85014 HEMATOCRIT: CPT

## 2018-11-11 PROCEDURE — 74018 RADEX ABDOMEN 1 VIEW: CPT

## 2018-11-11 PROCEDURE — 93610 INTRA-ATRIAL PACING: CPT

## 2018-11-11 PROCEDURE — 86850 RBC ANTIBODY SCREEN: CPT

## 2018-11-11 PROCEDURE — 84100 ASSAY OF PHOSPHORUS: CPT

## 2018-11-11 PROCEDURE — 84443 ASSAY THYROID STIM HORMONE: CPT

## 2018-11-11 PROCEDURE — C1889: CPT

## 2018-11-11 PROCEDURE — G0463: CPT

## 2018-11-11 PROCEDURE — 72131 CT LUMBAR SPINE W/O DYE: CPT

## 2018-11-11 PROCEDURE — 94660 CPAP INITIATION&MGMT: CPT

## 2018-11-11 PROCEDURE — 71045 X-RAY EXAM CHEST 1 VIEW: CPT

## 2018-11-11 PROCEDURE — 97530 THERAPEUTIC ACTIVITIES: CPT

## 2018-11-11 PROCEDURE — P9038: CPT

## 2018-11-11 PROCEDURE — C1730: CPT

## 2018-11-11 PROCEDURE — 72148 MRI LUMBAR SPINE W/O DYE: CPT

## 2018-11-11 PROCEDURE — 97161 PT EVAL LOW COMPLEX 20 MIN: CPT

## 2018-11-11 PROCEDURE — C1713: CPT

## 2018-11-11 PROCEDURE — 94002 VENT MGMT INPAT INIT DAY: CPT

## 2018-11-11 PROCEDURE — 82435 ASSAY OF BLOOD CHLORIDE: CPT

## 2018-11-11 PROCEDURE — P9040: CPT

## 2018-11-11 PROCEDURE — 76000 FLUOROSCOPY <1 HR PHYS/QHP: CPT

## 2018-11-11 PROCEDURE — 83605 ASSAY OF LACTIC ACID: CPT

## 2018-11-11 PROCEDURE — 93600 BUNDLE OF HIS RECORDING: CPT

## 2018-11-11 PROCEDURE — P9045: CPT

## 2018-11-11 RX ADMIN — HYDROMORPHONE HYDROCHLORIDE 4 MILLIGRAM(S): 2 INJECTION INTRAMUSCULAR; INTRAVENOUS; SUBCUTANEOUS at 00:00

## 2018-11-11 RX ADMIN — METFORMIN HYDROCHLORIDE 1000 MILLIGRAM(S): 850 TABLET ORAL at 06:00

## 2018-11-11 RX ADMIN — HYDROMORPHONE HYDROCHLORIDE 2 MILLIGRAM(S): 2 INJECTION INTRAMUSCULAR; INTRAVENOUS; SUBCUTANEOUS at 03:01

## 2018-11-11 RX ADMIN — ONDANSETRON 4 MILLIGRAM(S): 8 TABLET, FILM COATED ORAL at 00:00

## 2018-11-11 RX ADMIN — HYDROMORPHONE HYDROCHLORIDE 4 MILLIGRAM(S): 2 INJECTION INTRAMUSCULAR; INTRAVENOUS; SUBCUTANEOUS at 13:00

## 2018-11-11 RX ADMIN — ONDANSETRON 4 MILLIGRAM(S): 8 TABLET, FILM COATED ORAL at 12:02

## 2018-11-11 RX ADMIN — TAMSULOSIN HYDROCHLORIDE 0.4 MILLIGRAM(S): 0.4 CAPSULE ORAL at 21:05

## 2018-11-11 RX ADMIN — HYDROMORPHONE HYDROCHLORIDE 4 MILLIGRAM(S): 2 INJECTION INTRAMUSCULAR; INTRAVENOUS; SUBCUTANEOUS at 00:45

## 2018-11-11 RX ADMIN — INSULIN GLARGINE 12 UNIT(S): 100 INJECTION, SOLUTION SUBCUTANEOUS at 21:13

## 2018-11-11 RX ADMIN — Medication 100 MILLIGRAM(S): at 12:01

## 2018-11-11 RX ADMIN — HYDROMORPHONE HYDROCHLORIDE 4 MILLIGRAM(S): 2 INJECTION INTRAMUSCULAR; INTRAVENOUS; SUBCUTANEOUS at 06:52

## 2018-11-11 RX ADMIN — Medication 175 MICROGRAM(S): at 06:00

## 2018-11-11 RX ADMIN — HYDROMORPHONE HYDROCHLORIDE 4 MILLIGRAM(S): 2 INJECTION INTRAMUSCULAR; INTRAVENOUS; SUBCUTANEOUS at 06:00

## 2018-11-11 RX ADMIN — Medication 650 MILLIGRAM(S): at 09:45

## 2018-11-11 RX ADMIN — LISINOPRIL 10 MILLIGRAM(S): 2.5 TABLET ORAL at 06:00

## 2018-11-11 RX ADMIN — Medication 20 MILLIGRAM(S): at 06:00

## 2018-11-11 RX ADMIN — ONDANSETRON 4 MILLIGRAM(S): 8 TABLET, FILM COATED ORAL at 06:00

## 2018-11-11 RX ADMIN — HYDROMORPHONE HYDROCHLORIDE 4 MILLIGRAM(S): 2 INJECTION INTRAMUSCULAR; INTRAVENOUS; SUBCUTANEOUS at 18:40

## 2018-11-11 RX ADMIN — Medication 650 MILLIGRAM(S): at 15:29

## 2018-11-11 RX ADMIN — Medication 650 MILLIGRAM(S): at 08:49

## 2018-11-11 RX ADMIN — HYDROMORPHONE HYDROCHLORIDE 4 MILLIGRAM(S): 2 INJECTION INTRAMUSCULAR; INTRAVENOUS; SUBCUTANEOUS at 23:44

## 2018-11-11 RX ADMIN — Medication 650 MILLIGRAM(S): at 21:05

## 2018-11-11 RX ADMIN — Medication 650 MILLIGRAM(S): at 17:53

## 2018-11-11 RX ADMIN — Medication 650 MILLIGRAM(S): at 22:04

## 2018-11-11 RX ADMIN — SENNA PLUS 2 TABLET(S): 8.6 TABLET ORAL at 21:04

## 2018-11-11 RX ADMIN — ATORVASTATIN CALCIUM 80 MILLIGRAM(S): 80 TABLET, FILM COATED ORAL at 21:04

## 2018-11-11 RX ADMIN — HYDROMORPHONE HYDROCHLORIDE 4 MILLIGRAM(S): 2 INJECTION INTRAMUSCULAR; INTRAVENOUS; SUBCUTANEOUS at 17:52

## 2018-11-11 RX ADMIN — ENOXAPARIN SODIUM 40 MILLIGRAM(S): 100 INJECTION SUBCUTANEOUS at 17:51

## 2018-11-11 RX ADMIN — Medication 100 MILLIGRAM(S): at 06:00

## 2018-11-11 RX ADMIN — HYDROMORPHONE HYDROCHLORIDE 4 MILLIGRAM(S): 2 INJECTION INTRAMUSCULAR; INTRAVENOUS; SUBCUTANEOUS at 12:00

## 2018-11-11 RX ADMIN — Medication 500 MILLIGRAM(S): at 17:51

## 2018-11-11 RX ADMIN — ONDANSETRON 4 MILLIGRAM(S): 8 TABLET, FILM COATED ORAL at 23:44

## 2018-11-11 RX ADMIN — METFORMIN HYDROCHLORIDE 1000 MILLIGRAM(S): 850 TABLET ORAL at 17:51

## 2018-11-11 RX ADMIN — Medication 500 MILLIGRAM(S): at 06:00

## 2018-11-11 RX ADMIN — Medication 100 MILLIGRAM(S): at 21:05

## 2018-11-11 RX ADMIN — CYCLOBENZAPRINE HYDROCHLORIDE 5 MILLIGRAM(S): 10 TABLET, FILM COATED ORAL at 00:01

## 2018-11-11 RX ADMIN — ONDANSETRON 4 MILLIGRAM(S): 8 TABLET, FILM COATED ORAL at 17:51

## 2018-11-11 RX ADMIN — HYDROMORPHONE HYDROCHLORIDE 2 MILLIGRAM(S): 2 INJECTION INTRAMUSCULAR; INTRAVENOUS; SUBCUTANEOUS at 04:20

## 2018-11-11 NOTE — PROGRESS NOTE ADULT - SUBJECTIVE AND OBJECTIVE BOX
72M with history of T2DM(a1c 6.9% Oct 2018), HTN, HLD, FERNANDO on CPAP, Pneumonia, CLL, Prostate Cancer s/p resection 2003, thyroid cancer s/p thyroidectomy 8/2018, renal cancer s/p partial nephrectomy and spinal stenosis with acquired scoliosis who initially presented 10/18/18 for L1-5 posterior lumbar fusion and T10-pelvis instrumentation and fusion but case was aborted due to 4 second pause after induction with propofol who returned 10/27/18 for surgery. Of note, he had a prior Holter monitoring study that revealed nocturnal bradycardia with pauses. 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 On 10/27/18, he underwent placement of temporary pacer and L1-S1 transforaminal interbody fusion, T10-pelvis instrumented fusion and Plastics assisted closure. Operative course was notable for bradycardia responsive to glycopyrrolate, cerebrospinal fluid leak which was primarily repaired and EBL of 1500cc. He received 2 units PRBC intra-op. Post-operatively, he had a ~40 minutes episode of hypotension to SBP in the 50smmHg, for which he was placed on pressor support and given an additional 2 units PRBC.  Transferred to acute rehab on 11/2    Subjective    No complaints        PAST MEDICAL & SURGICAL HISTORY:  Former smoker, stopped smoking many years ago  CLL (chronic lymphocytic leukemia)  Sleep apnea, unspecified type  Spinal stenosis of lumbar region, unspecified whether neurogenic claudication present  Leukocytosis, unspecified type: monitored for CLL  Malignant neoplasm of kidney parenchyma, right: s/p surgery  Thyroid nodule  Malignant neoplasm of thyroid gland  Prostate cancer: 2003, s/p prostatectomy, RT 2009 x 40 days  Hypertension, unspecified type  Diabetes mellitus type 2 in obese  H/O thyroidectomy: 2016  History of strabismus surgery: b/l 1958  S/P left knee arthroscopy: 1998  H/O ventral hernia repair: 1997  H/O radical prostatectomy: 2003  H/O partial nephrectomy: right, 2009  History of total knee replacement, right: 2012, scoped 1998      MedsMEDICATIONS  (STANDING):  ascorbic acid 500 milliGRAM(s) Oral two times a day  atorvastatin 80 milliGRAM(s) Oral at bedtime  dextrose 5%. 1000 milliLiter(s) (50 mL/Hr) IV Continuous <Continuous>  dextrose 50% Injectable 12.5 Gram(s) IV Push once  dextrose 50% Injectable 25 Gram(s) IV Push once  dextrose 50% Injectable 25 Gram(s) IV Push once  docusate sodium 100 milliGRAM(s) Oral three times a day  enoxaparin Injectable 40 milliGRAM(s) SubCutaneous <User Schedule>  furosemide    Tablet 20 milliGRAM(s) Oral daily  HYDROmorphone   Tablet 4 milliGRAM(s) Oral every 6 hours  insulin glargine Injectable (LANTUS) 12 Unit(s) SubCutaneous at bedtime  levothyroxine 175 MICROGram(s) Oral daily  lisinopril 10 milliGRAM(s) Oral daily  metFORMIN 1000 milliGRAM(s) Oral every 12 hours  ondansetron    Tablet 4 milliGRAM(s) Oral every 6 hours  senna 2 Tablet(s) Oral at bedtime  tamsulosin 0.4 milliGRAM(s) Oral at bedtime    MEDICATIONS  (PRN):  acetaminophen   Tablet .. 650 milliGRAM(s) Oral every 6 hours PRN Temp greater or equal to 38C (100.4F), Mild Pain (1 - 3)  bisacodyl Suppository 10 milliGRAM(s) Rectal daily PRN Constipation  cyclobenzaprine 5 milliGRAM(s) Oral three times a day PRN Muscle Spasm  dextrose 40% Gel 15 Gram(s) Oral once PRN Blood Glucose LESS THAN 70 milliGRAM(s)/deciliter  glucagon  Injectable 1 milliGRAM(s) IntraMuscular once PRN Glucose LESS THAN 70 milligrams/deciliter  HYDROmorphone   Tablet 2 milliGRAM(s) Oral every 4 hours PRN Severe Pain (7 - 10)  polyethylene glycol 3350 17 Gram(s) Oral at bedtime PRN Constipation      Vital Signs Last 24 Hrs  T(C): 36.6 (10 Nov 2018 20:04), Max: 36.6 (10 Nov 2018 20:04)  T(F): 97.9 (10 Nov 2018 20:04), Max: 97.9 (10 Nov 2018 20:04)  HR: 60 (11 Nov 2018 08:53) (60 - 79)  BP: 116/65 (11 Nov 2018 08:53) (116/65 - 144/78)  BP(mean): --  RR: 14 (11 Nov 2018 08:53) (14 - 14)  SpO2: 96% (11 Nov 2018 08:53) (96% - 99%)  I&O's Summary      PHYSICAL EXAM:  GENERAL: NAD  NECK: Supple  NERVOUS SYSTEM:  awake and alert  HEART: S1s2 NL , RRR  CHEST/LUNG: Clear to percussion bilaterally  ABDOMEN: Soft, Nontender, Nondistended; Bowel sounds present  EXTREMITIES:  +1~2 LE edema      LABS:  |11-10-18 @ 06:15            --  -->--------------<--            --      136  |  101  |  17  --------------------------<121<H>  4.8  |  9.1  |  0.96    Mg -- / Phos--    PT -- / INR --    PTT --    Lipase --  11-10-18 @ 06:CAPILLARY BLOOD GLUCOSE      POCT Blood Glucose.: 114 mg/dL (11 Nov 2018 07:51)  POCT Blood Glucose.: 102 mg/dL (10 Nov 2018 20:23)  POCT Blood Glucose.: 151 mg/dL (10 Nov 2018 17:02)  POCT Blood Glucose.: 130 mg/dL (10 Nov 2018 11:51)    Imaging Personally Reviewed:  [ ] YES  [ ] NO      HEALTH ISSUES - PROBLEM Dx:  s/p  L1-L5 posterior lumbar fusion and T10 instrumentation   PT/OT per rehab  Pain control    LE edema-chronic per pt. Cont Lasix, elev legs, ace bandages wrap/compression stockings if pt can tolerate    HTN-Cont lisinopril     DM-A1c 6.9%, Pt takes Januvia(100mg) and Metformin(500mg 1 tab at breakfast/lunch and 2 tabs at dinner) at home. Glucose well controlled here, not requiring humalog  DC humalog and change glucose to daily  Cont Lantus/Metformin(Januvia non formulary)  No need fro insulin at home and can resume Januvia/metformin upon dc      Care Discussed with Consultants/Other Providers [ x] YES  [ ] NO

## 2018-11-11 NOTE — CHART NOTE - NSCHARTNOTEFT_GEN_A_CORE
Source: Patient [x ]    Family [ ]     other [x ] Medical Chart Review    Diet : Consistent Carbohydrate with snack  Glucerna shake q day (pending d/c approval due to pt refusal)      Patient reports [ ] nausea  [ ] vomiting [ ] diarrhea [ ] constipation  [ ]chewing problems [ ] swallowing issues  [ ] other: none  +BM     PO intake:  100%  Pt reported great improvement in appetite/po intake  Food preferences reviewed/updated on file with dietary      Source for PO intake [x ] Patient [ ] family [ x] chart [ ] staff [ ] other     Enteral /Parenteral Nutrition:       Current Weight: Weight (kg): 103 (11-07 @ 10:40)  % Weight Change: no updated weight; pt believes weight to be stable.    Pertinent Medications: MEDICATIONS  (STANDING):  ascorbic acid 500 milliGRAM(s) Oral two times a day  atorvastatin 80 milliGRAM(s) Oral at bedtime  dextrose 5%. 1000 milliLiter(s) (50 mL/Hr) IV Continuous <Continuous>  dextrose 50% Injectable 12.5 Gram(s) IV Push once  dextrose 50% Injectable 25 Gram(s) IV Push once  dextrose 50% Injectable 25 Gram(s) IV Push once  docusate sodium 100 milliGRAM(s) Oral three times a day  enoxaparin Injectable 40 milliGRAM(s) SubCutaneous <User Schedule>  furosemide    Tablet 20 milliGRAM(s) Oral daily  HYDROmorphone   Tablet 4 milliGRAM(s) Oral every 6 hours  insulin glargine Injectable (LANTUS) 12 Unit(s) SubCutaneous at bedtime  levothyroxine 175 MICROGram(s) Oral daily  lisinopril 10 milliGRAM(s) Oral daily  metFORMIN 1000 milliGRAM(s) Oral every 12 hours  ondansetron    Tablet 4 milliGRAM(s) Oral every 6 hours  senna 2 Tablet(s) Oral at bedtime  tamsulosin 0.4 milliGRAM(s) Oral at bedtime    MEDICATIONS  (PRN):  acetaminophen   Tablet .. 650 milliGRAM(s) Oral every 6 hours PRN Temp greater or equal to 38C (100.4F), Mild Pain (1 - 3)  bisacodyl Suppository 10 milliGRAM(s) Rectal daily PRN Constipation  cyclobenzaprine 5 milliGRAM(s) Oral three times a day PRN Muscle Spasm  dextrose 40% Gel 15 Gram(s) Oral once PRN Blood Glucose LESS THAN 70 milliGRAM(s)/deciliter  glucagon  Injectable 1 milliGRAM(s) IntraMuscular once PRN Glucose LESS THAN 70 milligrams/deciliter  HYDROmorphone   Tablet 2 milliGRAM(s) Oral every 4 hours PRN Severe Pain (7 - 10)  polyethylene glycol 3350 17 Gram(s) Oral at bedtime PRN Constipation    Pertinent Labs:  11-10 Na136 mmol/L Glu 121 mg/dL<H> K+ 4.8 mmol/L Cr  0.96 mg/dL BUN 17 mg/dL 11-03 PAB 13 mg/dL<L>      Skin: surgical site/no pressure ulcer per flowsheet documentation. Lt leg edema.    Estimated Needs:   [x ] no change since previous assessment  [ ] recalculated:       Previous Nutrition Diagnosis:  Predicted suboptimal intake r/t poor po    [ ] Inadequate Energy Intake [ ]Inadequate Oral Intake [ ] Excessive Energy Intake     [ ] Underweight [ ] Increased Nutrient Needs [ ] Overweight/Obesity     [ ] Altered GI Function [ ] Unintended Weight Loss [ ] Food & Nutrition Related Knowledge Deficit [ ] Malnutrition          Nutrition Diagnosis is [ ] ongoing  [ x] resolved [ ] not applicable          New Nutrition Diagnosis: [x ] not applicable    [ ] Inadequate Protein Energy Intake [ ]Inadequate Oral Intake [ ] Excessive Energy Intake     [ ] Underweight [ ] Increased Nutrient Needs [ ] Overweight/Obesity     [ ] Altered GI Function [ ] Unintended Weight Loss [ ] Food & Nutrition Related Knowledge Deficit[ ] Limited Adherence to nutrition related recommendations [ ] Malnutrition  [ ] other: Free text       Related to:      As evidenced by:      Interventions:     Recommend    1. Continue current POC; agree to d/c Glucerna shake due to pt refusal/request and improved po reported  2. obtain current weight for further assessment     Monitoring and Evaluation:     [x ] PO intake [ x] Tolerance to diet prescription [x ] weights [x ] follow up per protocol    [ ] other:

## 2018-11-11 NOTE — PROGRESS NOTE ADULT - SUBJECTIVE AND OBJECTIVE BOX
72 year old male seen in follow up after recent rehab admission following spinal surgery    INTERVAL SUBJECTIVE & REVIEW OF SYMPTOMS:  Patient seated in chair, states that he feels well. Plans to wean himself off his pain meds  Denies  constipation or urinary issues  Denies HA/CP/palpitations/numbness       REVIEW OF SYSTEMS  [ x  ] Constitutional WNL  [  x ] Cardio WNL  [ x  ] Resp WNL  [ x  ] GI WNL  [ x  ]  WNL  [    VITAL SIGNS  Vital Signs Last 24 Hrs  T(C): 36.6 (10 Nov 2018 20:04), Max: 36.6 (10 Nov 2018 20:04)  T(F): 97.9 (10 Nov 2018 20:04), Max: 97.9 (10 Nov 2018 20:04)  HR: 60 (11 Nov 2018 08:53) (60 - 79)  BP: 116/65 (11 Nov 2018 08:53) (116/65 - 144/78)  BP(mean): --  RR: 14 (11 Nov 2018 08:53) (14 - 14)  SpO2: 96% (11 Nov 2018 08:53) (96% - 99%)          PHYSICAL EXAM  General: NAD  Cardio: s1s2+  Resp: Clear  Abdomen: soft, NT  Extrem: Bilateral pedal edema + - per patient chronic, No calf tenderness  Motor 5/5  Skin: Back incision- extensive- sutures+, no drainage, no tenderness or swelling noted  Functional Exam:     RECENT LABS:    11-10    136  |  101  |  17  ----------------------------<  121<H>  4.8   |  33<H>  |  0.96    Ca    9.1      10 Nov 2018 06:15      CAPILLARY BLOOD GLUCOSE      POCT Blood Glucose.: 114 mg/dL (11 Nov 2018 07:51)  POCT Blood Glucose.: 102 mg/dL (10 Nov 2018 20:23)  POCT Blood Glucose.: 151 mg/dL (10 Nov 2018 17:02)    MEDICATIONS  (STANDING):  ascorbic acid 500 milliGRAM(s) Oral two times a day  atorvastatin 80 milliGRAM(s) Oral at bedtime  dextrose 5%. 1000 milliLiter(s) (50 mL/Hr) IV Continuous <Continuous>  dextrose 50% Injectable 12.5 Gram(s) IV Push once  dextrose 50% Injectable 25 Gram(s) IV Push once  dextrose 50% Injectable 25 Gram(s) IV Push once  docusate sodium 100 milliGRAM(s) Oral three times a day  enoxaparin Injectable 40 milliGRAM(s) SubCutaneous <User Schedule>  furosemide    Tablet 20 milliGRAM(s) Oral daily  HYDROmorphone   Tablet 4 milliGRAM(s) Oral every 6 hours  insulin glargine Injectable (LANTUS) 12 Unit(s) SubCutaneous at bedtime  levothyroxine 175 MICROGram(s) Oral daily  lisinopril 10 milliGRAM(s) Oral daily  metFORMIN 1000 milliGRAM(s) Oral every 12 hours  ondansetron    Tablet 4 milliGRAM(s) Oral every 6 hours  senna 2 Tablet(s) Oral at bedtime  tamsulosin 0.4 milliGRAM(s) Oral at bedtime    MEDICATIONS  (PRN):  acetaminophen   Tablet .. 650 milliGRAM(s) Oral every 6 hours PRN Temp greater or equal to 38C (100.4F), Mild Pain (1 - 3)  bisacodyl Suppository 10 milliGRAM(s) Rectal daily PRN Constipation  cyclobenzaprine 5 milliGRAM(s) Oral three times a day PRN Muscle Spasm  dextrose 40% Gel 15 Gram(s) Oral once PRN Blood Glucose LESS THAN 70 milliGRAM(s)/deciliter  glucagon  Injectable 1 milliGRAM(s) IntraMuscular once PRN Glucose LESS THAN 70 milligrams/deciliter  HYDROmorphone   Tablet 2 milliGRAM(s) Oral every 4 hours PRN Severe Pain (7 - 10)  polyethylene glycol 3350 17 Gram(s) Oral at bedtime PRN Constipation      A/P:    72 year old male with medical history including HTN, DM-II, FERNANDO, now s/p extensive spinal surgery T10 - pelvis instrumentation and L1-5 fusion    Continue full rehab program :PT/OT 3 hrs/day 5  days/week    DVT prophylaxis: on Lovenox    Pain : on hydromorphone prn and standing. Continue current bowel regimen    LE edema: on furosemide    DM: Continue Lantus, D/C SSI, change accuchecks to daily, continue metformin    Medical fu noted

## 2018-11-12 LAB
HCT VFR BLD CALC: 30.5 % — LOW (ref 39–50)
HGB BLD-MCNC: 9.9 G/DL — LOW (ref 13–17)
MCHC RBC-ENTMCNC: 29 PG — SIGNIFICANT CHANGE UP (ref 27–34)
MCHC RBC-ENTMCNC: 32.4 GM/DL — SIGNIFICANT CHANGE UP (ref 32–36)
MCV RBC AUTO: 89.4 FL — SIGNIFICANT CHANGE UP (ref 80–100)
PLATELET # BLD AUTO: 349 K/UL — SIGNIFICANT CHANGE UP (ref 150–400)
RBC # BLD: 3.41 M/UL — LOW (ref 4.2–5.8)
RBC # FLD: 14.5 % — SIGNIFICANT CHANGE UP (ref 10.3–14.5)
WBC # BLD: 20.8 K/UL — HIGH (ref 3.8–10.5)
WBC # FLD AUTO: 20.8 K/UL — HIGH (ref 3.8–10.5)

## 2018-11-12 PROCEDURE — 99233 SBSQ HOSP IP/OBS HIGH 50: CPT

## 2018-11-12 PROCEDURE — 99232 SBSQ HOSP IP/OBS MODERATE 35: CPT | Mod: GC

## 2018-11-12 RX ORDER — LIDOCAINE 4 G/100G
2 CREAM TOPICAL
Qty: 0 | Refills: 0 | Status: DISCONTINUED | OUTPATIENT
Start: 2018-11-12 | End: 2018-11-16

## 2018-11-12 RX ORDER — HYDROMORPHONE HYDROCHLORIDE 2 MG/ML
2 INJECTION INTRAMUSCULAR; INTRAVENOUS; SUBCUTANEOUS EVERY 6 HOURS
Qty: 0 | Refills: 0 | Status: DISCONTINUED | OUTPATIENT
Start: 2018-11-12 | End: 2018-11-13

## 2018-11-12 RX ADMIN — Medication 650 MILLIGRAM(S): at 04:15

## 2018-11-12 RX ADMIN — TAMSULOSIN HYDROCHLORIDE 0.4 MILLIGRAM(S): 0.4 CAPSULE ORAL at 21:20

## 2018-11-12 RX ADMIN — HYDROMORPHONE HYDROCHLORIDE 4 MILLIGRAM(S): 2 INJECTION INTRAMUSCULAR; INTRAVENOUS; SUBCUTANEOUS at 00:40

## 2018-11-12 RX ADMIN — METFORMIN HYDROCHLORIDE 1000 MILLIGRAM(S): 850 TABLET ORAL at 17:03

## 2018-11-12 RX ADMIN — HYDROMORPHONE HYDROCHLORIDE 2 MILLIGRAM(S): 2 INJECTION INTRAMUSCULAR; INTRAVENOUS; SUBCUTANEOUS at 12:18

## 2018-11-12 RX ADMIN — Medication 500 MILLIGRAM(S): at 06:07

## 2018-11-12 RX ADMIN — ONDANSETRON 4 MILLIGRAM(S): 8 TABLET, FILM COATED ORAL at 18:09

## 2018-11-12 RX ADMIN — Medication 100 MILLIGRAM(S): at 06:07

## 2018-11-12 RX ADMIN — HYDROMORPHONE HYDROCHLORIDE 4 MILLIGRAM(S): 2 INJECTION INTRAMUSCULAR; INTRAVENOUS; SUBCUTANEOUS at 06:06

## 2018-11-12 RX ADMIN — ONDANSETRON 4 MILLIGRAM(S): 8 TABLET, FILM COATED ORAL at 23:51

## 2018-11-12 RX ADMIN — ONDANSETRON 4 MILLIGRAM(S): 8 TABLET, FILM COATED ORAL at 12:18

## 2018-11-12 RX ADMIN — Medication 650 MILLIGRAM(S): at 14:15

## 2018-11-12 RX ADMIN — Medication 500 MILLIGRAM(S): at 17:03

## 2018-11-12 RX ADMIN — Medication 20 MILLIGRAM(S): at 06:07

## 2018-11-12 RX ADMIN — HYDROMORPHONE HYDROCHLORIDE 2 MILLIGRAM(S): 2 INJECTION INTRAMUSCULAR; INTRAVENOUS; SUBCUTANEOUS at 18:09

## 2018-11-12 RX ADMIN — HYDROMORPHONE HYDROCHLORIDE 2 MILLIGRAM(S): 2 INJECTION INTRAMUSCULAR; INTRAVENOUS; SUBCUTANEOUS at 13:38

## 2018-11-12 RX ADMIN — SENNA PLUS 2 TABLET(S): 8.6 TABLET ORAL at 21:21

## 2018-11-12 RX ADMIN — HYDROMORPHONE HYDROCHLORIDE 2 MILLIGRAM(S): 2 INJECTION INTRAMUSCULAR; INTRAVENOUS; SUBCUTANEOUS at 23:48

## 2018-11-12 RX ADMIN — Medication 100 MILLIGRAM(S): at 13:07

## 2018-11-12 RX ADMIN — ONDANSETRON 4 MILLIGRAM(S): 8 TABLET, FILM COATED ORAL at 06:07

## 2018-11-12 RX ADMIN — HYDROMORPHONE HYDROCHLORIDE 4 MILLIGRAM(S): 2 INJECTION INTRAMUSCULAR; INTRAVENOUS; SUBCUTANEOUS at 06:57

## 2018-11-12 RX ADMIN — Medication 650 MILLIGRAM(S): at 15:00

## 2018-11-12 RX ADMIN — LISINOPRIL 10 MILLIGRAM(S): 2.5 TABLET ORAL at 06:07

## 2018-11-12 RX ADMIN — ENOXAPARIN SODIUM 40 MILLIGRAM(S): 100 INJECTION SUBCUTANEOUS at 17:03

## 2018-11-12 RX ADMIN — ATORVASTATIN CALCIUM 80 MILLIGRAM(S): 80 TABLET, FILM COATED ORAL at 21:20

## 2018-11-12 RX ADMIN — INSULIN GLARGINE 12 UNIT(S): 100 INJECTION, SOLUTION SUBCUTANEOUS at 21:25

## 2018-11-12 RX ADMIN — METFORMIN HYDROCHLORIDE 1000 MILLIGRAM(S): 850 TABLET ORAL at 06:07

## 2018-11-12 RX ADMIN — Medication 100 MILLIGRAM(S): at 21:20

## 2018-11-12 RX ADMIN — Medication 650 MILLIGRAM(S): at 03:19

## 2018-11-12 RX ADMIN — Medication 175 MICROGRAM(S): at 06:07

## 2018-11-12 RX ADMIN — LIDOCAINE 2 PATCH: 4 CREAM TOPICAL at 21:21

## 2018-11-12 NOTE — PROGRESS NOTE ADULT - SUBJECTIVE AND OBJECTIVE BOX
Patient is a 72y old  Male who presents with a chief complaint of Spinal Fusion (12 Nov 2018 17:02)    Patient seen and examined at bedside.  Patient denies any chest pain, shortness of breath, or problems moving his bowels.     ALLERGIES:  codeine (Vomiting; Headache)  dogs, cats (Short breath)  dust (Rhinitis)  mold (Rhinitis)  morphine (Vomiting; Headache)  narcotic analgesics (Vomiting; Headache)    MEDICATIONS:  acetaminophen   Tablet .. 650 milliGRAM(s) Oral every 6 hours PRN  ascorbic acid 500 milliGRAM(s) Oral two times a day  atorvastatin 80 milliGRAM(s) Oral at bedtime  bisacodyl Suppository 10 milliGRAM(s) Rectal daily PRN  dextrose 40% Gel 15 Gram(s) Oral once PRN  dextrose 5%. 1000 milliLiter(s) IV Continuous <Continuous>  dextrose 50% Injectable 12.5 Gram(s) IV Push once  dextrose 50% Injectable 25 Gram(s) IV Push once  dextrose 50% Injectable 25 Gram(s) IV Push once  docusate sodium 100 milliGRAM(s) Oral three times a day  furosemide    Tablet 20 milliGRAM(s) Oral daily  glucagon  Injectable 1 milliGRAM(s) IntraMuscular once PRN  HYDROmorphone   Tablet 2 milliGRAM(s) Oral every 4 hours PRN  HYDROmorphone   Tablet 4 milliGRAM(s) Oral <User Schedule>  insulin glargine Injectable (LANTUS) 12 Unit(s) SubCutaneous at bedtime  levothyroxine 175 MICROGram(s) Oral daily  lidocaine   Patch 2 Patch Transdermal <User Schedule>  metFORMIN 1000 milliGRAM(s) Oral every 12 hours  polyethylene glycol 3350 17 Gram(s) Oral at bedtime PRN  senna 2 Tablet(s) Oral at bedtime    Vital Signs Last 24 Hrs  T(F): 97.6 (13 Nov 2018 07:38), Max: 98.5 (12 Nov 2018 21:30)  HR: 79 (13 Nov 2018 07:38) (79 - 96)  BP: 129/71 (13 Nov 2018 07:38) (128/73 - 132/756)  RR: 14 (13 Nov 2018 07:38) (14 - 14)  SpO2: 95% (13 Nov 2018 07:38) (95% - 97%)  I&O's Summary      PHYSICAL EXAM:  General: NAD, A/O x 3  ENT: MMM  Neck: Supple, No JVD  Lungs: Clear to auscultation bilaterally  Cardio: RRR, S1/S2, No murmurs  Abdomen: Soft, Nontender, Nondistended; obese   Extremities: No cyanosis, No edema    LABS:                        9.9    20.8  )-----------( 349      ( 12 Nov 2018 05:55 )             30.5                             CAPILLARY BLOOD GLUCOSE      POCT Blood Glucose.: 108 mg/dL (13 Nov 2018 08:13)  POCT Blood Glucose.: 177 mg/dL (12 Nov 2018 21:18)    10-04 [id number] 6.9  08-17 [id number] 6.5          RADIOLOGY & ADDITIONAL TESTS:    Care Discussed with Consultants/Other Providers:

## 2018-11-12 NOTE — PROGRESS NOTE ADULT - SUBJECTIVE AND OBJECTIVE BOX
HPI:  Pt is a 72M with history of T2DM, HTN, HLD, FERNANDO on CPAP, Pneumonia, CLL, Prostate Cancer s/p resection 2003, thyroid cancer s/p thyroidectomy 8/2018, renal cancer s/p partial nephrectomy and spinal stenosis with acquired scoliosis who initially presented 10/18/18 for L1-5 posterior lumbar fusion and T10-pelvis instrumentation and fusion but case was aborted due to 4 second pause after induction with propofol who returned 10/27/18 for surgery. Of note, he had a prior Holter monitoring study that revealed nocturnal bradycardia with pauses. 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 On 10/27/18, he underwent placement of temporary pacer and L1-S1 transforaminal interbody fusion, T10-pelvis instrumented fusion and Plastics assisted closure. Operative course was notable for bradycardia responsive to glycopyrrolate, cerebrospinal fluid leak which was primarily repaired and EBL of 1500cc. He received 2 units PRBC intra-op. Post-operatively, he had a ~40 minutes episode of hypotension to SBP in the 50smmHg, for which he was placed on pressor support and given an additional 2 units PRBC.  Pt was medically optimized and discharged to [facility] for AIR 11/2/18    PAST MEDICAL & SURGICAL HISTORY:  Former smoker, stopped smoking many years ago  CLL (chronic lymphocytic leukemia)  Sleep apnea, unspecified type  Spinal stenosis of lumbar region, unspecified whether neurogenic claudication present  Leukocytosis, unspecified type: monitored for CLL  Malignant neoplasm of kidney parenchyma, right: s/p surgery  Thyroid nodule  Malignant neoplasm of thyroid gland  Prostate cancer: 2003, s/p prostatectomy, RT 2009 x 40 days  Hypertension, unspecified type  Diabetes mellitus type 2 in obese  H/O thyroidectomy: 2016  History of strabismus surgery: b/l 1958  S/P left knee arthroscopy: 1998  H/O ventral hernia repair: 1997  H/O radical prostatectomy: 2003  H/O partial nephrectomy: right, 2009  History of total knee replacement, right: 2012, scoped 1998    TODAY'S SUBJECTIVE & REVIEW OF SYMPTOMS:  TODAY'S REVIEW OF SYMPTOMS  [ x ] Constiutional WNL            [X] Cardio WNL               [X] Resp WNL  [X] GI WNL                            [X]  WNL                    [X] Heme WNL  [X] Endo WNL                       [X] Skin WNL                   [  ] MSK WNL  [  ] Neuro WNL                     [X] Cognitive WNL           [X] Psych WNL    Subjective: NAEO. Back is sore at the base where surgical drains were previously. Patient would like to reduce standing hydromorphone to 2mg q6hrs. BM+ yesterday.     Vital Signs Last 24 Hrs  T(C): 36.3 (12 Nov 2018 07:35), Max: 36.7 (11 Nov 2018 19:46)  T(F): 97.4 (12 Nov 2018 07:35), Max: 98 (11 Nov 2018 19:46)  HR: 81 (12 Nov 2018 07:35) (75 - 81)  BP: 102/61 (12 Nov 2018 07:35) (102/61 - 132/67)  BP(mean): --  RR: 14 (12 Nov 2018 07:35) (14 - 14)  SpO2: 96% (12 Nov 2018 07:35) (96% - 97%)     Constitutional - NAD, Comfortable  	HEENT - NCAT  	Neck - Supple, No limited ROM  	Chest - CTA bilaterally  	Cardiovascular - RRR, S1S2  	Abdomen - BS+, Soft, NTND  	Extremities - No C/C/E  	Neurologic Exam -                 	   Cognitive - Awake, Alert, AAO to self, place, date, year, situation  	   Cranial Nerves - CN 2-12 grossly intact  	   Motor -   	                  LEFT    UE - 4/5  	                  RIGHT UE - 4/5  	                  LEFT    LE - HF 4/5, KE 4/5, DF 5/5, PF 5/5  	                  RIGHT LE - HF 4/5, KE 4/5, DF 5/5, PF 5/5     Skin -  Back incision with sutures CDI  No erythema or edema noted    Functional Status:  Bed mobility: Mod A  Transfers: CS  Gait: Amb RW CS  Stairs:  1HR CS  ADLs: Grooming sup, UE dress min A, LE dress total A                          9.9    20.8  )-----------( 349      ( 12 Nov 2018 05:55 )             30.5     CAPILLARY BLOOD GLUCOSE  POCT Blood Glucose.: 124 mg/dL (12 Nov 2018 07:39)  POCT Blood Glucose.: 159 mg/dL (11 Nov 2018 20:20)      Assessment and Plan:   · Assessment		  71 yo male HTN DM2, FERNANDO on CPAP, post op L1-5 posterior lumbar fusion and T10 pelvis instrumentation.  Course was complicated by hypotension  which required pressor support (initially with norepinephrine, neosynephrine and vasopressin)  with Gait Instability, ADL impairments and Functional impairments.    COMORBIDITIES/ACTIVE MEDICAL ISSUES   #L1-L5 posterior lumbar fusion and T10 pelvis instrumentation  -Pain control with dilaudid reduced to 2mg q6hrs 11/12/18  -Cyclobenzaprine  PRN  -WBAT  -spinal precautions  -Outpatient follow up with Dr. Dunn ( plastic surgery) [phone] within one week for suture removal.   - 11/5 removed the dressing as per instructions.  Pt may shower, avoiding direct streams of water onto your incision, do not scrub, do not soak in water, pat dry when finished    #HTN.  Hypotension and bradycardia post-op, junctional rhythm likely 2/2 hyperkalemia resolved, NSR  - Lisinopril.  Lasix 20mg daily added 11/7 (home dose 40mg lasix daily) due to BLE edema with improvement and without lowering of BP    #Hypothyroidism  -c/w levothyroxine    #DM-HbA1C 6.9 on 10/4  -Glargine  -SSI  -Consistent carb diet    #BPH  -tamsulosin    #Urinary retention  -Resolved  PVRs 2, 79, 100  D/Cd bladder scans    #Leukocytosis-Known; monitored for CLL  +h/o CA of kidney, prostate, thyroid  -Afebrile  -monitor cbc.  F/U 11/12     #Bowel regimen  -Colace, Miralax, Senna.  Dulc supp prn.    Pain Mgmt - Tylenol PRN, Dilaudid, 2 lidocaine patches added to low back qHS 11/12/18  GI/Bowel Mgmt -  Continent c/w Colace, Senna,  Dulcolax suppository PRN, Miralax,  /Bladder Mgmt - Continent.  Successful TOV    FEN   - Diet - Consistent Carb  [CCHO, DASH/TLC]    Vitamin C 500 mg bid    Precautions / PROPHYLAXIS:   - Falls, Cardiac, Spinal,   - ortho: Weight bearing status: WBAT  - Lungs: Aspiration, Incentive Spirometer   - DVT: Lovenox, SCDs, TEDs     Continue comprehensive rehab schedule, PT & OT 3hrs/day HPI:  Pt is a 72M with history of T2DM, HTN, HLD, FERNANDO on CPAP, Pneumonia, CLL, Prostate Cancer s/p resection 2003, thyroid cancer s/p thyroidectomy 8/2018, renal cancer s/p partial nephrectomy and spinal stenosis with acquired scoliosis who initially presented 10/18/18 for L1-5 posterior lumbar fusion and T10-pelvis instrumentation and fusion but case was aborted due to 4 second pause after induction with propofol who returned 10/27/18 for surgery. Of note, he had a prior Holter monitoring study that revealed nocturnal bradycardia with pauses. 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 On 10/27/18, he underwent placement of temporary pacer and L1-S1 transforaminal interbody fusion, T10-pelvis instrumented fusion and Plastics assisted closure. Operative course was notable for bradycardia responsive to glycopyrrolate, cerebrospinal fluid leak which was primarily repaired and EBL of 1500cc. He received 2 units PRBC intra-op. Post-operatively, he had a ~40 minutes episode of hypotension to SBP in the 50smmHg, for which he was placed on pressor support and given an additional 2 units PRBC.  Pt was medically optimized and discharged to [facility] for AIR 11/2/18    PAST MEDICAL & SURGICAL HISTORY:  Former smoker, stopped smoking many years ago  CLL (chronic lymphocytic leukemia)  Sleep apnea, unspecified type  Spinal stenosis of lumbar region, unspecified whether neurogenic claudication present  Leukocytosis, unspecified type: monitored for CLL  Malignant neoplasm of kidney parenchyma, right: s/p surgery  Thyroid nodule  Malignant neoplasm of thyroid gland  Prostate cancer: 2003, s/p prostatectomy, RT 2009 x 40 days  Hypertension, unspecified type  Diabetes mellitus type 2 in obese  H/O thyroidectomy: 2016  History of strabismus surgery: b/l 1958  S/P left knee arthroscopy: 1998  H/O ventral hernia repair: 1997  H/O radical prostatectomy: 2003  H/O partial nephrectomy: right, 2009  History of total knee replacement, right: 2012, scoped 1998    TODAY'S SUBJECTIVE & REVIEW OF SYMPTOMS:  TODAY'S REVIEW OF SYMPTOMS  [ x ] Constiutional WNL            [X] Cardio WNL               [X] Resp WNL  [X] GI WNL                            [X]  WNL                    [X] Heme WNL  [X] Endo WNL                       [X] Skin WNL                   [  ] MSK WNL  [  ] Neuro WNL                     [X] Cognitive WNL           [X] Psych WNL    Subjective: NAEO. Back is sore at the base where surgical drains were previously. Patient would like to reduce standing hydromorphone to 2mg q6hrs. BM+ yesterday.     Vital Signs Last 24 Hrs  T(C): 36.3 (12 Nov 2018 07:35), Max: 36.7 (11 Nov 2018 19:46)  T(F): 97.4 (12 Nov 2018 07:35), Max: 98 (11 Nov 2018 19:46)  HR: 81 (12 Nov 2018 07:35) (75 - 81)  BP: 102/61 (12 Nov 2018 07:35) (102/61 - 132/67)  BP(mean): --  RR: 14 (12 Nov 2018 07:35) (14 - 14)  SpO2: 96% (12 Nov 2018 07:35) (96% - 97%)     Constitutional - NAD, Comfortable  	HEENT - NCAT  	Neck - Supple, No limited ROM  	Chest - CTA bilaterally  	Cardiovascular - RRR, S1S2  	Abdomen - BS+, Soft, NTND  	Extremities - No C/C/E  	Neurologic Exam -                 	   Cognitive - Awake, Alert, AAO to self, place, date, year, situation  	   Cranial Nerves - CN 2-12 grossly intact  	   Motor -   	                  LEFT    UE - 4/5  	                  RIGHT UE - 4/5  	                  LEFT    LE - HF 4/5, KE 4/5, DF 5/5, PF 5/5  	                  RIGHT LE - HF 4/5, KE 4/5, DF 5/5, PF 5/5     Skin -  Back incision with sutures CDI  No erythema or edema noted    Functional Status:  Bed mobility: Mod A  Transfers: CS  Gait: Amb RW CS  Stairs:  1HR CS  ADLs: Grooming sup, UE dress min A, LE dress total A                          9.9    20.8  )-----------( 349      ( 12 Nov 2018 05:55 )             30.5     CAPILLARY BLOOD GLUCOSE  POCT Blood Glucose.: 124 mg/dL (12 Nov 2018 07:39)  POCT Blood Glucose.: 159 mg/dL (11 Nov 2018 20:20)      Assessment and Plan:   · Assessment		  73 yo male HTN DM2, FERNANDO on CPAP, post op L1-5 posterior lumbar fusion and T10 pelvis instrumentation.  Course was complicated by hypotension  which required pressor support (initially with norepinephrine, neosynephrine and vasopressin)  with Gait Instability, ADL impairments and Functional impairments.    COMORBIDITIES/ACTIVE MEDICAL ISSUES   #L1-L5 posterior lumbar fusion and T10 pelvis instrumentation  -Pain control with dilaudid reduced to 2mg q6hrs 11/12/18  -Cyclobenzaprine  PRN  -WBAT  -spinal precautions  -Outpatient follow up with Dr. Dunn ( plastic surgery) [phone] within one week for suture removal.   - 11/5 removed the dressing as per instructions.  Pt may shower, avoiding direct streams of water onto your incision, do not scrub, do not soak in water, pat dry when finished    #HTN.  Hypotension and bradycardia post-op, junctional rhythm likely 2/2 hyperkalemia resolved, NSR  - Lisinopril.  Lasix 20mg daily added 11/7 (home dose 40mg lasix daily) due to BLE edema with improvement and without lowering of BP    #Hypothyroidism  -c/w levothyroxine    #DM-HbA1C 6.9 on 10/4  -Glargine  -SSI  -Consistent carb diet  -As per hospitalist: No need for insulin at home and can resume Januvia/metformin upon dc    #BPH  -tamsulosin    #Urinary retention  -Resolved  PVRs 2, 79, 100  D/Cd bladder scans    #Leukocytosis-Known; monitored for CLL  +h/o CA of kidney, prostate, thyroid  -Afebrile  -monitor cbc.  F/U 11/12     #Bowel regimen  -Colace, Miralax, Senna.  Dulc supp prn.    Pain Mgmt - Tylenol PRN, Dilaudid, 2 lidocaine patches added to low back qHS 11/12/18  GI/Bowel Mgmt -  Continent c/w Colace, Senna,  Dulcolax suppository PRN, Miralax,  /Bladder Mgmt - Continent.  Successful TOV    FEN   - Diet - Consistent Carb  [CCHO, DASH/TLC]    Vitamin C 500 mg bid    Precautions / PROPHYLAXIS:   - Falls, Cardiac, Spinal,   - ortho: Weight bearing status: WBAT  - Lungs: Aspiration, Incentive Spirometer   - DVT: Lovenox, SCDs, TEDs     Continue comprehensive rehab schedule, PT & OT 3hrs/day

## 2018-11-12 NOTE — PROGRESS NOTE ADULT - ASSESSMENT
72M with history of T2DM(a1c 6.9% Oct 2018), HTN, HLD, FERNANDO on CPAP, Pneumonia, CLL, Prostate Cancer s/p resection 2003, thyroid cancer s/p thyroidectomy 8/2018, renal cancer s/p partial nephrectomy and spinal stenosis with acquired scoliosis who initially presented 10/18/18 for L1-5 posterior lumbar fusion and T10-pelvis instrumentation and fusion but case was aborted due to 4 second pause after induction with propofol who returned 10/27/18 for surgery. Of note, he had a prior Holter monitoring study that revealed nocturnal bradycardia with pauses. 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 On 10/27/18, he underwent placement of temporary pacer and L1-S1 transforaminal interbody fusion, T10-pelvis instrumented fusion and Plastics assisted closure. Operative course was notable for bradycardia responsive to glycopyrrolate, cerebrospinal fluid leak which was primarily repaired and EBL of 1500cc. He received 2 units PRBC intra-op. Post-operatively, he had a ~40 minutes episode of hypotension to SBP in the 50smmHg, for which he was placed on pressor support and given an additional 2 units PRBC.  Transferred to acute rehab on 11/2  HEALTH ISSUES - PROBLEM Dx:  s/p  L1-L5 posterior lumbar fusion and T10 instrumentation   PT/OT per rehab  Pain control    LE edema-chronic per pt. Cont Lasix, elev legs, ace bandages wrap/compression stockings if pt can tolerate    HTN-Cont lisinopril     DM-A1c 6.9%, Pt takes Januvia(100mg) and Metformin(500mg 1 tab at breakfast/lunch and 2 tabs at dinner) at home. Glucose well controlled here, not requiring humalog  DC humalog and change glucose to daily  Cont Lantus/Metformin(Januvia non formulary)  No need fro insulin at home and can resume Januvia/metformin upon dc 72M with history of T2DM(a1c 6.9% Oct 2018), HTN, HLD, FERNANDO on CPAP, Pneumonia, CLL, Prostate Cancer s/p resection 2003, thyroid cancer s/p thyroidectomy 8/2018, renal cancer s/p partial nephrectomy and spinal stenosis with acquired scoliosis who initially presented 10/18/18 for L1-5 posterior lumbar fusion and T10-pelvis instrumentation and fusion but case was aborted due to 4 second pause after induction with propofol who returned 10/27/18 for surgery. Of note, he had a prior Holter monitoring study that revealed nocturnal bradycardia with pauses. 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 On 10/27/18, he underwent placement of temporary pacer and L1-S1 transforaminal interbody fusion, T10-pelvis instrumented fusion and Plastics assisted closure. Operative course was notable for bradycardia responsive to glycopyrrolate, cerebrospinal fluid leak which was primarily repaired and EBL of 1500cc. He received 2 units PRBC intra-op. Post-operatively, he had a ~40 minutes episode of hypotension to SBP in the 50smmHg, for which he was placed on pressor support and given an additional 2 units PRBC.  Transferred to acute rehab on 11/2  HEALTH ISSUES - PROBLEM Dx:  s/p  L1-L5 posterior lumbar fusion and T10 instrumentation   PT/OT per rehab  Pain control-pt requesting a change in management which will be handled by primary team    LE edema-chronic per pt. Cont Lasix, elev legs, ace bandages wrap/compression stockings if pt can tolerate    HTN  -Last 24 hours systolic 144-102  -Cont lisinopril     DM-A1c 6.9%, Pt takes Januvia(100mg) and Metformin(500mg 1 tab at breakfast/lunch and 2 tabs at dinner) at home.  -last 24 hours 159-102  -good control    Leukocytosis  -related to CLL  -continue to monitor

## 2018-11-13 PROCEDURE — 99233 SBSQ HOSP IP/OBS HIGH 50: CPT

## 2018-11-13 PROCEDURE — 99232 SBSQ HOSP IP/OBS MODERATE 35: CPT

## 2018-11-13 RX ORDER — HYDROMORPHONE HYDROCHLORIDE 2 MG/ML
2 INJECTION INTRAMUSCULAR; INTRAVENOUS; SUBCUTANEOUS ONCE
Qty: 0 | Refills: 0 | Status: DISCONTINUED | OUTPATIENT
Start: 2018-11-13 | End: 2018-11-13

## 2018-11-13 RX ORDER — HYDROMORPHONE HYDROCHLORIDE 2 MG/ML
4 INJECTION INTRAMUSCULAR; INTRAVENOUS; SUBCUTANEOUS
Qty: 0 | Refills: 0 | Status: DISCONTINUED | OUTPATIENT
Start: 2018-11-13 | End: 2018-11-16

## 2018-11-13 RX ADMIN — Medication 500 MILLIGRAM(S): at 17:46

## 2018-11-13 RX ADMIN — HYDROMORPHONE HYDROCHLORIDE 4 MILLIGRAM(S): 2 INJECTION INTRAMUSCULAR; INTRAVENOUS; SUBCUTANEOUS at 11:54

## 2018-11-13 RX ADMIN — HYDROMORPHONE HYDROCHLORIDE 4 MILLIGRAM(S): 2 INJECTION INTRAMUSCULAR; INTRAVENOUS; SUBCUTANEOUS at 17:46

## 2018-11-13 RX ADMIN — ENOXAPARIN SODIUM 40 MILLIGRAM(S): 100 INJECTION SUBCUTANEOUS at 17:46

## 2018-11-13 RX ADMIN — TAMSULOSIN HYDROCHLORIDE 0.4 MILLIGRAM(S): 0.4 CAPSULE ORAL at 22:01

## 2018-11-13 RX ADMIN — Medication 650 MILLIGRAM(S): at 13:54

## 2018-11-13 RX ADMIN — INSULIN GLARGINE 12 UNIT(S): 100 INJECTION, SOLUTION SUBCUTANEOUS at 22:03

## 2018-11-13 RX ADMIN — ONDANSETRON 4 MILLIGRAM(S): 8 TABLET, FILM COATED ORAL at 17:46

## 2018-11-13 RX ADMIN — SENNA PLUS 2 TABLET(S): 8.6 TABLET ORAL at 22:01

## 2018-11-13 RX ADMIN — Medication 20 MILLIGRAM(S): at 05:22

## 2018-11-13 RX ADMIN — ONDANSETRON 4 MILLIGRAM(S): 8 TABLET, FILM COATED ORAL at 05:22

## 2018-11-13 RX ADMIN — Medication 100 MILLIGRAM(S): at 22:01

## 2018-11-13 RX ADMIN — HYDROMORPHONE HYDROCHLORIDE 4 MILLIGRAM(S): 2 INJECTION INTRAMUSCULAR; INTRAVENOUS; SUBCUTANEOUS at 12:30

## 2018-11-13 RX ADMIN — HYDROMORPHONE HYDROCHLORIDE 2 MILLIGRAM(S): 2 INJECTION INTRAMUSCULAR; INTRAVENOUS; SUBCUTANEOUS at 06:30

## 2018-11-13 RX ADMIN — HYDROMORPHONE HYDROCHLORIDE 2 MILLIGRAM(S): 2 INJECTION INTRAMUSCULAR; INTRAVENOUS; SUBCUTANEOUS at 05:58

## 2018-11-13 RX ADMIN — LIDOCAINE 2 PATCH: 4 CREAM TOPICAL at 08:48

## 2018-11-13 RX ADMIN — HYDROMORPHONE HYDROCHLORIDE 2 MILLIGRAM(S): 2 INJECTION INTRAMUSCULAR; INTRAVENOUS; SUBCUTANEOUS at 05:24

## 2018-11-13 RX ADMIN — LIDOCAINE 2 PATCH: 4 CREAM TOPICAL at 07:47

## 2018-11-13 RX ADMIN — Medication 650 MILLIGRAM(S): at 22:02

## 2018-11-13 RX ADMIN — HYDROMORPHONE HYDROCHLORIDE 2 MILLIGRAM(S): 2 INJECTION INTRAMUSCULAR; INTRAVENOUS; SUBCUTANEOUS at 13:53

## 2018-11-13 RX ADMIN — ONDANSETRON 4 MILLIGRAM(S): 8 TABLET, FILM COATED ORAL at 11:53

## 2018-11-13 RX ADMIN — Medication 100 MILLIGRAM(S): at 11:53

## 2018-11-13 RX ADMIN — METFORMIN HYDROCHLORIDE 1000 MILLIGRAM(S): 850 TABLET ORAL at 17:46

## 2018-11-13 RX ADMIN — ATORVASTATIN CALCIUM 80 MILLIGRAM(S): 80 TABLET, FILM COATED ORAL at 22:01

## 2018-11-13 RX ADMIN — Medication 500 MILLIGRAM(S): at 05:22

## 2018-11-13 RX ADMIN — HYDROMORPHONE HYDROCHLORIDE 4 MILLIGRAM(S): 2 INJECTION INTRAMUSCULAR; INTRAVENOUS; SUBCUTANEOUS at 23:57

## 2018-11-13 RX ADMIN — HYDROMORPHONE HYDROCHLORIDE 2 MILLIGRAM(S): 2 INJECTION INTRAMUSCULAR; INTRAVENOUS; SUBCUTANEOUS at 14:30

## 2018-11-13 RX ADMIN — ONDANSETRON 4 MILLIGRAM(S): 8 TABLET, FILM COATED ORAL at 23:57

## 2018-11-13 RX ADMIN — HYDROMORPHONE HYDROCHLORIDE 2 MILLIGRAM(S): 2 INJECTION INTRAMUSCULAR; INTRAVENOUS; SUBCUTANEOUS at 02:19

## 2018-11-13 RX ADMIN — Medication 175 MICROGRAM(S): at 05:22

## 2018-11-13 RX ADMIN — LISINOPRIL 10 MILLIGRAM(S): 2.5 TABLET ORAL at 05:23

## 2018-11-13 RX ADMIN — Medication 100 MILLIGRAM(S): at 05:22

## 2018-11-13 RX ADMIN — METFORMIN HYDROCHLORIDE 1000 MILLIGRAM(S): 850 TABLET ORAL at 05:23

## 2018-11-13 RX ADMIN — Medication 650 MILLIGRAM(S): at 14:30

## 2018-11-13 NOTE — PROGRESS NOTE ADULT - SUBJECTIVE AND OBJECTIVE BOX
HPI:  Pt is a 72M with history of T2DM, HTN, HLD, FERNANDO on CPAP, Pneumonia, CLL, Prostate Cancer s/p resection 2003, thyroid cancer s/p thyroidectomy 8/2018, renal cancer s/p partial nephrectomy and spinal stenosis with acquired scoliosis who initially presented 10/18/18 for L1-5 posterior lumbar fusion and T10-pelvis instrumentation and fusion but case was aborted due to 4 second pause after induction with propofol who returned 10/27/18 for surgery. Of note, he had a prior Holter monitoring study that revealed nocturnal bradycardia with pauses. 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 On 10/27/18, he underwent placement of temporary pacer and L1-S1 transforaminal interbody fusion, T10-pelvis instrumented fusion and Plastics assisted closure. Operative course was notable for bradycardia responsive to glycopyrrolate, cerebrospinal fluid leak which was primarily repaired and EBL of 1500cc. He received 2 units PRBC intra-op. Post-operatively, he had a ~40 minutes episode of hypotension to SBP in the 50smmHg, for which he was placed on pressor support and given an additional 2 units PRBC.  Pt was medically optimized and discharged to [facility] for AIR 11/2/18    PAST MEDICAL & SURGICAL HISTORY:  Former smoker, stopped smoking many years ago  CLL (chronic lymphocytic leukemia)  Sleep apnea, unspecified type  Spinal stenosis of lumbar region, unspecified whether neurogenic claudication present  Leukocytosis, unspecified type: monitored for CLL  Malignant neoplasm of kidney parenchyma, right: s/p surgery  Thyroid nodule  Malignant neoplasm of thyroid gland  Prostate cancer: 2003, s/p prostatectomy, RT 2009 x 40 days  Hypertension, unspecified type  Diabetes mellitus type 2 in obese  H/O thyroidectomy: 2016  History of strabismus surgery: b/l 1958  S/P left knee arthroscopy: 1998  H/O ventral hernia repair: 1997  H/O radical prostatectomy: 2003  H/O partial nephrectomy: right, 2009  History of total knee replacement, right: 2012, scoped 1998    TODAY'S REVIEW OF SYMPTOMS  [ x ] Constiutional WNL            [X] Cardio WNL               [X] Resp WNL  [X] GI WNL                            [X]  WNL                    [X] Heme WNL  [X] Endo WNL                       [X] Skin WNL                   [  ] MSK WNL  [  ] Neuro WNL                     [X] Cognitive WNL           [X] Psych WNL    Subjective: NAEO. Patient would like to go back to prior standing Dilaudid regimen (4mg at 0000/0600/1200/1800, ordered). Patient continues to report pain at drain sites, non-radiating. Agreeable to suture removal later today.    Vital Signs Last 24 Hrs  T(C): 36.4 (13 Nov 2018 07:38), Max: 36.9 (12 Nov 2018 21:30)  T(F): 97.6 (13 Nov 2018 07:38), Max: 98.5 (12 Nov 2018 21:30)  HR: 79 (13 Nov 2018 07:38) (79 - 96)  BP: 129/71 (13 Nov 2018 07:38) (128/73 - 132/756)  BP(mean): --  RR: 14 (13 Nov 2018 07:38) (14 - 14)  SpO2: 95% (13 Nov 2018 07:38) (95% - 97%)    Constitutional - NAD, Comfortable  	HEENT - NCAT  	Neck - Supple, No limited ROM  	Chest - CTA bilaterally  	Cardiovascular - RRR, S1S2  	Abdomen - BS+, Soft, NTND  	Extremities - No C/C/E  	Neurologic Exam -                 	   Cognitive - Awake, Alert, AAO to self, place, date, year, situation  	   Cranial Nerves - CN 2-12 grossly intact  	   Motor -   	                  LEFT    UE - 4/5  	                  RIGHT UE - 4/5  	                  LEFT    LE - HF 4/5, KE 4/5, DF 5/5, PF 5/5  	                  RIGHT LE - HF 4/5, KE 4/5, DF 5/5, PF 5/5     Skin -  Back incision with sutures CDI  No erythema or edema noted    FUNCTION:  gait 80' RW supervision  sit/stand  RW supervision  shower transfer CG                        9.9    20.8  )-----------( 349      ( 12 Nov 2018 05:55 )             30.5     CAPILLARY BLOOD GLUCOSE  POCT Blood Glucose.: 108 mg/dL (13 Nov 2018 08:13)  POCT Blood Glucose.: 177 mg/dL (12 Nov 2018 21:18)    MEDICATIONS  (STANDING):  ascorbic acid 500 milliGRAM(s) Oral two times a day  atorvastatin 80 milliGRAM(s) Oral at bedtime  dextrose 5%. 1000 milliLiter(s) (50 mL/Hr) IV Continuous <Continuous>  dextrose 50% Injectable 12.5 Gram(s) IV Push once  dextrose 50% Injectable 25 Gram(s) IV Push once  dextrose 50% Injectable 25 Gram(s) IV Push once  docusate sodium 100 milliGRAM(s) Oral three times a day  enoxaparin Injectable 40 milliGRAM(s) SubCutaneous <User Schedule>  furosemide    Tablet 20 milliGRAM(s) Oral daily  HYDROmorphone   Tablet 4 milliGRAM(s) Oral <User Schedule>  insulin glargine Injectable (LANTUS) 12 Unit(s) SubCutaneous at bedtime  levothyroxine 175 MICROGram(s) Oral daily  lidocaine   Patch 2 Patch Transdermal <User Schedule>  lisinopril 10 milliGRAM(s) Oral daily  metFORMIN 1000 milliGRAM(s) Oral every 12 hours  ondansetron    Tablet 4 milliGRAM(s) Oral every 6 hours  senna 2 Tablet(s) Oral at bedtime  tamsulosin 0.4 milliGRAM(s) Oral at bedtime    MEDICATIONS  (PRN):  acetaminophen   Tablet .. 650 milliGRAM(s) Oral every 6 hours PRN Temp greater or equal to 38C (100.4F), Mild Pain (1 - 3)  bisacodyl Suppository 10 milliGRAM(s) Rectal daily PRN Constipation  cyclobenzaprine 5 milliGRAM(s) Oral three times a day PRN Muscle Spasm  dextrose 40% Gel 15 Gram(s) Oral once PRN Blood Glucose LESS THAN 70 milliGRAM(s)/deciliter  glucagon  Injectable 1 milliGRAM(s) IntraMuscular once PRN Glucose LESS THAN 70 milligrams/deciliter  HYDROmorphone   Tablet 2 milliGRAM(s) Oral every 4 hours PRN Severe Pain (7 - 10)  polyethylene glycol 3350 17 Gram(s) Oral at bedtime PRN Constipation    A/P:  71 yo male HTN DM2, FERNANDO on CPAP, post op L1-5 posterior lumbar fusion and T10 pelvis instrumentation.  Course was complicated by hypotension  which required pressor support (initially with norepinephrine, neosynephrine and vasopressin)  with Gait Instability, ADL impairments and Functional impairments.    COMORBIDITIES/ACTIVE MEDICAL ISSUES   #L1-L5 posterior lumbar fusion and T10 pelvis instrumentation  -Pain control with Dilaudid increased back to 4mg q6hrs 11/13/18  -Cyclobenzaprine  PRN  -WBAT  -spinal precautions  - 11/5 removed the dressing as per instructions.  Pt may shower, avoiding direct streams of water onto your incision, do not scrub, do not soak in water, pat dry when finished  - suture removal by rehab team 11/13/18 (POD #17) as per OK from surgery NP    #HTN.  Hypotension and bradycardia post-op, junctional rhythm likely 2/2 hyperkalemia resolved, NSR  - Lisinopril.  Lasix 20mg daily added 11/7 (home dose 40mg lasix daily) due to BLE edema with improvement and without lowering of BP    #Hypothyroidism  -c/w levothyroxine    #DM-HbA1C 6.9 on 10/4  -Glargine  -SSI  -Consistent carb diet  -As per hospitalist: No need for insulin at home and can resume Januvia/metformin upon dc    #BPH  -tamsulosin    #Urinary retention  -Resolved  PVRs 2, 79, 100  D/Cd bladder scans    #Leukocytosis-Known; monitored for CLL  +h/o CA of kidney, prostate, thyroid  -Afebrile  -monitor cbc.  F/U 11/12     #Bowel regimen  -Colace, Miralax, Senna.  Dulc supp prn.    Pain Mgmt - Tylenol PRN, Dilaudid, 2 lidocaine patches added to low back qHS 11/12/18  GI/Bowel Mgmt -  Continent c/w Colace, Senna,  Dulcolax suppository PRN, Miralax,  /Bladder Mgmt - Continent.  Successful TOV    FEN   - Diet - Consistent Carb  [CCHO, DASH/TLC]    Vitamin C 500 mg bid    Precautions / PROPHYLAXIS:   - Falls, Cardiac, Spinal,   - ortho: Weight bearing status: WBAT  - Lungs: Aspiration, Incentive Spirometer   - DVT: Lovenox, SCDs, TEDs     Continue comprehensive rehab schedule, PT & OT 3hrs/day

## 2018-11-13 NOTE — PROGRESS NOTE ADULT - SUBJECTIVE AND OBJECTIVE BOX
Patient is a 72y old  Male who presents with a chief complaint of Spinal Fusion (12 Nov 2018 17:02)      Patient seen and examined at bedside.  Patient reports that the change in his pain medication has left him in a significant amount of pain. He request the pain medications to be changed to what it was previously.  Denies any chest pain, shortness of breath, and reports no problem moving his bowels.    ALLERGIES:  codeine (Vomiting; Headache)  dogs, cats (Short breath)  dust (Rhinitis)  mold (Rhinitis)  morphine (Vomiting; Headache)  narcotic analgesics (Vomiting; Headache)    MEDICATIONS:  acetaminophen   Tablet .. 650 milliGRAM(s) Oral every 6 hours PRN  ascorbic acid 500 milliGRAM(s) Oral two times a day  atorvastatin 80 milliGRAM(s) Oral at bedtime  bisacodyl Suppository 10 milliGRAM(s) Rectal daily PRN  dextrose 40% Gel 15 Gram(s) Oral once PRN  dextrose 5%. 1000 milliLiter(s) IV Continuous <Continuous>  dextrose 50% Injectable 12.5 Gram(s) IV Push once  dextrose 50% Injectable 25 Gram(s) IV Push once  dextrose 50% Injectable 25 Gram(s) IV Push once  docusate sodium 100 milliGRAM(s) Oral three times a day  furosemide    Tablet 20 milliGRAM(s) Oral daily  glucagon  Injectable 1 milliGRAM(s) IntraMuscular once PRN  HYDROmorphone   Tablet 2 milliGRAM(s) Oral every 4 hours PRN  HYDROmorphone   Tablet 4 milliGRAM(s) Oral <User Schedule>  insulin glargine Injectable (LANTUS) 12 Unit(s) SubCutaneous at bedtime  levothyroxine 175 MICROGram(s) Oral daily  lidocaine   Patch 2 Patch Transdermal <User Schedule>  metFORMIN 1000 milliGRAM(s) Oral every 12 hours  polyethylene glycol 3350 17 Gram(s) Oral at bedtime PRN  senna 2 Tablet(s) Oral at bedtime    Vital Signs Last 24 Hrs  T(F): 97.6 (13 Nov 2018 07:38), Max: 98.5 (12 Nov 2018 21:30)  HR: 79 (13 Nov 2018 07:38) (79 - 96)  BP: 129/71 (13 Nov 2018 07:38) (128/73 - 132/756)  RR: 14 (13 Nov 2018 07:38) (14 - 14)  SpO2: 95% (13 Nov 2018 07:38) (95% - 97%)  I&O's Summary      PHYSICAL EXAM:  General: NAD, A/O x 3  ENT: MMM  Neck: Supple, No JVD  Lungs: Clear to auscultation bilaterally  Cardio: RRR, S1/S2,   Abdomen: Soft, Nontender, Nondistended; obese  Extremities: No cyanosis, No edema    LABS:                        9.9    20.8  )-----------( 349      ( 12 Nov 2018 05:55 )             30.5               CAPILLARY BLOOD GLUCOSE      POCT Blood Glucose.: 108 mg/dL (13 Nov 2018 08:13)  POCT Blood Glucose.: 177 mg/dL (12 Nov 2018 21:18)    10-04 [id number] 6.9  08-17 [id number] 6.5          RADIOLOGY & ADDITIONAL TESTS:    Care Discussed with Consultants/Other Providers: Dr. Gonzalez

## 2018-11-13 NOTE — PROGRESS NOTE ADULT - ASSESSMENT
72M with history of T2DM(a1c 6.9% Oct 2018), HTN, HLD, FERNANDO on CPAP, Pneumonia, CLL, Prostate Cancer s/p resection 2003, thyroid cancer s/p thyroidectomy 8/2018, renal cancer s/p partial nephrectomy and spinal stenosis with acquired scoliosis who initially presented 10/18/18 for L1-5 posterior lumbar fusion and T10-pelvis instrumentation and fusion but case was aborted due to 4 second pause after induction with propofol who returned 10/27/18 for surgery. Of note, he had a prior Holter monitoring study that revealed nocturnal bradycardia with pauses. 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 On 10/27/18, he underwent placement of temporary pacer and L1-S1 transforaminal interbody fusion, T10-pelvis instrumented fusion and Plastics assisted closure. Operative course was notable for bradycardia responsive to glycopyrrolate, cerebrospinal fluid leak which was primarily repaired and EBL of 1500cc. He received 2 units PRBC intra-op. Post-operatively, he had a ~40 minutes episode of hypotension to SBP in the 50smmHg, for which he was placed on pressor support and given an additional 2 units PRBC.  Transferred to acute rehab on 11/2.    HEALTH ISSUES - PROBLEM Dx:  s/p  L1-L5 posterior lumbar fusion and T10 instrumentation   PT/OT per rehab  Pain control - managed by primary team, discussed plan to return to original medication regime      LE edema-chronic per pt. Cont Lasix, elev legs, ace bandages wrap/compression stockings if pt can tolerate    HTN  -Last 24 hours systolic 132-102  -Cont lisinopril     DM-A1c 6.9%, Pt takes Januvia(100mg) and Metformin(500mg 1 tab at breakfast/lunch and 2 tabs at dinner) at home.  -last 24 hours 177-124  -good control    Leukocytosis  -related to CLL  -continue to monitor

## 2018-11-14 ENCOUNTER — TRANSCRIPTION ENCOUNTER (OUTPATIENT)
Age: 73
End: 2018-11-14

## 2018-11-14 PROCEDURE — 99233 SBSQ HOSP IP/OBS HIGH 50: CPT | Mod: GC

## 2018-11-14 PROCEDURE — 99232 SBSQ HOSP IP/OBS MODERATE 35: CPT

## 2018-11-14 RX ORDER — POLYETHYLENE GLYCOL 3350 17 G/17G
17 POWDER, FOR SOLUTION ORAL
Qty: 0 | Refills: 0 | DISCHARGE
Start: 2018-11-14

## 2018-11-14 RX ORDER — HYDROMORPHONE HYDROCHLORIDE 2 MG/ML
2 INJECTION INTRAMUSCULAR; INTRAVENOUS; SUBCUTANEOUS
Qty: 84 | Refills: 0
Start: 2018-11-14 | End: 2018-11-20

## 2018-11-14 RX ORDER — ASPIRIN/CALCIUM CARB/MAGNESIUM 324 MG
81 TABLET ORAL DAILY
Qty: 0 | Refills: 0 | Status: DISCONTINUED | OUTPATIENT
Start: 2018-11-14 | End: 2018-11-16

## 2018-11-14 RX ORDER — ACETAMINOPHEN 500 MG
2 TABLET ORAL
Qty: 0 | Refills: 0 | DISCHARGE
Start: 2018-11-14

## 2018-11-14 RX ORDER — DOCUSATE SODIUM 100 MG
1 CAPSULE ORAL
Qty: 0 | Refills: 0 | DISCHARGE
Start: 2018-11-14

## 2018-11-14 RX ORDER — SENNA PLUS 8.6 MG/1
2 TABLET ORAL
Qty: 0 | Refills: 0 | DISCHARGE
Start: 2018-11-14

## 2018-11-14 RX ORDER — ASCORBIC ACID 60 MG
1 TABLET,CHEWABLE ORAL
Qty: 0 | Refills: 0 | DISCHARGE
Start: 2018-11-14

## 2018-11-14 RX ADMIN — HYDROMORPHONE HYDROCHLORIDE 4 MILLIGRAM(S): 2 INJECTION INTRAMUSCULAR; INTRAVENOUS; SUBCUTANEOUS at 00:30

## 2018-11-14 RX ADMIN — HYDROMORPHONE HYDROCHLORIDE 4 MILLIGRAM(S): 2 INJECTION INTRAMUSCULAR; INTRAVENOUS; SUBCUTANEOUS at 12:17

## 2018-11-14 RX ADMIN — HYDROMORPHONE HYDROCHLORIDE 4 MILLIGRAM(S): 2 INJECTION INTRAMUSCULAR; INTRAVENOUS; SUBCUTANEOUS at 05:51

## 2018-11-14 RX ADMIN — LISINOPRIL 10 MILLIGRAM(S): 2.5 TABLET ORAL at 05:50

## 2018-11-14 RX ADMIN — Medication 500 MILLIGRAM(S): at 17:13

## 2018-11-14 RX ADMIN — HYDROMORPHONE HYDROCHLORIDE 4 MILLIGRAM(S): 2 INJECTION INTRAMUSCULAR; INTRAVENOUS; SUBCUTANEOUS at 18:00

## 2018-11-14 RX ADMIN — TAMSULOSIN HYDROCHLORIDE 0.4 MILLIGRAM(S): 0.4 CAPSULE ORAL at 22:14

## 2018-11-14 RX ADMIN — Medication 20 MILLIGRAM(S): at 05:50

## 2018-11-14 RX ADMIN — HYDROMORPHONE HYDROCHLORIDE 4 MILLIGRAM(S): 2 INJECTION INTRAMUSCULAR; INTRAVENOUS; SUBCUTANEOUS at 23:29

## 2018-11-14 RX ADMIN — ONDANSETRON 4 MILLIGRAM(S): 8 TABLET, FILM COATED ORAL at 17:41

## 2018-11-14 RX ADMIN — HYDROMORPHONE HYDROCHLORIDE 2 MILLIGRAM(S): 2 INJECTION INTRAMUSCULAR; INTRAVENOUS; SUBCUTANEOUS at 08:38

## 2018-11-14 RX ADMIN — Medication 100 MILLIGRAM(S): at 05:50

## 2018-11-14 RX ADMIN — HYDROMORPHONE HYDROCHLORIDE 4 MILLIGRAM(S): 2 INJECTION INTRAMUSCULAR; INTRAVENOUS; SUBCUTANEOUS at 14:19

## 2018-11-14 RX ADMIN — Medication 100 MILLIGRAM(S): at 22:14

## 2018-11-14 RX ADMIN — HYDROMORPHONE HYDROCHLORIDE 2 MILLIGRAM(S): 2 INJECTION INTRAMUSCULAR; INTRAVENOUS; SUBCUTANEOUS at 09:00

## 2018-11-14 RX ADMIN — HYDROMORPHONE HYDROCHLORIDE 2 MILLIGRAM(S): 2 INJECTION INTRAMUSCULAR; INTRAVENOUS; SUBCUTANEOUS at 02:29

## 2018-11-14 RX ADMIN — HYDROMORPHONE HYDROCHLORIDE 4 MILLIGRAM(S): 2 INJECTION INTRAMUSCULAR; INTRAVENOUS; SUBCUTANEOUS at 17:41

## 2018-11-14 RX ADMIN — ONDANSETRON 4 MILLIGRAM(S): 8 TABLET, FILM COATED ORAL at 12:17

## 2018-11-14 RX ADMIN — SENNA PLUS 2 TABLET(S): 8.6 TABLET ORAL at 22:14

## 2018-11-14 RX ADMIN — ONDANSETRON 4 MILLIGRAM(S): 8 TABLET, FILM COATED ORAL at 23:29

## 2018-11-14 RX ADMIN — HYDROMORPHONE HYDROCHLORIDE 2 MILLIGRAM(S): 2 INJECTION INTRAMUSCULAR; INTRAVENOUS; SUBCUTANEOUS at 15:40

## 2018-11-14 RX ADMIN — METFORMIN HYDROCHLORIDE 1000 MILLIGRAM(S): 850 TABLET ORAL at 05:51

## 2018-11-14 RX ADMIN — INSULIN GLARGINE 12 UNIT(S): 100 INJECTION, SOLUTION SUBCUTANEOUS at 22:15

## 2018-11-14 RX ADMIN — Medication 100 MILLIGRAM(S): at 14:18

## 2018-11-14 RX ADMIN — ATORVASTATIN CALCIUM 80 MILLIGRAM(S): 80 TABLET, FILM COATED ORAL at 22:14

## 2018-11-14 RX ADMIN — ONDANSETRON 4 MILLIGRAM(S): 8 TABLET, FILM COATED ORAL at 05:50

## 2018-11-14 RX ADMIN — Medication 175 MICROGRAM(S): at 05:50

## 2018-11-14 RX ADMIN — ENOXAPARIN SODIUM 40 MILLIGRAM(S): 100 INJECTION SUBCUTANEOUS at 17:13

## 2018-11-14 RX ADMIN — Medication 500 MILLIGRAM(S): at 05:50

## 2018-11-14 RX ADMIN — METFORMIN HYDROCHLORIDE 1000 MILLIGRAM(S): 850 TABLET ORAL at 17:13

## 2018-11-14 NOTE — PROGRESS NOTE ADULT - ASSESSMENT
Pt is a 71yo M admitted to acute rehab s/p spinal fusion complicated with RBBB and first degree AV block, intra op complicated by bradycardia s/p glycopyrrolate, cerebrospinal fluid leak, and post op hypotension s/p pressor support and anemia.     *L1-L5 posterior lumbar fusion and T10 instrumentation   Cont acute rehab PT/OT  Pain management: better controlled   Flexeril prn   no sign of infection at surgical site     *HTN  s/p hypotension and bradycardia resolved   Cont lisinopril, Lasix     *DM  ISS  BGM controlled    *Urinary retention  Resolved     *CLL  at baseline  Continue monitor    *DVT ppx   Cont lonevox

## 2018-11-14 NOTE — DISCHARGE NOTE ADULT - CARE PROVIDER_API CALL
Kaela Gonzalez), PhysicalRehab Medicine  [address] [place] [address]  Phone: [phone]  Fax: [phone]    Cecilia House (DO), Neurological Surgery  [address] [facility] [address]  [place] [address]  Phone: [phone]  Fax: [phone]    Samson Doran (MD), Cardiovascular Disease; Internal Medicine  [address] [facility] [address]  [place] [address]  Phone: [phone]  Fax: [phone]    Nikolas Rodriguez (DO), EndocrinologyMetabDiabetes; Internal Medicine  [address] [address]  Phone: [phone]  Fax: [phone]

## 2018-11-14 NOTE — PROGRESS NOTE ADULT - SUBJECTIVE AND OBJECTIVE BOX
HPI:  Pt is a 72M with history of T2DM, HTN, HLD, FERANNDO on CPAP, Pneumonia, CLL, Prostate Cancer s/p resection 2003, thyroid cancer s/p thyroidectomy 8/2018, renal cancer s/p partial nephrectomy and spinal stenosis with acquired scoliosis who initially presented 10/18/18 for L1-5 posterior lumbar fusion and T10-pelvis instrumentation and fusion but case was aborted due to 4 second pause after induction with propofol who returned 10/27/18 for surgery. Of note, he had a prior Holter monitoring study that revealed nocturnal bradycardia with pauses. 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 On 10/27/18, he underwent placement of temporary pacer and L1-S1 transforaminal interbody fusion, T10-pelvis instrumented fusion and Plastics assisted closure. Operative course was notable for bradycardia responsive to glycopyrrolate, cerebrospinal fluid leak which was primarily repaired and EBL of 1500cc. He received 2 units PRBC intra-op. Post-operatively, he had a ~40 minutes episode of hypotension to SBP in the 50smmHg, for which he was placed on pressor support and given an additional 2 units PRBC.  Pt was medically optimized and discharged to [facility] for AIR 11/2/18    PAST MEDICAL & SURGICAL HISTORY:  Former smoker, stopped smoking many years ago  CLL (chronic lymphocytic leukemia)  Sleep apnea, unspecified type  Spinal stenosis of lumbar region, unspecified whether neurogenic claudication present  Leukocytosis, unspecified type: monitored for CLL  Malignant neoplasm of kidney parenchyma, right: s/p surgery  Thyroid nodule  Malignant neoplasm of thyroid gland  Prostate cancer: 2003, s/p prostatectomy, RT 2009 x 40 days  Hypertension, unspecified type  Diabetes mellitus type 2 in obese  H/O thyroidectomy: 2016  History of strabismus surgery: b/l 1958  S/P left knee arthroscopy: 1998  H/O ventral hernia repair: 1997  H/O radical prostatectomy: 2003  H/O partial nephrectomy: right, 2009  History of total knee replacement, right: 2012, scoped 1998    TODAY'S REVIEW OF SYMPTOMS  [ x ] Constiutional WNL            [X] Cardio WNL               [X] Resp WNL  [X] GI WNL                            [X]  WNL                    [X] Heme WNL  [X] Endo WNL                       [X] Skin WNL                   [  ] MSK WNL  [  ] Neuro WNL                     [X] Cognitive WNL           [X] Psych WNL    Subjective:  Pt seen and examined with his wife present.  Pt reports pain is better controlled with standing Dilaudid regimen (4mg at 0000/0600/1200/1800, ordered). Patient continues to report pain more so at previous drain sites, non-radiating.  +BM yesterday.  Denies numbness, weakness, SOB, dyspnea, cough,dysuria    Vital Signs Last 24 Hrs  T(C): 36.7 (14 Nov 2018 08:25), Max: 36.7 (14 Nov 2018 08:25)  T(F): 98 (14 Nov 2018 08:25), Max: 98 (14 Nov 2018 08:25)  HR: 57 (14 Nov 2018 08:25) (57 - 75)  BP: 107/54 (14 Nov 2018 08:25) (107/54 - 141/60)  BP(mean): --  RR: 14 (14 Nov 2018 08:25) (14 - 14)  SpO2: 97% (14 Nov 2018 08:25) (94% - 97%)    Constitutional - NAD, Comfortable  	HEENT - NCAT  	Neck - Supple, No limited ROM  	Chest - CTA bilaterally  	Cardiovascular - RRR, S1S2  	Abdomen - BS+, Soft, NTND  	Extremities - No C/C/E  	Neurologic Exam -                 	   Cognitive - Awake, Alert, AAO to self, place, date, year, situation  	   Cranial Nerves - CN 2-12 grossly intact  	   Motor -   	                  LEFT    UE - 4/5  	                  RIGHT UE - 4/5  	                  LEFT    LE - HF 4/5, KE 4/5, DF 5/5, PF 5/5  	                  RIGHT LE - HF 4/5, KE 4/5, DF 5/5, PF 5/5     Skin -  Back incision with sutures removed and steri strips applied without incident CDI  No erythema or edema noted    FUNCTION:  Transfers: Supervison  Gait: Amb 75' SAC supervision/CG  Stairs: 8 6" steps 1HR SAC sup  ADLS: LE dressing min A                        9.9    20.8  )-----------( 349      ( 12 Nov 2018 05:55 )             30.5     CAPILLARY BLOOD GLUCOSE      POCT Blood Glucose.: 109 mg/dL (14 Nov 2018 07:28)  POCT Blood Glucose.: 129 mg/dL (13 Nov 2018 21:59)      MEDICATIONS  (STANDING):  ascorbic acid 500 milliGRAM(s) Oral two times a day  aspirin  chewable 81 milliGRAM(s) Oral daily  atorvastatin 80 milliGRAM(s) Oral at bedtime  dextrose 5%. 1000 milliLiter(s) (50 mL/Hr) IV Continuous <Continuous>  dextrose 50% Injectable 12.5 Gram(s) IV Push once  dextrose 50% Injectable 25 Gram(s) IV Push once  dextrose 50% Injectable 25 Gram(s) IV Push once  docusate sodium 100 milliGRAM(s) Oral three times a day  enoxaparin Injectable 40 milliGRAM(s) SubCutaneous <User Schedule>  furosemide    Tablet 20 milliGRAM(s) Oral daily  HYDROmorphone   Tablet 4 milliGRAM(s) Oral <User Schedule>  insulin glargine Injectable (LANTUS) 12 Unit(s) SubCutaneous at bedtime  levothyroxine 175 MICROGram(s) Oral daily  lidocaine   Patch 2 Patch Transdermal <User Schedule>  lisinopril 10 milliGRAM(s) Oral daily  metFORMIN 1000 milliGRAM(s) Oral every 12 hours  ondansetron    Tablet 4 milliGRAM(s) Oral every 6 hours  senna 2 Tablet(s) Oral at bedtime  tamsulosin 0.4 milliGRAM(s) Oral at bedtime    MEDICATIONS  (PRN):  acetaminophen   Tablet .. 650 milliGRAM(s) Oral every 6 hours PRN Temp greater or equal to 38C (100.4F), Mild Pain (1 - 3)  bisacodyl Suppository 10 milliGRAM(s) Rectal daily PRN Constipation  dextrose 40% Gel 15 Gram(s) Oral once PRN Blood Glucose LESS THAN 70 milliGRAM(s)/deciliter  glucagon  Injectable 1 milliGRAM(s) IntraMuscular once PRN Glucose LESS THAN 70 milligrams/deciliter  HYDROmorphone   Tablet 2 milliGRAM(s) Oral every 4 hours PRN Severe Pain (7 - 10)  polyethylene glycol 3350 17 Gram(s) Oral at bedtime PRN Constipation    A/P:  71 yo male HTN DM2, FERNANDO on CPAP, post op L1-5 posterior lumbar fusion and T10 pelvis instrumentation.  Course was complicated by hypotension  which required pressor support (initially with norepinephrine, neosynephrine and vasopressin)  with Gait Instability, ADL impairments and Functional impairments.    COMORBIDITIES/ACTIVE MEDICAL ISSUES   #L1-L5 posterior lumbar fusion and T10 pelvis instrumentation  -Pain control with Dilaudid increased back to 4mg q6hrs 11/13/18  -Cyclobenzaprine  PRN  -WBAT  -spinal precautions  - 11/5 removed the dressing as per instructions.  Pt may shower, avoiding direct streams of water onto your incision, do not scrub, do not soak in water, pat dry when finished  - sutures removed 11/14/18 as per OK from surgery NP    #HTN.  Hypotension and bradycardia post-op, junctional rhythm likely 2/2 hyperkalemia resolved, NSR  - Lisinopril.  Lasix 20mg daily added 11/7 (home dose 40mg lasix daily) due to BLE edema with improvement and without lowering of BP  - ASA 81mg daily    #Hypothyroidism  -c/w levothyroxine    #DM-HbA1C 6.9 on 10/4  -Glargine  -SSI  -Consistent carb diet  -As per hospitalist: No need for insulin at home and can resume Januvia/metformin upon dc    #BPH  -tamsulosin    #Urinary retention  -Resolved  PVRs 2, 79, 100  D/Cd bladder scans    #Leukocytosis-Known; monitored for CLL  +h/o CA of kidney, prostate, thyroid  -Afebrile  -monitor cbc.  F/U 11/12     #Bowel regimen  -Colace, Miralax, Senna.  Dulc supp prn.    Pain Mgmt - Tylenol PRN, Dilaudid, 2 lidocaine patches added to low back qHS 11/12/18  GI/Bowel Mgmt -  Continent c/w Colace, Senna,  Dulcolax suppository PRN, Miralax,  /Bladder Mgmt - Continent.  Successful TOV    FEN   - Diet - Consistent Carb  [CCHO, DASH/TLC]    Vitamin C 500 mg bid    Precautions / PROPHYLAXIS:   - Falls, Cardiac, Spinal,   - ortho: Weight bearing status: WBAT  - Lungs: Aspiration, Incentive Spirometer   - DVT: Lovenox, SCDs, TEDs     Continue comprehensive rehab schedule, PT & OT 3hrs/day

## 2018-11-14 NOTE — PROGRESS NOTE ADULT - SUBJECTIVE AND OBJECTIVE BOX
HPI  Pt is a 73yo M admitted to acute rehab s/p spinal fusion complicated with RBBB and first degree AV block, intra op complicated by bradycardia s/p glycopyrrolate, cerebrospinal fluid leak, and post op hypotension s/p pressor support and anemia.   Pt was seen and examined in the wheelchair, complain pain at draining site    Vital Signs Last 24 Hrs  T(C): 36.7 (14 Nov 2018 08:25), Max: 36.7 (14 Nov 2018 08:25)  T(F): 98 (14 Nov 2018 08:25), Max: 98 (14 Nov 2018 08:25)  HR: 57 (14 Nov 2018 08:25) (57 - 75)  BP: 107/54 (14 Nov 2018 08:25) (107/54 - 141/60)  BP(mean): --  RR: 14 (14 Nov 2018 08:25) (14 - 14)  SpO2: 97% (14 Nov 2018 08:25) (94% - 97%)    I&O's Summary      CAPILLARY BLOOD GLUCOSE      POCT Blood Glucose.: 109 mg/dL (14 Nov 2018 07:28)  POCT Blood Glucose.: 129 mg/dL (13 Nov 2018 21:59)    PHYSICAL EXAM:    Constitutional: NAD,   HEENT: PERR, EOMI,   Neck: Soft   Respiratory: Breath sounds are clear bilaterally,   Cardiovascular: S1 and S2  Gastrointestinal: Bowel Sounds present, soft, nontender,  Extremities: No peripheral edema  Neurological: A/O x 3  Musculoskeletal: 4/5 strength b/l upper and lower extremities  Skin: linear vertical healing scar noted on the back, no sign of infection     MEDICATIONS:  MEDICATIONS  (STANDING):  ascorbic acid 500 milliGRAM(s) Oral two times a day  atorvastatin 80 milliGRAM(s) Oral at bedtime  dextrose 5%. 1000 milliLiter(s) (50 mL/Hr) IV Continuous <Continuous>  dextrose 50% Injectable 12.5 Gram(s) IV Push once  dextrose 50% Injectable 25 Gram(s) IV Push once  dextrose 50% Injectable 25 Gram(s) IV Push once  docusate sodium 100 milliGRAM(s) Oral three times a day  enoxaparin Injectable 40 milliGRAM(s) SubCutaneous <User Schedule>  furosemide    Tablet 20 milliGRAM(s) Oral daily  HYDROmorphone   Tablet 4 milliGRAM(s) Oral <User Schedule>  insulin glargine Injectable (LANTUS) 12 Unit(s) SubCutaneous at bedtime  levothyroxine 175 MICROGram(s) Oral daily  lidocaine   Patch 2 Patch Transdermal <User Schedule>  lisinopril 10 milliGRAM(s) Oral daily  metFORMIN 1000 milliGRAM(s) Oral every 12 hours  ondansetron    Tablet 4 milliGRAM(s) Oral every 6 hours  senna 2 Tablet(s) Oral at bedtime  tamsulosin 0.4 milliGRAM(s) Oral at bedtime      LABS: All Labs Reviewed:                Blood Culture:     RADIOLOGY/EKG:    DVT PPX:    ADVANCED DIRECTIVE:    DISPOSITION:

## 2018-11-14 NOTE — DISCHARGE NOTE ADULT - CARE PLAN
Principal Discharge DX:	Fusion of spine of lumbar region  Goal:	to continue recovery: strength, function, mobility  Assessment and plan of treatment:	to continue therapy in the community  Secondary Diagnosis:	Hypertension, unspecified type  Secondary Diagnosis:	Leukocytosis, unspecified type  Secondary Diagnosis:	Type 2 diabetes mellitus without complication, unspecified whether long term insulin use

## 2018-11-14 NOTE — DISCHARGE NOTE ADULT - NS AS ACTIVITY OBS
Do not drive or operate machinery Do not drive or operate machinery/Walking-Outdoors allowed/No Heavy lifting/straining/Stairs allowed/Walking-Indoors allowed/Showering allowed

## 2018-11-14 NOTE — DISCHARGE NOTE ADULT - ADDITIONAL INSTRUCTIONS
f/u with rehab doctor in 4 weeks   f/u with medical doctor, neurosurgery and endocrinology in 5-7 days

## 2018-11-14 NOTE — DISCHARGE NOTE ADULT - SECONDARY DIAGNOSIS.
Hypertension, unspecified type Leukocytosis, unspecified type Type 2 diabetes mellitus without complication, unspecified whether long term insulin use

## 2018-11-14 NOTE — DISCHARGE NOTE ADULT - HOSPITAL COURSE
Pt is a 72M with history of DMT2, HTN, HLD, FERNANDO on CPAP, Pneumonia, CLL, Prostate Cancer s/p resection 2003, thyroid cancer s/p thyroidectomy 8/2018, renal cancer s/p partial nephrectomy and spinal stenosis with acquired scoliosis who initially presented 10/18/18 for L1-5 posterior lumbar fusion and T10-pelvis instrumentation and fusion but case was aborted due to 4 second pause after induction with propofol who returned 10/27/18 for surgery. Of note, he had a prior Holter monitoring study that revealed nocturnal bradycardia with pauses. 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 On 10/27/18, he underwent placement of temporary pacer and L1-S1 transforaminal interbody fusion, T10-pelvis instrumented fusion and Plastics assisted closure. Operative course was notable for bradycardia responsive to glycopyrrolate, cerebrospinal fluid leak which was primarily repaired and EBL of ~1500cc. He received 2 units PRBC intra-op. Post-operatively, he had a ~40 minutes episode of hypotension to SBP in the 50s mmHg, for which he was placed on pressor support and given an additional 2 units PRBC.  Pt was medically optimized and discharged to [facility] for AIR 11/2/18.  Patient admitted to acute rehab at [facility], PT & OT, 11/2/18-11/17/18. Patient made significant functional gains. Patient's pain medications were optimized. Patient's Ortiz catheter was dc'ed. Dressing and sutures removed as per surgery service recommendations. Patient is medically stable for discharge home with family and home care.

## 2018-11-14 NOTE — DISCHARGE NOTE ADULT - MEDICATION SUMMARY - MEDICATIONS TO TAKE
I will START or STAY ON the medications listed below when I get home from the hospital:    butalbital/acetaminophen/caffeine 50 mg-325 mg-40 mg oral tablet  -- 1 tab(s) by mouth every 8 hours, As needed, HA  -- Indication: For Headache    aspirin 81 mg oral tablet  -- 1 tab(s) by mouth once a day  indication: CAD  -- Indication: For Heart protective    acetaminophen 325 mg oral tablet  -- 2 tab(s) by mouth every 6 hours, As needed, Temp greater or equal to 38C (100.4F), Mild Pain (1 - 3)  -- Indication: For pain    HYDROmorphone 2 mg oral tablet  -- 1-2 tab(s) by mouth every 4 hours, As Needed -Severe Pain (7 - 10) MDD:12 tabs  -- Indication: For pain    lisinopril 10 mg oral tablet  -- 1 tab(s) by mouth once a day  -- Indication: For blood pressure    tamsulosin 0.4 mg oral capsule  -- 1 cap(s) by mouth once a day (at bedtime)  -- Indication: For prostate    metFORMIN 1000 mg oral tablet  -- 1 tab(s) by mouth 2 times a day -for dizziness DM  -- Indication: For diabetes    Januvia 100 mg oral tablet  -- 1 tab(s) by mouth once a day  -- Indication: For diabetes    Crestor 20 mg oral tablet  -- 1 tab(s) by mouth once a day (at bedtime)  -- Indication: For Cholesterol    furosemide 40 mg oral tablet  -- 1 tab(s) by mouth once a day  -- Indication: For Heart    senna oral tablet  -- 2 tab(s) by mouth once a day (at bedtime)  -- Indication: For bowel regimen    docusate sodium 100 mg oral capsule  -- 1 cap(s) by mouth 3 times a day  -- Indication: For bowel regimen    polyethylene glycol 3350 oral powder for reconstitution  -- 17 gram(s) by mouth once a day (at bedtime), As needed, Constipation  -- Indication: For bowel regimen    Synthroid 175 mcg (0.175 mg) oral tablet  -- 1 tab(s) by mouth once a day  -- Indication: For Thyroid    ascorbic acid 500 mg oral tablet  -- 1 tab(s) by mouth 2 times a day  -- Indication: For Supplement

## 2018-11-14 NOTE — DISCHARGE NOTE ADULT - CARE PROVIDERS DIRECT ADDRESSES
,[email or web address],DirectAddress_Unknown

## 2018-11-15 PROCEDURE — 99233 SBSQ HOSP IP/OBS HIGH 50: CPT

## 2018-11-15 PROCEDURE — 99232 SBSQ HOSP IP/OBS MODERATE 35: CPT

## 2018-11-15 RX ADMIN — HYDROMORPHONE HYDROCHLORIDE 2 MILLIGRAM(S): 2 INJECTION INTRAMUSCULAR; INTRAVENOUS; SUBCUTANEOUS at 21:00

## 2018-11-15 RX ADMIN — HYDROMORPHONE HYDROCHLORIDE 4 MILLIGRAM(S): 2 INJECTION INTRAMUSCULAR; INTRAVENOUS; SUBCUTANEOUS at 23:33

## 2018-11-15 RX ADMIN — Medication 500 MILLIGRAM(S): at 18:06

## 2018-11-15 RX ADMIN — HYDROMORPHONE HYDROCHLORIDE 4 MILLIGRAM(S): 2 INJECTION INTRAMUSCULAR; INTRAVENOUS; SUBCUTANEOUS at 18:05

## 2018-11-15 RX ADMIN — HYDROMORPHONE HYDROCHLORIDE 4 MILLIGRAM(S): 2 INJECTION INTRAMUSCULAR; INTRAVENOUS; SUBCUTANEOUS at 19:03

## 2018-11-15 RX ADMIN — ONDANSETRON 4 MILLIGRAM(S): 8 TABLET, FILM COATED ORAL at 18:05

## 2018-11-15 RX ADMIN — HYDROMORPHONE HYDROCHLORIDE 4 MILLIGRAM(S): 2 INJECTION INTRAMUSCULAR; INTRAVENOUS; SUBCUTANEOUS at 06:10

## 2018-11-15 RX ADMIN — LISINOPRIL 10 MILLIGRAM(S): 2.5 TABLET ORAL at 05:40

## 2018-11-15 RX ADMIN — HYDROMORPHONE HYDROCHLORIDE 4 MILLIGRAM(S): 2 INJECTION INTRAMUSCULAR; INTRAVENOUS; SUBCUTANEOUS at 12:03

## 2018-11-15 RX ADMIN — Medication 100 MILLIGRAM(S): at 14:22

## 2018-11-15 RX ADMIN — Medication 20 MILLIGRAM(S): at 05:40

## 2018-11-15 RX ADMIN — Medication 650 MILLIGRAM(S): at 14:22

## 2018-11-15 RX ADMIN — SENNA PLUS 2 TABLET(S): 8.6 TABLET ORAL at 21:42

## 2018-11-15 RX ADMIN — HYDROMORPHONE HYDROCHLORIDE 4 MILLIGRAM(S): 2 INJECTION INTRAMUSCULAR; INTRAVENOUS; SUBCUTANEOUS at 11:25

## 2018-11-15 RX ADMIN — Medication 81 MILLIGRAM(S): at 11:26

## 2018-11-15 RX ADMIN — ATORVASTATIN CALCIUM 80 MILLIGRAM(S): 80 TABLET, FILM COATED ORAL at 21:42

## 2018-11-15 RX ADMIN — Medication 650 MILLIGRAM(S): at 00:46

## 2018-11-15 RX ADMIN — Medication 100 MILLIGRAM(S): at 21:41

## 2018-11-15 RX ADMIN — HYDROMORPHONE HYDROCHLORIDE 4 MILLIGRAM(S): 2 INJECTION INTRAMUSCULAR; INTRAVENOUS; SUBCUTANEOUS at 00:15

## 2018-11-15 RX ADMIN — Medication 650 MILLIGRAM(S): at 01:09

## 2018-11-15 RX ADMIN — METFORMIN HYDROCHLORIDE 1000 MILLIGRAM(S): 850 TABLET ORAL at 18:05

## 2018-11-15 RX ADMIN — Medication 500 MILLIGRAM(S): at 05:40

## 2018-11-15 RX ADMIN — TAMSULOSIN HYDROCHLORIDE 0.4 MILLIGRAM(S): 0.4 CAPSULE ORAL at 21:42

## 2018-11-15 RX ADMIN — HYDROMORPHONE HYDROCHLORIDE 2 MILLIGRAM(S): 2 INJECTION INTRAMUSCULAR; INTRAVENOUS; SUBCUTANEOUS at 20:17

## 2018-11-15 RX ADMIN — Medication 100 MILLIGRAM(S): at 05:40

## 2018-11-15 RX ADMIN — Medication 650 MILLIGRAM(S): at 15:04

## 2018-11-15 RX ADMIN — Medication 175 MICROGRAM(S): at 05:40

## 2018-11-15 RX ADMIN — METFORMIN HYDROCHLORIDE 1000 MILLIGRAM(S): 850 TABLET ORAL at 08:10

## 2018-11-15 RX ADMIN — INSULIN GLARGINE 12 UNIT(S): 100 INJECTION, SOLUTION SUBCUTANEOUS at 21:42

## 2018-11-15 RX ADMIN — HYDROMORPHONE HYDROCHLORIDE 4 MILLIGRAM(S): 2 INJECTION INTRAMUSCULAR; INTRAVENOUS; SUBCUTANEOUS at 05:40

## 2018-11-15 RX ADMIN — ENOXAPARIN SODIUM 40 MILLIGRAM(S): 100 INJECTION SUBCUTANEOUS at 18:05

## 2018-11-15 RX ADMIN — ONDANSETRON 4 MILLIGRAM(S): 8 TABLET, FILM COATED ORAL at 11:26

## 2018-11-15 RX ADMIN — ONDANSETRON 4 MILLIGRAM(S): 8 TABLET, FILM COATED ORAL at 05:40

## 2018-11-15 NOTE — PROGRESS NOTE ADULT - ASSESSMENT
Pt is a 72 y o M admitted to acute rehab s/p spinal fusion complicated with RBBB and first degree AV block, intra op complicated by bradycardia s/p glycopyrrolate, cerebrospinal fluid leak, and post op hypotension s/p pressor support and anemia.     #L1-L5 posterior lumbar fusion and T10 instrumentation: continue rehab    #HTN: cont Lasix     #DM: SSI, Metformin, accuchecks at goal    #CLL: WBC 20 on last labs, monitor    #DVT ppx: cont lovenox

## 2018-11-15 NOTE — PROGRESS NOTE ADULT - SUBJECTIVE AND OBJECTIVE BOX
HPI:  Pt is a 72M with history of T2DM, HTN, HLD, FERNANDO on CPAP, Pneumonia, CLL, Prostate Cancer s/p resection 2003, thyroid cancer s/p thyroidectomy 8/2018, renal cancer s/p partial nephrectomy and spinal stenosis with acquired scoliosis who initially presented 10/18/18 for L1-5 posterior lumbar fusion and T10-pelvis instrumentation and fusion but case was aborted due to 4 second pause after induction with propofol who returned 10/27/18 for surgery. Of note, he had a prior Holter monitoring study that revealed nocturnal bradycardia with pauses. 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 On 10/27/18, he underwent placement of temporary pacer and L1-S1 transforaminal interbody fusion, T10-pelvis instrumented fusion and Plastics assisted closure. Operative course was notable for bradycardia responsive to glycopyrrolate, cerebrospinal fluid leak which was primarily repaired and EBL of 1500cc. He received 2 units PRBC intra-op. Post-operatively, he had a ~40 minutes episode of hypotension to SBP in the 50smmHg, for which he was placed on pressor support and given an additional 2 units PRBC.  Pt was medically optimized and discharged to [facility] for AIR 11/2/18    PAST MEDICAL & SURGICAL HISTORY:  Former smoker, stopped smoking many years ago  CLL (chronic lymphocytic leukemia)  Sleep apnea, unspecified type  Spinal stenosis of lumbar region, unspecified whether neurogenic claudication present  Leukocytosis, unspecified type: monitored for CLL  Malignant neoplasm of kidney parenchyma, right: s/p surgery  Thyroid nodule  Malignant neoplasm of thyroid gland  Prostate cancer: 2003, s/p prostatectomy, RT 2009 x 40 days  Hypertension, unspecified type  Diabetes mellitus type 2 in obese  H/O thyroidectomy: 2016  History of strabismus surgery: b/l 1958  S/P left knee arthroscopy: 1998  H/O ventral hernia repair: 1997  H/O radical prostatectomy: 2003  H/O partial nephrectomy: right, 2009  History of total knee replacement, right: 2012, scoped 1998    TODAY'S REVIEW OF SYMPTOMS  [ x ] Constiutional WNL            [X] Cardio WNL               [X] Resp WNL  [X] GI WNL                            [X]  WNL                    [X] Heme WNL  [X] Endo WNL                       [X] Skin WNL                   [  ] MSK WNL  [  ] Neuro WNL                     [X] Cognitive WNL           [X] Psych WNL    Subjective:  Pt seen and examined.  Pt reports pain is better controlled with standing Dilaudid regimen and since sutures were removed. Patient continues to report pain more so at previous drain sites, non-radiating.  +BM yesterday.  Denies numbness, weakness, SOB, dyspnea, cough,dysuria    Vital Signs Last 24 Hrs  T(C): 36.6 (14 Nov 2018 22:20), Max: 36.6 (14 Nov 2018 22:20)  T(F): 97.8 (14 Nov 2018 22:20), Max: 97.8 (14 Nov 2018 22:20)  HR: 70 (15 Nov 2018 05:44) (70 - 78)  BP: 145/79 (15 Nov 2018 05:44) (115/62 - 145/79)  BP(mean): --  RR: 14 (14 Nov 2018 22:20) (14 - 14)  SpO2: 99% (14 Nov 2018 22:20) (99% - 99%)    Constitutional - NAD, Comfortable  	HEENT - NCAT  	Neck - Supple, No limited ROM  	Chest - CTA bilaterally  	Cardiovascular - RRR, S1S2  	Abdomen - BS+, Soft, NTND  	Extremities - Calves soft and NTTP.  Mild BLE edema  	Neurologic Exam -                 	   Cognitive - Awake, Alert, AAO to self, place, date, year, situation  	   Cranial Nerves - CN 2-12 grossly intact  	   Motor -   	                  LEFT    UE - 4/5  	                  RIGHT UE - 4/5  	                  LEFT    LE - HF 4/5, KE 4/5, DF 5/5, PF 5/5  	                  RIGHT LE - HF 4/5, KE 4/5, DF 5/5, PF 5/5     Skin -  Back incision with steri strips  CDI  No erythema or edema noted    FUNCTION:  Transfers: Supervison  Gait: Amb 90' SAC supervision  Stairs: 12 6" steps 1HR SAC sup  ADLS: LE dressing min A, hygiene independent                        9.9    20.8  )-----------( 349      ( 12 Nov 2018 05:55 )             30.5     CAPILLARY BLOOD GLUCOSE      POCT Blood Glucose.: 119 mg/dL (15 Nov 2018 08:03)  POCT Blood Glucose.: 127 mg/dL (14 Nov 2018 22:12)    MEDICATIONS  (STANDING):  ascorbic acid 500 milliGRAM(s) Oral two times a day  aspirin  chewable 81 milliGRAM(s) Oral daily  atorvastatin 80 milliGRAM(s) Oral at bedtime  dextrose 5%. 1000 milliLiter(s) (50 mL/Hr) IV Continuous <Continuous>  dextrose 50% Injectable 12.5 Gram(s) IV Push once  dextrose 50% Injectable 25 Gram(s) IV Push once  dextrose 50% Injectable 25 Gram(s) IV Push once  docusate sodium 100 milliGRAM(s) Oral three times a day  enoxaparin Injectable 40 milliGRAM(s) SubCutaneous <User Schedule>  furosemide    Tablet 20 milliGRAM(s) Oral daily  HYDROmorphone   Tablet 4 milliGRAM(s) Oral <User Schedule>  insulin glargine Injectable (LANTUS) 12 Unit(s) SubCutaneous at bedtime  levothyroxine 175 MICROGram(s) Oral daily  lidocaine   Patch 2 Patch Transdermal <User Schedule>  lisinopril 10 milliGRAM(s) Oral daily  metFORMIN 1000 milliGRAM(s) Oral every 12 hours  ondansetron    Tablet 4 milliGRAM(s) Oral every 6 hours  senna 2 Tablet(s) Oral at bedtime  tamsulosin 0.4 milliGRAM(s) Oral at bedtime    MEDICATIONS  (PRN):  acetaminophen   Tablet .. 650 milliGRAM(s) Oral every 6 hours PRN Temp greater or equal to 38C (100.4F), Mild Pain (1 - 3)  bisacodyl Suppository 10 milliGRAM(s) Rectal daily PRN Constipation  dextrose 40% Gel 15 Gram(s) Oral once PRN Blood Glucose LESS THAN 70 milliGRAM(s)/deciliter  glucagon  Injectable 1 milliGRAM(s) IntraMuscular once PRN Glucose LESS THAN 70 milligrams/deciliter  HYDROmorphone   Tablet 2 milliGRAM(s) Oral every 4 hours PRN Severe Pain (7 - 10)  polyethylene glycol 3350 17 Gram(s) Oral at bedtime PRN Constipation      A/P:  71 yo male HTN DM2, FERNANDO on CPAP, post op L1-5 posterior lumbar fusion and T10 pelvis instrumentation.  Course was complicated by hypotension  which required pressor support (initially with norepinephrine, neosynephrine and vasopressin)  with Gait Instability, ADL impairments and Functional impairments.    COMORBIDITIES/ACTIVE MEDICAL ISSUES   #L1-L5 posterior lumbar fusion and T10 pelvis instrumentation  -Pain control with Dilaudid 4mg q6hrs   -Discontinued cyclobenzaprine  PRN as not required  -WBAT  -spinal precautions  - 11/5 removed the dressing as per instructions.  Pt may shower, avoiding direct streams of water onto your incision, do not scrub, do not soak in water, pat dry when finished  - sutures removed 11/14/18 as per OK from surgery NP    #HTN.  Hypotension and bradycardia post-op, junctional rhythm likely 2/2 hyperkalemia resolved, NSR  - Lisinopril.  Lasix 20mg daily added 11/7 (home dose 40mg lasix daily) due to BLE edema with improvement and without lowering of BP  - ASA 81mg daily    #Hypothyroidism  -c/w levothyroxine    #DM-HbA1C 6.9 on 10/4  -Glargine  -SSI  -Consistent carb diet  -As per hospitalist: No need for insulin at home and can resume Januvia/metformin upon dc    #BPH  -tamsulosin    #Urinary retention  -Resolved  PVRs 2, 79, 100  D/Cd bladder scans    #Leukocytosis-Known; monitored for CLL  +h/o CA of kidney, prostate, thyroid  -Afebrile  -monitor cbc.      #Bowel regimen  -Colace, Miralax, Senna.  Dulc supp prn.    Pain Mgmt - Tylenol PRN, Dilaudid, 2 lidocaine patches added to low back qHS 11/12/18  GI/Bowel Mgmt -  Continent c/w Colace, Senna,  Dulcolax suppository PRN, Miralax,  /Bladder Mgmt - Continent.  Successful TOV    FEN   - Diet - Consistent Carb  [CCHO, DASH/TLC]    Vitamin C 500 mg bid    Precautions / PROPHYLAXIS:   - Falls, Cardiac, Spinal,   - ortho: Weight bearing status: WBAT  - Lungs: Aspiration, Incentive Spirometer   - DVT: Lovenox, SCDs, TEDs     Continue comprehensive rehab schedule, PT & OT 3hrs/day

## 2018-11-15 NOTE — PROGRESS NOTE ADULT - SUBJECTIVE AND OBJECTIVE BOX
CC: Patient is a 73y old  Male who presents with a chief complaint of Spinal Fusion (15 Nov 2018 08:45)      S: No f/c/n/v/pain    Patient seen and examined at bedside.    ALLERGIES:  codeine (Vomiting; Headache)  dogs, cats (Short breath)  dust (Rhinitis)  mold (Rhinitis)  morphine (Vomiting; Headache)  narcotic analgesics (Vomiting; Headache)      MEDICATIONS:  acetaminophen   Tablet .. 650 milliGRAM(s) Oral every 6 hours PRN  ascorbic acid 500 milliGRAM(s) Oral two times a day  aspirin  chewable 81 milliGRAM(s) Oral daily  atorvastatin 80 milliGRAM(s) Oral at bedtime  bisacodyl Suppository 10 milliGRAM(s) Rectal daily PRN  dextrose 40% Gel 15 Gram(s) Oral once PRN  dextrose 5%. 1000 milliLiter(s) IV Continuous <Continuous>  dextrose 50% Injectable 12.5 Gram(s) IV Push once  dextrose 50% Injectable 25 Gram(s) IV Push once  dextrose 50% Injectable 25 Gram(s) IV Push once  docusate sodium 100 milliGRAM(s) Oral three times a day  furosemide    Tablet 20 milliGRAM(s) Oral daily  glucagon  Injectable 1 milliGRAM(s) IntraMuscular once PRN  HYDROmorphone   Tablet 2 milliGRAM(s) Oral every 4 hours PRN  HYDROmorphone   Tablet 4 milliGRAM(s) Oral <User Schedule>  insulin glargine Injectable (LANTUS) 12 Unit(s) SubCutaneous at bedtime  levothyroxine 175 MICROGram(s) Oral daily  lidocaine   Patch 2 Patch Transdermal <User Schedule>  metFORMIN 1000 milliGRAM(s) Oral every 12 hours  polyethylene glycol 3350 17 Gram(s) Oral at bedtime PRN  senna 2 Tablet(s) Oral at bedtime        Vital Signs Last 24 Hrs  T(F): 98.2 (15 Nov 2018 09:03), Max: 98.2 (15 Nov 2018 09:03)  HR: 76 (15 Nov 2018 09:03) (70 - 78)  BP: 113/66 (15 Nov 2018 09:03) (113/66 - 145/79)  RR: 15 (15 Nov 2018 09:03) (14 - 15)  SpO2: 95% (15 Nov 2018 09:03) (95% - 99%)  I&O's Summary      PHYSICAL EXAM:  General: NAD  ENT: MMM  Neck: Supple, No JVD  Lungs: Clear to auscultation bilaterally  Cardio: RRR, S1/S2, No murmurs  Abdomen: Soft, Nontender, Nondistended; Bowel sounds present  Extremities: No cyanosis, No edema  Neuro: no new deficits  Skin: no rashes  Psych: AAO    LABS:                            CAPILLARY BLOOD GLUCOSE      POCT Blood Glucose.: 119 mg/dL (15 Nov 2018 08:03)  POCT Blood Glucose.: 127 mg/dL (14 Nov 2018 22:12)    10-04 [id number] 6.9          RADIOLOGY & ADDITIONAL TESTS:    Care Discussed with Consultants/Other Providers:

## 2018-11-16 VITALS
HEART RATE: 84 BPM | OXYGEN SATURATION: 96 % | RESPIRATION RATE: 14 BRPM | DIASTOLIC BLOOD PRESSURE: 81 MMHG | TEMPERATURE: 98 F | SYSTOLIC BLOOD PRESSURE: 147 MMHG

## 2018-11-16 PROCEDURE — 99238 HOSP IP/OBS DSCHRG MGMT 30/<: CPT

## 2018-11-16 PROCEDURE — 99232 SBSQ HOSP IP/OBS MODERATE 35: CPT

## 2018-11-16 RX ADMIN — Medication 175 MICROGRAM(S): at 06:20

## 2018-11-16 RX ADMIN — HYDROMORPHONE HYDROCHLORIDE 4 MILLIGRAM(S): 2 INJECTION INTRAMUSCULAR; INTRAVENOUS; SUBCUTANEOUS at 12:34

## 2018-11-16 RX ADMIN — Medication 81 MILLIGRAM(S): at 12:34

## 2018-11-16 RX ADMIN — HYDROMORPHONE HYDROCHLORIDE 4 MILLIGRAM(S): 2 INJECTION INTRAMUSCULAR; INTRAVENOUS; SUBCUTANEOUS at 07:02

## 2018-11-16 RX ADMIN — HYDROMORPHONE HYDROCHLORIDE 4 MILLIGRAM(S): 2 INJECTION INTRAMUSCULAR; INTRAVENOUS; SUBCUTANEOUS at 13:27

## 2018-11-16 RX ADMIN — HYDROMORPHONE HYDROCHLORIDE 4 MILLIGRAM(S): 2 INJECTION INTRAMUSCULAR; INTRAVENOUS; SUBCUTANEOUS at 06:20

## 2018-11-16 RX ADMIN — HYDROMORPHONE HYDROCHLORIDE 4 MILLIGRAM(S): 2 INJECTION INTRAMUSCULAR; INTRAVENOUS; SUBCUTANEOUS at 00:20

## 2018-11-16 RX ADMIN — METFORMIN HYDROCHLORIDE 1000 MILLIGRAM(S): 850 TABLET ORAL at 08:13

## 2018-11-16 RX ADMIN — Medication 500 MILLIGRAM(S): at 06:20

## 2018-11-16 RX ADMIN — Medication 100 MILLIGRAM(S): at 12:33

## 2018-11-16 RX ADMIN — Medication 20 MILLIGRAM(S): at 06:20

## 2018-11-16 RX ADMIN — HYDROMORPHONE HYDROCHLORIDE 2 MILLIGRAM(S): 2 INJECTION INTRAMUSCULAR; INTRAVENOUS; SUBCUTANEOUS at 02:44

## 2018-11-16 RX ADMIN — LISINOPRIL 10 MILLIGRAM(S): 2.5 TABLET ORAL at 06:20

## 2018-11-16 RX ADMIN — Medication 100 MILLIGRAM(S): at 06:20

## 2018-11-16 NOTE — PROGRESS NOTE ADULT - PROVIDER SPECIALTY LIST ADULT
Hospitalist
Rehab Medicine
Hospitalist
Hospitalist
Rehab Medicine

## 2018-11-16 NOTE — PROGRESS NOTE ADULT - SUBJECTIVE AND OBJECTIVE BOX
c: 73y old  Male who presents with a chief complaint of Spinal Fusion ,   no new complaints, no n/v, no sob.    Vital Signs Last 24 Hrs  T(C): 36.7 (16 Nov 2018 08:29), Max: 36.7 (15 Nov 2018 21:36)  T(F): 98.1 (16 Nov 2018 08:29), Max: 98.1 (16 Nov 2018 08:29)  HR: 84 (16 Nov 2018 08:29) (76 - 84)  BP: 147/81 (16 Nov 2018 08:29) (131/74 - 156/81)  BP(mean): --  RR: 14 (16 Nov 2018 08:29) (14 - 15)  SpO2: 96% (16 Nov 2018 08:29) (95% - 96%)    nad, regulo, mmm, ncat  supple  clear  s1s2  soft nt bs present  bilateral pedal edema  aao x 3  no gross neuro deficits   skin- back incision clean       no new labs

## 2018-11-16 NOTE — PROGRESS NOTE ADULT - REASON FOR ADMISSION
Spinal Fusion

## 2018-11-16 NOTE — PROGRESS NOTE ADULT - ATTENDING COMMENTS
Agree with above.  Medically stable.  Pain under control.  Cont comprehensive rehab, dvt ppx, wound care-plan to remove sutures prior to discharge
Patient seen in OT gym. Discussed rehab status with PT and OT. Has met all rehab goals.   Back incision clean and healing well    D/C home today after therapy. Patient agreeable and SW informed,  fu with PCP, surgery recommended
Agree with above.   Medically stable.  Pain has lessened; add Lidoderm patches to inferior pole of incision due to pain overnight in this region.  Wean pain meds as pain is better controlled.  Cont comprehensive rehab, wound care, d/c planning
Agree with above.  Pt doing well.  Mild increase in back discomfort which he attributes to exercise yesterday.  Pain managed well with dilaudid, ES tylenol and ice.  PVRs WNL; dc bladder scans.  BM yesterday.  Cont current regimen

## 2018-11-16 NOTE — CHART NOTE - NSCHARTNOTEFT_GEN_A_CORE
Nutrition Follow Up     Hospital Course (Per Electronic Medical Record):     Source: Patient [X]    Family [ ]     [X] Medical Record    Diet: Consistent Carbohydrate DASH-TLC Diet   Supplement D/C'ed  Tolerates Diet Well  Consumes 100% of Meals    Enteral /Parenteral Nutrition: N/A    Current Weight: N/A  % Weight Change: N/A  Obtain New Weight  Continue on Weekly Weights    Pertinent Medications: MEDICATIONS  (STANDING):  ascorbic acid 500 milliGRAM(s) Oral two times a day  aspirin  chewable 81 milliGRAM(s) Oral daily  atorvastatin 80 milliGRAM(s) Oral at bedtime  dextrose 5%. 1000 milliLiter(s) (50 mL/Hr) IV Continuous <Continuous>  dextrose 50% Injectable 12.5 Gram(s) IV Push once  dextrose 50% Injectable 25 Gram(s) IV Push once  dextrose 50% Injectable 25 Gram(s) IV Push once  docusate sodium 100 milliGRAM(s) Oral three times a day  enoxaparin Injectable 40 milliGRAM(s) SubCutaneous <User Schedule>  furosemide    Tablet 20 milliGRAM(s) Oral daily  HYDROmorphone   Tablet 4 milliGRAM(s) Oral <User Schedule>  insulin glargine Injectable (LANTUS) 12 Unit(s) SubCutaneous at bedtime  levothyroxine 175 MICROGram(s) Oral daily  lidocaine   Patch 2 Patch Transdermal <User Schedule>  lisinopril 10 milliGRAM(s) Oral daily  metFORMIN 1000 milliGRAM(s) Oral every 12 hours  ondansetron    Tablet 4 milliGRAM(s) Oral every 6 hours  senna 2 Tablet(s) Oral at bedtime  tamsulosin 0.4 milliGRAM(s) Oral at bedtime    MEDICATIONS  (PRN):  acetaminophen   Tablet .. 650 milliGRAM(s) Oral every 6 hours PRN Temp greater or equal to 38C (100.4F), Mild Pain (1 - 3)  bisacodyl Suppository 10 milliGRAM(s) Rectal daily PRN Constipation  dextrose 40% Gel 15 Gram(s) Oral once PRN Blood Glucose LESS THAN 70 milliGRAM(s)/deciliter  glucagon  Injectable 1 milliGRAM(s) IntraMuscular once PRN Glucose LESS THAN 70 milligrams/deciliter  HYDROmorphone   Tablet 2 milliGRAM(s) Oral every 4 hours PRN Severe Pain (7 - 10)  polyethylene glycol 3350 17 Gram(s) Oral at bedtime PRN Constipation      Pertinent Labs:   11-03 PAB 13 mg/dL<L>    Skin: No Pressure Ulcers     Edema: None Note    Last BM: 11/14    Estimated Needs:   [X] No Change since Previous Assessment  [ ] Recalculated:     Previous Nutrition Diagnosis:   No Nutrition Dx    Nutrition Diagnosis is [ ] Ongoing  [ ] Resolved [X] Not Applicable        New Nutrition Diagnosis: [X] Not Applicable    Interventions:   1. Recommend Continue Diet as Ordered    Monitoring & Evaluation:   [X] PO intake   [X] Tolerance to Diet Prescription   [X] Weights   [X] Follow Up (Per Protocol)  [X] Other: Labs    RD Remains Available.  Francisco Ahn RD

## 2018-11-16 NOTE — PROGRESS NOTE ADULT - ASSESSMENT
a/p -  72 y o M admitted to acute rehab s/p spinal fusion complicated with RBBB and first degree AV block, intra op complicated by bradycardia s/p glycopyrrolate, cerebrospinal fluid leak, and post op hypotension s/p pressor support and anemia.     #L1-L5 posterior lumbar fusion and T10 instrumentation: pt/ot    #HTN: lisinopril    #LE edema- lasix    #bph- flomax    #DM: SSI, Metformin, accuchecks at goal    #hypothyroidism- levothyroxine    #CLL: WBC 20 on last labs, monitor    #DVT ppx: cont lovenox    stable to be d/c

## 2018-11-20 ENCOUNTER — APPOINTMENT (OUTPATIENT)
Dept: SPINE | Facility: CLINIC | Age: 73
End: 2018-11-20
Payer: MEDICARE

## 2018-11-20 VITALS
HEIGHT: 70 IN | DIASTOLIC BLOOD PRESSURE: 70 MMHG | SYSTOLIC BLOOD PRESSURE: 130 MMHG | BODY MASS INDEX: 32.5 KG/M2 | WEIGHT: 227 LBS | HEART RATE: 81 BPM | TEMPERATURE: 98.3 F

## 2018-11-20 PROCEDURE — 99024 POSTOP FOLLOW-UP VISIT: CPT

## 2018-11-20 RX ORDER — TRAMADOL HYDROCHLORIDE 50 MG/1
50 TABLET, COATED ORAL
Qty: 40 | Refills: 0 | Status: COMPLETED | COMMUNITY
Start: 2018-08-09 | End: 2018-11-20

## 2018-11-20 RX ORDER — TRAMADOL HYDROCHLORIDE 50 MG/1
50 TABLET, COATED ORAL
Qty: 60 | Refills: 0 | Status: COMPLETED | COMMUNITY
Start: 2018-07-16 | End: 2018-11-20

## 2018-11-20 RX ORDER — ONDANSETRON 4 MG/1
4 TABLET, ORALLY DISINTEGRATING ORAL EVERY 6 HOURS
Qty: 28 | Refills: 0 | Status: COMPLETED | COMMUNITY
Start: 2018-10-02 | End: 2018-11-20

## 2018-11-20 RX ORDER — TRAMADOL HYDROCHLORIDE 50 MG/1
50 TABLET, COATED ORAL
Qty: 90 | Refills: 0 | Status: COMPLETED | COMMUNITY
Start: 2018-09-12 | End: 2018-11-20

## 2018-11-20 RX ORDER — TRAMADOL HYDROCHLORIDE 50 MG/1
50 TABLET, COATED ORAL
Qty: 60 | Refills: 0 | Status: COMPLETED | COMMUNITY
Start: 2018-06-16 | End: 2018-11-20

## 2018-11-27 PROCEDURE — 97116 GAIT TRAINING THERAPY: CPT

## 2018-11-27 PROCEDURE — 84134 ASSAY OF PREALBUMIN: CPT

## 2018-11-27 PROCEDURE — 80048 BASIC METABOLIC PNL TOTAL CA: CPT

## 2018-11-27 PROCEDURE — 97110 THERAPEUTIC EXERCISES: CPT

## 2018-11-27 PROCEDURE — 97530 THERAPEUTIC ACTIVITIES: CPT

## 2018-11-27 PROCEDURE — 85027 COMPLETE CBC AUTOMATED: CPT

## 2018-11-27 PROCEDURE — 97535 SELF CARE MNGMENT TRAINING: CPT

## 2018-11-27 PROCEDURE — 80053 COMPREHEN METABOLIC PANEL: CPT

## 2018-11-27 PROCEDURE — 97167 OT EVAL HIGH COMPLEX 60 MIN: CPT

## 2018-11-27 PROCEDURE — 82962 GLUCOSE BLOOD TEST: CPT

## 2018-11-27 PROCEDURE — 97163 PT EVAL HIGH COMPLEX 45 MIN: CPT

## 2018-11-29 ENCOUNTER — APPOINTMENT (OUTPATIENT)
Dept: RADIOLOGY | Facility: CLINIC | Age: 73
End: 2018-11-29
Payer: MEDICARE

## 2018-11-29 ENCOUNTER — OUTPATIENT (OUTPATIENT)
Dept: OUTPATIENT SERVICES | Facility: HOSPITAL | Age: 73
LOS: 1 days | End: 2018-11-29
Payer: MEDICARE

## 2018-11-29 DIAGNOSIS — Z98.890 OTHER SPECIFIED POSTPROCEDURAL STATES: Chronic | ICD-10-CM

## 2018-11-29 DIAGNOSIS — Z96.651 PRESENCE OF RIGHT ARTIFICIAL KNEE JOINT: Chronic | ICD-10-CM

## 2018-11-29 DIAGNOSIS — E89.0 POSTPROCEDURAL HYPOTHYROIDISM: Chronic | ICD-10-CM

## 2018-11-29 DIAGNOSIS — Z00.8 ENCOUNTER FOR OTHER GENERAL EXAMINATION: ICD-10-CM

## 2018-11-29 DIAGNOSIS — Z90.79 ACQUIRED ABSENCE OF OTHER GENITAL ORGAN(S): Chronic | ICD-10-CM

## 2018-11-29 DIAGNOSIS — Z90.5 ACQUIRED ABSENCE OF KIDNEY: Chronic | ICD-10-CM

## 2018-11-29 PROCEDURE — 72082 X-RAY EXAM ENTIRE SPI 2/3 VW: CPT | Mod: 26

## 2018-11-29 PROCEDURE — 72082 X-RAY EXAM ENTIRE SPI 2/3 VW: CPT

## 2018-12-04 ENCOUNTER — OTHER (OUTPATIENT)
Age: 73
End: 2018-12-04

## 2018-12-04 ENCOUNTER — APPOINTMENT (OUTPATIENT)
Dept: SPINE | Facility: CLINIC | Age: 73
End: 2018-12-04
Payer: MEDICARE

## 2018-12-04 VITALS
HEART RATE: 85 BPM | SYSTOLIC BLOOD PRESSURE: 124 MMHG | DIASTOLIC BLOOD PRESSURE: 70 MMHG | WEIGHT: 225 LBS | HEIGHT: 70 IN | BODY MASS INDEX: 32.21 KG/M2 | TEMPERATURE: 98.3 F

## 2018-12-04 PROCEDURE — 99024 POSTOP FOLLOW-UP VISIT: CPT

## 2018-12-07 RX ORDER — CALCIUM POLYCARBOPHIL 625 MG
625 TABLET ORAL
Refills: 0 | Status: ACTIVE | COMMUNITY

## 2018-12-07 RX ORDER — DOCUSATE SODIUM 100 MG/1
100 CAPSULE ORAL
Refills: 0 | Status: ACTIVE | COMMUNITY

## 2018-12-10 ENCOUNTER — OTHER (OUTPATIENT)
Age: 73
End: 2018-12-10

## 2018-12-18 DIAGNOSIS — M51.35 OTHER INTERVERTEBRAL DISC DEGENERATION, THORACOLUMBAR REGION: ICD-10-CM

## 2018-12-18 DIAGNOSIS — Z01.818 ENCOUNTER FOR OTHER PREPROCEDURAL EXAMINATION: ICD-10-CM

## 2018-12-18 PROCEDURE — 86923 COMPATIBILITY TEST ELECTRIC: CPT

## 2018-12-18 PROCEDURE — G0463: CPT

## 2018-12-21 ENCOUNTER — APPOINTMENT (OUTPATIENT)
Dept: RADIOLOGY | Facility: CLINIC | Age: 73
End: 2018-12-21
Payer: MEDICARE

## 2018-12-21 ENCOUNTER — OUTPATIENT (OUTPATIENT)
Dept: OUTPATIENT SERVICES | Facility: HOSPITAL | Age: 73
LOS: 1 days | End: 2018-12-21
Payer: MEDICARE

## 2018-12-21 ENCOUNTER — OUTPATIENT (OUTPATIENT)
Dept: OUTPATIENT SERVICES | Facility: HOSPITAL | Age: 73
LOS: 1 days | Discharge: HOME | End: 2018-12-21

## 2018-12-21 DIAGNOSIS — Z98.890 OTHER SPECIFIED POSTPROCEDURAL STATES: Chronic | ICD-10-CM

## 2018-12-21 DIAGNOSIS — Z90.5 ACQUIRED ABSENCE OF KIDNEY: Chronic | ICD-10-CM

## 2018-12-21 DIAGNOSIS — Z00.8 ENCOUNTER FOR OTHER GENERAL EXAMINATION: ICD-10-CM

## 2018-12-21 DIAGNOSIS — Z96.651 PRESENCE OF RIGHT ARTIFICIAL KNEE JOINT: Chronic | ICD-10-CM

## 2018-12-21 DIAGNOSIS — E89.0 POSTPROCEDURAL HYPOTHYROIDISM: Chronic | ICD-10-CM

## 2018-12-21 DIAGNOSIS — D72.820 LYMPHOCYTOSIS (SYMPTOMATIC): ICD-10-CM

## 2018-12-21 DIAGNOSIS — Z90.79 ACQUIRED ABSENCE OF OTHER GENITAL ORGAN(S): Chronic | ICD-10-CM

## 2018-12-21 DIAGNOSIS — M48.062 SPINAL STENOSIS, LUMBAR REGION WITH NEUROGENIC CLAUDICATION: ICD-10-CM

## 2018-12-21 PROCEDURE — 72100 X-RAY EXAM L-S SPINE 2/3 VWS: CPT

## 2018-12-21 PROCEDURE — 72100 X-RAY EXAM L-S SPINE 2/3 VWS: CPT | Mod: 26

## 2019-01-04 ENCOUNTER — TRANSCRIPTION ENCOUNTER (OUTPATIENT)
Age: 74
End: 2019-01-04

## 2019-01-07 ENCOUNTER — RESULT REVIEW (OUTPATIENT)
Age: 74
End: 2019-01-07

## 2019-01-07 ENCOUNTER — APPOINTMENT (OUTPATIENT)
Dept: HEMATOLOGY ONCOLOGY | Facility: CLINIC | Age: 74
End: 2019-01-07
Payer: MEDICARE

## 2019-01-07 VITALS
RESPIRATION RATE: 16 BRPM | BODY MASS INDEX: 32.55 KG/M2 | TEMPERATURE: 98 F | OXYGEN SATURATION: 96 % | HEART RATE: 89 BPM | WEIGHT: 226.86 LBS | SYSTOLIC BLOOD PRESSURE: 130 MMHG | DIASTOLIC BLOOD PRESSURE: 70 MMHG

## 2019-01-07 LAB
EOSINOPHIL # BLD AUTO: 0.7 K/UL — HIGH (ref 0–0.5)
EOSINOPHIL NFR BLD AUTO: 5 % — SIGNIFICANT CHANGE UP (ref 0–6)
HCT VFR BLD CALC: 36 % — LOW (ref 39–50)
HGB BLD-MCNC: 12 G/DL — LOW (ref 13–17)
LYMPHOCYTES # BLD AUTO: 11.7 K/UL — HIGH (ref 1–3.3)
LYMPHOCYTES # BLD AUTO: 58 % — HIGH (ref 13–44)
MCHC RBC-ENTMCNC: 28.1 PG — SIGNIFICANT CHANGE UP (ref 27–34)
MCHC RBC-ENTMCNC: 33.2 G/DL — SIGNIFICANT CHANGE UP (ref 32–36)
MCV RBC AUTO: 84.5 FL — SIGNIFICANT CHANGE UP (ref 80–100)
MONOCYTES # BLD AUTO: 0.7 K/UL — SIGNIFICANT CHANGE UP (ref 0–0.9)
MONOCYTES NFR BLD AUTO: 5 % — SIGNIFICANT CHANGE UP (ref 2–14)
NEUTROPHILS # BLD AUTO: 5.2 K/UL — SIGNIFICANT CHANGE UP (ref 1.8–7.4)
NEUTROPHILS NFR BLD AUTO: 32 % — LOW (ref 43–77)
PLAT MORPH BLD: NORMAL — SIGNIFICANT CHANGE UP
PLATELET # BLD AUTO: 222 K/UL — SIGNIFICANT CHANGE UP (ref 150–400)
RBC # BLD: 4.26 M/UL — SIGNIFICANT CHANGE UP (ref 4.2–5.8)
RBC # FLD: 14 % — SIGNIFICANT CHANGE UP (ref 10.3–14.5)
RBC BLD AUTO: SIGNIFICANT CHANGE UP
WBC # BLD: 18.3 K/UL — HIGH (ref 3.8–10.5)
WBC # FLD AUTO: 18.3 K/UL — HIGH (ref 3.8–10.5)

## 2019-01-07 PROCEDURE — 99213 OFFICE O/P EST LOW 20 MIN: CPT

## 2019-01-07 NOTE — HISTORY OF PRESENT ILLNESS
[de-identified] : He has since had a successful orthopedic surgery with reversal of left lower extremity radicular pain and weakness\par He has not had any fever night sweats anorexia or weight loss or noted any enlarged lymph nodes

## 2019-01-07 NOTE — RESULTS/DATA
[FreeTextEntry1] : CBC reveals a white count of 18.3 hemoglobin of 12 platelet count of 2 and 22,000 and absolute lymphocyte count of 11.7; in September 2018 the total white count was 20.8, absolute lymphocyte count of 20,800

## 2019-01-07 NOTE — ASSESSMENT
[FreeTextEntry1] : CLL, 11q-,13q- watch and wait\par \par No indication for treatment \par return in 4-6 months

## 2019-01-08 ENCOUNTER — APPOINTMENT (OUTPATIENT)
Dept: SPINE | Facility: CLINIC | Age: 74
End: 2019-01-08
Payer: MEDICARE

## 2019-01-08 VITALS
HEART RATE: 48 BPM | WEIGHT: 226 LBS | HEIGHT: 70 IN | BODY MASS INDEX: 32.35 KG/M2 | SYSTOLIC BLOOD PRESSURE: 144 MMHG | DIASTOLIC BLOOD PRESSURE: 70 MMHG

## 2019-01-08 PROCEDURE — 99024 POSTOP FOLLOW-UP VISIT: CPT

## 2019-01-08 RX ORDER — VITAMIN B COMPLEX
CAPSULE ORAL
Refills: 0 | Status: COMPLETED | COMMUNITY
End: 2019-01-08

## 2019-01-08 RX ORDER — TRAMADOL HYDROCHLORIDE 50 MG/1
50 TABLET, COATED ORAL
Qty: 60 | Refills: 1 | Status: COMPLETED | COMMUNITY
Start: 2018-11-20 | End: 2019-01-08

## 2019-01-08 RX ORDER — HYDROMORPHONE HYDROCHLORIDE 4 MG/1
4 TABLET ORAL
Qty: 28 | Refills: 0 | Status: COMPLETED | COMMUNITY
Start: 2018-10-02 | End: 2019-01-08

## 2019-01-09 LAB
ALBUMIN SERPL ELPH-MCNC: 4.2 G/DL
ALP BLD-CCNC: 95 U/L
ALT SERPL-CCNC: 17 U/L
ANION GAP SERPL CALC-SCNC: 10 MMOL/L
AST SERPL-CCNC: 18 U/L
BILIRUB SERPL-MCNC: <0.2 MG/DL
BUN SERPL-MCNC: 19 MG/DL
CALCIUM SERPL-MCNC: 9.5 MG/DL
CHLORIDE SERPL-SCNC: 106 MMOL/L
CO2 SERPL-SCNC: 29 MMOL/L
CREAT SERPL-MCNC: 0.94 MG/DL
GLUCOSE SERPL-MCNC: 135 MG/DL
LDH SERPL-CCNC: 123 U/L
POTASSIUM SERPL-SCNC: 4.6 MMOL/L
PROT SERPL-MCNC: 6.2 G/DL
SODIUM SERPL-SCNC: 145 MMOL/L

## 2019-01-14 NOTE — ASSESSMENT
[FreeTextEntry1] : Assess:\par Spinal stenosis\par Post thoraco-lumbar fusion 10/27/2018\par \par PLAN:\par No heavy lifting or stretching\par Follow up in  six months - June 2019\par X rays of scoliosis - June 2019\par Begin Physical therapy \par Begin Aquatic therapy\par \par

## 2019-01-14 NOTE — REASON FOR VISIT
[Follow-Up: _____] : a [unfilled] follow-up visit [Other: _____] : [unfilled] [FreeTextEntry1] : 73 year old with a hsitory of severe spinal stenosis and underwent a thoracic lumbar fusion at multiple levels.  He was taken to the OR on 10/27/2018 and has recovered with a significant decrease of lower back pain.  He has weaned off his narcotics and takes cyclobenzaprine on a daily basis.  \par \par He is utilizing a cane  for ambulation at times.  He  began aquatic therapy which has been helpful.  he will be traveling to [place] in the next few weeks.  Xrays will be  reviewed.

## 2019-01-16 ENCOUNTER — APPOINTMENT (OUTPATIENT)
Dept: SURGERY | Facility: CLINIC | Age: 74
End: 2019-01-16

## 2019-03-28 NOTE — PROGRESS NOTE ADULT - ASSESSMENT
71 y/o male s/p  L1-S1 transforaminal interbody fusion, T10-pelvis instrumented fusion with Plastics assist with closure called for acute post op spinal pain. unknown

## 2019-04-19 ENCOUNTER — TRANSCRIPTION ENCOUNTER (OUTPATIENT)
Age: 74
End: 2019-04-19

## 2019-05-24 ENCOUNTER — OTHER (OUTPATIENT)
Age: 74
End: 2019-05-24

## 2019-06-04 ENCOUNTER — OUTPATIENT (OUTPATIENT)
Dept: OUTPATIENT SERVICES | Facility: HOSPITAL | Age: 74
LOS: 1 days | Discharge: HOME | End: 2019-06-04

## 2019-06-04 DIAGNOSIS — Z90.5 ACQUIRED ABSENCE OF KIDNEY: Chronic | ICD-10-CM

## 2019-06-04 DIAGNOSIS — D72.820 LYMPHOCYTOSIS (SYMPTOMATIC): ICD-10-CM

## 2019-06-04 DIAGNOSIS — E89.0 POSTPROCEDURAL HYPOTHYROIDISM: Chronic | ICD-10-CM

## 2019-06-04 DIAGNOSIS — Z98.890 OTHER SPECIFIED POSTPROCEDURAL STATES: Chronic | ICD-10-CM

## 2019-06-04 DIAGNOSIS — Z90.79 ACQUIRED ABSENCE OF OTHER GENITAL ORGAN(S): Chronic | ICD-10-CM

## 2019-06-04 DIAGNOSIS — Z96.651 PRESENCE OF RIGHT ARTIFICIAL KNEE JOINT: Chronic | ICD-10-CM

## 2019-06-06 ENCOUNTER — OUTPATIENT (OUTPATIENT)
Dept: OUTPATIENT SERVICES | Facility: HOSPITAL | Age: 74
LOS: 1 days | End: 2019-06-06
Payer: MEDICARE

## 2019-06-06 ENCOUNTER — APPOINTMENT (OUTPATIENT)
Dept: HEMATOLOGY ONCOLOGY | Facility: CLINIC | Age: 74
End: 2019-06-06

## 2019-06-06 ENCOUNTER — APPOINTMENT (OUTPATIENT)
Dept: RADIOLOGY | Facility: CLINIC | Age: 74
End: 2019-06-06
Payer: MEDICARE

## 2019-06-06 DIAGNOSIS — E89.0 POSTPROCEDURAL HYPOTHYROIDISM: Chronic | ICD-10-CM

## 2019-06-06 DIAGNOSIS — Z98.890 OTHER SPECIFIED POSTPROCEDURAL STATES: Chronic | ICD-10-CM

## 2019-06-06 DIAGNOSIS — Z96.651 PRESENCE OF RIGHT ARTIFICIAL KNEE JOINT: Chronic | ICD-10-CM

## 2019-06-06 DIAGNOSIS — M48.062 SPINAL STENOSIS, LUMBAR REGION WITH NEUROGENIC CLAUDICATION: ICD-10-CM

## 2019-06-06 DIAGNOSIS — Z90.5 ACQUIRED ABSENCE OF KIDNEY: Chronic | ICD-10-CM

## 2019-06-06 DIAGNOSIS — Z90.79 ACQUIRED ABSENCE OF OTHER GENITAL ORGAN(S): Chronic | ICD-10-CM

## 2019-06-06 PROBLEM — D72.820 LYMPHOCYTOSIS: Status: ACTIVE | Noted: 2017-07-11

## 2019-06-06 PROCEDURE — 72082 X-RAY EXAM ENTIRE SPI 2/3 VW: CPT | Mod: 26

## 2019-06-06 PROCEDURE — 72082 X-RAY EXAM ENTIRE SPI 2/3 VW: CPT

## 2019-06-07 ENCOUNTER — RESULT REVIEW (OUTPATIENT)
Age: 74
End: 2019-06-07

## 2019-06-07 ENCOUNTER — APPOINTMENT (OUTPATIENT)
Dept: HEMATOLOGY ONCOLOGY | Facility: CLINIC | Age: 74
End: 2019-06-07
Payer: MEDICARE

## 2019-06-07 VITALS
WEIGHT: 227 LBS | RESPIRATION RATE: 17 BRPM | HEART RATE: 51 BPM | OXYGEN SATURATION: 99 % | DIASTOLIC BLOOD PRESSURE: 69 MMHG | SYSTOLIC BLOOD PRESSURE: 151 MMHG | BODY MASS INDEX: 32.57 KG/M2 | TEMPERATURE: 97.9 F

## 2019-06-07 DIAGNOSIS — D72.820 LYMPHOCYTOSIS (SYMPTOMATIC): ICD-10-CM

## 2019-06-07 LAB
BASOPHILS # BLD AUTO: 0.1 K/UL — SIGNIFICANT CHANGE UP (ref 0–0.2)
BASOPHILS NFR BLD AUTO: 1 % — SIGNIFICANT CHANGE UP (ref 0–2)
EOSINOPHIL # BLD AUTO: 0.4 K/UL — SIGNIFICANT CHANGE UP (ref 0–0.5)
EOSINOPHIL NFR BLD AUTO: 4 % — SIGNIFICANT CHANGE UP (ref 0–6)
HCT VFR BLD CALC: 39.1 % — SIGNIFICANT CHANGE UP (ref 39–50)
HGB BLD-MCNC: 12.6 G/DL — LOW (ref 13–17)
LYMPHOCYTES # BLD AUTO: 11.7 K/UL — HIGH (ref 1–3.3)
LYMPHOCYTES # BLD AUTO: 64 % — HIGH (ref 13–44)
MCHC RBC-ENTMCNC: 27.4 PG — SIGNIFICANT CHANGE UP (ref 27–34)
MCHC RBC-ENTMCNC: 32.1 G/DL — SIGNIFICANT CHANGE UP (ref 32–36)
MCV RBC AUTO: 85.4 FL — SIGNIFICANT CHANGE UP (ref 80–100)
MONOCYTES # BLD AUTO: 0.6 K/UL — SIGNIFICANT CHANGE UP (ref 0–0.9)
MONOCYTES NFR BLD AUTO: 3 % — SIGNIFICANT CHANGE UP (ref 2–14)
NEUTROPHILS # BLD AUTO: 5.2 K/UL — SIGNIFICANT CHANGE UP (ref 1.8–7.4)
NEUTROPHILS NFR BLD AUTO: 28 % — LOW (ref 43–77)
PLAT MORPH BLD: NORMAL — SIGNIFICANT CHANGE UP
PLATELET # BLD AUTO: 190 K/UL — SIGNIFICANT CHANGE UP (ref 150–400)
RBC # BLD: 4.58 M/UL — SIGNIFICANT CHANGE UP (ref 4.2–5.8)
RBC # FLD: 14.6 % — HIGH (ref 10.3–14.5)
RBC BLD AUTO: SIGNIFICANT CHANGE UP
WBC # BLD: 18.1 K/UL — HIGH (ref 3.8–10.5)
WBC # FLD AUTO: 18.1 K/UL — HIGH (ref 3.8–10.5)

## 2019-06-07 PROCEDURE — 99213 OFFICE O/P EST LOW 20 MIN: CPT

## 2019-06-07 NOTE — HISTORY OF PRESENT ILLNESS
[de-identified] : 74yo M here for f/u of CLL. He was previously following with Dr. Mccabe. \par \par On March 2,2017, total WBC 11,33, with absolute lymphocyte count 5865.\par On May 25, Total WBC 14.3, ALC 9052.\par Of note, chest CT for follow up of renal cell carcinoma on 5/31 showed slightly enlarged subpectoral and left axillary nodes.\par MRI/PET 6/1 multiple stable retroperitoneal nodes without abnormal FDG uptake. There was mild splenomegaly without abnormal uptake.\par \par Flow cytometry reveals monotypic B cells positive for CD19 didn't CD20 CD23 and C5 negative for FMC-7, CD10 and CD38 consistent with CLL. The monotypic B cells are 41% of cells, equating to 5658 cells\par FISH panel reveals deletion of 11q in 57.5% of cells and deletion of 13q in 67% of cells.\par \par He has had no constitutional symptoms.\par He has a history of prostate carcinoma, 3 cm renal cell carcinoma, 3.5 cm medullary thyroid cancer\par \par His previous CA history is as follows: \par In 2003, he was diagnosed with prostate CA s/p radical prostatectomy\par MRI done for rising PSA in 2009, found to have RCC s/p partial R nephrectomy and RT to prostate bed\par PET scheduled on Monday for rising PSA\par In 8/2018, found to have medullary thyroid CA s/p total thyroidectomy [de-identified] : He has not had any fever night sweats anorexia or weight loss or noted any enlarged lymph nodes.\par \par His labs from his PMD in 4/2019 showed WBC 21.8 [place]/ [place] 710[address], Hgb 13, plt 188.

## 2019-06-07 NOTE — ASSESSMENT
[FreeTextEntry1] : 74yo M w/ CLL, 11q-,13q-, being observed\par \par Counts stable, no indication for treatment \par RTC 4-6 months

## 2019-06-11 LAB
ALBUMIN SERPL ELPH-MCNC: 4.5 G/DL
ALP BLD-CCNC: 73 U/L
ALT SERPL-CCNC: 19 U/L
ANION GAP SERPL CALC-SCNC: 12 MMOL/L
AST SERPL-CCNC: 19 U/L
BILIRUB SERPL-MCNC: 0.2 MG/DL
BUN SERPL-MCNC: 21 MG/DL
CALCIUM SERPL-MCNC: 10 MG/DL
CHLORIDE SERPL-SCNC: 103 MMOL/L
CO2 SERPL-SCNC: 24 MMOL/L
CREAT SERPL-MCNC: 0.96 MG/DL
GLUCOSE SERPL-MCNC: 197 MG/DL
LDH SERPL-CCNC: 126 U/L
POTASSIUM SERPL-SCNC: 4.9 MMOL/L
PROT SERPL-MCNC: 6.3 G/DL
SODIUM SERPL-SCNC: 139 MMOL/L

## 2019-06-12 ENCOUNTER — APPOINTMENT (OUTPATIENT)
Dept: SURGERY | Facility: CLINIC | Age: 74
End: 2019-06-12
Payer: MEDICARE

## 2019-06-12 ENCOUNTER — LABORATORY RESULT (OUTPATIENT)
Age: 74
End: 2019-06-12

## 2019-06-12 PROCEDURE — 99214 OFFICE O/P EST MOD 30 MIN: CPT

## 2019-06-12 PROCEDURE — 36415 COLL VENOUS BLD VENIPUNCTURE: CPT

## 2019-06-12 NOTE — ASSESSMENT
[FreeTextEntry1] : doing well, lengthy discussion regarding need for f/u of thyroid cancer,  recommend ruth ann and neck US  and possible ct of neck if necessary.  questions answered.

## 2019-06-12 NOTE — PHYSICAL EXAM
[de-identified] : no cervical or supraclavicular adenopathy, trachea midline,incision well healed. no palpable masses, scar min discussed [Normal] : orientation to person, place, and time: normal

## 2019-06-12 NOTE — HISTORY OF PRESENT ILLNESS
[de-identified] : Patient referred by Dr. Rodriguez for evaluation of newly diagnosed medullary thyroid cancer.  Patient with over 10 year history of thyroid nodules, biopsy 8 years ago benign.  Patient with multiple malignancies followed with CT scans and PET scans.  Pet scan 5/2017 with uptake in thyroid, no uptake in cervical LNs.  Thyroid US 6/18/18 with multiple nodules left lower 3.5 x 2.2 x 2.1 cm increased from prior study with Ti-RADS 7.  three additional right nodules noted.  Biopsy left medullary thyroid cancer, Thyroseq RET mutation.  Denies dysphagia, hoarseness , had radiation 2007 for prostate cancer.  \par 8/16/18 Total thyroidectomy with CND, pathology 3.5 cm medullary thyroid cancer 0/1 LN,  denies dysphagia, reports mild hoarseness denies parathesias.  questions answered. returns prior to scheduled with concern regarding incision, denies f/.c/s , voice voice normal ruth ann reviewed calcitonin negative, CEA remains elevated, reports last colonoscopy was a year ago, will f/u oncologist and GI. \par Patient diagnosed with medullary thyroid cancer 1 year ago.  has not followed up . now on synthroid 200/  denies dysphagia,  change in voice or fatigue.  PET scan for f/u prostate cancer with uptake in neck, per patient, report not available.  is scheduled for recommended colonoscopy 9/2019.

## 2019-06-13 ENCOUNTER — FORM ENCOUNTER (OUTPATIENT)
Age: 74
End: 2019-06-13

## 2019-06-14 ENCOUNTER — OUTPATIENT (OUTPATIENT)
Dept: OUTPATIENT SERVICES | Facility: HOSPITAL | Age: 74
LOS: 1 days | End: 2019-06-14
Payer: MEDICARE

## 2019-06-14 ENCOUNTER — APPOINTMENT (OUTPATIENT)
Dept: ULTRASOUND IMAGING | Facility: CLINIC | Age: 74
End: 2019-06-14
Payer: MEDICARE

## 2019-06-14 DIAGNOSIS — Z98.890 OTHER SPECIFIED POSTPROCEDURAL STATES: Chronic | ICD-10-CM

## 2019-06-14 DIAGNOSIS — E89.0 POSTPROCEDURAL HYPOTHYROIDISM: Chronic | ICD-10-CM

## 2019-06-14 DIAGNOSIS — Z90.79 ACQUIRED ABSENCE OF OTHER GENITAL ORGAN(S): Chronic | ICD-10-CM

## 2019-06-14 DIAGNOSIS — Z90.5 ACQUIRED ABSENCE OF KIDNEY: Chronic | ICD-10-CM

## 2019-06-14 DIAGNOSIS — Z00.8 ENCOUNTER FOR OTHER GENERAL EXAMINATION: ICD-10-CM

## 2019-06-14 DIAGNOSIS — Z96.651 PRESENCE OF RIGHT ARTIFICIAL KNEE JOINT: Chronic | ICD-10-CM

## 2019-06-14 LAB
CALCIT SERPL-MCNC: 1.3 PG/ML
CEA SERPL-MCNC: 1.2 NG/ML
T3 SERPL-MCNC: 84 NG/DL
T4 FREE SERPL-MCNC: 1.8 NG/DL
THYROGLOB AB SERPL-ACNC: <20 IU/ML
THYROGLOB SERPL-MCNC: <0.2 NG/ML
TSH SERPL-ACNC: 1.37 UIU/ML

## 2019-06-14 PROCEDURE — 76536 US EXAM OF HEAD AND NECK: CPT | Mod: 26

## 2019-06-14 PROCEDURE — 76536 US EXAM OF HEAD AND NECK: CPT

## 2019-06-20 ENCOUNTER — TRANSCRIPTION ENCOUNTER (OUTPATIENT)
Age: 74
End: 2019-06-20

## 2019-06-25 ENCOUNTER — APPOINTMENT (OUTPATIENT)
Dept: SPINE | Facility: CLINIC | Age: 74
End: 2019-06-25
Payer: MEDICARE

## 2019-06-25 VITALS
DIASTOLIC BLOOD PRESSURE: 63 MMHG | HEIGHT: 70 IN | RESPIRATION RATE: 14 BRPM | TEMPERATURE: 98.3 F | HEART RATE: 50 BPM | BODY MASS INDEX: 32.5 KG/M2 | SYSTOLIC BLOOD PRESSURE: 111 MMHG | WEIGHT: 227 LBS

## 2019-06-25 PROCEDURE — 99213 OFFICE O/P EST LOW 20 MIN: CPT

## 2019-06-25 NOTE — CONSULT LETTER
[Dear  ___] : Dear  [unfilled], [Sincerely,] : Sincerely, [FreeTextEntry2] : Nikolas Rodriguez MD\par  [FreeTextEntry3] : Garret House,DO\par [facility] [place]\par [address]\[place] [address]\par Phone: [phone]\par Fax: [phone]\par

## 2019-06-25 NOTE — DATA REVIEWED
[de-identified] : 6/6/2019: Xay Lumbar: Unchanged configuration and position of the previously seen T10-S1 posterior \par spinal fusion hardware consisting of pedicle screws with interconnecting \par rods and L1-L2 to L5-S1 interbody disc spacer implants without evidence for \par complication. \par \par Unchanged vertebral body and disc space heights and alignment including \par residual slight lumbar dextrocurvature

## 2019-06-25 NOTE — REASON FOR VISIT
[Follow-Up: _____] : a [unfilled] follow-up visit [Spouse] : spouse [FreeTextEntry1] : Pt is s/p surgery. He is here with a an XRAY fore review in [employer]. \par Pt needs Ultram 50 mg and Flexeril 10 mg

## 2019-09-04 ENCOUNTER — OUTPATIENT (OUTPATIENT)
Dept: OUTPATIENT SERVICES | Facility: HOSPITAL | Age: 74
LOS: 1 days | End: 2019-09-04
Payer: MEDICARE

## 2019-09-04 ENCOUNTER — APPOINTMENT (OUTPATIENT)
Dept: ULTRASOUND IMAGING | Facility: CLINIC | Age: 74
End: 2019-09-04
Payer: MEDICARE

## 2019-09-04 DIAGNOSIS — Z90.79 ACQUIRED ABSENCE OF OTHER GENITAL ORGAN(S): Chronic | ICD-10-CM

## 2019-09-04 DIAGNOSIS — E89.0 POSTPROCEDURAL HYPOTHYROIDISM: Chronic | ICD-10-CM

## 2019-09-04 DIAGNOSIS — Z98.890 OTHER SPECIFIED POSTPROCEDURAL STATES: Chronic | ICD-10-CM

## 2019-09-04 DIAGNOSIS — Z96.651 PRESENCE OF RIGHT ARTIFICIAL KNEE JOINT: Chronic | ICD-10-CM

## 2019-09-04 DIAGNOSIS — Z90.5 ACQUIRED ABSENCE OF KIDNEY: Chronic | ICD-10-CM

## 2019-09-04 DIAGNOSIS — Z00.8 ENCOUNTER FOR OTHER GENERAL EXAMINATION: ICD-10-CM

## 2019-09-04 PROCEDURE — 76882 US LMTD JT/FCL EVL NVASC XTR: CPT

## 2019-09-04 PROCEDURE — 76882 US LMTD JT/FCL EVL NVASC XTR: CPT | Mod: 26,RT

## 2019-09-18 ENCOUNTER — APPOINTMENT (OUTPATIENT)
Dept: RADIOLOGY | Facility: CLINIC | Age: 74
End: 2019-09-18
Payer: MEDICARE

## 2019-09-18 ENCOUNTER — OUTPATIENT (OUTPATIENT)
Dept: OUTPATIENT SERVICES | Facility: HOSPITAL | Age: 74
LOS: 1 days | End: 2019-09-18
Payer: MEDICARE

## 2019-09-18 DIAGNOSIS — Z96.651 PRESENCE OF RIGHT ARTIFICIAL KNEE JOINT: Chronic | ICD-10-CM

## 2019-09-18 DIAGNOSIS — Z90.79 ACQUIRED ABSENCE OF OTHER GENITAL ORGAN(S): Chronic | ICD-10-CM

## 2019-09-18 DIAGNOSIS — Z90.5 ACQUIRED ABSENCE OF KIDNEY: Chronic | ICD-10-CM

## 2019-09-18 DIAGNOSIS — E89.0 POSTPROCEDURAL HYPOTHYROIDISM: Chronic | ICD-10-CM

## 2019-09-18 DIAGNOSIS — Z98.890 OTHER SPECIFIED POSTPROCEDURAL STATES: Chronic | ICD-10-CM

## 2019-09-18 DIAGNOSIS — Z00.8 ENCOUNTER FOR OTHER GENERAL EXAMINATION: ICD-10-CM

## 2019-09-18 PROCEDURE — 72100 X-RAY EXAM L-S SPINE 2/3 VWS: CPT | Mod: 26

## 2019-09-18 PROCEDURE — 72100 X-RAY EXAM L-S SPINE 2/3 VWS: CPT

## 2019-09-18 PROCEDURE — 72070 X-RAY EXAM THORAC SPINE 2VWS: CPT | Mod: 26

## 2019-09-18 PROCEDURE — 72070 X-RAY EXAM THORAC SPINE 2VWS: CPT

## 2019-09-24 ENCOUNTER — APPOINTMENT (OUTPATIENT)
Dept: SPINE | Facility: CLINIC | Age: 74
End: 2019-09-24
Payer: MEDICARE

## 2019-09-24 VITALS
SYSTOLIC BLOOD PRESSURE: 153 MMHG | DIASTOLIC BLOOD PRESSURE: 83 MMHG | BODY MASS INDEX: 31.78 KG/M2 | HEART RATE: 54 BPM | HEIGHT: 71 IN | WEIGHT: 227 LBS

## 2019-09-24 DIAGNOSIS — M54.16 RADICULOPATHY, LUMBAR REGION: ICD-10-CM

## 2019-09-24 DIAGNOSIS — M48.00 SPINAL STENOSIS, SITE UNSPECIFIED: ICD-10-CM

## 2019-09-24 DIAGNOSIS — Z87.891 PERSONAL HISTORY OF NICOTINE DEPENDENCE: ICD-10-CM

## 2019-09-24 PROCEDURE — 99213 OFFICE O/P EST LOW 20 MIN: CPT

## 2019-09-24 RX ORDER — CYCLOBENZAPRINE HYDROCHLORIDE 10 MG/1
10 TABLET, FILM COATED ORAL
Qty: 60 | Refills: 0 | Status: DISCONTINUED | COMMUNITY
Start: 2018-12-10 | End: 2019-09-24

## 2019-09-24 RX ORDER — TRAMADOL HYDROCHLORIDE 50 MG/1
50 TABLET, COATED ORAL
Qty: 42 | Refills: 0 | Status: DISCONTINUED | COMMUNITY
Start: 2019-06-25 | End: 2019-09-24

## 2019-09-24 NOTE — ASSESSMENT
[FreeTextEntry1] : Assess:\par Spinal stenosis\par Post 11 month follow up for thoraco-lumbar fusion \par Lower back pain improved and walking greatly  improved, but right buttock burning and lower spine tightness\par  Xray shows good alignment and dextroscoliosis and some bony growth noted on xray \par \par PLAN:\par Follow up when return from [place] in May 2020 \par may 2020 return with  thoraco - lumbar xrays\par NO B L T \par Consider trigger injections for right buttock injections \par Ordered Gabapentin 300 mg po once at night for buttock pain , side effects reviewed and understood \par \par

## 2019-09-24 NOTE — REASON FOR VISIT
[Spouse] : spouse [Follow-Up: _____] : a [unfilled] follow-up visit [FreeTextEntry1] : 11 month follow u following a thoraco-lumbar fusion.  Mr Moore reports he has lower back stiffness and right buttock burning.  He is able to ambulate without assistance.  he takes no medications for his discomfort.  At times he will take an advil and very rarely (once a month) a tramadol.  He has trialed conservative management  for his right buttock burning including heat, message and TENS unit with no relief.  The burning increases during the day. He is not in PT.  No leg radiculopathy.   He brings a  thoraco-lumbar xray.

## 2019-09-24 NOTE — REVIEW OF SYSTEMS
[Negative] : Heme/Lymph [de-identified] : Right buttock burning, lower back stiffness [FreeTextEntry1] : No urine or bowel symptoms

## 2019-10-02 ENCOUNTER — APPOINTMENT (OUTPATIENT)
Dept: SURGERY | Facility: CLINIC | Age: 74
End: 2019-10-02
Payer: MEDICARE

## 2019-10-02 PROCEDURE — 36415 COLL VENOUS BLD VENIPUNCTURE: CPT

## 2019-10-02 PROCEDURE — 99213 OFFICE O/P EST LOW 20 MIN: CPT

## 2019-10-02 NOTE — HISTORY OF PRESENT ILLNESS
[de-identified] : Patient referred by Dr. Rodriguez for evaluation of newly diagnosed medullary thyroid cancer.  Patient with over 10 year history of thyroid nodules, biopsy 8 years ago benign.  Patient with multiple malignancies followed with CT scans and PET scans.  Pet scan 5/2017 with uptake in thyroid, no uptake in cervical LNs.  Thyroid US 6/18/18 with multiple nodules left lower 3.5 x 2.2 x 2.1 cm increased from prior study with Ti-RADS 7.  three additional right nodules noted.  Biopsy left medullary thyroid cancer, Thyroseq RET mutation.  Denies dysphagia, hoarseness , had radiation 2007 for prostate cancer.  \par 8/16/18 Total thyroidectomy with CND, pathology 3.5 cm medullary thyroid cancer 0/1 LN,  denies dysphagia, reports mild hoarseness denies parathesias.  questions answered. returns prior to scheduled with concern regarding incision, denies f/.c/s , voice voice normal ruth ann reviewed calcitonin negative, CEA remains elevated, reports last colonoscopy was a year ago, will f/u oncologist and GI. \par Patient diagnosed with medullary thyroid cancer 2 year ago.  on synthroid 200/  denies dysphagia,  change in voice or fatigue. neck US 6/2019 with LNs consistent with CLL, recent TFTs good. no calcitonin.  denies palpitations

## 2019-10-02 NOTE — REASON FOR VISIT
Post-Procedures Photographs: Yes Number Of Passes For Tip #4: 1 Generator Settin Leo Tip #2: 20mm Number Of Passes: 5 Number Of Passes For Tip #2: 4 [Follow-Up: _____] : a [unfilled] follow-up visit Detail Level: Zone [Spouse] : spouse Treatment Endpoint: visual tightening Immediate Post-Procedure Findings: no edema or erythema Consent: Written consent obtained, risks reviewed including but not limited to crusting, scabbing, blistering, scarring, darker or lighter pigmentary change, and/or incomplete removal. Number Of Passes For Tip #3: 10 Post-Procedure Text: Vaseline and ice applied. Post care reviewed with patient. Patient Discomfort: no Post-Care Instructions: I reviewed with the patient in detail post-care instructions. Patient should stay away from the sun and wear sun protection until treated areas are fully healed. Generator Setting For Tip #2: 60 [place] Tip: 15mm Generator Setting For Tip #3: 60

## 2019-10-02 NOTE — PHYSICAL EXAM
[de-identified] : no cervical or supraclavicular adenopathy, trachea midline,incision well healed. no palpable masses, scar min discussed [Normal] : no neck adenopathy [de-identified] : Skin:  normal appearance.  no rash, nodules, vesicles, or erythema,\par Musculoskeletal:  full range of motion and no deformities appreciated\par Neurological:  grossly intact\par Psychiatric:  oriented to person, place and time with appropriate affect

## 2019-10-02 NOTE — ASSESSMENT
[FreeTextEntry1] : doing well, lengthy discussion regarding need for f/u of thyroid cancer,  recommend ruth ann and neck US 12/2019    RTO 6 mo

## 2019-10-08 LAB — CALCIT SERPL-MCNC: 1.9 PG/ML

## 2019-10-10 ENCOUNTER — OUTPATIENT (OUTPATIENT)
Dept: OUTPATIENT SERVICES | Facility: HOSPITAL | Age: 74
LOS: 1 days | Discharge: HOME | End: 2019-10-10

## 2019-10-10 DIAGNOSIS — D72.820 LYMPHOCYTOSIS (SYMPTOMATIC): ICD-10-CM

## 2019-10-10 DIAGNOSIS — Z98.890 OTHER SPECIFIED POSTPROCEDURAL STATES: Chronic | ICD-10-CM

## 2019-10-10 DIAGNOSIS — Z96.651 PRESENCE OF RIGHT ARTIFICIAL KNEE JOINT: Chronic | ICD-10-CM

## 2019-10-10 DIAGNOSIS — E89.0 POSTPROCEDURAL HYPOTHYROIDISM: Chronic | ICD-10-CM

## 2019-10-10 DIAGNOSIS — Z90.5 ACQUIRED ABSENCE OF KIDNEY: Chronic | ICD-10-CM

## 2019-10-10 DIAGNOSIS — Z90.79 ACQUIRED ABSENCE OF OTHER GENITAL ORGAN(S): Chronic | ICD-10-CM

## 2019-10-14 ENCOUNTER — APPOINTMENT (OUTPATIENT)
Dept: HEMATOLOGY ONCOLOGY | Facility: CLINIC | Age: 74
End: 2019-10-14
Payer: MEDICARE

## 2019-10-14 ENCOUNTER — RESULT REVIEW (OUTPATIENT)
Age: 74
End: 2019-10-14

## 2019-10-14 VITALS
DIASTOLIC BLOOD PRESSURE: 78 MMHG | HEART RATE: 66 BPM | WEIGHT: 228.99 LBS | TEMPERATURE: 98.6 F | BODY MASS INDEX: 31.94 KG/M2 | RESPIRATION RATE: 16 BRPM | SYSTOLIC BLOOD PRESSURE: 136 MMHG | OXYGEN SATURATION: 98 %

## 2019-10-14 LAB
BASOPHILS # BLD AUTO: 0 K/UL — SIGNIFICANT CHANGE UP (ref 0–0.2)
EOSINOPHIL # BLD AUTO: 0.4 K/UL — SIGNIFICANT CHANGE UP (ref 0–0.5)
EOSINOPHIL NFR BLD AUTO: 2 % — SIGNIFICANT CHANGE UP (ref 0–6)
HCT VFR BLD CALC: 40.8 % — SIGNIFICANT CHANGE UP (ref 39–50)
HGB BLD-MCNC: 13.2 G/DL — SIGNIFICANT CHANGE UP (ref 13–17)
LYMPHOCYTES # BLD AUTO: 30.9 K/UL — HIGH (ref 1–3.3)
LYMPHOCYTES # BLD AUTO: 65 % — HIGH (ref 13–44)
MCHC RBC-ENTMCNC: 28.3 PG — SIGNIFICANT CHANGE UP (ref 27–34)
MCHC RBC-ENTMCNC: 32.4 G/DL — SIGNIFICANT CHANGE UP (ref 32–36)
MCV RBC AUTO: 87.5 FL — SIGNIFICANT CHANGE UP (ref 80–100)
MONOCYTES # BLD AUTO: 1.1 K/UL — HIGH (ref 0–0.9)
MONOCYTES NFR BLD AUTO: 3 % — SIGNIFICANT CHANGE UP (ref 2–14)
NEUTROPHILS # BLD AUTO: 6.3 K/UL — SIGNIFICANT CHANGE UP (ref 1.8–7.4)
NEUTROPHILS NFR BLD AUTO: 29 % — LOW (ref 43–77)
NEUTS BAND # BLD: 1 % — SIGNIFICANT CHANGE UP (ref 0–8)
PLAT MORPH BLD: NORMAL — SIGNIFICANT CHANGE UP
PLATELET # BLD AUTO: 192 K/UL — SIGNIFICANT CHANGE UP (ref 150–400)
RBC # BLD: 4.66 M/UL — SIGNIFICANT CHANGE UP (ref 4.2–5.8)
RBC # FLD: 14 % — SIGNIFICANT CHANGE UP (ref 10.3–14.5)
RBC BLD AUTO: SIGNIFICANT CHANGE UP
WBC # BLD: 38.7 K/UL — HIGH (ref 3.8–10.5)
WBC # FLD AUTO: 38.7 K/UL — HIGH (ref 3.8–10.5)

## 2019-10-14 PROCEDURE — 99213 OFFICE O/P EST LOW 20 MIN: CPT

## 2019-10-14 NOTE — HISTORY OF PRESENT ILLNESS
[de-identified] : 74yo M here for f/u of CLL. He was previously following with Dr. Mccabe. \par \par On March 2,2017, total WBC 11,33, with absolute lymphocyte count 5865.\par On May 25, Total WBC 14.3, ALC 9052.\par Of note, chest CT for follow up of renal cell carcinoma on 5/31 showed slightly enlarged subpectoral and left axillary nodes.\par MRI/PET 6/1 multiple stable retroperitoneal nodes without abnormal FDG uptake. There was mild splenomegaly without abnormal uptake.\par \par Flow cytometry reveals monotypic B cells positive for CD19 didn't CD20 CD23 and C5 negative for FMC-7, CD10 and CD38 consistent with CLL. The monotypic B cells are 41% of cells, equating to 5658 cells\par FISH panel reveals deletion of 11q in 57.5% of cells and deletion of 13q in 67% of cells.\par \par He has had no constitutional symptoms.\par He has a history of prostate carcinoma, 3 cm renal cell carcinoma, 3.5 cm medullary thyroid cancer\par \par His previous CA history is as follows: \par In 2003, he was diagnosed with prostate CA s/p radical prostatectomy\par MRI done for rising PSA in 2009, found to have RCC s/p partial R nephrectomy and RT to prostate bed\par PET scheduled on Monday for rising PSA\par In 8/2018, found to have medullary thyroid CA s/p total thyroidectomy [de-identified] : He has not had any fever night sweats anorexia or weight loss or noted any enlarged lymph nodes.\par \par His labs from his PMD in 9/2019 showed WBC 36.9, Hgb 13.4, plt 201. Pt feels well, no new complaints. He will be going down to [place] for the winter, returning in May.

## 2019-10-14 NOTE — ASSESSMENT
[FreeTextEntry1] : 74yo M w/ CLL, 11q-,13q-, being observed\par WBC 38.1 today with ALC 30.9. No anemia or thrombocytopenia.\par He will be going down to [place] for the winter. Script for repeat CBC in 3 mo provided to pt\par RTC 6 months

## 2019-10-15 LAB
ALBUMIN SERPL ELPH-MCNC: 4.4 G/DL
ALP BLD-CCNC: 75 U/L
ALT SERPL-CCNC: 21 U/L
ANION GAP SERPL CALC-SCNC: 11 MMOL/L
AST SERPL-CCNC: 23 U/L
BILIRUB SERPL-MCNC: 0.2 MG/DL
BUN SERPL-MCNC: 16 MG/DL
CALCIUM SERPL-MCNC: 10.2 MG/DL
CHLORIDE SERPL-SCNC: 103 MMOL/L
CO2 SERPL-SCNC: 24 MMOL/L
CREAT SERPL-MCNC: 0.87 MG/DL
GLUCOSE SERPL-MCNC: 141 MG/DL
LDH SERPL-CCNC: 124 U/L
POTASSIUM SERPL-SCNC: 5.1 MMOL/L
PROT SERPL-MCNC: 6.2 G/DL
SODIUM SERPL-SCNC: 138 MMOL/L

## 2019-10-16 ENCOUNTER — MEDICATION RENEWAL (OUTPATIENT)
Age: 74
End: 2019-10-16

## 2020-07-14 ENCOUNTER — RESULT REVIEW (OUTPATIENT)
Age: 75
End: 2020-07-14

## 2020-07-14 ENCOUNTER — APPOINTMENT (OUTPATIENT)
Dept: RADIOLOGY | Facility: CLINIC | Age: 75
End: 2020-07-14
Payer: MEDICARE

## 2020-07-14 ENCOUNTER — OUTPATIENT (OUTPATIENT)
Dept: OUTPATIENT SERVICES | Facility: HOSPITAL | Age: 75
LOS: 1 days | End: 2020-07-14
Payer: MEDICARE

## 2020-07-14 ENCOUNTER — APPOINTMENT (OUTPATIENT)
Dept: ORTHOPEDIC SURGERY | Facility: CLINIC | Age: 75
End: 2020-07-14
Payer: MEDICARE

## 2020-07-14 ENCOUNTER — APPOINTMENT (OUTPATIENT)
Dept: ULTRASOUND IMAGING | Facility: CLINIC | Age: 75
End: 2020-07-14
Payer: MEDICARE

## 2020-07-14 VITALS — BODY MASS INDEX: 33.64 KG/M2 | WEIGHT: 235 LBS | HEIGHT: 70 IN

## 2020-07-14 DIAGNOSIS — Z90.5 ACQUIRED ABSENCE OF KIDNEY: Chronic | ICD-10-CM

## 2020-07-14 DIAGNOSIS — E89.0 POSTPROCEDURAL HYPOTHYROIDISM: Chronic | ICD-10-CM

## 2020-07-14 DIAGNOSIS — Z98.890 OTHER SPECIFIED POSTPROCEDURAL STATES: Chronic | ICD-10-CM

## 2020-07-14 DIAGNOSIS — Z96.651 PRESENCE OF RIGHT ARTIFICIAL KNEE JOINT: Chronic | ICD-10-CM

## 2020-07-14 DIAGNOSIS — M48.062 SPINAL STENOSIS, LUMBAR REGION WITH NEUROGENIC CLAUDICATION: ICD-10-CM

## 2020-07-14 DIAGNOSIS — Z00.8 ENCOUNTER FOR OTHER GENERAL EXAMINATION: ICD-10-CM

## 2020-07-14 DIAGNOSIS — Z90.79 ACQUIRED ABSENCE OF OTHER GENITAL ORGAN(S): Chronic | ICD-10-CM

## 2020-07-14 PROCEDURE — 76536 US EXAM OF HEAD AND NECK: CPT | Mod: 26

## 2020-07-14 PROCEDURE — 99214 OFFICE O/P EST MOD 30 MIN: CPT | Mod: 25

## 2020-07-14 PROCEDURE — 72082 X-RAY EXAM ENTIRE SPI 2/3 VW: CPT | Mod: 26

## 2020-07-14 PROCEDURE — 72082 X-RAY EXAM ENTIRE SPI 2/3 VW: CPT

## 2020-07-14 PROCEDURE — 20611 DRAIN/INJ JOINT/BURSA W/US: CPT | Mod: LT

## 2020-07-14 PROCEDURE — 76536 US EXAM OF HEAD AND NECK: CPT

## 2020-07-14 NOTE — DISCUSSION/SUMMARY
[de-identified] : The underlying pathophysiology was reviewed in great detail with the patient as well as the various treatment options, including ice, analgesics, NSAIDs, Physical therapy, steroid injections and a shoulder replacement.\par \par I offered a L shoulder Durolane injection today. \par \par Activity modifications and restrictions were discussed. I advised avoiding overhead lifting. I advised the patient to work on good posture. \par \par FU PRN\par \par All questions were answered, all alternatives discussed and the patient is in complete agreement with that plan. Follow-up appointment as instructed. Any issues and the patient will call or come in sooner.\par

## 2020-07-14 NOTE — HISTORY OF PRESENT ILLNESS
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 Patient denies injury or trauma to the area. The patient describes the pain as a dull aching, and occasionally sharp pain localized to the anterior aspect of his shoulder that is intermittent in nature. His  symptoms are exacerbated with ROM and with any movement of the shoulder. Patient reports the pain wakes him up at night. Can not lay on his L side due to pain.  Patient reports associated weakness. Denies numbness and tingling in the upper extremity.  He presents today with xrays from [place]. Had went to an orthopedic surgeon in [place] and had XRs showed severe arthritis of the L shoulder, he was given a shot of cortisone at the time (2 months ago). \michelle Has been seen in the past by me for R shoulder pain and was given injections. \par Patient denies any other complaints at this time.\par

## 2020-07-14 NOTE — PHYSICAL EXAM
[de-identified] : Left Upper Extremity\par o Shoulder :\par ¦ Inspection/Palpation : no tenderness, no swelling, no deformities\par ¦ Range of Motion : ACTIVE FORWARD ELEVATION: Measured at 110 degrees, ACTIVE EXTERNAL ROTATION: Measured at 60 degrees, ACTIVE INTERNAL ROTATION: Measured at L1\par ¦ Strength : external rotation 5/5, internal rotation 5/5, supraspinatus 5/5\par ¦ Stability : no joint instability on provocative testing\par ¦ Tests/Signs : Neer (-), Patricio (-)\par o Upper Arm : no tenderness, no swelling, no deformities\par o Muscle Bulk : no atrophy\par o Sensation : sensation intact to light touch\par o Skin : no skin rash or discoloration\par o Vascular Exam : no edema, no cyanosis, radial and ulnar pulses normal\par  [de-identified] : o Left Shoulder Xray taken on May 2nd 2020 in [place] : Impression: severe L glenohumeral arthritis \par ¦ \par ¦ \par ¦

## 2020-07-14 NOTE — PROCEDURE
[de-identified] : A Durolane shot was offered - 3ml  durolane injection administered under US guidance and sterile precautions in the L shoulder (Lot: [id number], Expiration: 01/31/2023)

## 2020-07-20 ENCOUNTER — APPOINTMENT (OUTPATIENT)
Dept: SURGERY | Facility: CLINIC | Age: 75
End: 2020-07-20
Payer: MEDICARE

## 2020-07-20 PROCEDURE — 99213 OFFICE O/P EST LOW 20 MIN: CPT

## 2020-07-20 PROCEDURE — 36415 COLL VENOUS BLD VENIPUNCTURE: CPT

## 2020-07-20 NOTE — HISTORY OF PRESENT ILLNESS
[de-identified] : Patient referred by Dr. Rodriguez for evaluation of newly diagnosed medullary thyroid cancer.  Patient with over 10 year history of thyroid nodules, biopsy 8 years ago benign.  Patient with multiple malignancies followed with CT scans and PET scans.  Pet scan 5/2017 with uptake in thyroid, no uptake in cervical LNs.  Thyroid US 6/18/18 with multiple nodules left lower 3.5 x 2.2 x 2.1 cm increased from prior study with Ti-RADS 7.  three additional right nodules noted.  Biopsy left medullary thyroid cancer, Thyroseq RET mutation.  Denies dysphagia, hoarseness , had radiation 2007 for prostate cancer.  \par 8/16/18 Total thyroidectomy with CND, pathology 3.5 cm medullary thyroid cancer 0/1 LN,  denies dysphagia, reports mild hoarseness denies parathesias.  questions answered. returns prior to scheduled with concern regarding incision, denies f/.c/s , voice voice normal ruth ann reviewed calcitonin negative, CEA remains elevated, reports last colonoscopy was a year ago, will f/u oncologist and GI. \par Patient diagnosed with medullary thyroid cancer 2 year ago.  on synthroid 200/  denies dysphagia,  change in voice or fatigue, or palpitations. neck US 6/2020  with LNs consistent with CLL, stable nodule in thyroid bed, denies recent illness.

## 2020-07-20 NOTE — ASSESSMENT
[FreeTextEntry1] : doing well, lengthy discussion regarding need for f/u of thyroid cancer, no evidence of recurrence on PE or imaging.  ruth ann sent. will check tumor markers.  if calcitonin increases will possibly need LN biopsy.   repeat  neck US 1/2021    RTO 6 mo  I reviewed the expected course of illness and the intent of current treatment with the patient. I have answered her questions.\par

## 2020-07-20 NOTE — PHYSICAL EXAM
[de-identified] : neck short, thick, no cervical or supraclavicular adenopathy, trachea midline,incision well healed. no palpable masses, scar min discussed [Normal] : extraocular movements are normal [de-identified] : Skin:  normal appearance.  no rash, nodules, vesicles, or erythema,\par Musculoskeletal:  full range of motion and no deformities appreciated\par Neurological:  grossly intact\par Psychiatric:  oriented to person, place and time with appropriate affect

## 2020-07-21 ENCOUNTER — APPOINTMENT (OUTPATIENT)
Dept: SPINE | Facility: CLINIC | Age: 75
End: 2020-07-21
Payer: MEDICARE

## 2020-07-21 VITALS
HEART RATE: 96 BPM | WEIGHT: 235 LBS | RESPIRATION RATE: 12 BRPM | BODY MASS INDEX: 33.64 KG/M2 | TEMPERATURE: 36.4 F | HEIGHT: 70 IN | DIASTOLIC BLOOD PRESSURE: 79 MMHG | OXYGEN SATURATION: 97 % | SYSTOLIC BLOOD PRESSURE: 148 MMHG

## 2020-07-21 PROCEDURE — 99214 OFFICE O/P EST MOD 30 MIN: CPT

## 2020-07-21 NOTE — RESULTS/DATA
[FreeTextEntry1] : Unchanged configuration the hardware.  Good construct, alignment with no evidence of loosening.

## 2020-07-21 NOTE — PHYSICAL EXAM
[Person] : oriented to person [Place] : oriented to place [Time] : oriented to time [Short Term Intact] : short term memory intact [Span Intact] : the attention span was normal [Remote Intact] : remote memory intact [Concentration Intact] : normal concentrating ability [Fluency] : fluency intact [Comprehension] : comprehension intact [Past History] : adequate knowledge of personal past history [Current Events] : adequate knowledge of current events [Vocabulary] : adequate range of vocabulary [Cranial Nerves Optic (II)] : visual acuity intact bilaterally,  pupils equal round and reactive to light [Cranial Nerves Trigeminal (V)] : facial sensation intact symmetrically [Cranial Nerves Facial (VII)] : face symmetrical [Cranial Nerves Oculomotor (III)] : extraocular motion intact [Cranial Nerves Vestibulocochlear (VIII)] : hearing was intact bilaterally [Cranial Nerves Glossopharyngeal (IX)] : tongue and palate midline [Cranial Nerves Accessory (XI - Cranial And Spinal)] : head turning and shoulder shrug symmetric [Motor Tone] : muscle tone was normal in all four extremities [Cranial Nerves Hypoglossal (XII)] : there was no tongue deviation with protrusion [No Muscle Atrophy] : normal bulk in all four extremities [Motor Strength] : muscle strength was normal in all four extremities [Sensation Tactile Decrease] : light touch was intact [Balance] : balance was intact [Abnormal Walk] : normal gait [2+] : Patella right 2+ [Past-pointing] : there was no past-pointing [Tremor] : no tremor present

## 2020-07-21 NOTE — REASON FOR VISIT
[Follow-Up: _____] : a [unfilled] follow-up visit [Spouse] : spouse [FreeTextEntry1] : PRINCESS GALDAMEZ is a 74 year old gentleman who is a patient who underwent a thoracolumbar fusion in October of 2018.  He stated that he is doing better than prior to surgery but has back stiffness.  He has thoracolumbar x rays for review.\par

## 2020-07-22 ENCOUNTER — OUTPATIENT (OUTPATIENT)
Dept: OUTPATIENT SERVICES | Facility: HOSPITAL | Age: 75
LOS: 1 days | Discharge: HOME | End: 2020-07-22

## 2020-07-22 DIAGNOSIS — Z98.890 OTHER SPECIFIED POSTPROCEDURAL STATES: Chronic | ICD-10-CM

## 2020-07-22 DIAGNOSIS — Z90.5 ACQUIRED ABSENCE OF KIDNEY: Chronic | ICD-10-CM

## 2020-07-22 DIAGNOSIS — E89.0 POSTPROCEDURAL HYPOTHYROIDISM: Chronic | ICD-10-CM

## 2020-07-22 DIAGNOSIS — D72.820 LYMPHOCYTOSIS (SYMPTOMATIC): ICD-10-CM

## 2020-07-22 DIAGNOSIS — Z96.651 PRESENCE OF RIGHT ARTIFICIAL KNEE JOINT: Chronic | ICD-10-CM

## 2020-07-22 DIAGNOSIS — Z90.79 ACQUIRED ABSENCE OF OTHER GENITAL ORGAN(S): Chronic | ICD-10-CM

## 2020-07-22 LAB
CALCIT SERPL-MCNC: 3.1 PG/ML
CEA SERPL-MCNC: 1.4 NG/ML
T3 SERPL-MCNC: 62 NG/DL
T4 FREE SERPL-MCNC: 1.7 NG/DL
TSH SERPL-ACNC: 0.57 UIU/ML

## 2020-07-27 ENCOUNTER — APPOINTMENT (OUTPATIENT)
Dept: HEMATOLOGY ONCOLOGY | Facility: CLINIC | Age: 75
End: 2020-07-27
Payer: MEDICARE

## 2020-07-27 ENCOUNTER — RESULT REVIEW (OUTPATIENT)
Age: 75
End: 2020-07-27

## 2020-07-27 VITALS
OXYGEN SATURATION: 98 % | BODY MASS INDEX: 33.72 KG/M2 | WEIGHT: 235 LBS | RESPIRATION RATE: 13 BRPM | TEMPERATURE: 98.4 F | DIASTOLIC BLOOD PRESSURE: 68 MMHG | HEART RATE: 63 BPM | SYSTOLIC BLOOD PRESSURE: 146 MMHG

## 2020-07-27 LAB
BASOPHILS # BLD AUTO: 0 K/UL — SIGNIFICANT CHANGE UP (ref 0–0.2)
BASOPHILS NFR BLD AUTO: 0 % — SIGNIFICANT CHANGE UP (ref 0–2)
EOSINOPHIL # BLD AUTO: 0.76 K/UL — HIGH (ref 0–0.5)
EOSINOPHIL NFR BLD AUTO: 1 % — SIGNIFICANT CHANGE UP (ref 0–6)
HCT VFR BLD CALC: 42.7 % — SIGNIFICANT CHANGE UP (ref 39–50)
HGB BLD-MCNC: 12.9 G/DL — LOW (ref 13–17)
LYMPHOCYTES # BLD AUTO: 70.97 K/UL — HIGH (ref 1–3.3)
LYMPHOCYTES # BLD AUTO: 93 % — HIGH (ref 13–44)
MCHC RBC-ENTMCNC: 27 PG — SIGNIFICANT CHANGE UP (ref 27–34)
MCHC RBC-ENTMCNC: 30.2 GM/DL — LOW (ref 32–36)
MCV RBC AUTO: 89.5 FL — SIGNIFICANT CHANGE UP (ref 80–100)
MONOCYTES # BLD AUTO: 1.53 K/UL — HIGH (ref 0–0.9)
MONOCYTES NFR BLD AUTO: 2 % — SIGNIFICANT CHANGE UP (ref 2–14)
NEUTROPHILS # BLD AUTO: 3.05 K/UL — SIGNIFICANT CHANGE UP (ref 1.8–7.4)
NEUTROPHILS NFR BLD AUTO: 4 % — LOW (ref 43–77)
NRBC # BLD: 0 /100 — SIGNIFICANT CHANGE UP (ref 0–0)
NRBC # BLD: SIGNIFICANT CHANGE UP /100 WBCS (ref 0–0)
PLAT MORPH BLD: NORMAL — SIGNIFICANT CHANGE UP
PLATELET # BLD AUTO: 168 K/UL — SIGNIFICANT CHANGE UP (ref 150–400)
RBC # BLD: 4.77 M/UL — SIGNIFICANT CHANGE UP (ref 4.2–5.8)
RBC # FLD: 15.8 % — HIGH (ref 10.3–14.5)
RBC BLD AUTO: SIGNIFICANT CHANGE UP
WBC # BLD: 76.31 K/UL — CRITICAL HIGH (ref 3.8–10.5)
WBC # FLD AUTO: 76.31 K/UL — CRITICAL HIGH (ref 3.8–10.5)

## 2020-07-27 PROCEDURE — 99214 OFFICE O/P EST MOD 30 MIN: CPT

## 2020-07-27 NOTE — HISTORY OF PRESENT ILLNESS
[de-identified] : 74yo M here for f/u of CLL. He was previously following with Dr. Mccabe. \par \par On March 2,2017, total WBC 11,33, with absolute lymphocyte count 5865.\par On May 25, Total WBC 14.3, ALC 9052.\par Of note, chest CT for follow up of renal cell carcinoma on 5/31 showed slightly enlarged subpectoral and left axillary nodes.\par MRI/PET 6/1 multiple stable retroperitoneal nodes without abnormal FDG uptake. There was mild splenomegaly without abnormal uptake.\par \par Flow cytometry reveals monotypic B cells positive for CD19 didn't CD20 CD23 and C5 negative for FMC-7, CD10 and CD38 consistent with CLL. The monotypic B cells are 41% of cells, equating to 5658 cells\par FISH panel reveals deletion of 11q in 57.5% of cells and deletion of 13q in 67% of cells.\par \par He has had no constitutional symptoms.\par He has a history of prostate carcinoma, 3 cm renal cell carcinoma, 3.5 cm medullary thyroid cancer\par \par His previous CA history is as follows: \par In 2003, he was diagnosed with prostate CA s/p radical prostatectomy\par MRI done for rising PSA in 2009, found to have RCC s/p partial R nephrectomy and RT to prostate bed\par PET scheduled on Monday for rising PSA\par In 8/2018, found to have medullary thyroid CA s/p total thyroidectomy [de-identified] : Pt has been doing well. He denies any fever, chills, night sweats, weight loss.\par His labs from 6/2020 showed WBC 70.5 (ALC 37419), Hgb 13.6, plt 173. \par He was in [place] for the winter, returned end of June (drove back to [place]).  \par \par PET MRI Axumin skullbase to thighs: s/p radical prostatectomy. No recurrence in the prostatectomy bed. No metastatic lymphadenopathy of prostate origin. No liver or lung metastasis.\par Interval increase in size of the abdominal, retroperitoneal and mesenteric lymphadenopathy with correcting increase Axumin uptake since 6/10/19,. No liver infiltration. Splenomegaly (14.6cm), relatively stable since 6/10/19.\par s/p R partial nephrectomy without evidence of local recurrence.\par s/p thyroidectomy. No recurrent mass in the thyroidectomy bed\par

## 2020-07-27 NOTE — ASSESSMENT
[FreeTextEntry1] : 73yo M w/ CLL, 11q-,13q-, being observed\par WBC continues to increase -76.37 today with ALC 70.9. No anemia or thrombocytopenia. He does have increasing size of LAD, largest is ~4.4cm and w/ a conglomerate of mesenteric lymph nodes measuring 6.7 x 4.5cm. SUV ranging 3-5. Splenomegaly 14.6cm. We discussed indications for treatment which pt does not meet at this time. Patient himself is also hesitant to start therapy at this time. He has a long family history of cancer and he expressed to me that his goal if we ever needed to start treatment is to not suffer. Reassured patient. \par RTC 3 months

## 2020-07-28 LAB
ALBUMIN SERPL ELPH-MCNC: 4.8 G/DL
ALP BLD-CCNC: 69 U/L
ALT SERPL-CCNC: 19 U/L
ANION GAP SERPL CALC-SCNC: 13 MMOL/L
AST SERPL-CCNC: 21 U/L
BILIRUB SERPL-MCNC: 0.3 MG/DL
BUN SERPL-MCNC: 20 MG/DL
CALCIUM SERPL-MCNC: 10.2 MG/DL
CHLORIDE SERPL-SCNC: 100 MMOL/L
CO2 SERPL-SCNC: 26 MMOL/L
CREAT SERPL-MCNC: 0.97 MG/DL
GLUCOSE SERPL-MCNC: 108 MG/DL
LDH SERPL-CCNC: 126 U/L
POTASSIUM SERPL-SCNC: 4.7 MMOL/L
PROT SERPL-MCNC: 6.2 G/DL
SODIUM SERPL-SCNC: 139 MMOL/L

## 2020-09-22 NOTE — DISCHARGE NOTE ADULT - MODE OF TRANSPORTATION
"Nicolas Green is a 44 y.o. male here for a non-provider visit for:   FLU    Reason for immunization: Annual Flu Vaccine  Immunization records indicate need for vaccine: Yes, confirmed with Epic  Minimum interval has been met for this vaccine: Yes  ABN completed: Yes    Order and dose verified by: Dr. Carney  VIS Dated  08/15/2019 was given to patient: Yes  All IAC Questionnaire questions were answered \"No.\"    Patient tolerated injection and no adverse effects were observed or reported: Yes    Pt scheduled for next dose in series: No  " car

## 2020-09-25 ENCOUNTER — OUTPATIENT (OUTPATIENT)
Dept: OUTPATIENT SERVICES | Facility: HOSPITAL | Age: 75
LOS: 1 days | Discharge: HOME | End: 2020-09-25

## 2020-09-25 DIAGNOSIS — D72.820 LYMPHOCYTOSIS (SYMPTOMATIC): ICD-10-CM

## 2020-09-25 DIAGNOSIS — Z98.890 OTHER SPECIFIED POSTPROCEDURAL STATES: Chronic | ICD-10-CM

## 2020-09-25 DIAGNOSIS — E89.0 POSTPROCEDURAL HYPOTHYROIDISM: Chronic | ICD-10-CM

## 2020-09-25 DIAGNOSIS — Z96.651 PRESENCE OF RIGHT ARTIFICIAL KNEE JOINT: Chronic | ICD-10-CM

## 2020-09-25 DIAGNOSIS — Z90.5 ACQUIRED ABSENCE OF KIDNEY: Chronic | ICD-10-CM

## 2020-09-25 DIAGNOSIS — Z90.79 ACQUIRED ABSENCE OF OTHER GENITAL ORGAN(S): Chronic | ICD-10-CM

## 2020-10-05 ENCOUNTER — RESULT REVIEW (OUTPATIENT)
Age: 75
End: 2020-10-05

## 2020-10-05 ENCOUNTER — APPOINTMENT (OUTPATIENT)
Dept: HEMATOLOGY ONCOLOGY | Facility: CLINIC | Age: 75
End: 2020-10-05
Payer: MEDICARE

## 2020-10-05 VITALS
WEIGHT: 235 LBS | HEART RATE: 56 BPM | DIASTOLIC BLOOD PRESSURE: 69 MMHG | OXYGEN SATURATION: 97 % | SYSTOLIC BLOOD PRESSURE: 145 MMHG | TEMPERATURE: 97.2 F | HEIGHT: 70 IN | RESPIRATION RATE: 14 BRPM | BODY MASS INDEX: 33.64 KG/M2

## 2020-10-05 LAB
BASOPHILS # BLD AUTO: 0 K/UL — SIGNIFICANT CHANGE UP (ref 0–0.2)
BASOPHILS NFR BLD AUTO: 0 % — SIGNIFICANT CHANGE UP (ref 0–2)
EOSINOPHIL # BLD AUTO: 0 K/UL — SIGNIFICANT CHANGE UP (ref 0–0.5)
EOSINOPHIL NFR BLD AUTO: 0 % — SIGNIFICANT CHANGE UP (ref 0–6)
HCT VFR BLD CALC: 40.5 % — SIGNIFICANT CHANGE UP (ref 39–50)
HGB BLD-MCNC: 12.4 G/DL — LOW (ref 13–17)
LYMPHOCYTES # BLD AUTO: 67.89 K/UL — HIGH (ref 1–3.3)
LYMPHOCYTES # BLD AUTO: 92 % — HIGH (ref 13–44)
MCHC RBC-ENTMCNC: 27.5 PG — SIGNIFICANT CHANGE UP (ref 27–34)
MCHC RBC-ENTMCNC: 30.6 G/DL — LOW (ref 32–36)
MCV RBC AUTO: 89.8 FL — SIGNIFICANT CHANGE UP (ref 80–100)
MONOCYTES # BLD AUTO: 2.21 K/UL — HIGH (ref 0–0.9)
MONOCYTES NFR BLD AUTO: 3 % — SIGNIFICANT CHANGE UP (ref 2–14)
MYELOCYTES NFR BLD: 1 % — HIGH (ref 0–0)
NEUTROPHILS # BLD AUTO: 2.95 K/UL — SIGNIFICANT CHANGE UP (ref 1.8–7.4)
NEUTROPHILS NFR BLD AUTO: 4 % — LOW (ref 43–77)
NRBC # BLD: 0 /100 — SIGNIFICANT CHANGE UP (ref 0–0)
NRBC # BLD: SIGNIFICANT CHANGE UP /100 WBCS (ref 0–0)
PLAT MORPH BLD: NORMAL — SIGNIFICANT CHANGE UP
PLATELET # BLD AUTO: 154 K/UL — SIGNIFICANT CHANGE UP (ref 150–400)
RBC # BLD: 4.51 M/UL — SIGNIFICANT CHANGE UP (ref 4.2–5.8)
RBC # FLD: 15.8 % — HIGH (ref 10.3–14.5)
RBC BLD AUTO: SIGNIFICANT CHANGE UP
SMUDGE CELLS # BLD: PRESENT — SIGNIFICANT CHANGE UP
WBC # BLD: 73.79 K/UL — CRITICAL HIGH (ref 3.8–10.5)
WBC # FLD AUTO: 73.79 K/UL — CRITICAL HIGH (ref 3.8–10.5)

## 2020-10-05 PROCEDURE — 99214 OFFICE O/P EST MOD 30 MIN: CPT

## 2020-10-05 NOTE — HISTORY OF PRESENT ILLNESS
[de-identified] : 72yo M here for f/u of CLL. He was previously following with Dr. Mccabe. \par \par On March 2,2017, total WBC 11,33, with absolute lymphocyte count 5865.\par On May 25, Total WBC 14.3, ALC 9052.\par Of note, chest CT for follow up of renal cell carcinoma on 5/31 showed slightly enlarged subpectoral and left axillary nodes.\par MRI/PET 6/1 multiple stable retroperitoneal nodes without abnormal FDG uptake. There was mild splenomegaly without abnormal uptake.\par \par Flow cytometry reveals monotypic B cells positive for CD19 didn't CD20 CD23 and C5 negative for FMC-7, CD10 and CD38 consistent with CLL. The monotypic B cells are 41% of cells, equating to 5658 cells\par FISH panel reveals deletion of 11q in 57.5% of cells and deletion of 13q in 67% of cells.\par \par He has had no constitutional symptoms.\par He has a history of prostate carcinoma, 3 cm renal cell carcinoma, 3.5 cm medullary thyroid cancer\par \par His previous CA history is as follows: \par In 2003, he was diagnosed with prostate CA s/p radical prostatectomy\par MRI done for rising PSA in 2009, found to have RCC s/p partial R nephrectomy and RT to prostate bed\par PET scheduled on Monday for rising PSA\par In 8/2018, found to have medullary thyroid CA s/p total thyroidectomy [de-identified] : Pt has been doing well. He denies any fever, chills, night sweats, weight loss.\par His labs from 6/2020 showed WBC 70.5 (ALC 94728), Hgb 13.6, plt 173. \par He was in [place] for the winter, returned end of June (drove back to [place]).  \par \par PET MRI Axumin skullbase to thighs: s/p radical prostatectomy. No recurrence in the prostatectomy bed. No metastatic lymphadenopathy of prostate origin. No liver or lung metastasis.\par Interval increase in size of the abdominal, retroperitoneal and mesenteric lymphadenopathy with correcting increase Axumin uptake since 6/10/19,. No liver infiltration. Splenomegaly (14.6cm), relatively stable since 6/10/19.\par s/p R partial nephrectomy without evidence of local recurrence.\par s/p thyroidectomy. No recurrent mass in the thyroidectomy bed\par \par Labs done on 9/16/20 - WBC 78.9\par He will driving down to [place] in 10 days.

## 2020-10-06 LAB
ALBUMIN SERPL ELPH-MCNC: 4.4 G/DL
ALP BLD-CCNC: 72 U/L
ALT SERPL-CCNC: 19 U/L
ANION GAP SERPL CALC-SCNC: 14 MMOL/L
AST SERPL-CCNC: 20 U/L
BILIRUB SERPL-MCNC: 0.3 MG/DL
BUN SERPL-MCNC: 21 MG/DL
CALCIUM SERPL-MCNC: 9.6 MG/DL
CHLORIDE SERPL-SCNC: 102 MMOL/L
CO2 SERPL-SCNC: 24 MMOL/L
CREAT SERPL-MCNC: 1.03 MG/DL
GLUCOSE SERPL-MCNC: 224 MG/DL
LDH SERPL-CCNC: 105 U/L
POTASSIUM SERPL-SCNC: 4.5 MMOL/L
PROT SERPL-MCNC: 5.9 G/DL
SODIUM SERPL-SCNC: 141 MMOL/L

## 2020-12-28 ENCOUNTER — NON-APPOINTMENT (OUTPATIENT)
Age: 75
End: 2020-12-28

## 2021-01-19 ENCOUNTER — APPOINTMENT (OUTPATIENT)
Dept: SURGERY | Facility: CLINIC | Age: 76
End: 2021-01-19
Payer: MEDICARE

## 2021-01-19 PROCEDURE — 99213 OFFICE O/P EST LOW 20 MIN: CPT | Mod: 95

## 2021-01-19 NOTE — ASSESSMENT
[FreeTextEntry1] : doing well, lengthy discussion regarding need for f/u of thyroid cancer, no evidence of recurrence on PE or imaging.  TG with min change, will continue to monitor    repeat  neck US 7/2021    RTO 6 mo  I reviewed the expected course of illness and the intent of current treatment with the patient. I have answered her questions.\par

## 2021-01-19 NOTE — HISTORY OF PRESENT ILLNESS
[Other Location: e.g. School (Enter Location, City,State)___] : at [unfilled], at the time of the visit. [Medical Office: ([facility])___] : at the medical office located in  [Spouse] : spouse [Verbal consent obtained from patient] : the patient, [unfilled] [de-identified] : Patient referred by Dr. Rodriguez for evaluation of newly diagnosed medullary thyroid cancer.  Patient with over 10 year history of thyroid nodules, biopsy 8 years ago benign.  Patient with multiple malignancies followed with CT scans and PET scans.  Pet scan 5/2017 with uptake in thyroid, no uptake in cervical LNs.  Thyroid US 6/18/18 with multiple nodules left lower 3.5 x 2.2 x 2.1 cm increased from prior study with Ti-RADS 7.  three additional right nodules noted.  Biopsy left medullary thyroid cancer, Thyroseq RET mutation.  Denies dysphagia, hoarseness , had radiation 2007 for prostate cancer.  \par 8/16/18 Total thyroidectomy with CND, pathology 3.5 cm medullary thyroid cancer 0/1 LN,  denies dysphagia, reports mild hoarseness denies parathesias.  questions answered. returns prior to scheduled with concern regarding incision, denies f/.c/s , voice voice normal ruth ann reviewed calcitonin negative, CEA remains elevated, reports last colonoscopy was a year ago, will f/u oncologist and GI. \par Patient diagnosed with medullary thyroid cancer 2 year ago.  on synthroid 200/  denies dysphagia,  change in voice or fatigue, or palpitations. neck US 6/2020  with LNs consistent with CLL, stable nodule in thyroid bed, denies recent illness. \par Patient understands, PE not possible on virtual visit.  \par Patient feels well, neck US reviewed, no evidence of recurrent medulary htyroid cancer.  stable persistent LNs c/w known CLL.  patient had ruth ann done with calcitonin 4.0.  reports not available.    Denies dyspahgia, hoarseness or recent illness.

## 2021-01-19 NOTE — PHYSICAL EXAM
[Normal] : no neck adenopathy [de-identified] : neck short, thick, ,incision well healed.  [de-identified] : Skin:  normal appearance.  no rash, nodules, vesicles, or erythema,\par Musculoskeletal:  full range of motion and no deformities appreciated\par Neurological:  grossly intact\par Psychiatric:  oriented to person, place and time with appropriate affect

## 2021-05-07 NOTE — ASSESSMENT
[FreeTextEntry1] : 75yo M w/ CLL, 11q-,13q-, being observed\par WBC stable -73.79 today with ALC 67.89. No anemia or thrombocytopenia. He does have LAD, largest is ~4.4cm and w/ a conglomerate of mesenteric lymph nodes measuring 6.7 x 4.5cm. SUV ranging 3-5. Splenomegaly 14.6cm. We discussed indications for treatment which pt does not meet at this time. Patient himself is also hesitant to start therapy at this time. He has a long family history of cancer and he expressed to me that his goal if we ever needed to start treatment is to not suffer. Reassured patient. \par RTC after returning from [place] Patient/Family

## 2021-05-21 ENCOUNTER — APPOINTMENT (OUTPATIENT)
Dept: ORTHOPEDIC SURGERY | Facility: CLINIC | Age: 76
End: 2021-05-21
Payer: MEDICARE

## 2021-05-21 ENCOUNTER — RESULT REVIEW (OUTPATIENT)
Age: 76
End: 2021-05-21

## 2021-05-21 VITALS
WEIGHT: 235 LBS | SYSTOLIC BLOOD PRESSURE: 158 MMHG | HEART RATE: 49 BPM | DIASTOLIC BLOOD PRESSURE: 71 MMHG | HEIGHT: 70 IN | BODY MASS INDEX: 33.64 KG/M2

## 2021-05-21 PROCEDURE — 99214 OFFICE O/P EST MOD 30 MIN: CPT | Mod: 25

## 2021-05-21 PROCEDURE — 20611 DRAIN/INJ JOINT/BURSA W/US: CPT | Mod: LT

## 2021-05-21 NOTE — DISCUSSION/SUMMARY
[de-identified] : I discussed the underlying pathophysiology of the patient's condition in great detail with the patient. I went over the patient's x-rays with them in great detail. The extent of the patient’s arthritis was discussed in great detail with them. We discussed the use of ice, Tylenol and anti-inflammatories to relieve pain. I stressed the importance of weight loss and its benefits to the patient’s joints and overall health. The patient elected to receive a Monovisc injection into his left knee today, and tolerated it well. I instructed the patient on ROM exercises, and told them to take it easy. The use of ice and rest was reviewed with the patient. The patient may resume activities tomorrow. I reminded the patient that it takes 4 to 6 weeks after the injection to feel symptom relief.\par I informed him that injections such as cortisone and viscosupplementation are short term solutions, and that surgery will eventually be required to provide him with long term relief. He understands that he needs to consider knee replacement within the next year given he is losing motion and he is not getting younger or healthier. He understands that he cannot have surgery until 3 months after his injection today at the earliest. All of his questions were answered. He understands and consents to the plan. \par \par FU 6 weeks.\par after a left knee Monovisc injection today (05/21/2021).\par \par The patient is an 75 year old male with bone on bone arthritis of their left knee. Based upon the patient's continued symptoms and failure to respond to conservative treatment I have recommended a total knee replacement for this patient. A long discussion took place with the patient describing what a total joint replacement is and what the procedure would entail. A total knee model, similar to the implant that will be used during the operation was utilized to demonstrate and to discuss the various bearing surfaces of the implants. The hospitalization and post-operative care and rehabilitation were also discussed. The use of perioperative antibiotics and DVT prophylaxis were discussed. The risk, benefits and alternatives to a surgical intervention were discussed at length with the patient. The patient was also advised of risks related to the medical comorbidities and elevated body mass index (BMI). A lengthy discussion took place to review the most common complications including but not limited to: deep vein thrombosis, pulmonary embolus, heart attack, stroke, infection, wound breakdown, numbness, damage to nerves, tendon, muscles, arteries or other blood vessels, death and other possible complications from anesthesia. The patient was told that we will take steps to minimize these risks by using sterile technique, antibiotics and DVT prophylaxis when appropriate and follow the patient postoperatively in the office setting to monitor progress. The possibility of recurrent pain, no improvement in pain and actual worsening of pain were also discussed with the patient.\par \par The discharge plan of care focused on the patient going home following surgery. I encouraged the patient to make the necessary arrangements to have someone stay with them when they are discharged home. Following discharge, a home care nurse will visit the patient. They will open your home care case and request home physical therapy services. Home physical therapy will commence following discharge provided it is appropriate and covered by his health insurance benefit plan.\par \par The risks, benefits and alternative treatment options of total joint replacement were reviewed with the patient at great length. All questions were answered to the satisfaction of the patient. The patient participated in an interactive discussion of the total knee replacement implant planned for their surgery with questions answered. The patient agreed with the treatment plan, and has decided to move forward with the elective total knee replacement as planned.\par \par PRINCESS GALDAMEZ was seen face to face and needs a commode for bedside use as the patient has no ability to acces the bathroom in their home. The patient also requires a rolling walker as this is needed for activities of daily living within their home secondary to the diagnosis of osteoarthritis.

## 2021-05-21 NOTE — PHYSICAL EXAM
[de-identified] : Constitutional\par o Appearance : well-nourished, well developed, alert, in no acute distress \par Head and Face\par o Head :\par ¦ Inspection : atraumatic, normocephalic\par o Face :\par ¦ Inspection : no visible rash or discoloration\par Respiratory\par o Respiratory Effort: breathing unlabored \par Neurologic\par o Mental Status Examination :\par ¦ Orientation : alert and oriented X 3\par Psychiatric\par o Mood and Affect: mood normal, affect appropriate\par Cardiovascular\par o Observation/Palpation : - no swelling\par Lymphatic\par o Additional Nodes : No palpable lymph nodes present\par \par Right Lower Extremity\par o Buttock : no tenderness, swelling or deformities \par o Right Hip :\par ¦ Inspection/Palpation : no tenderness, swelling or deformities\par ¦ Range of Motion : full and painless in all planes, no crepitance\par ¦ Stability : joint stability intact\par ¦ Strength : extension, flexion, adduction, abduction, internal rotation and external rotation, 5/5\par \par o Right Knee :\par ¦ Inspection/Palpation : no tenderness to palpation, no swelling, incision well-healed \par ¦ Range of Motion : active flexion and extension full and painless, no crepitance\par ¦ Stability : no valgus or varus instability present on provocative testing\par ¦ Strength : flexion and extension 5/5\par ¦ Tests and Signs : negative Anterior Drawer\par \par Left Lower Extremity\par o Buttock : no tenderness, swelling or deformities \par o Left Hip :\par ¦ Inspection/Palpation : no tenderness, no swelling or deformities\par ¦ Range of Motion : full and painless in all planes, no crepitance\par ¦ Stability : joint stability intact\par ¦ Strength : extension, flexion, adduction, abduction, internal rotation and external rotation, 5/5\par \par o Left Knee :\par ¦ Inspection/Palpation : medial and lateral compartment tenderness to palpation, mild swelling, minimal warmth, valgus attitude\par ¦ Range of Motion : 7-110°, pain with full flexion, no crepitance\par ¦ Stability : no valgus or varus instability present on provocative testing\par ¦ Strength : flexion and extension 5/5\par ¦ Tests and Signs : negative Anterior Drawer, negative Lachman, negative Radha\par \par Gait and Station:\par Gait: antalgic on the left, good proprioception and balance, venous stasis of the left leg\par \par Radiology Results:\par o Right Knee : Standing AP, Lateral and skyline views of the knee were obtained and revealed excellent position of his right total knee replacement with central tracking of the patella. \par o Left Knee : Standing AP, lateral and tunnel views of the knee were obtained and revealed bone on bone in the lateral compartment with moderate patellofemoral arthritis.\par \par o Left Knee injection : Indication- knee osteoarthritis, Anatomic location- left intra-articular joint space, Spray - area was sterilized with Betadine and alcohol and anesthetized with Ethyl Chloride, needle used-20G, Medications given- 4cc's Monovisc under Ultrasound guidance. \par Dmitriy# [id number]  oExp: 2023-02-28

## 2021-05-21 NOTE — HISTORY OF PRESENT ILLNESS
[de-identified] : 75 year old male presents complaining of left knee pain that flared recently. There was no injury but his knee is known to be severely arthritic. He notes difficulty walking due to pain. He has been treated viscosupplementation in the past and did well with these injections. His last gel shots were in June 2018. He has not had recent treatment. He is s/p right TKR many years ago. He notes that he recently had a bout of venous cellulitis in his left leg. 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 He has been wearing compression stockings. Pmhx: He takes Metformin for diabetes; reports a HbA1c of 6.7, fasting sugars 120-140.

## 2021-05-21 NOTE — ADDENDUM
[FreeTextEntry1] : I, Storm Zepeda, acted solely as a scribe for Dr. Jamaal Washington on this date 05/21/2021.\par All medical record entries made by the Scribe were at my, Dr. Jamaal Washington, direction and personally dictated by me on 05/21/2021. I have reviewed the chart and agree that the record accurately reflects my personal performance of the history, physical exam, assessment and plan. I have also personally directed, reviewed, and agreed with the chart.

## 2021-06-01 NOTE — CONSULT NOTE ADULT - PROBLEM SELECTOR PROBLEM 5
Writer called and left a generic message on patients voicemail to call clinic back.    Hypertension, unspecified type

## 2021-06-04 ENCOUNTER — OUTPATIENT (OUTPATIENT)
Dept: OUTPATIENT SERVICES | Facility: HOSPITAL | Age: 76
LOS: 1 days | Discharge: HOME | End: 2021-06-04

## 2021-06-04 DIAGNOSIS — D72.820 LYMPHOCYTOSIS (SYMPTOMATIC): ICD-10-CM

## 2021-06-04 DIAGNOSIS — Z98.890 OTHER SPECIFIED POSTPROCEDURAL STATES: Chronic | ICD-10-CM

## 2021-06-04 DIAGNOSIS — Z90.5 ACQUIRED ABSENCE OF KIDNEY: Chronic | ICD-10-CM

## 2021-06-04 DIAGNOSIS — E89.0 POSTPROCEDURAL HYPOTHYROIDISM: Chronic | ICD-10-CM

## 2021-06-04 DIAGNOSIS — Z96.651 PRESENCE OF RIGHT ARTIFICIAL KNEE JOINT: Chronic | ICD-10-CM

## 2021-06-04 DIAGNOSIS — Z90.79 ACQUIRED ABSENCE OF OTHER GENITAL ORGAN(S): Chronic | ICD-10-CM

## 2021-06-07 ENCOUNTER — APPOINTMENT (OUTPATIENT)
Dept: HEMATOLOGY ONCOLOGY | Facility: CLINIC | Age: 76
End: 2021-06-07
Payer: MEDICARE

## 2021-06-07 ENCOUNTER — RESULT REVIEW (OUTPATIENT)
Age: 76
End: 2021-06-07

## 2021-06-07 VITALS
HEIGHT: 70 IN | OXYGEN SATURATION: 99 % | WEIGHT: 235 LBS | BODY MASS INDEX: 33.64 KG/M2 | DIASTOLIC BLOOD PRESSURE: 72 MMHG | RESPIRATION RATE: 17 BRPM | TEMPERATURE: 97 F | HEART RATE: 48 BPM | SYSTOLIC BLOOD PRESSURE: 151 MMHG

## 2021-06-07 LAB
BASOPHILS # BLD AUTO: 0 K/UL — SIGNIFICANT CHANGE UP (ref 0–0.2)
BASOPHILS NFR BLD AUTO: 0 % — SIGNIFICANT CHANGE UP (ref 0–2)
EOSINOPHIL # BLD AUTO: 1.25 K/UL — HIGH (ref 0–0.5)
EOSINOPHIL NFR BLD AUTO: 1 % — SIGNIFICANT CHANGE UP (ref 0–6)
HCT VFR BLD CALC: 37.5 % — LOW (ref 39–50)
HGB BLD-MCNC: 11 G/DL — LOW (ref 13–17)
LYMPHOCYTES # BLD AUTO: 109.89 K/UL — HIGH (ref 1–3.3)
LYMPHOCYTES # BLD AUTO: 88 % — HIGH (ref 13–44)
LYMPHOCYTES # SPEC AUTO: 4 % — HIGH (ref 0–0)
MCHC RBC-ENTMCNC: 27 PG — SIGNIFICANT CHANGE UP (ref 27–34)
MCHC RBC-ENTMCNC: 29.3 G/DL — LOW (ref 32–36)
MCV RBC AUTO: 91.9 FL — SIGNIFICANT CHANGE UP (ref 80–100)
MONOCYTES # BLD AUTO: 3.75 K/UL — HIGH (ref 0–0.9)
MONOCYTES NFR BLD AUTO: 3 % — SIGNIFICANT CHANGE UP (ref 2–14)
NEUTROPHILS # BLD AUTO: 5 K/UL — SIGNIFICANT CHANGE UP (ref 1.8–7.4)
NEUTROPHILS NFR BLD AUTO: 4 % — LOW (ref 43–77)
NRBC # BLD: 0 /100 — SIGNIFICANT CHANGE UP (ref 0–0)
NRBC # BLD: SIGNIFICANT CHANGE UP /100 WBCS (ref 0–0)
PLAT MORPH BLD: NORMAL — SIGNIFICANT CHANGE UP
PLATELET # BLD AUTO: 185 K/UL — SIGNIFICANT CHANGE UP (ref 150–400)
RBC # BLD: 4.08 M/UL — LOW (ref 4.2–5.8)
RBC # FLD: 16.3 % — HIGH (ref 10.3–14.5)
RBC BLD AUTO: SIGNIFICANT CHANGE UP
SMUDGE CELLS # BLD: PRESENT — SIGNIFICANT CHANGE UP
WBC # BLD: 124.88 K/UL — CRITICAL HIGH (ref 3.8–10.5)
WBC # FLD AUTO: 124.88 K/UL — CRITICAL HIGH (ref 3.8–10.5)

## 2021-06-07 PROCEDURE — 99214 OFFICE O/P EST MOD 30 MIN: CPT

## 2021-06-08 LAB
ALBUMIN SERPL ELPH-MCNC: 4.2 G/DL
ALP BLD-CCNC: 73 U/L
ALT SERPL-CCNC: 14 U/L
ANION GAP SERPL CALC-SCNC: 11 MMOL/L
AST SERPL-CCNC: 15 U/L
BILIRUB SERPL-MCNC: 0.2 MG/DL
BUN SERPL-MCNC: 20 MG/DL
CALCIUM SERPL-MCNC: 9.4 MG/DL
CHLORIDE SERPL-SCNC: 106 MMOL/L
CO2 SERPL-SCNC: 21 MMOL/L
CREAT SERPL-MCNC: 0.97 MG/DL
GLUCOSE SERPL-MCNC: 130 MG/DL
LDH SERPL-CCNC: 115 U/L
POTASSIUM SERPL-SCNC: 4.4 MMOL/L
PROT SERPL-MCNC: 5.9 G/DL
SODIUM SERPL-SCNC: 138 MMOL/L

## 2021-06-09 LAB
MANUAL DIF COMMENT BLD-IMP: SIGNIFICANT CHANGE UP

## 2021-06-09 NOTE — HISTORY OF PRESENT ILLNESS
[de-identified] : 72yo M here for f/u of CLL. He was previously following with Dr. Mccabe. \par \par On March 2,2017, total WBC 11,33, with absolute lymphocyte count 5865.\par On May 25, Total WBC 14.3, ALC 9052.\par Of note, chest CT for follow up of renal cell carcinoma on 5/31 showed slightly enlarged subpectoral and left axillary nodes.\par MRI/PET 6/1 multiple stable retroperitoneal nodes without abnormal FDG uptake. There was mild splenomegaly without abnormal uptake.\par \par Flow cytometry reveals monotypic B cells positive for CD19 didn't CD20 CD23 and C5 negative for FMC-7, CD10 and CD38 consistent with CLL. The monotypic B cells are 41% of cells, equating to 5658 cells\par FISH panel reveals deletion of 11q in 57.5% of cells and deletion of 13q in 67% of cells.\par \par He has had no constitutional symptoms.\par He has a history of prostate carcinoma, 3 cm renal cell carcinoma, 3.5 cm medullary thyroid cancer\par \par His previous CA history is as follows: \par In 2003, he was diagnosed with prostate CA s/p radical prostatectomy\par MRI done for rising PSA in 2009, found to have RCC s/p partial R nephrectomy and RT to prostate bed\par PET scheduled on Monday for rising PSA\par In 8/2018, found to have medullary thyroid CA s/p total thyroidectomy [de-identified] : Pt has been doing well. He denies any fever, chills, night sweats, weight loss.\par His labs from 6/2020 showed WBC 70.5 (ALC 22164), Hgb 13.6, plt 173. \par He was in [place] for the winter, returned end of June (drove back to [place]).  \par \par PET MRI Axumin skullbase to thighs: s/p radical prostatectomy. No recurrence in the prostatectomy bed. No metastatic lymphadenopathy of prostate origin. No liver or lung metastasis.\par Interval increase in size of the abdominal, retroperitoneal and mesenteric lymphadenopathy with correcting increase Axumin uptake since 6/10/19,. No liver infiltration. Splenomegaly (14.6cm), relatively stable since 6/10/19.\par s/p R partial nephrectomy without evidence of local recurrence.\par s/p thyroidectomy. No recurrent mass in the thyroidectomy bed\par \par Labs done on 9/16/20 - WBC 78.9. Repeat on 10/6/20 was 73.79. \par He went to [place] for the winter. Labs done in [place] on 5/3/21 when he had cellulitis showed WBC of 143.4. \par \par He got back from [place] on 5/20. Cellulitis flared up again about 4 days later. He went to see his PMD and received a course of Cefadoxil x10d started 5/27.\par He saw Dr. Mckeon vascular surgeon today

## 2021-06-09 NOTE — ASSESSMENT
[FreeTextEntry1] : 76yo M w/ CLL, 11q-,13q-, being observed\par WBC higher than last visit -124.88 today with .89. No anemia or thrombocytopenia. He does have LAD, largest is ~4.4cm and w/ a conglomerate of mesenteric lymph nodes measuring 6.7 x 4.5cm. SUV ranging 3-5. Splenomegaly 14.6cm. We discussed indications for treatment which pt does not meet at this time. Patient himself is also hesitant to start therapy at this time. He has a long family history of cancer and he expressed to me that his goal if we ever needed to start treatment is to not suffer. Reassured patient. \par RTC 3 mo

## 2021-06-23 NOTE — H&P PST ADULT - PROBLEM SELECTOR PLAN 1
Scheduled for L1-5 Posterior lumbar fusion, T10-Pelvis instrumentation and fusion.  Swab for MRSA pending.  Labs pending.    Patient self donating blood-donor #[id number]; File #[id number], Pt. [id number]  Medical evaluation scheduled for 10/11/18  Patient will be off Tramadol after 10/4/18, he may continue hydromorphone. Scheduled for L1-5 Posterior lumbar fusion, T10-Pelvis instrumentation and fusion.  Swab for MRSA pending.  Labs pending.    Instructed that last dose of ASA and tramadol is to be 10/11/18, patient stopped ASA 10/2/18, has been titrating off tramadol and taking hydromorphone.  Patient self donating blood-donor #[id number]; File #[id number], Pt. [id number]  Medical evaluation scheduled for 10/11/18  Patient will be off Tramadol after 10/4/18, he may continue hydromorphone. Valtrex Counseling: I discussed with the patient the risks of valacyclovir including but not limited to kidney damage, nausea, vomiting and severe allergy.  The patient understands that if the infection seems to be worsening or is not improving, they are to call.

## 2021-06-29 ENCOUNTER — APPOINTMENT (OUTPATIENT)
Dept: RADIOLOGY | Facility: CLINIC | Age: 76
End: 2021-06-29

## 2021-06-29 ENCOUNTER — OUTPATIENT (OUTPATIENT)
Dept: OUTPATIENT SERVICES | Facility: HOSPITAL | Age: 76
LOS: 1 days | End: 2021-06-29
Payer: MEDICARE

## 2021-06-29 ENCOUNTER — APPOINTMENT (OUTPATIENT)
Dept: ULTRASOUND IMAGING | Facility: CLINIC | Age: 76
End: 2021-06-29

## 2021-06-29 DIAGNOSIS — Z98.890 OTHER SPECIFIED POSTPROCEDURAL STATES: Chronic | ICD-10-CM

## 2021-06-29 DIAGNOSIS — Z00.8 ENCOUNTER FOR OTHER GENERAL EXAMINATION: ICD-10-CM

## 2021-06-29 DIAGNOSIS — E89.0 POSTPROCEDURAL HYPOTHYROIDISM: Chronic | ICD-10-CM

## 2021-06-29 DIAGNOSIS — Z90.5 ACQUIRED ABSENCE OF KIDNEY: Chronic | ICD-10-CM

## 2021-06-29 DIAGNOSIS — Z96.651 PRESENCE OF RIGHT ARTIFICIAL KNEE JOINT: Chronic | ICD-10-CM

## 2021-06-29 DIAGNOSIS — Z90.79 ACQUIRED ABSENCE OF OTHER GENITAL ORGAN(S): Chronic | ICD-10-CM

## 2021-06-29 PROCEDURE — 76536 US EXAM OF HEAD AND NECK: CPT | Mod: 26

## 2021-06-29 PROCEDURE — 76536 US EXAM OF HEAD AND NECK: CPT

## 2021-06-29 PROCEDURE — 72070 X-RAY EXAM THORAC SPINE 2VWS: CPT | Mod: 26

## 2021-06-29 PROCEDURE — 72100 X-RAY EXAM L-S SPINE 2/3 VWS: CPT

## 2021-06-29 PROCEDURE — 72100 X-RAY EXAM L-S SPINE 2/3 VWS: CPT | Mod: 26

## 2021-06-29 PROCEDURE — 72070 X-RAY EXAM THORAC SPINE 2VWS: CPT

## 2021-07-02 ENCOUNTER — APPOINTMENT (OUTPATIENT)
Dept: ORTHOPEDIC SURGERY | Facility: CLINIC | Age: 76
End: 2021-07-02
Payer: MEDICARE

## 2021-07-02 VITALS — WEIGHT: 235 LBS | BODY MASS INDEX: 33.64 KG/M2 | HEIGHT: 70 IN

## 2021-07-02 PROCEDURE — 20610 DRAIN/INJ JOINT/BURSA W/O US: CPT | Mod: LT

## 2021-07-02 PROCEDURE — 99214 OFFICE O/P EST MOD 30 MIN: CPT | Mod: 25

## 2021-07-02 NOTE — HISTORY OF PRESENT ILLNESS
[de-identified] : 75 year old male presents complaining of left knee pain. He is s/p right TKR many years ago. He is diligent with his home exercises. He received a left knee Monovisc injection on 5/21/2021 and feels he is about 75% better. He is still looking for more improvement and would like a cortisone injection. Pmhx: He takes Metformin for diabetes; reports an A1c of 6.7, fasting sugars 120-140. He notes cortisone has raised his sugars but they never go above 170, especially if he watches his carbohydrate intake.\par \par Radiology Results taken on 5/21/2021:\par o Right Knee : Standing AP, Lateral and skyline views of the knee were obtained and revealed excellent position of his right total knee replacement with central tracking of the patella. \par o Left Knee : Standing AP, lateral and tunnel views of the knee were obtained and revealed bone on bone in the lateral compartment with moderate patellofemoral arthritis.

## 2021-07-02 NOTE — DISCUSSION/SUMMARY
[de-identified] : I went over the pathophysiology of the patient's symptoms in great detail with the patient. The patient elected to receive a cortisone injection into his left knee today, and tolerated it well. I instructed the patient on ROM exercises, and told them to take it easy. The use of ice and rest was reviewed with the patient. The patient may resume activities tomorrow. He was instructed to follow his sugar levels carefully over the weekend. All of his questions were answered. He understands and consents to the plan. \par \par FU 1 month.\par after a left knee cortisone injection today (07/02/2021). \par \par The patient is an 75 year old male with bone on bone arthritis of their left knee. Based upon the patient's continued symptoms and failure to respond to conservative treatment I have recommended a total knee replacement for this patient. A long discussion took place with the patient describing what a total joint replacement is and what the procedure would entail. A total knee model, similar to the implant that will be used during the operation was utilized to demonstrate and to discuss the various bearing surfaces of the implants. The hospitalization and post-operative care and rehabilitation were also discussed. The use of perioperative antibiotics and DVT prophylaxis were discussed. The risk, benefits and alternatives to a surgical intervention were discussed at length with the patient. The patient was also advised of risks related to the medical comorbidities and elevated body mass index (BMI). A lengthy discussion took place to review the most common complications including but not limited to: deep vein thrombosis, pulmonary embolus, heart attack, stroke, infection, wound breakdown, numbness, damage to nerves, tendon, muscles, arteries or other blood vessels, death and other possible complications from anesthesia. The patient was told that we will take steps to minimize these risks by using sterile technique, antibiotics and DVT prophylaxis when appropriate and follow the patient postoperatively in the office setting to monitor progress. The possibility of recurrent pain, no improvement in pain and actual worsening of pain were also discussed with the patient.\par \par The discharge plan of care focused on the patient going home following surgery. I encouraged the patient to make the necessary arrangements to have someone stay with them when they are discharged home. Following discharge, a home care nurse will visit the patient. They will open your home care case and request home physical therapy services. Home physical therapy will commence following discharge provided it is appropriate and covered by his health insurance benefit plan.\par \par The risks, benefits and alternative treatment options of total joint replacement were reviewed with the patient at great length. All questions were answered to the satisfaction of the patient. The patient participated in an interactive discussion of the total knee replacement implant planned for their surgery with questions answered. The patient agreed with the treatment plan, and has decided to move forward with the elective total knee replacement as planned.\par \par PRINCESS GALDAMEZ was seen face to face and needs a commode for bedside use as the patient has no ability to acces the bathroom in their home. The patient also requires a rolling walker as this is needed for activities of daily living within their home secondary to the diagnosis of osteoarthritis.

## 2021-07-02 NOTE — ADDENDUM
[FreeTextEntry1] : I, Storm Zepeda, acted solely as a scribe for Dr. Jamaal Washington on this date 07/02/2021.\par All medical record entries made by the Scribe were at my, Dr. Jamaal Washington, direction and personally dictated by me on 07/02/2021. I have reviewed the chart and agree that the record accurately reflects my personal performance of the history, physical exam, assessment and plan. I have also personally directed, reviewed, and agreed with the chart.

## 2021-07-02 NOTE — PHYSICAL EXAM
[de-identified] : Constitutional\par o Appearance : well-nourished, well developed, alert, in no acute distress \par Head and Face\par o Head :\par ¦ Inspection : atraumatic, normocephalic\par o Face :\par ¦ Inspection : no visible rash or discoloration\par Respiratory\par o Respiratory Effort: breathing unlabored \par Neurologic\par o Mental Status Examination :\par ¦ Orientation : alert and oriented X 3\par Psychiatric\par o Mood and Affect: mood normal, affect appropriate\par Cardiovascular\par o Observation/Palpation : - no swelling\par Lymphatic\par o Additional Nodes : No palpable lymph nodes present\par \par Right Lower Extremity\par o Buttock : no tenderness, swelling or deformities \par o Right Hip :\par ¦ Inspection/Palpation : no tenderness, swelling or deformities\par ¦ Range of Motion : full and painless in all planes, no crepitance\par ¦ Stability : joint stability intact\par ¦ Strength : extension, flexion, adduction, abduction, internal rotation and external rotation, 5/5\par \par o Right Knee :\par ¦ Inspection/Palpation : no tenderness to palpation, no swelling, incision well-healed \par ¦ Range of Motion : active flexion and extension full and painless, no crepitance\par ¦ Stability : no valgus or varus instability present on provocative testing\par ¦ Strength : flexion and extension 5/5\par ¦ Tests and Signs : negative Anterior Drawer\par \par Left Lower Extremity\par o Buttock : no tenderness, swelling or deformities \par o Left Hip :\par ¦ Inspection/Palpation : no tenderness, no swelling or deformities\par ¦ Range of Motion : full and painless in all planes, no crepitance\par ¦ Stability : joint stability intact\par ¦ Strength : extension, flexion, adduction, abduction, internal rotation and external rotation, 5/5\par \par o Left Knee :\par ¦ Inspection/Palpation : medial compartment tenderness, no lateral compartment tenderness, no swelling, valgus attitude\par ¦ Range of Motion : 5-120°, pain with full flexion, no crepitance\par ¦ Stability : no valgus or varus instability present on provocative testing\par ¦ Strength : flexion and extension 5/5\par ¦ Tests and Signs : negative Anterior Drawer, negative Lachman, negative Radha\par \par Gait and Station:\par Gait: antalgic on the left, good proprioception and balance, venous stasis of the left leg\par \par o Left Knee injection : Indication- knee osteoarthritis, Anatomic location- left intra-articular joint space, Spray - area was sterilized with Betadine and alcohol and anesthetized with Ethyl Chloride , needle used-20G, Medications given- 5cc's lidocaine, 0.5cc's kenalog, 0.5 cc's dexamethasone, and patient tolerated it well.

## 2021-07-08 ENCOUNTER — TRANSCRIPTION ENCOUNTER (OUTPATIENT)
Age: 76
End: 2021-07-08

## 2021-07-13 ENCOUNTER — APPOINTMENT (OUTPATIENT)
Dept: SPINE | Facility: CLINIC | Age: 76
End: 2021-07-13
Payer: MEDICARE

## 2021-07-13 VITALS
DIASTOLIC BLOOD PRESSURE: 66 MMHG | OXYGEN SATURATION: 96 % | HEART RATE: 48 BPM | SYSTOLIC BLOOD PRESSURE: 147 MMHG | WEIGHT: 235 LBS | HEIGHT: 70 IN | BODY MASS INDEX: 33.64 KG/M2

## 2021-07-13 DIAGNOSIS — Z98.1 ARTHRODESIS STATUS: ICD-10-CM

## 2021-07-13 DIAGNOSIS — G89.29 DORSALGIA, UNSPECIFIED: ICD-10-CM

## 2021-07-13 DIAGNOSIS — M54.9 DORSALGIA, UNSPECIFIED: ICD-10-CM

## 2021-07-13 PROCEDURE — 99211 OFF/OP EST MAY X REQ PHY/QHP: CPT

## 2021-07-13 NOTE — REASON FOR VISIT
[Follow-Up: _____] : a [unfilled] follow-up visit [FreeTextEntry1] : Today he is here for x-ray review\par Today he reports Left sided Lowerback pain since the surgery \par Pain is intermittent and is relieved with icepack and heat

## 2021-07-13 NOTE — PHYSICAL EXAM
[General Appearance - Alert] : alert [General Appearance - In No Acute Distress] : in no acute distress [Oriented To Time, Place, And Person] : oriented to person, place, and time [Impaired Insight] : insight and judgment were intact [Cranial Nerves Optic (II)] : visual acuity intact bilaterally,  pupils equal round and reactive to light [Cranial Nerves Oculomotor (III)] : extraocular motion intact [Cranial Nerves Trigeminal (V)] : facial sensation intact symmetrically [Cranial Nerves Facial (VII)] : face symmetrical [Cranial Nerves Vestibulocochlear (VIII)] : hearing was intact bilaterally [Cranial Nerves Glossopharyngeal (IX)] : tongue and palate midline [Cranial Nerves Accessory (XI - Cranial And Spinal)] : head turning and shoulder shrug symmetric [Cranial Nerves Hypoglossal (XII)] : there was no tongue deviation with protrusion [Neck Appearance] : the appearance of the neck was normal [] : no respiratory distress [Heart Rate And Rhythm] : heart rate was normal and rhythm regular [Abnormal Walk] : normal gait [Skin Color & Pigmentation] : normal skin color and pigmentation

## 2021-07-13 NOTE — ASSESSMENT
[FreeTextEntry1] : 75 Year old Man S/P Lumbar Fusion\par \par Persistent Left sided lowerback pain better with conservative management\par \par Follow up as needed

## 2021-07-13 NOTE — END OF VISIT
[FreeTextEntry3] : I, Dr. Cecilia House, evaluated the patient with the nurse practitioner Brad Ceja and established the plan of care. I personally discuss this patient with the nurse practitioner at the time of the visit. I agree with the assessment and plan as written, unless noted below.\par \par

## 2021-07-15 ENCOUNTER — APPOINTMENT (OUTPATIENT)
Dept: SURGERY | Facility: CLINIC | Age: 76
End: 2021-07-15
Payer: MEDICARE

## 2021-07-15 ENCOUNTER — LABORATORY RESULT (OUTPATIENT)
Age: 76
End: 2021-07-15

## 2021-07-15 PROCEDURE — 99213 OFFICE O/P EST LOW 20 MIN: CPT

## 2021-07-15 PROCEDURE — 36415 COLL VENOUS BLD VENIPUNCTURE: CPT

## 2021-07-15 NOTE — PHYSICAL EXAM
[de-identified] : neck short, thick, ,incision well healed.  [Normal] : no neck adenopathy [de-identified] : Skin:  normal appearance.  no rash, nodules, vesicles, or erythema,\par Musculoskeletal:  full range of motion and no deformities appreciated\par Neurological:  grossly intact\par Psychiatric:  oriented to person, place and time with appropriate affect

## 2021-07-15 NOTE — HISTORY OF PRESENT ILLNESS
[de-identified] : Patient referred by Dr. Rodriguez for evaluation of newly diagnosed medullary thyroid cancer.  Patient with over 10 year history of thyroid nodules, biopsy 8 years ago benign.  Patient with multiple malignancies followed with CT scans and PET scans.  Pet scan 5/2017 with uptake in thyroid, no uptake in cervical LNs.  Thyroid US 6/18/18 with multiple nodules left lower 3.5 x 2.2 x 2.1 cm increased from prior study with Ti-RADS 7.  three additional right nodules noted.  Biopsy left medullary thyroid cancer, Thyroseq RET mutation.  Denies dysphagia, hoarseness , had radiation 2007 for prostate cancer.  \par 8/16/18 Total thyroidectomy with CND, pathology 3.5 cm medullary thyroid cancer 0/1 LN,  denies dysphagia, reports mild hoarseness denies parathesias.  questions answered. returns prior to scheduled with concern regarding incision, denies f/.c/s , voice voice normal ruth ann reviewed calcitonin negative, CEA remains elevated, reports last colonoscopy was a year ago, will f/u oncologist and GI. \par Patient diagnosed with medullary thyroid cancer 2 year ago.  on synthroid 200/  denies dysphagia,  change in voice or fatigue, or palpitations. neck US 6/2020  with LNs consistent with CLL, stable nodule in thyroid bed, denies recent illness. \par Patient understands, PE not possible on virtual visit.  \par Patient feels well, neck US 6/2021  reviewed, no evidence of recurrent medullary thyroid cancer.  stable persistent LNs c/w known CLL. no recent calcitonin .    Denies dysphagia, hoarseness or recent illness. I have reviewed all old and new data and available images.

## 2021-07-15 NOTE — ASSESSMENT
[FreeTextEntry1] : doing well, lengthy discussion regarding need for f/u of thyroid cancer, no evidence of recurrence on PE or imaging.  TG with min change, will continue to monitor    ruth ann sent   RTO 6 mo  I reviewed the expected course of illness and the intent of current treatment with the patient. I have answered her questions.\par

## 2021-07-20 ENCOUNTER — NON-APPOINTMENT (OUTPATIENT)
Age: 76
End: 2021-07-20

## 2021-07-20 LAB
CALCIT SERPL-MCNC: 11 PG/ML
CEA SERPL-MCNC: 1.4 NG/ML
T3 SERPL-MCNC: 74 NG/DL
T4 FREE SERPL-MCNC: 1.7 NG/DL
THYROGLOB AB SERPL-ACNC: <20 IU/ML
THYROGLOB SERPL-MCNC: 0.3 NG/ML
TSH SERPL-ACNC: 0.67 UIU/ML

## 2021-07-31 NOTE — CHART NOTE - NSCHARTNOTEFT_GEN_A_CORE
Patient was lying flat and Right IJ was removed under standard fashion. Direct pressure was applied to stop bleeding and Tegaderm dressing was applied after hemostasis achieved. Patient tolerated procedure well. Vitals stable during the procedure. Patient's RN was instructed to check dressing frequently. No complications. fair plus

## 2021-08-20 ENCOUNTER — APPOINTMENT (OUTPATIENT)
Dept: ORTHOPEDIC SURGERY | Facility: CLINIC | Age: 76
End: 2021-08-20
Payer: MEDICARE

## 2021-08-20 DIAGNOSIS — Z96.651 PRESENCE OF RIGHT ARTIFICIAL KNEE JOINT: ICD-10-CM

## 2021-08-20 PROCEDURE — 99214 OFFICE O/P EST MOD 30 MIN: CPT

## 2021-08-20 NOTE — DISCUSSION/SUMMARY
[de-identified] : I went over the pathophysiology of the patient's symptoms in great detail with the patient. I informed him he is eligible for another Monovisc injection after 11/21/2021. I stressed the importance of weight loss and its benefits to the patient’s joints and overall health. All of his questions were answered. He understands and consents to the plan.\par \par FU 3 months.\par after focusing on weight loss.\par \par The patient is an 75 year old male with bone on bone arthritis of their left knee. Based upon the patient's continued symptoms and failure to respond to conservative treatment I have recommended a total knee replacement for this patient. A long discussion took place with the patient describing what a total joint replacement is and what the procedure would entail. A total knee model, similar to the implant that will be used during the operation was utilized to demonstrate and to discuss the various bearing surfaces of the implants. The hospitalization and post-operative care and rehabilitation were also discussed. The use of perioperative antibiotics and DVT prophylaxis were discussed. The risk, benefits and alternatives to a surgical intervention were discussed at length with the patient. The patient was also advised of risks related to the medical comorbidities and elevated body mass index (BMI). A lengthy discussion took place to review the most common complications including but not limited to: deep vein thrombosis, pulmonary embolus, heart attack, stroke, infection, wound breakdown, numbness, damage to nerves, tendon, muscles, arteries or other blood vessels, death and other possible complications from anesthesia. The patient was told that we will take steps to minimize these risks by using sterile technique, antibiotics and DVT prophylaxis when appropriate and follow the patient postoperatively in the office setting to monitor progress. The possibility of recurrent pain, no improvement in pain and actual worsening of pain were also discussed with the patient.\par \par The discharge plan of care focused on the patient going home following surgery. I encouraged the patient to make the necessary arrangements to have someone stay with them when they are discharged home. Following discharge, a home care nurse will visit the patient. They will open your home care case and request home physical therapy services. Home physical therapy will commence following discharge provided it is appropriate and covered by his health insurance benefit plan.\par \par The risks, benefits and alternative treatment options of total joint replacement were reviewed with the patient at great length. All questions were answered to the satisfaction of the patient. The patient participated in an interactive discussion of the total knee replacement implant planned for their surgery with questions answered. The patient agreed with the treatment plan, and has decided to move forward with the elective total knee replacement as planned.\par \par PRINCESS GALDAMEZ was seen face to face and needs a commode for bedside use as the patient has no ability to acces the bathroom in their home. The patient also requires a rolling walker as this is needed for activities of daily living within their home secondary to the diagnosis of osteoarthritis.

## 2021-08-20 NOTE — HISTORY OF PRESENT ILLNESS
[de-identified] : 75 year old male presents complaining of left knee pain. He is diligent with his home exercises. He received a Monovisc injection on 5/21/2021 and noted 75% improvement. He then had a cortisone injection on 7/2/2021 and is feeling much better. He notes difficulty when ascending and descending steps. Of note, He recently had a bout of shingles. He was not vaccinated. Pmhx: He is s/p right TKR many years ago. He is diabetic and takes Metformin; notes A1c of 6.7%, fasting sugars 120-140. He claims cortisone has raised his sugars but never above 170, especially if he watches his carbohydrate intake.\par \par Radiology Results taken 5/21/2021:\par o Right Knee : Standing AP, Lateral and skyline views of the knee were obtained and revealed excellent position of his right total knee replacement with central tracking of the patella. \par o Left Knee : Standing AP, lateral and tunnel views of the knee were obtained and revealed bone on bone in the lateral compartment with moderate patellofemoral arthritis.

## 2021-08-20 NOTE — PHYSICAL EXAM
[de-identified] : Constitutional\par o Appearance : well-nourished, well developed, alert, in no acute distress \par Head and Face\par o Head :\par ¦ Inspection : atraumatic, normocephalic\par o Face :\par ¦ Inspection : no visible rash or discoloration\par Respiratory\par o Respiratory Effort: breathing unlabored \par Neurologic\par o Mental Status Examination :\par ¦ Orientation : alert and oriented X 3\par Psychiatric\par o Mood and Affect: mood normal, affect appropriate\par Cardiovascular\par o Observation/Palpation : - no swelling\par Lymphatic\par o Additional Nodes : No palpable lymph nodes present\par \par Right Lower Extremity\par o Buttock : no tenderness, swelling or deformities \par o Right Hip :\par ¦ Inspection/Palpation : no tenderness, swelling or deformities\par ¦ Range of Motion : full and painless in all planes, no crepitance\par ¦ Stability : joint stability intact\par ¦ Strength : extension, flexion, adduction, abduction, internal rotation and external rotation, 4+/5\par \par o Right Knee :\par ¦ Inspection/Palpation : no tenderness to palpation, no swelling, incision well-healed \par ¦ Range of Motion : active flexion and extension full and painless, no crepitance\par ¦ Stability : no valgus or varus instability present on provocative testing\par ¦ Strength : flexion and extension 4+/5\par ¦ Tests and Signs : negative Anterior Drawer\par \par Left Lower Extremity\par o Buttock : no tenderness, swelling or deformities \par o Left Hip :\par ¦ Inspection/Palpation : no tenderness, no swelling or deformities\par ¦ Range of Motion : full and painless in all planes, no crepitance\par ¦ Stability : joint stability intact\par ¦ Strength : extension, flexion, adduction, abduction, internal rotation and external rotation, 4+/5\par \par o Left Knee :\par ¦ Inspection/Palpation : no medial or lateral compartment tenderness, medial facet tenderness, no swelling, valgus attitude\par ¦ Range of Motion : FROM but pain with full flexion, no crepitance\par ¦ Stability : no valgus or varus instability present on provocative testing\par ¦ Strength : flexion and extension 4+/5\par ¦ Tests and Signs : negative Anterior Drawer, negative Lachman, negative Radha\par \par Gait and Station:\par Gait: normal gait, good proprioception and balance, trophic changes of the left leg

## 2021-08-20 NOTE — ADDENDUM
[FreeTextEntry1] : I, Storm Zepeda, acted solely as a scribe for Dr. Jamaal Washington on this date 08/20/2021.\par All medical record entries made by the Scribe were at my, Dr. Jamaal Washington, direction and personally dictated by me on 08/20/2021. I have reviewed the chart and agree that the record accurately reflects my personal performance of the history, physical exam, assessment and plan. I have also personally directed, reviewed, and agreed with the chart.

## 2021-09-10 ENCOUNTER — OUTPATIENT (OUTPATIENT)
Dept: OUTPATIENT SERVICES | Facility: HOSPITAL | Age: 76
LOS: 1 days | Discharge: HOME | End: 2021-09-10

## 2021-09-10 DIAGNOSIS — Z90.79 ACQUIRED ABSENCE OF OTHER GENITAL ORGAN(S): Chronic | ICD-10-CM

## 2021-09-10 DIAGNOSIS — E89.0 POSTPROCEDURAL HYPOTHYROIDISM: Chronic | ICD-10-CM

## 2021-09-10 DIAGNOSIS — Z98.890 OTHER SPECIFIED POSTPROCEDURAL STATES: Chronic | ICD-10-CM

## 2021-09-10 DIAGNOSIS — Z90.5 ACQUIRED ABSENCE OF KIDNEY: Chronic | ICD-10-CM

## 2021-09-10 DIAGNOSIS — D72.820 LYMPHOCYTOSIS (SYMPTOMATIC): ICD-10-CM

## 2021-09-10 DIAGNOSIS — Z96.651 PRESENCE OF RIGHT ARTIFICIAL KNEE JOINT: Chronic | ICD-10-CM

## 2021-09-13 ENCOUNTER — RESULT REVIEW (OUTPATIENT)
Age: 76
End: 2021-09-13

## 2021-09-13 ENCOUNTER — APPOINTMENT (OUTPATIENT)
Dept: HEMATOLOGY ONCOLOGY | Facility: CLINIC | Age: 76
End: 2021-09-13
Payer: MEDICARE

## 2021-09-13 VITALS
SYSTOLIC BLOOD PRESSURE: 152 MMHG | HEART RATE: 50 BPM | WEIGHT: 234.99 LBS | OXYGEN SATURATION: 95 % | TEMPERATURE: 97.2 F | BODY MASS INDEX: 33.64 KG/M2 | HEIGHT: 70 IN | RESPIRATION RATE: 17 BRPM | DIASTOLIC BLOOD PRESSURE: 79 MMHG

## 2021-09-13 LAB
BASOPHILS # BLD AUTO: 0 K/UL — SIGNIFICANT CHANGE UP (ref 0–0.2)
BASOPHILS NFR BLD AUTO: 0 % — SIGNIFICANT CHANGE UP (ref 0–2)
EOSINOPHIL # BLD AUTO: 1.37 K/UL — HIGH (ref 0–0.5)
EOSINOPHIL NFR BLD AUTO: 1 % — SIGNIFICANT CHANGE UP (ref 0–6)
HCT VFR BLD CALC: 36 % — LOW (ref 39–50)
HGB BLD-MCNC: 10.9 G/DL — LOW (ref 13–17)
LYMPHOCYTES # BLD AUTO: 122.12 K/UL — HIGH (ref 1–3.3)
LYMPHOCYTES # BLD AUTO: 89 % — HIGH (ref 13–44)
LYMPHOCYTES # SPEC AUTO: 5 % — HIGH (ref 0–0)
MCHC RBC-ENTMCNC: 27.9 PG — SIGNIFICANT CHANGE UP (ref 27–34)
MCHC RBC-ENTMCNC: 30.3 G/DL — LOW (ref 32–36)
MCV RBC AUTO: 92.3 FL — SIGNIFICANT CHANGE UP (ref 80–100)
MONOCYTES # BLD AUTO: 0 K/UL — SIGNIFICANT CHANGE UP (ref 0–0.9)
MONOCYTES NFR BLD AUTO: 0 % — LOW (ref 2–14)
NEUTROPHILS # BLD AUTO: 6.86 K/UL — SIGNIFICANT CHANGE UP (ref 1.8–7.4)
NEUTROPHILS NFR BLD AUTO: 5 % — LOW (ref 43–77)
NRBC # BLD: 0 /100 — SIGNIFICANT CHANGE UP (ref 0–0)
NRBC # BLD: SIGNIFICANT CHANGE UP /100 WBCS (ref 0–0)
PLAT MORPH BLD: NORMAL — SIGNIFICANT CHANGE UP
PLATELET # BLD AUTO: 135 K/UL — LOW (ref 150–400)
RBC # BLD: 3.9 M/UL — LOW (ref 4.2–5.8)
RBC # FLD: 17.4 % — HIGH (ref 10.3–14.5)
RBC BLD AUTO: SIGNIFICANT CHANGE UP
SMUDGE CELLS # BLD: PRESENT — SIGNIFICANT CHANGE UP
WBC # BLD: 137.21 K/UL — CRITICAL HIGH (ref 3.8–10.5)
WBC # FLD AUTO: 137.21 K/UL — CRITICAL HIGH (ref 3.8–10.5)

## 2021-09-13 PROCEDURE — 99213 OFFICE O/P EST LOW 20 MIN: CPT

## 2021-09-13 NOTE — ASSESSMENT
[FreeTextEntry1] : 76yo M w/ CLL, 11q-,13q-, being observed\par WBC higher than last visit -137.21 today with .12. Mild anemia or thrombocytopenia, slightly worse than previous. \par He does have LAD, largest is ~4.4cm and w/ a conglomerate of mesenteric lymph nodes measuring 6.7 x 4.5cm. SUV ranging 3-5. Splenomegaly 14.6cm. We discussed indications for treatment which pt does not meet at this time. Patient himself is also hesitant to start therapy at this time. He has a long family history of cancer and he expressed to me that his goal if we ever needed to start treatment is to not suffer. \par Will check abd charityo for spleen size given LUQ pain, although more likely postherpetic\par Pt is going to [place] next month - will have labs done there\par RTC when he returns from [place] in [place]

## 2021-09-13 NOTE — HISTORY OF PRESENT ILLNESS
[de-identified] : 74yo M here for f/u of CLL. He was previously following with Dr. Mccabe. \par \par On March 2,2017, total WBC 11,33, with absolute lymphocyte count 5865.\par On May 25, Total WBC 14.3, ALC 9052.\par Of note, chest CT for follow up of renal cell carcinoma on 5/31 showed slightly enlarged subpectoral and left axillary nodes.\par MRI/PET 6/1 multiple stable retroperitoneal nodes without abnormal FDG uptake. There was mild splenomegaly without abnormal uptake.\par \par Flow cytometry reveals monotypic B cells positive for CD19 didn't CD20 CD23 and C5 negative for FMC-7, CD10 and CD38 consistent with CLL. The monotypic B cells are 41% of cells, equating to 5658 cells\par FISH panel reveals deletion of 11q in 57.5% of cells and deletion of 13q in 67% of cells.\par \par He has had no constitutional symptoms.\par He has a history of prostate carcinoma, 3 cm renal cell carcinoma, 3.5 cm medullary thyroid cancer\par \par His previous CA history is as follows: \par In 2003, he was diagnosed with prostate CA s/p radical prostatectomy\par MRI done for rising PSA in 2009, found to have RCC s/p partial R nephrectomy and RT to prostate bed\par PET scheduled on Monday for rising PSA\par In 8/2018, found to have medullary thyroid CA s/p total thyroidectomy [de-identified] : PET MRI Axumin skullbase to thighs: s/p radical prostatectomy. No recurrence in the prostatectomy bed. No metastatic lymphadenopathy of prostate origin. No liver or lung metastasis.\par Interval increase in size of the abdominal, retroperitoneal and mesenteric lymphadenopathy with correcting increase Axumin uptake since 6/10/19,. No liver infiltration. Splenomegaly (14.6cm), relatively stable since 6/10/19.\par s/p R partial nephrectomy without evidence of local recurrence.\par s/p thyroidectomy. No recurrent mass in the thyroidectomy bed\par \par Labs done on 9/16/20 - WBC 78.9. Repeat on 10/6/20 was 73.79. \par He went to [place] for the winter. Labs done in [place] on 5/3/21 when he had cellulitis showed WBC of 143.4. \par \par He got back from [place] on 5/20. Cellulitis flared up again about 4 days later. He went to see his PMD and received a course of Cefadoxil x10d started 5/27.\par \par He had shingles on L side in July, around 7/12. Has LUQ pain, ? postherpetic neuralgia, on gabapentin. \par He had his COVID booster Thurs 9/9

## 2021-09-13 NOTE — PHYSICAL EXAM
[Restricted in physically strenuous activity but ambulatory and able to carry out work of a light or sedentary nature] : Status 1- Restricted in physically strenuous activity but ambulatory and able to carry out work of a light or sedentary nature, e.g., light house work, office work [Normal] : affect appropriate [de-identified] : cervical adenopathy b/l [de-identified] : wearing compression stockings [de-identified] : healed herpetic lesions LUQ/back

## 2021-09-17 ENCOUNTER — OUTPATIENT (OUTPATIENT)
Dept: OUTPATIENT SERVICES | Facility: HOSPITAL | Age: 76
LOS: 1 days | End: 2021-09-17
Payer: MEDICARE

## 2021-09-17 ENCOUNTER — APPOINTMENT (OUTPATIENT)
Dept: ULTRASOUND IMAGING | Facility: CLINIC | Age: 76
End: 2021-09-17
Payer: MEDICARE

## 2021-09-17 DIAGNOSIS — Z98.890 OTHER SPECIFIED POSTPROCEDURAL STATES: Chronic | ICD-10-CM

## 2021-09-17 DIAGNOSIS — C91.10 CHRONIC LYMPHOCYTIC LEUKEMIA OF B-CELL TYPE NOT HAVING ACHIEVED REMISSION: ICD-10-CM

## 2021-09-17 DIAGNOSIS — Z90.5 ACQUIRED ABSENCE OF KIDNEY: Chronic | ICD-10-CM

## 2021-09-17 DIAGNOSIS — E89.0 POSTPROCEDURAL HYPOTHYROIDISM: Chronic | ICD-10-CM

## 2021-09-17 DIAGNOSIS — Z90.79 ACQUIRED ABSENCE OF OTHER GENITAL ORGAN(S): Chronic | ICD-10-CM

## 2021-09-17 DIAGNOSIS — Z96.651 PRESENCE OF RIGHT ARTIFICIAL KNEE JOINT: Chronic | ICD-10-CM

## 2021-09-17 PROCEDURE — 76700 US EXAM ABDOM COMPLETE: CPT

## 2021-09-17 PROCEDURE — 76700 US EXAM ABDOM COMPLETE: CPT | Mod: 26

## 2022-01-31 NOTE — DISCHARGE NOTE ADULT - NS TRANSFER PATIENT BELONGINGS
The patient has an appointment on 2/11/2022 it would be beneficial to know the recommendation(s) from the visit to determine follow up with the patient (if needed). [employer]

## 2022-03-14 ENCOUNTER — APPOINTMENT (OUTPATIENT)
Dept: HEMATOLOGY ONCOLOGY | Facility: CLINIC | Age: 77
End: 2022-03-14

## 2022-03-14 ENCOUNTER — NON-APPOINTMENT (OUTPATIENT)
Age: 77
End: 2022-03-14

## 2022-04-13 NOTE — ASU PATIENT PROFILE, ADULT - NSALCOHOLTYPE_GEN__A_CORE_SD
wine/beer Moderate: Comprehensive teaching/Patient asked questions/Returned Demonstration/Verbalized Understanding

## 2022-05-13 ENCOUNTER — TRANSCRIPTION ENCOUNTER (OUTPATIENT)
Age: 77
End: 2022-05-13

## 2022-05-16 ENCOUNTER — APPOINTMENT (OUTPATIENT)
Dept: DISASTER EMERGENCY | Facility: HOSPITAL | Age: 77
End: 2022-05-16

## 2022-05-16 ENCOUNTER — OUTPATIENT (OUTPATIENT)
Dept: OUTPATIENT SERVICES | Facility: HOSPITAL | Age: 77
LOS: 1 days | End: 2022-05-16

## 2022-05-16 VITALS
TEMPERATURE: 99 F | DIASTOLIC BLOOD PRESSURE: 64 MMHG | SYSTOLIC BLOOD PRESSURE: 146 MMHG | HEART RATE: 62 BPM | RESPIRATION RATE: 17 BRPM | OXYGEN SATURATION: 95 %

## 2022-05-16 VITALS
TEMPERATURE: 99 F | SYSTOLIC BLOOD PRESSURE: 152 MMHG | OXYGEN SATURATION: 97 % | HEART RATE: 66 BPM | DIASTOLIC BLOOD PRESSURE: 67 MMHG | RESPIRATION RATE: 17 BRPM

## 2022-05-16 DIAGNOSIS — U07.1 COVID-19: ICD-10-CM

## 2022-05-16 DIAGNOSIS — Z98.890 OTHER SPECIFIED POSTPROCEDURAL STATES: Chronic | ICD-10-CM

## 2022-05-16 DIAGNOSIS — E89.0 POSTPROCEDURAL HYPOTHYROIDISM: Chronic | ICD-10-CM

## 2022-05-16 DIAGNOSIS — Z96.651 PRESENCE OF RIGHT ARTIFICIAL KNEE JOINT: Chronic | ICD-10-CM

## 2022-05-16 DIAGNOSIS — Z90.5 ACQUIRED ABSENCE OF KIDNEY: Chronic | ICD-10-CM

## 2022-05-16 DIAGNOSIS — Z90.79 ACQUIRED ABSENCE OF OTHER GENITAL ORGAN(S): Chronic | ICD-10-CM

## 2022-05-16 RX ORDER — BEBTELOVIMAB 87.5 MG/ML
175 INJECTION, SOLUTION INTRAVENOUS ONCE
Refills: 0 | Status: COMPLETED | OUTPATIENT
Start: 2022-05-16 | End: 2022-05-16

## 2022-05-16 RX ADMIN — BEBTELOVIMAB 175 MILLIGRAM(S): 87.5 INJECTION, SOLUTION INTRAVENOUS at 11:05

## 2022-05-16 NOTE — CHART NOTE - NSCHARTNOTEFT_GEN_A_CORE
CC: Monoclonal Antibody Infusion/COVID 19 Positive  76y Male with PMH of lung CA, Renal CA, Prostate CA, CLL  and recent dx of COVID 19+ who presents today for elective Bebtelovimab. Patient has been screened and was deemed to be a candidate.    Symptoms/ Criteria: cough, rhinorrhea  Date of Symptom Onset: 5/9  Symptoms: cough, rhinorrhea  Date of Positive COVID PCR: 5/11  Risk Profile includes:   age >65    Vaccination Status: vaccinated & boosted - pfizer   PMHx:  Family history of prostate cancer in father (Father)    Family history of amyloidosis (Mother)    Infection due to severe acute respiratory syndrome coronavirus 2 (SARS-CoV-2)    Diabetes mellitus type 2 in obese    Hypertension, unspecified type    Prostate cancer    Malignant neoplasm of thyroid gland    Thyroid nodule    Malignant neoplasm of kidney parenchyma, right    Leukocytosis, unspecified type    Spinal stenosis of lumbar region, unspecified whether neurogenic claudication present    Sleep apnea, unspecified type    CLL (chronic lymphocytic leukemia)    Former smoker, stopped smoking many years ago    History of total knee replacement, right    H/O partial nephrectomy    H/O radical prostatectomy    H/O ventral hernia repair    S/P left knee arthroscopy    History of strabismus surgery    H/O thyroidectomy        Exam/findings:  T(C): 37.1 (05-16-22 @ 11:05), Max: 37.1 (05-16-22 @ 11:05)  HR: 66 (05-16-22 @ 11:05) (66 - 66)  BP: 152/67 (05-16-22 @ 11:05) (152/67 - 152/67)  RR: 17 (05-16-22 @ 11:05) (17 - 17)  SpO2: 97% (05-16-22 @ 11:05) (97% - 97%)    PE:   Appearance: NAD	  HEENT:  NC/AT  Cardiovascular:  No edema  Respiratory: no use of accessory muscles  Gastrointestinal:  non-distended   Skin: warm and dry  Neurologic: Non-focal  Extremities: Normal range of motion    ASSESSMENT:  Pt is COVID positive with mild to moderate symptoms who was referred for elective MAB (Bebtelovimab). referred by Jonathan mayorga    PLAN:  - MAB treatment explained to patient. I have reviewed the Bebtelovimab Emergency Use Authorization (EUA) and I have provided the patient or patient's caregiver with the following information:   1. FDA has authorized emergency use of Bebtelovimab to be administered for the treatment of mild to moderate COVID-19, which is not an FDA-approved biological product.   2. The patient or patient's caregiver has the option to accept or refuse administration of MAB.   3. The significant known and potential risks and benefits of Bebtelovimab and the extent to which such risks and benefits are unknown.  4. Information on available alternative treatments and risks and benefits of those alternatives.  - Patient verbalized understanding of plan and agrees to treatment. All questions answered.  - Consent for MAB obtained.   - 175mg of Bebtelovimab administered as a single intravenous injection over at least 30 seconds.   - Observe patient for one hour post medication administration and then if stable, discharge home with oupatient follow up as planned by PCP.      POST ASSESSMENT:   Patient completed MAB, and monitored x 1 hour post-infusion with no adverse reactions noted, remained hemodynamically stable.  - Patient tolerated infusion well; denies complaints of chest pain/SOB/dizziness/palpitations.   - VSS for discharge home.  - D/C instructions given/ fact sheet included.  - Patient was instructed to self-isolate and use infection control measures (e.g wear mask, isolate, social distance, avoid sharing personal items, clean and disinfect "high touch" surfaces, and frequent handwashing according to the CDC guidelines.   - The patient was informed on what symptoms to be aware of for the next couple of days, and if there are any issues to call the 24/7 clinical call center. Patient was instructed to follow up with primary care provider as needed.    DISCHARGE at __________

## 2022-05-17 ENCOUNTER — TRANSCRIPTION ENCOUNTER (OUTPATIENT)
Age: 77
End: 2022-05-17

## 2022-05-20 ENCOUNTER — TRANSCRIPTION ENCOUNTER (OUTPATIENT)
Age: 77
End: 2022-05-20

## 2022-05-20 ENCOUNTER — EMERGENCY (EMERGENCY)
Facility: HOSPITAL | Age: 77
LOS: 1 days | Discharge: LEFT BEFORE DISCHARGE | End: 2022-05-20
Attending: STUDENT IN AN ORGANIZED HEALTH CARE EDUCATION/TRAINING PROGRAM
Payer: MEDICARE

## 2022-05-20 VITALS
WEIGHT: 210.1 LBS | TEMPERATURE: 99 F | OXYGEN SATURATION: 96 % | SYSTOLIC BLOOD PRESSURE: 149 MMHG | RESPIRATION RATE: 20 BRPM | DIASTOLIC BLOOD PRESSURE: 62 MMHG | HEART RATE: 68 BPM | HEIGHT: 70 IN

## 2022-05-20 DIAGNOSIS — E89.0 POSTPROCEDURAL HYPOTHYROIDISM: Chronic | ICD-10-CM

## 2022-05-20 DIAGNOSIS — Z90.79 ACQUIRED ABSENCE OF OTHER GENITAL ORGAN(S): Chronic | ICD-10-CM

## 2022-05-20 DIAGNOSIS — Z98.890 OTHER SPECIFIED POSTPROCEDURAL STATES: Chronic | ICD-10-CM

## 2022-05-20 DIAGNOSIS — Z96.651 PRESENCE OF RIGHT ARTIFICIAL KNEE JOINT: Chronic | ICD-10-CM

## 2022-05-20 DIAGNOSIS — Z90.5 ACQUIRED ABSENCE OF KIDNEY: Chronic | ICD-10-CM

## 2022-05-20 LAB — D DIMER BLD IA.RAPID-MCNC: 165 NG/ML DDU — SIGNIFICANT CHANGE UP

## 2022-05-20 PROCEDURE — 93010 ELECTROCARDIOGRAM REPORT: CPT | Mod: NC

## 2022-05-20 PROCEDURE — 71045 X-RAY EXAM CHEST 1 VIEW: CPT | Mod: 26

## 2022-05-20 PROCEDURE — 99285 EMERGENCY DEPT VISIT HI MDM: CPT | Mod: CS,GC

## 2022-05-20 RX ORDER — AZITHROMYCIN 500 MG/1
500 TABLET, FILM COATED ORAL ONCE
Refills: 0 | Status: COMPLETED | OUTPATIENT
Start: 2022-05-20 | End: 2022-05-20

## 2022-05-21 VITALS
SYSTOLIC BLOOD PRESSURE: 116 MMHG | DIASTOLIC BLOOD PRESSURE: 60 MMHG | TEMPERATURE: 100 F | OXYGEN SATURATION: 98 % | HEART RATE: 63 BPM | RESPIRATION RATE: 20 BRPM

## 2022-05-21 LAB
ALBUMIN SERPL ELPH-MCNC: 3.8 G/DL — SIGNIFICANT CHANGE UP (ref 3.3–5)
ALP SERPL-CCNC: 98 U/L — SIGNIFICANT CHANGE UP (ref 40–120)
ALT FLD-CCNC: 18 U/L — SIGNIFICANT CHANGE UP (ref 10–45)
ANION GAP SERPL CALC-SCNC: 11 MMOL/L — SIGNIFICANT CHANGE UP (ref 5–17)
ANISOCYTOSIS BLD QL: SLIGHT — SIGNIFICANT CHANGE UP
APPEARANCE UR: CLEAR — SIGNIFICANT CHANGE UP
APTT BLD: 29.2 SEC — SIGNIFICANT CHANGE UP (ref 27.5–35.5)
AST SERPL-CCNC: 17 U/L — SIGNIFICANT CHANGE UP (ref 10–40)
BACTERIA # UR AUTO: NEGATIVE — SIGNIFICANT CHANGE UP
BASE EXCESS BLDV CALC-SCNC: -5 MMOL/L — LOW (ref -2–2)
BASOPHILS # BLD AUTO: 0 K/UL — SIGNIFICANT CHANGE UP (ref 0–0.2)
BASOPHILS NFR BLD AUTO: 0 % — SIGNIFICANT CHANGE UP (ref 0–2)
BILIRUB SERPL-MCNC: 0.4 MG/DL — SIGNIFICANT CHANGE UP (ref 0.2–1.2)
BILIRUB UR-MCNC: NEGATIVE — SIGNIFICANT CHANGE UP
BUN SERPL-MCNC: 22 MG/DL — SIGNIFICANT CHANGE UP (ref 7–23)
CA-I SERPL-SCNC: 1.32 MMOL/L — SIGNIFICANT CHANGE UP (ref 1.15–1.33)
CALCIUM SERPL-MCNC: 9.6 MG/DL — SIGNIFICANT CHANGE UP (ref 8.4–10.5)
CHLORIDE BLDV-SCNC: 106 MMOL/L — SIGNIFICANT CHANGE UP (ref 96–108)
CHLORIDE SERPL-SCNC: 106 MMOL/L — SIGNIFICANT CHANGE UP (ref 96–108)
CO2 BLDV-SCNC: 20 MMOL/L — LOW (ref 22–26)
CO2 SERPL-SCNC: 21 MMOL/L — LOW (ref 22–31)
COLOR SPEC: YELLOW — SIGNIFICANT CHANGE UP
CREAT SERPL-MCNC: 1.03 MG/DL — SIGNIFICANT CHANGE UP (ref 0.5–1.3)
DACRYOCYTES BLD QL SMEAR: SLIGHT — SIGNIFICANT CHANGE UP
DIFF PNL FLD: NEGATIVE — SIGNIFICANT CHANGE UP
EGFR: 76 ML/MIN/1.73M2 — SIGNIFICANT CHANGE UP
ELLIPTOCYTES BLD QL SMEAR: SLIGHT — SIGNIFICANT CHANGE UP
EOSINOPHIL # BLD AUTO: 2.09 K/UL — HIGH (ref 0–0.5)
EOSINOPHIL NFR BLD AUTO: 1 % — SIGNIFICANT CHANGE UP (ref 0–6)
EPI CELLS # UR: 0 /HPF — SIGNIFICANT CHANGE UP
GAS PNL BLDV: 137 MMOL/L — SIGNIFICANT CHANGE UP (ref 136–145)
GAS PNL BLDV: SIGNIFICANT CHANGE UP
GAS PNL BLDV: SIGNIFICANT CHANGE UP
GLUCOSE BLDV-MCNC: 158 MG/DL — HIGH (ref 70–99)
GLUCOSE SERPL-MCNC: 154 MG/DL — HIGH (ref 70–99)
GLUCOSE UR QL: NEGATIVE — SIGNIFICANT CHANGE UP
HCO3 BLDV-SCNC: 20 MMOL/L — LOW (ref 22–29)
HCT VFR BLD CALC: 31.1 % — LOW (ref 39–50)
HCT VFR BLDA CALC: 28 % — LOW (ref 39–51)
HGB BLD CALC-MCNC: 9.4 G/DL — LOW (ref 12.6–17.4)
HGB BLD-MCNC: 8.8 G/DL — LOW (ref 13–17)
HYALINE CASTS # UR AUTO: 0 /LPF — SIGNIFICANT CHANGE UP (ref 0–2)
INR BLD: 1.2 RATIO — HIGH (ref 0.88–1.16)
KETONES UR-MCNC: NEGATIVE — SIGNIFICANT CHANGE UP
LACTATE BLDV-MCNC: 1 MMOL/L — SIGNIFICANT CHANGE UP (ref 0.7–2)
LEUKOCYTE ESTERASE UR-ACNC: NEGATIVE — SIGNIFICANT CHANGE UP
LYMPHOCYTES # BLD AUTO: 186.27 K/UL — HIGH (ref 1–3.3)
LYMPHOCYTES # BLD AUTO: 89 % — HIGH (ref 13–44)
LYMPHOCYTES # SPEC AUTO: 3 % — HIGH (ref 0–0)
MACROCYTES BLD QL: SLIGHT — SIGNIFICANT CHANGE UP
MANUAL SMEAR VERIFICATION: SIGNIFICANT CHANGE UP
MCHC RBC-ENTMCNC: 27.1 PG — SIGNIFICANT CHANGE UP (ref 27–34)
MCHC RBC-ENTMCNC: 28.3 GM/DL — LOW (ref 32–36)
MCV RBC AUTO: 95.7 FL — SIGNIFICANT CHANGE UP (ref 80–100)
MICROCYTES BLD QL: SLIGHT — SIGNIFICANT CHANGE UP
MONOCYTES # BLD AUTO: 6.28 K/UL — HIGH (ref 0–0.9)
MONOCYTES NFR BLD AUTO: 3 % — SIGNIFICANT CHANGE UP (ref 2–14)
NEUTROPHILS # BLD AUTO: 8.37 K/UL — HIGH (ref 1.8–7.4)
NEUTROPHILS NFR BLD AUTO: 4 % — LOW (ref 43–77)
NITRITE UR-MCNC: NEGATIVE — SIGNIFICANT CHANGE UP
NRBC # BLD: 0 /100 — SIGNIFICANT CHANGE UP (ref 0–0)
NT-PROBNP SERPL-SCNC: 806 PG/ML — HIGH (ref 0–300)
PCO2 BLDV: 33 MMHG — LOW (ref 42–55)
PH BLDV: 7.38 — SIGNIFICANT CHANGE UP (ref 7.32–7.43)
PH UR: 6 — SIGNIFICANT CHANGE UP (ref 5–8)
PLAT MORPH BLD: NORMAL — SIGNIFICANT CHANGE UP
PLATELET # BLD AUTO: 135 K/UL — LOW (ref 150–400)
PO2 BLDV: 48 MMHG — HIGH (ref 25–45)
POLYCHROMASIA BLD QL SMEAR: SLIGHT — SIGNIFICANT CHANGE UP
POTASSIUM BLDV-SCNC: 3.6 MMOL/L — SIGNIFICANT CHANGE UP (ref 3.5–5.1)
POTASSIUM SERPL-MCNC: 3.9 MMOL/L — SIGNIFICANT CHANGE UP (ref 3.5–5.3)
POTASSIUM SERPL-SCNC: 3.9 MMOL/L — SIGNIFICANT CHANGE UP (ref 3.5–5.3)
PROT SERPL-MCNC: 6.1 G/DL — SIGNIFICANT CHANGE UP (ref 6–8.3)
PROT UR-MCNC: ABNORMAL
PROTHROM AB SERPL-ACNC: 13.8 SEC — HIGH (ref 10.5–13.4)
RAPID RVP RESULT: DETECTED
RBC # BLD: 3.25 M/UL — LOW (ref 4.2–5.8)
RBC # FLD: 17.8 % — HIGH (ref 10.3–14.5)
RBC BLD AUTO: ABNORMAL
RBC CASTS # UR COMP ASSIST: 2 /HPF — SIGNIFICANT CHANGE UP (ref 0–4)
SAO2 % BLDV: 80.6 % — SIGNIFICANT CHANGE UP (ref 67–88)
SARS-COV-2 RNA SPEC QL NAA+PROBE: DETECTED
SMUDGE CELLS # BLD: PRESENT — SIGNIFICANT CHANGE UP
SODIUM SERPL-SCNC: 138 MMOL/L — SIGNIFICANT CHANGE UP (ref 135–145)
SP GR SPEC: 1.03 — HIGH (ref 1.01–1.02)
TROPONIN T, HIGH SENSITIVITY RESULT: 50 NG/L — SIGNIFICANT CHANGE UP (ref 0–51)
UROBILINOGEN FLD QL: NEGATIVE — SIGNIFICANT CHANGE UP
WBC # BLD: 209.29 K/UL — CRITICAL HIGH (ref 3.8–10.5)
WBC # FLD AUTO: 209.29 K/UL — CRITICAL HIGH (ref 3.8–10.5)
WBC UR QL: 1 /HPF — SIGNIFICANT CHANGE UP (ref 0–5)

## 2022-05-21 PROCEDURE — 71275 CT ANGIOGRAPHY CHEST: CPT | Mod: 26,MA

## 2022-05-21 PROCEDURE — 93971 EXTREMITY STUDY: CPT | Mod: 26,LT

## 2022-05-21 RX ADMIN — AZITHROMYCIN 500 MILLIGRAM(S): 500 TABLET, FILM COATED ORAL at 01:19

## 2022-05-21 RX ADMIN — Medication 1 TABLET(S): at 01:19

## 2022-05-21 NOTE — ED PROVIDER NOTE - NSICDXFAMILYHX_GEN_ALL_CORE_FT
FAMILY HISTORY:  Father  Still living? No  Family history of prostate cancer in father, Age at diagnosis: Age Unknown    Mother  Still living? No  Family history of amyloidosis, Age at diagnosis: Age Unknown

## 2022-05-21 NOTE — ED PROVIDER NOTE - PATIENT PORTAL LINK FT
You can access the FollowMyHealth Patient Portal offered by [facility] by registering at the following website: [email or web address]. By joining [employer]’s FollowMyHealth portal, you will also be able to view your health information using other applications (apps) compatible with our system.

## 2022-05-21 NOTE — ED PROVIDER NOTE - NSICDXPASTSURGICALHX_GEN_ALL_CORE_FT
PAST SURGICAL HISTORY:  H/O partial nephrectomy right, 2009    H/O radical prostatectomy 2003    H/O thyroidectomy 2016    H/O ventral hernia repair 1997    History of strabismus surgery b/l 1958    History of total knee replacement, right 2012, scoped 1998    S/P left knee arthroscopy 1998

## 2022-05-21 NOTE — ED PROVIDER NOTE - PHYSICAL EXAMINATION
General appearance: NAD, conversant, afebrile    Eyes: anicteric sclerae, JOSUE, EOMI   HENT: Atraumatic; oropharynx clear, MMM and no ulcerations, no pharyngeal erythema or exudate   Neck: Trachea midline; Full range of motion, supple   Pulm: Coarse breath sounds b/l, normal respiratory effort and no intercostal retractions, normal work of breathing   CV: RRR, No murmurs, rubs, or gallops   Abdomen: Soft, non-tender, non-distended; no guarding or rebound   Extremities: 1+ RLE pitting, 3+ LLE pitting peripheral edema    Skin: Dry, normal temperature, turgor and texture; no rash, LLE with mild erythema ankle to midcalf   Psych: Appropriate affect, cooperative; alert and oriented to person, place and time General appearance: NAD, conversant, afebrile    Eyes: anicteric sclerae, JOSUE, EOMI   HENT: Atraumatic; oropharynx clear, MMM and no ulcerations, no pharyngeal erythema or exudate   Neck: Trachea midline; Full range of motion, supple   Pulm: Coarse breath sounds b/l, tachypneic    CV: RRR, No murmurs, rubs, or gallops   Abdomen: Soft, non-tender, non-distended; no guarding or rebound   Extremities: 1+ RLE pitting, 3+ LLE pitting peripheral edema    Skin: Dry, normal temperature, turgor and texture; no rash, LLE with mild erythema ankle to midcalf   Psych: Appropriate affect, cooperative; alert and oriented to person, place and time

## 2022-05-21 NOTE — ED PROVIDER NOTE - NSFOLLOWUPINSTRUCTIONS_ED_ALL_ED_FT
You were seen in the Emergency Department for cough  We recommended you were to be admitted to the hospital but you preferred to leave against medical advice   Take amoxicillin-clavulanate twice daily  Take doxycycline twice daily  Take tessalon as needed for cough every 8 hours  Rest, drink plenty of fluids  Advance activity as tolerated  Continue all previously prescribed medications as directed  Follow up with your PMD - bring copies of your results  Return to the ER for chest pain, difficulty breathing, persistent fevers, or other new or concerning symptoms

## 2022-05-21 NOTE — ED PROVIDER NOTE - OBJECTIVE STATEMENT
77yo male with pmh CLL not being treated, HTN, DM, thyroid ca, prostate ca, renal ca, presenting with cough, fever.  Patient was diagnosed with covid 8-9 days ago.  Has been doing well but over past few days has been having fevers and worsening cough that otc meds and tessalon have not been helping.  No sob, chest pain.  Is also endorsing LLE edema to knee, no pain.  States this happens sometime 2/2 venous insufficiency but L is more edematous than R.

## 2022-05-21 NOTE — ED PROVIDER NOTE - NSICDXPASTMEDICALHX_GEN_ALL_CORE_FT
PAST MEDICAL HISTORY:  CLL (chronic lymphocytic leukemia)     Diabetes mellitus type 2 in obese     Former smoker, stopped smoking many years ago     Hypertension, unspecified type     Leukocytosis, unspecified type monitored for CLL    Malignant neoplasm of kidney parenchyma, right s/p surgery    Malignant neoplasm of thyroid gland     Prostate cancer 2003, s/p prostatectomy, RT 2009 x 40 days    Sleep apnea, unspecified type     Spinal stenosis of lumbar region, unspecified whether neurogenic claudication present     Thyroid nodule

## 2022-05-21 NOTE — ED ADULT NURSE REASSESSMENT NOTE - NS ED NURSE REASSESS COMMENT FT1
pt is awake and alert, resting comfortably in stretcher, speaking in full coherent sentences. Pt endorses no pain or discomfort at this time. Call bell within reach, comfort & safety provided. Will continue to monitor.
Irregular

## 2022-05-21 NOTE — ED PROVIDER NOTE - CLINICAL SUMMARY MEDICAL DECISION MAKING FREE TEXT BOX
77yo male with active CLL and significant cancer hx presenting with cough, LLE edema.  May be 2/2 covid but patient on day 8-9 with now worsening symptoms.  LLE edema in cancer pt concerning for dvt.  Will get cta r/o pe and eval for infectious and other acute process.  Check labs with cultures.  Tachypneic with ambulation but not hypoxic.  Reassess after work up. 75yo male with active CLL and significant cancer hx presenting with cough, LLE edema.  May be 2/2 covid but patient on day 8-9 with now worsening symptoms.  LLE edema in cancer pt concerning for dvt.  Will get cta r/o pe and eval for infectious and other acute process.  Check labs with cultures.  Tachypneic with ambulation but not hypoxic.  Reassess after work up.    Attending MD West : Patient here tachypneic in the setting of worsening cough and SOB in the setting of COVID PNA. Not hypoxic, but tachypneic on my exam. Will obtain CTPA in setting of new RBBB, US LLE to eval for DVT, troponin to eval for new trop elevation. Low threshold for admission. Will closely reassess. Patient signed out to Dr Higuera pending labs, CT and US.

## 2022-05-21 NOTE — ED PROVIDER NOTE - CARE PLAN
1 Principal Discharge DX:	Pneumonia  Secondary Diagnosis:	2019 novel coronavirus disease (COVID-19)  Secondary Diagnosis:	Cough

## 2022-05-21 NOTE — ED PROVIDER NOTE - PROGRESS NOTE DETAILS
Rober PGY3: Patient reassessed.  Reviewed results with patient.  Given worsening of symptoms near end of covid and leukocytosis to 200 with previous 130s, concern for bacterial pna.  Also found to have new RBBB, last ecg in system 2018.  No pe or dvt noted.  Recommending admission but patient does not want to stay.  I explained the risks of doing so including worsening symptoms, disability, death but patient would likely to leave the hospital against medical advice.  Patient mentating well and has capacity to do so.  Will treat for pneumonia with po antibiotics and explained return precautions with patient.  Will dc ama.

## 2022-05-22 ENCOUNTER — TRANSCRIPTION ENCOUNTER (OUTPATIENT)
Age: 77
End: 2022-05-22

## 2022-05-22 ENCOUNTER — INPATIENT (INPATIENT)
Facility: HOSPITAL | Age: 77
LOS: 4 days | Discharge: HOME HEALTH | DRG: 177 | End: 2022-05-27
Attending: HOSPITALIST | Admitting: STUDENT IN AN ORGANIZED HEALTH CARE EDUCATION/TRAINING PROGRAM
Payer: MEDICARE

## 2022-05-22 VITALS
SYSTOLIC BLOOD PRESSURE: 138 MMHG | HEIGHT: 70 IN | HEART RATE: 90 BPM | WEIGHT: 210.1 LBS | TEMPERATURE: 103 F | RESPIRATION RATE: 22 BRPM | DIASTOLIC BLOOD PRESSURE: 67 MMHG | OXYGEN SATURATION: 94 %

## 2022-05-22 DIAGNOSIS — J45.909 UNSPECIFIED ASTHMA, UNCOMPLICATED: ICD-10-CM

## 2022-05-22 DIAGNOSIS — E89.0 POSTPROCEDURAL HYPOTHYROIDISM: Chronic | ICD-10-CM

## 2022-05-22 DIAGNOSIS — E11.9 TYPE 2 DIABETES MELLITUS WITHOUT COMPLICATIONS: ICD-10-CM

## 2022-05-22 DIAGNOSIS — Z90.5 ACQUIRED ABSENCE OF KIDNEY: Chronic | ICD-10-CM

## 2022-05-22 DIAGNOSIS — Z90.79 ACQUIRED ABSENCE OF OTHER GENITAL ORGAN(S): Chronic | ICD-10-CM

## 2022-05-22 DIAGNOSIS — I45.10 UNSPECIFIED RIGHT BUNDLE-BRANCH BLOCK: ICD-10-CM

## 2022-05-22 DIAGNOSIS — Z98.890 OTHER SPECIFIED POSTPROCEDURAL STATES: Chronic | ICD-10-CM

## 2022-05-22 DIAGNOSIS — D64.9 ANEMIA, UNSPECIFIED: ICD-10-CM

## 2022-05-22 DIAGNOSIS — C91.10 CHRONIC LYMPHOCYTIC LEUKEMIA OF B-CELL TYPE NOT HAVING ACHIEVED REMISSION: ICD-10-CM

## 2022-05-22 DIAGNOSIS — I10 ESSENTIAL (PRIMARY) HYPERTENSION: ICD-10-CM

## 2022-05-22 DIAGNOSIS — U07.1 COVID-19: ICD-10-CM

## 2022-05-22 DIAGNOSIS — R25.1 TREMOR, UNSPECIFIED: ICD-10-CM

## 2022-05-22 DIAGNOSIS — M79.89 OTHER SPECIFIED SOFT TISSUE DISORDERS: ICD-10-CM

## 2022-05-22 DIAGNOSIS — Z29.9 ENCOUNTER FOR PROPHYLACTIC MEASURES, UNSPECIFIED: ICD-10-CM

## 2022-05-22 DIAGNOSIS — Z96.651 PRESENCE OF RIGHT ARTIFICIAL KNEE JOINT: Chronic | ICD-10-CM

## 2022-05-22 DIAGNOSIS — E03.9 HYPOTHYROIDISM, UNSPECIFIED: ICD-10-CM

## 2022-05-22 DIAGNOSIS — R50.9 FEVER, UNSPECIFIED: ICD-10-CM

## 2022-05-22 LAB
ALBUMIN SERPL ELPH-MCNC: 3.8 G/DL — SIGNIFICANT CHANGE UP (ref 3.3–5)
ALP SERPL-CCNC: 97 U/L — SIGNIFICANT CHANGE UP (ref 40–120)
ALT FLD-CCNC: 21 U/L — SIGNIFICANT CHANGE UP (ref 10–45)
ANION GAP SERPL CALC-SCNC: 13 MMOL/L — SIGNIFICANT CHANGE UP (ref 5–17)
ANISOCYTOSIS BLD QL: SLIGHT — SIGNIFICANT CHANGE UP
AST SERPL-CCNC: 20 U/L — SIGNIFICANT CHANGE UP (ref 10–40)
BASOPHILS # BLD AUTO: 0 K/UL — SIGNIFICANT CHANGE UP (ref 0–0.2)
BASOPHILS NFR BLD AUTO: 0 % — SIGNIFICANT CHANGE UP (ref 0–2)
BILIRUB SERPL-MCNC: 0.4 MG/DL — SIGNIFICANT CHANGE UP (ref 0.2–1.2)
BUN SERPL-MCNC: 20 MG/DL — SIGNIFICANT CHANGE UP (ref 7–23)
CALCIUM SERPL-MCNC: 9.5 MG/DL — SIGNIFICANT CHANGE UP (ref 8.4–10.5)
CHLORIDE SERPL-SCNC: 102 MMOL/L — SIGNIFICANT CHANGE UP (ref 96–108)
CK MB BLD-MCNC: 2.8 % — SIGNIFICANT CHANGE UP (ref 0–3.5)
CK MB CFR SERPL CALC: 2 NG/ML — SIGNIFICANT CHANGE UP (ref 0–6.7)
CK SERPL-CCNC: 72 U/L — SIGNIFICANT CHANGE UP (ref 30–200)
CO2 SERPL-SCNC: 21 MMOL/L — LOW (ref 22–31)
CREAT SERPL-MCNC: 1.13 MG/DL — SIGNIFICANT CHANGE UP (ref 0.5–1.3)
CULTURE RESULTS: SIGNIFICANT CHANGE UP
DACRYOCYTES BLD QL SMEAR: SLIGHT — SIGNIFICANT CHANGE UP
EGFR: 68 ML/MIN/1.73M2 — SIGNIFICANT CHANGE UP
ELLIPTOCYTES BLD QL SMEAR: SLIGHT — SIGNIFICANT CHANGE UP
EOSINOPHIL # BLD AUTO: 2.17 K/UL — HIGH (ref 0–0.5)
EOSINOPHIL NFR BLD AUTO: 1 % — SIGNIFICANT CHANGE UP (ref 0–6)
GLUCOSE SERPL-MCNC: 128 MG/DL — HIGH (ref 70–99)
HCT VFR BLD CALC: 30.7 % — LOW (ref 39–50)
HGB BLD-MCNC: 8.8 G/DL — LOW (ref 13–17)
LYMPHOCYTES # BLD AUTO: 200.08 K/UL — HIGH (ref 1–3.3)
LYMPHOCYTES # BLD AUTO: 92 % — HIGH (ref 13–44)
LYMPHOCYTES # SPEC AUTO: 2 % — HIGH (ref 0–0)
MACROCYTES BLD QL: SLIGHT — SIGNIFICANT CHANGE UP
MANUAL SMEAR VERIFICATION: SIGNIFICANT CHANGE UP
MCHC RBC-ENTMCNC: 27.7 PG — SIGNIFICANT CHANGE UP (ref 27–34)
MCHC RBC-ENTMCNC: 28.7 GM/DL — LOW (ref 32–36)
MCV RBC AUTO: 96.5 FL — SIGNIFICANT CHANGE UP (ref 80–100)
MICROCYTES BLD QL: SLIGHT — SIGNIFICANT CHANGE UP
MONOCYTES # BLD AUTO: 6.52 K/UL — HIGH (ref 0–0.9)
MONOCYTES NFR BLD AUTO: 3 % — SIGNIFICANT CHANGE UP (ref 2–14)
NEUTROPHILS # BLD AUTO: 4.35 K/UL — SIGNIFICANT CHANGE UP (ref 1.8–7.4)
NEUTROPHILS NFR BLD AUTO: 2 % — LOW (ref 43–77)
NRBC # BLD: 0 /100 — SIGNIFICANT CHANGE UP (ref 0–0)
NT-PROBNP SERPL-SCNC: 985 PG/ML — HIGH (ref 0–300)
PLAT MORPH BLD: NORMAL — SIGNIFICANT CHANGE UP
PLATELET # BLD AUTO: 160 K/UL — SIGNIFICANT CHANGE UP (ref 150–400)
POIKILOCYTOSIS BLD QL AUTO: SLIGHT — SIGNIFICANT CHANGE UP
POLYCHROMASIA BLD QL SMEAR: SLIGHT — SIGNIFICANT CHANGE UP
POTASSIUM SERPL-MCNC: 4 MMOL/L — SIGNIFICANT CHANGE UP (ref 3.5–5.3)
POTASSIUM SERPL-SCNC: 4 MMOL/L — SIGNIFICANT CHANGE UP (ref 3.5–5.3)
PROCALCITONIN SERPL-MCNC: 0.5 NG/ML — HIGH (ref 0.02–0.1)
PROT SERPL-MCNC: 6.3 G/DL — SIGNIFICANT CHANGE UP (ref 6–8.3)
RBC # BLD: 3.18 M/UL — LOW (ref 4.2–5.8)
RBC # FLD: 18.3 % — HIGH (ref 10.3–14.5)
RBC BLD AUTO: ABNORMAL
SARS-COV-2 RNA SPEC QL NAA+PROBE: DETECTED
SCHISTOCYTES BLD QL AUTO: SLIGHT — SIGNIFICANT CHANGE UP
SMUDGE CELLS # BLD: PRESENT — SIGNIFICANT CHANGE UP
SODIUM SERPL-SCNC: 136 MMOL/L — SIGNIFICANT CHANGE UP (ref 135–145)
SPECIMEN SOURCE: SIGNIFICANT CHANGE UP
TROPONIN T, HIGH SENSITIVITY RESULT: 63 NG/L — HIGH (ref 0–51)
WBC # BLD: 217.48 K/UL — CRITICAL HIGH (ref 3.8–10.5)
WBC # FLD AUTO: 217.48 K/UL — CRITICAL HIGH (ref 3.8–10.5)

## 2022-05-22 PROCEDURE — 99223 1ST HOSP IP/OBS HIGH 75: CPT

## 2022-05-22 PROCEDURE — 99285 EMERGENCY DEPT VISIT HI MDM: CPT | Mod: CS

## 2022-05-22 RX ORDER — CEFTRIAXONE 500 MG/1
1000 INJECTION, POWDER, FOR SOLUTION INTRAMUSCULAR; INTRAVENOUS ONCE
Refills: 0 | Status: COMPLETED | OUTPATIENT
Start: 2022-05-22 | End: 2022-05-22

## 2022-05-22 RX ORDER — ACETAMINOPHEN 500 MG
325 TABLET ORAL EVERY 6 HOURS
Refills: 0 | Status: DISCONTINUED | OUTPATIENT
Start: 2022-05-22 | End: 2022-05-23

## 2022-05-22 RX ORDER — IBUPROFEN 200 MG
600 TABLET ORAL ONCE
Refills: 0 | Status: COMPLETED | OUTPATIENT
Start: 2022-05-22 | End: 2022-05-22

## 2022-05-22 RX ORDER — DEXTROSE 50 % IN WATER 50 %
25 SYRINGE (ML) INTRAVENOUS ONCE
Refills: 0 | Status: DISCONTINUED | OUTPATIENT
Start: 2022-05-22 | End: 2022-05-27

## 2022-05-22 RX ORDER — LEVOTHYROXINE SODIUM 125 MCG
1 TABLET ORAL
Qty: 0 | Refills: 0 | DISCHARGE

## 2022-05-22 RX ORDER — DEXTROSE 50 % IN WATER 50 %
12.5 SYRINGE (ML) INTRAVENOUS ONCE
Refills: 0 | Status: DISCONTINUED | OUTPATIENT
Start: 2022-05-22 | End: 2022-05-27

## 2022-05-22 RX ORDER — AZITHROMYCIN 500 MG/1
TABLET, FILM COATED ORAL
Refills: 0 | Status: DISCONTINUED | OUTPATIENT
Start: 2022-05-22 | End: 2022-05-25

## 2022-05-22 RX ORDER — IPRATROPIUM/ALBUTEROL SULFATE 18-103MCG
3 AEROSOL WITH ADAPTER (GRAM) INHALATION EVERY 6 HOURS
Refills: 0 | Status: DISCONTINUED | OUTPATIENT
Start: 2022-05-22 | End: 2022-05-25

## 2022-05-22 RX ORDER — AZITHROMYCIN 500 MG/1
500 TABLET, FILM COATED ORAL EVERY 24 HOURS
Refills: 0 | Status: DISCONTINUED | OUTPATIENT
Start: 2022-05-23 | End: 2022-05-25

## 2022-05-22 RX ORDER — INSULIN LISPRO 100/ML
VIAL (ML) SUBCUTANEOUS AT BEDTIME
Refills: 0 | Status: DISCONTINUED | OUTPATIENT
Start: 2022-05-23 | End: 2022-05-27

## 2022-05-22 RX ORDER — FUROSEMIDE 40 MG
1 TABLET ORAL
Qty: 0 | Refills: 0 | DISCHARGE

## 2022-05-22 RX ORDER — AZITHROMYCIN 500 MG/1
500 TABLET, FILM COATED ORAL ONCE
Refills: 0 | Status: COMPLETED | OUTPATIENT
Start: 2022-05-22 | End: 2022-05-22

## 2022-05-22 RX ORDER — SODIUM CHLORIDE 9 MG/ML
1000 INJECTION, SOLUTION INTRAVENOUS
Refills: 0 | Status: DISCONTINUED | OUTPATIENT
Start: 2022-05-22 | End: 2022-05-27

## 2022-05-22 RX ORDER — INSULIN LISPRO 100/ML
VIAL (ML) SUBCUTANEOUS
Refills: 0 | Status: DISCONTINUED | OUTPATIENT
Start: 2022-05-22 | End: 2022-05-27

## 2022-05-22 RX ORDER — ENOXAPARIN SODIUM 100 MG/ML
40 INJECTION SUBCUTANEOUS EVERY 24 HOURS
Refills: 0 | Status: DISCONTINUED | OUTPATIENT
Start: 2022-05-22 | End: 2022-05-27

## 2022-05-22 RX ORDER — GLUCAGON INJECTION, SOLUTION 0.5 MG/.1ML
1 INJECTION, SOLUTION SUBCUTANEOUS ONCE
Refills: 0 | Status: DISCONTINUED | OUTPATIENT
Start: 2022-05-22 | End: 2022-05-27

## 2022-05-22 RX ORDER — CEFTRIAXONE 500 MG/1
1000 INJECTION, POWDER, FOR SOLUTION INTRAMUSCULAR; INTRAVENOUS EVERY 24 HOURS
Refills: 0 | Status: DISCONTINUED | OUTPATIENT
Start: 2022-05-23 | End: 2022-05-23

## 2022-05-22 RX ORDER — SITAGLIPTIN 50 MG/1
1 TABLET, FILM COATED ORAL
Qty: 0 | Refills: 0 | DISCHARGE

## 2022-05-22 RX ORDER — LACTOBACILLUS ACIDOPHILUS 100MM CELL
1 CAPSULE ORAL DAILY
Refills: 0 | Status: DISCONTINUED | OUTPATIENT
Start: 2022-05-22 | End: 2022-05-27

## 2022-05-22 RX ORDER — REMDESIVIR 5 MG/ML
200 INJECTION INTRAVENOUS EVERY 24 HOURS
Refills: 0 | Status: COMPLETED | OUTPATIENT
Start: 2022-05-22 | End: 2022-05-23

## 2022-05-22 RX ORDER — ASPIRIN/CALCIUM CARB/MAGNESIUM 324 MG
1 TABLET ORAL
Qty: 0 | Refills: 0 | DISCHARGE

## 2022-05-22 RX ORDER — DEXTROSE 50 % IN WATER 50 %
15 SYRINGE (ML) INTRAVENOUS ONCE
Refills: 0 | Status: DISCONTINUED | OUTPATIENT
Start: 2022-05-22 | End: 2022-05-27

## 2022-05-22 RX ORDER — REMDESIVIR 5 MG/ML
INJECTION INTRAVENOUS
Refills: 0 | Status: COMPLETED | OUTPATIENT
Start: 2022-05-22 | End: 2022-05-27

## 2022-05-22 RX ORDER — ACETAMINOPHEN 500 MG
650 TABLET ORAL ONCE
Refills: 0 | Status: COMPLETED | OUTPATIENT
Start: 2022-05-22 | End: 2022-05-22

## 2022-05-22 RX ORDER — CEFTRIAXONE 500 MG/1
INJECTION, POWDER, FOR SOLUTION INTRAMUSCULAR; INTRAVENOUS
Refills: 0 | Status: DISCONTINUED | OUTPATIENT
Start: 2022-05-22 | End: 2022-05-23

## 2022-05-22 RX ORDER — LEVOTHYROXINE SODIUM 125 MCG
200 TABLET ORAL DAILY
Refills: 0 | Status: DISCONTINUED | OUTPATIENT
Start: 2022-05-22 | End: 2022-05-27

## 2022-05-22 RX ORDER — SODIUM CHLORIDE 9 MG/ML
1000 INJECTION INTRAMUSCULAR; INTRAVENOUS; SUBCUTANEOUS ONCE
Refills: 0 | Status: COMPLETED | OUTPATIENT
Start: 2022-05-22 | End: 2022-05-22

## 2022-05-22 RX ORDER — TOPIRAMATE 25 MG
100 TABLET ORAL DAILY
Refills: 0 | Status: DISCONTINUED | OUTPATIENT
Start: 2022-05-22 | End: 2022-05-27

## 2022-05-22 RX ADMIN — Medication 325 MILLIGRAM(S): at 23:58

## 2022-05-22 RX ADMIN — SODIUM CHLORIDE 1000 MILLILITER(S): 9 INJECTION INTRAMUSCULAR; INTRAVENOUS; SUBCUTANEOUS at 18:11

## 2022-05-22 RX ADMIN — CEFTRIAXONE 100 MILLIGRAM(S): 500 INJECTION, POWDER, FOR SOLUTION INTRAMUSCULAR; INTRAVENOUS at 23:58

## 2022-05-22 RX ADMIN — Medication 650 MILLIGRAM(S): at 17:57

## 2022-05-22 RX ADMIN — Medication 100 MILLIGRAM(S): at 23:56

## 2022-05-22 NOTE — H&P ADULT - PROBLEM SELECTOR PLAN 1
COVID positive 5/12  Monoclonal ab 5/16  COVID vaccine x4   Febrile to 103, cough, NOT hypoxic   CT chest: Bilateral ground glass opacities compatible with pneumonia in the   setting of patient's known Covid diagnosis. Multiple superimposed nodular densities within the right lung could be   related to underlying infection, but are indeterminate.   Procalcitonin:  0.50  Could be superimposed bacterial PNA given immunocompromised     - Ceftriaxone, Azithro 5/22-   - Remdesevir 5/22-   - On room air, does not qualify for dexamethasone   - Blood cx 5/21 NGTD  - Urine cx negative  - Sputum cx, urine legionella, urine Strep ag   - inflamm markers: ESR, CRP, ferritin, D-dimer, LDH q48h  - Probiotic while on abx COVID positive 5/12  Monoclonal ab 5/16  COVID vaccine x4   Febrile to 103, cough, NOT hypoxic   CT chest: Bilateral ground glass opacities compatible with pneumonia in the setting of patient's known Covid diagnosis. Multiple superimposed nodular densities within the right lung could be  related to underlying infection, but are indeterminate.   Procalcitonin:  0.50  Could be superimposed bacterial PNA given immunocompromised     - Ceftriaxone, Azithro 5/22-   - Remdesevir 5/22-   - On room air, does not qualify for dexamethasone   - Blood cx 5/21 NGTD  - Urine cx negative  - Sputum cx, urine legionella, urine Strep ag ordered  - inflamm markers: ESR, CRP, ferritin, D-dimer, LDH q48h  - Probiotic while on abx COVID positive 5/12  Monoclonal ab 5/16  COVID vaccine x4   Febrile to 103, cough, NOT hypoxic   CT chest: Bilateral ground glass opacities compatible with pneumonia in the setting of patient's known Covid diagnosis. Multiple superimposed nodular densities within the right lung could be  related to underlying infection, but are indeterminate.   Procalcitonin:  0.50  Could be superimposed bacterial PNA given immunocompromised     - Zosyn, Azithro (2 more days) 5/22-   - Remdesevir 5/22-   - On room air, does not qualify for dexamethasone   - Blood cx 5/21 NGTD  - Urine cx negative  - Sputum cx, urine legionella, urine Strep ag ordered  - inflamm markers: ESR, CRP, ferritin, D-dimer, LDH q48h  - Probiotic while on abx

## 2022-05-22 NOTE — ED PROVIDER NOTE - CLINICAL SUMMARY MEDICAL DECISION MAKING FREE TEXT BOX
Patient is a 76 year-old-male with history of CLL presents for admission for pneumonia. Basic labs, COVID swab, ECG. Will reach out to Dr. Doran. TBA.

## 2022-05-22 NOTE — H&P ADULT - ASSESSMENT
76M with history of T2DM, HTN, HLD, asthma, CVI,  CLL, Prostate Cancer s/p resection 2003, thyroid cancer s/p thyroidectomy 8/2018, renal cancer s/p partial nephrectomy and spinal stenosis with acquired scoliosis s/p L1-5 posterior lumbar fusion and T10-pelvis instrumentation and fusion now presents 9-10d of persistent fevers and with dry cough, found to be COVID positive on 5/12/22 likely symptoms 2/2 COVID PNA with possible superimposed bacterial PNA

## 2022-05-22 NOTE — H&P ADULT - PROBLEM SELECTOR PLAN 5
CVI at baseline  DVT studies 4/21 show No evidence of left lower extremity deep venous thrombosis.  Subcutaneous edema in the left calf.  on PE possible swelling and erythema on LLE with venous stasis dermatitis, however PE skewed by wearing compression stocking   Could be cellulitis? no open wounds  Has cellulitis in the past     - Ceftriaxone as above  - DVT ppx as below  - MRSA swab

## 2022-05-22 NOTE — ED PROVIDER NOTE - OBJECTIVE STATEMENT
Patient is a 76 year-old-male with history of CLL presents for admission for pneumonia. Patient was seen here yesterday with fever and cough, admission was offered for COVID pneumonia, however patient AMA'd and decided to take PO ABX. Reports that coughing is improving, however still has been running fever at home. Spoke to his PMD Dr. Doran and was recommended to come in to be admitted. Was diagnosed with COVID about 10 days ago, with worsening cough and fever in the last few days. No chest pain, SOB.

## 2022-05-22 NOTE — ED PROVIDER NOTE - PHYSICAL EXAMINATION
General: non-toxic appearing, in no respiratory distress  HEENT: atraumatic, normocephalic; pupils are equal, round and react to light, extraocular movements intact bilaterally without deficits, no conjunctival pallor, mucous membranes moist  Neck: no jugular venous distension, full range of motion  Chest/Lung: clear to auscultation bilaterally, no wheezes/rhonchi/rales  Heart: regular rate and rhythm, no murmur/gallops/rubs  Abdomen: normal bowel sounds, soft, non-tender, non-distended  Extremities: no lower extremity edema, +2 radial pulses bilaterally, +2 dorsalis pedis pulses bilaterally  Musculoskeletal: full range of motion of all 4 extremities  Nervous System: alert and oriented, no motor deficits or sensory deficits; CNII-XII grossly intact; no focal neurologic deficits  Skin: no rashes/lacerations noted

## 2022-05-22 NOTE — ED ADULT TRIAGE NOTE - CHIEF COMPLAINT QUOTE
fever, cough, covid +, yesterday Dced AMA , PCP rcc to came to ED  if fever spike , took Tylenol at  1500 x2/650mg

## 2022-05-22 NOTE — H&P ADULT - NSHPPHYSICALEXAM_GEN_ALL_CORE
ICU Vital Signs Last 24 Hrs  T(C): 37.7 (22 May 2022 19:22), Max: 39.5 (22 May 2022 16:16)  T(F): 99.8 (22 May 2022 19:22), Max: 103.1 (22 May 2022 16:16)  HR: 74 (22 May 2022 19:22) (69 - 90)  BP: 117/58 (22 May 2022 19:22) (117/58 - 144/53)  BP(mean): 72 (22 May 2022 19:22) (72 - 72)  RR: 20 (22 May 2022 19:22) (20 - 22)  SpO2: 93% (22 May 2022 19:22) (93% - 94%)    PHYSICAL EXAM:  GENERAL: NAD, lying in bed comfortably  HEAD:  Atraumatic, Normocephalic  EYES: EOMI, PERRLA, conjunctiva and sclera clear  ENT: Moist mucous membranes  NECK: Supple, No JVD  CHEST/LUNG: Clear to auscultation bilaterally; No rales, rhonchi, wheezing, or rubs. Unlabored respirations  HEART: Regular rate and rhythm; No murmurs, rubs, or gallops  ABDOMEN: Bowel sounds present; Soft, Nontender, Nondistended. No hepatomegally  EXTREMITIES:  2+ Peripheral Pulses, brisk capillary refill. No clubbing, cyanosis, or edema  NERVOUS SYSTEM:  Alert & Oriented X3, speech clear. No deficits   MSK: FROM all 4 extremities, full and equal strength  SKIN: No rashes or lesions ICU Vital Signs Last 24 Hrs  T(C): 37.7 (22 May 2022 19:22), Max: 39.5 (22 May 2022 16:16)  T(F): 99.8 (22 May 2022 19:22), Max: 103.1 (22 May 2022 16:16)  HR: 74 (22 May 2022 19:22) (69 - 90)  BP: 117/58 (22 May 2022 19:22) (117/58 - 144/53)  BP(mean): 72 (22 May 2022 19:22) (72 - 72)  RR: 20 (22 May 2022 19:22) (20 - 22)  SpO2: 93% (22 May 2022 19:22) (93% - 94%)    PHYSICAL EXAM:  GENERAL: NAD, lying in bed comfortably, on RA   HEAD:  Atraumatic, Normocephalic  EYES: conjunctiva and sclera clear  CHEST/LUNG: Clear to auscultation bilaterally; No rales, rhonchi, wheezing, or rubs. Unlabored respirations  Auditory cough present, nonproductive   HEART: Regular rate and rhythm; No murmurs, rubs, or gallops  ABDOMEN: Bowel sounds present; Soft, Nontender, Nondistended.   EXTREMITIES:  2+ Peripheral Pulses, compression stocking present b/l, LLE with venous stasis dermatitis on dorsal surface, no increased warmth or tenderness, however erythema present, could be 2.2 stockings  NERVOUS SYSTEM:  Alert & Oriented X3, speech clear. No deficits   MSK: FROM all 4 extremities, full and equal strength  SKIN: No rashes or lesions

## 2022-05-22 NOTE — H&P ADULT - PROBLEM SELECTOR PLAN 2
EKG on 4/21 RBBB seen on EKG  Per patient this is not new for him   No chest pain   Trops 63 from 50s  yesterday   Pro-    - Trend trops AM   - Repeat EKG  - baseline TTE   - consider cards consult depending on trops and new EKG

## 2022-05-22 NOTE — ED POST DISCHARGE NOTE - RESULT SUMMARY
Incidental/Recommended Values Report follow up. Multiple superimposed nodular densities within the right lung could be related to underlying infection, but are indeterminate. Follow-up chest CT in 4-6 weeks is recommended.

## 2022-05-22 NOTE — H&P ADULT - NSHPREVIEWOFSYSTEMS_GEN_ALL_CORE
CONSTITUTIONAL:  No weight loss, fever, chills, weakness or fatigue.  HEENT:  Eyes:  No visual loss, blurred vision, double vision or yellow sclerae. Ears, Nose, Throat:  No hearing loss, sneezing, congestion, runny nose or sore throat.  SKIN:  No rash or itching.  CARDIOVASCULAR:  No chest pain, chest pressure or chest discomfort. No palpitations.  RESPIRATORY:  No shortness of breath, cough or sputum.  GASTROINTESTINAL:  No anorexia, nausea, vomiting or diarrhea. No abdominal pain or blood.  GENITOURINARY:  Denies hematuria, dysuria.   NEUROLOGICAL:  No headache, dizziness, syncope, paralysis, ataxia, numbness or tingling in the extremities. No change in bowel or bladder control.  MUSCULOSKELETAL:  No muscle, back pain, joint pain or stiffness.  HEMATOLOGIC:  No anemia, bleeding or bruising.  LYMPHATICS:  No enlarged nodes.   PSYCHIATRIC:  No history of depression or anxiety.  ENDOCRINOLOGIC:  No reports of sweating, cold or heat intolerance. No polyuria or polydipsia.  ALLERGIES:  No history of asthma, hives, eczema or rhinitis. CONSTITUTIONAL:  (+) fever No weight loss, chills, weakness or fatigue.  HEENT:  Eyes:  No visual loss, blurred vision, double vision or yellow sclerae.   Ears, Nose, Throat:  No hearing loss, sneezing, congestion, runny nose or sore throat.  SKIN:  No rash or itching.  CARDIOVASCULAR:  No chest pain, chest pressure or chest discomfort. No palpitations.  RESPIRATORY:  (+) cough No shortness of breath, or sputum.  GASTROINTESTINAL:  No anorexia, nausea, vomiting or diarrhea. No abdominal pain or blood.  GENITOURINARY:  Denies hematuria, dysuria.   NEUROLOGICAL:  No headache, dizziness, syncope, paralysis, ataxia, numbness or tingling in the extremities. No change in bowel or bladder control.  MUSCULOSKELETAL:  No muscle, back pain, joint pain or stiffness.  HEMATOLOGIC:  No anemia, bleeding or bruising.  LYMPHATICS:  No enlarged nodes.   PSYCHIATRIC:  No history of depression or anxiety.  ENDOCRINOLOGIC:  No reports of sweating, cold or heat intolerance. No polyuria or polydipsia.  ALLERGIES:  No history of asthma, hives, eczema or rhinitis.

## 2022-05-22 NOTE — H&P ADULT - HISTORY OF PRESENT ILLNESS
72M with history of T2DM, HTN, HLD, FERNANDO on CPAP, Pneumonia, CLL, Prostate Cancer s/p resection 2003, thyroid cancer s/p thyroidectomy 8/2018, renal cancer s/p partial nephrectomy and spinal stenosis with acquired scoliosis who initially presented 10/18/18 for L1-5 posterior lumbar fusion and T10-pelvis instrumentation and fusion but case was aborted due to 4 second pause after induction with propofol who returned 10/27/18 for surgery 72M with history of T2DM, HTN, HLD, FERNANDO on CPAP, Pneumonia, CLL, Prostate Cancer s/p resection 2003, thyroid cancer s/p thyroidectomy 8/2018, renal cancer s/p partial nephrectomy and spinal stenosis with acquired scoliosis s/p L1-5 posterior lumbar fusion and T10-pelvis instrumentation and fusion now presents with COVID positive 9-10d prior persistently febrile and with cough.   Patient s/p treatment monolonal ab 5/16.   Presented to ED 4/21 for persistent cough and fevers, at that time found to have new RBBB on EKG, CTA chest concerning for PNA (possible superimposed bacterial PNA), DVT studies negative. Patient recommended for medicine admission but opted for AMA discharge with PO abx. Patient returns to today for persistent fevers and cough and PCP Dr. Doran recommending inpatient admission.     ED course:   febrile to 103F   Labs: Leukocytosis 200s, Hgb 8.8 (was 10-11) 2021, INR 1.2, CMP wnl except HCO3 21  Trops 50--> 55   UA negative  RVP positive COVID PCR positive   Urine cx negative from yesterday  DVT studies negative except L calf subcutaneous edema    72M with history of T2DM, HTN, HLD, FERNANDO on CPAP, asthma, CVI,  CLL, Prostate Cancer s/p resection 2003, thyroid cancer s/p thyroidectomy 8/2018, renal cancer s/p partial nephrectomy and spinal stenosis with acquired scoliosis s/p L1-5 posterior lumbar fusion and T10-pelvis instrumentation and fusion now presents 9-10d of persistent fevers and with dry cough, found to be COVID positive on 5/12/22. Patient was going on a road trip with partner, visit [place], [place], and [place]. He states him and his partner likely got COVID from the trip. Both tested positive two Thursdays ago and is s/p  treatment monoclonal ab 5/16. His partner's sx improved, but patient still experiencing fevers and cough. No chest pain, shortness of breath, abdominal pain, n/v, urinary sx, or diarrhea.   Presented to ED 4/21 for this persistent cough and fevers, at that time found to have a RBBB on EKG, per patient this is not new, he was told of RBBB in the past,  CTA chest concerning for PNA (possible superimposed bacterial PNA), DVT studies negative (patient has chronic venous insufficiency). Patient was recommended for medicine admission but opted for AMA discharge with PO abx (Doxy and Augmentin). Patient returns to today for persistent fevers and cough and PCP Dr. Doran recommending inpatient admission.     ED course:   febrile to 103F   Labs: Leukocytosis 200s, Hgb 8.8 (was 10-11) 2021, INR 1.2, CMP wnl except HCO3 21  Trops 50--> 55   UA negative  RVP positive COVID PCR positive   Urine cx negative from yesterday  DVT studies negative except L calf subcutaneous edema     Patient admitted to medicine for further management. Currently complains of cough and HA, all other ROS negative.    76M with history of T2DM, HTN, HLD, asthma, CVI,  CLL, Prostate Cancer s/p resection 2003, thyroid cancer s/p thyroidectomy 8/2018, renal cancer s/p partial nephrectomy and spinal stenosis with acquired scoliosis s/p L1-5 posterior lumbar fusion and T10-pelvis instrumentation and fusion now presents 9-10d of persistent fevers and with dry cough, found to be COVID positive on 5/12/22. Patient was going on a road trip with partner, visit [place], [place], and [place]. He states him and his partner likely got COVID from the trip. Both tested positive two Thursdays ago and is s/p  treatment monoclonal ab 5/16. His partner's sx improved, but patient still experiencing fevers and cough. No chest pain, shortness of breath, abdominal pain, n/v, urinary sx, or diarrhea.   Presented to ED 4/21 for this persistent cough and fevers, at that time found to have a RBBB on EKG, per patient this is not new, he was told of RBBB in the past,  CTA chest concerning for PNA (possible superimposed bacterial PNA), DVT studies negative (patient has chronic venous insufficiency). Patient was recommended for medicine admission but opted for AMA discharge with PO abx (Doxy and Augmentin). Patient returns to today for persistent fevers and cough and PCP Dr. Doran recommending inpatient admission.     ED course:   febrile to 103F   Labs: Leukocytosis 200s, Hgb 8.8 (was 10-11) 2021, INR 1.2, CMP wnl except HCO3 21  Trops 50--> 55   UA negative  RVP positive COVID PCR positive   Urine cx negative from yesterday  DVT studies negative except L calf subcutaneous edema     Patient admitted to medicine for further management. Currently complains of cough and HA, all other ROS negative.

## 2022-05-22 NOTE — ED PROVIDER NOTE - PROGRESS NOTE DETAILS
Joaquín PGY1   Left a message for Dr. Doran's answering service. Joaquín PGY1  Paged hospitalist for admission. Joaquín PGY1  Admitted to COVID pneumonia.

## 2022-05-22 NOTE — H&P ADULT - PROBLEM SELECTOR PLAN 7
On as needed albuterol inhaler   no wheezing on exam     - duo-nebs q6h for now, can decrease as needed  - no steroids

## 2022-05-22 NOTE — H&P ADULT - PROBLEM SELECTOR PLAN 4
Hx of CLL follows with Dr. Shadia Gage  No treatment at time, only monitoring   Diagnosed in 2017  Significant malignancy hx: CLL, Prostate Cancer s/p resection 2003, thyroid cancer s/p thyroidectomy 8/2018, renal cancer s/p partial nephrectomy AND  FHx of cancer  Leukocytosis of 200s, lymphocytic predominance, expected with CLL  Contributes to CLL state     - CTM   - outpatient oncology f.u Hx of CLL follows with Dr. Shadia Gage  No treatment at time, only monitoring   Diagnosed in 2017  Significant malignancy hx: CLL, Prostate Cancer s/p resection 2003, thyroid cancer s/p thyroidectomy 8/2018, renal cancer s/p partial nephrectomy AND  FHx of cancer  Leukocytosis of 200s, lymphocytic predominance, expected with CLL  Contributes to CLL state     - CTM   - outpatient oncology f.u  - Monitor for signs of hyperviscosity syndrome: headaches, lethargy, deafness, convulsions, visual sx, HTN, bruising, bleeding etc

## 2022-05-22 NOTE — ED ADULT NURSE NOTE - OBJECTIVE STATEMENT
tested positive for covid on May 12; received monclonal antibody therapy on Monday; seen yesterday for same; dx with pneumonia; dc home with antibiotics, comes back due to spiked fevers; took Tylenol at 1500 today; pt is fully alert and oriented; sob with exertion; pmh CLL; no treatment for CLL at this time; wife is present; ekg done at bedside; oriented to room and routines; call bell given. tested positive for covid on May 12; received monclonal antibody therapy on Monday; seen yesterday for same; dx with pneumonia; dc home with antibiotics, comes back due to spiked fevers; took Tylenol at 1500 today; pt is fully alert and oriented; sob with exertion; pmh CLL; no treatment for CLL at this time; wife is present; ekg done at bedside; oriented to room and routines; call bell given; on isolation precautions for covid infection. tested positive for covid on May 12; has symptoms of fever, cough, runny nose, and fatigue; received monclonal antibody therapy on Monday; seen yesterday for same; dx with pneumonia; dc home with antibiotics, comes back due to spiked fevers; took Tylenol at 1500 today; pt is fully alert and oriented; sob with exertion; pmh CLL; no treatment for CLL at this time; wife is present; ekg done at bedside; oriented to room and routines; call bell given; on isolation precautions for covid infection.

## 2022-05-22 NOTE — H&P ADULT - ATTENDING COMMENTS
Agree w/ above    #COVID-19 pneumonia w/ superimposed bacterial pna  -w/ saturation of 93% on RA  -possible superimposed bacterial component  -tx w/ remdesivir due to high risk disease progression, and can tx w/ zosyn/azithro given immunocompromised state w/ CLL  -MRSA/MSSA nasal swab  -if symptoms are improving can de-escalate antibiotics above    #Asthma  -agree w/ prn duonebs as above Agree w/ above    #COVID-19 pneumonia w/ superimposed bacterial pna  -w/ saturation of 93% on RA  -possible superimposed bacterial component  -tx w/ remdesivir due to high risk disease progression, and can tx w/ zosyn/azithro given immunocompromised state w/ CLL  -MRSA/MSSA nasal swab  -if symptoms are improving can de-escalate antibiotics above    #Asthma  -agree w/ prn duonebs as above    #LE swelling  -chronic LE swelling, d-dimer 5/20 wnl  -lower concern for DVT at this time   -L>R duplex was negative at last ED visit

## 2022-05-22 NOTE — ED ADULT NURSE NOTE - NSIMPLEMENTINTERV_GEN_ALL_ED
Implemented All Fall Risk Interventions:  [place] to call system. Call bell, personal items and telephone within reach. Instruct patient to call for assistance. Room bathroom lighting operational. Non-slip footwear when patient is off stretcher. Physically safe environment: no spills, clutter or unnecessary equipment. Stretcher in lowest position, wheels locked, appropriate side rails in place. Provide visual cue, wrist band, yellow gown, etc. Monitor gait and stability. Monitor for mental status changes and reorient to person, place, and time. Review medications for side effects contributing to fall risk. Reinforce activity limits and safety measures with patient and family.

## 2022-05-22 NOTE — H&P ADULT - NSHPSOCIALHISTORY_GEN_ALL_CORE
Lives with partner, denies smoking, drinking, rec drug use  Able to complete all ADLs, no cane necessary

## 2022-05-22 NOTE — H&P ADULT - NSHPLABSRESULTS_GEN_ALL_CORE
LABS:                          8.8    217.48 )-----------( 160      ( 22 May 2022 18:18 )             30.7     05-    136  |  102  |  20  ----------------------------<  128<H>  4.0   |  21<L>  |  1.13    Ca    9.5      22 May 2022 18:18    TPro  6.3  /  Alb  3.8  /  TBili  0.4  /  DBili  x   /  AST  20  /  ALT  21  /  AlkPhos  97  -    LIVER FUNCTIONS - ( 22 May 2022 18:18 )  Alb: 3.8 g/dL / Pro: 6.3 g/dL / ALK PHOS: 97 U/L / ALT: 21 U/L / AST: 20 U/L / GGT: x           PT/INR - ( 21 May 2022 00:34 )   PT: 13.8 sec;   INR: 1.20 ratio         PTT - ( 21 May 2022 00:34 )  PTT:29.2 sec  Urinalysis Basic - ( 21 May 2022 01:32 )    Color: Yellow / Appearance: Clear / S.027 / pH: x  Gluc: x / Ketone: Negative  / Bili: Negative / Urobili: Negative   Blood: x / Protein: 30 mg/dL / Nitrite: Negative   Leuk Esterase: Negative / RBC: 2 /hpf / WBC 1 /HPF   Sq Epi: x / Non Sq Epi: 0 /hpf / Bacteria: Negative      ACC: [id number] EXAM:  CT ANGIO CHEST [facility]UL[facility] by [facility]                          PROCEDURE DATE:  2022          INTERPRETATION:  CLINICAL INFORMATION: Positive Covid, cough, fever,   history of CLL.    COMPARISON: None.    PROCEDURE:  CT Angiography of the Chest.  85 mL of Omnipaque 350 was injected intravenously. 0 ml were discarded.  Sagittal and coronal reformats were performed as well as 3D (MIP)   reconstructions.    FINDINGS:    LUNGS AND AIRWAYS: Patent central airways.  Bilateral groundglass   opacities, right greater than left. Multiple superimposed rounded   solid-appearing nodules also noted (example 1.7 cm right upper lobe   lesion on 2, 41 and 2.1 cm right lower lobe nodule and 2, 77).  PLEURA: No pleural effusion..  MEDIASTINUM AND CARLOS MANUEL: Mediastinal adenopathy including 1.4 cm right   superior mediastinal node and 1.1 cm prevascular space node. Right hilar   adenopathy.  VESSELS: No PE identified noting limited evaluation of multiple   left-sided subsegmental branches due to motion artifact.  HEART: Cardiomegaly. Small pericardial effusion.  CHEST WALL AND LOWER NECK: Extensive bilateral axillary adenopathy. Left   greater than right subpectoral adenopathy. Partially imaged lower neck   adenopathy.  VISUALIZED UPPER ABDOMEN: Hepatosplenomegaly. Right hepatic hypodensity   too small to characterize. Left renal cysts. Indeterminate 2.3 cm   exophytic lesion from the left kidney, not well evaluated due to streak   artifact.  BONES: Degenerative changes. Partially imaged posterior thoracolumbar   spinal fixation hardware.    IMPRESSION:    1. No PE identified noting limited evaluation of some subsegmental   branches.  2. Bilateral ground glass opacities compatible with pneumonia in the   setting of patient's known Covid diagnosis.    3. Multiple superimposed nodular densities within the right lung could be   related to underlying infection, but are indeterminate. Follow-up chest   CT in 4-6 weeks is recommended.    4. Extensive adenopathy likely related to patient's lymphoma.  --- End of Report ---

## 2022-05-22 NOTE — H&P ADULT - PROBLEM SELECTOR PLAN 3
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

## 2022-05-22 NOTE — ED PROVIDER NOTE - ATTENDING CONTRIBUTION TO CARE
Patient known COVID-19 positive now approximately day 14 of symptoms still spiking fevers.  Yesterday signed out AMA from Emergency Department, then he discussed with his PMD (Dr Samson Doran) who told him to come back to the Emergency Department since he was still spiking fevers.  Blood and urine cultures from yesterday NGTD.  CT chest noting likely COVID PNA.    Exam:  General: Patient well appearing, febrile otherwise vital signs within normal limits  HEENT: airway patent with moist mucous membranes  Cardiac: RRR S1/S2 with strong peripheral pulses  Respiratory: mild diffuse rales, normal respiratory effort, satting well on room air  GI: abdomen soft, non tender, non distended  Neuro: no gross neurologic deficits  Skin: warm, well perfused  Psych: normal mood and affect    Patient with likely ongoing COVID-19, could be bacterial superinfection but no obvious evidence of same, non toxic appearing, reporting WBC around 200's known and was present prior to COVID, plan to repeat labs and discuss with sending physician.

## 2022-05-22 NOTE — ED POST DISCHARGE NOTE - DETAILS
5/22/22: Spoke w/ pt and wife extensively about CT results and importance of obtaining repeat CT chest in timeframe as above to ensure either resolution or possible other diagnosis. Pt acknowledged understanding, all questions answered, appreciative of call. - Imtiaz Hunt PA-C.

## 2022-05-23 LAB
A1C WITH ESTIMATED AVERAGE GLUCOSE RESULT: 6.4 % — HIGH (ref 4–5.6)
ALBUMIN SERPL ELPH-MCNC: 2.7 G/DL — LOW (ref 3.3–5)
ALP SERPL-CCNC: 86 U/L — SIGNIFICANT CHANGE UP (ref 40–120)
ALT FLD-CCNC: 20 U/L — SIGNIFICANT CHANGE UP (ref 10–45)
ANION GAP SERPL CALC-SCNC: 14 MMOL/L — SIGNIFICANT CHANGE UP (ref 5–17)
AST SERPL-CCNC: 20 U/L — SIGNIFICANT CHANGE UP (ref 10–40)
BILIRUB SERPL-MCNC: 0.3 MG/DL — SIGNIFICANT CHANGE UP (ref 0.2–1.2)
BLD GP AB SCN SERPL QL: NEGATIVE — SIGNIFICANT CHANGE UP
BUN SERPL-MCNC: 20 MG/DL — SIGNIFICANT CHANGE UP (ref 7–23)
CALCIUM SERPL-MCNC: 9.3 MG/DL — SIGNIFICANT CHANGE UP (ref 8.4–10.5)
CHLORIDE SERPL-SCNC: 102 MMOL/L — SIGNIFICANT CHANGE UP (ref 96–108)
CK MB BLD-MCNC: 2.7 % — SIGNIFICANT CHANGE UP (ref 0–3.5)
CK MB CFR SERPL CALC: 1.7 NG/ML — SIGNIFICANT CHANGE UP (ref 0–6.7)
CK SERPL-CCNC: 63 U/L — SIGNIFICANT CHANGE UP (ref 30–200)
CO2 SERPL-SCNC: 20 MMOL/L — LOW (ref 22–31)
CREAT SERPL-MCNC: 1.22 MG/DL — SIGNIFICANT CHANGE UP (ref 0.5–1.3)
CRP SERPL-MCNC: 213 MG/L — HIGH (ref 0–4)
D DIMER BLD IA.RAPID-MCNC: 303 NG/ML DDU — HIGH
EGFR: 62 ML/MIN/1.73M2 — SIGNIFICANT CHANGE UP
ERYTHROCYTE [SEDIMENTATION RATE] IN BLOOD: 77 MM/HR — HIGH (ref 0–20)
ESTIMATED AVERAGE GLUCOSE: 137 MG/DL — HIGH (ref 68–114)
FERRITIN SERPL-MCNC: 207 NG/ML — SIGNIFICANT CHANGE UP (ref 30–400)
FOLATE SERPL-MCNC: 12.5 NG/ML — SIGNIFICANT CHANGE UP
GLUCOSE BLDC GLUCOMTR-MCNC: 159 MG/DL — HIGH (ref 70–99)
GLUCOSE BLDC GLUCOMTR-MCNC: 189 MG/DL — HIGH (ref 70–99)
GLUCOSE BLDC GLUCOMTR-MCNC: 189 MG/DL — HIGH (ref 70–99)
GLUCOSE BLDC GLUCOMTR-MCNC: 197 MG/DL — HIGH (ref 70–99)
GLUCOSE BLDC GLUCOMTR-MCNC: 200 MG/DL — HIGH (ref 70–99)
GLUCOSE SERPL-MCNC: 191 MG/DL — HIGH (ref 70–99)
GRAM STN FLD: SIGNIFICANT CHANGE UP
HAPTOGLOB SERPL-MCNC: 364 MG/DL — HIGH (ref 34–200)
HCT VFR BLD CALC: 29.8 % — LOW (ref 39–50)
HCV AB S/CO SERPL IA: 0.06 S/CO — SIGNIFICANT CHANGE UP (ref 0–0.99)
HCV AB SERPL-IMP: SIGNIFICANT CHANGE UP
HGB BLD-MCNC: 8.7 G/DL — LOW (ref 13–17)
IRON SATN MFR SERPL: 12 % — LOW (ref 16–55)
IRON SATN MFR SERPL: 31 UG/DL — LOW (ref 45–165)
LDH SERPL L TO P-CCNC: 140 U/L — SIGNIFICANT CHANGE UP (ref 50–242)
LEGIONELLA AG UR QL: NEGATIVE — SIGNIFICANT CHANGE UP
MAGNESIUM SERPL-MCNC: 1.8 MG/DL — SIGNIFICANT CHANGE UP (ref 1.6–2.6)
MANUAL DIF COMMENT BLD-IMP: SIGNIFICANT CHANGE UP
MCHC RBC-ENTMCNC: 28.7 PG — SIGNIFICANT CHANGE UP (ref 27–34)
MCHC RBC-ENTMCNC: 29.2 GM/DL — LOW (ref 32–36)
MCV RBC AUTO: 98.3 FL — SIGNIFICANT CHANGE UP (ref 80–100)
MRSA PCR RESULT.: SIGNIFICANT CHANGE UP
NRBC # BLD: 0 /100 WBCS — SIGNIFICANT CHANGE UP (ref 0–0)
PHOSPHATE SERPL-MCNC: 3.1 MG/DL — SIGNIFICANT CHANGE UP (ref 2.5–4.5)
PLATELET # BLD AUTO: 155 K/UL — SIGNIFICANT CHANGE UP (ref 150–400)
POTASSIUM SERPL-MCNC: 4.1 MMOL/L — SIGNIFICANT CHANGE UP (ref 3.5–5.3)
POTASSIUM SERPL-SCNC: 4.1 MMOL/L — SIGNIFICANT CHANGE UP (ref 3.5–5.3)
PROT SERPL-MCNC: 5.9 G/DL — LOW (ref 6–8.3)
RBC # BLD: 3.03 M/UL — LOW (ref 4.2–5.8)
RBC # BLD: 3.03 M/UL — LOW (ref 4.2–5.8)
RBC # FLD: 17.1 % — HIGH (ref 10.3–14.5)
RETICS #: 26.4 K/UL — SIGNIFICANT CHANGE UP (ref 25–125)
RETICS/RBC NFR: 0.9 % — SIGNIFICANT CHANGE UP (ref 0.5–2.5)
RH IG SCN BLD-IMP: POSITIVE — SIGNIFICANT CHANGE UP
S AUREUS DNA NOSE QL NAA+PROBE: DETECTED
SODIUM SERPL-SCNC: 136 MMOL/L — SIGNIFICANT CHANGE UP (ref 135–145)
SPECIMEN SOURCE: SIGNIFICANT CHANGE UP
TIBC SERPL-MCNC: 252 UG/DL — SIGNIFICANT CHANGE UP (ref 220–430)
TROPONIN T, HIGH SENSITIVITY RESULT: 67 NG/L — HIGH (ref 0–51)
TSH SERPL-MCNC: 0.52 UIU/ML — SIGNIFICANT CHANGE UP (ref 0.27–4.2)
UIBC SERPL-MCNC: 221 UG/DL — SIGNIFICANT CHANGE UP (ref 110–370)
VIT B12 SERPL-MCNC: 220 PG/ML — LOW (ref 232–1245)
WBC # BLD: 194.28 K/UL — CRITICAL HIGH (ref 3.8–10.5)
WBC # FLD AUTO: 194.28 K/UL — CRITICAL HIGH (ref 3.8–10.5)

## 2022-05-23 PROCEDURE — 99233 SBSQ HOSP IP/OBS HIGH 50: CPT

## 2022-05-23 RX ORDER — PIPERACILLIN AND TAZOBACTAM 4; .5 G/20ML; G/20ML
3.38 INJECTION, POWDER, LYOPHILIZED, FOR SOLUTION INTRAVENOUS ONCE
Refills: 0 | Status: COMPLETED | OUTPATIENT
Start: 2022-05-23 | End: 2022-05-23

## 2022-05-23 RX ORDER — ACETAMINOPHEN 500 MG
650 TABLET ORAL EVERY 6 HOURS
Refills: 0 | Status: DISCONTINUED | OUTPATIENT
Start: 2022-05-23 | End: 2022-05-24

## 2022-05-23 RX ORDER — CHLORHEXIDINE GLUCONATE 213 G/1000ML
1 SOLUTION TOPICAL
Refills: 0 | Status: DISCONTINUED | OUTPATIENT
Start: 2022-05-23 | End: 2022-05-27

## 2022-05-23 RX ORDER — REMDESIVIR 5 MG/ML
100 INJECTION INTRAVENOUS EVERY 24 HOURS
Refills: 0 | Status: COMPLETED | OUTPATIENT
Start: 2022-05-24 | End: 2022-05-27

## 2022-05-23 RX ORDER — ACETAMINOPHEN 500 MG
650 TABLET ORAL ONCE
Refills: 0 | Status: COMPLETED | OUTPATIENT
Start: 2022-05-23 | End: 2022-05-23

## 2022-05-23 RX ORDER — PIPERACILLIN AND TAZOBACTAM 4; .5 G/20ML; G/20ML
3.38 INJECTION, POWDER, LYOPHILIZED, FOR SOLUTION INTRAVENOUS EVERY 8 HOURS
Refills: 0 | Status: DISCONTINUED | OUTPATIENT
Start: 2022-05-23 | End: 2022-05-27

## 2022-05-23 RX ADMIN — Medication 100 MILLIGRAM(S): at 12:06

## 2022-05-23 RX ADMIN — Medication 3 MILLILITER(S): at 06:22

## 2022-05-23 RX ADMIN — Medication 650 MILLIGRAM(S): at 17:59

## 2022-05-23 RX ADMIN — Medication 100 MILLIGRAM(S): at 22:30

## 2022-05-23 RX ADMIN — Medication 650 MILLIGRAM(S): at 00:40

## 2022-05-23 RX ADMIN — Medication 325 MILLIGRAM(S): at 07:59

## 2022-05-23 RX ADMIN — Medication 325 MILLIGRAM(S): at 06:51

## 2022-05-23 RX ADMIN — Medication 200 MICROGRAM(S): at 06:21

## 2022-05-23 RX ADMIN — Medication 1: at 08:55

## 2022-05-23 RX ADMIN — AZITHROMYCIN 255 MILLIGRAM(S): 500 TABLET, FILM COATED ORAL at 22:28

## 2022-05-23 RX ADMIN — Medication 3 MILLILITER(S): at 00:14

## 2022-05-23 RX ADMIN — Medication 100 MILLIGRAM(S): at 03:00

## 2022-05-23 RX ADMIN — CHLORHEXIDINE GLUCONATE 1 APPLICATION(S): 213 SOLUTION TOPICAL at 11:47

## 2022-05-23 RX ADMIN — Medication 650 MILLIGRAM(S): at 01:10

## 2022-05-23 RX ADMIN — Medication 100 MILLIGRAM(S): at 11:46

## 2022-05-23 RX ADMIN — Medication 650 MILLIGRAM(S): at 18:14

## 2022-05-23 RX ADMIN — ENOXAPARIN SODIUM 40 MILLIGRAM(S): 100 INJECTION SUBCUTANEOUS at 06:21

## 2022-05-23 RX ADMIN — Medication 1 TABLET(S): at 11:46

## 2022-05-23 RX ADMIN — Medication 3 MILLILITER(S): at 17:58

## 2022-05-23 RX ADMIN — PIPERACILLIN AND TAZOBACTAM 25 GRAM(S): 4; .5 INJECTION, POWDER, LYOPHILIZED, FOR SOLUTION INTRAVENOUS at 06:21

## 2022-05-23 RX ADMIN — Medication 100 MILLIGRAM(S): at 06:50

## 2022-05-23 RX ADMIN — PIPERACILLIN AND TAZOBACTAM 25 GRAM(S): 4; .5 INJECTION, POWDER, LYOPHILIZED, FOR SOLUTION INTRAVENOUS at 22:29

## 2022-05-23 RX ADMIN — Medication 100 MILLIGRAM(S): at 17:59

## 2022-05-23 RX ADMIN — Medication 1: at 17:58

## 2022-05-23 RX ADMIN — PIPERACILLIN AND TAZOBACTAM 200 GRAM(S): 4; .5 INJECTION, POWDER, LYOPHILIZED, FOR SOLUTION INTRAVENOUS at 03:00

## 2022-05-23 RX ADMIN — REMDESIVIR 500 MILLIGRAM(S): 5 INJECTION INTRAVENOUS at 10:16

## 2022-05-23 RX ADMIN — Medication 3 MILLILITER(S): at 23:35

## 2022-05-23 RX ADMIN — PIPERACILLIN AND TAZOBACTAM 25 GRAM(S): 4; .5 INJECTION, POWDER, LYOPHILIZED, FOR SOLUTION INTRAVENOUS at 14:39

## 2022-05-23 RX ADMIN — Medication 650 MILLIGRAM(S): at 13:01

## 2022-05-23 RX ADMIN — Medication 325 MILLIGRAM(S): at 00:18

## 2022-05-23 RX ADMIN — AZITHROMYCIN 500 MILLIGRAM(S): 500 TABLET, FILM COATED ORAL at 01:02

## 2022-05-23 RX ADMIN — Medication 3 MILLILITER(S): at 11:46

## 2022-05-23 RX ADMIN — Medication 1: at 12:05

## 2022-05-23 RX ADMIN — Medication 650 MILLIGRAM(S): at 13:49

## 2022-05-23 NOTE — PHYSICAL THERAPY INITIAL EVALUATION ADULT - PERTINENT HX OF CURRENT PROBLEM, REHAB EVAL
76M with history of presents 9-10days of persistent fevers and with dry cough, found to be COVID positive on 5/12/22 likely symptoms 2/2 COVID PNA with possible superimposed bacterial PNA. 5/20: Chest xray: Nonspecific patchy nodular opacities involving the right mid to lower lung field *5/21: CT angio chest: Bilateral ground glass opacities compatible with pneumonia, no pulmonary embolism,  Multiple superimposed nodular densities within the right lung *Left LE US: (-) DVT

## 2022-05-23 NOTE — PHYSICAL THERAPY INITIAL EVALUATION ADULT - ADDITIONAL COMMENTS
[facility].NEPHROLOGY PROGRESS NOTE      NAME: Yusuf Reyes  MRN: [id number] : 2004    Visit Type: Established Visit    Identification / Chief Complaint:  Source(s) of Information: Parent(s) and Patient    HISTORY OF PRESENT ILLNESS:    Yusuf Reyes is a 16 year old male who presents to Nephrology clinic for follow up of evaluation and management of hypertension.  He was previously followed by Dr. Vela for high blood pressure but was lost to follow up.  Cardiology (Dr. Dudley) evaluated him in May 2019 and performed an extensive work up including normal CMP, EKG, and echocardiogram.  Renal doppler was concerning for possible renal artery stenosis but abdominal CT showed no evidence of JEROME.  Cardiology also performed a 24 hour ambulatory blood pressure monitor, which confirmed hypertension (see full results below).  He was then referred to Nephrology for further management.  Of note, patient has a strong FH of hypertension in both sides, mom has mitral valve prolapse and hypertension.     Interval History:  Yusuf was last see in clinic in 2020.  Since then, he states he has been overall well.  He has missed a few doses of Lisinopril intermittently but otherwise has been taking it regularly.   When he checks his blood pressures at home, he states that they range in the 110s/60-70s.  He continues to work on healthy dietary habits and is active with sports.     ABPM Results from: 20  Current anti-hypertensive regimen: Lisinopril 10 mg daily  58 successful readings     Systolics > upper limits (24 hour period): 45%  Average systolic (24 hour period): 121 mmHg  Diastolics > upper limits (24 hour period): 36%  Average diastolic (24 hour period): 74 mmHg     Systolics > upper limits (daytime): 46%  Average systolic (daytime): 126 mmHg  Diastolics > upper limits (daytime): 33%  Average diastolic (daytime): 79 mmHg     Systolics > upper limits (nighttime): 40%  Average systolic (nighttime):  108mmHg  Diastolics > upper limits (nighttime): 50%  Average diastolic (nighttime): 62 mmHg    Systolic Dippin.6%  Diastolic Dippin.9%    Interpretation: BP elevated by load but not mean.  Appropriate dipping    Review of Systems:   A complete 14-point review of systems is negative, except as noted in the HPI.    Medications Orders:   Current Outpatient Medications   Medication Sig Dispense Refill   • lisinopril (ZESTRIL) 10 MG tablet Take 1 tablet by mouth every morning. 30 tablet 11     No current facility-administered medications for this visit.         PAST HISTORY (Medical, Surgical, Family, etc):     ALLERGIES:  No Known Allergies    Past Medical History:   Diagnosis Date   • COVID-19    • Essential (primary) hypertension    • FTND (full term normal delivery)        Past Surgical History:   Procedure Laterality Date   • No past surgeries         Family History   Problem Relation Age of Onset   • Hypertension Mother    • Hypertension Maternal Grandmother    • Hypertension Paternal Grandmother    • Hypertension Maternal Grandfather    • Diabetes Maternal Grandfather    • Patient is unaware of any medical problems Sister    • Patient is unaware of any medical problems Paternal Grandfather    • Arrhythmia Neg Hx    • Long QT Syndrome Neg Hx    • Myocardial Infarction Neg Hx    • Stroke Neg Hx    • Pacemaker Neg Hx    • Congenital Heart Disease Neg Hx         Social History:   Pediatric History   Patient Parents   • SIMI ACEVEDO (Mother)   • BRIAN ACEVEDO (Father)     Other Topics Concern   • Not on file   Social History Narrative   • Not on file           Vital Signs (24 h):    There were no vitals taken for this visit.    No blood pressure reading on file for this encounter.    There were no vitals filed for this visit.    PHYSICAL EXAM:   Physical exam limited due to telehealth visit  General: no acute distress, appears overall well  Head: NC/AT  Eyes: EOMI, conjunctiva clear without  icterus  ENT/Mouth: mucous membranes moist  Extremities: no edema  Neurologic: awake, alert, no focal deficits / grossly intact, moves all extremities appropriately  Skin:  no rashes / lesions, no jaundice  Other: appropriate development for age    ASSESSMENT & PLAN / RECOMMENDATIONS:    Yusuf Reyes is a 16 Yrs 11 Mos-old male who presents to Nephrology clinic via telehealth for management of hypertension.  His blood pressures are currently well controlled on Lisinopril 10 mg daily.  His ABPM shows some elevated blood pressures by load but his average blood pressures are within normal range so we will make no changes at this time.      - Continue Lisinopril 10 mg daily  - Echocardiogram performed July 2020, normal results  - Come to clinic next week for a BP check  - CMP, urinalysis, and urine protein to creatinine ratio when he comes to clinic next week  - Return to clinic in 6 months    I have discussed my assessment and plan with the parents and patient.    Referring Provider: No ref. provider found, , Ph: N/A, Fax:   Primary Care Provider: Em Chu MD, [id number] / [facility] [address], Ph: None, Fax: None      Note completed by: Bridgette Ozuna MD   6/22/2021   Patient lives in private home with significant other, no stairs to enter, 1 flight to bedroom, independent with ADLs

## 2022-05-23 NOTE — PHYSICAL THERAPY INITIAL EVALUATION ADULT - GENERAL OBSERVATIONS, REHAB EVAL
Patient received standing at bedside with PCA, NAD, VSS on room air, +PIV, significant other present

## 2022-05-23 NOTE — PATIENT PROFILE ADULT - PATIENT REPRESENTATIVE: ( YOU CAN CHOOSE ANY PERSON THAT CAN ASSIST YOU WITH YOUR HEALTH CARE PREFERENCES, DOES NOT HAVE TO BE A SPOUSE, IMMEDIATE FAMILY OR SIGNIFICANT OTHER/PARTNER)
What Type Of Note Output Would You Prefer (Optional)?: Standard Output On A Scale Of 0 To 10 How Would You Rate Your Itching?: 7 How Did Your Itching Occur?: gradual in onset  (over a period of years) How Severe Is Your Itching?: mild Additional History: Patient states no rash, no redness and is asymptomatic today. Pt states no throat tightness, no tongue swelling, no tingling sensation. yes

## 2022-05-23 NOTE — PHYSICAL THERAPY INITIAL EVALUATION ADULT - PLANNED THERAPY INTERVENTIONS, PT EVAL
stair negotiation GOAL: Patient will negotiate up / down 10 steps independently in 2 weeks/bed mobility training/gait training/transfer training

## 2022-05-23 NOTE — PROVIDER CONTACT NOTE (OTHER) - SITUATION
Patient with hx of CLL and covid from 5/12, admitted with symptoms on 4/22. Cycle threshold is 18.5. Continue Covid isolation.
c/o of cough

## 2022-05-23 NOTE — PATIENT PROFILE ADULT - FALL HARM RISK - HARM RISK INTERVENTIONS

## 2022-05-24 LAB
ALBUMIN SERPL ELPH-MCNC: 3.3 G/DL — SIGNIFICANT CHANGE UP (ref 3.3–5)
ALP SERPL-CCNC: 89 U/L — SIGNIFICANT CHANGE UP (ref 40–120)
ALT FLD-CCNC: 23 U/L — SIGNIFICANT CHANGE UP (ref 10–45)
ANION GAP SERPL CALC-SCNC: 14 MMOL/L — SIGNIFICANT CHANGE UP (ref 5–17)
AST SERPL-CCNC: 22 U/L — SIGNIFICANT CHANGE UP (ref 10–40)
BILIRUB SERPL-MCNC: 0.3 MG/DL — SIGNIFICANT CHANGE UP (ref 0.2–1.2)
BUN SERPL-MCNC: 21 MG/DL — SIGNIFICANT CHANGE UP (ref 7–23)
CALCIUM SERPL-MCNC: 9.2 MG/DL — SIGNIFICANT CHANGE UP (ref 8.4–10.5)
CHLORIDE SERPL-SCNC: 102 MMOL/L — SIGNIFICANT CHANGE UP (ref 96–108)
CO2 SERPL-SCNC: 19 MMOL/L — LOW (ref 22–31)
CREAT SERPL-MCNC: 1.22 MG/DL — SIGNIFICANT CHANGE UP (ref 0.5–1.3)
EGFR: 62 ML/MIN/1.73M2 — SIGNIFICANT CHANGE UP
GLUCOSE BLDC GLUCOMTR-MCNC: 133 MG/DL — HIGH (ref 70–99)
GLUCOSE BLDC GLUCOMTR-MCNC: 192 MG/DL — HIGH (ref 70–99)
GLUCOSE BLDC GLUCOMTR-MCNC: 195 MG/DL — HIGH (ref 70–99)
GLUCOSE BLDC GLUCOMTR-MCNC: 227 MG/DL — HIGH (ref 70–99)
GLUCOSE SERPL-MCNC: 168 MG/DL — HIGH (ref 70–99)
HCT VFR BLD CALC: 28.3 % — LOW (ref 39–50)
HGB BLD-MCNC: 8.3 G/DL — LOW (ref 13–17)
MAGNESIUM SERPL-MCNC: 1.9 MG/DL — SIGNIFICANT CHANGE UP (ref 1.6–2.6)
MCHC RBC-ENTMCNC: 27.9 PG — SIGNIFICANT CHANGE UP (ref 27–34)
MCHC RBC-ENTMCNC: 29.3 GM/DL — LOW (ref 32–36)
MCV RBC AUTO: 95 FL — SIGNIFICANT CHANGE UP (ref 80–100)
NRBC # BLD: 0 /100 WBCS — SIGNIFICANT CHANGE UP (ref 0–0)
PHOSPHATE SERPL-MCNC: 2.4 MG/DL — LOW (ref 2.5–4.5)
PLATELET # BLD AUTO: 164 K/UL — SIGNIFICANT CHANGE UP (ref 150–400)
POTASSIUM SERPL-MCNC: 3.7 MMOL/L — SIGNIFICANT CHANGE UP (ref 3.5–5.3)
POTASSIUM SERPL-SCNC: 3.7 MMOL/L — SIGNIFICANT CHANGE UP (ref 3.5–5.3)
PROCALCITONIN SERPL-MCNC: 0.49 NG/ML — HIGH (ref 0.02–0.1)
PROT SERPL-MCNC: 5.9 G/DL — LOW (ref 6–8.3)
RBC # BLD: 2.98 M/UL — LOW (ref 4.2–5.8)
RBC # FLD: 16.8 % — HIGH (ref 10.3–14.5)
S PNEUM AG UR QL: NEGATIVE — SIGNIFICANT CHANGE UP
SODIUM SERPL-SCNC: 135 MMOL/L — SIGNIFICANT CHANGE UP (ref 135–145)
WBC # BLD: 200.34 K/UL — CRITICAL HIGH (ref 3.8–10.5)
WBC # FLD AUTO: 200.34 K/UL — CRITICAL HIGH (ref 3.8–10.5)

## 2022-05-24 PROCEDURE — 93306 TTE W/DOPPLER COMPLETE: CPT | Mod: 26

## 2022-05-24 PROCEDURE — 99233 SBSQ HOSP IP/OBS HIGH 50: CPT

## 2022-05-24 RX ORDER — FUROSEMIDE 40 MG
40 TABLET ORAL DAILY
Refills: 0 | Status: DISCONTINUED | OUTPATIENT
Start: 2022-05-24 | End: 2022-05-27

## 2022-05-24 RX ORDER — ACETAMINOPHEN 500 MG
650 TABLET ORAL EVERY 6 HOURS
Refills: 0 | Status: COMPLETED | OUTPATIENT
Start: 2022-05-24 | End: 2022-05-27

## 2022-05-24 RX ORDER — POLYETHYLENE GLYCOL 3350 17 G/17G
17 POWDER, FOR SOLUTION ORAL DAILY
Refills: 0 | Status: DISCONTINUED | OUTPATIENT
Start: 2022-05-24 | End: 2022-05-27

## 2022-05-24 RX ADMIN — Medication 100 MILLIGRAM(S): at 12:58

## 2022-05-24 RX ADMIN — Medication 650 MILLIGRAM(S): at 07:21

## 2022-05-24 RX ADMIN — ENOXAPARIN SODIUM 40 MILLIGRAM(S): 100 INJECTION SUBCUTANEOUS at 05:59

## 2022-05-24 RX ADMIN — Medication 5 MILLIGRAM(S): at 14:00

## 2022-05-24 RX ADMIN — Medication 650 MILLIGRAM(S): at 20:10

## 2022-05-24 RX ADMIN — Medication 3 MILLILITER(S): at 05:58

## 2022-05-24 RX ADMIN — Medication 2: at 12:58

## 2022-05-24 RX ADMIN — AZITHROMYCIN 255 MILLIGRAM(S): 500 TABLET, FILM COATED ORAL at 23:19

## 2022-05-24 RX ADMIN — Medication 3 MILLILITER(S): at 14:04

## 2022-05-24 RX ADMIN — Medication 3 MILLILITER(S): at 23:25

## 2022-05-24 RX ADMIN — PIPERACILLIN AND TAZOBACTAM 25 GRAM(S): 4; .5 INJECTION, POWDER, LYOPHILIZED, FOR SOLUTION INTRAVENOUS at 23:19

## 2022-05-24 RX ADMIN — PIPERACILLIN AND TAZOBACTAM 25 GRAM(S): 4; .5 INJECTION, POWDER, LYOPHILIZED, FOR SOLUTION INTRAVENOUS at 07:04

## 2022-05-24 RX ADMIN — Medication 100 MILLIGRAM(S): at 13:00

## 2022-05-24 RX ADMIN — Medication 3 MILLILITER(S): at 19:35

## 2022-05-24 RX ADMIN — CHLORHEXIDINE GLUCONATE 1 APPLICATION(S): 213 SOLUTION TOPICAL at 06:18

## 2022-05-24 RX ADMIN — Medication 650 MILLIGRAM(S): at 15:04

## 2022-05-24 RX ADMIN — Medication 100 MILLIGRAM(S): at 19:34

## 2022-05-24 RX ADMIN — Medication 1: at 09:25

## 2022-05-24 RX ADMIN — PIPERACILLIN AND TAZOBACTAM 25 GRAM(S): 4; .5 INJECTION, POWDER, LYOPHILIZED, FOR SOLUTION INTRAVENOUS at 15:37

## 2022-05-24 RX ADMIN — Medication 1 TABLET(S): at 12:58

## 2022-05-24 RX ADMIN — Medication 100 MILLIGRAM(S): at 13:14

## 2022-05-24 RX ADMIN — Medication 650 MILLIGRAM(S): at 06:21

## 2022-05-24 RX ADMIN — POLYETHYLENE GLYCOL 3350 17 GRAM(S): 17 POWDER, FOR SOLUTION ORAL at 15:37

## 2022-05-24 RX ADMIN — Medication 40 MILLIGRAM(S): at 12:58

## 2022-05-24 RX ADMIN — Medication 650 MILLIGRAM(S): at 14:04

## 2022-05-24 RX ADMIN — REMDESIVIR 500 MILLIGRAM(S): 5 INJECTION INTRAVENOUS at 10:30

## 2022-05-24 RX ADMIN — Medication 100 MILLIGRAM(S): at 06:22

## 2022-05-24 RX ADMIN — Medication 200 MICROGRAM(S): at 05:58

## 2022-05-24 RX ADMIN — Medication 650 MILLIGRAM(S): at 21:47

## 2022-05-24 NOTE — CHART NOTE - NSCHARTNOTEFT_GEN_A_CORE
Chart reviewed.    CC: complaint of constipation; no abd pain/N/V    HPI: 76M with history of T2DM, HTN, HLD, asthma, CVI,  CLL, Prostate Cancer s/p resection 2003, thyroid cancer s/p thyroidectomy 8/2018, renal cancer s/p partial nephrectomy and spinal stenosis with acquired scoliosis s/p L1-5 posterior lumbar fusion and T10-pelvis instrumentation and fusion now presents 9-10d of persistent fevers and with dry cough, found to be COVID positive on 5/12/22. Patient was going on a road trip with partner, visit [place], [place], and [place]. He states him and his partner likely got COVID from the trip. Both tested positive two Thursdays ago and is s/p  treatment monoclonal ab 5/16. His partner's sx improved, but patient still experiencing fevers and cough. No chest pain, shortness of breath, abdominal pain, n/v, urinary sx, or diarrhea.   Presented to ED 4/21 for this persistent cough and fevers, at that time found to have a RBBB on EKG, per patient this is not new, he was told of RBBB in the past,  CTA chest concerning for PNA (possible superimposed bacterial PNA), DVT studies negative (patient has chronic venous insufficiency). Patient was recommended for medicine admission but opted for AMA discharge with PO abx (Doxy and Augmentin). Patient returns to today for persistent fevers and cough and PCP Dr. Doran recommending inpatient admission.   ED course:   febrile to 103F   Labs: Leukocytosis 200s, Hgb 8.8 (was 10-11) 2021, INR 1.2, CMP wnl except HCO3 21  Trops 50--> 55   UA negative  RVP positive COVID PCR positive   Urine cx negative from yesterday  DVT studies negative except L calf subcutaneous edema     Patient admitted to medicine for further management. Currently complains of cough and HA, all other ROS negative.    (22 May 2022 21:23)        MEDICATIONS  (STANDING):  albuterol/ipratropium for Nebulization 3 milliLiter(s) Nebulizer every 6 hours  azithromycin  IVPB 500 milliGRAM(s) IV Intermittent every 24 hours  azithromycin  IVPB      chlorhexidine 2% Cloths 1 Application(s) Topical <User Schedule>  dextrose 5%. 1000 milliLiter(s) (100 mL/Hr) IV Continuous <Continuous>  dextrose 5%. 1000 milliLiter(s) (50 mL/Hr) IV Continuous <Continuous>  dextrose 50% Injectable 25 Gram(s) IV Push once  dextrose 50% Injectable 12.5 Gram(s) IV Push once  dextrose 50% Injectable 25 Gram(s) IV Push once  enoxaparin Injectable 40 milliGRAM(s) SubCutaneous every 24 hours  glucagon  Injectable 1 milliGRAM(s) IntraMuscular once  insulin lispro (ADMELOG) corrective regimen sliding scale   SubCutaneous three times a day before meals  insulin lispro (ADMELOG) corrective regimen sliding scale   SubCutaneous at bedtime  lactobacillus acidophilus 1 Tablet(s) Oral daily  levothyroxine 200 MICROGram(s) Oral daily  piperacillin/tazobactam IVPB.. 3.375 Gram(s) IV Intermittent every 8 hours  remdesivir  IVPB 100 milliGRAM(s) IV Intermittent every 24 hours  remdesivir  IVPB   IV Intermittent   topiramate 100 milliGRAM(s) Oral daily    MEDICATIONS  (PRN):  acetaminophen     Tablet .. 650 milliGRAM(s) Oral every 6 hours PRN Temp greater or equal to 38C (100.4F)  benzonatate 100 milliGRAM(s) Oral every 8 hours PRN Cough  dextrose Oral Gel 15 Gram(s) Oral once PRN Blood Glucose LESS THAN 70 milliGRAM(s)/deciliter  guaiFENesin Oral Liquid (Sugar-Free) 100 milliGRAM(s) Oral every 4 hours PRN Cough      VITALS:  Vital Signs Last 24 Hrs  T(C): 37.4 (24 May 2022 05:17), Max: 39.2 (23 May 2022 14:05)  T(F): 99.4 (24 May 2022 05:17), Max: 102.5 (23 May 2022 14:05)  HR: 79 (24 May 2022 05:17) (70 - 89)  BP: 122/70 (24 May 2022 05:17) (122/70 - 137/60)  BP(mean): --  RR: 18 (24 May 2022 05:17) (17 - 20)  SpO2: 93% (24 May 2022 05:17) (92% - 94%)    LABS:                       8.3    200.34 )-----------( 164      ( 24 May 2022 07:09 )             28.3     05-24    135  |  102  |  21  ----------------------------<  168<H>  3.7   |  19<L>  |  1.22    Ca    9.2      24 May 2022 07:07  Phos  2.4     05-24  Mg     1.9     05-24    TPro  5.9<L>  /  Alb  3.3  /  TBili  0.3  /  DBili  x   /  AST  22  /  ALT  23  /  AlkPhos  89  05-24    CARDIAC MARKERS ( 23 May 2022 07:09 )  x     / x     / 63 U/L / x     / 1.7 ng/mL  CARDIAC MARKERS ( 22 May 2022 18:18 )  x     / x     / 72 U/L / x     / 2.0 ng/mL    LIVER FUNCTIONS - ( 24 May 2022 07:07 )  Alb: 3.3 g/dL / Pro: 5.9 g/dL / ALK PHOS: 89 U/L / ALT: 23 U/L / AST: 22 U/L / GGT: x           Serum Pro-Brain Natriuretic Peptide: 985 pg/mL (05-22-22 @ 18:18)  Serum Pro-Brain Natriuretic Peptide: 806 pg/mL (05-21-22 @ 00:34)    Culture - Sputum (collected 05-23-22 @ 13:19)  Source: .Sputum Sputum  Gram Stain (05-23-22 @ 19:44):    Rare polymorphonuclear leukocytes per low power field    No Squamous epithelial cells per low power field    Rare Gram positive cocci in pairs per oil power field    Rare Gram Variable Rods per oil power field      A/P  76 year old Man admitted for COVID PNA being treated with remdisivir reporting feelings of constipation  - will give dulcolax supp x1 now

## 2022-05-25 LAB
ALBUMIN SERPL ELPH-MCNC: 3.1 G/DL — LOW (ref 3.3–5)
ALP SERPL-CCNC: 89 U/L — SIGNIFICANT CHANGE UP (ref 40–120)
ALT FLD-CCNC: 29 U/L — SIGNIFICANT CHANGE UP (ref 10–45)
ANION GAP SERPL CALC-SCNC: 16 MMOL/L — SIGNIFICANT CHANGE UP (ref 5–17)
AST SERPL-CCNC: 32 U/L — SIGNIFICANT CHANGE UP (ref 10–40)
BILIRUB SERPL-MCNC: 0.3 MG/DL — SIGNIFICANT CHANGE UP (ref 0.2–1.2)
BUN SERPL-MCNC: 24 MG/DL — HIGH (ref 7–23)
CALCIUM SERPL-MCNC: 9.1 MG/DL — SIGNIFICANT CHANGE UP (ref 8.4–10.5)
CHLORIDE SERPL-SCNC: 102 MMOL/L — SIGNIFICANT CHANGE UP (ref 96–108)
CO2 SERPL-SCNC: 18 MMOL/L — LOW (ref 22–31)
CREAT SERPL-MCNC: 1.2 MG/DL — SIGNIFICANT CHANGE UP (ref 0.5–1.3)
CRP SERPL-MCNC: 235 MG/L — HIGH (ref 0–4)
CULTURE RESULTS: SIGNIFICANT CHANGE UP
D DIMER BLD IA.RAPID-MCNC: 427 NG/ML DDU — HIGH
EGFR: 63 ML/MIN/1.73M2 — SIGNIFICANT CHANGE UP
FERRITIN SERPL-MCNC: 281 NG/ML — SIGNIFICANT CHANGE UP (ref 30–400)
GLUCOSE BLDC GLUCOMTR-MCNC: 141 MG/DL — HIGH (ref 70–99)
GLUCOSE BLDC GLUCOMTR-MCNC: 172 MG/DL — HIGH (ref 70–99)
GLUCOSE BLDC GLUCOMTR-MCNC: 194 MG/DL — HIGH (ref 70–99)
GLUCOSE BLDC GLUCOMTR-MCNC: 229 MG/DL — HIGH (ref 70–99)
GLUCOSE SERPL-MCNC: 185 MG/DL — HIGH (ref 70–99)
HCT VFR BLD CALC: 27.6 % — LOW (ref 39–50)
HGB BLD-MCNC: 8.2 G/DL — LOW (ref 13–17)
MCHC RBC-ENTMCNC: 28 PG — SIGNIFICANT CHANGE UP (ref 27–34)
MCHC RBC-ENTMCNC: 29.7 GM/DL — LOW (ref 32–36)
MCV RBC AUTO: 94.2 FL — SIGNIFICANT CHANGE UP (ref 80–100)
NRBC # BLD: 0 /100 WBCS — SIGNIFICANT CHANGE UP (ref 0–0)
PLATELET # BLD AUTO: 177 K/UL — SIGNIFICANT CHANGE UP (ref 150–400)
POTASSIUM SERPL-MCNC: 4.5 MMOL/L — SIGNIFICANT CHANGE UP (ref 3.5–5.3)
POTASSIUM SERPL-SCNC: 4.5 MMOL/L — SIGNIFICANT CHANGE UP (ref 3.5–5.3)
PROCALCITONIN SERPL-MCNC: 0.4 NG/ML — HIGH (ref 0.02–0.1)
PROT SERPL-MCNC: 5.9 G/DL — LOW (ref 6–8.3)
RBC # BLD: 2.93 M/UL — LOW (ref 4.2–5.8)
RBC # FLD: 17.2 % — HIGH (ref 10.3–14.5)
SODIUM SERPL-SCNC: 136 MMOL/L — SIGNIFICANT CHANGE UP (ref 135–145)
SPECIMEN SOURCE: SIGNIFICANT CHANGE UP
WBC # BLD: 196.75 K/UL — CRITICAL HIGH (ref 3.8–10.5)
WBC # FLD AUTO: 196.75 K/UL — CRITICAL HIGH (ref 3.8–10.5)

## 2022-05-25 PROCEDURE — 99233 SBSQ HOSP IP/OBS HIGH 50: CPT

## 2022-05-25 RX ORDER — ALBUTEROL 90 UG/1
2 AEROSOL, METERED ORAL EVERY 6 HOURS
Refills: 0 | Status: DISCONTINUED | OUTPATIENT
Start: 2022-05-25 | End: 2022-05-27

## 2022-05-25 RX ADMIN — Medication 200 MILLIGRAM(S): at 15:21

## 2022-05-25 RX ADMIN — Medication 650 MILLIGRAM(S): at 16:31

## 2022-05-25 RX ADMIN — Medication 200 MILLIGRAM(S): at 19:56

## 2022-05-25 RX ADMIN — Medication 650 MILLIGRAM(S): at 22:28

## 2022-05-25 RX ADMIN — POLYETHYLENE GLYCOL 3350 17 GRAM(S): 17 POWDER, FOR SOLUTION ORAL at 12:47

## 2022-05-25 RX ADMIN — ENOXAPARIN SODIUM 40 MILLIGRAM(S): 100 INJECTION SUBCUTANEOUS at 06:08

## 2022-05-25 RX ADMIN — Medication 650 MILLIGRAM(S): at 17:54

## 2022-05-25 RX ADMIN — Medication 3 MILLILITER(S): at 12:48

## 2022-05-25 RX ADMIN — Medication 1: at 08:55

## 2022-05-25 RX ADMIN — Medication 3 MILLILITER(S): at 06:08

## 2022-05-25 RX ADMIN — PIPERACILLIN AND TAZOBACTAM 25 GRAM(S): 4; .5 INJECTION, POWDER, LYOPHILIZED, FOR SOLUTION INTRAVENOUS at 15:21

## 2022-05-25 RX ADMIN — Medication 100 MILLIGRAM(S): at 10:29

## 2022-05-25 RX ADMIN — Medication 1 TABLET(S): at 12:48

## 2022-05-25 RX ADMIN — Medication 650 MILLIGRAM(S): at 21:58

## 2022-05-25 RX ADMIN — Medication 100 MILLIGRAM(S): at 12:49

## 2022-05-25 RX ADMIN — Medication 650 MILLIGRAM(S): at 03:38

## 2022-05-25 RX ADMIN — ALBUTEROL 2 PUFF(S): 90 AEROSOL, METERED ORAL at 15:21

## 2022-05-25 RX ADMIN — Medication 100 MILLIGRAM(S): at 06:09

## 2022-05-25 RX ADMIN — Medication 650 MILLIGRAM(S): at 11:30

## 2022-05-25 RX ADMIN — Medication 200 MILLIGRAM(S): at 23:52

## 2022-05-25 RX ADMIN — Medication 200 MICROGRAM(S): at 06:08

## 2022-05-25 RX ADMIN — REMDESIVIR 500 MILLIGRAM(S): 5 INJECTION INTRAVENOUS at 10:30

## 2022-05-25 RX ADMIN — PIPERACILLIN AND TAZOBACTAM 25 GRAM(S): 4; .5 INJECTION, POWDER, LYOPHILIZED, FOR SOLUTION INTRAVENOUS at 06:10

## 2022-05-25 RX ADMIN — Medication 2: at 12:49

## 2022-05-25 RX ADMIN — Medication 40 MILLIGRAM(S): at 06:08

## 2022-05-25 RX ADMIN — CHLORHEXIDINE GLUCONATE 1 APPLICATION(S): 213 SOLUTION TOPICAL at 06:10

## 2022-05-25 RX ADMIN — Medication 650 MILLIGRAM(S): at 10:29

## 2022-05-25 RX ADMIN — PIPERACILLIN AND TAZOBACTAM 25 GRAM(S): 4; .5 INJECTION, POWDER, LYOPHILIZED, FOR SOLUTION INTRAVENOUS at 23:52

## 2022-05-26 LAB
ANION GAP SERPL CALC-SCNC: 16 MMOL/L — SIGNIFICANT CHANGE UP (ref 5–17)
BUN SERPL-MCNC: 22 MG/DL — SIGNIFICANT CHANGE UP (ref 7–23)
CALCIUM SERPL-MCNC: 9.4 MG/DL — SIGNIFICANT CHANGE UP (ref 8.4–10.5)
CHLORIDE SERPL-SCNC: 104 MMOL/L — SIGNIFICANT CHANGE UP (ref 96–108)
CO2 SERPL-SCNC: 19 MMOL/L — LOW (ref 22–31)
CREAT SERPL-MCNC: 1.13 MG/DL — SIGNIFICANT CHANGE UP (ref 0.5–1.3)
CULTURE RESULTS: SIGNIFICANT CHANGE UP
CULTURE RESULTS: SIGNIFICANT CHANGE UP
EGFR: 68 ML/MIN/1.73M2 — SIGNIFICANT CHANGE UP
FERRITIN SERPL-MCNC: 344 NG/ML — SIGNIFICANT CHANGE UP (ref 30–400)
GLUCOSE BLDC GLUCOMTR-MCNC: 135 MG/DL — HIGH (ref 70–99)
GLUCOSE BLDC GLUCOMTR-MCNC: 146 MG/DL — HIGH (ref 70–99)
GLUCOSE BLDC GLUCOMTR-MCNC: 199 MG/DL — HIGH (ref 70–99)
GLUCOSE BLDC GLUCOMTR-MCNC: 221 MG/DL — HIGH (ref 70–99)
GLUCOSE SERPL-MCNC: 176 MG/DL — HIGH (ref 70–99)
HCT VFR BLD CALC: 28.7 % — LOW (ref 39–50)
HGB BLD-MCNC: 8.4 G/DL — LOW (ref 13–17)
MCHC RBC-ENTMCNC: 27.4 PG — SIGNIFICANT CHANGE UP (ref 27–34)
MCHC RBC-ENTMCNC: 29.3 GM/DL — LOW (ref 32–36)
MCV RBC AUTO: 93.5 FL — SIGNIFICANT CHANGE UP (ref 80–100)
NRBC # BLD: 0 /100 WBCS — SIGNIFICANT CHANGE UP (ref 0–0)
PLATELET # BLD AUTO: 222 K/UL — SIGNIFICANT CHANGE UP (ref 150–400)
POTASSIUM SERPL-MCNC: 4.3 MMOL/L — SIGNIFICANT CHANGE UP (ref 3.5–5.3)
POTASSIUM SERPL-SCNC: 4.3 MMOL/L — SIGNIFICANT CHANGE UP (ref 3.5–5.3)
PROCALCITONIN SERPL-MCNC: 0.39 NG/ML — HIGH (ref 0.02–0.1)
RBC # BLD: 3.07 M/UL — LOW (ref 4.2–5.8)
RBC # FLD: 17 % — HIGH (ref 10.3–14.5)
SODIUM SERPL-SCNC: 139 MMOL/L — SIGNIFICANT CHANGE UP (ref 135–145)
SPECIMEN SOURCE: SIGNIFICANT CHANGE UP
SPECIMEN SOURCE: SIGNIFICANT CHANGE UP
WBC # BLD: 211.42 K/UL — CRITICAL HIGH (ref 3.8–10.5)
WBC # FLD AUTO: 211.42 K/UL — CRITICAL HIGH (ref 3.8–10.5)

## 2022-05-26 PROCEDURE — 99232 SBSQ HOSP IP/OBS MODERATE 35: CPT

## 2022-05-26 RX ADMIN — PIPERACILLIN AND TAZOBACTAM 25 GRAM(S): 4; .5 INJECTION, POWDER, LYOPHILIZED, FOR SOLUTION INTRAVENOUS at 08:00

## 2022-05-26 RX ADMIN — PIPERACILLIN AND TAZOBACTAM 25 GRAM(S): 4; .5 INJECTION, POWDER, LYOPHILIZED, FOR SOLUTION INTRAVENOUS at 15:33

## 2022-05-26 RX ADMIN — Medication 200 MILLIGRAM(S): at 20:41

## 2022-05-26 RX ADMIN — Medication 100 MILLIGRAM(S): at 12:28

## 2022-05-26 RX ADMIN — Medication 40 MILLIGRAM(S): at 06:21

## 2022-05-26 RX ADMIN — REMDESIVIR 500 MILLIGRAM(S): 5 INJECTION INTRAVENOUS at 09:19

## 2022-05-26 RX ADMIN — Medication 200 MILLIGRAM(S): at 08:00

## 2022-05-26 RX ADMIN — Medication 2: at 13:12

## 2022-05-26 RX ADMIN — CHLORHEXIDINE GLUCONATE 1 APPLICATION(S): 213 SOLUTION TOPICAL at 06:22

## 2022-05-26 RX ADMIN — Medication 650 MILLIGRAM(S): at 06:20

## 2022-05-26 RX ADMIN — Medication 200 MILLIGRAM(S): at 16:43

## 2022-05-26 RX ADMIN — Medication 650 MILLIGRAM(S): at 20:42

## 2022-05-26 RX ADMIN — Medication 200 MICROGRAM(S): at 07:42

## 2022-05-26 RX ADMIN — Medication 200 MILLIGRAM(S): at 12:32

## 2022-05-26 RX ADMIN — ENOXAPARIN SODIUM 40 MILLIGRAM(S): 100 INJECTION SUBCUTANEOUS at 06:21

## 2022-05-26 RX ADMIN — Medication 1 TABLET(S): at 12:27

## 2022-05-26 RX ADMIN — Medication 200 MILLIGRAM(S): at 03:56

## 2022-05-26 RX ADMIN — Medication 1: at 09:15

## 2022-05-26 RX ADMIN — PIPERACILLIN AND TAZOBACTAM 25 GRAM(S): 4; .5 INJECTION, POWDER, LYOPHILIZED, FOR SOLUTION INTRAVENOUS at 22:49

## 2022-05-26 RX ADMIN — Medication 650 MILLIGRAM(S): at 21:15

## 2022-05-27 ENCOUNTER — TRANSCRIPTION ENCOUNTER (OUTPATIENT)
Age: 77
End: 2022-05-27

## 2022-05-27 VITALS
DIASTOLIC BLOOD PRESSURE: 62 MMHG | RESPIRATION RATE: 18 BRPM | OXYGEN SATURATION: 93 % | HEART RATE: 56 BPM | SYSTOLIC BLOOD PRESSURE: 129 MMHG | TEMPERATURE: 98 F

## 2022-05-27 LAB
ANION GAP SERPL CALC-SCNC: 11 MMOL/L — SIGNIFICANT CHANGE UP (ref 5–17)
BUN SERPL-MCNC: 23 MG/DL — SIGNIFICANT CHANGE UP (ref 7–23)
CALCIUM SERPL-MCNC: 9.2 MG/DL — SIGNIFICANT CHANGE UP (ref 8.4–10.5)
CHLORIDE SERPL-SCNC: 104 MMOL/L — SIGNIFICANT CHANGE UP (ref 96–108)
CO2 SERPL-SCNC: 22 MMOL/L — SIGNIFICANT CHANGE UP (ref 22–31)
CREAT SERPL-MCNC: 1.21 MG/DL — SIGNIFICANT CHANGE UP (ref 0.5–1.3)
EGFR: 62 ML/MIN/1.73M2 — SIGNIFICANT CHANGE UP
GLUCOSE BLDC GLUCOMTR-MCNC: 179 MG/DL — HIGH (ref 70–99)
GLUCOSE BLDC GLUCOMTR-MCNC: 197 MG/DL — HIGH (ref 70–99)
GLUCOSE SERPL-MCNC: 185 MG/DL — HIGH (ref 70–99)
HCT VFR BLD CALC: 28.3 % — LOW (ref 39–50)
HGB BLD-MCNC: 8.1 G/DL — LOW (ref 13–17)
MCHC RBC-ENTMCNC: 27.4 PG — SIGNIFICANT CHANGE UP (ref 27–34)
MCHC RBC-ENTMCNC: 28.6 GM/DL — LOW (ref 32–36)
MCV RBC AUTO: 95.6 FL — SIGNIFICANT CHANGE UP (ref 80–100)
NRBC # BLD: 0 /100 WBCS — SIGNIFICANT CHANGE UP (ref 0–0)
PLATELET # BLD AUTO: 234 K/UL — SIGNIFICANT CHANGE UP (ref 150–400)
POTASSIUM SERPL-MCNC: 4.7 MMOL/L — SIGNIFICANT CHANGE UP (ref 3.5–5.3)
POTASSIUM SERPL-SCNC: 4.7 MMOL/L — SIGNIFICANT CHANGE UP (ref 3.5–5.3)
RBC # BLD: 2.96 M/UL — LOW (ref 4.2–5.8)
RBC # FLD: 17.3 % — HIGH (ref 10.3–14.5)
SODIUM SERPL-SCNC: 137 MMOL/L — SIGNIFICANT CHANGE UP (ref 135–145)
WBC # BLD: 195.22 K/UL — CRITICAL HIGH (ref 3.8–10.5)
WBC # FLD AUTO: 195.22 K/UL — CRITICAL HIGH (ref 3.8–10.5)

## 2022-05-27 PROCEDURE — 83880 ASSAY OF NATRIURETIC PEPTIDE: CPT

## 2022-05-27 PROCEDURE — 82553 CREATINE MB FRACTION: CPT

## 2022-05-27 PROCEDURE — 83540 ASSAY OF IRON: CPT

## 2022-05-27 PROCEDURE — 82435 ASSAY OF BLOOD CHLORIDE: CPT

## 2022-05-27 PROCEDURE — 87640 STAPH A DNA AMP PROBE: CPT

## 2022-05-27 PROCEDURE — 84295 ASSAY OF SERUM SODIUM: CPT

## 2022-05-27 PROCEDURE — 85025 COMPLETE CBC W/AUTO DIFF WBC: CPT

## 2022-05-27 PROCEDURE — 99239 HOSP IP/OBS DSCHRG MGMT >30: CPT

## 2022-05-27 PROCEDURE — 87040 BLOOD CULTURE FOR BACTERIA: CPT

## 2022-05-27 PROCEDURE — 71275 CT ANGIOGRAPHY CHEST: CPT | Mod: MA

## 2022-05-27 PROCEDURE — 82947 ASSAY GLUCOSE BLOOD QUANT: CPT

## 2022-05-27 PROCEDURE — 93971 EXTREMITY STUDY: CPT

## 2022-05-27 PROCEDURE — 0225U NFCT DS DNA&RNA 21 SARSCOV2: CPT

## 2022-05-27 PROCEDURE — 86850 RBC ANTIBODY SCREEN: CPT

## 2022-05-27 PROCEDURE — 84484 ASSAY OF TROPONIN QUANT: CPT

## 2022-05-27 PROCEDURE — 85027 COMPLETE CBC AUTOMATED: CPT

## 2022-05-27 PROCEDURE — 82962 GLUCOSE BLOOD TEST: CPT

## 2022-05-27 PROCEDURE — 86803 HEPATITIS C AB TEST: CPT

## 2022-05-27 PROCEDURE — 97161 PT EVAL LOW COMPLEX 20 MIN: CPT

## 2022-05-27 PROCEDURE — 85652 RBC SED RATE AUTOMATED: CPT

## 2022-05-27 PROCEDURE — 84132 ASSAY OF SERUM POTASSIUM: CPT

## 2022-05-27 PROCEDURE — 87070 CULTURE OTHR SPECIMN AEROBIC: CPT

## 2022-05-27 PROCEDURE — 36415 COLL VENOUS BLD VENIPUNCTURE: CPT

## 2022-05-27 PROCEDURE — 86900 BLOOD TYPING SEROLOGIC ABO: CPT

## 2022-05-27 PROCEDURE — 87641 MR-STAPH DNA AMP PROBE: CPT

## 2022-05-27 PROCEDURE — 82330 ASSAY OF CALCIUM: CPT

## 2022-05-27 PROCEDURE — 93306 TTE W/DOPPLER COMPLETE: CPT

## 2022-05-27 PROCEDURE — 83615 LACTATE (LD) (LDH) ENZYME: CPT

## 2022-05-27 PROCEDURE — 87449 NOS EACH ORGANISM AG IA: CPT

## 2022-05-27 PROCEDURE — 84145 PROCALCITONIN (PCT): CPT

## 2022-05-27 PROCEDURE — 82728 ASSAY OF FERRITIN: CPT

## 2022-05-27 PROCEDURE — 85045 AUTOMATED RETICULOCYTE COUNT: CPT

## 2022-05-27 PROCEDURE — 83036 HEMOGLOBIN GLYCOSYLATED A1C: CPT

## 2022-05-27 PROCEDURE — 87899 AGENT NOS ASSAY W/OPTIC: CPT

## 2022-05-27 PROCEDURE — 83735 ASSAY OF MAGNESIUM: CPT

## 2022-05-27 PROCEDURE — 87086 URINE CULTURE/COLONY COUNT: CPT

## 2022-05-27 PROCEDURE — 85379 FIBRIN DEGRADATION QUANT: CPT

## 2022-05-27 PROCEDURE — 85730 THROMBOPLASTIN TIME PARTIAL: CPT

## 2022-05-27 PROCEDURE — 82607 VITAMIN B-12: CPT

## 2022-05-27 PROCEDURE — 83605 ASSAY OF LACTIC ACID: CPT

## 2022-05-27 PROCEDURE — 80053 COMPREHEN METABOLIC PANEL: CPT

## 2022-05-27 PROCEDURE — 86901 BLOOD TYPING SEROLOGIC RH(D): CPT

## 2022-05-27 PROCEDURE — 94640 AIRWAY INHALATION TREATMENT: CPT

## 2022-05-27 PROCEDURE — 83550 IRON BINDING TEST: CPT

## 2022-05-27 PROCEDURE — U0005: CPT

## 2022-05-27 PROCEDURE — 84100 ASSAY OF PHOSPHORUS: CPT

## 2022-05-27 PROCEDURE — 80048 BASIC METABOLIC PNL TOTAL CA: CPT

## 2022-05-27 PROCEDURE — U0003: CPT

## 2022-05-27 PROCEDURE — 82803 BLOOD GASES ANY COMBINATION: CPT

## 2022-05-27 PROCEDURE — 93005 ELECTROCARDIOGRAM TRACING: CPT

## 2022-05-27 PROCEDURE — 71045 X-RAY EXAM CHEST 1 VIEW: CPT

## 2022-05-27 PROCEDURE — 85610 PROTHROMBIN TIME: CPT

## 2022-05-27 PROCEDURE — 83010 ASSAY OF HAPTOGLOBIN QUANT: CPT

## 2022-05-27 PROCEDURE — 99285 EMERGENCY DEPT VISIT HI MDM: CPT | Mod: 25

## 2022-05-27 PROCEDURE — 82550 ASSAY OF CK (CPK): CPT

## 2022-05-27 PROCEDURE — 82746 ASSAY OF FOLIC ACID SERUM: CPT

## 2022-05-27 PROCEDURE — 85014 HEMATOCRIT: CPT

## 2022-05-27 PROCEDURE — 85018 HEMOGLOBIN: CPT

## 2022-05-27 PROCEDURE — 84443 ASSAY THYROID STIM HORMONE: CPT

## 2022-05-27 PROCEDURE — 86140 C-REACTIVE PROTEIN: CPT

## 2022-05-27 PROCEDURE — 81001 URINALYSIS AUTO W/SCOPE: CPT

## 2022-05-27 RX ORDER — FUROSEMIDE 40 MG
1 TABLET ORAL
Qty: 14 | Refills: 0
Start: 2022-05-27 | End: 2022-06-09

## 2022-05-27 RX ORDER — POLYETHYLENE GLYCOL 3350 17 G/17G
17 POWDER, FOR SOLUTION ORAL
Qty: 0 | Refills: 0 | DISCHARGE
Start: 2022-05-27

## 2022-05-27 RX ORDER — LACTOBACILLUS ACIDOPHILUS 100MM CELL
0 CAPSULE ORAL
Qty: 0 | Refills: 0 | DISCHARGE
Start: 2022-05-27

## 2022-05-27 RX ORDER — ROSUVASTATIN CALCIUM 5 MG/1
0 TABLET ORAL
Qty: 0 | Refills: 0 | DISCHARGE

## 2022-05-27 RX ADMIN — Medication 200 MICROGRAM(S): at 06:28

## 2022-05-27 RX ADMIN — Medication 1: at 13:08

## 2022-05-27 RX ADMIN — PIPERACILLIN AND TAZOBACTAM 25 GRAM(S): 4; .5 INJECTION, POWDER, LYOPHILIZED, FOR SOLUTION INTRAVENOUS at 06:29

## 2022-05-27 RX ADMIN — Medication 40 MILLIGRAM(S): at 06:28

## 2022-05-27 RX ADMIN — Medication 200 MILLIGRAM(S): at 04:22

## 2022-05-27 RX ADMIN — ENOXAPARIN SODIUM 40 MILLIGRAM(S): 100 INJECTION SUBCUTANEOUS at 06:29

## 2022-05-27 RX ADMIN — REMDESIVIR 500 MILLIGRAM(S): 5 INJECTION INTRAVENOUS at 10:15

## 2022-05-27 RX ADMIN — Medication 100 MILLIGRAM(S): at 11:31

## 2022-05-27 RX ADMIN — Medication 200 MILLIGRAM(S): at 08:56

## 2022-05-27 RX ADMIN — Medication 1 TABLET(S): at 11:31

## 2022-05-27 RX ADMIN — CHLORHEXIDINE GLUCONATE 1 APPLICATION(S): 213 SOLUTION TOPICAL at 06:29

## 2022-05-27 RX ADMIN — Medication 650 MILLIGRAM(S): at 13:54

## 2022-05-27 RX ADMIN — Medication 650 MILLIGRAM(S): at 13:08

## 2022-05-27 RX ADMIN — Medication 1: at 08:57

## 2022-05-27 RX ADMIN — Medication 200 MILLIGRAM(S): at 13:08

## 2022-05-27 RX ADMIN — Medication 650 MILLIGRAM(S): at 06:30

## 2022-05-27 RX ADMIN — Medication 200 MILLIGRAM(S): at 00:20

## 2022-05-27 NOTE — PROGRESS NOTE ADULT - PROBLEM SELECTOR PLAN 9
Metformin at home    - ISS  - A1c 6.4

## 2022-05-27 NOTE — PROGRESS NOTE ADULT - PROBLEM SELECTOR PLAN 11
Diet: DASH/CC  DVT ppx: Lovenox  Dispo: pending completion of remdesivir 5/27  home today    total discharge time: 45 minutes.  made referral to [facility] program for telehealth check-in and pulm f/u  discussed plan with pt and wife at bedside.
Diet: DASH/CC  DVT ppx: Lovenox  Dispo: pending completion of remdesivir
Diet: DASH/CC  DVT ppx: Lovenox  Dispo: pending improvement of fevers
Diet: DASH/CC  DVT ppx: Lovenox  Dispo: pending improvement of fevers
Diet: DASH/CC  DVT ppx: Lovenox  Dispo: pending completion of remdesivir 5/27

## 2022-05-27 NOTE — PROGRESS NOTE ADULT - PROBLEM SELECTOR PLAN 2
EKG on 4/21 RBBB seen on EKG  Per patient this is not new for him   No chest pain   Trops 50s-60s  Pro-    - trops relatively flat, low concern acs  - TTE no signs of WMA
EKG on 4/21 RBBB seen on EKG  Per patient this is not new for him   No chest pain   Trops 50s-60s  Pro-    - trops relatively flat, low concern acs  - TTE
EKG on 4/21 RBBB seen on EKG  Per patient this is not new for him   No chest pain   Trops 50s-60s  Pro-    - trops relatively flat, low concern acs  - TTE no signs of WMA

## 2022-05-27 NOTE — DISCHARGE NOTE PROVIDER - CARE PROVIDERS DIRECT ADDRESSES
,[email or web address]

## 2022-05-27 NOTE — PROGRESS NOTE ADULT - PROBLEM SELECTOR PLAN 5
CVI at baseline  DVT studies 4/21 show No evidence of left lower extremity deep venous thrombosis.  - continue home lasix 40 daily  - DVT ppx as below  - MRSA swab neg
CVI at baseline  DVT studies 4/21 show No evidence of left lower extremity deep venous thrombosis.    - DVT ppx as below  - MRSA swab neg
CVI at baseline  DVT studies 4/21 show No evidence of left lower extremity deep venous thrombosis.  - continue home lasix 40 daily  - DVT ppx as below  - MRSA swab neg

## 2022-05-27 NOTE — PROVIDER CONTACT NOTE (CRITICAL VALUE NOTIFICATION) - SITUATION
Patient had a critical of .75
wbc 200.34   CLL
Covid 19 PCR (+)
Patient had a critical value of .22
critical lab WBC =194.28
Troponin- 63
White count 211.42

## 2022-05-27 NOTE — PROVIDER CONTACT NOTE (CRITICAL VALUE NOTIFICATION) - ASSESSMENT
pt vss, afebrile, no acute distress noted at this time, no sob, c/o coughing.
Patient is A/O x 4. Vital signs stable.
A&O x4
Pt AOx4. Vital signs stable.
A&O x4
Patient A/O x 4.

## 2022-05-27 NOTE — DISCHARGE NOTE PROVIDER - CARE PROVIDER_API CALL
Shadia Mcmahon)  HematologyOncology; Internal Medicine; Medical Oncology  [address] [place] [address]  Phone: [phone]  Fax: [phone]  Follow Up Time:     Samosn Doran  CARDIOVASCULAR DISEASE  [address] [place] [address], [address]  [place] [address]  Phone: [phone]  Fax: [phone]  Follow Up Time:

## 2022-05-27 NOTE — PROGRESS NOTE ADULT - PROBLEM SELECTOR PLAN 10
On topiramate 100mg daily for tremors  - Continue

## 2022-05-27 NOTE — PROGRESS NOTE ADULT - PROBLEM SELECTOR PLAN 4
Hx of CLL follows with Dr. Shadia Gage  No treatment at time, only monitoring   Diagnosed in 2017  Significant malignancy hx: CLL, Prostate Cancer s/p resection 2003, thyroid cancer s/p thyroidectomy 8/2018, renal cancer s/p partial nephrectomy AND  FHx of cancer  Leukocytosis of 200s, lymphocytic predominance, expected    - CTM   - outpatient oncology f.u

## 2022-05-27 NOTE — DISCHARGE NOTE PROVIDER - HOSPITAL COURSE
76M with history of T2DM, HTN, HLD, asthma, CVI,  CLL, Prostate Cancer s/p resection 2003, thyroid cancer s/p thyroidectomy 8/2018, renal cancer s/p partial nephrectomy and spinal stenosis with acquired scoliosis s/p L1-5 posterior lumbar fusion and T10-pelvis instrumentation and fusion now presents 9-10d of persistent fevers and with dry cough, found to be COVID positive on 5/12/22 likely symptoms 2/2 COVID PNA with possible superimposed bacterial PNA     Pneumonia due to COVID-19 virus; COVID positive 5/12 - Monoclonal ab 5/16; COVID vaccine x4   Febrile to 103 on presentation, cough, NOT hypoxic   CT chest: B'l ground glass opacities compatible w/ pna in the setting of patient's known Covid diagnosis. Multiple superimposed nodular densities within the right lung could be  related to underlying infection, but are indeterminate.   Procalcitonin:  0.50->0.4 - could be superimposed bacterial PNA given immunocompromised   -started doxy + augmentin 5/20-5/22  - Zosyn 5 day course (d/c after last remdesivir dose 5/27), Azithro 5/22-5/24, Remdesevir  5 days 5/22-27   - On room air, does not qualify for dexamethasone; symptoms much improved, d/c home post Remdesivir.    RBBB; EKG on 4/21 RBBB seen on EKG - per patient this is not new for him   No chest pain  / Trops 50s-60s / Pro- / Trops relatively flat, low concern acs  TTE no signs of WMA.    Anemia; hgb 8, was 11 in Dec 2021; No signs of bleeding   Iron studies with mixed picture (would expect ferritin to be higher in COVID); no signs of hemolysis    CLL (chronic lymphocytic leukemia). Hx of CLL follows with Dr. Shadia Gage; no treatment and monitoring (dx'd in 2017)  Significant malignancy hx: CLL, Prostate Cancer s/p resection 2003, thyroid cancer s/p thyroidectomy 8/2018, renal cancer s/p partial nephrectomy AND  FHx of cancer  Leukocytosis of 200s, lymphocytic predominance, expected    CTM - outpatient oncology f.u.    Leg swelling; CVI at baseline  DVT studies 4/21 show No evidence of left lower extremity deep venous thrombosis; continue home lasix 40 daily, MRSA swab neg.    Hypothyroidism; continue Synthroid 200mcg.    Asthma; on as needed albuterol inhaler; no wheezing on exam - continued albuterol prn, no steroids.    Mild HTN; On Ramipril 10mg at home - will hold for now & add HTN meds as needed.    Diabetes; Metformin at home - ISS, A1c 6.4.    Tremor; on topiramate 100mg daily for tremors - will continue.    DVT prophylaxis - Lovenox / Diet: DASH/CC  Dispo: pending completion of remdesivir 5/27.   76M with history of T2DM, HTN, HLD, asthma, CVI,  CLL, Prostate Cancer s/p resection 2003, thyroid cancer s/p thyroidectomy 8/2018, renal cancer s/p partial nephrectomy and spinal stenosis with acquired scoliosis s/p L1-5 posterior lumbar fusion and T10-pelvis instrumentation and fusion now presents 9-10d of persistent fevers and with dry cough, found to be COVID positive on 5/12/22 likely symptoms 2/2 COVID PNA with possible superimposed bacterial PNA     Pneumonia due to COVID-19 virus; COVID positive 5/12 - Monoclonal ab 5/16; COVID vaccine x4   Febrile to 103 on presentation, cough, NOT hypoxic   CT chest: B'l ground glass opacities compatible w/ pna in the setting of patient's known Covid diagnosis. Multiple superimposed nodular densities within the right lung could be  related to underlying infection, but are indeterminate.   Procalcitonin:  0.50->0.4 - could be superimposed bacterial PNA given immunocompromised   started doxy + augmentin 5/20-5/22;  Zosyn 5 day course (d/c after last remdesivir dose 5/27), Azithro 5/22-5/24, Remdesevir  5 days 5/22-27   On room air, does not qualify for dexamethasone; symptoms much improved, d/c home post Remdesivir.    RBBB; EKG on 4/21 RBBB seen on EKG - per patient this is not new for him   No chest pain  / Trops 50s-60s / Pro- / Trops relatively flat, low concern acs  TTE no signs of WMA.    Anemia; hgb 8, was 11 in Dec 2021; No signs of bleeding   Iron studies with mixed picture (would expect ferritin to be higher in COVID); no signs of hemolysis    CLL (chronic lymphocytic leukemia). Hx of CLL follows with Dr. Shadia Gage; no treatment and monitoring (dx'd in 2017)  Significant malignancy hx: CLL, Prostate Cancer s/p resection 2003, thyroid cancer s/p thyroidectomy 8/2018, renal cancer s/p partial nephrectomy AND  FHx of cancer  Leukocytosis of 200s, lymphocytic predominance, expected    CTM - outpatient oncology f.u.    Leg swelling; CVI at baseline  DVT studies 4/21 show No evidence of left lower extremity deep venous thrombosis; continue home lasix 40 daily, MRSA swab neg.    Hypothyroidism; continue Synthroid 200mcg.    Asthma; on as needed albuterol inhaler; no wheezing on exam - continued albuterol prn, no steroids.    Mild HTN; On Ramipril 10mg at home - will hold for now & add HTN meds as needed.    Diabetes; Metformin at home - ISS, A1c 6.4.    Tremor; on topiramate 100mg daily for tremors - will continue.    DVT prophylaxis - Lovenox / Diet: DASH/CC  Dispo: completing remdesivir 5/27 & discharged 76M with history of T2DM, HTN, HLD, asthma, CVI,  CLL, Prostate Cancer s/p resection 2003, thyroid cancer s/p thyroidectomy 8/2018, renal cancer s/p partial nephrectomy and spinal stenosis with acquired scoliosis s/p L1-5 posterior lumbar fusion and T10-pelvis instrumentation and fusion now presents 9-10d of persistent fevers and with dry cough, found to be COVID positive on 5/12/22 likely symptoms 2/2 COVID PNA with possible superimposed bacterial PNA     Pneumonia due to COVID-19 virus; COVID positive 5/12 - Monoclonal ab 5/16; COVID vaccine x4   Febrile to 103 on presentation, cough, NOT hypoxic   CT chest: B'l ground glass opacities compatible w/ pna in the setting of patient's known Covid diagnosis. Multiple superimposed nodular densities within the right lung could be  related to underlying infection, but are indeterminate.   Procalcitonin:  0.50->0.4 - could be superimposed bacterial PNA given immunocompromised   started doxy + augmentin 5/20-5/22;  Zosyn 5 day course (d/c after last remdesivir dose 5/27), Azithro 5/22-5/24, Remdesevir  5 days 5/22-27   On room air, does not qualify for dexamethasone; symptoms much improved, d/c home post Remdesivir.    RBBB; EKG on 4/21 RBBB seen on EKG - per patient this is not new for him   No chest pain  / Trops 50s-60s / Pro- / Trops relatively flat, low concern acs  TTE no signs of WMA.    Anemia; hgb 8, was 11 in Dec 2021; No signs of bleeding   Iron studies with mixed picture (would expect ferritin to be higher in COVID); no signs of hemolysis    CLL (chronic lymphocytic leukemia). Hx of CLL follows with Dr. Shadia Gage; no treatment and monitoring (dx'd in 2017)  Significant malignancy hx: CLL, Prostate Cancer s/p resection 2003, thyroid cancer s/p thyroidectomy 8/2018, renal cancer s/p partial nephrectomy AND  FHx of cancer  Leukocytosis of 200s, lymphocytic predominance, expected    CTM - outpatient oncology f.u.    Leg swelling; CVI at baseline  DVT studies 4/21 show No evidence of left lower extremity deep venous thrombosis; continue home lasix 40 daily, MRSA swab neg.    Hypothyroidism; continue Synthroid 200mcg.    Asthma; on as needed albuterol inhaler; no wheezing on exam - continued albuterol prn, no steroids.    Mild HTN; On Ramipril 10mg at home - will hold for now & add HTN meds as needed.    Diabetes; Metformin at home - ISS, A1c 6.4.    Tremor; on topiramate 100mg daily for tremors - will continue.    DVT prophylaxis - Lovenox / Diet: DASH/CC  Dispo: completing remdesivir 5/27 & discharged with referral to cares program placed. 76M with history of T2DM, HTN, HLD, asthma, CVI,  CLL, Prostate Cancer s/p resection 2003, thyroid cancer s/p thyroidectomy 8/2018, renal cancer s/p partial nephrectomy and spinal stenosis with acquired scoliosis s/p L1-5 posterior lumbar fusion and T10-pelvis instrumentation and fusion now presents 9-10d of persistent fevers and with dry cough, found to be COVID positive on 5/12/22 likely symptoms 2/2 COVID PNA with possible superimposed bacterial PNA     Pneumonia due to COVID-19 virus; COVID positive 5/12 - Monoclonal ab 5/16; COVID vaccine x4; Febrile to 103 on presentation, cough, NOT hypoxic   CT chest: B'l ground glass opacities compatible w/ pna in the setting of patient's known Covid diagnosis. Multiple superimposed nodular densities within the right lung could be  related to underlying infection, but are indeterminate.   Procalcitonin:  0.50->0.4 - could be superimposed bacterial PNA given immunocompromised   started doxy + augmentin 5/20-5/22;  Zosyn 5 day course (d/c after last remdesivir dose 5/27), Azithro 5/22-5/24, Remdesevir  5 days 5/22-27   On room air, does not qualify for dexamethasone; symptoms much improved, d/c home post Remdesivir.    RBBB; EKG on 4/21 RBBB seen on EKG - per patient this is not new for him   No chest pain  / Trops 50s-60s / Pro- / Trops relatively flat, low concern acs & TTE no signs of WMA.    Anemia; hgb 8, was 11 in Dec 2021; No signs of bleeding   Iron studies with mixed picture (would expect ferritin to be higher in COVID); no signs of hemolysis    CLL (chronic lymphocytic leukemia). Hx of CLL follows with Dr. Shadia Gage; no treatment and monitoring (dx'd in 2017)  Significant malignancy hx: CLL, Prostate Cancer s/p resection 2003, thyroid cancer s/p thyroidectomy 8/2018, renal cancer s/p partial nephrectomy AND  FHx of cancer  Leukocytosis of 200s, lymphocytic predominance, expected    CTM - outpatient oncology f.u.    Leg swelling; CVI at baseline  DVT studies 4/21 show No evidence of left lower extremity deep venous thrombosis; continue home lasix 40 daily, MRSA swab neg.    Hypothyroidism; continue Synthroid 200mcg.    Asthma; on as needed albuterol inhaler; no wheezing on exam - continued albuterol prn, no steroids.    Mild HTN; On Ramipril 10mg at home - will hold for now & add HTN meds as needed.    Diabetes; Metformin at home - ISS, A1c 6.4.    Tremor; on topiramate 100mg daily for tremors - will continue.    DVT prophylaxis - Lovenox / Diet: DASH/CC  Dispo: completing remdesivir 5/27 & discharged with referral to cares program placed.

## 2022-05-27 NOTE — DISCHARGE NOTE NURSING/CASE MANAGEMENT/SOCIAL WORK - PATIENT PORTAL LINK FT
You can access the FollowMyHealth Patient Portal offered by [facility] by registering at the following website: [email or web address]. By joining [email or web address] [email or web address] portal, you will also be able to view your health information using other applications (apps) compatible with our system.

## 2022-05-27 NOTE — PROGRESS NOTE ADULT - SUBJECTIVE AND OBJECTIVE BOX
[facility] Division of Hospital Medicine  Abimael Vidal  Pager (M-F, 8A-5P): [phone]  Other Times:  [phone]    CC: 75 yo M presenting with complex pna    SUBJECTIVE / OVERNIGHT EVENTS:  no acute events overnight  reports cough improving  denies f/chills/sob  on RA    ADDITIONAL REVIEW OF SYSTEMS:    MEDICATIONS  (STANDING):  chlorhexidine 2% Cloths 1 Application(s) Topical <User Schedule>  dextrose 5%. 1000 milliLiter(s) (100 mL/Hr) IV Continuous <Continuous>  dextrose 5%. 1000 milliLiter(s) (50 mL/Hr) IV Continuous <Continuous>  dextrose 50% Injectable 25 Gram(s) IV Push once  dextrose 50% Injectable 12.5 Gram(s) IV Push once  dextrose 50% Injectable 25 Gram(s) IV Push once  enoxaparin Injectable 40 milliGRAM(s) SubCutaneous every 24 hours  furosemide    Tablet 40 milliGRAM(s) Oral daily  glucagon  Injectable 1 milliGRAM(s) IntraMuscular once  insulin lispro (ADMELOG) corrective regimen sliding scale   SubCutaneous three times a day before meals  insulin lispro (ADMELOG) corrective regimen sliding scale   SubCutaneous at bedtime  lactobacillus acidophilus 1 Tablet(s) Oral daily  levothyroxine 200 MICROGram(s) Oral daily  piperacillin/tazobactam IVPB.. 3.375 Gram(s) IV Intermittent every 8 hours  polyethylene glycol 3350 17 Gram(s) Oral daily  remdesivir  IVPB 100 milliGRAM(s) IV Intermittent every 24 hours  remdesivir  IVPB   IV Intermittent   topiramate 100 milliGRAM(s) Oral daily    MEDICATIONS  (PRN):  acetaminophen     Tablet .. 650 milliGRAM(s) Oral every 6 hours PRN Temp greater or equal to 38C (100.4F), Mild Pain (1 - 3), Moderate Pain (4 - 6)  ALBUTerol    90 MICROgram(s) HFA Inhaler 2 Puff(s) Inhalation every 6 hours PRN Shortness of Breath  benzonatate 100 milliGRAM(s) Oral every 8 hours PRN Cough  bisacodyl 5 milliGRAM(s) Oral every 12 hours PRN Constipation  dextrose Oral Gel 15 Gram(s) Oral once PRN Blood Glucose LESS THAN 70 milliGRAM(s)/deciliter  guaiFENesin Oral Liquid (Sugar-Free) 200 milliGRAM(s) Oral every 4 hours PRN Cough      I&O's Summary    24 May 2022 07:01  -  25 May 2022 07:00  --------------------------------------------------------  IN: 640 mL / OUT: 1400 mL / NET: -760 mL        PHYSICAL EXAM:  Vital Signs Last 24 Hrs  T(C): 37.3 (25 May 2022 11:19), Max: 37.4 (25 May 2022 04:28)  T(F): 99.2 (25 May 2022 11:19), Max: 99.4 (25 May 2022 04:28)  HR: 75 (25 May 2022 11:19) (63 - 85)  BP: 147/84 (25 May 2022 11:19) (141/71 - 156/60)  BP(mean): --  RR: 17 (25 May 2022 11:19) (17 - 17)  SpO2: 94% (25 May 2022 11:19) (94% - 94%)    CONSTITUTIONAL: NAD, well-developed  EYES: PERRLA; conjunctiva and sclera clear  ENMT: Moist oral mucosa, no pharyngeal injection or exudates  NECK: Supple, no palpable masses; no thyromegaly  RESPIRATORY: Normal respiratory effort; coughing, crackles throughout, decreased, speaking in full sentences  CARDIOVASCULAR: Regular rate and rhythm, normal S1 and S2, no murmur/rub/gallop; pitting edema to calf b/l; Peripheral pulses are 2+ bilaterally  ABDOMEN: Nontender to palpation, normoactive bowel sounds, no rebound/guarding; No hepatosplenomegaly  MUSCULOSKELETAL: no clubbing or cyanosis of digits; no joint swelling or tenderness to palpation  PSYCH: A+O to person, place, and time; affect appropriate  NEUROLOGY: CN 2-12 are intact and symmetric; no gross sensory deficits   SKIN: No rashes; no palpable lesions    LABS:                        8.2    196.75 )-----------( 177      ( 25 May 2022 07:27 )             27.6     05-25    136  |  102  |  24<H>  ----------------------------<  185<H>  4.5   |  18<L>  |  1.20    Ca    9.1      25 May 2022 07:26  Phos  2.4     05-24  Mg     1.9     05-24    TPro  5.9<L>  /  Alb  3.1<L>  /  TBili  0.3  /  DBili  x   /  AST  32  /  ALT  29  /  AlkPhos  89  05-25        Procal 0.4    Ferritin 281  D-Dimer 427        Culture - Sputum (collected 23 May 2022 13:19)  Source: .Sputum Sputum  Gram Stain (23 May 2022 19:44):    Rare polymorphonuclear leukocytes per low power field    No Squamous epithelial cells per low power field    Rare Gram positive cocci in pairs per oil power field    Rare Gram Variable Rods per oil power field  Preliminary Report (24 May 2022 15:51):    Normal Respiratory Aleyda present    
[facility] Division of Hospital Medicine  Abimael Vidal  Pager (BISI-F, 8A-5P): [phone]  Other Times:  [phone]    CC: 75 yo M presenting with complex pna    SUBJECTIVE / OVERNIGHT EVENTS:  No acute events overnight  Continues to endorse ongoing cough/f/chills  Has not ambulated  93-95% on RA  Denies CP  ADDITIONAL REVIEW OF SYSTEMS:    MEDICATIONS  (STANDING):  albuterol/ipratropium for Nebulization 3 milliLiter(s) Nebulizer every 6 hours  azithromycin  IVPB 500 milliGRAM(s) IV Intermittent every 24 hours  azithromycin  IVPB      chlorhexidine 2% Cloths 1 Application(s) Topical <User Schedule>  dextrose 5%. 1000 milliLiter(s) (100 mL/Hr) IV Continuous <Continuous>  dextrose 5%. 1000 milliLiter(s) (50 mL/Hr) IV Continuous <Continuous>  dextrose 50% Injectable 25 Gram(s) IV Push once  dextrose 50% Injectable 12.5 Gram(s) IV Push once  dextrose 50% Injectable 25 Gram(s) IV Push once  enoxaparin Injectable 40 milliGRAM(s) SubCutaneous every 24 hours  glucagon  Injectable 1 milliGRAM(s) IntraMuscular once  insulin lispro (ADMELOG) corrective regimen sliding scale   SubCutaneous three times a day before meals  insulin lispro (ADMELOG) corrective regimen sliding scale   SubCutaneous at bedtime  lactobacillus acidophilus 1 Tablet(s) Oral daily  levothyroxine 200 MICROGram(s) Oral daily  piperacillin/tazobactam IVPB.. 3.375 Gram(s) IV Intermittent every 8 hours  remdesivir  IVPB   IV Intermittent   topiramate 100 milliGRAM(s) Oral daily    MEDICATIONS  (PRN):  acetaminophen     Tablet .. 650 milliGRAM(s) Oral every 6 hours PRN Temp greater or equal to 38C (100.4F)  benzonatate 100 milliGRAM(s) Oral every 8 hours PRN Cough  dextrose Oral Gel 15 Gram(s) Oral once PRN Blood Glucose LESS THAN 70 milliGRAM(s)/deciliter  guaiFENesin Oral Liquid (Sugar-Free) 100 milliGRAM(s) Oral every 4 hours PRN Cough      I&O's Summary    22 May 2022 07:01  -  23 May 2022 07:00  --------------------------------------------------------  IN: 550 mL / OUT: 50 mL / NET: 500 mL    23 May 2022 07:01  -  23 May 2022 13:57  --------------------------------------------------------  IN: 240 mL / OUT: 0 mL / NET: 240 mL        PHYSICAL EXAM:  Vital Signs Last 24 Hrs  T(C): 36.6 (23 May 2022 10:38), Max: 39.5 (22 May 2022 16:16)  T(F): 97.8 (23 May 2022 10:38), Max: 103.1 (22 May 2022 16:16)  HR: 82 (23 May 2022 10:38) (69 - 90)  BP: 134/57 (23 May 2022 10:38) (117/58 - 144/53)  BP(mean): 72 (22 May 2022 19:22) (72 - 72)  RR: 20 (23 May 2022 10:38) (19 - 22)  SpO2: 94% (23 May 2022 10:38) (92% - 94%)    CONSTITUTIONAL: NAD, well-developed  EYES: PERRLA; conjunctiva and sclera clear  ENMT: Moist oral mucosa, no pharyngeal injection or exudates  NECK: Supple, no palpable masses; no thyromegaly  RESPIRATORY: Normal respiratory effort; coughing, crackles throughout, speaking in full sentences  CARDIOVASCULAR: Regular rate and rhythm, normal S1 and S2, no murmur/rub/gallop; no pitting edema to calf b/l; Peripheral pulses are 2+ bilaterally  ABDOMEN: Nontender to palpation, normoactive bowel sounds, no rebound/guarding; No hepatosplenomegaly  MUSCULOSKELETAL: no clubbing or cyanosis of digits; no joint swelling or tenderness to palpation  PSYCH: A+O to person, place, and time; affect appropriate  NEUROLOGY: CN 2-12 are intact and symmetric; no gross sensory deficits   SKIN: No rashes; no palpable lesions    LABS:                        8.7    194.28 )-----------( 155      ( 23 May 2022 07:10 )             29.8     05-23    136  |  102  |  20  ----------------------------<  191<H>  4.1   |  20<L>  |  1.22    Ca    9.3      23 May 2022 07:09  Phos  3.1     05-23  Mg     1.8     05-23    TPro  5.9<L>  /  Alb  2.7<L>  /  TBili  0.3  /  DBili  x   /  AST  20  /  ALT  20  /  AlkPhos  86  05-23      CARDIAC MARKERS ( 23 May 2022 07:09 )  x     / x     / 63 U/L / x     / 1.7 ng/mL  CARDIAC MARKERS ( 22 May 2022 18:18 )  x     / x     / 72 U/L / x     / 2.0 ng/mL          Culture - Urine (collected 21 May 2022 01:32)  Source: Clean Catch Clean Catch (Midstream)  Final Report (22 May 2022 11:21):    <10,000 CFU/mL Normal Urogenital Aleyda    Culture - Blood (collected 21 May 2022 00:25)  Source: .Blood Blood-Peripheral  Preliminary Report (22 May 2022 04:01):    No growth to date.    Culture - Blood (collected 21 May 2022 00:10)  Source: .Blood Blood-Peripheral  Preliminary Report (22 May 2022 04:01):    No growth to date.      strep legionella pending  Procal 0.5    
[facility] Division of Hospital Medicine  Abimael Vidal  Pager (BISI-F, 8A-5P): [phone]  Other Times:  [phone]    CC: 75 yo M presenting with complex pna    SUBJECTIVE / OVERNIGHT EVENTS:  no acute events overnight  reports improvement in cough  denies cp/sob/f/chills    ADDITIONAL REVIEW OF SYSTEMS:    MEDICATIONS  (STANDING):  chlorhexidine 2% Cloths 1 Application(s) Topical <User Schedule>  dextrose 5%. 1000 milliLiter(s) (100 mL/Hr) IV Continuous <Continuous>  dextrose 5%. 1000 milliLiter(s) (50 mL/Hr) IV Continuous <Continuous>  dextrose 50% Injectable 25 Gram(s) IV Push once  dextrose 50% Injectable 12.5 Gram(s) IV Push once  dextrose 50% Injectable 25 Gram(s) IV Push once  enoxaparin Injectable 40 milliGRAM(s) SubCutaneous every 24 hours  furosemide    Tablet 40 milliGRAM(s) Oral daily  glucagon  Injectable 1 milliGRAM(s) IntraMuscular once  insulin lispro (ADMELOG) corrective regimen sliding scale   SubCutaneous three times a day before meals  insulin lispro (ADMELOG) corrective regimen sliding scale   SubCutaneous at bedtime  lactobacillus acidophilus 1 Tablet(s) Oral daily  levothyroxine 200 MICROGram(s) Oral daily  piperacillin/tazobactam IVPB.. 3.375 Gram(s) IV Intermittent every 8 hours  polyethylene glycol 3350 17 Gram(s) Oral daily  remdesivir  IVPB 100 milliGRAM(s) IV Intermittent every 24 hours  remdesivir  IVPB   IV Intermittent   topiramate 100 milliGRAM(s) Oral daily    MEDICATIONS  (PRN):  acetaminophen     Tablet .. 650 milliGRAM(s) Oral every 6 hours PRN Temp greater or equal to 38C (100.4F), Mild Pain (1 - 3), Moderate Pain (4 - 6)  ALBUTerol    90 MICROgram(s) HFA Inhaler 2 Puff(s) Inhalation every 6 hours PRN Shortness of Breath  benzonatate 100 milliGRAM(s) Oral every 8 hours PRN Cough  bisacodyl 5 milliGRAM(s) Oral every 12 hours PRN Constipation  dextrose Oral Gel 15 Gram(s) Oral once PRN Blood Glucose LESS THAN 70 milliGRAM(s)/deciliter  guaiFENesin Oral Liquid (Sugar-Free) 200 milliGRAM(s) Oral every 4 hours PRN Cough      I&O's Summary    25 May 2022 07:01  -  26 May 2022 07:00  --------------------------------------------------------  IN: 990 mL / OUT: 2050 mL / NET: -1060 mL    26 May 2022 07:01  -  26 May 2022 13:35  --------------------------------------------------------  IN: 240 mL / OUT: 550 mL / NET: -310 mL        PHYSICAL EXAM:  Vital Signs Last 24 Hrs  T(C): 36.7 (26 May 2022 05:04), Max: 37.3 (25 May 2022 20:20)  T(F): 98.1 (26 May 2022 05:04), Max: 99.1 (25 May 2022 20:20)  HR: 70 (26 May 2022 05:04) (70 - 74)  BP: 129/62 (26 May 2022 05:04) (123/63 - 129/62)  BP(mean): --  RR: 18 (26 May 2022 05:04) (17 - 18)  SpO2: 96% (26 May 2022 05:04) (93% - 96%)    CONSTITUTIONAL: NAD, well-developed  EYES: PERRLA; conjunctiva and sclera clear  ENMT: Moist oral mucosa, no pharyngeal injection or exudates  NECK: Supple, no palpable masses; no thyromegaly  RESPIRATORY: Normal respiratory effort; coughing, crackles throughout, stable, speaking in full sentences  CARDIOVASCULAR: Regular rate and rhythm, normal S1 and S2, no murmur/rub/gallop; pitting edema to calf b/l, less tense than prior  ABDOMEN: Nontender to palpation, normoactive bowel sounds  MUSCULOSKELETAL: no clubbing or cyanosis of digits; no joint swelling or tenderness to palpation  PSYCH: A+O to person, place, and time; affect appropriate  NEUROLOGY: CN 2-12 are intact and symmetric; no gross sensory deficits   SKIN: No rashes; no palpable lesions  LABS:                        8.4    211.42 )-----------( 222      ( 26 May 2022 07:42 )             28.7     05-26    139  |  104  |  22  ----------------------------<  176<H>  4.3   |  19<L>  |  1.13    Ca    9.4      26 May 2022 07:42    TPro  5.9<L>  /  Alb  3.1<L>  /  TBili  0.3  /  DBili  x   /  AST  32  /  ALT  29  /  AlkPhos  89  05-25          
Jerrod Clark MD  Division of Hospital Medicine  Pager: [phone]  If no response or off-hours, page [phone]  -------------------------------------    Patient is a 76y old  Male who presents with a chief complaint of Pneumonia (27 May 2022 07:52)    SUBJECTIVE / OVERNIGHT EVENTS: none acute  ADDITIONAL REVIEW OF SYSTEMS: pt denies fevers/chills, no sob. Has ongoing cough, better with robitussin and inhalers. eager to go home.    MEDICATIONS  (STANDING):  chlorhexidine 2% Cloths 1 Application(s) Topical <User Schedule>  dextrose 5%. 1000 milliLiter(s) (50 mL/Hr) IV Continuous <Continuous>  dextrose 5%. 1000 milliLiter(s) (100 mL/Hr) IV Continuous <Continuous>  dextrose 50% Injectable 25 Gram(s) IV Push once  dextrose 50% Injectable 12.5 Gram(s) IV Push once  dextrose 50% Injectable 25 Gram(s) IV Push once  enoxaparin Injectable 40 milliGRAM(s) SubCutaneous every 24 hours  furosemide    Tablet 40 milliGRAM(s) Oral daily  glucagon  Injectable 1 milliGRAM(s) IntraMuscular once  insulin lispro (ADMELOG) corrective regimen sliding scale   SubCutaneous three times a day before meals  insulin lispro (ADMELOG) corrective regimen sliding scale   SubCutaneous at bedtime  lactobacillus acidophilus 1 Tablet(s) Oral daily  levothyroxine 200 MICROGram(s) Oral daily  piperacillin/tazobactam IVPB.. 3.375 Gram(s) IV Intermittent every 8 hours  polyethylene glycol 3350 17 Gram(s) Oral daily  topiramate 100 milliGRAM(s) Oral daily    MEDICATIONS  (PRN):  ALBUTerol    90 MICROgram(s) HFA Inhaler 2 Puff(s) Inhalation every 6 hours PRN Shortness of Breath  benzonatate 100 milliGRAM(s) Oral every 8 hours PRN Cough  bisacodyl 5 milliGRAM(s) Oral every 12 hours PRN Constipation  dextrose Oral Gel 15 Gram(s) Oral once PRN Blood Glucose LESS THAN 70 milliGRAM(s)/deciliter  guaiFENesin Oral Liquid (Sugar-Free) 200 milliGRAM(s) Oral every 4 hours PRN Cough      CAPILLARY BLOOD GLUCOSE      POCT Blood Glucose.: 179 mg/dL (27 May 2022 12:22)  POCT Blood Glucose.: 197 mg/dL (27 May 2022 08:48)  POCT Blood Glucose.: 146 mg/dL (26 May 2022 22:01)  POCT Blood Glucose.: 135 mg/dL (26 May 2022 17:38)    I&O's Summary    26 May 2022 07:01  -  27 May 2022 07:00  --------------------------------------------------------  IN: 800 mL / OUT: 2850 mL / NET: -2050 mL    27 May 2022 07:01  -  27 May 2022 14:48  --------------------------------------------------------  IN: 360 mL / OUT: 200 mL / NET: 160 mL        PHYSICAL EXAM:  Vital Signs Last 24 Hrs  T(C): 36.7 (27 May 2022 12:42), Max: 37.2 (26 May 2022 21:04)  T(F): 98.1 (27 May 2022 12:42), Max: 98.9 (26 May 2022 21:04)  HR: 56 (27 May 2022 12:42) (56 - 67)  BP: 129/62 (27 May 2022 12:42) (109/60 - 129/62)  BP(mean): --  RR: 18 (27 May 2022 12:42) (18 - 18)  SpO2: 93% (27 May 2022 12:42) (93% - 96%)  CONSTITUTIONAL: NAD, well-developed, well-groomed  EYES: PERRLA; conjunctiva and sclera clear  ENMT: Moist oral mucosa, no pharyngeal injection or exudates; normal dentition  NECK: Supple, no palpable masses; no thyromegaly  RESPIRATORY: Normal respiratory effort; lungs are clear to auscultation bilaterally  CARDIOVASCULAR: Regular rate and rhythm, normal S1 and S2, no murmur/rub/gallop; +2 pitting edema B/L.  Peripheral pulses are 2+ bilaterally  ABDOMEN: Nontender to palpation, normoactive bowel sounds, no rebound/guarding; No hepatosplenomegaly  MUSCLOSKELETAL:  Normal gait; no clubbing or cyanosis of digits; no joint swelling or tenderness to palpation  PSYCH: A+O to person, place, and time; affect appropriate  NEUROLOGY: CN 2-12 are intact and symmetric; no gross sensory deficits;   SKIN: No rashes; no palpable lesions    LABS:                        8.1    195.22 )-----------( 234      ( 27 May 2022 07:01 )             28.3     05-27    137  |  104  |  23  ----------------------------<  185<H>  4.7   |  22  |  1.21    Ca    9.2      27 May 2022 06:59                  RADIOLOGY & ADDITIONAL TESTS:  Results Reviewed:   Imaging Personally Reviewed:  Electrocardiogram Personally Reviewed:    COORDINATION OF CARE:  Care Discussed with Consultants/Other Providers [Y/N]:  Prior or Outpatient Records Reviewed [Y/N]:  
[facility] Division of Hospital Medicine  Abimael Vidal  Pager (CARIN, 8A-5P): [phone]  Other Times:  [phone]    CC: 75 yo M presenting with complex pna    SUBJECTIVE / OVERNIGHT EVENTS:  no acute events overnight  reports ongoing cough  denies cp/sob  + LE edema, unchanged since prior to admission    ADDITIONAL REVIEW OF SYSTEMS:    MEDICATIONS  (STANDING):  albuterol/ipratropium for Nebulization 3 milliLiter(s) Nebulizer every 6 hours  azithromycin  IVPB 500 milliGRAM(s) IV Intermittent every 24 hours  azithromycin  IVPB      chlorhexidine 2% Cloths 1 Application(s) Topical <User Schedule>  dextrose 5%. 1000 milliLiter(s) (100 mL/Hr) IV Continuous <Continuous>  dextrose 5%. 1000 milliLiter(s) (50 mL/Hr) IV Continuous <Continuous>  dextrose 50% Injectable 25 Gram(s) IV Push once  dextrose 50% Injectable 12.5 Gram(s) IV Push once  dextrose 50% Injectable 25 Gram(s) IV Push once  enoxaparin Injectable 40 milliGRAM(s) SubCutaneous every 24 hours  furosemide    Tablet 40 milliGRAM(s) Oral daily  glucagon  Injectable 1 milliGRAM(s) IntraMuscular once  insulin lispro (ADMELOG) corrective regimen sliding scale   SubCutaneous three times a day before meals  insulin lispro (ADMELOG) corrective regimen sliding scale   SubCutaneous at bedtime  lactobacillus acidophilus 1 Tablet(s) Oral daily  levothyroxine 200 MICROGram(s) Oral daily  piperacillin/tazobactam IVPB.. 3.375 Gram(s) IV Intermittent every 8 hours  polyethylene glycol 3350 17 Gram(s) Oral daily  remdesivir  IVPB 100 milliGRAM(s) IV Intermittent every 24 hours  remdesivir  IVPB   IV Intermittent   topiramate 100 milliGRAM(s) Oral daily    MEDICATIONS  (PRN):  acetaminophen     Tablet .. 650 milliGRAM(s) Oral every 6 hours PRN Temp greater or equal to 38C (100.4F), Mild Pain (1 - 3), Moderate Pain (4 - 6)  benzonatate 100 milliGRAM(s) Oral every 8 hours PRN Cough  bisacodyl 5 milliGRAM(s) Oral every 12 hours PRN Constipation  dextrose Oral Gel 15 Gram(s) Oral once PRN Blood Glucose LESS THAN 70 milliGRAM(s)/deciliter  guaiFENesin Oral Liquid (Sugar-Free) 100 milliGRAM(s) Oral every 4 hours PRN Cough      I&O's Summary    23 May 2022 07:01  -  24 May 2022 07:00  --------------------------------------------------------  IN: 240 mL / OUT: 350 mL / NET: -110 mL    24 May 2022 07:01  -  24 May 2022 14:30  --------------------------------------------------------  IN: 240 mL / OUT: 0 mL / NET: 240 mL        PHYSICAL EXAM:  Vital Signs Last 24 Hrs  T(C): 37.4 (24 May 2022 05:17), Max: 37.4 (24 May 2022 05:17)  T(F): 99.4 (24 May 2022 05:17), Max: 99.4 (24 May 2022 05:17)  HR: 79 (24 May 2022 05:17) (79 - 89)  BP: 122/70 (24 May 2022 05:17) (122/70 - 137/60)  BP(mean): --  RR: 18 (24 May 2022 05:17) (17 - 18)  SpO2: 93% (24 May 2022 05:17) (92% - 93%)    CONSTITUTIONAL: NAD, well-developed  EYES: PERRLA; conjunctiva and sclera clear  ENMT: Moist oral mucosa, no pharyngeal injection or exudates  NECK: Supple, no palpable masses; no thyromegaly  RESPIRATORY: Normal respiratory effort; coughing, crackles throughout, decreased, speaking in full sentences  CARDIOVASCULAR: Regular rate and rhythm, normal S1 and S2, no murmur/rub/gallop; pitting edema to calf b/l; Peripheral pulses are 2+ bilaterally  ABDOMEN: Nontender to palpation, normoactive bowel sounds, no rebound/guarding; No hepatosplenomegaly  MUSCULOSKELETAL: no clubbing or cyanosis of digits; no joint swelling or tenderness to palpation  PSYCH: A+O to person, place, and time; affect appropriate  NEUROLOGY: CN 2-12 are intact and symmetric; no gross sensory deficits   SKIN: No rashes; no palpable lesions    LABS:                        8.3    200.34 )-----------( 164      ( 24 May 2022 07:09 )             28.3     05-24    135  |  102  |  21  ----------------------------<  168<H>  3.7   |  19<L>  |  1.22    Ca    9.2      24 May 2022 07:07  Phos  2.4     05-24  Mg     1.9     05-24    TPro  5.9<L>  /  Alb  3.3  /  TBili  0.3  /  DBili  x   /  AST  22  /  ALT  23  /  AlkPhos  89  05-24      CARDIAC MARKERS ( 23 May 2022 07:09 )  x     / x     / 63 U/L / x     / 1.7 ng/mL  CARDIAC MARKERS ( 22 May 2022 18:18 )  x     / x     / 72 U/L / x     / 2.0 ng/mL          Culture - Sputum (collected 23 May 2022 13:19)  Source: .Sputum Sputum  Gram Stain (23 May 2022 19:44):    Rare polymorphonuclear leukocytes per low power field    No Squamous epithelial cells per low power field    Rare Gram positive cocci in pairs per oil power field    Rare Gram Variable Rods per oil power field    TTE    1. Concentric left ventricular hypertrophy.  2. Endocardium not well visualized; grossly normal left  ventricular systolic function.  3. The right ventricle is not well visualized. Mild Right  ventricular enlargement with decreased right ventricular  systolic function.  4. Normal pericardium with trace pericardial effusion.  5. Left pleural effusion.  6. Echo-free space 11cm x 10 cm is noted in the liver  consistent with cyst, however, dedicated imaging  recommended for further evaluation if clinically indicated.

## 2022-05-27 NOTE — PROVIDER CONTACT NOTE (CRITICAL VALUE NOTIFICATION) - RECOMMENDATIONS
will continue to monitor. resident placed in Isolation room
NP aware.
Continue to monitor.
awaiting for new orders, MD aware, will monitor

## 2022-05-27 NOTE — PROGRESS NOTE ADULT - PROBLEM SELECTOR PLAN 7
On as needed albuterol inhaler   no wheezing on exam     - duo-nebs q6h for now, can decrease as needed  - no steroids
On as needed albuterol inhaler   no wheezing on exam     - cotninue albuterol prn  - no steroids
On as needed albuterol inhaler   no wheezing on exam     - duo-nebs q6h for now, can decrease as needed  - no steroids
On as needed albuterol inhaler   no wheezing on exam     - duo-nebs q6h for now, can decrease as needed  - no steroids
On as needed albuterol inhaler   no wheezing on exam     - cotninue albuterol prn  - no steroids

## 2022-05-27 NOTE — PROGRESS NOTE ADULT - PROBLEM SELECTOR PLAN 1
COVID positive 5/12  Monoclonal ab 5/16  COVID vaccine x4   Febrile to 103 on presentation, cough, NOT hypoxic   CT chest: Bilateral ground glass opacities compatible with pneumonia in the setting of patient's known Covid diagnosis. Multiple superimposed nodular densities within the right lung could be  related to underlying infection, but are indeterminate.   Procalcitonin:  0.50->0.4  Could be superimposed bacterial PNA given immunocompromised   -started doxy + augmentin 5/20-5/22  - Zosyn 5 day course, Azithro 5/22-5/24   - Remdesevir  5 days 5/22-   - On room air, does not qualify for dexamethasone   - Blood cx 5/21 NGTD  - Urine cx negative  - Sputum cx, rare species, seems c/w normal baudilio, pending final result  - Strep and legionella negative, d/c azithro today 5/25  - trend inflammatory markers + procal tomorrow q48 relatively stable
COVID positive 5/12  Monoclonal ab 5/16  COVID vaccine x4   Febrile to 103 on presentation, cough, NOT hypoxic   CT chest: Bilateral ground glass opacities compatible with pneumonia in the setting of patient's known Covid diagnosis. Multiple superimposed nodular densities within the right lung could be  related to underlying infection, but are indeterminate.   Procalcitonin:  0.50->0.4  Could be superimposed bacterial PNA given immunocompromised     -started doxy + augmentin 5/20-5/22  - Zosyn 5 day course (d/c after last remdesivir dose 5/27), Azithro 5/22-5/24   - Remdesevir  5 days 5/22-27   - On room air, does not qualify for dexamethasone   - symptoms much improved, d/c home post remdesivir  - does not need antibiotics on discharge
COVID positive 5/12  Monoclonal ab 5/16  COVID vaccine x4   Febrile to 103 on presentation, cough, NOT hypoxic   CT chest: Bilateral ground glass opacities compatible with pneumonia in the setting of patient's known Covid diagnosis. Multiple superimposed nodular densities within the right lung could be  related to underlying infection, but are indeterminate.   Procalcitonin:  0.50  Could be superimposed bacterial PNA given immunocompromised   -started doxy + augmentin 5/20-5/22  - Zosyn 5 day course, Azithro 5/22-   - Remdesevir 5/22-   - On room air, does not qualify for dexamethasone   - Blood cx 5/21 NGTD  - Urine cx negative  - Sputum cx, rare species, seems c/w normal baudilio, pending final result  - Strep and legionella negative, d/c azithro tomorrow  - trend inflammatory markers + procal tomorrow 5/25
COVID positive 5/12  Monoclonal ab 5/16  COVID vaccine x4   Febrile to 103 on presentation, cough, NOT hypoxic   CT chest: Bilateral ground glass opacities compatible with pneumonia in the setting of patient's known Covid diagnosis. Multiple superimposed nodular densities within the right lung could be  related to underlying infection, but are indeterminate.   Procalcitonin:  0.50->0.4  Could be superimposed bacterial PNA given immunocompromised     -started doxy + augmentin 5/20-5/22  - Zosyn 5 day course (d/c after last remdesivir dose 5/27), Azithro 5/22-5/24   - Remdesevir  5 days 5/22-27   - On room air, does not qualify for dexamethasone   - symptoms much improved, d/c home post remdesivir
COVID positive 5/12  Monoclonal ab 5/16  COVID vaccine x4   Febrile to 103, cough, NOT hypoxic   CT chest: Bilateral ground glass opacities compatible with pneumonia in the setting of patient's known Covid diagnosis. Multiple superimposed nodular densities within the right lung could be  related to underlying infection, but are indeterminate.   Procalcitonin:  0.50  Could be superimposed bacterial PNA given immunocompromised   -started doxy + augmentin 5/20-5/22  - Zosyn 5 day course, Azithro 5/22-   - Remdesevir 5/22-   - On room air, does not qualify for dexamethasone   - Blood cx 5/21 NGTD  - Urine cx negative  - Sputum cx, urine legionella, urine Strep ag in process  - trend inflammatory markers

## 2022-05-27 NOTE — DISCHARGE NOTE PROVIDER - NSDCCPCAREPLAN_GEN_ALL_CORE_FT
PRINCIPAL DISCHARGE DIAGNOSIS  Diagnosis: Fever  Assessment and Plan of Treatment: Pneumonia due to COVID-19 virus; COVID positive 5/12 - Monoclonal ab 5/16; COVID vaccine x4   Febrile to 103 on presentation, cough, NOT hypoxic   CT chest: B'l ground glass opacities compatible w/ pna in the setting of patient's known Covid diagnosis. Multiple superimposed nodular densities within the right lung could be  related to underlying infection, but are indeterminate.   Procalcitonin:  0.50->0.4 - could be superimposed bacterial PNA given immunocompromised   -started doxy + augmentin 5/20-5/22  - Zosyn 5 day course (d/c after last remdesivir dose 5/27), Azithro 5/22-5/24, Remdesevir  5 days 5/22-27   - On room air, does not qualify for dexamethasone; symptoms much improved, d/c home post Remdesivir.      SECONDARY DISCHARGE DIAGNOSES  Diagnosis: Cough  Assessment and Plan of Treatment:      PRINCIPAL DISCHARGE DIAGNOSIS  Diagnosis: Fever  Assessment and Plan of Treatment: Pneumonia due to COVID-19 virus; COVID positive 5/12 - Monoclonal ab 5/16; COVID vaccine x4   Febrile to 103 on presentation, cough, NOT hypoxic   CT chest: B'l ground glass opacities compatible w/ pna in the setting of patient's known Covid diagnosis. Multiple superimposed nodular densities within the right lung could be  related to underlying infection, but are indeterminate.   Procalcitonin:  0.50->0.4 - could be superimposed bacterial PNA given immunocompromised   -started doxy + augmentin 5/20-5/22  - Zosyn 5 day course (d/c after last remdesivir dose 5/27), Azithro 5/22-5/24, Remdesevir  5 days 5/22-27   - On room air, does not qualify for dexamethasone; symptoms much improved, d/c home post Remdesivir.      SECONDARY DISCHARGE DIAGNOSES  Diagnosis: RBBB  Assessment and Plan of Treatment: RBBB; EKG on 4/21 RBBB seen on EKG - per patient this is not new for him   No chest pain  / Trops 50s-60s / Pro- / Trops relatively flat, low concern acs  TTE no signs of WMA.    Diagnosis: Anemia  Assessment and Plan of Treatment: Anemia; hgb 8, was 11 in Dec 2021; No signs of bleeding   Iron studies with mixed picture (would expect ferritin to be higher in COVID); no signs of hemolysis    Diagnosis: CLL (chronic lymphocytic leukemia)  Assessment and Plan of Treatment: CLL (chronic lymphocytic leukemia). Hx of CLL follows with Dr. Shadai Gage; no treatment and monitoring (dx'd in 2017)  Significant malignancy hx: CLL, Prostate Cancer s/p resection 2003, thyroid cancer s/p thyroidectomy 8/2018, renal cancer s/p partial nephrectomy AND  FHx of cancer  Leukocytosis of 200s, lymphocytic predominance, expected      Diagnosis: Leg swelling  Assessment and Plan of Treatment: Leg swelling; CVI at baseline  DVT studies 4/21 show No evidence of left lower extremity deep venous thrombosis; continue home lasix 40 daily, MRSA swab neg.    Diagnosis: Hypothyroidism  Assessment and Plan of Treatment: Hypothyroidism; continue Synthroid 200mcg.    Diagnosis: Asthma  Assessment and Plan of Treatment: Asthma; on as needed albuterol inhaler; no wheezing on exam - continued albuterol prn, no steroids.

## 2022-05-27 NOTE — PROGRESS NOTE ADULT - PROBLEM SELECTOR PLAN 3
Hgb 8, was 11 in Dec 2021  No signs of bleeding at this time  Iron studies with mixed picture (would expect ferritin to be higher in COVID)  no signs of hemolysis  ctm

## 2022-05-27 NOTE — DISCHARGE NOTE PROVIDER - NSDCFUSCHEDAPPT_GEN_ALL_CORE_FT
Maite Navarrete  [facility] Physician [facility]  [facility] [address] [facility]  Scheduled Appointment: 06/07/2022    Shadia Mcmahon  [facility] Physician [facility]  [facility]  Scheduled Appointment: 06/09/2022

## 2022-05-27 NOTE — DISCHARGE NOTE PROVIDER - NSDCMRMEDTOKEN_GEN_ALL_CORE_FT
acetaminophen 325 mg oral tablet: 2 tab(s) orally every 6 hours, As needed, Temp greater or equal to 38C (100.4F), Mild Pain (1 - 3)  ALBUTEROL SULFATE HFA  108 (90 Base) MCG/ACT AERS:   amoxicillin-clavulanate 875 mg-125 mg oral tablet: 1 tab(s) orally 2 times a day   Crestor 20 mg oral tablet: 1 tab(s) orally once a day (at bedtime)  doxycycline hyclate 100 mg oral tablet: 1 tab(s) orally 2 times a day   metFORMIN 1000 mg oral tablet: 1 tab(s) orally 2 times a day -for dizziness DM  RAMIPRIL  10 MG CAPS:   ROSUVASTATIN CALCIUM  20 MG TABS:   SYNTHROID  200 MCG TABS:   Tessalon Perles 100 mg oral capsule: 1 cap(s) orally every 8 hours, As Needed -for cough   TOPIRAMATE  100 MG TABS:    acetaminophen 325 mg oral tablet: 2 tab(s) orally every 6 hours, As needed, Temp greater or equal to 38C (100.4F), Mild Pain (1 - 3)  ALBUTEROL SULFATE HFA  108 (90 Base) MCG/ACT AERS:   amoxicillin-clavulanate 875 mg-125 mg oral tablet: 1 tab(s) orally 2 times a day   Crestor 20 mg oral tablet: 1 tab(s) orally once a day (at bedtime)  doxycycline hyclate 100 mg oral tablet: 1 tab(s) orally 2 times a day   lactobacillus acidophilus oral capsule: orally once a day  metFORMIN 1000 mg oral tablet: 1 tab(s) orally 2 times a day -for dizziness DM  polyethylene glycol 3350 oral powder for reconstitution: 17 gram(s) orally once a day  RAMIPRIL  10 MG CAPS:   ROSUVASTATIN CALCIUM  20 MG TABS:   SYNTHROID  200 MCG TABS:   Tessalon Perles 100 mg oral capsule: 1 cap(s) orally every 8 hours, As Needed -for cough   TOPIRAMATE  100 MG TABS:    acetaminophen 325 mg oral tablet: 2 tab(s) orally every 6 hours, As needed, Temp greater or equal to 38C (100.4F), Mild Pain (1 - 3)  ALBUTEROL SULFATE HFA  108 (90 Base) MCG/ACT AERS:   Crestor 20 mg oral tablet: 1 tab(s) orally once a day (at bedtime)  guaiFENesin 100 mg/5 mL oral liquid: 10 milliliter(s) orally every 6 hours, As Needed x 10 days  lactobacillus acidophilus oral capsule: orally once a day  Lasix 20 mg oral tablet: 1 tab(s) orally once a day   metFORMIN 1000 mg oral tablet: 1 tab(s) orally 2 times a day -for dizziness DM  polyethylene glycol 3350 oral powder for reconstitution: 17 gram(s) orally once a day  RAMIPRIL  10 MG CAPS:   SYNTHROID  200 MCG TABS:   Tessalon Perles 100 mg oral capsule: 1 cap(s) orally every 8 hours, As Needed -for cough   TOPIRAMATE  100 MG TABS:

## 2022-05-27 NOTE — PROVIDER CONTACT NOTE (CRITICAL VALUE NOTIFICATION) - BACKGROUND
Patient has a history of CLL.
Pt admitted for fever.  Pt has PMH of CLL.
Hx of CLL, Malignant Neoplasm of Thyroid
76M with history of T2DM, HTN, HLD, asthma, CVI,  CLL, presents 9-10d of persistent fevers and with dry cough, found to be COVID positive on 5/12/22 likely symptoms 2/2 COVID PNA s/p MCA with possible superimposed bacterial PNA.
Patient has a history of CLL.
Hx of CLL, Malignant Neoplasm of thyroid gland

## 2022-05-27 NOTE — GOALS OF CARE CONVERSATION - ADVANCED CARE PLANNING - CONVERSATION DETAILS
Introduced self and role of the PCE.  Pts spouse was contacted via phone and 2 way conference spouse for goals of care conversation.  Pt is currently on isolation for COVID.  Pt lives with spouse in private home and wants to return home, Pt is for discharge home today.       CPR/ intubation procedures and possible complications explained.  Pt wants to be full code and receive CPR and Intubation in an attempt to save his life.  He does not want to live on machines for extended period of time.  TIme limited trial explained and would like information text to phone. No conversations have been had around tube feedings.     Contact information for further needs provided.  No [employer] exemptions noted. Code NAYR provided      Laura Calderón RN  Palliative Care Educator  [phone] cell

## 2022-05-27 NOTE — PROVIDER CONTACT NOTE (CRITICAL VALUE NOTIFICATION) - PERSON GIVING RESULT:
Vamsi Langley
Daija Cain
Gregory Gould / [facility]
Gregory Gould / [facility]
Gregory Gould/ [facility]
Gregory Ivone
Gregory Gould

## 2022-05-27 NOTE — PROGRESS NOTE ADULT - PROBLEM SELECTOR PLAN 6
- Continue Synthroid 200mcg
- Continue Synthroid 200mcg
- Continue Synthroid
- Continue Synthroid 200mcg
- Continue Synthroid 200mcg

## 2022-05-27 NOTE — PROGRESS NOTE ADULT - ASSESSMENT
76M with history of T2DM, HTN, HLD, asthma, CVI,  CLL, Prostate Cancer s/p resection 2003, thyroid cancer s/p thyroidectomy 8/2018, renal cancer s/p partial nephrectomy and spinal stenosis with acquired scoliosis s/p L1-5 posterior lumbar fusion and T10-pelvis instrumentation and fusion now presents 9-10d of persistent fevers and with dry cough, found to be COVID positive on 5/12/22 likely symptoms 2/2 COVID PNA with possible superimposed bacterial PNA now s/p antibiotics. 
76M with history of T2DM, HTN, HLD, asthma, CVI,  CLL, Prostate Cancer s/p resection 2003, thyroid cancer s/p thyroidectomy 8/2018, renal cancer s/p partial nephrectomy and spinal stenosis with acquired scoliosis s/p L1-5 posterior lumbar fusion and T10-pelvis instrumentation and fusion now presents 9-10d of persistent fevers and with dry cough, found to be COVID positive on 5/12/22 likely symptoms 2/2 COVID PNA with possible superimposed bacterial PNA 

## 2022-05-27 NOTE — PROVIDER CONTACT NOTE (CRITICAL VALUE NOTIFICATION) - ACTION/TREATMENT ORDERED:
NP aware.
informed of
awaiting for new orders, MD aware, will monitor
no new orders, will continue to monitor
no new intervention, continue to monitor.

## 2022-05-27 NOTE — PROGRESS NOTE ADULT - PROBLEM SELECTOR PLAN 8
On Ramipril 10mg at home     - will hold for now  - add HTN meds as needed

## 2022-06-02 ENCOUNTER — OUTPATIENT (OUTPATIENT)
Dept: OUTPATIENT SERVICES | Facility: HOSPITAL | Age: 77
LOS: 1 days | Discharge: HOME | End: 2022-06-02

## 2022-06-02 DIAGNOSIS — E89.0 POSTPROCEDURAL HYPOTHYROIDISM: Chronic | ICD-10-CM

## 2022-06-02 DIAGNOSIS — Z90.5 ACQUIRED ABSENCE OF KIDNEY: Chronic | ICD-10-CM

## 2022-06-02 DIAGNOSIS — Z98.890 OTHER SPECIFIED POSTPROCEDURAL STATES: Chronic | ICD-10-CM

## 2022-06-02 DIAGNOSIS — D72.820 LYMPHOCYTOSIS (SYMPTOMATIC): ICD-10-CM

## 2022-06-02 DIAGNOSIS — Z90.79 ACQUIRED ABSENCE OF OTHER GENITAL ORGAN(S): Chronic | ICD-10-CM

## 2022-06-02 DIAGNOSIS — Z96.651 PRESENCE OF RIGHT ARTIFICIAL KNEE JOINT: Chronic | ICD-10-CM

## 2022-06-07 ENCOUNTER — APPOINTMENT (OUTPATIENT)
Dept: SURGERY | Facility: CLINIC | Age: 77
End: 2022-06-07
Payer: MEDICARE

## 2022-06-07 ENCOUNTER — APPOINTMENT (OUTPATIENT)
Dept: HEMATOLOGY ONCOLOGY | Facility: CLINIC | Age: 77
End: 2022-06-07

## 2022-06-07 PROCEDURE — 99213 OFFICE O/P EST LOW 20 MIN: CPT

## 2022-06-07 PROCEDURE — 36415 COLL VENOUS BLD VENIPUNCTURE: CPT

## 2022-06-07 NOTE — PHYSICAL EXAM
[de-identified] : neck short, thick, ,incision well healed.  [Normal] : no neck adenopathy [de-identified] : Skin:  normal appearance.  no rash, nodules, vesicles, or erythema,\par Musculoskeletal:  full range of motion and no deformities appreciated\par Neurological:  grossly intact\par Psychiatric:  oriented to person, place and time with appropriate affect

## 2022-06-07 NOTE — HISTORY OF PRESENT ILLNESS
[de-identified] : Patient referred by Dr. Rodriguez for evaluation of newly diagnosed medullary thyroid cancer.  Patient with over 10 year history of thyroid nodules, biopsy 8 years ago benign.  Patient with multiple malignancies followed with CT scans and PET scans.  Pet scan 5/2017 with uptake in thyroid, no uptake in cervical LNs.  Thyroid US 6/18/18 with multiple nodules left lower 3.5 x 2.2 x 2.1 cm increased from prior study with Ti-RADS 7.  three additional right nodules noted.  Biopsy left medullary thyroid cancer, Thyroseq RET mutation.  Denies dysphagia, hoarseness , had radiation 2007 for prostate cancer.  \par 8/16/18 Total thyroidectomy with CND, pathology 3.5 cm medullary thyroid cancer 0/1 LN,  denies dysphagia, reports mild hoarseness denies parathesias.  questions answered. returns prior to scheduled with concern regarding incision, denies f/.c/s , voice voice normal ruth ann reviewed calcitonin negative, CEA remains elevated, reports last colonoscopy was a year ago, will f/u oncologist and GI. \par Patient diagnosed with medullary thyroid cancer 4 year ago.  on synthroid 200.  denies dysphagia,  change in voice or fatigue, or palpitations. neck US 6/2021  with LNs consistent with CLL, stable nodule in thyroid bed, denies recent illness.  no recent calcitonin .   Patient reports 40 pound weight loss over the last 5 months.  Had COVID requiring hospitalization 1 month ago.  Now improving.  Denies dysphagia, hoarseness or recent illness. I have reviewed all old and new data and available images.

## 2022-06-09 ENCOUNTER — LABORATORY RESULT (OUTPATIENT)
Age: 77
End: 2022-06-09

## 2022-06-09 ENCOUNTER — RESULT REVIEW (OUTPATIENT)
Age: 77
End: 2022-06-09

## 2022-06-09 ENCOUNTER — APPOINTMENT (OUTPATIENT)
Dept: HEMATOLOGY ONCOLOGY | Facility: CLINIC | Age: 77
End: 2022-06-09
Payer: MEDICARE

## 2022-06-09 VITALS
RESPIRATION RATE: 16 BRPM | BODY MASS INDEX: 27.92 KG/M2 | HEIGHT: 70 IN | WEIGHT: 195 LBS | SYSTOLIC BLOOD PRESSURE: 129 MMHG | OXYGEN SATURATION: 98 % | HEART RATE: 59 BPM | DIASTOLIC BLOOD PRESSURE: 73 MMHG | TEMPERATURE: 97 F

## 2022-06-09 LAB
ANISOCYTOSIS BLD QL: SLIGHT — SIGNIFICANT CHANGE UP
BASOPHILS # BLD AUTO: 0 K/UL — SIGNIFICANT CHANGE UP (ref 0–0.2)
BASOPHILS NFR BLD AUTO: 0 % — SIGNIFICANT CHANGE UP (ref 0–2)
ELLIPTOCYTES BLD QL SMEAR: SLIGHT — SIGNIFICANT CHANGE UP
EOSINOPHIL # BLD AUTO: 0 K/UL — SIGNIFICANT CHANGE UP (ref 0–0.5)
EOSINOPHIL NFR BLD AUTO: 0 % — SIGNIFICANT CHANGE UP (ref 0–6)
HCT VFR BLD CALC: 33.1 % — LOW (ref 39–50)
HGB BLD-MCNC: 8.8 G/DL — LOW (ref 13–17)
LYMPHOCYTES # BLD AUTO: 237.55 K/UL — HIGH (ref 1–3.3)
LYMPHOCYTES # BLD AUTO: 89 % — HIGH (ref 13–44)
LYMPHOCYTES # SPEC AUTO: 8 % — HIGH (ref 0–0)
MACROCYTES BLD QL: SLIGHT — SIGNIFICANT CHANGE UP
MCHC RBC-ENTMCNC: 26.6 G/DL — LOW (ref 32–36)
MCHC RBC-ENTMCNC: 26.9 PG — LOW (ref 27–34)
MCV RBC AUTO: 101.2 FL — HIGH (ref 80–100)
MICROCYTES BLD QL: SLIGHT — SIGNIFICANT CHANGE UP
MONOCYTES # BLD AUTO: 0 K/UL — SIGNIFICANT CHANGE UP (ref 0–0.9)
MONOCYTES NFR BLD AUTO: 0 % — LOW (ref 2–14)
NEUTROPHILS # BLD AUTO: 8.01 K/UL — HIGH (ref 1.8–7.4)
NEUTROPHILS NFR BLD AUTO: 3 % — LOW (ref 43–77)
NRBC # BLD: 0 /100 — SIGNIFICANT CHANGE UP (ref 0–0)
NRBC # BLD: SIGNIFICANT CHANGE UP /100 WBCS (ref 0–0)
PLAT MORPH BLD: NORMAL — SIGNIFICANT CHANGE UP
PLATELET # BLD AUTO: 147 K/UL — LOW (ref 150–400)
POIKILOCYTOSIS BLD QL AUTO: SLIGHT — SIGNIFICANT CHANGE UP
RBC # BLD: 3.27 M/UL — LOW (ref 4.2–5.8)
RBC # FLD: 20.7 % — HIGH (ref 10.3–14.5)
RBC BLD AUTO: ABNORMAL
SCHISTOCYTES BLD QL AUTO: SLIGHT — SIGNIFICANT CHANGE UP
SMUDGE CELLS # BLD: PRESENT — SIGNIFICANT CHANGE UP
WBC # BLD: 266.91 K/UL — CRITICAL HIGH (ref 3.8–10.5)
WBC # FLD AUTO: 266.91 K/UL — CRITICAL HIGH (ref 3.8–10.5)

## 2022-06-09 PROCEDURE — 99215 OFFICE O/P EST HI 40 MIN: CPT

## 2022-06-09 NOTE — PHYSICAL EXAM
[Restricted in physically strenuous activity but ambulatory and able to carry out work of a light or sedentary nature] : Status 1- Restricted in physically strenuous activity but ambulatory and able to carry out work of a light or sedentary nature, e.g., light house work, office work [Normal] : affect appropriate [de-identified] : cervical adenopathy b/l [de-identified] : (+) edema b/l, wearing compression stockings [de-identified] : healed herpetic lesions LUQ/back

## 2022-06-09 NOTE — ASSESSMENT
[FreeTextEntry1] : 74yo M w/ CLL, 11q-,13q-, being observed\par WBC continues rising - 266.91 today.  Hgb 8.8. CBC reviewed with pt\par He does have LAD and w/ a conglomerate of mesenteric lymph nodes measuring up to 11cm\par We discussed starting CLL therapy with Calquence given worsening leukocytosis and anemia and also enlarging adenopathy. Information on Calquence provided and side effects reviewed. Pt and significant other would like to consider and repeat labs before deciding on treatment. Will repeat in 1 mo\par RTC 1mo

## 2022-06-09 NOTE — HISTORY OF PRESENT ILLNESS
[de-identified] : 72yo M here for f/u of CLL. He was previously following with Dr. Mccabe. \par \par On March 2,2017, total WBC 11,33, with absolute lymphocyte count 5865.\par On May 25, Total WBC 14.3, ALC 9052.\par Of note, chest CT for follow up of renal cell carcinoma on 5/31 showed slightly enlarged subpectoral and left axillary nodes.\par MRI/PET 6/1 multiple stable retroperitoneal nodes without abnormal FDG uptake. There was mild splenomegaly without abnormal uptake.\par \par Flow cytometry reveals monotypic B cells positive for CD19 didn't CD20 CD23 and C5 negative for FMC-7, CD10 and CD38 consistent with CLL. The monotypic B cells are 41% of cells, equating to 5658 cells\par FISH panel reveals deletion of 11q in 57.5% of cells and deletion of 13q in 67% of cells.\par \par He has had no constitutional symptoms.\par He has a history of prostate carcinoma, 3 cm renal cell carcinoma, 3.5 cm medullary thyroid cancer\par \par His previous CA history is as follows: \par In 2003, he was diagnosed with prostate CA s/p radical prostatectomy\par MRI done for rising PSA in 2009, found to have RCC s/p partial R nephrectomy and RT to prostate bed\par PET scheduled on Monday for rising PSA\par In 8/2018, found to have medullary thyroid CA s/p total thyroidectomy\par \par PET MRI Axumin skullbase to thighs: s/p radical prostatectomy. No recurrence in the prostatectomy bed. No metastatic lymphadenopathy of prostate origin. No liver or lung metastasis.\par Interval increase in size of the abdominal, retroperitoneal and mesenteric lymphadenopathy with correcting increase Axumin uptake since 6/10/19,. No liver infiltration. Splenomegaly (14.6cm), relatively stable since 6/10/19.\par s/p R partial nephrectomy without evidence of local recurrence.\par s/p thyroidectomy. No recurrent mass in the thyroidectomy bed [de-identified] : Labs done on 9/16/20 - WBC 78.9. Repeat on 10/6/20 was 73.79. \par He went to [place] for the winter. Labs done in [place] on 5/3/21 when he had cellulitis showed WBC of 143.4. \par \par He got back from [place] on 5/20. Cellulitis flared up again about 4 days later. He went to see his PMD and received a course of Cefadoxil x10d started 5/27.\par He had shingles on L side in July, around 7/12. Has LUQ pain, ? postherpetic neuralgia, on gabapentin. \par He had his COVID booster 9/9\par Abd sono done 9/2022: Extensive mesenteric adenopathy measuring up to 11 cm in size.\par Splenomegaly 16.8 by 8.7 x 12.3 cm.\par \par He went to [place] for the winter. He was admitted for cellulitis - was discharged on IV Abx. Also had a rectal bleed from internal hemorrhoids\par Saw Dr. Jones - .88 in 4/2022. Hgb 9.0, plts 102\par He was started on iron supplements for ferritin of 26.\par He had COVID 5/12 s/p monoclonal antibodies but still required hospitalization. He received Abx and Remdesivir, was discharged last week. \par He has lost about 40 lbs since last visit after these admissions for infections.

## 2022-06-10 LAB
ALBUMIN SERPL ELPH-MCNC: 4.3 G/DL
ALP BLD-CCNC: 93 U/L
ALT SERPL-CCNC: 13 U/L
ANION GAP SERPL CALC-SCNC: 10 MMOL/L
AST SERPL-CCNC: 15 U/L
BILIRUB SERPL-MCNC: <0.2 MG/DL
BUN SERPL-MCNC: 27 MG/DL
CALCIUM SERPL-MCNC: 9.7 MG/DL
CHLORIDE SERPL-SCNC: 106 MMOL/L
CO2 SERPL-SCNC: 24 MMOL/L
CREAT SERPL-MCNC: 1.06 MG/DL
EGFR: 73 ML/MIN/1.73M2
GLUCOSE SERPL-MCNC: 109 MG/DL
LDH SERPL-CCNC: 103 U/L
POTASSIUM SERPL-SCNC: 4.6 MMOL/L
PROT SERPL-MCNC: 5.7 G/DL
SODIUM SERPL-SCNC: 140 MMOL/L

## 2022-06-13 ENCOUNTER — NON-APPOINTMENT (OUTPATIENT)
Age: 77
End: 2022-06-13

## 2022-06-13 LAB
CALCIT SERPL-MCNC: 15.9 PG/ML
CEA SERPL-MCNC: 1.8 NG/ML
T3 SERPL-MCNC: 55 NG/DL
T4 FREE SERPL-MCNC: 1.5 NG/DL
TSH SERPL-ACNC: 3.65 UIU/ML

## 2022-06-14 ENCOUNTER — TRANSCRIPTION ENCOUNTER (OUTPATIENT)
Age: 77
End: 2022-06-14

## 2022-06-15 ENCOUNTER — TRANSCRIPTION ENCOUNTER (OUTPATIENT)
Age: 77
End: 2022-06-15

## 2022-07-07 ENCOUNTER — RESULT REVIEW (OUTPATIENT)
Age: 77
End: 2022-07-07

## 2022-07-07 ENCOUNTER — APPOINTMENT (OUTPATIENT)
Dept: HEMATOLOGY ONCOLOGY | Facility: CLINIC | Age: 77
End: 2022-07-07

## 2022-07-07 VITALS
SYSTOLIC BLOOD PRESSURE: 153 MMHG | OXYGEN SATURATION: 96 % | WEIGHT: 210.54 LBS | DIASTOLIC BLOOD PRESSURE: 60 MMHG | RESPIRATION RATE: 19 BRPM | TEMPERATURE: 97 F | BODY MASS INDEX: 30.21 KG/M2 | HEART RATE: 53 BPM

## 2022-07-07 LAB
BASOPHILS # BLD AUTO: 2.1 K/UL — HIGH (ref 0–0.2)
BASOPHILS NFR BLD AUTO: 1 % — SIGNIFICANT CHANGE UP (ref 0–2)
EOSINOPHIL # BLD AUTO: 0 K/UL — SIGNIFICANT CHANGE UP (ref 0–0.5)
EOSINOPHIL NFR BLD AUTO: 0 % — SIGNIFICANT CHANGE UP (ref 0–6)
HCT VFR BLD CALC: 31.8 % — LOW (ref 39–50)
HGB BLD-MCNC: 9 G/DL — LOW (ref 13–17)
LYMPHOCYTES # BLD AUTO: 169.91 K/UL — HIGH (ref 1–3.3)
LYMPHOCYTES # BLD AUTO: 81 % — HIGH (ref 13–44)
LYMPHOCYTES # SPEC AUTO: 15 % — HIGH (ref 0–0)
MCHC RBC-ENTMCNC: 28.3 G/DL — LOW (ref 32–36)
MCHC RBC-ENTMCNC: 28.3 PG — SIGNIFICANT CHANGE UP (ref 27–34)
MCV RBC AUTO: 100 FL — SIGNIFICANT CHANGE UP (ref 80–100)
MONOCYTES # BLD AUTO: 2.1 K/UL — HIGH (ref 0–0.9)
MONOCYTES NFR BLD AUTO: 1 % — LOW (ref 2–14)
NEUTROPHILS # BLD AUTO: 4.2 K/UL — SIGNIFICANT CHANGE UP (ref 1.8–7.4)
NEUTROPHILS NFR BLD AUTO: 2 % — LOW (ref 43–77)
NRBC # BLD: 0 /100 — SIGNIFICANT CHANGE UP (ref 0–0)
NRBC # BLD: SIGNIFICANT CHANGE UP /100 WBCS (ref 0–0)
PLAT MORPH BLD: NORMAL — SIGNIFICANT CHANGE UP
PLATELET # BLD AUTO: 112 K/UL — LOW (ref 150–400)
RBC # BLD: 3.18 M/UL — LOW (ref 4.2–5.8)
RBC # FLD: 20.6 % — HIGH (ref 10.3–14.5)
RBC BLD AUTO: SIGNIFICANT CHANGE UP
SMUDGE CELLS # BLD: PRESENT — SIGNIFICANT CHANGE UP
WBC # BLD: 209.76 K/UL — CRITICAL HIGH (ref 3.8–10.5)
WBC # FLD AUTO: 209.76 K/UL — CRITICAL HIGH (ref 3.8–10.5)

## 2022-07-07 PROCEDURE — 99215 OFFICE O/P EST HI 40 MIN: CPT

## 2022-07-07 NOTE — HISTORY OF PRESENT ILLNESS
[de-identified] : 72yo M here for f/u of CLL. He was previously following with Dr. Mccabe. \par \par On March 2,2017, total WBC 11,33, with absolute lymphocyte count 5865.\par On May 25, Total WBC 14.3, ALC 9052.\par Of note, chest CT for follow up of renal cell carcinoma on 5/31 showed slightly enlarged subpectoral and left axillary nodes.\par MRI/PET 6/1 multiple stable retroperitoneal nodes without abnormal FDG uptake. There was mild splenomegaly without abnormal uptake.\par \par Flow cytometry reveals monotypic B cells positive for CD19 didn't CD20 CD23 and C5 negative for FMC-7, CD10 and CD38 consistent with CLL. The monotypic B cells are 41% of cells, equating to 5658 cells\par FISH panel reveals deletion of 11q in 57.5% of cells and deletion of 13q in 67% of cells.\par \par He has had no constitutional symptoms.\par He has a history of prostate carcinoma, 3 cm renal cell carcinoma, 3.5 cm medullary thyroid cancer\par \par His previous CA history is as follows: \par In 2003, he was diagnosed with prostate CA s/p radical prostatectomy\par MRI done for rising PSA in 2009, found to have RCC s/p partial R nephrectomy and RT to prostate bed\par PET scheduled on Monday for rising PSA\par In 8/2018, found to have medullary thyroid CA s/p total thyroidectomy\par \par PET MRI Axumin skullbase to thighs: s/p radical prostatectomy. No recurrence in the prostatectomy bed. No metastatic lymphadenopathy of prostate origin. No liver or lung metastasis.\par Interval increase in size of the abdominal, retroperitoneal and mesenteric lymphadenopathy with correcting increase Axumin uptake since 6/10/19,. No liver infiltration. Splenomegaly (14.6cm), relatively stable since 6/10/19.\par s/p R partial nephrectomy without evidence of local recurrence.\par s/p thyroidectomy. No recurrent mass in the thyroidectomy bed [de-identified] : Labs done on 9/16/20 - WBC 78.9. Repeat on 10/6/20 was 73.79. \par He went to [place] for the winter. Labs done in [place] on 5/3/21 when he had cellulitis showed WBC of 143.4. \par \par He got back from [place] on 5/20. Cellulitis flared up again about 4 days later. He went to see his PMD and received a course of Cefadoxil x10d started 5/27.\par He had shingles on L side in July, around 7/12. Has LUQ pain, ? postherpetic neuralgia, on gabapentin. \par He had his COVID booster 9/9\par Abd sono done 9/2022: Extensive mesenteric adenopathy measuring up to 11 cm in size.\par Splenomegaly 16.8 by 8.7 x 12.3 cm.\par \par He went to [place] for the winter. He was admitted for cellulitis - was discharged on IV Abx. Also had a rectal bleed from internal hemorrhoids\par Saw Dr. Jones - .88 in 4/2022. Hgb 9.0, plts 102\par He was started on iron supplements for ferritin of 26.\par He had COVID 5/12 s/p monoclonal antibodies but still required hospitalization. He received Abx and Remdesivir, was discharged last week. \par He has lost about 40 lbs after these admissions for infections. \par \par Going to [place] 7/23-8/6. Then he will go back there from Oct-May\par IgG levels drawn in June was 295. \par

## 2022-07-07 NOTE — ASSESSMENT
[FreeTextEntry1] : 75yo M w/ CLL, 11q-,13q-, being observed\par WBC today is 209.76.  Hgb 9.0. CBC reviewed with pt\par He does have LAD and w/ a conglomerate of mesenteric lymph nodes measuring up to 11cm. Last visit, we discussed starting CLL therapy with Calquence given worsening leukocytosis and anemia and also enlarging adenopathy. Information on Calquence provided and side effects reviewed. Pt and significant other would like to consider and repeat labs before deciding on treatment. Today he is in agreement to start treatment -will send Rx for Calquence to [facility]\par IgG from 6/9/22 was 295. We discussed doing IVIG for hypogammaglobulinemia x1yr. Pt has had multiple infections recently. Pt and significant other in agreement, informed consent obtained. \par RTC 2 weeks (prior to trip to [place])\par All questions answered

## 2022-07-07 NOTE — PHYSICAL EXAM
[Restricted in physically strenuous activity but ambulatory and able to carry out work of a light or sedentary nature] : Status 1- Restricted in physically strenuous activity but ambulatory and able to carry out work of a light or sedentary nature, e.g., light house work, office work [Normal] : affect appropriate [de-identified] : cervical adenopathy b/l [de-identified] : (+) edema b/l, wearing compression stockings [de-identified] : healed herpetic lesions LUQ/back

## 2022-07-11 LAB
ALBUMIN SERPL ELPH-MCNC: 4 G/DL
ALP BLD-CCNC: 90 U/L
ALT SERPL-CCNC: 11 U/L
ANION GAP SERPL CALC-SCNC: 9 MMOL/L
AST SERPL-CCNC: 17 U/L
BILIRUB SERPL-MCNC: 0.2 MG/DL
BUN SERPL-MCNC: 23 MG/DL
CALCIUM SERPL-MCNC: 8.9 MG/DL
CHLORIDE SERPL-SCNC: 109 MMOL/L
CO2 SERPL-SCNC: 22 MMOL/L
CREAT SERPL-MCNC: 0.9 MG/DL
EGFR: 89 ML/MIN/1.73M2
GLUCOSE SERPL-MCNC: 222 MG/DL
LDH SERPL-CCNC: 117 U/L
POTASSIUM SERPL-SCNC: 4.4 MMOL/L
PROT SERPL-MCNC: 5.5 G/DL
SODIUM SERPL-SCNC: 140 MMOL/L

## 2022-07-13 ENCOUNTER — TRANSCRIPTION ENCOUNTER (OUTPATIENT)
Age: 77
End: 2022-07-13

## 2022-07-14 ENCOUNTER — APPOINTMENT (OUTPATIENT)
Dept: INFUSION THERAPY | Facility: HOSPITAL | Age: 77
End: 2022-07-14

## 2022-07-14 RX ORDER — LEVOTHYROXINE SODIUM 125 MCG
0 TABLET ORAL
Qty: 0 | Refills: 0 | DISCHARGE

## 2022-07-14 RX ORDER — ALBUTEROL 90 UG/1
0 AEROSOL, METERED ORAL
Qty: 0 | Refills: 0 | DISCHARGE

## 2022-07-14 RX ORDER — RAMIPRIL 5 MG
0 CAPSULE ORAL
Qty: 0 | Refills: 0 | DISCHARGE

## 2022-07-14 RX ORDER — ROSUVASTATIN CALCIUM 5 MG/1
1 TABLET ORAL
Qty: 0 | Refills: 0 | DISCHARGE

## 2022-07-14 RX ORDER — TOPIRAMATE 25 MG
0 TABLET ORAL
Qty: 0 | Refills: 0 | DISCHARGE

## 2022-07-15 DIAGNOSIS — Z51.11 ENCOUNTER FOR ANTINEOPLASTIC CHEMOTHERAPY: ICD-10-CM

## 2022-07-15 DIAGNOSIS — R11.2 NAUSEA WITH VOMITING, UNSPECIFIED: ICD-10-CM

## 2022-07-15 PROBLEM — R59.0 LOCALIZED ENLARGED LYMPH NODES: Chronic | Status: ACTIVE | Noted: 2022-07-12

## 2022-07-15 PROBLEM — B02.9 ZOSTER WITHOUT COMPLICATIONS: Chronic | Status: ACTIVE | Noted: 2022-07-12

## 2022-07-15 PROBLEM — L03.90 CELLULITIS, UNSPECIFIED: Chronic | Status: ACTIVE | Noted: 2022-07-12

## 2022-07-15 PROBLEM — E78.00 PURE HYPERCHOLESTEROLEMIA, UNSPECIFIED: Chronic | Status: ACTIVE | Noted: 2022-07-12

## 2022-07-15 PROBLEM — M54.9 DORSALGIA, UNSPECIFIED: Chronic | Status: ACTIVE | Noted: 2022-07-12

## 2022-07-15 PROBLEM — M19.90 UNSPECIFIED OSTEOARTHRITIS, UNSPECIFIED SITE: Chronic | Status: ACTIVE | Noted: 2022-07-12

## 2022-07-15 PROBLEM — K62.5 HEMORRHAGE OF ANUS AND RECTUM: Chronic | Status: ACTIVE | Noted: 2022-07-12

## 2022-07-15 PROBLEM — R16.1 SPLENOMEGALY, NOT ELSEWHERE CLASSIFIED: Chronic | Status: ACTIVE | Noted: 2022-07-12

## 2022-07-15 PROBLEM — Z87.09 PERSONAL HISTORY OF OTHER DISEASES OF THE RESPIRATORY SYSTEM: Chronic | Status: ACTIVE | Noted: 2022-07-12

## 2022-07-18 ENCOUNTER — NON-APPOINTMENT (OUTPATIENT)
Age: 77
End: 2022-07-18

## 2022-07-20 ENCOUNTER — NON-APPOINTMENT (OUTPATIENT)
Age: 77
End: 2022-07-20

## 2022-07-21 ENCOUNTER — RESULT REVIEW (OUTPATIENT)
Age: 77
End: 2022-07-21

## 2022-07-21 ENCOUNTER — APPOINTMENT (OUTPATIENT)
Dept: HEMATOLOGY ONCOLOGY | Facility: CLINIC | Age: 77
End: 2022-07-21

## 2022-07-21 VITALS
TEMPERATURE: 97.9 F | WEIGHT: 205 LBS | DIASTOLIC BLOOD PRESSURE: 66 MMHG | SYSTOLIC BLOOD PRESSURE: 125 MMHG | RESPIRATION RATE: 17 BRPM | BODY MASS INDEX: 29.35 KG/M2 | HEART RATE: 54 BPM | OXYGEN SATURATION: 99 % | HEIGHT: 70 IN

## 2022-07-21 LAB
ALBUMIN SERPL ELPH-MCNC: 4.1 G/DL
ALP BLD-CCNC: 88 U/L
ALT SERPL-CCNC: 12 U/L
ANION GAP SERPL CALC-SCNC: 9 MMOL/L
AST SERPL-CCNC: 13 U/L
BASOPHILS # BLD AUTO: 0 K/UL — SIGNIFICANT CHANGE UP (ref 0–0.2)
BASOPHILS NFR BLD AUTO: 0 % — SIGNIFICANT CHANGE UP (ref 0–2)
BILIRUB SERPL-MCNC: 0.2 MG/DL
BUN SERPL-MCNC: 28 MG/DL
CALCIUM SERPL-MCNC: 9.4 MG/DL
CHLORIDE SERPL-SCNC: 109 MMOL/L
CO2 SERPL-SCNC: 23 MMOL/L
CREAT SERPL-MCNC: 0.97 MG/DL
EGFR: 81 ML/MIN/1.73M2
EOSINOPHIL # BLD AUTO: 0 K/UL — SIGNIFICANT CHANGE UP (ref 0–0.5)
EOSINOPHIL NFR BLD AUTO: 0 % — SIGNIFICANT CHANGE UP (ref 0–6)
GLUCOSE SERPL-MCNC: 155 MG/DL
HCT VFR BLD CALC: 32.8 % — LOW (ref 39–50)
HGB BLD-MCNC: 8.6 G/DL — LOW (ref 13–17)
LYMPHOCYTES # BLD AUTO: 176.17 K/UL — HIGH (ref 1–3.3)
LYMPHOCYTES # BLD AUTO: 71 % — HIGH (ref 13–44)
LYMPHOCYTES # SPEC AUTO: 24 % — HIGH (ref 0–0)
MCHC RBC-ENTMCNC: 26.2 G/DL — LOW (ref 32–36)
MCHC RBC-ENTMCNC: 26.7 PG — LOW (ref 27–34)
MCV RBC AUTO: 101.9 FL — HIGH (ref 80–100)
MONOCYTES # BLD AUTO: 0 K/UL — SIGNIFICANT CHANGE UP (ref 0–0.9)
MONOCYTES NFR BLD AUTO: 0 % — LOW (ref 2–14)
NEUTROPHILS # BLD AUTO: 12.41 K/UL — HIGH (ref 1.8–7.4)
NEUTROPHILS NFR BLD AUTO: 5 % — LOW (ref 43–77)
NRBC # BLD: 0 /100 — SIGNIFICANT CHANGE UP (ref 0–0)
NRBC # BLD: SIGNIFICANT CHANGE UP /100 WBCS (ref 0–0)
PLAT MORPH BLD: NORMAL — SIGNIFICANT CHANGE UP
PLATELET # BLD AUTO: 83 K/UL — LOW (ref 150–400)
POTASSIUM SERPL-SCNC: 4.5 MMOL/L
PROT SERPL-MCNC: 5.7 G/DL
RBC # BLD: 3.22 M/UL — LOW (ref 4.2–5.8)
RBC # FLD: SIGNIFICANT CHANGE UP (ref 10.3–14.5)
RBC BLD AUTO: SIGNIFICANT CHANGE UP
SMUDGE CELLS # BLD: PRESENT — SIGNIFICANT CHANGE UP
SODIUM SERPL-SCNC: 141 MMOL/L
WBC # BLD: 248.12 K/UL — CRITICAL HIGH (ref 3.8–10.5)
WBC # FLD AUTO: 248.12 K/UL — CRITICAL HIGH (ref 3.8–10.5)

## 2022-07-21 PROCEDURE — 99214 OFFICE O/P EST MOD 30 MIN: CPT

## 2022-07-21 NOTE — HISTORY OF PRESENT ILLNESS
[de-identified] : 74yo M here for f/u of CLL. He was previously following with Dr. Mccabe. \par \par On March 2,2017, total WBC 11,33, with absolute lymphocyte count 5865.\par On May 25, Total WBC 14.3, ALC 9052.\par Of note, chest CT for follow up of renal cell carcinoma on 5/31 showed slightly enlarged subpectoral and left axillary nodes.\par MRI/PET 6/1 multiple stable retroperitoneal nodes without abnormal FDG uptake. There was mild splenomegaly without abnormal uptake.\par \par Flow cytometry reveals monotypic B cells positive for CD19 didn't CD20 CD23 and C5 negative for FMC-7, CD10 and CD38 consistent with CLL. The monotypic B cells are 41% of cells, equating to 5658 cells\par FISH panel reveals deletion of 11q in 57.5% of cells and deletion of 13q in 67% of cells.\par \par He has had no constitutional symptoms.\par He has a history of prostate carcinoma, 3 cm renal cell carcinoma, 3.5 cm medullary thyroid cancer\par \par His previous CA history is as follows: \par In 2003, he was diagnosed with prostate CA s/p radical prostatectomy\par MRI done for rising PSA in 2009, found to have RCC s/p partial R nephrectomy and RT to prostate bed\par PET scheduled on Monday for rising PSA\par In 8/2018, found to have medullary thyroid CA s/p total thyroidectomy\par \par PET MRI Axumin skullbase to thighs: s/p radical prostatectomy. No recurrence in the prostatectomy bed. No metastatic lymphadenopathy of prostate origin. No liver or lung metastasis.\par Interval increase in size of the abdominal, retroperitoneal and mesenteric lymphadenopathy with correcting increase Axumin uptake since 6/10/19,. No liver infiltration. Splenomegaly (14.6cm), relatively stable since 6/10/19.\par s/p R partial nephrectomy without evidence of local recurrence.\par s/p thyroidectomy. No recurrent mass in the thyroidectomy bed [de-identified] : Labs done on 9/16/20 - WBC 78.9. Repeat on 10/6/20 was 73.79. \par He went to [place] for the winter. Labs done in [place] on 5/3/21 when he had cellulitis showed WBC of 143.4. \par \par He got back from [place] on 5/20. Cellulitis flared up again about 4 days later. He went to see his PMD and received a course of Cefadoxil x10d started 5/27.\par He had shingles on L side in July, around 7/12. Has LUQ pain, ? postherpetic neuralgia, on gabapentin. \par He had his COVID booster 9/9\par Abd sono done 9/2022: Extensive mesenteric adenopathy measuring up to 11 cm in size.\par Splenomegaly 16.8 by 8.7 x 12.3 cm.\par \par He went to [place] for the winter. He was admitted for cellulitis - was discharged on IV Abx. Also had a rectal bleed from internal hemorrhoids\par Saw Dr. Jones - .88 in 4/2022. Hgb 9.0, plts 102\par He was started on iron supplements for ferritin of 26.\par He had COVID 5/12 s/p monoclonal antibodies but still required hospitalization. He received Abx and Remdesivir, was discharged last week. \par He has lost about 40 lbs after these admissions for infections. \par IgG levels drawn in June was 295. \par \par Pt was seen today for f/u, he started Calquence 100mg BID on 7/7, tolerated it well. Denied any new complaints.\par His daughter in law was COVID 19 tested positive home test on 7/19 w/slight fatigue, cough, son and grandson all positive; he was a close contact, so far he feels fine w/o any symptoms. Home COVID test was negative  \par Going to [place] 7/23-8/6. Then he will go back there from Oct-May

## 2022-07-21 NOTE — PHYSICAL EXAM
[Restricted in physically strenuous activity but ambulatory and able to carry out work of a light or sedentary nature] : Status 1- Restricted in physically strenuous activity but ambulatory and able to carry out work of a light or sedentary nature, e.g., light house work, office work [Normal] : affect appropriate [de-identified] : cervical adenopathy b/l  [de-identified] : (+) edema b/l, wearing compression stockings [de-identified] : healed herpetic lesions LUQ/back

## 2022-07-21 NOTE — ASSESSMENT
[FreeTextEntry1] : 77yo M w/ CLL, 11q-,13q-, being observed\par WBC today is 209.76.  Hgb 9.0. CBC reviewed with pt\par He does have LAD and w/ a conglomerate of mesenteric lymph nodes measuring up to 11cm. Last visit, we discussed starting CLL therapy with Calquence given worsening leukocytosis and anemia and also enlarging adenopathy. Information on Calquence provided and side effects reviewed. Patient started Calquence on 7/7\par IgG from 6/9/22 was 295. We discussed doing IVIG for hypogammaglobulinemia x1yr. Pt has had multiple infections recently. Pt and significant other in agreement, informed consent obtained. s/p first dose of IVIG on 7/14, next dose is scheduled on 8/11\par CBC result reviewed and printed out for pt, WBC still high\par Cont IVIG monthly\par Cont Calquence 100mg BID \par COVID 19 PCR test ordered today given his exposure to COVID 19 \par He will fly to [place] this Saturday, will call the office to f/u with his COVID PCR test result tomorrow \par RTC 4 weeks (after trip to [place])\par All questions answered\par \par Case and management discussed with Dr. Mcmahon\par

## 2022-07-22 LAB
SARS-COV-2 N GENE NPH QL NAA+PROBE: NOT DETECTED
URATE SERPL-MCNC: 7.6 MG/DL

## 2022-08-03 ENCOUNTER — OUTPATIENT (OUTPATIENT)
Dept: OUTPATIENT SERVICES | Facility: HOSPITAL | Age: 77
LOS: 1 days | Discharge: HOME | End: 2022-08-03

## 2022-08-03 DIAGNOSIS — D72.820 LYMPHOCYTOSIS (SYMPTOMATIC): ICD-10-CM

## 2022-08-03 DIAGNOSIS — E89.0 POSTPROCEDURAL HYPOTHYROIDISM: Chronic | ICD-10-CM

## 2022-08-03 DIAGNOSIS — Z90.5 ACQUIRED ABSENCE OF KIDNEY: Chronic | ICD-10-CM

## 2022-08-03 DIAGNOSIS — Z98.890 OTHER SPECIFIED POSTPROCEDURAL STATES: Chronic | ICD-10-CM

## 2022-08-03 DIAGNOSIS — Z90.79 ACQUIRED ABSENCE OF OTHER GENITAL ORGAN(S): Chronic | ICD-10-CM

## 2022-08-03 DIAGNOSIS — Z96.651 PRESENCE OF RIGHT ARTIFICIAL KNEE JOINT: Chronic | ICD-10-CM

## 2022-08-08 ENCOUNTER — OUTPATIENT (OUTPATIENT)
Dept: OUTPATIENT SERVICES | Facility: HOSPITAL | Age: 77
LOS: 1 days | End: 2022-08-08
Payer: MEDICARE

## 2022-08-08 ENCOUNTER — RESULT REVIEW (OUTPATIENT)
Age: 77
End: 2022-08-08

## 2022-08-08 ENCOUNTER — APPOINTMENT (OUTPATIENT)
Dept: RADIOLOGY | Facility: CLINIC | Age: 77
End: 2022-08-08

## 2022-08-08 DIAGNOSIS — Z00.00 ENCOUNTER FOR GENERAL ADULT MEDICAL EXAMINATION WITHOUT ABNORMAL FINDINGS: ICD-10-CM

## 2022-08-08 DIAGNOSIS — E89.0 POSTPROCEDURAL HYPOTHYROIDISM: Chronic | ICD-10-CM

## 2022-08-08 DIAGNOSIS — Z98.890 OTHER SPECIFIED POSTPROCEDURAL STATES: Chronic | ICD-10-CM

## 2022-08-08 DIAGNOSIS — Z90.5 ACQUIRED ABSENCE OF KIDNEY: Chronic | ICD-10-CM

## 2022-08-08 DIAGNOSIS — Z96.651 PRESENCE OF RIGHT ARTIFICIAL KNEE JOINT: Chronic | ICD-10-CM

## 2022-08-08 DIAGNOSIS — Z90.79 ACQUIRED ABSENCE OF OTHER GENITAL ORGAN(S): Chronic | ICD-10-CM

## 2022-08-08 PROCEDURE — 72110 X-RAY EXAM L-2 SPINE 4/>VWS: CPT

## 2022-08-08 PROCEDURE — 72110 X-RAY EXAM L-2 SPINE 4/>VWS: CPT | Mod: 26

## 2022-08-11 ENCOUNTER — APPOINTMENT (OUTPATIENT)
Dept: HEMATOLOGY ONCOLOGY | Facility: CLINIC | Age: 77
End: 2022-08-11

## 2022-08-11 ENCOUNTER — LABORATORY RESULT (OUTPATIENT)
Age: 77
End: 2022-08-11

## 2022-08-11 ENCOUNTER — APPOINTMENT (OUTPATIENT)
Dept: INFUSION THERAPY | Facility: HOSPITAL | Age: 77
End: 2022-08-11

## 2022-08-11 VITALS
SYSTOLIC BLOOD PRESSURE: 136 MMHG | OXYGEN SATURATION: 98 % | TEMPERATURE: 97.9 F | BODY MASS INDEX: 30.85 KG/M2 | HEART RATE: 43 BPM | DIASTOLIC BLOOD PRESSURE: 67 MMHG | RESPIRATION RATE: 18 BRPM | WEIGHT: 214.99 LBS

## 2022-08-11 PROCEDURE — 99215 OFFICE O/P EST HI 40 MIN: CPT

## 2022-08-11 NOTE — PHYSICAL EXAM
[Restricted in physically strenuous activity but ambulatory and able to carry out work of a light or sedentary nature] : Status 1- Restricted in physically strenuous activity but ambulatory and able to carry out work of a light or sedentary nature, e.g., light house work, office work [Normal] : affect appropriate [de-identified] : (+) edema b/l, wearing compression stockings [de-identified] : healed herpetic lesions LUQ/back

## 2022-08-11 NOTE — HISTORY OF PRESENT ILLNESS
[de-identified] : 74yo M here for f/u of CLL. He was previously following with Dr. Mccabe. \par \par On March 2,2017, total WBC 11,33, with absolute lymphocyte count 5865.\par On May 25, Total WBC 14.3, ALC 9052.\par Of note, chest CT for follow up of renal cell carcinoma on 5/31 showed slightly enlarged subpectoral and left axillary nodes.\par MRI/PET 6/1 multiple stable retroperitoneal nodes without abnormal FDG uptake. There was mild splenomegaly without abnormal uptake.\par \par Flow cytometry reveals monotypic B cells positive for CD19 didn't CD20 CD23 and C5 negative for FMC-7, CD10 and CD38 consistent with CLL. The monotypic B cells are 41% of cells, equating to 5658 cells\par FISH panel reveals deletion of 11q in 57.5% of cells and deletion of 13q in 67% of cells.\par \par He has had no constitutional symptoms.\par He has a history of prostate carcinoma, 3 cm renal cell carcinoma, 3.5 cm medullary thyroid cancer\par \par His previous CA history is as follows: \par In 2003, he was diagnosed with prostate CA s/p radical prostatectomy\par MRI done for rising PSA in 2009, found to have RCC s/p partial R nephrectomy and RT to prostate bed\par PET scheduled on Monday for rising PSA\par In 8/2018, found to have medullary thyroid CA s/p total thyroidectomy\par \par PET MRI Axumin skullbase to thighs: s/p radical prostatectomy. No recurrence in the prostatectomy bed. No metastatic lymphadenopathy of prostate origin. No liver or lung metastasis.\par Interval increase in size of the abdominal, retroperitoneal and mesenteric lymphadenopathy with correcting increase Axumin uptake since 6/10/19,. No liver infiltration. Splenomegaly (14.6cm), relatively stable since 6/10/19.\par s/p R partial nephrectomy without evidence of local recurrence.\par s/p thyroidectomy. No recurrent mass in the thyroidectomy bed [de-identified] : Labs done on 9/16/20 - WBC 78.9. Repeat on 10/6/20 was 73.79. \par He went to [place] for the winter. Labs done in [place] on 5/3/21 when he had cellulitis showed WBC of 143.4. \par \par He got back from [place] on 5/20. Cellulitis flared up again about 4 days later. He went to see his PMD and received a course of Cefadoxil x10d started 5/27.\par He had shingles on L side in July, around 7/12. Has LUQ pain, ? postherpetic neuralgia, on gabapentin. \par He had his COVID booster 9/9\par Abd sono done 9/2022: Extensive mesenteric adenopathy measuring up to 11 cm in size.\par Splenomegaly 16.8 by 8.7 x 12.3 cm.\par \par He went to [place] for the winter. He was admitted for cellulitis - was discharged on IV Abx. Also had a rectal bleed from internal hemorrhoids\par Saw Dr. Jones - .88 in 4/2022. Hgb 9.0, plts 102\par He was started on iron supplements for ferritin of 26.\par He had COVID 5/12 s/p monoclonal antibodies but still required hospitalization. He received Abx and Remdesivir, was discharged last week. \par He has lost about 40 lbs after these admissions for infections. \par IgG levels drawn in June was 295. \par \par He started Calquence 100mg BID on 7/7, tolerating it well. He is bruising easily when there's trauma. He has increased joint pains at night; pain not too bad during the daytime. \par We started IVIG on 7/14/22\par Went to [place] 7/23-8/6. 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

## 2022-08-11 NOTE — ASSESSMENT
[FreeTextEntry1] : 75yo M w/ CLL, 11q-,13q-, recently started on therapy\par He does have LAD and w/ a conglomerate of mesenteric lymph nodes measuring up to 11cm. Last visit, we discussed starting CLL therapy with Calquence given worsening leukocytosis and anemia and also enlarging adenopathy. Patient started Calquence on 7/7\par IgG from 6/9/22 was 295. We discussed doing IVIG for hypogammaglobulinemia x1yr. Pt has had multiple infections recently. s/p first dose of IVIG on 7/14/22\par CBC result reviewed and printed out for pt, WBC still high but stable\par Cont IVIG monthly\par Cont Calquence 100mg BID \par Plts 63k today -will repeat CBC in 2 wks\par Supportive care of joint pains for now. We discussed possibly lowering dose of Calquence if needed\par Bone scan reviewed, for Xray tomorrow. If pt needs an oncologist he will let me know \par RTC 4 weeks\par All questions answered\par \par

## 2022-08-12 DIAGNOSIS — D80.1 NONFAMILIAL HYPOGAMMAGLOBULINEMIA: ICD-10-CM

## 2022-08-12 LAB
ALBUMIN SERPL ELPH-MCNC: 4.1 G/DL
ALP BLD-CCNC: 68 U/L
ALT SERPL-CCNC: 12 U/L
ANION GAP SERPL CALC-SCNC: 9 MMOL/L
AST SERPL-CCNC: 10 U/L
BASOPHILS # BLD AUTO: 0.06 K/UL
BASOPHILS NFR BLD AUTO: 0 %
BILIRUB SERPL-MCNC: 0.2 MG/DL
BUN SERPL-MCNC: 29 MG/DL
CALCIUM SERPL-MCNC: 9.5 MG/DL
CHLORIDE SERPL-SCNC: 112 MMOL/L
CO2 SERPL-SCNC: 20 MMOL/L
CREAT SERPL-MCNC: 0.98 MG/DL
EGFR: 80 ML/MIN/1.73M2
EOSINOPHIL # BLD AUTO: 0.12 K/UL
EOSINOPHIL NFR BLD AUTO: 0.1 %
GLUCOSE SERPL-MCNC: 128 MG/DL
HCT VFR BLD CALC: 31.3 %
HGB BLD-MCNC: 8.6 G/DL
IMM GRANULOCYTES NFR BLD AUTO: 0.1 %
LDH SERPL-CCNC: 78 U/L
LYMPHOCYTES # BLD AUTO: 218.86 K/UL
LYMPHOCYTES NFR BLD AUTO: 98.1 %
MAN DIFF?: NORMAL
MCHC RBC-ENTMCNC: 27.5 GM/DL
MCHC RBC-ENTMCNC: 28.6 PG
MCV RBC AUTO: 104 FL
MONOCYTES # BLD AUTO: 1.56 K/UL
MONOCYTES NFR BLD AUTO: 0.7 %
NEUTROPHILS # BLD AUTO: 2.42 K/UL
NEUTROPHILS NFR BLD AUTO: 1 %
PLATELET # BLD AUTO: 65 K/UL
POTASSIUM SERPL-SCNC: 4.4 MMOL/L
PROT SERPL-MCNC: 5.6 G/DL
RBC # BLD: 3.01 M/UL
RBC # FLD: NORMAL
SODIUM SERPL-SCNC: 140 MMOL/L
WBC # FLD AUTO: 223.17 K/UL

## 2022-08-17 ENCOUNTER — TRANSCRIPTION ENCOUNTER (OUTPATIENT)
Age: 77
End: 2022-08-17

## 2022-08-18 ENCOUNTER — TRANSCRIPTION ENCOUNTER (OUTPATIENT)
Age: 77
End: 2022-08-18

## 2022-08-18 ENCOUNTER — APPOINTMENT (OUTPATIENT)
Dept: ORTHOPEDIC SURGERY | Facility: CLINIC | Age: 77
End: 2022-08-18

## 2022-08-18 PROCEDURE — 20611 DRAIN/INJ JOINT/BURSA W/US: CPT | Mod: LT

## 2022-08-18 PROCEDURE — 73564 X-RAY EXAM KNEE 4 OR MORE: CPT | Mod: LT

## 2022-08-18 PROCEDURE — 99213 OFFICE O/P EST LOW 20 MIN: CPT | Mod: 25

## 2022-08-18 NOTE — HISTORY OF PRESENT ILLNESS
[de-identified] : 76 year old male presents complaining of left knee pain. He received a Monovisc injection in May 2021 and noted 75% symptom improvement. His most recent cortisone shot was in July 2021. His knee has been acting up again. He has difficulty when ascending and descending stairs. He is requesting another Monovisc injection today (08/18/2022). \par He is s/p right TKR many years ago. Pmhx of DM; prostate Ca and CLL. He was recently diagnosed with metastatic cancer in the left femur. He will be starting treatments for that. \par \par Femur x-rays done at [facility] on 8/12/2022 were reviewed and show sclerosis of the proximal femur but no lytic lesions.

## 2022-08-18 NOTE — ADDENDUM
[FreeTextEntry1] : I, Storm Zepeda, acted solely as a scribe for Dr. Jamaal Washington on this date 08/18/2022.\par All medical record entries made by the Scribe were at my, Dr. Jamaal Washington, direction and personally dictated by me on 08/18/2022. I have reviewed the chart and agree that the record accurately reflects my personal performance of the history, physical exam, assessment and plan. I have also personally directed, reviewed, and agreed with the chart.

## 2022-08-18 NOTE — PHYSICAL EXAM
[de-identified] : Constitutional\par o Appearance : well-nourished, well developed, alert, in no acute distress \par Head and Face\par o Head :\par ¦ Inspection : atraumatic, normocephalic\par o Face :\par ¦ Inspection : no visible rash or discoloration\par Respiratory\par o Respiratory Effort: breathing unlabored \par Neurologic\par o Mental Status Examination :\par ¦ Orientation : alert and oriented X 3\par Psychiatric\par o Mood and Affect: mood normal, affect appropriate\par Cardiovascular\par o Observation/Palpation : - no swelling\par Lymphatic\par o Additional Nodes : No palpable lymph nodes present\par \par Right Lower Extremity\par o Buttock : no tenderness, swelling or deformities \par o Right Hip :\par ¦ Inspection/Palpation : no tenderness, swelling or deformities\par ¦ Range of Motion : full and painless in all planes, no crepitance\par ¦ Stability : joint stability intact\par ¦ Strength : extension, flexion, adduction, abduction, internal rotation and external rotation, 4+/5\par \par o Right Knee :\par ¦ Inspection/Palpation : no tenderness to palpation, no swelling, incision well-healed \par ¦ Range of Motion : active flexion and extension full and painless, no crepitance\par ¦ Stability : no valgus or varus instability present on provocative testing\par ¦ Strength : flexion and extension 4+/5\par ¦ Tests and Signs : negative Anterior Drawer\par \par Left Lower Extremity\par o Buttock : no tenderness, swelling or deformities \par o Left Hip :\par ¦ Inspection/Palpation : no tenderness, no swelling or deformities\par ¦ Range of Motion : full and painless in all planes, no crepitance\par ¦ Stability : joint stability intact\par ¦ Strength : extension, flexion, adduction, abduction, internal rotation and external rotation, 4+/5\par \par o Left Knee :\par ¦ Inspection/Palpation : lateral compartment tenderness, no swelling, valgus attitude\par ¦ Range of Motion : 0-120° but pain with full flexion, no crepitance\par ¦ Stability : no valgus or varus instability present on provocative testing\par ¦ Strength : flexion and extension 4+/5\par ¦ Tests and Signs : negative Anterior Drawer, negative Lachman, negative Radha\par \par Gait and Station:\par Gait: normal gait, good proprioception and balance, trophic changes of the left leg\par \par Radiology Results\par o Left Knee : Standing AP, lateral, tunnel, and skyline views of the knee were obtained and revealed bone on bone in the lateral compartment with moderate patellofemoral arthritis. No lytic lesions visible in the left knee.\par \par o Left Knee injection : Indication- knee osteoarthritis, Anatomic location- left intra-articular joint space, Spray - area was sterilized with Betadine and alcohol and anesthetized with Ethyl Chloride, needle used-20G, Medications given- 4cc's Monovisc under Ultrasound guidance. The patient tolerated the procedure well.  oLot# [id number]   oExp: 2024-10-31

## 2022-08-18 NOTE — DISCUSSION/SUMMARY
[de-identified] : I discussed the underlying pathophysiology of the patient's condition in great detail with the patient and his spouse. I went over the patient's x-rays with them in great detail. The patient elected to receive a Monovisc injection into his left knee today and tolerated it well. I instructed the patient on ROM exercises and told them to take it easy. The use of ice and rest was reviewed with the patient. The patient may resume activities tomorrow. I reminded the patient that it takes 4 to 6 weeks after the injection to feel symptom relief. All of their questions were answered. They understand and consent to the plan.\par \par FU in 6 weeks.\par after a left knee Monovisc injection today (08/18/2022).\par \par The patient is an 76 year old male with bone on bone arthritis of their left knee. Based upon the patient's continued symptoms and failure to respond to conservative treatment I have recommended a total knee replacement for this patient. A long discussion took place with the patient describing what a total joint replacement is and what the procedure would entail. A total knee model, similar to the implant that will be used during the operation was utilized to demonstrate and to discuss the various bearing surfaces of the implants. The hospitalization and post-operative care and rehabilitation were also discussed. The use of perioperative antibiotics and DVT prophylaxis were discussed. The risk, benefits and alternatives to a surgical intervention were discussed at length with the patient. The patient was also advised of risks related to the medical comorbidities and elevated body mass index (BMI). A lengthy discussion took place to review the most common complications including but not limited to: deep vein thrombosis, pulmonary embolus, heart attack, stroke, infection, wound breakdown, numbness, damage to nerves, tendon, muscles, arteries or other blood vessels, death and other possible complications from anesthesia. The patient was told that we will take steps to minimize these risks by using sterile technique, antibiotics and DVT prophylaxis when appropriate and follow the patient postoperatively in the office setting to monitor progress. The possibility of recurrent pain, no improvement in pain and actual worsening of pain were also discussed with the patient.\par \par The discharge plan of care focused on the patient going home following surgery. I encouraged the patient to make the necessary arrangements to have someone stay with them when they are discharged home. Following discharge, a home care nurse will visit the patient. They will open your home care case and request home physical therapy services. Home physical therapy will commence following discharge provided it is appropriate and covered by his health insurance benefit plan.\par \par The risks, benefits and alternative treatment options of total joint replacement were reviewed with the patient at great length. All questions were answered to the satisfaction of the patient. The patient participated in an interactive discussion of the total knee replacement implant planned for their surgery with questions answered. The patient agreed with the treatment plan, and has decided to move forward with the elective total knee replacement as planned.\par \par PRINCESS GALDAMEZ was seen face to face and needs a commode for bedside use as the patient has no ability to acces the bathroom in their home. The patient also requires a rolling walker as this is needed for activities of daily living within their home secondary to the diagnosis of osteoarthritis.

## 2022-08-20 ENCOUNTER — NON-APPOINTMENT (OUTPATIENT)
Age: 77
End: 2022-08-20

## 2022-08-25 ENCOUNTER — RESULT REVIEW (OUTPATIENT)
Age: 77
End: 2022-08-25

## 2022-08-25 ENCOUNTER — APPOINTMENT (OUTPATIENT)
Dept: HEMATOLOGY ONCOLOGY | Facility: CLINIC | Age: 77
End: 2022-08-25

## 2022-08-25 ENCOUNTER — NON-APPOINTMENT (OUTPATIENT)
Age: 77
End: 2022-08-25

## 2022-08-25 LAB
ANISOCYTOSIS BLD QL: SLIGHT — SIGNIFICANT CHANGE UP
BASOPHILS # BLD AUTO: 0 K/UL — SIGNIFICANT CHANGE UP (ref 0–0.2)
BASOPHILS NFR BLD AUTO: 0 % — SIGNIFICANT CHANGE UP (ref 0–2)
BURR CELLS BLD QL SMEAR: PRESENT — SIGNIFICANT CHANGE UP
ELLIPTOCYTES BLD QL SMEAR: SLIGHT — SIGNIFICANT CHANGE UP
EOSINOPHIL # BLD AUTO: 0 K/UL — SIGNIFICANT CHANGE UP (ref 0–0.5)
EOSINOPHIL NFR BLD AUTO: 0 % — SIGNIFICANT CHANGE UP (ref 0–6)
HCT VFR BLD CALC: 33 % — LOW (ref 39–50)
HGB BLD-MCNC: 8.5 G/DL — LOW (ref 13–17)
HYPOCHROMIA BLD QL: SLIGHT — SIGNIFICANT CHANGE UP
LYMPHOCYTES # BLD AUTO: 202.82 K/UL — HIGH (ref 1–3.3)
LYMPHOCYTES # BLD AUTO: 81 % — HIGH (ref 13–44)
LYMPHOCYTES # SPEC AUTO: 18 % — HIGH (ref 0–0)
MCHC RBC-ENTMCNC: 25.8 G/DL — LOW (ref 32–36)
MCHC RBC-ENTMCNC: 27.1 PG — SIGNIFICANT CHANGE UP (ref 27–34)
MCV RBC AUTO: 105.1 FL — HIGH (ref 80–100)
MONOCYTES # BLD AUTO: 0 K/UL — SIGNIFICANT CHANGE UP (ref 0–0.9)
MONOCYTES NFR BLD AUTO: 0 % — LOW (ref 2–14)
NEUTROPHILS # BLD AUTO: 2.5 K/UL — SIGNIFICANT CHANGE UP (ref 1.8–7.4)
NEUTROPHILS NFR BLD AUTO: 1 % — LOW (ref 43–77)
NRBC # BLD: 0 /100 — SIGNIFICANT CHANGE UP (ref 0–0)
NRBC # BLD: SIGNIFICANT CHANGE UP /100 WBCS (ref 0–0)
OVALOCYTES BLD QL SMEAR: SLIGHT — SIGNIFICANT CHANGE UP
PLAT MORPH BLD: NORMAL — SIGNIFICANT CHANGE UP
PLATELET # BLD AUTO: 86 K/UL — LOW (ref 150–400)
POIKILOCYTOSIS BLD QL AUTO: SLIGHT — SIGNIFICANT CHANGE UP
RBC # BLD: 3.14 M/UL — LOW (ref 4.2–5.8)
RBC # FLD: SIGNIFICANT CHANGE UP (ref 10.3–14.5)
RBC BLD AUTO: ABNORMAL
SMUDGE CELLS # BLD: PRESENT — SIGNIFICANT CHANGE UP
WBC # BLD: 250.4 K/UL — CRITICAL HIGH (ref 3.8–10.5)
WBC # FLD AUTO: 250.4 K/UL — CRITICAL HIGH (ref 3.8–10.5)

## 2022-09-08 ENCOUNTER — RESULT REVIEW (OUTPATIENT)
Age: 77
End: 2022-09-08

## 2022-09-08 ENCOUNTER — APPOINTMENT (OUTPATIENT)
Dept: INFUSION THERAPY | Facility: HOSPITAL | Age: 77
End: 2022-09-08

## 2022-09-08 ENCOUNTER — APPOINTMENT (OUTPATIENT)
Dept: HEMATOLOGY ONCOLOGY | Facility: CLINIC | Age: 77
End: 2022-09-08

## 2022-09-08 VITALS
BODY MASS INDEX: 30.71 KG/M2 | DIASTOLIC BLOOD PRESSURE: 68 MMHG | TEMPERATURE: 97 F | WEIGHT: 214 LBS | HEART RATE: 43 BPM | RESPIRATION RATE: 18 BRPM | SYSTOLIC BLOOD PRESSURE: 115 MMHG | OXYGEN SATURATION: 96 %

## 2022-09-08 LAB
ANISOCYTOSIS BLD QL: SLIGHT — SIGNIFICANT CHANGE UP
BASOPHILS # BLD AUTO: 0 K/UL — SIGNIFICANT CHANGE UP (ref 0–0.2)
BASOPHILS NFR BLD AUTO: 0 % — SIGNIFICANT CHANGE UP (ref 0–2)
BURR CELLS BLD QL SMEAR: PRESENT — SIGNIFICANT CHANGE UP
ELLIPTOCYTES BLD QL SMEAR: SLIGHT — SIGNIFICANT CHANGE UP
EOSINOPHIL # BLD AUTO: 0 K/UL — SIGNIFICANT CHANGE UP (ref 0–0.5)
EOSINOPHIL NFR BLD AUTO: 0 % — SIGNIFICANT CHANGE UP (ref 0–6)
HCT VFR BLD CALC: 33.7 % — LOW (ref 39–50)
HCT VFR BLD CALC: 34.7 % — LOW (ref 39–50)
HGB BLD-MCNC: 8.8 G/DL — LOW (ref 13–17)
HGB BLD-MCNC: 9.3 G/DL — LOW (ref 13–17)
IGA FLD-MCNC: 54 MG/DL — LOW (ref 84–499)
IGG FLD-MCNC: 503 MG/DL — LOW (ref 610–1660)
IGM SERPL-MCNC: 19 MG/DL — LOW (ref 35–242)
KAPPA LC SER QL IFE: 3.06 MG/DL — HIGH (ref 0.33–1.94)
KAPPA/LAMBDA FREE LIGHT CHAIN RATIO, SERUM: 1.55 RATIO — SIGNIFICANT CHANGE UP (ref 0.26–1.65)
LAMBDA LC SER QL IFE: 1.98 MG/DL — SIGNIFICANT CHANGE UP (ref 0.57–2.63)
LYMPHOCYTES # BLD AUTO: 235.31 K/UL — HIGH (ref 1–3.3)
LYMPHOCYTES # BLD AUTO: 92 % — HIGH (ref 13–44)
LYMPHOCYTES # SPEC AUTO: 6 % — HIGH (ref 0–0)
MCHC RBC-ENTMCNC: 26.1 G/DL — LOW (ref 32–36)
MCHC RBC-ENTMCNC: 26.8 GM/DL — LOW (ref 32–36)
MCHC RBC-ENTMCNC: 27.3 PG — SIGNIFICANT CHANGE UP (ref 27–34)
MCHC RBC-ENTMCNC: 28.2 PG — SIGNIFICANT CHANGE UP (ref 27–34)
MCV RBC AUTO: 104.7 FL — HIGH (ref 80–100)
MCV RBC AUTO: 105.2 FL — HIGH (ref 80–100)
MONOCYTES # BLD AUTO: 0 K/UL — SIGNIFICANT CHANGE UP (ref 0–0.9)
MONOCYTES NFR BLD AUTO: 0 % — LOW (ref 2–14)
NEUTROPHILS # BLD AUTO: 5.12 K/UL — SIGNIFICANT CHANGE UP (ref 1.8–7.4)
NEUTROPHILS NFR BLD AUTO: 2 % — LOW (ref 43–77)
NRBC # BLD: 0 /100 — SIGNIFICANT CHANGE UP (ref 0–0)
NRBC # BLD: SIGNIFICANT CHANGE UP /100 WBCS (ref 0–0)
PLAT MORPH BLD: NORMAL — SIGNIFICANT CHANGE UP
PLATELET # BLD AUTO: 116 K/UL — LOW (ref 150–400)
PLATELET # BLD AUTO: 97 K/UL — LOW (ref 150–400)
POIKILOCYTOSIS BLD QL AUTO: SLIGHT — SIGNIFICANT CHANGE UP
RBC # BLD: 3.22 M/UL — LOW (ref 4.2–5.8)
RBC # BLD: 3.3 M/UL — LOW (ref 4.2–5.8)
RBC # FLD: SIGNIFICANT CHANGE UP (ref 10.3–14.5)
RBC # FLD: SIGNIFICANT CHANGE UP (ref 10.3–14.5)
RBC BLD AUTO: ABNORMAL
SCHISTOCYTES BLD QL AUTO: SLIGHT — SIGNIFICANT CHANGE UP
SMUDGE CELLS # BLD: PRESENT — SIGNIFICANT CHANGE UP
WBC # BLD: 255.77 K/UL — CRITICAL HIGH (ref 3.8–10.5)
WBC # BLD: 256.22 K/UL — CRITICAL HIGH (ref 3.8–10.5)
WBC # FLD AUTO: 255.77 K/UL — CRITICAL HIGH (ref 3.8–10.5)
WBC # FLD AUTO: 256.22 K/UL — CRITICAL HIGH (ref 3.8–10.5)

## 2022-09-08 PROCEDURE — 99214 OFFICE O/P EST MOD 30 MIN: CPT

## 2022-09-09 ENCOUNTER — APPOINTMENT (OUTPATIENT)
Dept: ORTHOPEDIC SURGERY | Facility: CLINIC | Age: 77
End: 2022-09-09

## 2022-09-09 VITALS
DIASTOLIC BLOOD PRESSURE: 63 MMHG | HEART RATE: 43 BPM | HEIGHT: 70 IN | SYSTOLIC BLOOD PRESSURE: 151 MMHG | WEIGHT: 214 LBS | BODY MASS INDEX: 30.64 KG/M2

## 2022-09-09 DIAGNOSIS — M48.062 SPINAL STENOSIS, LUMBAR REGION WITH NEUROGENIC CLAUDICATION: ICD-10-CM

## 2022-09-09 DIAGNOSIS — M19.012 PRIMARY OSTEOARTHRITIS, LEFT SHOULDER: ICD-10-CM

## 2022-09-09 DIAGNOSIS — R11.2 NAUSEA WITH VOMITING, UNSPECIFIED: ICD-10-CM

## 2022-09-09 PROCEDURE — 99214 OFFICE O/P EST MOD 30 MIN: CPT | Mod: 25

## 2022-09-09 PROCEDURE — 73564 X-RAY EXAM KNEE 4 OR MORE: CPT | Mod: LT

## 2022-09-09 PROCEDURE — 20610 DRAIN/INJ JOINT/BURSA W/O US: CPT | Mod: LT

## 2022-09-09 NOTE — DISCUSSION/SUMMARY
[de-identified] : I discussed the underlying pathophysiology of the patient's condition in great detail with the patient. I went over the patient's x-rays with them in great detail. I advised him to return to his back surgeon regarding his back pain. The patient elected to receive a cortisone injection into his left knee today and tolerated it well. I instructed the patient on ROM exercises and told them to take it easy. The use of ice and rest was reviewed with the patient. The patient may resume activities tomorrow. All of their questions were answered. They understand and consent to the plan.\par \par FU in 1 month.\par after a left knee cortisone injection today (09/09/2022).\par \par The patient is an 76 year old male with bone on bone arthritis of their left knee. Based upon the patient's continued symptoms and failure to respond to conservative treatment I have recommended a total knee replacement for this patient. A long discussion took place with the patient describing what a total joint replacement is and what the procedure would entail. A total knee model, similar to the implant that will be used during the operation was utilized to demonstrate and to discuss the various bearing surfaces of the implants. The hospitalization and post-operative care and rehabilitation were also discussed. The use of perioperative antibiotics and DVT prophylaxis were discussed. The risk, benefits and alternatives to a surgical intervention were discussed at length with the patient. The patient was also advised of risks related to the medical comorbidities and elevated body mass index (BMI). A lengthy discussion took place to review the most common complications including but not limited to: deep vein thrombosis, pulmonary embolus, heart attack, stroke, infection, wound breakdown, numbness, damage to nerves, tendon, muscles, arteries or other blood vessels, death and other possible complications from anesthesia. The patient was told that we will take steps to minimize these risks by using sterile technique, antibiotics and DVT prophylaxis when appropriate and follow the patient postoperatively in the office setting to monitor progress. The possibility of recurrent pain, no improvement in pain and actual worsening of pain were also discussed with the patient.\par \par The discharge plan of care focused on the patient going home following surgery. I encouraged the patient to make the necessary arrangements to have someone stay with them when they are discharged home. Following discharge, a home care nurse will visit the patient. They will open your home care case and request home physical therapy services. Home physical therapy will commence following discharge provided it is appropriate and covered by his health insurance benefit plan.\par \par The risks, benefits and alternative treatment options of total joint replacement were reviewed with the patient at great length. All questions were answered to the satisfaction of the patient. The patient participated in an interactive discussion of the total knee replacement implant planned for their surgery with questions answered. The patient agreed with the treatment plan, and has decided to move forward with the elective total knee replacement as planned.\par \par PRINCESS GALDAMEZ was seen face to face and needs a commode for bedside use as the patient has no ability to acces the bathroom in their home. The patient also requires a rolling walker as this is needed for activities of daily living within their home secondary to the diagnosis of osteoarthritis.

## 2022-09-09 NOTE — ADDENDUM
[FreeTextEntry1] : I, Storm Zepeda, acted solely as a scribe for Dr. Jamaal Washington on this date 09/09/2022.\par All medical record entries made by the Scribe were at my, Dr. Jamaal Washington, direction and personally dictated by me on 09/09/2022. I have reviewed the chart and agree that the record accurately reflects my personal performance of the history, physical exam, assessment and plan. I have also personally directed, reviewed, and agreed with the chart.

## 2022-09-09 NOTE — PHYSICAL EXAM
[de-identified] : Constitutional\par o Appearance : well-nourished, well developed, alert, in no acute distress \par Head and Face\par o Head :\par ¦ Inspection : atraumatic, normocephalic\par o Face :\par ¦ Inspection : no visible rash or discoloration\par Respiratory\par o Respiratory Effort: breathing unlabored \par Neurologic\par o Mental Status Examination :\par ¦ Orientation : alert and oriented X 3\par Psychiatric\par o Mood and Affect: mood normal, affect appropriate\par Cardiovascular\par o Observation/Palpation : - no swelling\par Lymphatic\par o Additional Nodes : No palpable lymph nodes present\par \par Right Lower Extremity\par o Buttock : no tenderness, swelling or deformities \par o Right Hip :\par ¦ Inspection/Palpation : no tenderness, swelling or deformities\par ¦ Range of Motion : full and painless in all planes, no crepitance\par ¦ Stability : joint stability intact\par ¦ Strength : extension, flexion, adduction, abduction, internal rotation and external rotation, 4+/5\par \par o Right Knee :\par ¦ Inspection/Palpation : no tenderness to palpation, no swelling, incision well-healed \par ¦ Range of Motion : active flexion and extension full and painless, no crepitance\par ¦ Stability : no valgus or varus instability present on provocative testing\par ¦ Strength : flexion and extension 4+/5\par ¦ Tests and Signs : negative Anterior Drawer\par \par Left Lower Extremity\par o Buttock : no tenderness, swelling or deformities \par o Left Hip :\par ¦ Inspection/Palpation : no tenderness, no swelling or deformities\par ¦ Range of Motion : full and painless in all planes, no crepitance\par ¦ Stability : joint stability intact\par ¦ Strength : extension, flexion, adduction, abduction, internal rotation and external rotation, 4+/5\par \par o Left Knee :\par ¦ Inspection/Palpation : lateral compartment tenderness, mild swelling, valgus attitude\par ¦ Range of Motion : 0-120° but pain with full flexion, no crepitance\par ¦ Stability : no valgus or varus instability present on provocative testing\par ¦ Strength : flexion and extension 4+/5\par ¦ Tests and Signs : negative Anterior Drawer, negative Lachman, negative Radha\par \par Gait and Station:\par Gait: antalgic gait, trophic changes of the left leg\par \par Radiology Results\par o Left Knee : Standing AP, lateral, tunnel, and skyline views of the knee were obtained and reveled severe lateral and patellofemoral arthritis. No lytic lesions visible. \par \par o Left Knee injection : Indication- knee osteoarthritis, Anatomic location- left intra-articular joint space, Spray - area was sterilized with Betadine and alcohol and anesthetized with Ethyl Chloride , needle used-20G, Medications given- 5cc's lidocaine, 0.5cc's kenalog, 0.5 cc's dexamethasone, and patient tolerated it well.

## 2022-09-09 NOTE — HISTORY OF PRESENT ILLNESS
[de-identified] : 76 year old male presents complaining of left knee pain localized to the lateral compartment. He has difficulty when ascending and descending stairs. He received a Monovisc injection on 8/18/2022. He claims that he aggravated his knee 4 days ago and it is getting worse. He is having difficulty walking and sleeping at night due to pain. He has needed to use a cane. He is also complaining of constant low back pain. He is s/p fusion of his back in 2018 and had x-rays done at a [facility] facility. \par He is s/p right TKR many years ago. Pmhx of DM; prostate Ca and CLL. 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  \par \par AP and lateral views of the lumbosacral spine obtained at [facility] on 8/8/2022 revealed: Patient status post T10-S1 fusion with screws into the pelvis. Questionable broken screw.\par  \par Femur x-rays done at [facility] on 8/12/2022 were reviewed and show sclerosis of the proximal femur but no lytic lesions.

## 2022-09-12 LAB
ALBUMIN SERPL ELPH-MCNC: 4.1 G/DL
ALP BLD-CCNC: 77 U/L
ALT SERPL-CCNC: 12 U/L
ANION GAP SERPL CALC-SCNC: 13 MMOL/L
AST SERPL-CCNC: 13 U/L
BILIRUB SERPL-MCNC: 0.2 MG/DL
BUN SERPL-MCNC: 33 MG/DL
CALCIUM SERPL-MCNC: 9.8 MG/DL
CHLORIDE SERPL-SCNC: 109 MMOL/L
CO2 SERPL-SCNC: 19 MMOL/L
CREAT SERPL-MCNC: 1.05 MG/DL
EGFR: 74 ML/MIN/1.73M2
GLUCOSE SERPL-MCNC: 109 MG/DL
LDH SERPL-CCNC: 92 U/L
POTASSIUM SERPL-SCNC: 4.6 MMOL/L
PROT SERPL-MCNC: 5.9 G/DL
SODIUM SERPL-SCNC: 142 MMOL/L

## 2022-09-14 NOTE — PHYSICAL THERAPY INITIAL EVALUATION ADULT - BED MOBILITY TRAINING, PT EVAL
Pt calling saw you this morning and all 5 of her meds were refilled and sent to [address] [place] [address] [address] [facility] [address]---she found out after got home that she can no longer use them has different insurance---she must go to [facility]---can you cx the [employer] ones and resend to [facility] for the inconvenience. Thanks. GOAL: Pt will perform bed mobility adhering to spinal precautions w/ CG in 4wks.

## 2022-09-19 ENCOUNTER — APPOINTMENT (OUTPATIENT)
Dept: SURGERY | Facility: CLINIC | Age: 77
End: 2022-09-19

## 2022-09-19 VITALS
TEMPERATURE: 97.2 F | DIASTOLIC BLOOD PRESSURE: 67 MMHG | RESPIRATION RATE: 17 BRPM | SYSTOLIC BLOOD PRESSURE: 157 MMHG | OXYGEN SATURATION: 99 % | HEART RATE: 41 BPM

## 2022-09-19 DIAGNOSIS — M62.08 SEPARATION OF MUSCLE (NONTRAUMATIC), OTHER SITE: ICD-10-CM

## 2022-09-19 DIAGNOSIS — Z80.0 FAMILY HISTORY OF MALIGNANT NEOPLASM OF DIGESTIVE ORGANS: ICD-10-CM

## 2022-09-19 DIAGNOSIS — K64.1 SECOND DEGREE HEMORRHOIDS: ICD-10-CM

## 2022-09-19 PROCEDURE — 99204 OFFICE O/P NEW MOD 45 MIN: CPT | Mod: 25

## 2022-09-19 PROCEDURE — 46600 DIAGNOSTIC ANOSCOPY SPX: CPT

## 2022-09-19 RX ORDER — GABAPENTIN 300 MG/1
300 CAPSULE ORAL
Qty: 30 | Refills: 3 | Status: DISCONTINUED | COMMUNITY
Start: 2019-09-24 | End: 2022-09-19

## 2022-09-19 RX ORDER — GLUC/MSM/COLGN2/HYAL/ANTIARTH3 375-375-20
TABLET ORAL
Refills: 0 | Status: ACTIVE | COMMUNITY

## 2022-09-19 RX ORDER — RELUGOLIX 120 MG/1
TABLET, FILM COATED ORAL
Refills: 0 | Status: ACTIVE | COMMUNITY

## 2022-09-19 RX ORDER — SITAGLIPTIN 100 MG/1
100 TABLET, FILM COATED ORAL
Refills: 0 | Status: DISCONTINUED | COMMUNITY
End: 2022-09-19

## 2022-09-19 RX ORDER — CHOLECALCIFEROL (VITAMIN D3) 50 MCG
2000 CAPSULE ORAL
Refills: 0 | Status: DISCONTINUED | COMMUNITY
End: 2022-09-19

## 2022-09-19 RX ORDER — CYCLOBENZAPRINE HYDROCHLORIDE 10 MG/1
10 TABLET, FILM COATED ORAL 3 TIMES DAILY
Qty: 60 | Refills: 1 | Status: DISCONTINUED | COMMUNITY
Start: 2019-06-25 | End: 2022-09-19

## 2022-09-19 RX ORDER — TRAMADOL HYDROCHLORIDE 25 MG/1
TABLET, COATED ORAL
Refills: 0 | Status: ACTIVE | COMMUNITY

## 2022-09-19 NOTE — ASSESSMENT
[FreeTextEntry1] : Multiple medical problems being treated for CLL and metastatic prostate cancer.  Says he had.  In end of August which showed perirectal tissue that lit up for prostate.  He had an episode of BRBPR, significant in March and that is when he had a colonoscopy.  The bleeding resolved quickly with steroid suppositories.  He only had minor spotting on TP since then occasionally until last Monday 1 week from today that he developed again bleeding.  He used again the suppositories and the bleeding resolved within 2 days.  Currently he has spotting on TP.  Denies constipation.\par \par On exam nothing on inspection, NAVDEEP no masses and anoscopy with large four-quadrant and inflamed internal hemorrhoids.  He is he certainly appears to be bleeding from his hemorrhoids however the episodes are months apart and relatively easily managed with suppositories.  He I advised that he continues with medical treatment as needed and if his symptoms worsen we can consider RBL and if it will force our hands.  RBL would be high risk for wound healing and problems with bleeding.\par The patient agrees with the plan.

## 2022-09-19 NOTE — HISTORY OF PRESENT ILLNESS
[FreeTextEntry1] : Arnoldo is a 75 y/o male here for a consultation visit, possible hemorrhoids. \par \par Colonoscopy on 3/10/22 - a single (solitary) ulcer in the terminal ileum. Biopsied. One 7mm polyp in the transverse colon, removed with a hot snare. Resected and retrieved. Diverticulosis in the sigmoid colon and in the distal colon. Internal hemorrhoids. The examination was otherwise normal. \par \par Today pt reports discomfort. Daily 1-2  BMs, normal consistency (with suppository), gets burning sensation, no straining, with bleeding on tp (but does have moments when bleeding on bowl), and denies feeling swollen or prolapsed tissue. Denies nausea and vomiting. Denies fever and chills. Good appetite. Not taking any anticoagulants. Being treated for prostate ca (started orgovyx) and CLL. Had radiation, ended in 2009. Started IVIG on 7/14/22.

## 2022-09-20 ENCOUNTER — APPOINTMENT (OUTPATIENT)
Dept: SURGERY | Facility: CLINIC | Age: 77
End: 2022-09-20

## 2022-09-20 PROCEDURE — 99214 OFFICE O/P EST MOD 30 MIN: CPT

## 2022-09-20 NOTE — PHYSICAL EXAM
[de-identified] : neck short, thick, ,incision well healed.  [Normal] : no neck adenopathy [de-identified] : Skin:  normal appearance.  no rash, nodules, vesicles, or erythema,\par Musculoskeletal:  full range of motion and no deformities appreciated\par Neurological:  grossly intact\par Psychiatric:  oriented to person, place and time with appropriate affect

## 2022-09-20 NOTE — ASSESSMENT
[FreeTextEntry1] : doing well, lengthy discussion regarding need for f/u of thyroid cancer, no evidence of recurrence on PE or imaging. calcitonin with min change, will continue to monitor     RTO 6 mo we will repeat blood work at that time.  I reviewed the expected course of illness and the intent of current treatment with the patient. I have answered her questions.\par

## 2022-09-20 NOTE — HISTORY OF PRESENT ILLNESS
[de-identified] : Patient referred by Dr. Rodriguez for evaluation of newly diagnosed medullary thyroid cancer.  Patient with over 10 year history of thyroid nodules, biopsy 8 years ago benign.  Patient with multiple malignancies followed with CT scans and PET scans.  Pet scan 5/2017 with uptake in thyroid, no uptake in cervical LNs.  Thyroid US 6/18/18 with multiple nodules left lower 3.5 x 2.2 x 2.1 cm increased from prior study with Ti-RADS 7.  three additional right nodules noted.  Biopsy left medullary thyroid cancer, Thyroseq RET mutation.  Denies dysphagia, hoarseness , had radiation 2007 for prostate cancer.  \par 8/16/18 Total thyroidectomy with CND, pathology 3.5 cm medullary thyroid cancer 0/1 LN,  denies dysphagia, reports mild hoarseness denies parathesias.  questions answered. returns prior to scheduled with concern regarding incision, denies f/.c/s , voice voice normal ruth ann reviewed calcitonin negative, CEA remains elevated, reports last colonoscopy was a year ago, will f/u oncologist and GI. \par Patient diagnosed with medullary thyroid cancer 4 year ago.  on synthroid 200.  denies dysphagia,  change in voice or fatigue, or palpitations. neck US 6/2021  with LNs consistent with CLL, stable nodule in thyroid bed, denies recent illness.    Denies dysphagia, hoarseness or recent illness.  Patient now being treated for CLL and recently diagnosed with metastatic prostate cancer.  PET scan reviewed.  No evidence of suspicious findings in thyroid bed or cervical adenopathy.  Patient concerned about possible recurrence.  Planning to go to [place] for the winter.  I have reviewed all old and new data and available images.

## 2022-09-21 NOTE — HISTORY OF PRESENT ILLNESS
[de-identified] : 72yo M here for f/u of CLL. He was previously following with Dr. Mccabe. \par \par On March 2,2017, total WBC 11,33, with absolute lymphocyte count 5865.\par On May 25, Total WBC 14.3, ALC 9052.\par Of note, chest CT for follow up of renal cell carcinoma on 5/31 showed slightly enlarged subpectoral and left axillary nodes.\par MRI/PET 6/1 multiple stable retroperitoneal nodes without abnormal FDG uptake. There was mild splenomegaly without abnormal uptake.\par \par Flow cytometry reveals monotypic B cells positive for CD19 didn't CD20 CD23 and C5 negative for FMC-7, CD10 and CD38 consistent with CLL. The monotypic B cells are 41% of cells, equating to 5658 cells\par FISH panel reveals deletion of 11q in 57.5% of cells and deletion of 13q in 67% of cells.\par \par He has had no constitutional symptoms.\par He has a history of prostate carcinoma, 3 cm renal cell carcinoma, 3.5 cm medullary thyroid cancer\par \par His previous CA history is as follows: \par In 2003, he was diagnosed with prostate CA s/p radical prostatectomy\par MRI done for rising PSA in 2009, found to have RCC s/p partial R nephrectomy and RT to prostate bed\par PET scheduled on Monday for rising PSA\par In 8/2018, found to have medullary thyroid CA s/p total thyroidectomy\par \par PET MRI Axumin skullbase to thighs: s/p radical prostatectomy. No recurrence in the prostatectomy bed. No metastatic lymphadenopathy of prostate origin. No liver or lung metastasis.\par Interval increase in size of the abdominal, retroperitoneal and mesenteric lymphadenopathy with correcting increase Axumin uptake since 6/10/19,. No liver infiltration. Splenomegaly (14.6cm), relatively stable since 6/10/19.\par s/p R partial nephrectomy without evidence of local recurrence.\par s/p thyroidectomy. No recurrent mass in the thyroidectomy bed [de-identified] : Labs done on 9/16/20 - WBC 78.9. Repeat on 10/6/20 was 73.79. \par He went to [place] for the winter. Labs done in [place] on 5/3/21 when he had cellulitis showed WBC of 143.4. \par \par He got back from [place] on 5/20. Cellulitis flared up again about 4 days later. He went to see his PMD and received a course of Cefadoxil x10d started 5/27.\par He had shingles on L side in July, around 7/12. Has LUQ pain, ? postherpetic neuralgia, on gabapentin. \par He had his COVID booster 9/9\par Abd sono done 9/2022: Extensive mesenteric adenopathy measuring up to 11 cm in size.\par Splenomegaly 16.8 by 8.7 x 12.3 cm.\par \par He went to [place] for the winter. He was admitted for cellulitis - was discharged on IV Abx. Also had a rectal bleed from internal hemorrhoids\par Saw Dr. Jones - .88 in 4/2022. Hgb 9.0, plts 102\par He was started on iron supplements for ferritin of 26.\par He had COVID 5/12 s/p monoclonal antibodies but still required hospitalization. He received Abx and Remdesivir, was discharged last week. \par He has lost about 40 lbs after these admissions for infections. \par IgG levels drawn in June was 295. \par \par He started Calquence 100mg BID on 7/7, tolerating it well. He is bruising easily when there's trauma. He has increased joint pains at night; pain not too bad during the daytime. \par We started IVIG on 7/14/22\par Went to [place] 7/23-8/6. He will go back there from Oct-May\par \par Had bone scan done for rising PSA showing new focus of radiotracer activity in the proximal left femur which may reflect osteoblastic skeletal metastases.\par Xray L femur scheduled for tomorrow \par \par Patient was seen today accompanied by his wife, he is very depressed with his metastatic prostate cancer and CLL. He stated his f diffused pain in left knee, back lower back, Tylenol for and tramadol with mild improvement. Saw Ortho  on 8/18 for left knee pain 2/2 OR, had Monovisc injection into his left knee. \par He f/u with [facility] oncologist for metastatic prostate cancer, Femur Xras done at [facility] on 8/12/22 show sclerosis of the proximal femur but no lytic lesions. Pt had PET/CT done on 8/30 will fax the result to Dr. Mcmahon's office. A hormone therapy med for prostate cancer added 1 week ago per pt, he didn't remember the name. Denied any usage of any steroid recently. \par \par

## 2022-09-21 NOTE — ASSESSMENT
[FreeTextEntry1] : 77yo M w/ CLL, 11q-,13q-, recently started on therapy\par He does have LAD and w/ a conglomerate of mesenteric lymph nodes measuring up to 11cm. Last visit, we discussed starting CLL therapy with Calquence given worsening leukocytosis and anemia and also enlarging adenopathy. Patient started Calquence on 7/7\par IgG from 6/9/22 was 295. We discussed doing IVIG for hypogammaglobulinemia x1yr. Pt has had multiple infections recently. s/p first dose of IVIG on 7/14/22\par CBC result reviewed and printed out for pt, WBC still high but stable. He is very frustrated that the WBC is still high. Explained that WBC could be increased then drop. \par Cont IVIG monthly, will check immunoglobulin panel today\par Cont Calquence 100mg BID \par Plts 116k today improved\par Supportive care of joint pains for now. F/u w/ orth Dr. Washington for left knee pain, had Monovisc injection. We discussed possibly lowering dose of Calquence if needed\par Bone scan reviewed, had left femur Xray done no lytic bone lesions. Had PET/CT done 8/30, will fax both xray and PET/CT to our office. \par RTC 4 weeks\par All questions answered\par \par \par Case and management discussed with Dr. Mcmahon\par \par \par

## 2022-09-21 NOTE — PHYSICAL EXAM
[Restricted in physically strenuous activity but ambulatory and able to carry out work of a light or sedentary nature] : Status 1- Restricted in physically strenuous activity but ambulatory and able to carry out work of a light or sedentary nature, e.g., light house work, office work [Normal] : affect appropriate [de-identified] : (+) edema b/l, wearing compression stockings [de-identified] : healed herpetic lesions LUQ/back

## 2022-09-29 ENCOUNTER — APPOINTMENT (OUTPATIENT)
Dept: ORTHOPEDIC SURGERY | Facility: CLINIC | Age: 77
End: 2022-09-29

## 2022-09-29 ENCOUNTER — OUTPATIENT (OUTPATIENT)
Dept: OUTPATIENT SERVICES | Facility: HOSPITAL | Age: 77
LOS: 1 days | Discharge: HOME | End: 2022-09-29

## 2022-09-29 DIAGNOSIS — Z90.79 ACQUIRED ABSENCE OF OTHER GENITAL ORGAN(S): Chronic | ICD-10-CM

## 2022-09-29 DIAGNOSIS — Z90.5 ACQUIRED ABSENCE OF KIDNEY: Chronic | ICD-10-CM

## 2022-09-29 DIAGNOSIS — Z96.651 PRESENCE OF RIGHT ARTIFICIAL KNEE JOINT: Chronic | ICD-10-CM

## 2022-09-29 DIAGNOSIS — Z98.890 OTHER SPECIFIED POSTPROCEDURAL STATES: Chronic | ICD-10-CM

## 2022-09-29 DIAGNOSIS — E89.0 POSTPROCEDURAL HYPOTHYROIDISM: Chronic | ICD-10-CM

## 2022-09-29 DIAGNOSIS — D72.820 LYMPHOCYTOSIS (SYMPTOMATIC): ICD-10-CM

## 2022-09-29 DIAGNOSIS — M17.12 UNILATERAL PRIMARY OSTEOARTHRITIS, LEFT KNEE: ICD-10-CM

## 2022-09-29 PROCEDURE — 99214 OFFICE O/P EST MOD 30 MIN: CPT

## 2022-09-29 NOTE — ADDENDUM
[FreeTextEntry1] : I, Storm Zepeda, acted solely as a scribe for Dr. Jamaal Washington on this date 09/29/2022.\par \par All medical record entries made by the Scribe were at my, Dr. Jamaal Washington, direction and personally dictated by me on 09/29/2022. I have reviewed the chart and agree that the record accurately reflects my personal performance of the history, physical exam, assessment and plan. I have also personally directed, reviewed, and agreed with the chart.

## 2022-09-29 NOTE — PHYSICAL EXAM
[de-identified] : Constitutional\par o Appearance : well-nourished, well developed, alert, in no acute distress \par Head and Face\par o Head :\par ¦ Inspection : atraumatic, normocephalic\par o Face :\par ¦ Inspection : no visible rash or discoloration\par Respiratory\par o Respiratory Effort: breathing unlabored \par Neurologic\par o Mental Status Examination :\par ¦ Orientation : alert and oriented X 3\par Psychiatric\par o Mood and Affect: mood normal, affect appropriate\par Cardiovascular\par o Observation/Palpation : - no swelling\par Lymphatic\par o Additional Nodes : No palpable lymph nodes present\par \par Right Lower Extremity\par o Buttock : no tenderness, swelling or deformities \par o Right Hip :\par ¦ Inspection/Palpation : no tenderness, swelling or deformities\par ¦ Range of Motion : full and painless in all planes, no crepitance\par ¦ Stability : joint stability intact\par ¦ Strength : extension, flexion, adduction, abduction, internal rotation and external rotation, 4+/5\par \par o Right Knee :\par ¦ Inspection/Palpation : no tenderness to palpation, no swelling, incision well-healed \par ¦ Range of Motion : active flexion and extension full and painless, no crepitance\par ¦ Stability : no valgus or varus instability present on provocative testing\par ¦ Strength : flexion and extension 4+/5\par ¦ Tests and Signs : negative Anterior Drawer\par \par Left Lower Extremity\par o Buttock : no tenderness, swelling or deformities \par o Left Hip :\par ¦ Inspection/Palpation : no tenderness, no swelling or deformities\par ¦ Range of Motion : full and painless in all planes, no crepitance\par ¦ Stability : joint stability intact\par ¦ Strength : extension, flexion, adduction, abduction, internal rotation and external rotation, 4+/5\par \par o Left Knee :\par ¦ Inspection/Palpation : lateral compartment tenderness, mild swelling, valgus deformity \par ¦ Range of Motion : 0-120° but pain with full extension and flexion, no crepitance\par ¦ Stability : no valgus or varus instability present on provocative testing\par ¦ Strength : flexion and extension 4+/5\par ¦ Tests and Signs : negative Anterior Drawer, negative Lachman, negative Radha\par \par Gait and Station:\par Gait: stiff  gait, trophic changes of the left leg, no foot drop

## 2022-09-29 NOTE — DISCUSSION/SUMMARY
[de-identified] : I went over the pathophysiology of the patient's symptoms in great detail with them. I advised him that the level of his arthritis would normally warrant surgery, but because of his poorly controlled cancer he is not a surgical candidate. We discussed the use of ice, Tylenol, and Voltaren gel was provided. A Pennsaid sample was provided. I advised him it is too soon for another gel or cortisone injection. I informed him that he is entitled HA injections every 6 months as per insurance guidelines. He can have another left knee Monovisc injection after 02/18/2023. I suggested he uses a cane in the right hand to unload the left knee. Proper cane walking protocol was discussed and demonstrated with the patient. Pool exercises were discussed and demonstrated with the patient for when he is in [place]. He can also ride a stationary bike on low tension. All of their questions were answered. They understand and consent to the plan.\par \par FU PRN.\par \par The patient is an 76 year old male with bone on bone arthritis of their left knee. Based upon the patient's continued symptoms and failure to respond to conservative treatment I have recommended a total knee replacement for this patient. A long discussion took place with the patient describing what a total joint replacement is and what the procedure would entail. A total knee model, similar to the implant that will be used during the operation was utilized to demonstrate and to discuss the various bearing surfaces of the implants. The hospitalization and post-operative care and rehabilitation were also discussed. The use of perioperative antibiotics and DVT prophylaxis were discussed. The risk, benefits and alternatives to a surgical intervention were discussed at length with the patient. The patient was also advised of risks related to the medical comorbidities and elevated body mass index (BMI). A lengthy discussion took place to review the most common complications including but not limited to: deep vein thrombosis, pulmonary embolus, heart attack, stroke, infection, wound breakdown, numbness, damage to nerves, tendon, muscles, arteries or other blood vessels, death and other possible complications from anesthesia. The patient was told that we will take steps to minimize these risks by using sterile technique, antibiotics and DVT prophylaxis when appropriate and follow the patient postoperatively in the office setting to monitor progress. The possibility of recurrent pain, no improvement in pain and actual worsening of pain were also discussed with the patient.\par \par The discharge plan of care focused on the patient going home following surgery. I encouraged the patient to make the necessary arrangements to have someone stay with them when they are discharged home. Following discharge, a home care nurse will visit the patient. They will open your home care case and request home physical therapy services. Home physical therapy will commence following discharge provided it is appropriate and covered by his health insurance benefit plan.\par \par The risks, benefits and alternative treatment options of total joint replacement were reviewed with the patient at great length. All questions were answered to the satisfaction of the patient. The patient participated in an interactive discussion of the total knee replacement implant planned for their surgery with questions answered. The patient agreed with the treatment plan, and has decided to move forward with the elective total knee replacement as planned.\par \par PRINCESS GALDAMEZ was seen face to face and needs a commode for bedside use as the patient has no ability to acces the bathroom in their home. The patient also requires a rolling walker as this is needed for activities of daily living within their home secondary to the diagnosis of osteoarthritis.

## 2022-09-29 NOTE — HISTORY OF PRESENT ILLNESS
[de-identified] : 76 year old male presents complaining of left knee pain localized to the lateral compartment. He notes a feeling of locking over the lateral compartment. He has difficulty when ascending and descending stairs. He received a Monovisc injection on 8/18/22. He then aggravated his knee on 9/5/22. He received a cortisone injection on 09/09/22 and reports this injection did nothing. He is using ice and Tylenol. \par He also complains about constant low back pain. He is s/p fusion of his back in 2018. He is s/p right TKR many years ago. PMHx of: DM; prostate Ca; and CLL. He states the CLL is poorly controlled and the cancer has metastasized to the lymph glands. Notes his PSA dropped 90% and the CLL blood test dropped 10% after starting a new cancer drug.\par \par Radiology Results 09/09/2022:\par o Left Knee : Standing AP, lateral, tunnel, and skyline views of the knee were obtained and reveled severe lateral and patellofemoral arthritis. No lytic lesions visible. \par \par AP and lateral views of the lumbosacral spine obtained at [facility] on 8/8/2022 revealed: Patient status post T10-S1 fusion with screws into the pelvis. Questionable broken screw.\par  \par Femur x-rays done at [facility] on 8/12/2022 were reviewed by me and show sclerosis of the proximal femur but no lytic lesions.\par

## 2022-10-06 ENCOUNTER — APPOINTMENT (OUTPATIENT)
Dept: HEMATOLOGY ONCOLOGY | Facility: CLINIC | Age: 77
End: 2022-10-06

## 2022-10-06 ENCOUNTER — RESULT REVIEW (OUTPATIENT)
Age: 77
End: 2022-10-06

## 2022-10-06 ENCOUNTER — APPOINTMENT (OUTPATIENT)
Dept: INFUSION THERAPY | Facility: HOSPITAL | Age: 77
End: 2022-10-06

## 2022-10-06 LAB
ALBUMIN SERPL ELPH-MCNC: 4.1 G/DL — SIGNIFICANT CHANGE UP (ref 3.3–5)
ALP SERPL-CCNC: 65 U/L — SIGNIFICANT CHANGE UP (ref 40–120)
ALT FLD-CCNC: 17 U/L — SIGNIFICANT CHANGE UP (ref 10–45)
ANION GAP SERPL CALC-SCNC: 11 MMOL/L — SIGNIFICANT CHANGE UP (ref 5–17)
ANISOCYTOSIS BLD QL: SLIGHT — SIGNIFICANT CHANGE UP
AST SERPL-CCNC: 16 U/L — SIGNIFICANT CHANGE UP (ref 10–40)
BASOPHILS # BLD AUTO: 0 K/UL — SIGNIFICANT CHANGE UP (ref 0–0.2)
BASOPHILS NFR BLD AUTO: 0 % — SIGNIFICANT CHANGE UP (ref 0–2)
BILIRUB SERPL-MCNC: 0.2 MG/DL — SIGNIFICANT CHANGE UP (ref 0.2–1.2)
BUN SERPL-MCNC: 36 MG/DL — HIGH (ref 7–23)
CALCIUM SERPL-MCNC: 9.7 MG/DL — SIGNIFICANT CHANGE UP (ref 8.4–10.5)
CHLORIDE SERPL-SCNC: 107 MMOL/L — SIGNIFICANT CHANGE UP (ref 96–108)
CO2 SERPL-SCNC: 22 MMOL/L — SIGNIFICANT CHANGE UP (ref 22–31)
CREAT SERPL-MCNC: 1.11 MG/DL — SIGNIFICANT CHANGE UP (ref 0.5–1.3)
EGFR: 69 ML/MIN/1.73M2 — SIGNIFICANT CHANGE UP
ELLIPTOCYTES BLD QL SMEAR: SLIGHT — SIGNIFICANT CHANGE UP
EOSINOPHIL # BLD AUTO: 0 K/UL — SIGNIFICANT CHANGE UP (ref 0–0.5)
EOSINOPHIL NFR BLD AUTO: 0 % — SIGNIFICANT CHANGE UP (ref 0–6)
GLUCOSE SERPL-MCNC: 101 MG/DL — HIGH (ref 70–99)
HCT VFR BLD CALC: 33.7 % — LOW (ref 39–50)
HGB BLD-MCNC: 9.3 G/DL — LOW (ref 13–17)
IGA FLD-MCNC: 54 MG/DL — LOW (ref 84–499)
IGG FLD-MCNC: 528 MG/DL — LOW (ref 610–1660)
IGM SERPL-MCNC: 20 MG/DL — LOW (ref 35–242)
KAPPA LC SER QL IFE: 2.89 MG/DL — HIGH (ref 0.33–1.94)
KAPPA/LAMBDA FREE LIGHT CHAIN RATIO, SERUM: 1.58 RATIO — SIGNIFICANT CHANGE UP (ref 0.26–1.65)
LAMBDA LC SER QL IFE: 1.83 MG/DL — SIGNIFICANT CHANGE UP (ref 0.57–2.63)
LYMPHOCYTES # BLD AUTO: 214.3 K/UL — HIGH (ref 1–3.3)
LYMPHOCYTES # BLD AUTO: 94.5 % — HIGH (ref 13–44)
LYMPHOCYTES # SPEC AUTO: 2 % — HIGH (ref 0–0)
MCHC RBC-ENTMCNC: 27.6 G/DL — LOW (ref 32–36)
MCHC RBC-ENTMCNC: 28.7 PG — SIGNIFICANT CHANGE UP (ref 27–34)
MCV RBC AUTO: 104 FL — HIGH (ref 80–100)
MONOCYTES # BLD AUTO: 1.13 K/UL — HIGH (ref 0–0.9)
MONOCYTES NFR BLD AUTO: 0.5 % — LOW (ref 2–14)
NEUTROPHILS # BLD AUTO: 6.8 K/UL — SIGNIFICANT CHANGE UP (ref 1.8–7.4)
NEUTROPHILS NFR BLD AUTO: 3 % — LOW (ref 43–77)
NRBC # BLD: 0 /100 — SIGNIFICANT CHANGE UP (ref 0–0)
NRBC # BLD: SIGNIFICANT CHANGE UP /100 WBCS (ref 0–0)
PLAT MORPH BLD: NORMAL — SIGNIFICANT CHANGE UP
PLATELET # BLD AUTO: 85 K/UL — LOW (ref 150–400)
POIKILOCYTOSIS BLD QL AUTO: SLIGHT — SIGNIFICANT CHANGE UP
POTASSIUM SERPL-MCNC: 4.4 MMOL/L — SIGNIFICANT CHANGE UP (ref 3.5–5.3)
POTASSIUM SERPL-SCNC: 4.4 MMOL/L — SIGNIFICANT CHANGE UP (ref 3.5–5.3)
PROT SERPL-MCNC: 5.9 G/DL — LOW (ref 6–8.3)
RBC # BLD: 3.24 M/UL — LOW (ref 4.2–5.8)
RBC # FLD: 18 % — HIGH (ref 10.3–14.5)
RBC BLD AUTO: ABNORMAL
SCHISTOCYTES BLD QL AUTO: SLIGHT — SIGNIFICANT CHANGE UP
SMUDGE CELLS # BLD: PRESENT — SIGNIFICANT CHANGE UP
SODIUM SERPL-SCNC: 140 MMOL/L — SIGNIFICANT CHANGE UP (ref 135–145)
WBC # BLD: 226.77 K/UL — CRITICAL HIGH (ref 3.8–10.5)
WBC # FLD AUTO: 226.77 K/UL — CRITICAL HIGH (ref 3.8–10.5)

## 2022-10-06 PROCEDURE — 99215 OFFICE O/P EST HI 40 MIN: CPT

## 2022-10-06 RX ORDER — CYCLOBENZAPRINE HYDROCHLORIDE 10 MG/1
10 TABLET, FILM COATED ORAL TWICE DAILY
Qty: 60 | Refills: 2 | Status: ACTIVE | COMMUNITY
Start: 2022-10-06 | End: 1900-01-01

## 2022-10-11 DIAGNOSIS — R11.2 NAUSEA WITH VOMITING, UNSPECIFIED: ICD-10-CM

## 2022-10-11 DIAGNOSIS — D80.1 NONFAMILIAL HYPOGAMMAGLOBULINEMIA: ICD-10-CM

## 2022-10-11 NOTE — PHYSICAL EXAM
[Restricted in physically strenuous activity but ambulatory and able to carry out work of a light or sedentary nature] : Status 1- Restricted in physically strenuous activity but ambulatory and able to carry out work of a light or sedentary nature, e.g., light house work, office work [Normal] : normal appearance, no rash, nodules, vesicles, ulcers, erythema [de-identified] : (+) edema b/l, wearing compression stockings

## 2022-10-11 NOTE — HISTORY OF PRESENT ILLNESS
[de-identified] : 74yo M here for f/u of CLL. He was previously following with Dr. Mccabe. \par \par On March 2,2017, total WBC 11,33, with absolute lymphocyte count 5865.\par On May 25, Total WBC 14.3, ALC 9052.\par Of note, chest CT for follow up of renal cell carcinoma on 5/31 showed slightly enlarged subpectoral and left axillary nodes.\par MRI/PET 6/1 multiple stable retroperitoneal nodes without abnormal FDG uptake. There was mild splenomegaly without abnormal uptake.\par \par Flow cytometry reveals monotypic B cells positive for CD19 didn't CD20 CD23 and C5 negative for FMC-7, CD10 and CD38 consistent with CLL. The monotypic B cells are 41% of cells, equating to 5658 cells\par FISH panel reveals deletion of 11q in 57.5% of cells and deletion of 13q in 67% of cells.\par \par He has had no constitutional symptoms.\par He has a history of prostate carcinoma, 3 cm renal cell carcinoma, 3.5 cm medullary thyroid cancer\par \par His previous CA history is as follows: \par In 2003, he was diagnosed with prostate CA s/p radical prostatectomy\par MRI done for rising PSA in 2009, found to have RCC s/p partial R nephrectomy and RT to prostate bed\par PET scheduled on Monday for rising PSA\par In 8/2018, found to have medullary thyroid CA s/p total thyroidectomy\par \par PET MRI Axumin skullbase to thighs: s/p radical prostatectomy. No recurrence in the prostatectomy bed. No metastatic lymphadenopathy of prostate origin. No liver or lung metastasis.\par Interval increase in size of the abdominal, retroperitoneal and mesenteric lymphadenopathy with correcting increase Axumin uptake since 6/10/19,. No liver infiltration. Splenomegaly (14.6cm), relatively stable since 6/10/19.\par s/p R partial nephrectomy without evidence of local recurrence.\par s/p thyroidectomy. No recurrent mass in the thyroidectomy bed [de-identified] : Labs done on 9/16/20 - WBC 78.9. Repeat on 10/6/20 was 73.79. \par He went to [place] for the winter. Labs done in [place] on 5/3/21 when he had cellulitis showed WBC of 143.4. \par \par He got back from [place] on 5/20. Cellulitis flared up again about 4 days later. He went to see his PMD and received a course of Cefadoxil x10d started 5/27.\par He had shingles on L side in July, around 7/12. Has LUQ pain, ? postherpetic neuralgia, on gabapentin. \par He had his COVID booster 9/9\par Abd sono done 9/2022: Extensive mesenteric adenopathy measuring up to 11 cm in size.\par Splenomegaly 16.8 by 8.7 x 12.3 cm.\par \par He went to [place] for the winter. He was admitted for cellulitis - was discharged on IV Abx. Also had a rectal bleed from internal hemorrhoids\par Saw Dr. Jones - .88 in 4/2022. Hgb 9.0, plts 102\par He was started on iron supplements for ferritin of 26.\par He had COVID 5/12 s/p monoclonal antibodies but still required hospitalization. He received Abx and Remdesivir, was discharged last week. \par He has lost about 40 lbs after these admissions for infections. \par IgG levels drawn in June was 295. \par \par He started Calquence 100mg BID on 7/7, tolerating it well. He is bruising easily when there's trauma. He has increased joint pains at night; pain not too bad during the daytime. \par We started IVIG on 7/14/22\par Went to [place] 7/23-8/6. 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 He stated his f diffused pain in left knee, back lower back, Tylenol for and tramadol with mild improvement. Saw Ortho  on 8/18 for left knee pain 2/2 OR, had Monovisc injection into his left knee. \par He f/u with [facility] oncologist for metastatic prostate cancer, Femur Xray done at [facility] on 8/12/22 show sclerosis of the proximal femur but no lytic lesions. Pt had PET/CT done on 8/30. He was started on med for prostate cancer added 1 week ago per pt, he didn't remember the name. Denied any usage of any steroid recently. \par \par 10/6: patient was seen today in the tx rm with Dr. Mcmahon. Pt c/o constipation/hemorrhoid bleeding due to s/e of oral iron supplement.\par He will fly to [place] in 1 wk and will come back in May, will f/u w/ Dr. Jones while in [place]\par Will have COVID booster tomorrow night. \par PSA level 90% decreased now\par He c/o shoulder, back and knee pain, took tylenol/ tramadol w/o significant improvement. Could not sleep during night due to the pain. rated it 6-7/10 at night. \par

## 2022-10-11 NOTE — ASSESSMENT
[FreeTextEntry1] : 75yo M w/ CLL, 11q-,13q-, recently started on therapy\par He does have LAD and w/ a conglomerate of mesenteric lymph nodes measuring up to 11cm. Last visit, we discussed starting CLL therapy with Calquence given worsening leukocytosis and anemia and also enlarging adenopathy. Patient started Calquence on 7/7\par IgG from 6/9/22 was 295. We discussed doing IVIG for hypogammaglobulinemia x1yr. Pt has had multiple infections recently. s/p first dose of IVIG on 7/14/22. IgG levels improved to 503 on 9/8\par Cont IVIG 20gm monthly, will check immunoglobulin panel today\par CBC result reviewed and printed out for pt, WBC still high but stable. He is very frustrated that the WBC is still high. We discussed adding Obinutuzumab to Calquence but pt will be going to [place] next week. \par Cont Calquence 100mg BID \par Cont f/u for prostate cancer\par All questions answered\par He will call us when he is back in [place]. Will discuss pt with Dr. Jones -pt has f/u w/ him in Nov. \par \par \par

## 2022-10-20 ENCOUNTER — NON-APPOINTMENT (OUTPATIENT)
Age: 77
End: 2022-10-20

## 2023-04-24 ENCOUNTER — OUTPATIENT (OUTPATIENT)
Dept: OUTPATIENT SERVICES | Facility: HOSPITAL | Age: 78
LOS: 1 days | Discharge: HOME | End: 2023-04-24

## 2023-04-24 DIAGNOSIS — Z90.79 ACQUIRED ABSENCE OF OTHER GENITAL ORGAN(S): Chronic | ICD-10-CM

## 2023-04-24 DIAGNOSIS — Z98.890 OTHER SPECIFIED POSTPROCEDURAL STATES: Chronic | ICD-10-CM

## 2023-04-24 DIAGNOSIS — D72.820 LYMPHOCYTOSIS (SYMPTOMATIC): ICD-10-CM

## 2023-04-24 DIAGNOSIS — Z90.5 ACQUIRED ABSENCE OF KIDNEY: Chronic | ICD-10-CM

## 2023-04-24 DIAGNOSIS — Z96.651 PRESENCE OF RIGHT ARTIFICIAL KNEE JOINT: Chronic | ICD-10-CM

## 2023-04-24 DIAGNOSIS — E89.0 POSTPROCEDURAL HYPOTHYROIDISM: Chronic | ICD-10-CM

## 2023-05-30 ENCOUNTER — TRANSCRIPTION ENCOUNTER (OUTPATIENT)
Age: 78
End: 2023-05-30

## 2023-06-06 ENCOUNTER — APPOINTMENT (OUTPATIENT)
Dept: SURGERY | Facility: CLINIC | Age: 78
End: 2023-06-06
Payer: MEDICARE

## 2023-06-06 PROCEDURE — 99213 OFFICE O/P EST LOW 20 MIN: CPT

## 2023-06-06 PROCEDURE — 36415 COLL VENOUS BLD VENIPUNCTURE: CPT

## 2023-06-06 NOTE — HISTORY OF PRESENT ILLNESS
[de-identified] : Patient referred by Dr. Rodriguez for evaluation of newly diagnosed medullary thyroid cancer.  Patient with over 10 year history of thyroid nodules, biopsy 8 years ago benign.  Patient with multiple malignancies followed with CT scans and PET scans.  Pet scan 5/2017 with uptake in thyroid, no uptake in cervical LNs.  Thyroid US 6/18/18 with multiple nodules left lower 3.5 x 2.2 x 2.1 cm increased from prior study with Ti-RADS 7.  three additional right nodules noted.  Biopsy left medullary thyroid cancer, Thyroseq RET mutation.  Denies dysphagia, hoarseness , had radiation 2007 for prostate cancer.  \par 8/16/18 Total thyroidectomy with CND, pathology 3.5 cm medullary thyroid cancer 0/1 LN,  denies dysphagia, reports mild hoarseness denies parathesias.  questions answered. returns prior to scheduled with concern regarding incision, denies f/.c/s , voice voice normal ruth ann reviewed calcitonin negative, CEA remains elevated, reports last colonoscopy was a year ago, will f/u oncologist and GI. \par Patient diagnosed with medullary thyroid cancer 4 year ago.  on synthroid 200.  denies dysphagia,  change in voice or fatigue, or palpitations. neck US 6/2021  with LNs consistent with CLL, stable nodule in thyroid bed,    Patient now being treated for CLL numbers improved.  Iron deficiency anemia being treated.  PSA level decreasing with oral treatments for metastatic prostate cancer.  Prior PET scan with no evidence of suspicious findings in thyroid bed or cervical adenopathy.    I have reviewed all old and new data and available images.

## 2023-06-06 NOTE — ASSESSMENT
[FreeTextEntry1] : doing well, lengthy discussion regarding need for f/u of thyroid cancer, no evidence of recurrence on PE or prior imaging. calcitonin with slight increase on prior blood work, will continue to monitor , tumor marker sent.  Request neck ultrasound now.  Patient will contact office to review results.    RTO 6 mo . I reviewed the expected course of illness and the intent of current treatment with the patient. I have answered her questions.\par

## 2023-06-06 NOTE — PHYSICAL EXAM
[de-identified] : neck short, thick, ,incision well healed.  [Normal] : no neck adenopathy [de-identified] : Skin:  normal appearance.  no rash, nodules, vesicles, or erythema,\par Musculoskeletal:  full range of motion and no deformities appreciated\par Neurological:  grossly intact\par Psychiatric:  oriented to person, place and time with appropriate affect

## 2023-06-07 ENCOUNTER — APPOINTMENT (OUTPATIENT)
Dept: HEMATOLOGY ONCOLOGY | Facility: CLINIC | Age: 78
End: 2023-06-07
Payer: MEDICARE

## 2023-06-07 ENCOUNTER — APPOINTMENT (OUTPATIENT)
Dept: HEMATOLOGY ONCOLOGY | Facility: CLINIC | Age: 78
End: 2023-06-07

## 2023-06-07 ENCOUNTER — APPOINTMENT (OUTPATIENT)
Dept: INFUSION THERAPY | Facility: HOSPITAL | Age: 78
End: 2023-06-07

## 2023-06-07 ENCOUNTER — RESULT REVIEW (OUTPATIENT)
Age: 78
End: 2023-06-07

## 2023-06-07 VITALS
DIASTOLIC BLOOD PRESSURE: 78 MMHG | HEIGHT: 70 IN | HEART RATE: 49 BPM | RESPIRATION RATE: 16 BRPM | WEIGHT: 214.99 LBS | BODY MASS INDEX: 30.78 KG/M2 | OXYGEN SATURATION: 99 % | SYSTOLIC BLOOD PRESSURE: 158 MMHG | TEMPERATURE: 96.8 F

## 2023-06-07 LAB
ANISOCYTOSIS BLD QL: SLIGHT — SIGNIFICANT CHANGE UP
BASOPHILS # BLD AUTO: 0 K/UL — SIGNIFICANT CHANGE UP (ref 0–0.2)
BASOPHILS NFR BLD AUTO: 0 % — SIGNIFICANT CHANGE UP (ref 0–2)
ELLIPTOCYTES BLD QL SMEAR: SLIGHT — SIGNIFICANT CHANGE UP
EOSINOPHIL # BLD AUTO: 0 K/UL — SIGNIFICANT CHANGE UP (ref 0–0.5)
EOSINOPHIL NFR BLD AUTO: 0 % — SIGNIFICANT CHANGE UP (ref 0–6)
HCT VFR BLD CALC: 33.5 % — LOW (ref 39–50)
HGB BLD-MCNC: 10 G/DL — LOW (ref 13–17)
LYMPHOCYTES # BLD AUTO: 37.13 K/UL — HIGH (ref 1–3.3)
LYMPHOCYTES # BLD AUTO: 94 % — HIGH (ref 13–44)
LYMPHOCYTES # SPEC AUTO: 2 % — HIGH (ref 0–0)
MCHC RBC-ENTMCNC: 29.9 G/DL — LOW (ref 32–36)
MCHC RBC-ENTMCNC: 29.9 PG — SIGNIFICANT CHANGE UP (ref 27–34)
MCV RBC AUTO: 100 FL — SIGNIFICANT CHANGE UP (ref 80–100)
MONOCYTES # BLD AUTO: 0 K/UL — SIGNIFICANT CHANGE UP (ref 0–0.9)
MONOCYTES NFR BLD AUTO: 0 % — LOW (ref 2–14)
NEUTROPHILS # BLD AUTO: 1.58 K/UL — LOW (ref 1.8–7.4)
NEUTROPHILS NFR BLD AUTO: 4 % — LOW (ref 43–77)
NRBC # BLD: 0 /100 — SIGNIFICANT CHANGE UP (ref 0–0)
NRBC # BLD: SIGNIFICANT CHANGE UP /100 WBCS (ref 0–0)
PLAT MORPH BLD: NORMAL — SIGNIFICANT CHANGE UP
PLATELET # BLD AUTO: 101 K/UL — LOW (ref 150–400)
POIKILOCYTOSIS BLD QL AUTO: SLIGHT — SIGNIFICANT CHANGE UP
RBC # BLD: 3.35 M/UL — LOW (ref 4.2–5.8)
RBC # FLD: 15.8 % — HIGH (ref 10.3–14.5)
RBC BLD AUTO: ABNORMAL
SMUDGE CELLS # BLD: PRESENT — SIGNIFICANT CHANGE UP
WBC # BLD: 39.5 K/UL — HIGH (ref 3.8–10.5)
WBC # FLD AUTO: 39.5 K/UL — HIGH (ref 3.8–10.5)

## 2023-06-07 PROCEDURE — 99215 OFFICE O/P EST HI 40 MIN: CPT

## 2023-06-07 NOTE — HISTORY OF PRESENT ILLNESS
[de-identified] : 74yo M here for f/u of CLL. He was previously following with Dr. Mccabe. \par \par On March 2,2017, total WBC 11,33, with absolute lymphocyte count 5865.\par On May 25, Total WBC 14.3, ALC 9052.\par Of note, chest CT for follow up of renal cell carcinoma on 5/31 showed slightly enlarged subpectoral and left axillary nodes.\par MRI/PET 6/1 multiple stable retroperitoneal nodes without abnormal FDG uptake. There was mild splenomegaly without abnormal uptake.\par \par Flow cytometry reveals monotypic B cells positive for CD19 didn't CD20 CD23 and C5 negative for FMC-7, CD10 and CD38 consistent with CLL. The monotypic B cells are 41% of cells, equating to 5658 cells\par FISH panel reveals deletion of 11q in 57.5% of cells and deletion of 13q in 67% of cells.\par \par He has had no constitutional symptoms.\par He has a history of prostate carcinoma, 3 cm renal cell carcinoma, 3.5 cm medullary thyroid cancer\par \par His previous CA history is as follows: \par In 2003, he was diagnosed with prostate CA s/p radical prostatectomy\par MRI done for rising PSA in 2009, found to have RCC s/p partial R nephrectomy and RT to prostate bed\par PET scheduled on Monday for rising PSA\par In 8/2018, found to have medullary thyroid CA s/p total thyroidectomy\par \par PET MRI Axumin skullbase to thighs: s/p radical prostatectomy. No recurrence in the prostatectomy bed. No metastatic lymphadenopathy of prostate origin. No liver or lung metastasis.\par Interval increase in size of the abdominal, retroperitoneal and mesenteric lymphadenopathy with correcting increase Axumin uptake since 6/10/19,. No liver infiltration. Splenomegaly (14.6cm), relatively stable since 6/10/19.\par s/p R partial nephrectomy without evidence of local recurrence.\par s/p thyroidectomy. No recurrent mass in the thyroidectomy bed [de-identified] : Labs done on 9/16/20 - WBC 78.9. Repeat on 10/6/20 was 73.79. \par He went to [place] for the winter. Labs done in [place] on 5/3/21 when he had cellulitis showed WBC of 143.4. \par \par He got back from [place] on 5/20. Cellulitis flared up again about 4 days later. He went to see his PMD and received a course of Cefadoxil x10d started 5/27.\par He had shingles on L side in July, around 7/12. Has LUQ pain, ? postherpetic neuralgia, on gabapentin. \par He had his COVID booster 9/9\par Abd sono done 9/2022: Extensive mesenteric adenopathy measuring up to 11 cm in size.\par Splenomegaly 16.8 by 8.7 x 12.3 cm.\par \par He went to [place] for the winter. He was admitted for cellulitis - was discharged on IV Abx. Also had a rectal bleed from internal hemorrhoids\par Saw Dr. Jones - .88 in 4/2022. Hgb 9.0, plts 102\par He was started on iron supplements for ferritin of 26.\par He had COVID 5/12 s/p monoclonal antibodies but still required hospitalization. He received Abx and Remdesivir, was discharged last week. \par He has lost about 40 lbs after these admissions for infections. \par IgG levels drawn in June was 295. \par \par He started Calquence 100mg BID on 7/7, tolerating it well. He is bruising easily when there's trauma. He has increased joint pains at night; pain not too bad during the daytime. \par We started IVIG on 7/14/22\par Went to [place] 7/23-8/6. 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 He stated his f diffused pain in left knee, back lower back, Tylenol for and tramadol with mild improvement. Saw Ortho  on 8/18 for left knee pain 2/2 OR, had Monovisc injection into his left knee. \par He f/u with [facility] oncologist for metastatic prostate cancer, Femur Xray done at [facility] on 8/12/22 show sclerosis of the proximal femur but no lytic lesions. Pt had PET/CT done on 8/30. He was started on med for prostate cancer added 1 week ago per pt, he didn't remember the name. Denied any usage of any steroid recently. \par \par 10/6: patient was seen today in the tx rm with Dr. Mcmahon. Pt c/o constipation/hemorrhoid bleeding due to s/e of oral iron supplement.\par He will fly to [place] in 1 wk and will come back in May, will f/u w/ Dr. Jones while in [place]\par Will have COVID booster tomorrow night. \par PSA level 90% decreased now\par He c/o shoulder, back and knee pain, took tylenol/ tramadol w/o significant improvement. Could not sleep during night due to the pain. rated it 6-7/10 at night. \par \par 6/7: He just got back from [place] a week or 2 ago. He was seeing Dr. Jones down in [place]. Last labs with him in April were: WBC 65.09, Hgb 10.8, plt 105. \par He received Feraheme in Nov 2022: 11/18 and 11/25. He had labs with his PMD Dr. Doran on 5/31: Fe 37, TIBC 281, TSAT 13%, ferritin 50. Pt reports feeling very fatigued. \par Getting IVIG today. He continues on Calquence.  \par

## 2023-06-07 NOTE — ASSESSMENT
[FreeTextEntry1] : 75yo M w/ CLL, 11q-,13q-, recently started on therapy\par He does have LAD and w/ a conglomerate of mesenteric lymph nodes measuring up to 11cm. Last visit, we discussed starting CLL therapy with Calquence given worsening leukocytosis and anemia and also enlarging adenopathy. Patient started Calquence on 7/7\par IgG from 6/9/22 was 295. We discussed doing IVIG for hypogammaglobulinemia x1yr. Pt has had multiple infections recently. s/p first dose of IVIG on 7/14/22. IgG levels improved to 503 on 9/8\par Cont IVIG 20gm monthly, will check immunoglobulin panel today\par CBC result reviewed and printed out for pt, WBC improving\par Cont Calquence 100mg BID \par He received Feraheme in Nov 2022 in [place]: 11/18 and 11/25. He had labs with his PMD Dr. Doran on 5/31: Fe 37, TIBC 281, TSAT 13%, ferritin 50. Pt reports feeling very fatigued. Will schedule for Feraheme weekly x2 -informed consent obtained\par Cont f/u for prostate cancer\par All questions answered\par RTC 1 mo\par \par \par

## 2023-06-07 NOTE — PHYSICAL EXAM
[Restricted in physically strenuous activity but ambulatory and able to carry out work of a light or sedentary nature] : Status 1- Restricted in physically strenuous activity but ambulatory and able to carry out work of a light or sedentary nature, e.g., light house work, office work [Normal] : affect appropriate [de-identified] : (+) edema b/l, wearing compression stockings

## 2023-06-08 DIAGNOSIS — R11.2 NAUSEA WITH VOMITING, UNSPECIFIED: ICD-10-CM

## 2023-06-08 DIAGNOSIS — D80.1 NONFAMILIAL HYPOGAMMAGLOBULINEMIA: ICD-10-CM

## 2023-06-08 LAB
ALBUMIN SERPL ELPH-MCNC: 4.3 G/DL
ALP BLD-CCNC: 66 U/L
ALT SERPL-CCNC: 15 U/L
ANION GAP SERPL CALC-SCNC: 9 MMOL/L
AST SERPL-CCNC: 11 U/L
BILIRUB SERPL-MCNC: 0.2 MG/DL
BUN SERPL-MCNC: 33 MG/DL
CALCIUM SERPL-MCNC: 9.6 MG/DL
CHLORIDE SERPL-SCNC: 112 MMOL/L
CO2 SERPL-SCNC: 22 MMOL/L
CREAT SERPL-MCNC: 1.14 MG/DL
DEPRECATED KAPPA LC FREE/LAMBDA SER: 1.49 RATIO
EGFR: 67 ML/MIN/1.73M2
GLUCOSE SERPL-MCNC: 134 MG/DL
IGA SER QL IEP: 52 MG/DL
IGG SER QL IEP: 571 MG/DL
IGM SER QL IEP: 20 MG/DL
KAPPA LC CSF-MCNC: 1.8 MG/DL
KAPPA LC SERPL-MCNC: 2.69 MG/DL
LDH SERPL-CCNC: 96 U/L
POTASSIUM SERPL-SCNC: 4.9 MMOL/L
PROT SERPL-MCNC: 6.1 G/DL
SODIUM SERPL-SCNC: 143 MMOL/L

## 2023-06-15 ENCOUNTER — NON-APPOINTMENT (OUTPATIENT)
Age: 78
End: 2023-06-15

## 2023-06-15 ENCOUNTER — APPOINTMENT (OUTPATIENT)
Dept: INFUSION THERAPY | Facility: HOSPITAL | Age: 78
End: 2023-06-15

## 2023-06-15 LAB
CALCIT SERPL-MCNC: 28 PG/ML
CEA SERPL-MCNC: 1.9 NG/ML

## 2023-06-16 DIAGNOSIS — D50.9 IRON DEFICIENCY ANEMIA, UNSPECIFIED: ICD-10-CM

## 2023-06-19 ENCOUNTER — OUTPATIENT (OUTPATIENT)
Dept: OUTPATIENT SERVICES | Facility: HOSPITAL | Age: 78
LOS: 1 days | End: 2023-06-19
Payer: MEDICARE

## 2023-06-19 ENCOUNTER — APPOINTMENT (OUTPATIENT)
Dept: ULTRASOUND IMAGING | Facility: CLINIC | Age: 78
End: 2023-06-19
Payer: MEDICARE

## 2023-06-19 DIAGNOSIS — Z96.651 PRESENCE OF RIGHT ARTIFICIAL KNEE JOINT: Chronic | ICD-10-CM

## 2023-06-19 DIAGNOSIS — Z98.890 OTHER SPECIFIED POSTPROCEDURAL STATES: Chronic | ICD-10-CM

## 2023-06-19 DIAGNOSIS — C73 MALIGNANT NEOPLASM OF THYROID GLAND: ICD-10-CM

## 2023-06-19 DIAGNOSIS — Z90.79 ACQUIRED ABSENCE OF OTHER GENITAL ORGAN(S): Chronic | ICD-10-CM

## 2023-06-19 DIAGNOSIS — Z90.5 ACQUIRED ABSENCE OF KIDNEY: Chronic | ICD-10-CM

## 2023-06-19 DIAGNOSIS — E89.0 POSTPROCEDURAL HYPOTHYROIDISM: ICD-10-CM

## 2023-06-19 DIAGNOSIS — E89.0 POSTPROCEDURAL HYPOTHYROIDISM: Chronic | ICD-10-CM

## 2023-06-19 DIAGNOSIS — R59.0 LOCALIZED ENLARGED LYMPH NODES: ICD-10-CM

## 2023-06-19 PROCEDURE — 76536 US EXAM OF HEAD AND NECK: CPT

## 2023-06-19 PROCEDURE — 76536 US EXAM OF HEAD AND NECK: CPT | Mod: 26

## 2023-06-20 ENCOUNTER — NON-APPOINTMENT (OUTPATIENT)
Age: 78
End: 2023-06-20

## 2023-06-22 ENCOUNTER — APPOINTMENT (OUTPATIENT)
Dept: INFUSION THERAPY | Facility: HOSPITAL | Age: 78
End: 2023-06-22

## 2023-06-26 ENCOUNTER — OUTPATIENT (OUTPATIENT)
Dept: OUTPATIENT SERVICES | Facility: HOSPITAL | Age: 78
LOS: 1 days | Discharge: HOME | End: 2023-06-26

## 2023-06-26 DIAGNOSIS — Z90.5 ACQUIRED ABSENCE OF KIDNEY: Chronic | ICD-10-CM

## 2023-06-26 DIAGNOSIS — Z98.890 OTHER SPECIFIED POSTPROCEDURAL STATES: Chronic | ICD-10-CM

## 2023-06-26 DIAGNOSIS — E89.0 POSTPROCEDURAL HYPOTHYROIDISM: Chronic | ICD-10-CM

## 2023-06-26 DIAGNOSIS — Z96.651 PRESENCE OF RIGHT ARTIFICIAL KNEE JOINT: Chronic | ICD-10-CM

## 2023-06-26 DIAGNOSIS — Z90.79 ACQUIRED ABSENCE OF OTHER GENITAL ORGAN(S): Chronic | ICD-10-CM

## 2023-06-26 DIAGNOSIS — D72.820 LYMPHOCYTOSIS (SYMPTOMATIC): ICD-10-CM

## 2023-07-06 ENCOUNTER — APPOINTMENT (OUTPATIENT)
Dept: INFUSION THERAPY | Facility: HOSPITAL | Age: 78
End: 2023-07-06

## 2023-07-06 ENCOUNTER — APPOINTMENT (OUTPATIENT)
Dept: HEMATOLOGY ONCOLOGY | Facility: CLINIC | Age: 78
End: 2023-07-06

## 2023-07-18 NOTE — PATIENT PROFILE ADULT - TOBACCO USE
Impression: Regular astigmatism, bilateral: H52.223. Plan: Discussed diagnosis in detail with patient. New glasses Rx was given today. Recommend UV protection. Potential for initial adaptation discussed. Former smoker

## 2023-08-03 ENCOUNTER — RESULT REVIEW (OUTPATIENT)
Age: 78
End: 2023-08-03

## 2023-08-03 ENCOUNTER — APPOINTMENT (OUTPATIENT)
Dept: HEMATOLOGY ONCOLOGY | Facility: CLINIC | Age: 78
End: 2023-08-03
Payer: MEDICARE

## 2023-08-03 ENCOUNTER — APPOINTMENT (OUTPATIENT)
Dept: INFUSION THERAPY | Facility: HOSPITAL | Age: 78
End: 2023-08-03

## 2023-08-03 VITALS
BODY MASS INDEX: 30.78 KG/M2 | HEART RATE: 54 BPM | RESPIRATION RATE: 16 BRPM | SYSTOLIC BLOOD PRESSURE: 132 MMHG | HEIGHT: 70 IN | OXYGEN SATURATION: 98 % | WEIGHT: 214.99 LBS | TEMPERATURE: 96.8 F | DIASTOLIC BLOOD PRESSURE: 65 MMHG

## 2023-08-03 LAB
BASOPHILS # BLD AUTO: 0 K/UL — SIGNIFICANT CHANGE UP (ref 0–0.2)
BASOPHILS NFR BLD AUTO: 0 % — SIGNIFICANT CHANGE UP (ref 0–2)
DACRYOCYTES BLD QL SMEAR: SLIGHT — SIGNIFICANT CHANGE UP
ELLIPTOCYTES BLD QL SMEAR: SLIGHT — SIGNIFICANT CHANGE UP
EOSINOPHIL # BLD AUTO: 0.26 K/UL — SIGNIFICANT CHANGE UP (ref 0–0.5)
EOSINOPHIL NFR BLD AUTO: 1 % — SIGNIFICANT CHANGE UP (ref 0–6)
HCT VFR BLD CALC: 35.2 % — LOW (ref 39–50)
HGB BLD-MCNC: 10.7 G/DL — LOW (ref 13–17)
LYMPHOCYTES # BLD AUTO: 23.41 K/UL — HIGH (ref 1–3.3)
LYMPHOCYTES # BLD AUTO: 91 % — HIGH (ref 13–44)
LYMPHOCYTES # SPEC AUTO: 1 % — HIGH (ref 0–0)
MCHC RBC-ENTMCNC: 30.3 PG — SIGNIFICANT CHANGE UP (ref 27–34)
MCHC RBC-ENTMCNC: 30.4 G/DL — LOW (ref 32–36)
MCV RBC AUTO: 99.7 FL — SIGNIFICANT CHANGE UP (ref 80–100)
MONOCYTES # BLD AUTO: 0 K/UL — SIGNIFICANT CHANGE UP (ref 0–0.9)
MONOCYTES NFR BLD AUTO: 0 % — LOW (ref 2–14)
NEUTROPHILS # BLD AUTO: 1.8 K/UL — SIGNIFICANT CHANGE UP (ref 1.8–7.4)
NEUTROPHILS NFR BLD AUTO: 7 % — LOW (ref 43–77)
NRBC # BLD: 0 /100 — SIGNIFICANT CHANGE UP (ref 0–0)
NRBC # BLD: SIGNIFICANT CHANGE UP /100 WBCS (ref 0–0)
PLAT MORPH BLD: NORMAL — SIGNIFICANT CHANGE UP
PLATELET # BLD AUTO: 122 K/UL — LOW (ref 150–400)
POIKILOCYTOSIS BLD QL AUTO: SLIGHT — SIGNIFICANT CHANGE UP
RBC # BLD: 3.53 M/UL — LOW (ref 4.2–5.8)
RBC # FLD: 15.8 % — HIGH (ref 10.3–14.5)
RBC BLD AUTO: ABNORMAL
SCHISTOCYTES BLD QL AUTO: SLIGHT — SIGNIFICANT CHANGE UP
SMUDGE CELLS # BLD: PRESENT — SIGNIFICANT CHANGE UP
VIT B12 SERPL-MCNC: 993 PG/ML — SIGNIFICANT CHANGE UP (ref 232–1245)
WBC # BLD: 25.73 K/UL — HIGH (ref 3.8–10.5)
WBC # FLD AUTO: 25.73 K/UL — HIGH (ref 3.8–10.5)

## 2023-08-03 PROCEDURE — 99214 OFFICE O/P EST MOD 30 MIN: CPT

## 2023-08-03 NOTE — PHYSICAL EXAM
[Restricted in physically strenuous activity but ambulatory and able to carry out work of a light or sedentary nature] : Status 1- Restricted in physically strenuous activity but ambulatory and able to carry out work of a light or sedentary nature, e.g., light house work, office work [Normal] : affect appropriate [de-identified] : wearing compression stockings

## 2023-08-03 NOTE — ASSESSMENT
[FreeTextEntry1] : 77yo M w/ CLL, 11q-,13q-, recently started on therapy He does have LAD and w/ a conglomerate of mesenteric lymph nodes measuring up to 11cm. Last visit, we discussed starting CLL therapy with Calquence given worsening leukocytosis and anemia and also enlarging adenopathy. Patient started Calquence on 7/7 IgG from 6/9/22 was 295. We discussed doing IVIG for hypogammaglobulinemia x1yr. Pt has had multiple infections recently. s/p first dose of IVIG on 7/14/22. IgG levels improved to 503 on 9/8/22. Will complete 1 yr next month (missed last month b/c of COVID) Cont IVIG 20gm monthly, will check immunoglobulin panel today  CBC result reviewed and printed out for pt, WBC improving Cont Calquence 100mg BID  He received Feraheme in Nov 2022 in [place]: 11/18 and 11/25. He had labs with his PMD Dr. Doran on 5/31: Fe 37, TIBC 281, TSAT 13%, ferritin 50, s/p Feraheme weekly x2 on 6/15/23 and 6/22/23. f/u iron studies from today Cont f/u for prostate cancer All questions answered RTC 1 mo

## 2023-08-03 NOTE — HISTORY OF PRESENT ILLNESS
[de-identified] : 74yo M here for f/u of CLL. He was previously following with Dr. Mccabe. \par  \par  On March 2,2017, total WBC 11,33, with absolute lymphocyte count 5865.\par  On May 25, Total WBC 14.3, ALC 9052.\par  Of note, chest CT for follow up of renal cell carcinoma on 5/31 showed slightly enlarged subpectoral and left axillary nodes.\par  MRI/PET 6/1 multiple stable retroperitoneal nodes without abnormal FDG uptake. There was mild splenomegaly without abnormal uptake.\par  \par  Flow cytometry reveals monotypic B cells positive for CD19 didn't CD20 CD23 and C5 negative for FMC-7, CD10 and CD38 consistent with CLL. The monotypic B cells are 41% of cells, equating to 5658 cells\par  FISH panel reveals deletion of 11q in 57.5% of cells and deletion of 13q in 67% of cells.\par  \par  He has had no constitutional symptoms.\par  He has a history of prostate carcinoma, 3 cm renal cell carcinoma, 3.5 cm medullary thyroid cancer\par  \par  His previous CA history is as follows: \par  In 2003, he was diagnosed with prostate CA s/p radical prostatectomy\par  MRI done for rising PSA in 2009, found to have RCC s/p partial R nephrectomy and RT to prostate bed\par  PET scheduled on Monday for rising PSA\par  In 8/2018, found to have medullary thyroid CA s/p total thyroidectomy\par  \par  PET MRI Axumin skullbase to thighs: s/p radical prostatectomy. No recurrence in the prostatectomy bed. No metastatic lymphadenopathy of prostate origin. No liver or lung metastasis.\par  Interval increase in size of the abdominal, retroperitoneal and mesenteric lymphadenopathy with correcting increase Axumin uptake since 6/10/19,. No liver infiltration. Splenomegaly (14.6cm), relatively stable since 6/10/19.\par  s/p R partial nephrectomy without evidence of local recurrence.\par  s/p thyroidectomy. No recurrent mass in the thyroidectomy bed [de-identified] : Labs done on 9/16/20 - WBC 78.9. Repeat on 10/6/20 was 73.79.  He went to [place] for the winter. Labs done in [place] on 5/3/21 when he had cellulitis showed WBC of 143.4.   He got back from [place] on 5/20. Cellulitis flared up again about 4 days later. He went to see his PMD and received a course of Cefadoxil x10d started 5/27. He had shingles on L side in July, around 7/12. Has LUQ pain, ? postherpetic neuralgia, on gabapentin.  He had his COVID booster 9/9 Abd sono done 9/2022: Extensive mesenteric adenopathy measuring up to 11 cm in size. Splenomegaly 16.8 by 8.7 x 12.3 cm.  He went to [place] for the winter. He was admitted for cellulitis - was discharged on IV Abx. Also had a rectal bleed from internal hemorrhoids Saw Dr. Jones - .88 in 4/2022. Hgb 9.0, plts 102 He was started on iron supplements for ferritin of 26. He had COVID 5/12 s/p monoclonal antibodies but still required hospitalization. He received Abx and Remdesivir, was discharged last week.  He has lost about 40 lbs after these admissions for infections.  IgG levels drawn in June was 295.   He started Calquence 100mg BID on 7/7, tolerating it well. He is bruising easily when there's trauma. He has increased joint pains at night; pain not too bad during the daytime.  We started IVIG on 7/14/22 Went to [place] 7/23-8/6. 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 Xray L femur scheduled for tomorrow   9/8: Patient was seen today accompanied by his wife, he is very depressed with his metastatic prostate cancer and CLL. He stated his f diffused pain in left knee, back lower back, Tylenol for and tramadol with mild improvement. Saw Ortho  on 8/18 for left knee pain 2/2 OR, had Monovisc injection into his left knee.  He f/u with [facility] oncologist for metastatic prostate cancer, Femur Xray done at [facility] on 8/12/22 show sclerosis of the proximal femur but no lytic lesions. Pt had PET/CT done on 8/30. He was started on med for prostate cancer added 1 week ago per pt, he didn't remember the name. Denied any usage of any steroid recently.   10/6: patient was seen today in the tx  with Dr. Mcmahon. Pt c/o constipation/hemorrhoid bleeding due to s/e of oral iron supplement. He will fly to [place] in 1 wk and will come back in May, will f/u w/ Dr. Jones while in [place] Will have COVID booster tomorrow night.  PSA level 90% decreased now He c/o shoulder, back and knee pain, took tylenol/ tramadol w/o significant improvement. Could not sleep during night due to the pain. rated it 6-7/10 at night.   6/7: He just got back from [place] a week or 2 ago. He was seeing Dr. Jones down in [place]. Last labs with him in April were: WBC 65.09, Hgb 10.8, plt 105.  He received Feraheme in Nov 2022: 11/18 and 11/25. He had labs with his PMD Dr. Doran on 5/31: Fe 37, TIBC 281, TSAT 13%, ferritin 50. Pt reports feeling very fatigued.  Getting IVIG today. He continues on Calquence.    Pt had COVID end of June. He continues to have fatigue and weakness. Also with diffuse joint pains.

## 2023-08-04 DIAGNOSIS — D80.1 NONFAMILIAL HYPOGAMMAGLOBULINEMIA: ICD-10-CM

## 2023-08-04 DIAGNOSIS — R11.2 NAUSEA WITH VOMITING, UNSPECIFIED: ICD-10-CM

## 2023-08-07 LAB
ALBUMIN MFR SERPL ELPH: 60.2 %
ALBUMIN SERPL ELPH-MCNC: 4.2 G/DL
ALBUMIN SERPL-MCNC: 3.7 G/DL
ALBUMIN/GLOB SERPL: 1.5 RATIO
ALP BLD-CCNC: 82 U/L
ALPHA1 GLOB MFR SERPL ELPH: 6.2 %
ALPHA1 GLOB SERPL ELPH-MCNC: 0.4 G/DL
ALPHA2 GLOB MFR SERPL ELPH: 14.1 %
ALPHA2 GLOB SERPL ELPH-MCNC: 0.9 G/DL
ALT SERPL-CCNC: 13 U/L
ANION GAP SERPL CALC-SCNC: 11 MMOL/L
AST SERPL-CCNC: 14 U/L
B-GLOBULIN MFR SERPL ELPH: 11.4 %
B-GLOBULIN SERPL ELPH-MCNC: 0.7 G/DL
BILIRUB SERPL-MCNC: 0.2 MG/DL
BUN SERPL-MCNC: 25 MG/DL
CALCIUM SERPL-MCNC: 10 MG/DL
CHLORIDE SERPL-SCNC: 109 MMOL/L
CO2 SERPL-SCNC: 23 MMOL/L
CREAT SERPL-MCNC: 1.11 MG/DL
DEPRECATED KAPPA LC FREE/LAMBDA SER: 1.15 RATIO
EGFR: 69 ML/MIN/1.73M2
FERRITIN SERPL-MCNC: 130 NG/ML
GAMMA GLOB FLD ELPH-MCNC: 0.5 G/DL
GAMMA GLOB MFR SERPL ELPH: 8.1 %
GLUCOSE SERPL-MCNC: 142 MG/DL
IGA SER QL IEP: 86 MG/DL
IGG SER QL IEP: 503 MG/DL
IGM SER QL IEP: 27 MG/DL
INTERPRETATION SERPL IEP-IMP: NORMAL
IRON SATN MFR SERPL: 18 %
IRON SERPL-MCNC: 42 UG/DL
KAPPA LC CSF-MCNC: 2.38 MG/DL
KAPPA LC SERPL-MCNC: 2.74 MG/DL
M PROTEIN MFR SERPL ELPH: NORMAL
M PROTEIN SPEC IFE-MCNC: NORMAL
MONOCLON BAND OBS SERPL: NORMAL
POTASSIUM SERPL-SCNC: 4.8 MMOL/L
PROT SERPL-MCNC: 6.2 G/DL
SODIUM SERPL-SCNC: 143 MMOL/L
TIBC SERPL-MCNC: 236 UG/DL
UIBC SERPL-MCNC: 193 UG/DL

## 2023-08-18 DIAGNOSIS — D80.1 NONFAMILIAL HYPOGAMMAGLOBULINEMIA: ICD-10-CM

## 2023-08-28 ENCOUNTER — OUTPATIENT (OUTPATIENT)
Dept: OUTPATIENT SERVICES | Facility: HOSPITAL | Age: 78
LOS: 1 days | Discharge: HOME | End: 2023-08-28

## 2023-08-28 DIAGNOSIS — D72.820 LYMPHOCYTOSIS (SYMPTOMATIC): ICD-10-CM

## 2023-08-28 DIAGNOSIS — Z90.79 ACQUIRED ABSENCE OF OTHER GENITAL ORGAN(S): Chronic | ICD-10-CM

## 2023-08-28 DIAGNOSIS — Z98.890 OTHER SPECIFIED POSTPROCEDURAL STATES: Chronic | ICD-10-CM

## 2023-08-28 DIAGNOSIS — Z96.651 PRESENCE OF RIGHT ARTIFICIAL KNEE JOINT: Chronic | ICD-10-CM

## 2023-08-28 DIAGNOSIS — E89.0 POSTPROCEDURAL HYPOTHYROIDISM: Chronic | ICD-10-CM

## 2023-08-28 DIAGNOSIS — Z90.5 ACQUIRED ABSENCE OF KIDNEY: Chronic | ICD-10-CM

## 2023-09-07 ENCOUNTER — RESULT REVIEW (OUTPATIENT)
Age: 78
End: 2023-09-07

## 2023-09-07 ENCOUNTER — APPOINTMENT (OUTPATIENT)
Dept: HEMATOLOGY ONCOLOGY | Facility: CLINIC | Age: 78
End: 2023-09-07
Payer: MEDICARE

## 2023-09-07 ENCOUNTER — APPOINTMENT (OUTPATIENT)
Dept: INFUSION THERAPY | Facility: HOSPITAL | Age: 78
End: 2023-09-07

## 2023-09-07 VITALS
DIASTOLIC BLOOD PRESSURE: 82 MMHG | BODY MASS INDEX: 30.78 KG/M2 | RESPIRATION RATE: 16 BRPM | WEIGHT: 214.99 LBS | HEIGHT: 70 IN | SYSTOLIC BLOOD PRESSURE: 153 MMHG | HEART RATE: 68 BPM | TEMPERATURE: 97 F | OXYGEN SATURATION: 97 %

## 2023-09-07 LAB
BASOPHILS # BLD AUTO: 0 K/UL — SIGNIFICANT CHANGE UP (ref 0–0.2)
BASOPHILS NFR BLD AUTO: 0 % — SIGNIFICANT CHANGE UP (ref 0–2)
DACRYOCYTES BLD QL SMEAR: SLIGHT — SIGNIFICANT CHANGE UP
ELLIPTOCYTES BLD QL SMEAR: SLIGHT — SIGNIFICANT CHANGE UP
EOSINOPHIL # BLD AUTO: 0 K/UL — SIGNIFICANT CHANGE UP (ref 0–0.5)
EOSINOPHIL NFR BLD AUTO: 0 % — SIGNIFICANT CHANGE UP (ref 0–6)
HCT VFR BLD CALC: 34.6 % — LOW (ref 39–50)
HGB BLD-MCNC: 10.7 G/DL — LOW (ref 13–17)
LYMPHOCYTES # BLD AUTO: 23.98 K/UL — HIGH (ref 1–3.3)
LYMPHOCYTES # BLD AUTO: 92 % — HIGH (ref 13–44)
MCHC RBC-ENTMCNC: 30.6 PG — SIGNIFICANT CHANGE UP (ref 27–34)
MCHC RBC-ENTMCNC: 30.9 G/DL — LOW (ref 32–36)
MCV RBC AUTO: 98.9 FL — SIGNIFICANT CHANGE UP (ref 80–100)
MONOCYTES # BLD AUTO: 0 K/UL — SIGNIFICANT CHANGE UP (ref 0–0.9)
MONOCYTES NFR BLD AUTO: 0 % — LOW (ref 2–14)
NEUTROPHILS # BLD AUTO: 2.09 K/UL — SIGNIFICANT CHANGE UP (ref 1.8–7.4)
NEUTROPHILS NFR BLD AUTO: 8 % — LOW (ref 43–77)
NRBC # BLD: 0 /100 — SIGNIFICANT CHANGE UP (ref 0–0)
NRBC # BLD: SIGNIFICANT CHANGE UP /100 WBCS (ref 0–0)
PLAT MORPH BLD: NORMAL — SIGNIFICANT CHANGE UP
PLATELET # BLD AUTO: 111 K/UL — LOW (ref 150–400)
POIKILOCYTOSIS BLD QL AUTO: SLIGHT — SIGNIFICANT CHANGE UP
RBC # BLD: 3.5 M/UL — LOW (ref 4.2–5.8)
RBC # FLD: 15 % — HIGH (ref 10.3–14.5)
RBC BLD AUTO: ABNORMAL
SCHISTOCYTES BLD QL AUTO: SLIGHT — SIGNIFICANT CHANGE UP
SMUDGE CELLS # BLD: PRESENT — SIGNIFICANT CHANGE UP
WBC # BLD: 26.07 K/UL — HIGH (ref 3.8–10.5)
WBC # FLD AUTO: 26.07 K/UL — HIGH (ref 3.8–10.5)

## 2023-09-07 PROCEDURE — 99214 OFFICE O/P EST MOD 30 MIN: CPT

## 2023-09-07 RX ORDER — ACALABRUTINIB 100 MG/1
100 CAPSULE, GELATIN COATED ORAL
Qty: 60 | Refills: 5 | Status: ACTIVE | COMMUNITY
Start: 2022-07-07 | End: 1900-01-01

## 2023-09-07 RX ORDER — ACALABRUTINIB 100 MG/1
100 TABLET, FILM COATED ORAL
Qty: 120 | Refills: 5 | Status: ACTIVE | COMMUNITY
Start: 2023-01-31 | End: 1900-01-01

## 2023-09-07 NOTE — ASSESSMENT
[FreeTextEntry1] : 75yo M w/ CLL, 11q-,13q-, recently started on therapy He does have LAD and w/ a conglomerate of mesenteric lymph nodes measuring up to 11cm. Last visit, we discussed starting CLL therapy with Calquence given worsening leukocytosis and anemia and also enlarging adenopathy. Patient started Calquence on 7/7 IgG from 6/9/22 was 295. We discussed doing IVIG for hypogammaglobulinemia x1yr. Pt has had multiple infections recently. s/p first dose of IVIG on 7/14/22. IgG levels improved to 503 on 9/8/22. Will complete 1 yr next month (missed last month b/c of COVID) Cont IVIG 20gm monthly, will check immunoglobulin panel today  CBC result reviewed and printed out for pt, WBC improving Cont Calquence 100mg BID  He received Feraheme in Nov 2022 in [place]: 11/18 and 11/25. He had labs with his PMD Dr. Doran on 5/31: Fe 37, TIBC 281, TSAT 13%, ferritin 50, s/p Feraheme weekly x2 on 6/15/23 and 6/22/23. f/u iron studies from today Cont f/u for prostate cancer Will go to [place] 10/8/23.  Had a pacemaker placement on 9/1/23, will f/u w/ cardiologist next week.  All questions answered. RTC 1 mo  Case and management discussed with Dr. Mcmahon

## 2023-09-07 NOTE — HISTORY OF PRESENT ILLNESS
[de-identified] : 72yo M here for f/u of CLL. He was previously following with Dr. Mccabe. \par  \par  On March 2,2017, total WBC 11,33, with absolute lymphocyte count 5865.\par  On May 25, Total WBC 14.3, ALC 9052.\par  Of note, chest CT for follow up of renal cell carcinoma on 5/31 showed slightly enlarged subpectoral and left axillary nodes.\par  MRI/PET 6/1 multiple stable retroperitoneal nodes without abnormal FDG uptake. There was mild splenomegaly without abnormal uptake.\par  \par  Flow cytometry reveals monotypic B cells positive for CD19 didn't CD20 CD23 and C5 negative for FMC-7, CD10 and CD38 consistent with CLL. The monotypic B cells are 41% of cells, equating to 5658 cells\par  FISH panel reveals deletion of 11q in 57.5% of cells and deletion of 13q in 67% of cells.\par  \par  He has had no constitutional symptoms.\par  He has a history of prostate carcinoma, 3 cm renal cell carcinoma, 3.5 cm medullary thyroid cancer\par  \par  His previous CA history is as follows: \par  In 2003, he was diagnosed with prostate CA s/p radical prostatectomy\par  MRI done for rising PSA in 2009, found to have RCC s/p partial R nephrectomy and RT to prostate bed\par  PET scheduled on Monday for rising PSA\par  In 8/2018, found to have medullary thyroid CA s/p total thyroidectomy\par  \par  PET MRI Axumin skullbase to thighs: s/p radical prostatectomy. No recurrence in the prostatectomy bed. No metastatic lymphadenopathy of prostate origin. No liver or lung metastasis.\par  Interval increase in size of the abdominal, retroperitoneal and mesenteric lymphadenopathy with correcting increase Axumin uptake since 6/10/19,. No liver infiltration. Splenomegaly (14.6cm), relatively stable since 6/10/19.\par  s/p R partial nephrectomy without evidence of local recurrence.\par  s/p thyroidectomy. No recurrent mass in the thyroidectomy bed [de-identified] : Labs done on 9/16/20 - WBC 78.9. Repeat on 10/6/20 was 73.79.  He went to [place] for the winter. Labs done in [place] on 5/3/21 when he had cellulitis showed WBC of 143.4.   He got back from [place] on 5/20. Cellulitis flared up again about 4 days later. He went to see his PMD and received a course of Cefadoxil x10d started 5/27. He had shingles on L side in July, around 7/12. Has LUQ pain, ? postherpetic neuralgia, on gabapentin.  He had his COVID booster 9/9 Abd sono done 9/2022: Extensive mesenteric adenopathy measuring up to 11 cm in size. Splenomegaly 16.8 by 8.7 x 12.3 cm.  He went to [place] for the winter. He was admitted for cellulitis - was discharged on IV Abx. Also had a rectal bleed from internal hemorrhoids Saw Dr. Jones - .88 in 4/2022. Hgb 9.0, plts 102 He was started on iron supplements for ferritin of 26. He had COVID 5/12 s/p monoclonal antibodies but still required hospitalization. He received Abx and Remdesivir, was discharged last week.  He has lost about 40 lbs after these admissions for infections.  IgG levels drawn in June was 295.   He started Calquence 100mg BID on 7/7, tolerating it well. He is bruising easily when there's trauma. He has increased joint pains at night; pain not too bad during the daytime.  We started IVIG on 7/14/22 Went to [place] 7/23-8/6. 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 Xray L femur scheduled for tomorrow   9/8: Patient was seen today accompanied by his wife, he is very depressed with his metastatic prostate cancer and CLL. He stated his f diffused pain in left knee, back lower back, Tylenol for and tramadol with mild improvement. Saw Ortho  on 8/18 for left knee pain 2/2 OR, had Monovisc injection into his left knee.  He f/u with [facility] oncologist for metastatic prostate cancer, Femur Xray done at [facility] on 8/12/22 show sclerosis of the proximal femur but no lytic lesions. Pt had PET/CT done on 8/30. He was started on med for prostate cancer added 1 week ago per pt, he didn't remember the name. Denied any usage of any steroid recently.   10/6: patient was seen today in the tx  with Dr. Mcmahon. Pt c/o constipation/hemorrhoid bleeding due to s/e of oral iron supplement. He will fly to [place] in 1 wk and will come back in May, will f/u w/ Dr. Jones while in [place] Will have COVID booster tomorrow night.  PSA level 90% decreased now He c/o shoulder, back and knee pain, took tylenol/ tramadol w/o significant improvement. Could not sleep during night due to the pain. rated it 6-7/10 at night.   6/7: He just got back from [place] a week or 2 ago. He was seeing Dr. Jones down in [place]. Last labs with him in April were: WBC 65.09, Hgb 10.8, plt 105.  He received Feraheme in Nov 2022: 11/18 and 11/25. He had labs with his PMD Dr. Doran on 5/31: Fe 37, TIBC 281, TSAT 13%, ferritin 50. Pt reports feeling very fatigued.  Getting IVIG today. He continues on Calquence.    Pt had COVID end of June. He continues to have fatigue and weakness. Also with diffuse joint pains.   9/7:  Patient is seen today for a f/u apt accompanied by his wife. He feels tired recently, saw cardiologist and had a cardiac monitor, was found pulse paused last Thursday night. He had a pacemaker placed last Friday. Still have bruses on his left chest from the procedure.  He will fly to [place] 10/8/23.

## 2023-09-07 NOTE — PHYSICAL EXAM
[Restricted in physically strenuous activity but ambulatory and able to carry out work of a light or sedentary nature] : Status 1- Restricted in physically strenuous activity but ambulatory and able to carry out work of a light or sedentary nature, e.g., light house work, office work [Normal] : RRR, normal S1S2, no murmurs, rubs, gallops [de-identified] : wearing compression stockings [de-identified] : pacemaker placed in the left chest, a clean surgical incision noticed on the upper left chest, healing well. No abnormal discharge. multiple large burses noticed on the left chest

## 2023-09-08 DIAGNOSIS — D80.1 NONFAMILIAL HYPOGAMMAGLOBULINEMIA: ICD-10-CM

## 2023-09-08 DIAGNOSIS — R11.2 NAUSEA WITH VOMITING, UNSPECIFIED: ICD-10-CM

## 2023-09-08 LAB
ALBUMIN SERPL ELPH-MCNC: 4.1 G/DL
ALP BLD-CCNC: 66 U/L
ALT SERPL-CCNC: 16 U/L
ANION GAP SERPL CALC-SCNC: 11 MMOL/L
AST SERPL-CCNC: 14 U/L
BILIRUB SERPL-MCNC: 0.2 MG/DL
BUN SERPL-MCNC: 21 MG/DL
CALCIUM SERPL-MCNC: 9.6 MG/DL
CHLORIDE SERPL-SCNC: 107 MMOL/L
CO2 SERPL-SCNC: 22 MMOL/L
CREAT SERPL-MCNC: 0.93 MG/DL
EGFR: 85 ML/MIN/1.73M2
FERRITIN SERPL-MCNC: 94 NG/ML
GLUCOSE SERPL-MCNC: 145 MG/DL
IRON SATN MFR SERPL: 22 %
IRON SERPL-MCNC: 59 UG/DL
POTASSIUM SERPL-SCNC: 4.6 MMOL/L
PROT SERPL-MCNC: 5.6 G/DL
SODIUM SERPL-SCNC: 140 MMOL/L
TIBC SERPL-MCNC: 272 UG/DL
UIBC SERPL-MCNC: 213 UG/DL

## 2023-09-13 LAB
ALBUMIN MFR SERPL ELPH: 62.7 %
ALBUMIN SERPL-MCNC: 3.5 G/DL
ALBUMIN/GLOB SERPL: 1.7 RATIO
ALPHA1 GLOB MFR SERPL ELPH: 5.5 %
ALPHA1 GLOB SERPL ELPH-MCNC: 0.3 G/DL
ALPHA2 GLOB MFR SERPL ELPH: 13.3 %
ALPHA2 GLOB SERPL ELPH-MCNC: 0.7 G/DL
B-GLOBULIN MFR SERPL ELPH: 10.9 %
B-GLOBULIN SERPL ELPH-MCNC: 0.6 G/DL
DEPRECATED KAPPA LC FREE/LAMBDA SER: 1.4 RATIO
GAMMA GLOB FLD ELPH-MCNC: 0.4 G/DL
GAMMA GLOB MFR SERPL ELPH: 7.6 %
IGA SER QL IEP: 62 MG/DL
IGG SER QL IEP: 436 MG/DL
IGM SER QL IEP: 26 MG/DL
INTERPRETATION SERPL IEP-IMP: NORMAL
KAPPA LC CSF-MCNC: 1.84 MG/DL
KAPPA LC SERPL-MCNC: 2.58 MG/DL
M PROTEIN MFR SERPL ELPH: NORMAL
M PROTEIN SPEC IFE-MCNC: NORMAL
MONOCLON BAND OBS SERPL: NORMAL
PROT SERPL-MCNC: 5.6 G/DL
PROT SERPL-MCNC: 5.6 G/DL

## 2023-09-14 NOTE — H&P PST ADULT - TOBACCO, LAST USE DATE, PROFILE
normal appearance , without tenderness upon palpation , no deformities , trachea midline , Thyroid normal size , no masses , thyroid nontender 01-Jan-1992

## 2023-09-28 ENCOUNTER — APPOINTMENT (OUTPATIENT)
Dept: SURGERY | Facility: CLINIC | Age: 78
End: 2023-09-28
Payer: MEDICARE

## 2023-09-28 DIAGNOSIS — R59.0 LOCALIZED ENLARGED LYMPH NODES: ICD-10-CM

## 2023-09-28 PROCEDURE — 99213 OFFICE O/P EST LOW 20 MIN: CPT

## 2023-09-28 PROCEDURE — 36415 COLL VENOUS BLD VENIPUNCTURE: CPT

## 2023-10-01 PROBLEM — M54.16 LUMBAR RADICULAR PAIN: Status: ACTIVE | Noted: 2018-09-25

## 2023-10-02 LAB
CALCIT SERPL-MCNC: 21.4 PG/ML
CEA SERPL-MCNC: 1.6 NG/ML
T3 SERPL-MCNC: 100 NG/DL
T4 FREE SERPL-MCNC: 2 NG/DL
TSH SERPL-ACNC: 0.04 UIU/ML

## 2023-10-09 ENCOUNTER — RESULT REVIEW (OUTPATIENT)
Age: 78
End: 2023-10-09

## 2023-10-09 ENCOUNTER — APPOINTMENT (OUTPATIENT)
Dept: HEMATOLOGY ONCOLOGY | Facility: CLINIC | Age: 78
End: 2023-10-09
Payer: MEDICARE

## 2023-10-09 ENCOUNTER — APPOINTMENT (OUTPATIENT)
Dept: INFUSION THERAPY | Facility: HOSPITAL | Age: 78
End: 2023-10-09

## 2023-10-09 VITALS
OXYGEN SATURATION: 97 % | BODY MASS INDEX: 30.78 KG/M2 | HEART RATE: 77 BPM | HEIGHT: 70 IN | SYSTOLIC BLOOD PRESSURE: 152 MMHG | TEMPERATURE: 96.9 F | DIASTOLIC BLOOD PRESSURE: 82 MMHG | WEIGHT: 214.99 LBS | RESPIRATION RATE: 16 BRPM

## 2023-10-09 LAB
BASOPHILS # BLD AUTO: 0 K/UL — SIGNIFICANT CHANGE UP (ref 0–0.2)
BASOPHILS NFR BLD AUTO: 0 % — SIGNIFICANT CHANGE UP (ref 0–2)
ELLIPTOCYTES BLD QL SMEAR: SLIGHT — SIGNIFICANT CHANGE UP
EOSINOPHIL # BLD AUTO: 0 K/UL — SIGNIFICANT CHANGE UP (ref 0–0.5)
EOSINOPHIL NFR BLD AUTO: 0 % — SIGNIFICANT CHANGE UP (ref 0–6)
HCT VFR BLD CALC: 35.8 % — LOW (ref 39–50)
HGB BLD-MCNC: 11 G/DL — LOW (ref 13–17)
LYMPHOCYTES # BLD AUTO: 26.07 K/UL — HIGH (ref 1–3.3)
LYMPHOCYTES # BLD AUTO: 93 % — HIGH (ref 13–44)
MCHC RBC-ENTMCNC: 29.6 PG — SIGNIFICANT CHANGE UP (ref 27–34)
MCHC RBC-ENTMCNC: 30.7 G/DL — LOW (ref 32–36)
MCV RBC AUTO: 96.5 FL — SIGNIFICANT CHANGE UP (ref 80–100)
MONOCYTES # BLD AUTO: 0 K/UL — SIGNIFICANT CHANGE UP (ref 0–0.9)
MONOCYTES NFR BLD AUTO: 0 % — LOW (ref 2–14)
NEUTROPHILS # BLD AUTO: 1.96 K/UL — SIGNIFICANT CHANGE UP (ref 1.8–7.4)
NEUTROPHILS NFR BLD AUTO: 7 % — LOW (ref 43–77)
NRBC # BLD: 0 /100 — SIGNIFICANT CHANGE UP (ref 0–0)
NRBC # BLD: SIGNIFICANT CHANGE UP /100 WBCS (ref 0–0)
PLAT MORPH BLD: NORMAL — SIGNIFICANT CHANGE UP
PLATELET # BLD AUTO: 112 K/UL — LOW (ref 150–400)
POIKILOCYTOSIS BLD QL AUTO: SLIGHT — SIGNIFICANT CHANGE UP
RBC # BLD: 3.71 M/UL — LOW (ref 4.2–5.8)
RBC # FLD: 14.4 % — SIGNIFICANT CHANGE UP (ref 10.3–14.5)
RBC BLD AUTO: ABNORMAL
SMUDGE CELLS # BLD: PRESENT — SIGNIFICANT CHANGE UP
WBC # BLD: 28.03 K/UL — HIGH (ref 3.8–10.5)
WBC # FLD AUTO: 28.03 K/UL — HIGH (ref 3.8–10.5)

## 2023-10-09 PROCEDURE — 99214 OFFICE O/P EST MOD 30 MIN: CPT

## 2024-01-02 ENCOUNTER — NON-APPOINTMENT (OUTPATIENT)
Age: 79
End: 2024-01-02

## 2024-01-09 ENCOUNTER — OUTPATIENT (OUTPATIENT)
Dept: OUTPATIENT SERVICES | Facility: HOSPITAL | Age: 79
LOS: 1 days | Discharge: HOME | End: 2024-01-09

## 2024-01-09 DIAGNOSIS — Z98.890 OTHER SPECIFIED POSTPROCEDURAL STATES: Chronic | ICD-10-CM

## 2024-01-09 DIAGNOSIS — Z96.651 PRESENCE OF RIGHT ARTIFICIAL KNEE JOINT: Chronic | ICD-10-CM

## 2024-01-09 DIAGNOSIS — Z90.5 ACQUIRED ABSENCE OF KIDNEY: Chronic | ICD-10-CM

## 2024-01-09 DIAGNOSIS — Z90.79 ACQUIRED ABSENCE OF OTHER GENITAL ORGAN(S): Chronic | ICD-10-CM

## 2024-01-09 DIAGNOSIS — E89.0 POSTPROCEDURAL HYPOTHYROIDISM: Chronic | ICD-10-CM

## 2024-01-09 DIAGNOSIS — D72.820 LYMPHOCYTOSIS (SYMPTOMATIC): ICD-10-CM

## 2024-04-26 NOTE — PROGRESS NOTE ADULT - SUBJECTIVE AND OBJECTIVE BOX
SUMMARY:  72 year-old man with history of T2DM, HTN, HLD, FERNANDO on CPAP, Pneumonia, CLL, Prostate Cancer s/p resection 2003, thyroid cancer s/p thyroidectomy 8/2018, renal cancer s/p partial nephrectomy and spinal stenosis with acquired scoliosis who initially presented 10/18/18 for L1-5 posterior lumbar fusion and T10-pelvis instrumentation and fusion but case was aborted due to 4 second pause after induction with propofol who returned 10/27/18 for surgery. Of note, he had a prior Holter monitoring study that revealed nocturnal bradycardia with pauses. 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 On 10/27/18, he underwent placement of temporary pacer and L1-S1 transforaminal interbody fusion, T10-pelvis instrumented fusion and Plastics assisted closure. Operative course was notable for bradycardia responsive to glycopyrrolate, cerebrospinal fluid leak which was primarily repaired and EBL of 1500cc. He received 2 units PRBC intra-op. Post-operatively, he had a ~40 minutes episode of hypotension to SBP in the 50smmHg, for which he was placed on pressor support and given an additional 2 units PRBC. Upon admission to [facility], still hypotensive and at one point required triple pressor support. Extubated 10/28/18.    Off pressure support during the day  +delirium/agitation, +pin    VITALS/IMAGING/DATA/IVF FLUIDS/MEDICATIONS: [x] Reviewed    EXAMINATION:  General: In pain but no acute distress  HEENT: Anicteric sclerae  Cardiac: [id number]  Lungs: Decreased BS at bases  Abdomen: Soft, distended, +BS  Extremities: No c/c/e  Skin/Incision Site: Clean, dry and intact  Neurologic: Eyes open spontaneously, regards, follows commands, anxious, PERRL, follows commands, UE 4/5, LE 3/5 Psychosis

## 2024-05-15 LAB
ALBUMIN SERPL ELPH-MCNC: 4.2 G/DL
ALP BLD-CCNC: 68 U/L
ALT SERPL-CCNC: 15 U/L
ANION GAP SERPL CALC-SCNC: 8 MMOL/L
AST SERPL-CCNC: 13 U/L
BILIRUB SERPL-MCNC: 0.2 MG/DL
BUN SERPL-MCNC: 26 MG/DL
CALCIUM SERPL-MCNC: 9.8 MG/DL
CHLORIDE SERPL-SCNC: 106 MMOL/L
CO2 SERPL-SCNC: 24 MMOL/L
CREAT SERPL-MCNC: 0.95 MG/DL
EGFR: 83 ML/MIN/1.73M2
GLUCOSE SERPL-MCNC: 145 MG/DL
POTASSIUM SERPL-SCNC: 4.6 MMOL/L
PROT SERPL-MCNC: 5.7 G/DL
SODIUM SERPL-SCNC: 139 MMOL/L

## 2024-05-17 ENCOUNTER — OUTPATIENT (OUTPATIENT)
Dept: OUTPATIENT SERVICES | Facility: HOSPITAL | Age: 79
LOS: 1 days | Discharge: HOME | End: 2024-05-17

## 2024-05-17 DIAGNOSIS — Z98.890 OTHER SPECIFIED POSTPROCEDURAL STATES: Chronic | ICD-10-CM

## 2024-05-17 DIAGNOSIS — Z96.651 PRESENCE OF RIGHT ARTIFICIAL KNEE JOINT: Chronic | ICD-10-CM

## 2024-05-17 DIAGNOSIS — E89.0 POSTPROCEDURAL HYPOTHYROIDISM: Chronic | ICD-10-CM

## 2024-05-17 DIAGNOSIS — Z90.5 ACQUIRED ABSENCE OF KIDNEY: Chronic | ICD-10-CM

## 2024-05-17 DIAGNOSIS — D72.820 LYMPHOCYTOSIS (SYMPTOMATIC): ICD-10-CM

## 2024-05-17 DIAGNOSIS — Z90.79 ACQUIRED ABSENCE OF OTHER GENITAL ORGAN(S): Chronic | ICD-10-CM

## 2024-05-20 NOTE — CONSULT NOTE ADULT - CONSULT REQUESTED BY NAME
----- Message from Azul RONNIE Bowdens sent at 5/20/2024  3:32 PM CDT -----  Contact: Charan/[id number]/ # [phone]  Requesting an RX refill or new RX.  Is this a refill or new RX: Refill 1  RX name and strength (copy/paste from chart):  metoprolol succinate (TOPROL-XL) 25 MG 24 hr tablet  Is this a 30 day or 90 day RX:   Pharmacy name and phone # (copy/paste from chart):  [id number] - [place], [place] - [address] AT [facility] [address] [facility]   Phone: [phone]  Fax: [phone]      Requesting an RX refill or new RX.  Is this a refill or new RX: Refill 2  RX name and strength (copy/paste from chart):  amLODIPine (NORVASC) 5 MG tablet  Is this a 30 day or 90 day RX:   Pharmacy name and phone # (copy/paste from chart):  [id number] - [place], [place] - [address] AT [facility] [address] [facility]   Phone: [phone] Fax: [phone]        The doctors have asked that we provide their patients with the following 2 reminders -- prescription refills can take up to 72 hours, and a friendly reminder that in the future you can use your [facility] account to request refills:       Patient unsure if he still on the medication. Please clarify. Thank you.   Dr. Garibay

## 2024-05-22 ENCOUNTER — APPOINTMENT (OUTPATIENT)
Dept: HEMATOLOGY ONCOLOGY | Facility: CLINIC | Age: 79
End: 2024-05-22
Payer: MEDICARE

## 2024-05-22 ENCOUNTER — LABORATORY RESULT (OUTPATIENT)
Age: 79
End: 2024-05-22

## 2024-05-22 ENCOUNTER — RESULT REVIEW (OUTPATIENT)
Age: 79
End: 2024-05-22

## 2024-05-22 ENCOUNTER — OUTPATIENT (OUTPATIENT)
Dept: OUTPATIENT SERVICES | Facility: HOSPITAL | Age: 79
LOS: 1 days | End: 2024-05-22
Payer: MEDICARE

## 2024-05-22 VITALS
SYSTOLIC BLOOD PRESSURE: 109 MMHG | TEMPERATURE: 96.6 F | DIASTOLIC BLOOD PRESSURE: 67 MMHG | HEART RATE: 65 BPM | RESPIRATION RATE: 16 BRPM | OXYGEN SATURATION: 98 %

## 2024-05-22 VITALS — BODY MASS INDEX: 32.64 KG/M2 | WEIGHT: 227.52 LBS

## 2024-05-22 DIAGNOSIS — D72.820 LYMPHOCYTOSIS (SYMPTOMATIC): ICD-10-CM

## 2024-05-22 DIAGNOSIS — Z90.5 ACQUIRED ABSENCE OF KIDNEY: Chronic | ICD-10-CM

## 2024-05-22 DIAGNOSIS — Z98.890 OTHER SPECIFIED POSTPROCEDURAL STATES: Chronic | ICD-10-CM

## 2024-05-22 DIAGNOSIS — Z96.651 PRESENCE OF RIGHT ARTIFICIAL KNEE JOINT: Chronic | ICD-10-CM

## 2024-05-22 DIAGNOSIS — E89.0 POSTPROCEDURAL HYPOTHYROIDISM: Chronic | ICD-10-CM

## 2024-05-22 DIAGNOSIS — Z90.79 ACQUIRED ABSENCE OF OTHER GENITAL ORGAN(S): Chronic | ICD-10-CM

## 2024-05-22 LAB
BASOPHILS # BLD AUTO: 0.05 K/UL — SIGNIFICANT CHANGE UP (ref 0–0.2)
BASOPHILS NFR BLD AUTO: 1 % — SIGNIFICANT CHANGE UP (ref 0–2)
DACRYOCYTES BLD QL SMEAR: SLIGHT — SIGNIFICANT CHANGE UP
ELLIPTOCYTES BLD QL SMEAR: SLIGHT — SIGNIFICANT CHANGE UP
EOSINOPHIL # BLD AUTO: 0.22 K/UL — SIGNIFICANT CHANGE UP (ref 0–0.5)
EOSINOPHIL NFR BLD AUTO: 4 % — SIGNIFICANT CHANGE UP (ref 0–6)
HCT VFR BLD CALC: 31.9 % — LOW (ref 39–50)
HGB BLD-MCNC: 10.1 G/DL — LOW (ref 13–17)
HYPOCHROMIA BLD QL: SLIGHT — SIGNIFICANT CHANGE UP
LYMPHOCYTES # BLD AUTO: 2.03 K/UL — SIGNIFICANT CHANGE UP (ref 1–3.3)
LYMPHOCYTES # BLD AUTO: 37 % — SIGNIFICANT CHANGE UP (ref 13–44)
LYMPHOCYTES # SPEC AUTO: 24 % — HIGH (ref 0–0)
MCHC RBC-ENTMCNC: 29.9 PG — SIGNIFICANT CHANGE UP (ref 27–34)
MCHC RBC-ENTMCNC: 31.7 G/DL — LOW (ref 32–36)
MCV RBC AUTO: 94.4 FL — SIGNIFICANT CHANGE UP (ref 80–100)
MONOCYTES # BLD AUTO: 0 K/UL — SIGNIFICANT CHANGE UP (ref 0–0.9)
MONOCYTES NFR BLD AUTO: 0 % — LOW (ref 2–14)
NEUTROPHILS # BLD AUTO: 1.86 K/UL — SIGNIFICANT CHANGE UP (ref 1.8–7.4)
NEUTROPHILS NFR BLD AUTO: 34 % — LOW (ref 43–77)
NRBC # BLD: 0 /100 WBCS — SIGNIFICANT CHANGE UP (ref 0–0)
NRBC # BLD: SIGNIFICANT CHANGE UP /100 WBCS (ref 0–0)
PLAT MORPH BLD: NORMAL — SIGNIFICANT CHANGE UP
PLATELET # BLD AUTO: 36 K/UL — LOW (ref 150–400)
POIKILOCYTOSIS BLD QL AUTO: SLIGHT — SIGNIFICANT CHANGE UP
RBC # BLD: 3.38 M/UL — LOW (ref 4.2–5.8)
RBC # FLD: 16.6 % — HIGH (ref 10.3–14.5)
RBC BLD AUTO: ABNORMAL
SCHISTOCYTES BLD QL AUTO: SLIGHT — SIGNIFICANT CHANGE UP
SMUDGE CELLS # BLD: PRESENT — SIGNIFICANT CHANGE UP
WBC # BLD: 5.48 K/UL — SIGNIFICANT CHANGE UP (ref 3.8–10.5)
WBC # FLD AUTO: 5.48 K/UL — SIGNIFICANT CHANGE UP (ref 3.8–10.5)

## 2024-05-22 PROCEDURE — 38222 DX BONE MARROW BX & ASPIR: CPT | Mod: RT

## 2024-05-22 PROCEDURE — G2211 COMPLEX E/M VISIT ADD ON: CPT

## 2024-05-22 PROCEDURE — 86901 BLOOD TYPING SEROLOGIC RH(D): CPT

## 2024-05-22 PROCEDURE — 86850 RBC ANTIBODY SCREEN: CPT

## 2024-05-22 PROCEDURE — 86900 BLOOD TYPING SEROLOGIC ABO: CPT

## 2024-05-22 PROCEDURE — 99214 OFFICE O/P EST MOD 30 MIN: CPT

## 2024-05-22 NOTE — HISTORY OF PRESENT ILLNESS
[de-identified] : 74yo M here for f/u of CLL. He was previously following with Dr. Mccabe. \par  \par  On March 2,2017, total WBC 11,33, with absolute lymphocyte count 5865.\par  On May 25, Total WBC 14.3, ALC 9052.\par  Of note, chest CT for follow up of renal cell carcinoma on 5/31 showed slightly enlarged subpectoral and left axillary nodes.\par  MRI/PET 6/1 multiple stable retroperitoneal nodes without abnormal FDG uptake. There was mild splenomegaly without abnormal uptake.\par  \par  Flow cytometry reveals monotypic B cells positive for CD19 didn't CD20 CD23 and C5 negative for FMC-7, CD10 and CD38 consistent with CLL. The monotypic B cells are 41% of cells, equating to 5658 cells\par  FISH panel reveals deletion of 11q in 57.5% of cells and deletion of 13q in 67% of cells.\par  \par  He has had no constitutional symptoms.\par  He has a history of prostate carcinoma, 3 cm renal cell carcinoma, 3.5 cm medullary thyroid cancer\par  \par  His previous CA history is as follows: \par  In 2003, he was diagnosed with prostate CA s/p radical prostatectomy\par  MRI done for rising PSA in 2009, found to have RCC s/p partial R nephrectomy and RT to prostate bed\par  PET scheduled on Monday for rising PSA\par  In 8/2018, found to have medullary thyroid CA s/p total thyroidectomy\par  \par  PET MRI Axumin skullbase to thighs: s/p radical prostatectomy. No recurrence in the prostatectomy bed. No metastatic lymphadenopathy of prostate origin. No liver or lung metastasis.\par  Interval increase in size of the abdominal, retroperitoneal and mesenteric lymphadenopathy with correcting increase Axumin uptake since 6/10/19,. No liver infiltration. Splenomegaly (14.6cm), relatively stable since 6/10/19.\par  s/p R partial nephrectomy without evidence of local recurrence.\par  s/p thyroidectomy. No recurrent mass in the thyroidectomy bed [de-identified] : Labs done on 9/16/20 - WBC 78.9. Repeat on 10/6/20 was 73.79.  He went to [place] for the winter. Labs done in [place] on 5/3/21 when he had cellulitis showed WBC of 143.4.   He got back from [place] on 5/20. Cellulitis flared up again about 4 days later. He went to see his PMD and received a course of Cefadoxil x10d started 5/27. He had shingles on L side in July, around 7/12. Has LUQ pain, ? postherpetic neuralgia, on gabapentin.  He had his COVID booster 9/9 Abd sono done 9/2022: Extensive mesenteric adenopathy measuring up to 11 cm in size. Splenomegaly 16.8 by 8.7 x 12.3 cm.  He went to [place] for the winter. He was admitted for cellulitis - was discharged on IV Abx. Also had a rectal bleed from internal hemorrhoids Saw Dr. Jones - .88 in 4/2022. Hgb 9.0, plts 102 He was started on iron supplements for ferritin of 26. He had COVID 5/12 s/p monoclonal antibodies but still required hospitalization. He received Abx and Remdesivir, was discharged last week.  He has lost about 40 lbs after these admissions for infections.  IgG levels drawn in June was 295.   He started Calquence 100mg BID on 7/7, tolerating it well. He is bruising easily when there's trauma. He has increased joint pains at night; pain not too bad during the daytime.  We started IVIG on 7/14/22 Went to [place] 7/23-8/6. 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 Xray L femur scheduled for tomorrow   9/8: Patient was seen today accompanied by his wife, he is very depressed with his metastatic prostate cancer and CLL. He stated his f diffused pain in left knee, back lower back, Tylenol for and tramadol with mild improvement. Saw Ortho  on 8/18 for left knee pain 2/2 OR, had Monovisc injection into his left knee.  He f/u with [facility] oncologist for metastatic prostate cancer, Femur Xray done at [facility] on 8/12/22 show sclerosis of the proximal femur but no lytic lesions. Pt had PET/CT done on 8/30. He was started on med for prostate cancer added 1 week ago per pt, he didn't remember the name. Denied any usage of any steroid recently.   10/6: patient was seen today in the [facility] with Dr. Mcmahon. Pt c/o constipation/hemorrhoid bleeding due to s/e of oral iron supplement. He will fly to [place] in 1 wk and will come back in May, will f/u w/ Dr. Jones while in [place] Will have COVID booster tomorrow night.  PSA level 90% decreased now He c/o shoulder, back and knee pain, took tylenol/ tramadol w/o significant improvement. Could not sleep during night due to the pain. rated it 6-7/10 at night.   6/7: He just got back from [place] a week or 2 ago. He was seeing Dr. Jones down in [place]. Last labs with him in April were: WBC 65.09, Hgb 10.8, plt 105.  He received Feraheme in Nov 2022: 11/18 and 11/25. He had labs with his PMD Dr. Doran on 5/31: Fe 37, TIBC 281, TSAT 13%, ferritin 50. Pt reports feeling very fatigued.  Getting IVIG today. He continues on Calquence.    Pt had COVID end of June. He continues to have fatigue and weakness. Also with diffuse joint pains.   9/7:  Patient is seen today for a f/u apt accompanied by his wife. He feels tired recently, saw cardiologist and had a cardiac monitor, was found pulse paused last Thursday night. He had a pacemaker placed last Friday 9/1/23. Still have bruises on his left chest from the procedure.   10/9: Still has some fatigue but not like before. He will be going to [place] end of the month.   5/22/24: Pt returned to [place] from [place] yesterday.  He was admitted to [facility] on 4/23/24 for 5 dys for GIB and severe anemia, s/p 3U PRBC. He was seeing hematologist Dr. Jones when he was in [place]. His platelet dropped on 5/7/2024 to 40K,  he got 1U PLT on 5/9, plate rechecked on 5/14 55k. Dexa 40mg x 4 days completed on 5/18. Calquence was hold since 4/18/24 when he was hospitalized. Xarelto was hold since platelet trended down.  He is still on Orgovyx for prostate

## 2024-05-22 NOTE — REASON FOR VISIT
[Bone Marrow Biopsy] : bone marrow biopsy [Bone Marrow Aspiration] : bone marrow aspiration [FreeTextEntry2] : 77 yo male w/CLL on calquence, now presented w/worsening thrombocytopenia, r/o MDS

## 2024-05-22 NOTE — ASSESSMENT
[FreeTextEntry1] : 77yo M w/ CLL, 11q-,13q-, recently started on therapy He does have LAD and w/ a conglomerate of mesenteric lymph nodes measuring up to 11cm. Last visit, we discussed starting CLL therapy with Calquence given worsening leukocytosis and anemia and also enlarging adenopathy. Patient started Calquence on 7/7 IgG from 6/9/22 was 295. We discussed doing IVIG for hypogammaglobulinemia x1yr. Pt has had multiple infections recently. s/p first dose of IVIG on 7/14/22. IgG levels improved to 503 on 9/8/22. Cont IVIG 20gm monthly, IgG ~500s before each dose. Pt has not had serious infections since starting. cont IVIG monthly, scheduled on 6/19 CBC result reviewed and printed out for pt, WBC stable Xarelto and Calquence 100mg BID since 4/18/24 when he was hospitalized in [place] for GIB and 3UPRBC; he f/u Hematologist Dr. Jones in [place], was found worsening thrombocytopenia, lowest 41K; CBC today showed platelet 36K, WBC trending down to 5.48 today. We discussed to proceed Bmbx to assess for MDS. Pt and spouse verbalized understanding and agreed. Will have bmbx done today.  He received Feraheme in Nov 2022 in [place]: 11/18 and 11/25. He had labs with his PMD Dr. Doran on 5/31: Fe 37, TIBC 281, TSAT 13%, ferritin 50, s/p Feraheme weekly x2 on 6/15/23 and 6/22/23. Iron study today showed LYNN: ferritin 28, Iron sat 13%. Will set up Feraheme weekly x 2.  Will have weekly check on platelet count, set up possible plt weekly. Will have first platelet transfusion at [facility] on 5/25, blood consent obtained today, T&Cx2 ordered today.  Cont f/u for prostate cancer, on Orgovyx  Had a pacemaker placement on 9/1/23, will f/u w/ cardiologist next week.  All questions answered. RTC in 2 wks  Case and management discussed with Dr. Mcmahon

## 2024-05-22 NOTE — PROCEDURE
[Bone Marrow Biopsy] : bone marrow biopsy [Bone Marrow Aspiration] : bone marrow aspiration  [Patient] : the patient [Patient identification verified] : patient identification verified [Procedure verified and consent obtained] : procedure verified and consent obtained [Correct positioning] : correct positioning [Prone] : prone [The right posterior iliac crest was prepped with betadine and draped, using sterile technique.] : The right posterior iliac crest was prepped with betadine and draped, using sterile technique. [Lidocaine was injected and into the periosteum overlying the site.] : Lidocaine was injected and into the periosteum overlying the site. [Aspirate] : aspirate [Cytogenetics] : cytogenetics [FISH] : FISH [Other ___] : [unfilled] [Biopsy] : biopsy [Flow Cytometry] : flow cytometry [] : The patient was instructed to remove the bandage the following AM. The patient may bathe. Acetaminophen may be taken for discomfort, as per package directions.If there are any other problems, the patient was instructed to call the office. The patient verbalized understanding, and is aware of the office contact numbers. [FreeTextEntry1] : 79 yo male w/CLL on calquence, now presented w/worsening thrombocytopenia, r/o MDS [FreeTextEntry2] : CBC prior to procedure WBC 5.38 Hgb  10.1   Hct 31.9 Plt 36K BM Bx and aspiration was performed by JAXSON Ingram. 2 lavender + 2 green top tubes of BM aspirate and 1 cassette of BM core specimen sent to lab. 10ml 2% Lidocaine injected at the biopsy site.

## 2024-05-22 NOTE — PHYSICAL EXAM
[Restricted in physically strenuous activity but ambulatory and able to carry out work of a light or sedentary nature] : Status 1- Restricted in physically strenuous activity but ambulatory and able to carry out work of a light or sedentary nature, e.g., light house work, office work [Normal] : affect appropriate [de-identified] : wearing compression stockings [de-identified] : pacemaker placed in the left chest, well healed

## 2024-05-24 LAB
ALBUMIN SERPL ELPH-MCNC: 4.1 G/DL
ALP BLD-CCNC: 64 U/L
ALT SERPL-CCNC: 15 U/L
ANION GAP SERPL CALC-SCNC: 12 MMOL/L
AST SERPL-CCNC: 16 U/L
BILIRUB SERPL-MCNC: 0.2 MG/DL
BUN SERPL-MCNC: 18 MG/DL
CALCIUM SERPL-MCNC: 8.7 MG/DL
CHLORIDE SERPL-SCNC: 104 MMOL/L
CO2 SERPL-SCNC: 22 MMOL/L
CREAT SERPL-MCNC: 1.08 MG/DL
EGFR: 71 ML/MIN/1.73M2
FERRITIN SERPL-MCNC: 28 NG/ML
GLUCOSE SERPL-MCNC: 112 MG/DL
IRON SATN MFR SERPL: 13 %
IRON SERPL-MCNC: 50 UG/DL
POTASSIUM SERPL-SCNC: 4.4 MMOL/L
PROT SERPL-MCNC: 6.1 G/DL
SODIUM SERPL-SCNC: 138 MMOL/L
TIBC SERPL-MCNC: 383 UG/DL
UIBC SERPL-MCNC: 334 UG/DL

## 2024-05-25 ENCOUNTER — RESULT REVIEW (OUTPATIENT)
Age: 79
End: 2024-05-25

## 2024-05-25 ENCOUNTER — APPOINTMENT (OUTPATIENT)
Dept: INFUSION THERAPY | Facility: HOSPITAL | Age: 79
End: 2024-05-25

## 2024-05-25 LAB
BASOPHILS # BLD AUTO: 0 K/UL — SIGNIFICANT CHANGE UP (ref 0–0.2)
BASOPHILS NFR BLD AUTO: 0 % — SIGNIFICANT CHANGE UP (ref 0–2)
DACRYOCYTES BLD QL SMEAR: SLIGHT — SIGNIFICANT CHANGE UP
ELLIPTOCYTES BLD QL SMEAR: SLIGHT — SIGNIFICANT CHANGE UP
EOSINOPHIL # BLD AUTO: 0.18 K/UL — SIGNIFICANT CHANGE UP (ref 0–0.5)
EOSINOPHIL NFR BLD AUTO: 3 % — SIGNIFICANT CHANGE UP (ref 0–6)
HCT VFR BLD CALC: 30 % — LOW (ref 39–50)
HGB BLD-MCNC: 9.7 G/DL — LOW (ref 13–17)
LYMPHOCYTES # BLD AUTO: 2.02 K/UL — SIGNIFICANT CHANGE UP (ref 1–3.3)
LYMPHOCYTES # BLD AUTO: 34 % — SIGNIFICANT CHANGE UP (ref 13–44)
LYMPHOCYTES # SPEC AUTO: 23 % — HIGH (ref 0–0)
MCHC RBC-ENTMCNC: 29.8 PG — SIGNIFICANT CHANGE UP (ref 27–34)
MCHC RBC-ENTMCNC: 32.3 G/DL — SIGNIFICANT CHANGE UP (ref 32–36)
MCV RBC AUTO: 92.3 FL — SIGNIFICANT CHANGE UP (ref 80–100)
MONOCYTES # BLD AUTO: 0.06 K/UL — SIGNIFICANT CHANGE UP (ref 0–0.9)
MONOCYTES NFR BLD AUTO: 1 % — LOW (ref 2–14)
NEUTROPHILS # BLD AUTO: 2.32 K/UL — SIGNIFICANT CHANGE UP (ref 1.8–7.4)
NEUTROPHILS NFR BLD AUTO: 39 % — LOW (ref 43–77)
NRBC # BLD: 0 /100 WBCS — SIGNIFICANT CHANGE UP (ref 0–0)
NRBC # BLD: SIGNIFICANT CHANGE UP /100 WBCS (ref 0–0)
PLAT MORPH BLD: NORMAL — SIGNIFICANT CHANGE UP
PLATELET # BLD AUTO: 35 K/UL — LOW (ref 150–400)
POIKILOCYTOSIS BLD QL AUTO: SLIGHT — SIGNIFICANT CHANGE UP
RBC # BLD: 3.25 M/UL — LOW (ref 4.2–5.8)
RBC # FLD: 16.4 % — HIGH (ref 10.3–14.5)
RBC BLD AUTO: ABNORMAL
SCHISTOCYTES BLD QL AUTO: SLIGHT — SIGNIFICANT CHANGE UP
WBC # BLD: 5.94 K/UL — SIGNIFICANT CHANGE UP (ref 3.8–10.5)
WBC # FLD AUTO: 5.94 K/UL — SIGNIFICANT CHANGE UP (ref 3.8–10.5)

## 2024-05-28 DIAGNOSIS — D50.9 IRON DEFICIENCY ANEMIA, UNSPECIFIED: ICD-10-CM

## 2024-05-28 DIAGNOSIS — C91.10 CHRONIC LYMPHOCYTIC LEUKEMIA OF B-CELL TYPE NOT HAVING ACHIEVED REMISSION: ICD-10-CM

## 2024-05-29 ENCOUNTER — NON-APPOINTMENT (OUTPATIENT)
Age: 79
End: 2024-05-29

## 2024-05-30 ENCOUNTER — RESULT REVIEW (OUTPATIENT)
Age: 79
End: 2024-05-30

## 2024-05-30 ENCOUNTER — APPOINTMENT (OUTPATIENT)
Dept: INFUSION THERAPY | Facility: HOSPITAL | Age: 79
End: 2024-05-30

## 2024-05-30 LAB
BASOPHILS # BLD AUTO: 0 K/UL — SIGNIFICANT CHANGE UP (ref 0–0.2)
BASOPHILS NFR BLD AUTO: 0 % — SIGNIFICANT CHANGE UP (ref 0–2)
DACRYOCYTES BLD QL SMEAR: SLIGHT — SIGNIFICANT CHANGE UP
ELLIPTOCYTES BLD QL SMEAR: SLIGHT — SIGNIFICANT CHANGE UP
EOSINOPHIL # BLD AUTO: 0.07 K/UL — SIGNIFICANT CHANGE UP (ref 0–0.5)
EOSINOPHIL NFR BLD AUTO: 1 % — SIGNIFICANT CHANGE UP (ref 0–6)
HCT VFR BLD CALC: 29.8 % — LOW (ref 39–50)
HGB BLD-MCNC: 9.7 G/DL — LOW (ref 13–17)
LYMPHOCYTES # BLD AUTO: 2.32 K/UL — SIGNIFICANT CHANGE UP (ref 1–3.3)
LYMPHOCYTES # BLD AUTO: 35 % — SIGNIFICANT CHANGE UP (ref 13–44)
LYMPHOCYTES # SPEC AUTO: 23 % — HIGH (ref 0–0)
MCHC RBC-ENTMCNC: 29.9 PG — SIGNIFICANT CHANGE UP (ref 27–34)
MCHC RBC-ENTMCNC: 32.6 G/DL — SIGNIFICANT CHANGE UP (ref 32–36)
MCV RBC AUTO: 92 FL — SIGNIFICANT CHANGE UP (ref 80–100)
MONOCYTES # BLD AUTO: 0.13 K/UL — SIGNIFICANT CHANGE UP (ref 0–0.9)
MONOCYTES NFR BLD AUTO: 2 % — SIGNIFICANT CHANGE UP (ref 2–14)
NEUTROPHILS # BLD AUTO: 2.58 K/UL — SIGNIFICANT CHANGE UP (ref 1.8–7.4)
NEUTROPHILS NFR BLD AUTO: 39 % — LOW (ref 43–77)
NRBC # BLD: 0 /100 WBCS — SIGNIFICANT CHANGE UP (ref 0–0)
NRBC # BLD: SIGNIFICANT CHANGE UP /100 WBCS (ref 0–0)
PLAT MORPH BLD: NORMAL — SIGNIFICANT CHANGE UP
PLATELET # BLD AUTO: 36 K/UL — LOW (ref 150–400)
POIKILOCYTOSIS BLD QL AUTO: SLIGHT — SIGNIFICANT CHANGE UP
RBC # BLD: 3.24 M/UL — LOW (ref 4.2–5.8)
RBC # FLD: 17.1 % — HIGH (ref 10.3–14.5)
RBC BLD AUTO: ABNORMAL
SCHISTOCYTES BLD QL AUTO: SLIGHT — SIGNIFICANT CHANGE UP
WBC # BLD: 6.62 K/UL — SIGNIFICANT CHANGE UP (ref 3.8–10.5)
WBC # FLD AUTO: 6.62 K/UL — SIGNIFICANT CHANGE UP (ref 3.8–10.5)

## 2024-06-01 ENCOUNTER — APPOINTMENT (OUTPATIENT)
Dept: INFUSION THERAPY | Facility: HOSPITAL | Age: 79
End: 2024-06-01

## 2024-06-03 ENCOUNTER — RESULT REVIEW (OUTPATIENT)
Age: 79
End: 2024-06-03

## 2024-06-03 ENCOUNTER — APPOINTMENT (OUTPATIENT)
Dept: HEMATOLOGY ONCOLOGY | Facility: CLINIC | Age: 79
End: 2024-06-03
Payer: MEDICARE

## 2024-06-03 ENCOUNTER — APPOINTMENT (OUTPATIENT)
Dept: INFUSION THERAPY | Facility: HOSPITAL | Age: 79
End: 2024-06-03

## 2024-06-03 VITALS
BODY MASS INDEX: 32.5 KG/M2 | HEART RATE: 64 BPM | TEMPERATURE: 98.6 F | RESPIRATION RATE: 16 BRPM | SYSTOLIC BLOOD PRESSURE: 127 MMHG | DIASTOLIC BLOOD PRESSURE: 72 MMHG | HEIGHT: 70 IN | WEIGHT: 226.99 LBS | OXYGEN SATURATION: 95 %

## 2024-06-03 DIAGNOSIS — D61.818 OTHER PANCYTOPENIA: ICD-10-CM

## 2024-06-03 LAB
ALBUMIN MFR SERPL ELPH: 60.7 %
ALBUMIN SERPL-MCNC: 3.8 G/DL
ALBUMIN/GLOB SERPL: 1.6 RATIO
ALPHA1 GLOB MFR SERPL ELPH: 4.7 %
ALPHA1 GLOB SERPL ELPH-MCNC: 0.3 G/DL
ALPHA2 GLOB MFR SERPL ELPH: 11.6 %
ALPHA2 GLOB SERPL ELPH-MCNC: 0.7 G/DL
ANISOCYTOSIS BLD QL: SLIGHT — SIGNIFICANT CHANGE UP
B-GLOBULIN MFR SERPL ELPH: 11.8 %
B-GLOBULIN SERPL ELPH-MCNC: 0.7 G/DL
BASOPHILS # BLD AUTO: 0 K/UL — SIGNIFICANT CHANGE UP (ref 0–0.2)
BASOPHILS NFR BLD AUTO: 0 % — SIGNIFICANT CHANGE UP (ref 0–2)
DACRYOCYTES BLD QL SMEAR: SLIGHT — SIGNIFICANT CHANGE UP
DEPRECATED KAPPA LC FREE/LAMBDA SER: 1.27 RATIO
ELLIPTOCYTES BLD QL SMEAR: SLIGHT — SIGNIFICANT CHANGE UP
EOSINOPHIL # BLD AUTO: 0.13 K/UL — SIGNIFICANT CHANGE UP (ref 0–0.5)
EOSINOPHIL NFR BLD AUTO: 2 % — SIGNIFICANT CHANGE UP (ref 0–6)
GAMMA GLOB FLD ELPH-MCNC: 0.7 G/DL
GAMMA GLOB MFR SERPL ELPH: 11.2 %
HCT VFR BLD CALC: 31.1 % — LOW (ref 39–50)
HGB BLD-MCNC: 10 G/DL — LOW (ref 13–17)
IGA SER QL IEP: 53 MG/DL
IGG SER QL IEP: 681 MG/DL
IGM SER QL IEP: 25 MG/DL
INTERPRETATION SERPL IEP-IMP: NORMAL
KAPPA LC CSF-MCNC: 1.6 MG/DL
KAPPA LC SERPL-MCNC: 2.03 MG/DL
LYMPHOCYTES # BLD AUTO: 1.83 K/UL — SIGNIFICANT CHANGE UP (ref 1–3.3)
LYMPHOCYTES # BLD AUTO: 29 % — SIGNIFICANT CHANGE UP (ref 13–44)
LYMPHOCYTES # SPEC AUTO: 26 % — HIGH (ref 0–0)
M PROTEIN SPEC IFE-MCNC: NORMAL
MCHC RBC-ENTMCNC: 30.4 PG — SIGNIFICANT CHANGE UP (ref 27–34)
MCHC RBC-ENTMCNC: 32.2 G/DL — SIGNIFICANT CHANGE UP (ref 32–36)
MCV RBC AUTO: 94.5 FL — SIGNIFICANT CHANGE UP (ref 80–100)
MONOCYTES # BLD AUTO: 0.06 K/UL — SIGNIFICANT CHANGE UP (ref 0–0.9)
MONOCYTES NFR BLD AUTO: 1 % — LOW (ref 2–14)
NEUTROPHILS # BLD AUTO: 2.65 K/UL — SIGNIFICANT CHANGE UP (ref 1.8–7.4)
NEUTROPHILS NFR BLD AUTO: 42 % — LOW (ref 43–77)
NRBC # BLD: 0 /100 WBCS — SIGNIFICANT CHANGE UP (ref 0–0)
NRBC # BLD: SIGNIFICANT CHANGE UP /100 WBCS (ref 0–0)
PLAT MORPH BLD: NORMAL — SIGNIFICANT CHANGE UP
PLATELET # BLD AUTO: 46 K/UL — LOW (ref 150–400)
POIKILOCYTOSIS BLD QL AUTO: SLIGHT — SIGNIFICANT CHANGE UP
PROT SERPL-MCNC: 6.2 G/DL
PROT SERPL-MCNC: 6.2 G/DL
RBC # BLD: 3.29 M/UL — LOW (ref 4.2–5.8)
RBC # FLD: 17.4 % — HIGH (ref 10.3–14.5)
RBC BLD AUTO: ABNORMAL
SCHISTOCYTES BLD QL AUTO: SLIGHT — SIGNIFICANT CHANGE UP
SMUDGE CELLS # BLD: PRESENT — SIGNIFICANT CHANGE UP
WBC # BLD: 6.32 K/UL — SIGNIFICANT CHANGE UP (ref 3.8–10.5)
WBC # FLD AUTO: 6.32 K/UL — SIGNIFICANT CHANGE UP (ref 3.8–10.5)

## 2024-06-03 PROCEDURE — 99214 OFFICE O/P EST MOD 30 MIN: CPT

## 2024-06-03 PROCEDURE — G2211 COMPLEX E/M VISIT ADD ON: CPT

## 2024-06-14 ENCOUNTER — APPOINTMENT (OUTPATIENT)
Dept: INFUSION THERAPY | Facility: HOSPITAL | Age: 79
End: 2024-06-14

## 2024-06-15 ENCOUNTER — APPOINTMENT (OUTPATIENT)
Dept: INFUSION THERAPY | Facility: HOSPITAL | Age: 79
End: 2024-06-15

## 2024-06-18 NOTE — HISTORY OF PRESENT ILLNESS
[de-identified] : 74yo M here for f/u of CLL. He was previously following with Dr. Mccabe. \par  \par  On March 2,2017, total WBC 11,33, with absolute lymphocyte count 5865.\par  On May 25, Total WBC 14.3, ALC 9052.\par  Of note, chest CT for follow up of renal cell carcinoma on 5/31 showed slightly enlarged subpectoral and left axillary nodes.\par  MRI/PET 6/1 multiple stable retroperitoneal nodes without abnormal FDG uptake. There was mild splenomegaly without abnormal uptake.\par  \par  Flow cytometry reveals monotypic B cells positive for CD19 didn't CD20 CD23 and C5 negative for FMC-7, CD10 and CD38 consistent with CLL. The monotypic B cells are 41% of cells, equating to 5658 cells\par  FISH panel reveals deletion of 11q in 57.5% of cells and deletion of 13q in 67% of cells.\par  \par  He has had no constitutional symptoms.\par  He has a history of prostate carcinoma, 3 cm renal cell carcinoma, 3.5 cm medullary thyroid cancer\par  \par  His previous CA history is as follows: \par  In 2003, he was diagnosed with prostate CA s/p radical prostatectomy\par  MRI done for rising PSA in 2009, found to have RCC s/p partial R nephrectomy and RT to prostate bed\par  PET scheduled on Monday for rising PSA\par  In 8/2018, found to have medullary thyroid CA s/p total thyroidectomy\par  \par  PET MRI Axumin skullbase to thighs: s/p radical prostatectomy. No recurrence in the prostatectomy bed. No metastatic lymphadenopathy of prostate origin. No liver or lung metastasis.\par  Interval increase in size of the abdominal, retroperitoneal and mesenteric lymphadenopathy with correcting increase Axumin uptake since 6/10/19,. No liver infiltration. Splenomegaly (14.6cm), relatively stable since 6/10/19.\par  s/p R partial nephrectomy without evidence of local recurrence.\par  s/p thyroidectomy. No recurrent mass in the thyroidectomy bed [de-identified] : Labs done on 9/16/20 - WBC 78.9. Repeat on 10/6/20 was 73.79.  He went to [place] for the winter. Labs done in [place] on 5/3/21 when he had cellulitis showed WBC of 143.4.   He got back from [place] on 5/20. Cellulitis flared up again about 4 days later. He went to see his PMD and received a course of Cefadoxil x10d started 5/27. He had shingles on L side in July, around 7/12. Has LUQ pain, ? postherpetic neuralgia, on gabapentin.  He had his COVID booster 9/9 Abd sono done 9/2022: Extensive mesenteric adenopathy measuring up to 11 cm in size. Splenomegaly 16.8 by 8.7 x 12.3 cm.  He went to [place] for the winter. He was admitted for cellulitis - was discharged on IV Abx. Also had a rectal bleed from internal hemorrhoids Saw Dr. Jones - .88 in 4/2022. Hgb 9.0, plts 102 He was started on iron supplements for ferritin of 26. He had COVID 5/12 s/p monoclonal antibodies but still required hospitalization. He received Abx and Remdesivir, was discharged last week.  He has lost about 40 lbs after these admissions for infections.  IgG levels drawn in June was 295.   He started Calquence 100mg BID on 7/7, tolerating it well. He is bruising easily when there's trauma. He has increased joint pains at night; pain not too bad during the daytime.  We started IVIG on 7/14/22 Went to [place] 7/23-8/6. 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 Xray L femur scheduled for tomorrow   9/8: Patient was seen today accompanied by his wife, he is very depressed with his metastatic prostate cancer and CLL. He stated his f diffused pain in left knee, back lower back, Tylenol for and tramadol with mild improvement. Saw Ortho  on 8/18 for left knee pain 2/2 OR, had Monovisc injection into his left knee.  He f/u with [facility] oncologist for metastatic prostate cancer, Femur Xray done at [facility] on 8/12/22 show sclerosis of the proximal femur but no lytic lesions. Pt had PET/CT done on 8/30. He was started on med for prostate cancer added 1 week ago per pt, he didn't remember the name. Denied any usage of any steroid recently.   10/6: patient was seen today in the [facility] with Dr. Mcmahon. Pt c/o constipation/hemorrhoid bleeding due to s/e of oral iron supplement. He will fly to [place] in 1 wk and will come back in May, will f/u w/ Dr. Jones while in [place] Will have COVID booster tomorrow night.  PSA level 90% decreased now He c/o shoulder, back and knee pain, took tylenol/ tramadol w/o significant improvement. Could not sleep during night due to the pain. rated it 6-7/10 at night.   6/7: He just got back from [place] a week or 2 ago. He was seeing Dr. Jones down in [place]. Last labs with him in April were: WBC 65.09, Hgb 10.8, plt 105.  He received Feraheme in Nov 2022: 11/18 and 11/25. He had labs with his PMD Dr. Doran on 5/31: Fe 37, TIBC 281, TSAT 13%, ferritin 50. Pt reports feeling very fatigued.  Getting IVIG today. He continues on Calquence.    Pt had COVID end of June. He continues to have fatigue and weakness. Also with diffuse joint pains.   9/7:  Patient is seen today for a f/u apt accompanied by his wife. He feels tired recently, saw cardiologist and had a cardiac monitor, was found pulse paused last Thursday night. He had a pacemaker placed last Friday 9/1/23. Still have bruises on his left chest from the procedure.   10/9: Still has some fatigue but not like before. He will be going to [place] end of the month.   5/22/24: Pt returned to [place] from [place] yesterday.  He was admitted to [facility] on 4/23/24 for 5 dys for GIB and severe anemia, s/p 3U PRBC transfusion. He was seeing hematologist Dr. Jones when he was in [place]. His platelet dropped on 5/7/2024 to 40K,  he got 1U PLT on 5/9, plate rechecked on 5/14 55k. Dexa 40mg x 4 days completed on 5/18. Calquence was hold since 4/18/24 when he was hospitalized. Xarelto was hold since platelet trended down.  He is still on Orgovyx for prostate;   6/3/24:  Patient is seen today for a f/u apt accompanied by his wife. s/p Fereheme x 2 on 5/25/24 and 6/1/24. He c/o headache and fatigue after fereheme, took tramadol/Tylenol for the pain relieve. Still c/o headache today.  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

## 2024-06-18 NOTE — ASSESSMENT
[FreeTextEntry1] : 75yo M w/ CLL, 11q-,13q-, recently started on therapy He does have LAD and w/ a conglomerate of mesenteric lymph nodes measuring up to 11cm. Last visit, we discussed starting CLL therapy with Calquence given worsening leukocytosis and anemia and also enlarging adenopathy. Patient started Calquence on 7/7 IgG from 6/9/22 was 295. We discussed doing IVIG for hypogammaglobulinemia x1yr. Pt has had multiple infections recently. s/p first dose of IVIG on 7/14/22. IgG levels improved to 503 on 9/8/22. Cont IVIG 20gm monthly, IgG ~500s before each dose. Pt has not had serious infections since starting. cont IVIG monthly, scheduled on 6/19 CBC result reviewed and printed out for pt, WBC stable Xarelto and Calquence 100mg BID since 4/18/24 when he was hospitalized in [place] for GIB and 3UPRBC; he f/u Hematologist Dr. Jones in [place], was found worsening thrombocytopenia, lowest 41K; CBC today showed platelet 36K, WBC trending down to 5.48 today. We discussed to proceed Bmbx to assess for MDS. We reviewed the bmbx result of 5/25/24: persistent CLL/SLL, no evidence of MDS. We discussed to try IVIG 1g/kg x 2 days for ITP. He verbalized understanding and agreed the plan. Will start IVIG 100mg IV on 5/14 and 5/15.  He received Feraheme in Nov 2022 in [place]: 11/18 and 11/25. He had labs with his PMD Dr. Doran on 5/31: Fe 37, TIBC 281, TSAT 13%, ferritin 50, s/p Feraheme weekly x2 on 6/15/23 and 6/22/23. Iron study today showed LYNN: ferritin 28, Iron sat 13%. s/p Feraheme weekly x 2 done on 5/25 and 6/1/24. He c/o headache after iron infusion.  Platelet today slightly improved 46k. He didn't need platelet transfusion today.  Cont f/u for prostate cancer, on Orgovyx  Had a pacemaker placement on 9/1/23, will f/u w/ cardiologist next week.  All questions answered. RTC in 2 wks  Case and management discussed with Dr. Mcmahon

## 2024-06-18 NOTE — PHYSICAL EXAM
[Restricted in physically strenuous activity but ambulatory and able to carry out work of a light or sedentary nature] : Status 1- Restricted in physically strenuous activity but ambulatory and able to carry out work of a light or sedentary nature, e.g., light house work, office work [Normal] : affect appropriate [de-identified] : wearing compression stockings [de-identified] : pacemaker placed in the left chest, well healed

## 2024-06-19 ENCOUNTER — RESULT REVIEW (OUTPATIENT)
Age: 79
End: 2024-06-19

## 2024-06-19 ENCOUNTER — APPOINTMENT (OUTPATIENT)
Dept: INFUSION THERAPY | Facility: HOSPITAL | Age: 79
End: 2024-06-19

## 2024-06-19 ENCOUNTER — APPOINTMENT (OUTPATIENT)
Dept: HEMATOLOGY ONCOLOGY | Facility: CLINIC | Age: 79
End: 2024-06-19

## 2024-06-19 ENCOUNTER — APPOINTMENT (OUTPATIENT)
Dept: HEMATOLOGY ONCOLOGY | Facility: CLINIC | Age: 79
End: 2024-06-19
Payer: MEDICARE

## 2024-06-19 VITALS
RESPIRATION RATE: 15 BRPM | OXYGEN SATURATION: 97 % | DIASTOLIC BLOOD PRESSURE: 72 MMHG | BODY MASS INDEX: 32.57 KG/M2 | HEART RATE: 77 BPM | TEMPERATURE: 97.4 F | SYSTOLIC BLOOD PRESSURE: 127 MMHG | WEIGHT: 226.99 LBS

## 2024-06-19 DIAGNOSIS — C91.10 CHRONIC LYMPHOCYTIC LEUKEMIA OF B-CELL TYPE NOT HAVING ACHIEVED REMISSION: ICD-10-CM

## 2024-06-19 DIAGNOSIS — D69.6 THROMBOCYTOPENIA, UNSPECIFIED: ICD-10-CM

## 2024-06-19 DIAGNOSIS — D50.9 IRON DEFICIENCY ANEMIA, UNSPECIFIED: ICD-10-CM

## 2024-06-19 LAB
ANISOCYTOSIS BLD QL: SLIGHT — SIGNIFICANT CHANGE UP
BASOPHILS # BLD AUTO: 0 K/UL — SIGNIFICANT CHANGE UP (ref 0–0.2)
BASOPHILS NFR BLD AUTO: 0 % — SIGNIFICANT CHANGE UP (ref 0–2)
DACRYOCYTES BLD QL SMEAR: SLIGHT — SIGNIFICANT CHANGE UP
ELLIPTOCYTES BLD QL SMEAR: SLIGHT — SIGNIFICANT CHANGE UP
EOSINOPHIL # BLD AUTO: 0.15 K/UL — SIGNIFICANT CHANGE UP (ref 0–0.5)
EOSINOPHIL NFR BLD AUTO: 2 % — SIGNIFICANT CHANGE UP (ref 0–6)
HCT VFR BLD CALC: 32.4 % — LOW (ref 39–50)
HGB BLD-MCNC: 10.3 G/DL — LOW (ref 13–17)
LYMPHOCYTES # BLD AUTO: 3.56 K/UL — HIGH (ref 1–3.3)
LYMPHOCYTES # BLD AUTO: 49 % — HIGH (ref 13–44)
LYMPHOCYTES # SPEC AUTO: 13 % — HIGH (ref 0–0)
MCHC RBC-ENTMCNC: 30 PG — SIGNIFICANT CHANGE UP (ref 27–34)
MCHC RBC-ENTMCNC: 31.8 G/DL — LOW (ref 32–36)
MCV RBC AUTO: 94.5 FL — SIGNIFICANT CHANGE UP (ref 80–100)
MONOCYTES # BLD AUTO: 0.07 K/UL — SIGNIFICANT CHANGE UP (ref 0–0.9)
MONOCYTES NFR BLD AUTO: 1 % — LOW (ref 2–14)
MYELOCYTES NFR BLD: 1 % — HIGH (ref 0–0)
NEUTROPHILS # BLD AUTO: 2.47 K/UL — SIGNIFICANT CHANGE UP (ref 1.8–7.4)
NEUTROPHILS NFR BLD AUTO: 34 % — LOW (ref 43–77)
NRBC # BLD: 0 /100 WBCS — SIGNIFICANT CHANGE UP (ref 0–0)
NRBC # BLD: SIGNIFICANT CHANGE UP /100 WBCS (ref 0–0)
PLAT MORPH BLD: NORMAL — SIGNIFICANT CHANGE UP
PLATELET # BLD AUTO: 144 K/UL — LOW (ref 150–400)
POIKILOCYTOSIS BLD QL AUTO: SLIGHT — SIGNIFICANT CHANGE UP
RBC # BLD: 3.43 M/UL — LOW (ref 4.2–5.8)
RBC # FLD: 18 % — HIGH (ref 10.3–14.5)
RBC BLD AUTO: ABNORMAL
SCHISTOCYTES BLD QL AUTO: SLIGHT — SIGNIFICANT CHANGE UP
WBC # BLD: 7.27 K/UL — SIGNIFICANT CHANGE UP (ref 3.8–10.5)
WBC # FLD AUTO: 7.27 K/UL — SIGNIFICANT CHANGE UP (ref 3.8–10.5)

## 2024-06-19 PROCEDURE — G2211 COMPLEX E/M VISIT ADD ON: CPT

## 2024-06-19 PROCEDURE — 99214 OFFICE O/P EST MOD 30 MIN: CPT

## 2024-06-19 NOTE — PHYSICAL EXAM
[Restricted in physically strenuous activity but ambulatory and able to carry out work of a light or sedentary nature] : Status 1- Restricted in physically strenuous activity but ambulatory and able to carry out work of a light or sedentary nature, e.g., light house work, office work [Normal] : affect appropriate [de-identified] : wearing compression stockings [de-identified] : pacemaker placed in the left chest, well healed

## 2024-06-19 NOTE — HISTORY OF PRESENT ILLNESS
[de-identified] : 72yo M here for f/u of CLL. He was previously following with Dr. Mccabe. \par  \par  On March 2,2017, total WBC 11,33, with absolute lymphocyte count 5865.\par  On May 25, Total WBC 14.3, ALC 9052.\par  Of note, chest CT for follow up of renal cell carcinoma on 5/31 showed slightly enlarged subpectoral and left axillary nodes.\par  MRI/PET 6/1 multiple stable retroperitoneal nodes without abnormal FDG uptake. There was mild splenomegaly without abnormal uptake.\par  \par  Flow cytometry reveals monotypic B cells positive for CD19 didn't CD20 CD23 and C5 negative for FMC-7, CD10 and CD38 consistent with CLL. The monotypic B cells are 41% of cells, equating to 5658 cells\par  FISH panel reveals deletion of 11q in 57.5% of cells and deletion of 13q in 67% of cells.\par  \par  He has had no constitutional symptoms.\par  He has a history of prostate carcinoma, 3 cm renal cell carcinoma, 3.5 cm medullary thyroid cancer\par  \par  His previous CA history is as follows: \par  In 2003, he was diagnosed with prostate CA s/p radical prostatectomy\par  MRI done for rising PSA in 2009, found to have RCC s/p partial R nephrectomy and RT to prostate bed\par  PET scheduled on Monday for rising PSA\par  In 8/2018, found to have medullary thyroid CA s/p total thyroidectomy\par  \par  PET MRI Axumin skullbase to thighs: s/p radical prostatectomy. No recurrence in the prostatectomy bed. No metastatic lymphadenopathy of prostate origin. No liver or lung metastasis.\par  Interval increase in size of the abdominal, retroperitoneal and mesenteric lymphadenopathy with correcting increase Axumin uptake since 6/10/19,. No liver infiltration. Splenomegaly (14.6cm), relatively stable since 6/10/19.\par  s/p R partial nephrectomy without evidence of local recurrence.\par  s/p thyroidectomy. No recurrent mass in the thyroidectomy bed [de-identified] : Labs done on 9/16/20 - WBC 78.9. Repeat on 10/6/20 was 73.79.  He went to [place] for the winter. Labs done in [place] on 5/3/21 when he had cellulitis showed WBC of 143.4.   He got back from [place] on 5/20. Cellulitis flared up again about 4 days later. He went to see his PMD and received a course of Cefadoxil x10d started 5/27. He had shingles on L side in July, around 7/12. Has LUQ pain, ? postherpetic neuralgia, on gabapentin.  He had his COVID booster 9/9 Abd sono done 9/2022: Extensive mesenteric adenopathy measuring up to 11 cm in size. Splenomegaly 16.8 by 8.7 x 12.3 cm.  He went to [place] for the winter. He was admitted for cellulitis - was discharged on IV Abx. Also had a rectal bleed from internal hemorrhoids Saw Dr. Jones - .88 in 4/2022. Hgb 9.0, plts 102 He was started on iron supplements for ferritin of 26. He had COVID 5/12 s/p monoclonal antibodies but still required hospitalization. He received Abx and Remdesivir, was discharged last week.  He has lost about 40 lbs after these admissions for infections.  IgG levels drawn in June was 295.   He started Calquence 100mg BID on 7/7, tolerating it well. He is bruising easily when there's trauma. He has increased joint pains at night; pain not too bad during the daytime.  We started IVIG on 7/14/22 Went to [place] 7/23-8/6. 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 Xray L femur scheduled for tomorrow   9/8: Patient was seen today accompanied by his wife, he is very depressed with his metastatic prostate cancer and CLL. He stated his f diffused pain in left knee, back lower back, Tylenol for and tramadol with mild improvement. Saw Ortho  on 8/18 for left knee pain 2/2 OR, had Monovisc injection into his left knee.  He f/u with [facility] oncologist for metastatic prostate cancer, Femur Xray done at [facility] on 8/12/22 show sclerosis of the proximal femur but no lytic lesions. Pt had PET/CT done on 8/30. He was started on med for prostate cancer added 1 week ago per pt, he didn't remember the name. Denied any usage of any steroid recently.   10/6: patient was seen today in the [facility] with Dr. Mcmahon. Pt c/o constipation/hemorrhoid bleeding due to s/e of oral iron supplement. He will fly to [place] in 1 wk and will come back in May, will f/u w/ Dr. Jones while in [place] Will have COVID booster tomorrow night.  PSA level 90% decreased now He c/o shoulder, back and knee pain, took tylenol/ tramadol w/o significant improvement. Could not sleep during night due to the pain. rated it 6-7/10 at night.   6/7: He just got back from [place] a week or 2 ago. He was seeing Dr. Jones down in [place]. Last labs with him in April were: WBC 65.09, Hgb 10.8, plt 105.  He received Feraheme in Nov 2022: 11/18 and 11/25. He had labs with his PMD Dr. Doran on 5/31: Fe 37, TIBC 281, TSAT 13%, ferritin 50. Pt reports feeling very fatigued.  Getting IVIG today. He continues on Calquence.    Pt had COVID end of June. He continues to have fatigue and weakness. Also with diffuse joint pains.   9/7:  Patient is seen today for a f/u apt accompanied by his wife. He feels tired recently, saw cardiologist and had a cardiac monitor, was found pulse paused last Thursday night. He had a pacemaker placed last Friday 9/1/23. Still have bruises on his left chest from the procedure.   10/9: Still has some fatigue but not like before. He will be going to [place] end of the month.   5/22/24: Pt returned to [place] from [place] yesterday.  He was admitted to [facility] on 4/23/24 for 5 dys for GIB and severe anemia, s/p 3U PRBC transfusion. He was seeing hematologist Dr. Jones when he was in [place]. His platelet dropped on 5/7/2024 to 40K,  he got 1U PLT on 5/9, plate rechecked on 5/14 55k. Dexa 40mg x 4 days completed on 5/18. Calquence was hold since 4/18/24 when he was hospitalized. Xarelto was hold since platelet trended down.  He is still on Orgovyx for prostate;   6/3/24:  Patient is seen today for a f/u apt accompanied by his wife. s/p Fereheme x 2 on 5/25/24 and 6/1/24. He c/o headache and fatigue after fereheme, took tramadol/Tylenol for the pain relieve. Still c/o headache today.  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 Synthroid dose increased to 175mcg and ramipril dose decreased. Notes very low energy.

## 2024-06-19 NOTE — ASSESSMENT
[FreeTextEntry1] : 77yo M w/ CLL, 11q-,13q- here for f/u Patient started Calquence on 7/7/22  IgG from 6/9/22 was 295. Pt has had multiple infections recently. s/p first dose of IVIG on 7/14/22. IgG levels improved to 503 on 9/8/22. Cont IVIG 20gm monthly, IgG ~500s before each dose. Pt has not had serious infections since starting. cont IVIG monthly, due today Calquence has been held since 4/18/24 when he was hospitalized in [place] for GIB (pt was on Xarelto at the time for afib); he f/u Hematologist Dr. Jones in [place], was found worsening thrombocytopenia, lowest 41K; we discussed Bmbx to assess for MDS. We reviewed the bmbx result of 5/25/24: persistent CLL/SLL, addendum reporting MDS with del(5q).  His counts have improved spontaneously after the marrow. CBC result reviewed and printed out for pt, WBC stable and plts have improved to 144k He is also iron deficient and received Feraheme in Nov 2022 in [place]: 11/18 and 11/25. He had labs with his PMD Dr. Doran on 5/31/23: Fe 37, TIBC 281, TSAT 13%, ferritin 50, s/p Feraheme weekly x2 on 6/15/23 and 6/22/23. Iron study 5/22/24 showed LYNN: ferritin 28, Iron sat 13%. s/p Feraheme weekly x 2 done on 5/25 and 6/1/24.  Cont f/u for prostate cancer, on Orgovyx Had a pacemaker placement on 9/1/23, following w/ cardiologist. He is reluctant to restart blood thinners. He is meeting with cardiology on Fri to discuss Watchman.   All questions answered. RTC in 4 wks

## 2024-06-20 ENCOUNTER — APPOINTMENT (OUTPATIENT)
Dept: SURGERY | Facility: CLINIC | Age: 79
End: 2024-06-20
Payer: MEDICARE

## 2024-06-20 DIAGNOSIS — D80.1 NONFAMILIAL HYPOGAMMAGLOBULINEMIA: ICD-10-CM

## 2024-06-20 DIAGNOSIS — R11.2 NAUSEA WITH VOMITING, UNSPECIFIED: ICD-10-CM

## 2024-06-20 DIAGNOSIS — C73 MALIGNANT NEOPLASM OF THYROID GLAND: ICD-10-CM

## 2024-06-20 DIAGNOSIS — E89.0 POSTPROCEDURAL HYPOTHYROIDISM: ICD-10-CM

## 2024-06-20 PROCEDURE — G2211 COMPLEX E/M VISIT ADD ON: CPT

## 2024-06-20 PROCEDURE — 36415 COLL VENOUS BLD VENIPUNCTURE: CPT

## 2024-06-20 PROCEDURE — 99213 OFFICE O/P EST LOW 20 MIN: CPT

## 2024-06-20 NOTE — HISTORY OF PRESENT ILLNESS
[de-identified] : Patient referred by Dr. Rodriguez for evaluation of newly diagnosed medullary thyroid cancer.  Patient with over 10 year history of thyroid nodules, biopsy 8 years ago benign.  Patient with multiple malignancies followed with CT scans and PET scans.  Pet scan 5/2017 with uptake in thyroid, no uptake in cervical LNs.  Thyroid US 6/18/18 with multiple nodules left lower 3.5 x 2.2 x 2.1 cm increased from prior study with Ti-RADS 7.  three additional right nodules noted.  Biopsy left medullary thyroid cancer, Thyroseq RET mutation.  Denies dysphagia, hoarseness , had radiation 2007 for prostate cancer.   8/16/18 Total thyroidectomy with CND, pathology 3.5 cm medullary thyroid cancer 0/1 LN,  Patient diagnosed with medullary thyroid cancer 5 year ago.  on synthroid 200.  denies dysphagia, change in voice or fatigue, or palpitations. neck US 6/2023 no LNs,no evidence of recurrence.  Patient now being treated for CLL numbers improved.  Iron deficiency anemia being treated.  PSA level decreasing with oral treatments for metastatic prostate cancer.  Prior PET scan 9/2022 with no evidence of suspicious findings in thyroid bed or cervical adenopathy.  ruth ann 9/2023 Calcitonin 21.4 stable.  Patient with episode of atrial fibrillation and treated with anticoagulation.  Major bleed April 2024 requiring 3 unit transfusion.  Blood work May 2024: Hemoglobin 9.8, hematocrit 31.6, TSH 16.9, free T4 0.9.  Patient's dose was increased to 175 2 weeks ago.  Continues to complain of extreme fatigue.  Currently not on anticoagulation.  I have reviewed all old and new data and available images.

## 2024-06-20 NOTE — ASSESSMENT
[FreeTextEntry1] : doing well, lengthy discussion regarding need for f/u of thyroid cancer, no evidence of recurrence on PE or prior imaging. calcitonin with minimal change on prior blood work, will continue to monitor , tumor marker sent.  Patient will contact office to review results.    RTO 6 mo . I reviewed the expected course of illness and the intent of current treatment with the patient. I have answered her questions.

## 2024-06-20 NOTE — PHYSICAL EXAM
[de-identified] : neck short, thick, ,incision well healed.  [Normal] : no neck adenopathy [de-identified] : Skin:  normal appearance.  no rash, nodules, vesicles, or erythema,\par  Musculoskeletal:  full range of motion and no deformities appreciated\par  Neurological:  grossly intact\par  Psychiatric:  oriented to person, place and time with appropriate affect

## 2024-06-24 ENCOUNTER — NON-APPOINTMENT (OUTPATIENT)
Age: 79
End: 2024-06-24

## 2024-06-24 LAB
ALBUMIN SERPL ELPH-MCNC: 3.8 G/DL
ALBUMIN SERPL ELPH-MCNC: 3.9 G/DL
ALP BLD-CCNC: 81 U/L
ALP BLD-CCNC: 84 U/L
ALT SERPL-CCNC: 14 U/L
ALT SERPL-CCNC: 21 U/L
ANION GAP SERPL CALC-SCNC: 10 MMOL/L
ANION GAP SERPL CALC-SCNC: 11 MMOL/L
AST SERPL-CCNC: 16 U/L
AST SERPL-CCNC: 18 U/L
BILIRUB SERPL-MCNC: <0.2 MG/DL
BILIRUB SERPL-MCNC: <0.2 MG/DL
BUN SERPL-MCNC: 16 MG/DL
BUN SERPL-MCNC: 22 MG/DL
CALCIT SERPL-MCNC: 32.3 PG/ML
CALCIUM SERPL-MCNC: 9.2 MG/DL
CALCIUM SERPL-MCNC: 9.6 MG/DL
CEA SERPL-MCNC: 0.9 NG/ML
CHLORIDE SERPL-SCNC: 106 MMOL/L
CHLORIDE SERPL-SCNC: 108 MMOL/L
CO2 SERPL-SCNC: 22 MMOL/L
CO2 SERPL-SCNC: 23 MMOL/L
CREAT SERPL-MCNC: 0.97 MG/DL
CREAT SERPL-MCNC: 1.11 MG/DL
EGFR: 68 ML/MIN/1.73M2
EGFR: 80 ML/MIN/1.73M2
FERRITIN SERPL-MCNC: 309 NG/ML
FOLATE SERPL-MCNC: 12.3 NG/ML
GLUCOSE SERPL-MCNC: 122 MG/DL
GLUCOSE SERPL-MCNC: 167 MG/DL
IRON SATN MFR SERPL: 23 %
IRON SERPL-MCNC: 57 UG/DL
POTASSIUM SERPL-SCNC: 4.2 MMOL/L
POTASSIUM SERPL-SCNC: 5.2 MMOL/L
PROT SERPL-MCNC: 5.6 G/DL
PROT SERPL-MCNC: 5.9 G/DL
SODIUM SERPL-SCNC: 139 MMOL/L
SODIUM SERPL-SCNC: 141 MMOL/L
T3 SERPL-MCNC: 72 NG/DL
T4 FREE SERPL-MCNC: 1.2 NG/DL
TIBC SERPL-MCNC: 248 UG/DL
TSH SERPL-ACNC: 3.17 UIU/ML
UIBC SERPL-MCNC: 191 UG/DL
VIT B12 SERPL-MCNC: 871 PG/ML

## 2024-06-24 RX ORDER — LEVOTHYROXINE SODIUM 0.15 MG/1
150 TABLET ORAL
Qty: 90 | Refills: 3 | Status: ACTIVE | COMMUNITY
Start: 2018-08-22 | End: 1900-01-01

## 2024-07-12 ENCOUNTER — OUTPATIENT (OUTPATIENT)
Dept: OUTPATIENT SERVICES | Facility: HOSPITAL | Age: 79
LOS: 1 days | Discharge: HOME | End: 2024-07-12

## 2024-07-12 DIAGNOSIS — Z98.890 OTHER SPECIFIED POSTPROCEDURAL STATES: Chronic | ICD-10-CM

## 2024-07-12 DIAGNOSIS — D50.9 IRON DEFICIENCY ANEMIA, UNSPECIFIED: ICD-10-CM

## 2024-07-12 DIAGNOSIS — Z90.79 ACQUIRED ABSENCE OF OTHER GENITAL ORGAN(S): Chronic | ICD-10-CM

## 2024-07-12 DIAGNOSIS — E89.0 POSTPROCEDURAL HYPOTHYROIDISM: Chronic | ICD-10-CM

## 2024-07-12 DIAGNOSIS — Z90.5 ACQUIRED ABSENCE OF KIDNEY: Chronic | ICD-10-CM

## 2024-07-12 DIAGNOSIS — C91.10 CHRONIC LYMPHOCYTIC LEUKEMIA OF B-CELL TYPE NOT HAVING ACHIEVED REMISSION: ICD-10-CM

## 2024-07-12 DIAGNOSIS — Z96.651 PRESENCE OF RIGHT ARTIFICIAL KNEE JOINT: Chronic | ICD-10-CM

## 2024-07-17 ENCOUNTER — APPOINTMENT (OUTPATIENT)
Dept: INFUSION THERAPY | Facility: HOSPITAL | Age: 79
End: 2024-07-17

## 2024-07-17 ENCOUNTER — RESULT REVIEW (OUTPATIENT)
Age: 79
End: 2024-07-17

## 2024-07-17 ENCOUNTER — APPOINTMENT (OUTPATIENT)
Dept: HEMATOLOGY ONCOLOGY | Facility: CLINIC | Age: 79
End: 2024-07-17
Payer: MEDICARE

## 2024-07-17 VITALS
DIASTOLIC BLOOD PRESSURE: 73 MMHG | SYSTOLIC BLOOD PRESSURE: 131 MMHG | WEIGHT: 226.99 LBS | RESPIRATION RATE: 18 BRPM | OXYGEN SATURATION: 97 % | BODY MASS INDEX: 32.57 KG/M2 | TEMPERATURE: 97.5 F | HEART RATE: 77 BPM

## 2024-07-17 LAB
ANISOCYTOSIS BLD QL: SLIGHT — SIGNIFICANT CHANGE UP
BASOPHILS # BLD AUTO: 0 K/UL — SIGNIFICANT CHANGE UP (ref 0–0.2)
BASOPHILS NFR BLD AUTO: 0 % — SIGNIFICANT CHANGE UP (ref 0–2)
DACRYOCYTES BLD QL SMEAR: SLIGHT — SIGNIFICANT CHANGE UP
ELLIPTOCYTES BLD QL SMEAR: SLIGHT — SIGNIFICANT CHANGE UP
EOSINOPHIL # BLD AUTO: 0.09 K/UL — SIGNIFICANT CHANGE UP (ref 0–0.5)
EOSINOPHIL NFR BLD AUTO: 1 % — SIGNIFICANT CHANGE UP (ref 0–6)
HCT VFR BLD CALC: 37.8 % — LOW (ref 39–50)
HGB BLD-MCNC: 12 G/DL — LOW (ref 13–17)
LYMPHOCYTES # BLD AUTO: 4.79 K/UL — HIGH (ref 1–3.3)
LYMPHOCYTES # BLD AUTO: 52 % — HIGH (ref 13–44)
LYMPHOCYTES # SPEC AUTO: 13 % — HIGH (ref 0–0)
MCHC RBC-ENTMCNC: 29.9 PG — SIGNIFICANT CHANGE UP (ref 27–34)
MCHC RBC-ENTMCNC: 31.7 G/DL — LOW (ref 32–36)
MCV RBC AUTO: 94.3 FL — SIGNIFICANT CHANGE UP (ref 80–100)
MONOCYTES # BLD AUTO: 0.18 K/UL — SIGNIFICANT CHANGE UP (ref 0–0.9)
MONOCYTES NFR BLD AUTO: 2 % — SIGNIFICANT CHANGE UP (ref 2–14)
NEUTROPHILS # BLD AUTO: 2.95 K/UL — SIGNIFICANT CHANGE UP (ref 1.8–7.4)
NEUTROPHILS NFR BLD AUTO: 32 % — LOW (ref 43–77)
NRBC # BLD: 0 /100 WBCS — SIGNIFICANT CHANGE UP (ref 0–0)
NRBC # BLD: SIGNIFICANT CHANGE UP /100 WBCS (ref 0–0)
PLAT MORPH BLD: NORMAL — SIGNIFICANT CHANGE UP
PLATELET # BLD AUTO: 65 K/UL — LOW (ref 150–400)
POIKILOCYTOSIS BLD QL AUTO: SLIGHT — SIGNIFICANT CHANGE UP
RBC # BLD: 4.01 M/UL — LOW (ref 4.2–5.8)
RBC # FLD: 17.2 % — HIGH (ref 10.3–14.5)
RBC BLD AUTO: ABNORMAL
SCHISTOCYTES BLD QL AUTO: SLIGHT — SIGNIFICANT CHANGE UP
SMUDGE CELLS # BLD: PRESENT — SIGNIFICANT CHANGE UP
WBC # BLD: 9.22 K/UL — SIGNIFICANT CHANGE UP (ref 3.8–10.5)
WBC # FLD AUTO: 9.22 K/UL — SIGNIFICANT CHANGE UP (ref 3.8–10.5)

## 2024-07-17 PROCEDURE — 99213 OFFICE O/P EST LOW 20 MIN: CPT

## 2024-07-17 PROCEDURE — G2211 COMPLEX E/M VISIT ADD ON: CPT

## 2024-07-18 DIAGNOSIS — R11.2 NAUSEA WITH VOMITING, UNSPECIFIED: ICD-10-CM

## 2024-07-18 DIAGNOSIS — D80.1 NONFAMILIAL HYPOGAMMAGLOBULINEMIA: ICD-10-CM

## 2024-07-23 ENCOUNTER — NON-APPOINTMENT (OUTPATIENT)
Age: 79
End: 2024-07-23

## 2024-07-24 ENCOUNTER — RESULT REVIEW (OUTPATIENT)
Age: 79
End: 2024-07-24

## 2024-07-24 ENCOUNTER — OUTPATIENT (OUTPATIENT)
Dept: OUTPATIENT SERVICES | Facility: HOSPITAL | Age: 79
LOS: 1 days | End: 2024-07-24
Payer: MEDICARE

## 2024-07-24 ENCOUNTER — APPOINTMENT (OUTPATIENT)
Dept: HEMATOLOGY ONCOLOGY | Facility: CLINIC | Age: 79
End: 2024-07-24
Payer: MEDICARE

## 2024-07-24 VITALS
OXYGEN SATURATION: 97 % | SYSTOLIC BLOOD PRESSURE: 124 MMHG | HEART RATE: 64 BPM | DIASTOLIC BLOOD PRESSURE: 70 MMHG | BODY MASS INDEX: 32.57 KG/M2 | RESPIRATION RATE: 16 BRPM | WEIGHT: 226.99 LBS | TEMPERATURE: 97.4 F

## 2024-07-24 DIAGNOSIS — Z98.890 OTHER SPECIFIED POSTPROCEDURAL STATES: Chronic | ICD-10-CM

## 2024-07-24 DIAGNOSIS — D46.9 MYELODYSPLASTIC SYNDROME, UNSPECIFIED: ICD-10-CM

## 2024-07-24 DIAGNOSIS — D69.6 THROMBOCYTOPENIA, UNSPECIFIED: ICD-10-CM

## 2024-07-24 DIAGNOSIS — Z90.79 ACQUIRED ABSENCE OF OTHER GENITAL ORGAN(S): Chronic | ICD-10-CM

## 2024-07-24 DIAGNOSIS — Z90.5 ACQUIRED ABSENCE OF KIDNEY: Chronic | ICD-10-CM

## 2024-07-24 DIAGNOSIS — E89.0 POSTPROCEDURAL HYPOTHYROIDISM: Chronic | ICD-10-CM

## 2024-07-24 DIAGNOSIS — C91.10 CHRONIC LYMPHOCYTIC LEUKEMIA OF B-CELL TYPE NOT HAVING ACHIEVED REMISSION: ICD-10-CM

## 2024-07-24 DIAGNOSIS — Z96.651 PRESENCE OF RIGHT ARTIFICIAL KNEE JOINT: Chronic | ICD-10-CM

## 2024-07-24 LAB
ANISOCYTOSIS BLD QL: SLIGHT — SIGNIFICANT CHANGE UP
BASOPHILS # BLD AUTO: 0 K/UL — SIGNIFICANT CHANGE UP (ref 0–0.2)
BASOPHILS NFR BLD AUTO: 0 % — SIGNIFICANT CHANGE UP (ref 0–2)
DACRYOCYTES BLD QL SMEAR: SLIGHT — SIGNIFICANT CHANGE UP
ELLIPTOCYTES BLD QL SMEAR: SLIGHT — SIGNIFICANT CHANGE UP
EOSINOPHIL # BLD AUTO: 0.17 K/UL — SIGNIFICANT CHANGE UP (ref 0–0.5)
EOSINOPHIL NFR BLD AUTO: 2 % — SIGNIFICANT CHANGE UP (ref 0–6)
HCT VFR BLD CALC: 34.6 % — LOW (ref 39–50)
HGB BLD-MCNC: 11.5 G/DL — LOW (ref 13–17)
LYMPHOCYTES # BLD AUTO: 4.26 K/UL — HIGH (ref 1–3.3)
LYMPHOCYTES # BLD AUTO: 49 % — HIGH (ref 13–44)
LYMPHOCYTES # SPEC AUTO: 18 % — HIGH (ref 0–0)
MCHC RBC-ENTMCNC: 30.3 PG — SIGNIFICANT CHANGE UP (ref 27–34)
MCHC RBC-ENTMCNC: 33.2 G/DL — SIGNIFICANT CHANGE UP (ref 32–36)
MCV RBC AUTO: 91.1 FL — SIGNIFICANT CHANGE UP (ref 80–100)
MONOCYTES # BLD AUTO: 0.09 K/UL — SIGNIFICANT CHANGE UP (ref 0–0.9)
MONOCYTES NFR BLD AUTO: 1 % — LOW (ref 2–14)
NEUTROPHILS # BLD AUTO: 2.61 K/UL — SIGNIFICANT CHANGE UP (ref 1.8–7.4)
NEUTROPHILS NFR BLD AUTO: 30 % — LOW (ref 43–77)
NRBC # BLD: 0 /100 WBCS — SIGNIFICANT CHANGE UP (ref 0–0)
NRBC # BLD: SIGNIFICANT CHANGE UP /100 WBCS (ref 0–0)
PLAT MORPH BLD: NORMAL — SIGNIFICANT CHANGE UP
PLATELET # BLD AUTO: 43 K/UL — LOW (ref 150–400)
POIKILOCYTOSIS BLD QL AUTO: SLIGHT — SIGNIFICANT CHANGE UP
RBC # BLD: 3.8 M/UL — LOW (ref 4.2–5.8)
RBC # FLD: 16.9 % — HIGH (ref 10.3–14.5)
RBC BLD AUTO: ABNORMAL
SMUDGE CELLS # BLD: PRESENT — SIGNIFICANT CHANGE UP
WBC # BLD: 8.7 K/UL — SIGNIFICANT CHANGE UP (ref 3.8–10.5)
WBC # FLD AUTO: 8.7 K/UL — SIGNIFICANT CHANGE UP (ref 3.8–10.5)

## 2024-07-24 PROCEDURE — G2211 COMPLEX E/M VISIT ADD ON: CPT

## 2024-07-24 PROCEDURE — 86850 RBC ANTIBODY SCREEN: CPT

## 2024-07-24 PROCEDURE — 86901 BLOOD TYPING SEROLOGIC RH(D): CPT

## 2024-07-24 PROCEDURE — 99214 OFFICE O/P EST MOD 30 MIN: CPT

## 2024-07-24 PROCEDURE — 86900 BLOOD TYPING SEROLOGIC ABO: CPT

## 2024-07-25 PROBLEM — D46.9 MDS (MYELODYSPLASTIC SYNDROME): Status: ACTIVE | Noted: 2024-07-24

## 2024-07-25 NOTE — HISTORY OF PRESENT ILLNESS
[de-identified] : 74yo M here for f/u of CLL. He was previously following with Dr. Mccabe. \par  \par  On March 2,2017, total WBC 11,33, with absolute lymphocyte count 5865.\par  On May 25, Total WBC 14.3, ALC 9052.\par  Of note, chest CT for follow up of renal cell carcinoma on 5/31 showed slightly enlarged subpectoral and left axillary nodes.\par  MRI/PET 6/1 multiple stable retroperitoneal nodes without abnormal FDG uptake. There was mild splenomegaly without abnormal uptake.\par  \par  Flow cytometry reveals monotypic B cells positive for CD19 didn't CD20 CD23 and C5 negative for FMC-7, CD10 and CD38 consistent with CLL. The monotypic B cells are 41% of cells, equating to 5658 cells\par  FISH panel reveals deletion of 11q in 57.5% of cells and deletion of 13q in 67% of cells.\par  \par  He has had no constitutional symptoms.\par  He has a history of prostate carcinoma, 3 cm renal cell carcinoma, 3.5 cm medullary thyroid cancer\par  \par  His previous CA history is as follows: \par  In 2003, he was diagnosed with prostate CA s/p radical prostatectomy\par  MRI done for rising PSA in 2009, found to have RCC s/p partial R nephrectomy and RT to prostate bed\par  PET scheduled on Monday for rising PSA\par  In 8/2018, found to have medullary thyroid CA s/p total thyroidectomy\par  \par  PET MRI Axumin skullbase to thighs: s/p radical prostatectomy. No recurrence in the prostatectomy bed. No metastatic lymphadenopathy of prostate origin. No liver or lung metastasis.\par  Interval increase in size of the abdominal, retroperitoneal and mesenteric lymphadenopathy with correcting increase Axumin uptake since 6/10/19,. No liver infiltration. Splenomegaly (14.6cm), relatively stable since 6/10/19.\par  s/p R partial nephrectomy without evidence of local recurrence.\par  s/p thyroidectomy. No recurrent mass in the thyroidectomy bed [de-identified] : Labs done on 9/16/20 - WBC 78.9. Repeat on 10/6/20 was 73.79.  He went to [place] for the winter. Labs done in [place] on 5/3/21 when he had cellulitis showed WBC of 143.4.   He got back from [place] on 5/20. Cellulitis flared up again about 4 days later. He went to see his PMD and received a course of Cefadoxil x10d started 5/27. He had shingles on L side in July, around 7/12. Has LUQ pain, ? postherpetic neuralgia, on gabapentin.  He had his COVID booster 9/9 Abd sono done 9/2022: Extensive mesenteric adenopathy measuring up to 11 cm in size. Splenomegaly 16.8 by 8.7 x 12.3 cm.  He went to [place] for the winter. He was admitted for cellulitis - was discharged on IV Abx. Also had a rectal bleed from internal hemorrhoids Saw Dr. Jones - .88 in 4/2022. Hgb 9.0, plts 102 He was started on iron supplements for ferritin of 26. He had COVID 5/12 s/p monoclonal antibodies but still required hospitalization. He received Abx and Remdesivir, was discharged last week.  He has lost about 40 lbs after these admissions for infections.  IgG levels drawn in June was 295.   He started Calquence 100mg BID on 7/7, tolerating it well. He is bruising easily when there's trauma. He has increased joint pains at night; pain not too bad during the daytime.  We started IVIG on 7/14/22 Went to [place] 7/23-8/6. 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 Xray L femur scheduled for tomorrow   9/8: Patient was seen today accompanied by his wife, he is very depressed with his metastatic prostate cancer and CLL. He stated his f diffused pain in left knee, back lower back, Tylenol for and tramadol with mild improvement. Saw Ortho  on 8/18 for left knee pain 2/2 OR, had Monovisc injection into his left knee.  He f/u with [facility] oncologist for metastatic prostate cancer, Femur Xray done at [facility] on 8/12/22 show sclerosis of the proximal femur but no lytic lesions. Pt had PET/CT done on 8/30. He was started on med for prostate cancer added 1 week ago per pt, he didn't remember the name. Denied any usage of any steroid recently.   10/6: patient was seen today in the [facility] with Dr. Mcmahon. Pt c/o constipation/hemorrhoid bleeding due to s/e of oral iron supplement. He will fly to [place] in 1 wk and will come back in May, will f/u w/ Dr. Jones while in [place] Will have COVID booster tomorrow night.  PSA level 90% decreased now He c/o shoulder, back and knee pain, took tylenol/ tramadol w/o significant improvement. Could not sleep during night due to the pain. rated it 6-7/10 at night.   6/7: He just got back from [place] a week or 2 ago. He was seeing Dr. Jones down in [place]. Last labs with him in April were: WBC 65.09, Hgb 10.8, plt 105.  He received Feraheme in Nov 2022: 11/18 and 11/25. He had labs with his PMD Dr. Doran on 5/31: Fe 37, TIBC 281, TSAT 13%, ferritin 50. Pt reports feeling very fatigued.  Getting IVIG today. He continues on Calquence.    Pt had COVID end of June. He continues to have fatigue and weakness. Also with diffuse joint pains.   9/7:  Patient is seen today for a f/u apt accompanied by his wife. He feels tired recently, saw cardiologist and had a cardiac monitor, was found pulse paused last Thursday night. He had a pacemaker placed last Friday 9/1/23. Still have bruises on his left chest from the procedure.   10/9: Still has some fatigue but not like before. He will be going to [place] end of the month.   5/22/24: Pt returned to [place] from [place] yesterday.  He was admitted to [facility] on 4/23/24 for 5 dys for GIB and severe anemia, s/p 3U PRBC transfusion. He was seeing hematologist Dr. Jones when he was in [place]. His platelet dropped on 5/7/2024 to 40K,  he got 1U PLT on 5/9, plate rechecked on 5/14 55k. Dexa 40mg x 4 days completed on 5/18. Calquence was hold since 4/18/24 when he was hospitalized. Xarelto was hold since platelet trended down.  He is still on Orgovyx for prostate;   6/3/24:  Patient is seen today for a f/u apt accompanied by his wife. s/p Fereheme x 2 on 5/25/24 and 6/1/24. He c/o headache and fatigue after fereheme, took tramadol/Tylenol for the pain relieve. Still c/o headache today.  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 Synthroid dose increased to 175mcg and ramipril dose decreased. Notes very low energy.   7/17/24:  Patient is seen today for a f/u apt accompanied by his son Tanner. He denied any active bleeding.  Platelet today 65K.  7/25/24: Pt felt frustrated today when see platelet trending down to 43K, He will fly to [place] 7/29-8/12/24.

## 2024-07-25 NOTE — PHYSICAL EXAM
[Restricted in physically strenuous activity but ambulatory and able to carry out work of a light or sedentary nature] : Status 1- Restricted in physically strenuous activity but ambulatory and able to carry out work of a light or sedentary nature, e.g., light house work, office work [Normal] : affect appropriate [de-identified] : wearing compression stockings [de-identified] : pacemaker placed in the left chest, well healed

## 2024-07-25 NOTE — ASSESSMENT
[FreeTextEntry1] : 77yo M w/ CLL, 11q-,13q- here for f/u Patient started Calquence on 7/7/22  IgG from 6/9/22 was 295. Pt has had multiple infections recently. s/p first dose of IVIG on 7/14/22. IgG levels improved to 503 on 9/8/22. Cont IVIG 20gm monthly, IgG ~500s before each dose. Pt has not had serious infections since starting. cont IVIG monthly, due today Calquence has been held since 4/18/24 when he was hospitalized in [place] for GIB (pt was on Xarelto at the time for afib); he f/u Hematologist Dr. Jones in [place], was found worsening thrombocytopenia, lowest 41K; we discussed Bmbx to assess for MDS. We reviewed the bmbx result of 5/25/24: persistent CLL/SLL, addendum reporting MDS with del(5q).  CBC result reviewed and printed out for pt, WBC stable and plts have improved to 144k 6/19/24, his count has been improved spontaneously after bmbx, now platelet trending down again. today 43K.  He is also iron deficient and received Feraheme in Nov 2022 in [place]: 11/18 and 11/25. He had labs with his PMD Dr. Doran on 5/31/23: Fe 37, TIBC 281, TSAT 13%, ferritin 50, s/p Feraheme weekly x2 on 6/15/23 and 6/22/23. Iron study 5/22/24 showed LYNN: ferritin 28, Iron sat 13%. s/p Feraheme weekly x 2 done on 5/25 and 6/1/24.  Cont f/u for prostate cancer, on Orgovyx Had a pacemaker placement on 9/1/23, following w/ cardiologist. He is reluctant to restart blood thinners. He is meeting with cardiology to discuss Watchman.   Will cont monthly IVIG 20mg, last dose on 7/17, next IVIG scheduled on 8/14 He will go to [place] for 2 wks, advised to have count check at Dr. Phi Jones's office while in [place] Dr. Mcmahon will discuss the case in Tumor Board next week T&C done today, blood/platelet transfusion consent obtained, possible platelet transfusion apt added to 8/14 IVIG apt All questions answered. RTC to see Dr. Mcmahon on 8/14  Dr. Phi Jones, Tel , Fax  cancer center [facility] Case and management discussed with Dr. Mcmahon

## 2024-07-25 NOTE — HISTORY OF PRESENT ILLNESS
[de-identified] : 74yo M here for f/u of CLL. He was previously following with Dr. Mccabe. \par  \par  On March 2,2017, total WBC 11,33, with absolute lymphocyte count 5865.\par  On May 25, Total WBC 14.3, ALC 9052.\par  Of note, chest CT for follow up of renal cell carcinoma on 5/31 showed slightly enlarged subpectoral and left axillary nodes.\par  MRI/PET 6/1 multiple stable retroperitoneal nodes without abnormal FDG uptake. There was mild splenomegaly without abnormal uptake.\par  \par  Flow cytometry reveals monotypic B cells positive for CD19 didn't CD20 CD23 and C5 negative for FMC-7, CD10 and CD38 consistent with CLL. The monotypic B cells are 41% of cells, equating to 5658 cells\par  FISH panel reveals deletion of 11q in 57.5% of cells and deletion of 13q in 67% of cells.\par  \par  He has had no constitutional symptoms.\par  He has a history of prostate carcinoma, 3 cm renal cell carcinoma, 3.5 cm medullary thyroid cancer\par  \par  His previous CA history is as follows: \par  In 2003, he was diagnosed with prostate CA s/p radical prostatectomy\par  MRI done for rising PSA in 2009, found to have RCC s/p partial R nephrectomy and RT to prostate bed\par  PET scheduled on Monday for rising PSA\par  In 8/2018, found to have medullary thyroid CA s/p total thyroidectomy\par  \par  PET MRI Axumin skullbase to thighs: s/p radical prostatectomy. No recurrence in the prostatectomy bed. No metastatic lymphadenopathy of prostate origin. No liver or lung metastasis.\par  Interval increase in size of the abdominal, retroperitoneal and mesenteric lymphadenopathy with correcting increase Axumin uptake since 6/10/19,. No liver infiltration. Splenomegaly (14.6cm), relatively stable since 6/10/19.\par  s/p R partial nephrectomy without evidence of local recurrence.\par  s/p thyroidectomy. No recurrent mass in the thyroidectomy bed [de-identified] : Labs done on 9/16/20 - WBC 78.9. Repeat on 10/6/20 was 73.79.  He went to [place] for the winter. Labs done in [place] on 5/3/21 when he had cellulitis showed WBC of 143.4.   He got back from [place] on 5/20. Cellulitis flared up again about 4 days later. He went to see his PMD and received a course of Cefadoxil x10d started 5/27. He had shingles on L side in July, around 7/12. Has LUQ pain, ? postherpetic neuralgia, on gabapentin.  He had his COVID booster 9/9 Abd sono done 9/2022: Extensive mesenteric adenopathy measuring up to 11 cm in size. Splenomegaly 16.8 by 8.7 x 12.3 cm.  He went to [place] for the winter. He was admitted for cellulitis - was discharged on IV Abx. Also had a rectal bleed from internal hemorrhoids Saw Dr. Jones - .88 in 4/2022. Hgb 9.0, plts 102 He was started on iron supplements for ferritin of 26. He had COVID 5/12 s/p monoclonal antibodies but still required hospitalization. He received Abx and Remdesivir, was discharged last week.  He has lost about 40 lbs after these admissions for infections.  IgG levels drawn in June was 295.   He started Calquence 100mg BID on 7/7, tolerating it well. He is bruising easily when there's trauma. He has increased joint pains at night; pain not too bad during the daytime.  We started IVIG on 7/14/22 Went to [place] 7/23-8/6. 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 Xray L femur scheduled for tomorrow   9/8: Patient was seen today accompanied by his wife, he is very depressed with his metastatic prostate cancer and CLL. He stated his f diffused pain in left knee, back lower back, Tylenol for and tramadol with mild improvement. Saw Ortho  on 8/18 for left knee pain 2/2 OR, had Monovisc injection into his left knee.  He f/u with [facility] oncologist for metastatic prostate cancer, Femur Xray done at [facility] on 8/12/22 show sclerosis of the proximal femur but no lytic lesions. Pt had PET/CT done on 8/30. He was started on med for prostate cancer added 1 week ago per pt, he didn't remember the name. Denied any usage of any steroid recently.   10/6: patient was seen today in the [facility] with Dr. Mcmahon. Pt c/o constipation/hemorrhoid bleeding due to s/e of oral iron supplement. He will fly to [place] in 1 wk and will come back in May, will f/u w/ Dr. Jones while in [place] Will have COVID booster tomorrow night.  PSA level 90% decreased now He c/o shoulder, back and knee pain, took tylenol/ tramadol w/o significant improvement. Could not sleep during night due to the pain. rated it 6-7/10 at night.   6/7: He just got back from [place] a week or 2 ago. He was seeing Dr. Jones down in [place]. Last labs with him in April were: WBC 65.09, Hgb 10.8, plt 105.  He received Feraheme in Nov 2022: 11/18 and 11/25. He had labs with his PMD Dr. Doran on 5/31: Fe 37, TIBC 281, TSAT 13%, ferritin 50. Pt reports feeling very fatigued.  Getting IVIG today. He continues on Calquence.    Pt had COVID end of June. He continues to have fatigue and weakness. Also with diffuse joint pains.   9/7:  Patient is seen today for a f/u apt accompanied by his wife. He feels tired recently, saw cardiologist and had a cardiac monitor, was found pulse paused last Thursday night. He had a pacemaker placed last Friday 9/1/23. Still have bruises on his left chest from the procedure.   10/9: Still has some fatigue but not like before. He will be going to [place] end of the month.   5/22/24: Pt returned to [place] from [place] yesterday.  He was admitted to [facility] on 4/23/24 for 5 dys for GIB and severe anemia, s/p 3U PRBC transfusion. He was seeing hematologist Dr. Jones when he was in [place]. His platelet dropped on 5/7/2024 to 40K,  he got 1U PLT on 5/9, plate rechecked on 5/14 55k. Dexa 40mg x 4 days completed on 5/18. Calquence was hold since 4/18/24 when he was hospitalized. Xarelto was hold since platelet trended down.  He is still on Orgovyx for prostate;   6/3/24:  Patient is seen today for a f/u apt accompanied by his wife. s/p Fereheme x 2 on 5/25/24 and 6/1/24. He c/o headache and fatigue after fereheme, took tramadol/Tylenol for the pain relieve. Still c/o headache today.  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 Synthroid dose increased to 175mcg and ramipril dose decreased. Notes very low energy.   7/17/24:  Patient is seen today for a f/u apt accompanied by his son Tanner. He denied any active bleeding.  Platelet today 65K.  7/25/24: Pt felt frustrated today when see platelet trending down to 43K, He will fly to [place] 7/29-8/12/24.

## 2024-07-25 NOTE — ASSESSMENT
[FreeTextEntry1] : 77yo M w/ CLL, 11q-,13q- here for f/u Patient started Calquence on 7/7/22  IgG from 6/9/22 was 295. Pt has had multiple infections recently. s/p first dose of IVIG on 7/14/22. IgG levels improved to 503 on 9/8/22. Cont IVIG 20gm monthly, IgG ~500s before each dose. Pt has not had serious infections since starting. cont IVIG monthly, due today Calquence has been held since 4/18/24 when he was hospitalized in [place] for GIB (pt was on Xarelto at the time for afib); he f/u Hematologist Dr. Jones in [place], was found worsening thrombocytopenia, lowest 41K; we discussed Bmbx to assess for MDS. We reviewed the bmbx result of 5/25/24: persistent CLL/SLL, addendum reporting MDS with del(5q).  CBC result reviewed and printed out for pt, WBC stable and plts have improved to 144k 6/19/24, his count has been improved spontaneously after bmbx, now platelet trending down again. today 43K.  He is also iron deficient and received Feraheme in Nov 2022 in [place]: 11/18 and 11/25. He had labs with his PMD Dr. Doran on 5/31/23: Fe 37, TIBC 281, TSAT 13%, ferritin 50, s/p Feraheme weekly x2 on 6/15/23 and 6/22/23. Iron study 5/22/24 showed LYNN: ferritin 28, Iron sat 13%. s/p Feraheme weekly x 2 done on 5/25 and 6/1/24.  Cont f/u for prostate cancer, on Orgovyx Had a pacemaker placement on 9/1/23, following w/ cardiologist. He is reluctant to restart blood thinners. He is meeting with cardiology to discuss Watchman.   Will cont monthly IVIG 20mg, last dose on 7/17, next IVIG scheduled on 8/14 He will go to [place] for 2 wks, advised to have count check at Dr. Phi Jones's office while in [place] Dr. Mcmahon will discuss the case in Tumor Board next week T&C done today, blood/platelet transfusion consent obtained, possible platelet transfusion apt added to 8/14 IVIG apt All questions answered. RTC to see Dr. Mcmahon on 8/14  Dr. Phi Jones, Tel , Fax  cancer center [facility] Case and management discussed with Dr. Mcmahon

## 2024-07-25 NOTE — PHYSICAL EXAM
[Restricted in physically strenuous activity but ambulatory and able to carry out work of a light or sedentary nature] : Status 1- Restricted in physically strenuous activity but ambulatory and able to carry out work of a light or sedentary nature, e.g., light house work, office work [Normal] : affect appropriate [de-identified] : wearing compression stockings [de-identified] : pacemaker placed in the left chest, well healed

## 2024-07-26 LAB
ALBUMIN SERPL ELPH-MCNC: 4.2 G/DL
ALP BLD-CCNC: 86 U/L
ALT SERPL-CCNC: 16 U/L
ANION GAP SERPL CALC-SCNC: 11 MMOL/L
AST SERPL-CCNC: 17 U/L
BILIRUB SERPL-MCNC: 0.2 MG/DL
BUN SERPL-MCNC: 18 MG/DL
CALCIUM SERPL-MCNC: 9.2 MG/DL
CHLORIDE SERPL-SCNC: 107 MMOL/L
CO2 SERPL-SCNC: 23 MMOL/L
CREAT SERPL-MCNC: 0.93 MG/DL
EGFR: 85 ML/MIN/1.73M2
GLUCOSE SERPL-MCNC: 188 MG/DL
POTASSIUM SERPL-SCNC: 4 MMOL/L
PROT SERPL-MCNC: 5.9 G/DL
SODIUM SERPL-SCNC: 142 MMOL/L

## 2024-08-14 ENCOUNTER — APPOINTMENT (OUTPATIENT)
Dept: HEMATOLOGY ONCOLOGY | Facility: CLINIC | Age: 79
End: 2024-08-14
Payer: MEDICARE

## 2024-08-14 ENCOUNTER — RESULT REVIEW (OUTPATIENT)
Age: 79
End: 2024-08-14

## 2024-08-14 ENCOUNTER — APPOINTMENT (OUTPATIENT)
Dept: INFUSION THERAPY | Facility: HOSPITAL | Age: 79
End: 2024-08-14

## 2024-08-14 ENCOUNTER — APPOINTMENT (OUTPATIENT)
Dept: HEMATOLOGY ONCOLOGY | Facility: CLINIC | Age: 79
End: 2024-08-14

## 2024-08-14 VITALS
TEMPERATURE: 98 F | HEART RATE: 75 BPM | WEIGHT: 221.98 LBS | RESPIRATION RATE: 16 BRPM | BODY MASS INDEX: 31.85 KG/M2 | OXYGEN SATURATION: 96 % | SYSTOLIC BLOOD PRESSURE: 132 MMHG | DIASTOLIC BLOOD PRESSURE: 74 MMHG

## 2024-08-14 DIAGNOSIS — C91.10 CHRONIC LYMPHOCYTIC LEUKEMIA OF B-CELL TYPE NOT HAVING ACHIEVED REMISSION: ICD-10-CM

## 2024-08-14 LAB
ANISOCYTOSIS BLD QL: SLIGHT — SIGNIFICANT CHANGE UP
BASOPHILS # BLD AUTO: 0 K/UL — SIGNIFICANT CHANGE UP (ref 0–0.2)
BASOPHILS NFR BLD AUTO: 0 % — SIGNIFICANT CHANGE UP (ref 0–2)
DACRYOCYTES BLD QL SMEAR: SLIGHT — SIGNIFICANT CHANGE UP
ELLIPTOCYTES BLD QL SMEAR: SLIGHT — SIGNIFICANT CHANGE UP
EOSINOPHIL # BLD AUTO: 0.3 K/UL — SIGNIFICANT CHANGE UP (ref 0–0.5)
EOSINOPHIL NFR BLD AUTO: 2 % — SIGNIFICANT CHANGE UP (ref 0–6)
HCT VFR BLD CALC: 38.4 % — LOW (ref 39–50)
HGB BLD-MCNC: 12.4 G/DL — LOW (ref 13–17)
LYMPHOCYTES # BLD AUTO: 42.5 % — SIGNIFICANT CHANGE UP (ref 13–44)
LYMPHOCYTES # BLD AUTO: 6.29 K/UL — HIGH (ref 1–3.3)
LYMPHOCYTES # SPEC AUTO: 24.5 % — HIGH (ref 0–0)
MCHC RBC-ENTMCNC: 29.5 PG — SIGNIFICANT CHANGE UP (ref 27–34)
MCHC RBC-ENTMCNC: 32.3 G/DL — SIGNIFICANT CHANGE UP (ref 32–36)
MCV RBC AUTO: 91.4 FL — SIGNIFICANT CHANGE UP (ref 80–100)
MONOCYTES # BLD AUTO: 0.37 K/UL — SIGNIFICANT CHANGE UP (ref 0–0.9)
MONOCYTES NFR BLD AUTO: 2.5 % — SIGNIFICANT CHANGE UP (ref 2–14)
NEUTROPHILS # BLD AUTO: 4.22 K/UL — SIGNIFICANT CHANGE UP (ref 1.8–7.4)
NEUTROPHILS NFR BLD AUTO: 28.5 % — LOW (ref 43–77)
NRBC # BLD: 0 /100 WBCS — SIGNIFICANT CHANGE UP (ref 0–0)
NRBC # BLD: SIGNIFICANT CHANGE UP /100 WBCS (ref 0–0)
PLAT MORPH BLD: NORMAL — SIGNIFICANT CHANGE UP
PLATELET # BLD AUTO: 56 K/UL — LOW (ref 150–400)
POIKILOCYTOSIS BLD QL AUTO: SLIGHT — SIGNIFICANT CHANGE UP
RBC # BLD: 4.2 M/UL — SIGNIFICANT CHANGE UP (ref 4.2–5.8)
RBC # FLD: 16.3 % — HIGH (ref 10.3–14.5)
RBC BLD AUTO: ABNORMAL
SMUDGE CELLS # BLD: PRESENT — SIGNIFICANT CHANGE UP
WBC # BLD: 14.79 K/UL — HIGH (ref 3.8–10.5)
WBC # FLD AUTO: 14.79 K/UL — HIGH (ref 3.8–10.5)

## 2024-08-14 PROCEDURE — G2211 COMPLEX E/M VISIT ADD ON: CPT

## 2024-08-14 PROCEDURE — 99215 OFFICE O/P EST HI 40 MIN: CPT

## 2024-08-14 RX ORDER — ZANUBRUTINIB 80 MG/1
80 CAPSULE, GELATIN COATED ORAL
Qty: 120 | Refills: 2 | Status: ACTIVE | COMMUNITY
Start: 2024-08-14 | End: 1900-01-01

## 2024-08-15 NOTE — PHYSICAL EXAM
[Restricted in physically strenuous activity but ambulatory and able to carry out work of a light or sedentary nature] : Status 1- Restricted in physically strenuous activity but ambulatory and able to carry out work of a light or sedentary nature, e.g., light house work, office work [Normal] : affect appropriate [de-identified] : wearing compression stockings [de-identified] : pacemaker placed in the left chest, well healed

## 2024-08-15 NOTE — PHYSICAL EXAM
[Restricted in physically strenuous activity but ambulatory and able to carry out work of a light or sedentary nature] : Status 1- Restricted in physically strenuous activity but ambulatory and able to carry out work of a light or sedentary nature, e.g., light house work, office work [Normal] : affect appropriate [de-identified] : wearing compression stockings [de-identified] : pacemaker placed in the left chest, well healed

## 2024-08-15 NOTE — HISTORY OF PRESENT ILLNESS
[de-identified] : 72yo M here for f/u of CLL. He was previously following with Dr. Mccabe. \par  \par  On March 2,2017, total WBC 11,33, with absolute lymphocyte count 5865.\par  On May 25, Total WBC 14.3, ALC 9052.\par  Of note, chest CT for follow up of renal cell carcinoma on 5/31 showed slightly enlarged subpectoral and left axillary nodes.\par  MRI/PET 6/1 multiple stable retroperitoneal nodes without abnormal FDG uptake. There was mild splenomegaly without abnormal uptake.\par  \par  Flow cytometry reveals monotypic B cells positive for CD19 didn't CD20 CD23 and C5 negative for FMC-7, CD10 and CD38 consistent with CLL. The monotypic B cells are 41% of cells, equating to 5658 cells\par  FISH panel reveals deletion of 11q in 57.5% of cells and deletion of 13q in 67% of cells.\par  \par  He has had no constitutional symptoms.\par  He has a history of prostate carcinoma, 3 cm renal cell carcinoma, 3.5 cm medullary thyroid cancer\par  \par  His previous CA history is as follows: \par  In 2003, he was diagnosed with prostate CA s/p radical prostatectomy\par  MRI done for rising PSA in 2009, found to have RCC s/p partial R nephrectomy and RT to prostate bed\par  PET scheduled on Monday for rising PSA\par  In 8/2018, found to have medullary thyroid CA s/p total thyroidectomy\par  \par  PET MRI Axumin skullbase to thighs: s/p radical prostatectomy. No recurrence in the prostatectomy bed. No metastatic lymphadenopathy of prostate origin. No liver or lung metastasis.\par  Interval increase in size of the abdominal, retroperitoneal and mesenteric lymphadenopathy with correcting increase Axumin uptake since 6/10/19,. No liver infiltration. Splenomegaly (14.6cm), relatively stable since 6/10/19.\par  s/p R partial nephrectomy without evidence of local recurrence.\par  s/p thyroidectomy. No recurrent mass in the thyroidectomy bed [de-identified] : Labs done on 9/16/20 - WBC 78.9. Repeat on 10/6/20 was 73.79.  He went to [place] for the winter. Labs done in [place] on 5/3/21 when he had cellulitis showed WBC of 143.4.   He got back from [place] on 5/20. Cellulitis flared up again about 4 days later. He went to see his PMD and received a course of Cefadoxil x10d started 5/27. He had shingles on L side in July, around 7/12. Has LUQ pain, ? postherpetic neuralgia, on gabapentin.  He had his COVID booster 9/9 Abd sono done 9/2022: Extensive mesenteric adenopathy measuring up to 11 cm in size. Splenomegaly 16.8 by 8.7 x 12.3 cm.  He went to [place] for the winter. He was admitted for cellulitis - was discharged on IV Abx. Also had a rectal bleed from internal hemorrhoids Saw Dr. Jones - .88 in 4/2022. Hgb 9.0, plts 102 He was started on iron supplements for ferritin of 26. He had COVID 5/12 s/p monoclonal antibodies but still required hospitalization. He received Abx and Remdesivir, was discharged last week.  He has lost about 40 lbs after these admissions for infections.  IgG levels drawn in June was 295.   He started Calquence 100mg BID on 7/7, tolerating it well. He is bruising easily when there's trauma. He has increased joint pains at night; pain not too bad during the daytime.  We started IVIG on 7/14/22 Went to [place] 7/23-8/6. 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 Xray L femur scheduled for tomorrow   9/8: Patient was seen today accompanied by his wife, he is very depressed with his metastatic prostate cancer and CLL. He stated his f diffused pain in left knee, back lower back, Tylenol for and tramadol with mild improvement. Saw Ortho  on 8/18 for left knee pain 2/2 OR, had Monovisc injection into his left knee.  He f/u with [facility] oncologist for metastatic prostate cancer, Femur Xray done at [facility] on 8/12/22 show sclerosis of the proximal femur but no lytic lesions. Pt had PET/CT done on 8/30. He was started on med for prostate cancer added 1 week ago per pt, he didn't remember the name. Denied any usage of any steroid recently.   10/6: patient was seen today in the [facility] with Dr. Mcmahon. Pt c/o constipation/hemorrhoid bleeding due to s/e of oral iron supplement. He will fly to [place] in 1 wk and will come back in May, will f/u w/ Dr. Jones while in [place] Will have COVID booster tomorrow night.  PSA level 90% decreased now He c/o shoulder, back and knee pain, took tylenol/ tramadol w/o significant improvement. Could not sleep during night due to the pain. rated it 6-7/10 at night.   6/7: He just got back from [place] a week or 2 ago. He was seeing Dr. Jones down in [place]. Last labs with him in April were: WBC 65.09, Hgb 10.8, plt 105.  He received Feraheme in Nov 2022: 11/18 and 11/25. He had labs with his PMD Dr. Doran on 5/31: Fe 37, TIBC 281, TSAT 13%, ferritin 50. Pt reports feeling very fatigued.  Getting IVIG today. He continues on Calquence.    Pt had COVID end of June. He continues to have fatigue and weakness. Also with diffuse joint pains.   9/7:  Patient is seen today for a f/u apt accompanied by his wife. He feels tired recently, saw cardiologist and had a cardiac monitor, was found pulse paused last Thursday night. He had a pacemaker placed last Friday 9/1/23. Still have bruises on his left chest from the procedure.   10/9: Still has some fatigue but not like before. He will be going to [place] end of the month.   5/22/24: Pt returned to [place] from [place] yesterday.  He was admitted to [facility] on 4/23/24 for 5 dys for GIB and severe anemia, s/p 3U PRBC transfusion. He was seeing hematologist Dr. Jones when he was in [place]. His platelet dropped on 5/7/2024 to 40K,  he got 1U PLT on 5/9, plate rechecked on 5/14 55k. Dexa 40mg x 4 days completed on 5/18. Calquence was hold since 4/18/24 when he was hospitalized. Xarelto was hold since platelet trended down.  He is still on Orgovyx for prostate;   6/3/24:  Patient is seen today for a f/u apt accompanied by his wife. s/p Fereheme x 2 on 5/25/24 and 6/1/24. He c/o headache and fatigue after fereheme, took tramadol/Tylenol for the pain relieve. Still c/o headache today.  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 Synthroid dose increased to 175mcg and ramipril dose decreased. Notes very low energy.   7/17/24:  Patient is seen today for a f/u apt accompanied by his son Tanner. He denied any active bleeding. Platelet today 65K.  7/25/24: Pt felt frustrated today when saw platelet trending down to 43K, He will fly to [place] 7/29-8/12/24.    8/14/24: Patient stopped taking xarelto in April 2024 when he had a GIB from diverticulosis. He stopped calquence at that time. After that, he had a brief episode of a fib in April 2024 but he was already off both xarelto and calquence. He has a plan for Watchman procedure in October 2024. He's been having sweats and tiredness.

## 2024-08-27 ENCOUNTER — OUTPATIENT (OUTPATIENT)
Dept: OUTPATIENT SERVICES | Facility: HOSPITAL | Age: 79
LOS: 1 days | End: 2024-08-27

## 2024-08-27 DIAGNOSIS — Z98.890 OTHER SPECIFIED POSTPROCEDURAL STATES: Chronic | ICD-10-CM

## 2024-08-27 DIAGNOSIS — Z90.79 ACQUIRED ABSENCE OF OTHER GENITAL ORGAN(S): Chronic | ICD-10-CM

## 2024-08-27 DIAGNOSIS — D64.9 ANEMIA, UNSPECIFIED: ICD-10-CM

## 2024-08-27 DIAGNOSIS — Z90.5 ACQUIRED ABSENCE OF KIDNEY: Chronic | ICD-10-CM

## 2024-08-27 DIAGNOSIS — E89.0 POSTPROCEDURAL HYPOTHYROIDISM: Chronic | ICD-10-CM

## 2024-08-27 DIAGNOSIS — Z96.651 PRESENCE OF RIGHT ARTIFICIAL KNEE JOINT: Chronic | ICD-10-CM

## 2024-08-28 ENCOUNTER — RESULT REVIEW (OUTPATIENT)
Age: 79
End: 2024-08-28

## 2024-08-28 ENCOUNTER — APPOINTMENT (OUTPATIENT)
Dept: HEMATOLOGY ONCOLOGY | Facility: CLINIC | Age: 79
End: 2024-08-28

## 2024-08-28 ENCOUNTER — APPOINTMENT (OUTPATIENT)
Dept: INFUSION THERAPY | Facility: HOSPITAL | Age: 79
End: 2024-08-28

## 2024-08-28 DIAGNOSIS — C91.10 CHRONIC LYMPHOCYTIC LEUKEMIA OF B-CELL TYPE NOT HAVING ACHIEVED REMISSION: ICD-10-CM

## 2024-08-28 LAB
ACANTHOCYTES BLD QL SMEAR: SLIGHT — SIGNIFICANT CHANGE UP
ANISOCYTOSIS BLD QL: SLIGHT — SIGNIFICANT CHANGE UP
BASOPHILS # BLD AUTO: 0 K/UL — SIGNIFICANT CHANGE UP (ref 0–0.2)
BASOPHILS NFR BLD AUTO: 0 % — SIGNIFICANT CHANGE UP (ref 0–2)
DACRYOCYTES BLD QL SMEAR: SLIGHT — SIGNIFICANT CHANGE UP
ELLIPTOCYTES BLD QL SMEAR: SLIGHT — SIGNIFICANT CHANGE UP
EOSINOPHIL # BLD AUTO: 0.5 K/UL — SIGNIFICANT CHANGE UP (ref 0–0.5)
EOSINOPHIL NFR BLD AUTO: 1.5 % — SIGNIFICANT CHANGE UP (ref 0–6)
HCT VFR BLD CALC: 35 % — LOW (ref 39–50)
HGB BLD-MCNC: 11.4 G/DL — LOW (ref 13–17)
LYMPHOCYTES # BLD AUTO: 26.09 K/UL — HIGH (ref 1–3.3)
LYMPHOCYTES # BLD AUTO: 79 % — HIGH (ref 13–44)
LYMPHOCYTES # SPEC AUTO: 8.5 % — HIGH (ref 0–0)
MCHC RBC-ENTMCNC: 29.7 PG — SIGNIFICANT CHANGE UP (ref 27–34)
MCHC RBC-ENTMCNC: 32.6 G/DL — SIGNIFICANT CHANGE UP (ref 32–36)
MCV RBC AUTO: 91.1 FL — SIGNIFICANT CHANGE UP (ref 80–100)
MONOCYTES # BLD AUTO: 0.5 K/UL — SIGNIFICANT CHANGE UP (ref 0–0.9)
MONOCYTES NFR BLD AUTO: 1.5 % — LOW (ref 2–14)
NEUTROPHILS # BLD AUTO: 3.14 K/UL — SIGNIFICANT CHANGE UP (ref 1.8–7.4)
NEUTROPHILS NFR BLD AUTO: 9.5 % — LOW (ref 43–77)
NRBC # BLD: 0 /100 WBCS — SIGNIFICANT CHANGE UP (ref 0–0)
NRBC # BLD: SIGNIFICANT CHANGE UP /100 WBCS (ref 0–0)
PLAT MORPH BLD: NORMAL — SIGNIFICANT CHANGE UP
PLATELET # BLD AUTO: 118 K/UL — LOW (ref 150–400)
POIKILOCYTOSIS BLD QL AUTO: SLIGHT — SIGNIFICANT CHANGE UP
RBC # BLD: 3.84 M/UL — LOW (ref 4.2–5.8)
RBC # FLD: 16.1 % — HIGH (ref 10.3–14.5)
RBC BLD AUTO: ABNORMAL
SCHISTOCYTES BLD QL AUTO: SLIGHT — SIGNIFICANT CHANGE UP
SMUDGE CELLS # BLD: PRESENT — SIGNIFICANT CHANGE UP
WBC # BLD: 33.03 K/UL — HIGH (ref 3.8–10.5)
WBC # FLD AUTO: 33.03 K/UL — HIGH (ref 3.8–10.5)

## 2024-09-05 ENCOUNTER — OUTPATIENT (OUTPATIENT)
Dept: OUTPATIENT SERVICES | Facility: HOSPITAL | Age: 79
LOS: 1 days | Discharge: HOME | End: 2024-09-05

## 2024-09-05 DIAGNOSIS — C91.10 CHRONIC LYMPHOCYTIC LEUKEMIA OF B-CELL TYPE NOT HAVING ACHIEVED REMISSION: ICD-10-CM

## 2024-09-05 DIAGNOSIS — Z98.890 OTHER SPECIFIED POSTPROCEDURAL STATES: Chronic | ICD-10-CM

## 2024-09-05 DIAGNOSIS — Z96.651 PRESENCE OF RIGHT ARTIFICIAL KNEE JOINT: Chronic | ICD-10-CM

## 2024-09-05 DIAGNOSIS — Z90.79 ACQUIRED ABSENCE OF OTHER GENITAL ORGAN(S): Chronic | ICD-10-CM

## 2024-09-05 DIAGNOSIS — Z90.5 ACQUIRED ABSENCE OF KIDNEY: Chronic | ICD-10-CM

## 2024-09-05 DIAGNOSIS — D50.9 IRON DEFICIENCY ANEMIA, UNSPECIFIED: ICD-10-CM

## 2024-09-05 DIAGNOSIS — E89.0 POSTPROCEDURAL HYPOTHYROIDISM: Chronic | ICD-10-CM

## 2024-09-12 ENCOUNTER — APPOINTMENT (OUTPATIENT)
Dept: INFUSION THERAPY | Facility: HOSPITAL | Age: 79
End: 2024-09-12

## 2024-09-12 ENCOUNTER — RESULT REVIEW (OUTPATIENT)
Age: 79
End: 2024-09-12

## 2024-09-12 ENCOUNTER — APPOINTMENT (OUTPATIENT)
Dept: HEMATOLOGY ONCOLOGY | Facility: CLINIC | Age: 79
End: 2024-09-12
Payer: MEDICARE

## 2024-09-12 DIAGNOSIS — C91.10 CHRONIC LYMPHOCYTIC LEUKEMIA OF B-CELL TYPE NOT HAVING ACHIEVED REMISSION: ICD-10-CM

## 2024-09-12 DIAGNOSIS — D46.9 MYELODYSPLASTIC SYNDROME, UNSPECIFIED: ICD-10-CM

## 2024-09-12 LAB
BASOPHILS # BLD AUTO: 0.2 K/UL — SIGNIFICANT CHANGE UP (ref 0–0.2)
BASOPHILS NFR BLD AUTO: 0.5 % — SIGNIFICANT CHANGE UP (ref 0–2)
DACRYOCYTES BLD QL SMEAR: SLIGHT — SIGNIFICANT CHANGE UP
ELLIPTOCYTES BLD QL SMEAR: SLIGHT — SIGNIFICANT CHANGE UP
EOSINOPHIL # BLD AUTO: 0.4 K/UL — SIGNIFICANT CHANGE UP (ref 0–0.5)
EOSINOPHIL NFR BLD AUTO: 1 % — SIGNIFICANT CHANGE UP (ref 0–6)
HCT VFR BLD CALC: 38.4 % — LOW (ref 39–50)
HGB BLD-MCNC: 11.8 G/DL — LOW (ref 13–17)
LYMPHOCYTES # BLD AUTO: 33.45 K/UL — HIGH (ref 1–3.3)
LYMPHOCYTES # BLD AUTO: 84.5 % — HIGH (ref 13–44)
LYMPHOCYTES # SPEC AUTO: 5 % — HIGH (ref 0–0)
MCHC RBC-ENTMCNC: 29.4 PG — SIGNIFICANT CHANGE UP (ref 27–34)
MCHC RBC-ENTMCNC: 30.7 G/DL — LOW (ref 32–36)
MCV RBC AUTO: 95.5 FL — SIGNIFICANT CHANGE UP (ref 80–100)
MONOCYTES # BLD AUTO: 0 K/UL — SIGNIFICANT CHANGE UP (ref 0–0.9)
MONOCYTES NFR BLD AUTO: 0 % — LOW (ref 2–14)
NEUTROPHILS # BLD AUTO: 3.56 K/UL — SIGNIFICANT CHANGE UP (ref 1.8–7.4)
NEUTROPHILS NFR BLD AUTO: 9 % — LOW (ref 43–77)
NRBC # BLD: 0 /100 WBCS — SIGNIFICANT CHANGE UP (ref 0–0)
NRBC # BLD: SIGNIFICANT CHANGE UP /100 WBCS (ref 0–0)
NRBC BLD-RTO: 0 /100 WBCS — SIGNIFICANT CHANGE UP (ref 0–0)
NRBC BLD-RTO: SIGNIFICANT CHANGE UP /100 WBCS (ref 0–0)
PLAT MORPH BLD: NORMAL — SIGNIFICANT CHANGE UP
PLATELET # BLD AUTO: 120 K/UL — LOW (ref 150–400)
POIKILOCYTOSIS BLD QL AUTO: SLIGHT — SIGNIFICANT CHANGE UP
RBC # BLD: 4.02 M/UL — LOW (ref 4.2–5.8)
RBC # FLD: 15.9 % — HIGH (ref 10.3–14.5)
RBC BLD AUTO: ABNORMAL
SCHISTOCYTES BLD QL AUTO: SLIGHT — SIGNIFICANT CHANGE UP
SMUDGE CELLS # BLD: PRESENT — SIGNIFICANT CHANGE UP
WBC # BLD: 39.59 K/UL — HIGH (ref 3.8–10.5)
WBC # FLD AUTO: 39.59 K/UL — HIGH (ref 3.8–10.5)

## 2024-09-12 PROCEDURE — G2211 COMPLEX E/M VISIT ADD ON: CPT

## 2024-09-12 PROCEDURE — 99214 OFFICE O/P EST MOD 30 MIN: CPT

## 2024-09-12 RX ORDER — FAMOTIDINE 20 MG/1
20 TABLET, FILM COATED ORAL
Qty: 90 | Refills: 0 | Status: ACTIVE | COMMUNITY
Start: 2024-09-12 | End: 1900-01-01

## 2024-09-12 NOTE — ASSESSMENT
[FreeTextEntry1] : 79yo M w/ CLL, 11q-,13q- here for f/u Patient started Calquence on 7/7/22  IgG from 6/9/22 was 295. Pt has had multiple infections recently. s/p first dose of IVIG on 7/14/22. IgG levels improved to 503 on 9/8/22. Cont IVIG 20gm monthly, IgG ~500s before each dose. Pt has not had serious infections since starting. cont IVIG monthly, due today Calquence has been held since 4/18/24 when he was hospitalized in [place] for GIB (pt was on Xarelto at the time for afib); he f/u Hematologist Dr. Jones in [place], was found worsening thrombocytopenia, lowest 41K; we did a Bmbx on 5/25/24: persistent CLL/SLL (70%), addendum reporting MDS with del(5q).  He is also iron deficient and received Feraheme in Nov 2022 in [place]: 11/18 and 11/25. He had labs with his PMD Dr. Doran on 5/31/23: Fe 37, TIBC 281, TSAT 13%, ferritin 50, s/p Feraheme weekly x2 on 6/15/23 and 6/22/23. Iron study 5/22/24 showed LYNN: ferritin 28, Iron sat 13%. s/p Feraheme weekly x 2 done on 5/25 and 6/1/24.  Cont f/u for prostate cancer, on Orgovyx Had a pacemaker placement on 9/1/23, following w/ cardiologist. He is reluctant to restart blood thinners. He is meeting with cardiology to discuss Watchman.  Tentatively planned for October 2024.  We discussed that we can restart with a different BTKi, zanubrutinib, vs. venetoclax. Side effects reviewed and pt opting to try zanu since he did well w/ acala.  We also discussed treatment of lenalidomide for MDS if no improvement in platelets once initiating CLL therapy. Will plan to take one step at a time so that it allows us to monitor the side effects. He will need to hold meds for at least 2 weeks prior to Watchman procedure. Started Brukinsa 320mg daily on 8/14/24. CBC result reviewed w/pt and wife: WBC 39.59, platlelet 120k stable.  Rx of Famotidine sent to [facility] for gastric pain after lunch All questions answered. RTC every 2 weeks for count checks  Dr. Phi Jones, Tel , Fax  cancer center [facility]

## 2024-09-12 NOTE — HISTORY OF PRESENT ILLNESS
[de-identified] : 72yo M here for f/u of CLL. He was previously following with Dr. Mccabe. \par  \par  On March 2,2017, total WBC 11,33, with absolute lymphocyte count 5865.\par  On May 25, Total WBC 14.3, ALC 9052.\par  Of note, chest CT for follow up of renal cell carcinoma on 5/31 showed slightly enlarged subpectoral and left axillary nodes.\par  MRI/PET 6/1 multiple stable retroperitoneal nodes without abnormal FDG uptake. There was mild splenomegaly without abnormal uptake.\par  \par  Flow cytometry reveals monotypic B cells positive for CD19 didn't CD20 CD23 and C5 negative for FMC-7, CD10 and CD38 consistent with CLL. The monotypic B cells are 41% of cells, equating to 5658 cells\par  FISH panel reveals deletion of 11q in 57.5% of cells and deletion of 13q in 67% of cells.\par  \par  He has had no constitutional symptoms.\par  He has a history of prostate carcinoma, 3 cm renal cell carcinoma, 3.5 cm medullary thyroid cancer\par  \par  His previous CA history is as follows: \par  In 2003, he was diagnosed with prostate CA s/p radical prostatectomy\par  MRI done for rising PSA in 2009, found to have RCC s/p partial R nephrectomy and RT to prostate bed\par  PET scheduled on Monday for rising PSA\par  In 8/2018, found to have medullary thyroid CA s/p total thyroidectomy\par  \par  PET MRI Axumin skullbase to thighs: s/p radical prostatectomy. No recurrence in the prostatectomy bed. No metastatic lymphadenopathy of prostate origin. No liver or lung metastasis.\par  Interval increase in size of the abdominal, retroperitoneal and mesenteric lymphadenopathy with correcting increase Axumin uptake since 6/10/19,. No liver infiltration. Splenomegaly (14.6cm), relatively stable since 6/10/19.\par  s/p R partial nephrectomy without evidence of local recurrence.\par  s/p thyroidectomy. No recurrent mass in the thyroidectomy bed [de-identified] : Labs done on 9/16/20 - WBC 78.9. Repeat on 10/6/20 was 73.79.  He went to [place] for the winter. Labs done in [place] on 5/3/21 when he had cellulitis showed WBC of 143.4.   He got back from [place] on 5/20. Cellulitis flared up again about 4 days later. He went to see his PMD and received a course of Cefadoxil x10d started 5/27. He had shingles on L side in July, around 7/12. Has LUQ pain, ? postherpetic neuralgia, on gabapentin.  He had his COVID booster 9/9 Abd sono done 9/2022: Extensive mesenteric adenopathy measuring up to 11 cm in size. Splenomegaly 16.8 by 8.7 x 12.3 cm.  He went to [place] for the winter. He was admitted for cellulitis - was discharged on IV Abx. Also had a rectal bleed from internal hemorrhoids Saw Dr. Jones - .88 in 4/2022. Hgb 9.0, plts 102 He was started on iron supplements for ferritin of 26. He had COVID 5/12 s/p monoclonal antibodies but still required hospitalization. He received Abx and Remdesivir, was discharged last week.  He has lost about 40 lbs after these admissions for infections.  IgG levels drawn in June was 295.   He started Calquence 100mg BID on 7/7, tolerating it well. He is bruising easily when there's trauma. He has increased joint pains at night; pain not too bad during the daytime.  We started IVIG on 7/14/22 Went to [place] 7/23-8/6. 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 Xray L femur scheduled for tomorrow   9/8: Patient was seen today accompanied by his wife, he is very depressed with his metastatic prostate cancer and CLL. He stated his f diffused pain in left knee, back lower back, Tylenol for and tramadol with mild improvement. Saw Ortho  on 8/18 for left knee pain 2/2 OR, had Monovisc injection into his left knee.  He f/u with [facility] oncologist for metastatic prostate cancer, Femur Xray done at [facility] on 8/12/22 show sclerosis of the proximal femur but no lytic lesions. Pt had PET/CT done on 8/30. He was started on med for prostate cancer added 1 week ago per pt, he didn't remember the name. Denied any usage of any steroid recently.   10/6: patient was seen today in the [facility] with Dr. Mcmahon. Pt c/o constipation/hemorrhoid bleeding due to s/e of oral iron supplement. He will fly to [place] in 1 wk and will come back in May, will f/u w/ Dr. Jones while in [place] Will have COVID booster tomorrow night.  PSA level 90% decreased now He c/o shoulder, back and knee pain, took tylenol/ tramadol w/o significant improvement. Could not sleep during night due to the pain. rated it 6-7/10 at night.   6/7: He just got back from [place] a week or 2 ago. He was seeing Dr. Jones down in [place]. Last labs with him in April were: WBC 65.09, Hgb 10.8, plt 105.  He received Feraheme in Nov 2022: 11/18 and 11/25. He had labs with his PMD Dr. Doran on 5/31: Fe 37, TIBC 281, TSAT 13%, ferritin 50. Pt reports feeling very fatigued.  Getting IVIG today. He continues on Calquence.    Pt had COVID end of June. He continues to have fatigue and weakness. Also with diffuse joint pains.   9/7:  Patient is seen today for a f/u apt accompanied by his wife. He feels tired recently, saw cardiologist and had a cardiac monitor, was found pulse paused last Thursday night. He had a pacemaker placed last Friday 9/1/23. Still have bruises on his left chest from the procedure.   10/9: Still has some fatigue but not like before. He will be going to [place] end of the month.   5/22/24: Pt returned to [place] from [place] yesterday.  He was admitted to [facility] on 4/23/24 for 5 dys for GIB and severe anemia, s/p 3U PRBC transfusion. He was seeing hematologist Dr. Jones when he was in [place]. His platelet dropped on 5/7/2024 to 40K,  he got 1U PLT on 5/9, plate rechecked on 5/14 55k. Dexa 40mg x 4 days completed on 5/18. Calquence was hold since 4/18/24 when he was hospitalized. Xarelto was hold since platelet trended down.  He is still on Orgovyx for prostate;   6/3/24:  Patient is seen today for a f/u apt accompanied by his wife. s/p Fereheme x 2 on 5/25/24 and 6/1/24. He c/o headache and fatigue after fereheme, took tramadol/Tylenol for the pain relieve. Still c/o headache today.  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 Synthroid dose increased to 175mcg and ramipril dose decreased. Notes very low energy.   7/17/24:  Patient is seen today for a f/u apt accompanied by his son Tanner. He denied any active bleeding. Platelet today 65K.  7/25/24: Pt felt frustrated today when saw platelet trending down to 43K, He will fly to [place] 7/29-8/12/24.    8/14/24: Patient stopped taking xarelto in April 2024 when he had a GIB from diverticulosis. He stopped calquence at that time. After that, he had a brief episode of a fib in April 2024 but he was already off both xarelto and calquence. He has a plan for Watchman procedure in October 2024. He's been having sweats and tiredness.  9/12/24:  Patient is seen today for a f/u apt accompanied by his wife. Pt feels more tired since started Brukinsa 8/14/24. c/o intermittent stomach pain after lunch. Denied any fever chill, nausea or vomiting. Bowl movement was normal. Denied any diarrhea/constipation.  He found a new non-pruritic irregular shaped large red rash on his right chest; it didn't bother him. He will see derm in 2 wks.

## 2024-09-12 NOTE — PHYSICAL EXAM
[Restricted in physically strenuous activity but ambulatory and able to carry out work of a light or sedentary nature] : Status 1- Restricted in physically strenuous activity but ambulatory and able to carry out work of a light or sedentary nature, e.g., light house work, office work [Normal] : affect appropriate [de-identified] : wearing compression stockings [de-identified] : pacemaker placed in the left chest, well healed; new irregular red rash on the right chest,

## 2024-09-13 DIAGNOSIS — R11.2 NAUSEA WITH VOMITING, UNSPECIFIED: ICD-10-CM

## 2024-09-13 LAB
ALBUMIN SERPL ELPH-MCNC: 4.2 G/DL
ALP BLD-CCNC: 76 U/L
ALT SERPL-CCNC: 23 U/L
ANION GAP SERPL CALC-SCNC: 11 MMOL/L
AST SERPL-CCNC: 19 U/L
BILIRUB SERPL-MCNC: 0.2 MG/DL
BUN SERPL-MCNC: 24 MG/DL
CALCIUM SERPL-MCNC: 10 MG/DL
CHLORIDE SERPL-SCNC: 103 MMOL/L
CO2 SERPL-SCNC: 25 MMOL/L
CREAT SERPL-MCNC: 1.08 MG/DL
EGFR: 71 ML/MIN/1.73M2
GLUCOSE SERPL-MCNC: 154 MG/DL
POTASSIUM SERPL-SCNC: 4.4 MMOL/L
PROT SERPL-MCNC: 6.2 G/DL
SODIUM SERPL-SCNC: 139 MMOL/L

## 2024-09-25 ENCOUNTER — APPOINTMENT (OUTPATIENT)
Dept: HEMATOLOGY ONCOLOGY | Facility: CLINIC | Age: 79
End: 2024-09-25

## 2024-09-25 ENCOUNTER — RESULT REVIEW (OUTPATIENT)
Age: 79
End: 2024-09-25

## 2024-09-25 ENCOUNTER — APPOINTMENT (OUTPATIENT)
Dept: INFUSION THERAPY | Facility: HOSPITAL | Age: 79
End: 2024-09-25

## 2024-09-25 LAB
BASOPHILS # BLD AUTO: 0 K/UL — SIGNIFICANT CHANGE UP (ref 0–0.2)
BASOPHILS NFR BLD AUTO: 0 % — SIGNIFICANT CHANGE UP (ref 0–2)
DACRYOCYTES BLD QL SMEAR: SLIGHT — SIGNIFICANT CHANGE UP
ELLIPTOCYTES BLD QL SMEAR: SLIGHT — SIGNIFICANT CHANGE UP
EOSINOPHIL # BLD AUTO: 0 K/UL — SIGNIFICANT CHANGE UP (ref 0–0.5)
EOSINOPHIL NFR BLD AUTO: 0 % — SIGNIFICANT CHANGE UP (ref 0–6)
HCT VFR BLD CALC: 37.2 % — LOW (ref 39–50)
HGB BLD-MCNC: 11.9 G/DL — LOW (ref 13–17)
LYMPHOCYTES # BLD AUTO: 29.86 K/UL — HIGH (ref 1–3.3)
LYMPHOCYTES # BLD AUTO: 84 % — HIGH (ref 13–44)
LYMPHOCYTES # SPEC AUTO: 6 % — HIGH (ref 0–0)
MCHC RBC-ENTMCNC: 29.9 PG — SIGNIFICANT CHANGE UP (ref 27–34)
MCHC RBC-ENTMCNC: 32 G/DL — SIGNIFICANT CHANGE UP (ref 32–36)
MCV RBC AUTO: 93.5 FL — SIGNIFICANT CHANGE UP (ref 80–100)
MONOCYTES # BLD AUTO: 0 K/UL — SIGNIFICANT CHANGE UP (ref 0–0.9)
MONOCYTES NFR BLD AUTO: 0 % — LOW (ref 2–14)
NEUTROPHILS # BLD AUTO: 3.56 K/UL — SIGNIFICANT CHANGE UP (ref 1.8–7.4)
NEUTROPHILS NFR BLD AUTO: 10 % — LOW (ref 43–77)
NRBC # BLD: 0 /100 WBCS — SIGNIFICANT CHANGE UP (ref 0–0)
NRBC # BLD: SIGNIFICANT CHANGE UP /100 WBCS (ref 0–0)
NRBC BLD-RTO: 0 /100 WBCS — SIGNIFICANT CHANGE UP (ref 0–0)
NRBC BLD-RTO: SIGNIFICANT CHANGE UP /100 WBCS (ref 0–0)
PLAT MORPH BLD: NORMAL — SIGNIFICANT CHANGE UP
PLATELET # BLD AUTO: 119 K/UL — LOW (ref 150–400)
POIKILOCYTOSIS BLD QL AUTO: SLIGHT — SIGNIFICANT CHANGE UP
RBC # BLD: 3.98 M/UL — LOW (ref 4.2–5.8)
RBC # FLD: 15.9 % — HIGH (ref 10.3–14.5)
RBC BLD AUTO: ABNORMAL
SCHISTOCYTES BLD QL AUTO: SLIGHT — SIGNIFICANT CHANGE UP
SMUDGE CELLS # BLD: PRESENT — SIGNIFICANT CHANGE UP
WBC # BLD: 35.55 K/UL — HIGH (ref 3.8–10.5)
WBC # FLD AUTO: 35.55 K/UL — HIGH (ref 3.8–10.5)

## 2024-10-08 ENCOUNTER — OUTPATIENT (OUTPATIENT)
Dept: OUTPATIENT SERVICES | Facility: HOSPITAL | Age: 79
LOS: 1 days | End: 2024-10-08

## 2024-10-08 DIAGNOSIS — Z90.5 ACQUIRED ABSENCE OF KIDNEY: Chronic | ICD-10-CM

## 2024-10-08 DIAGNOSIS — E89.0 POSTPROCEDURAL HYPOTHYROIDISM: Chronic | ICD-10-CM

## 2024-10-08 DIAGNOSIS — Z98.890 OTHER SPECIFIED POSTPROCEDURAL STATES: Chronic | ICD-10-CM

## 2024-10-08 DIAGNOSIS — D64.9 ANEMIA, UNSPECIFIED: ICD-10-CM

## 2024-10-08 DIAGNOSIS — Z90.79 ACQUIRED ABSENCE OF OTHER GENITAL ORGAN(S): Chronic | ICD-10-CM

## 2024-10-08 DIAGNOSIS — Z96.651 PRESENCE OF RIGHT ARTIFICIAL KNEE JOINT: Chronic | ICD-10-CM

## 2024-10-09 ENCOUNTER — RESULT REVIEW (OUTPATIENT)
Age: 79
End: 2024-10-09

## 2024-10-09 ENCOUNTER — APPOINTMENT (OUTPATIENT)
Dept: INFUSION THERAPY | Facility: HOSPITAL | Age: 79
End: 2024-10-09

## 2024-10-09 ENCOUNTER — APPOINTMENT (OUTPATIENT)
Dept: HEMATOLOGY ONCOLOGY | Facility: CLINIC | Age: 79
End: 2024-10-09

## 2024-10-09 ENCOUNTER — APPOINTMENT (OUTPATIENT)
Dept: HEMATOLOGY ONCOLOGY | Facility: CLINIC | Age: 79
End: 2024-10-09
Payer: MEDICARE

## 2024-10-09 DIAGNOSIS — C91.10 CHRONIC LYMPHOCYTIC LEUKEMIA OF B-CELL TYPE NOT HAVING ACHIEVED REMISSION: ICD-10-CM

## 2024-10-09 DIAGNOSIS — D80.1 NONFAMILIAL HYPOGAMMAGLOBULINEMIA: ICD-10-CM

## 2024-10-09 LAB
BASOPHILS # BLD AUTO: 0 K/UL — SIGNIFICANT CHANGE UP (ref 0–0.2)
BASOPHILS NFR BLD AUTO: 0 % — SIGNIFICANT CHANGE UP (ref 0–2)
DACRYOCYTES BLD QL SMEAR: SLIGHT — SIGNIFICANT CHANGE UP
ELLIPTOCYTES BLD QL SMEAR: SLIGHT — SIGNIFICANT CHANGE UP
EOSINOPHIL # BLD AUTO: 0 K/UL — SIGNIFICANT CHANGE UP (ref 0–0.5)
EOSINOPHIL NFR BLD AUTO: 0 % — SIGNIFICANT CHANGE UP (ref 0–6)
HCT VFR BLD CALC: 35.7 % — LOW (ref 39–50)
HGB BLD-MCNC: 11.5 G/DL — LOW (ref 13–17)
LYMPHOCYTES # BLD AUTO: 34.39 K/UL — HIGH (ref 1–3.3)
LYMPHOCYTES # BLD AUTO: 78 % — HIGH (ref 13–44)
LYMPHOCYTES # SPEC AUTO: 9 % — HIGH (ref 0–0)
MCHC RBC-ENTMCNC: 30 PG — SIGNIFICANT CHANGE UP (ref 27–34)
MCHC RBC-ENTMCNC: 32.2 G/DL — SIGNIFICANT CHANGE UP (ref 32–36)
MCV RBC AUTO: 93.2 FL — SIGNIFICANT CHANGE UP (ref 80–100)
MONOCYTES # BLD AUTO: 0.44 K/UL — SIGNIFICANT CHANGE UP (ref 0–0.9)
MONOCYTES NFR BLD AUTO: 1 % — LOW (ref 2–14)
NEUTROPHILS # BLD AUTO: 5.29 K/UL — SIGNIFICANT CHANGE UP (ref 1.8–7.4)
NEUTROPHILS NFR BLD AUTO: 12 % — LOW (ref 43–77)
NRBC # BLD: 0 /100 WBCS — SIGNIFICANT CHANGE UP (ref 0–0)
NRBC # BLD: SIGNIFICANT CHANGE UP /100 WBCS (ref 0–0)
NRBC BLD-RTO: 0 /100 WBCS — SIGNIFICANT CHANGE UP (ref 0–0)
NRBC BLD-RTO: SIGNIFICANT CHANGE UP /100 WBCS (ref 0–0)
PLAT MORPH BLD: NORMAL — SIGNIFICANT CHANGE UP
PLATELET # BLD AUTO: 170 K/UL — SIGNIFICANT CHANGE UP (ref 150–400)
POIKILOCYTOSIS BLD QL AUTO: SLIGHT — SIGNIFICANT CHANGE UP
RBC # BLD: 3.83 M/UL — LOW (ref 4.2–5.8)
RBC # FLD: 16.1 % — HIGH (ref 10.3–14.5)
RBC BLD AUTO: ABNORMAL
SCHISTOCYTES BLD QL AUTO: SLIGHT — SIGNIFICANT CHANGE UP
SMUDGE CELLS # BLD: PRESENT — SIGNIFICANT CHANGE UP
WBC # BLD: 44.09 K/UL — CRITICAL HIGH (ref 3.8–10.5)
WBC # FLD AUTO: 44.09 K/UL — CRITICAL HIGH (ref 3.8–10.5)

## 2024-10-09 PROCEDURE — G2211 COMPLEX E/M VISIT ADD ON: CPT

## 2024-10-09 PROCEDURE — 99214 OFFICE O/P EST MOD 30 MIN: CPT

## 2024-10-10 DIAGNOSIS — D80.1 NONFAMILIAL HYPOGAMMAGLOBULINEMIA: ICD-10-CM

## 2024-12-13 NOTE — ASU PATIENT PROFILE, ADULT - IS PATIENT PREGNANT?
I have personally interviewed and examined patient via [employer].    I confirmed the findings listed below -  Adjustment disorder with mixed anxiety and depressed mood  Performance anxiety  Social anxiety disorder  Notably, JU, not on CPAP (due to delays with his sleep doctor)    Care was discussed with the resident and the patient, and I agree with the assessment and plan as documented.     not applicable (Male)

## 2025-02-18 NOTE — PHYSICAL THERAPY INITIAL EVALUATION ADULT - FUNCTIONAL LIMITATIONS, PT EVAL
PAST MEDICAL HISTORY:  Alcoholic recovering alcoholic x 40years    Benign prostatic hyperplasia without lower urinary tract symptoms, unspecified morphology     Diverticulosis of large intestine without hemorrhage     Graves disease     Hemorrhoids, unspecified hemorrhoid type     History of colon polyps     Hyperlipidemia, unspecified hyperlipidemia type     Hypothyroidism, unspecified type     Myelopathy     Osteoarthritis of knee, unilateral left    Spastic     Spinal cord injury at C5-C7 level without injury of spinal bone, subsequent encounter     Spinal stenosis, unspecified spinal region     Weakness to right side     home management/self-care/community/leisure

## 2025-06-06 ENCOUNTER — OUTPATIENT (OUTPATIENT)
Dept: OUTPATIENT SERVICES | Facility: HOSPITAL | Age: 80
LOS: 1 days | Discharge: HOME | End: 2025-06-06

## 2025-06-06 DIAGNOSIS — Z98.890 OTHER SPECIFIED POSTPROCEDURAL STATES: Chronic | ICD-10-CM

## 2025-06-06 DIAGNOSIS — D50.9 IRON DEFICIENCY ANEMIA, UNSPECIFIED: ICD-10-CM

## 2025-06-06 DIAGNOSIS — Z90.79 ACQUIRED ABSENCE OF OTHER GENITAL ORGAN(S): Chronic | ICD-10-CM

## 2025-06-06 DIAGNOSIS — Z90.5 ACQUIRED ABSENCE OF KIDNEY: Chronic | ICD-10-CM

## 2025-06-06 DIAGNOSIS — E89.0 POSTPROCEDURAL HYPOTHYROIDISM: Chronic | ICD-10-CM

## 2025-06-06 DIAGNOSIS — Z96.651 PRESENCE OF RIGHT ARTIFICIAL KNEE JOINT: Chronic | ICD-10-CM

## 2025-06-06 DIAGNOSIS — C91.10 CHRONIC LYMPHOCYTIC LEUKEMIA OF B-CELL TYPE NOT HAVING ACHIEVED REMISSION: ICD-10-CM

## 2025-06-16 ENCOUNTER — NON-APPOINTMENT (OUTPATIENT)
Age: 80
End: 2025-06-16

## 2025-06-18 ENCOUNTER — APPOINTMENT (OUTPATIENT)
Dept: HEMATOLOGY ONCOLOGY | Facility: CLINIC | Age: 80
End: 2025-06-18
Payer: MEDICARE

## 2025-06-18 ENCOUNTER — APPOINTMENT (OUTPATIENT)
Dept: INFUSION THERAPY | Facility: HOSPITAL | Age: 80
End: 2025-06-18

## 2025-06-18 ENCOUNTER — RESULT REVIEW (OUTPATIENT)
Age: 80
End: 2025-06-18

## 2025-06-18 ENCOUNTER — NON-APPOINTMENT (OUTPATIENT)
Age: 80
End: 2025-06-18

## 2025-06-18 VITALS
OXYGEN SATURATION: 95 % | WEIGHT: 209.44 LBS | RESPIRATION RATE: 16 BRPM | SYSTOLIC BLOOD PRESSURE: 150 MMHG | TEMPERATURE: 97.5 F | DIASTOLIC BLOOD PRESSURE: 76 MMHG | HEART RATE: 70 BPM | BODY MASS INDEX: 30.05 KG/M2

## 2025-06-18 LAB
ANISOCYTOSIS BLD QL: SLIGHT — SIGNIFICANT CHANGE UP
BASOPHILS # BLD AUTO: 0 K/UL — SIGNIFICANT CHANGE UP (ref 0–0.2)
BASOPHILS NFR BLD AUTO: 0 % — SIGNIFICANT CHANGE UP (ref 0–2)
DACRYOCYTES BLD QL SMEAR: SLIGHT — SIGNIFICANT CHANGE UP
ELLIPTOCYTES BLD QL SMEAR: SLIGHT — SIGNIFICANT CHANGE UP
EOSINOPHIL # BLD AUTO: 0 K/UL — SIGNIFICANT CHANGE UP (ref 0–0.5)
EOSINOPHIL NFR BLD AUTO: 0 % — SIGNIFICANT CHANGE UP (ref 0–6)
HCT VFR BLD CALC: 33 % — LOW (ref 39–50)
HGB BLD-MCNC: 10 G/DL — LOW (ref 13–17)
LYMPHOCYTES # BLD AUTO: 54.71 K/UL — HIGH (ref 1–3.3)
LYMPHOCYTES # BLD AUTO: 89 % — HIGH (ref 13–44)
LYMPHOCYTES # SPEC AUTO: 1 % — HIGH (ref 0–0)
MCHC RBC-ENTMCNC: 30.3 G/DL — LOW (ref 32–36)
MCHC RBC-ENTMCNC: 30.9 PG — SIGNIFICANT CHANGE UP (ref 27–34)
MCV RBC AUTO: 101.9 FL — HIGH (ref 80–100)
MONOCYTES # BLD AUTO: 0 K/UL — SIGNIFICANT CHANGE UP (ref 0–0.9)
MONOCYTES NFR BLD AUTO: 0 % — LOW (ref 2–14)
NEUTROPHILS # BLD AUTO: 6.15 K/UL — SIGNIFICANT CHANGE UP (ref 1.8–7.4)
NEUTROPHILS NFR BLD AUTO: 10 % — LOW (ref 43–77)
NRBC # BLD: 0 /100 WBCS — SIGNIFICANT CHANGE UP (ref 0–0)
NRBC BLD AUTO-RTO: SIGNIFICANT CHANGE UP /100 WBCS (ref 0–0)
NRBC BLD-RTO: 0 /100 WBCS — SIGNIFICANT CHANGE UP (ref 0–0)
PLAT MORPH BLD: NORMAL — SIGNIFICANT CHANGE UP
PLATELET # BLD AUTO: 155 K/UL — SIGNIFICANT CHANGE UP (ref 150–400)
POIKILOCYTOSIS BLD QL AUTO: SLIGHT — SIGNIFICANT CHANGE UP
POLYCHROMASIA BLD QL SMEAR: SLIGHT — SIGNIFICANT CHANGE UP
RBC # BLD: 3.24 M/UL — LOW (ref 4.2–5.8)
RBC # FLD: 17.8 % — HIGH (ref 10.3–14.5)
RBC BLD AUTO: ABNORMAL
SCHISTOCYTES BLD QL AUTO: SLIGHT — SIGNIFICANT CHANGE UP
SMUDGE CELLS # BLD: PRESENT — SIGNIFICANT CHANGE UP
WBC # BLD: 61.47 K/UL — CRITICAL HIGH (ref 3.8–10.5)
WBC # FLD AUTO: 61.47 K/UL — CRITICAL HIGH (ref 3.8–10.5)

## 2025-06-18 PROCEDURE — 99214 OFFICE O/P EST MOD 30 MIN: CPT

## 2025-06-18 PROCEDURE — G2211 COMPLEX E/M VISIT ADD ON: CPT

## 2025-06-20 LAB
ALBUMIN SERPL ELPH-MCNC: 4.3 G/DL
ALP BLD-CCNC: 68 U/L
ALT SERPL-CCNC: 21 U/L
ANION GAP SERPL CALC-SCNC: 10 MMOL/L
AST SERPL-CCNC: 18 U/L
BILIRUB SERPL-MCNC: 0.2 MG/DL
BUN SERPL-MCNC: 22 MG/DL
CALCIUM SERPL-MCNC: 9.5 MG/DL
CHLORIDE SERPL-SCNC: 105 MMOL/L
CO2 SERPL-SCNC: 25 MMOL/L
CREAT SERPL-MCNC: 1.04 MG/DL
EGFRCR SERPLBLD CKD-EPI 2021: 74 ML/MIN/1.73M2
FERRITIN SERPL-MCNC: 72 NG/ML
FOLATE SERPL-MCNC: 9.3 NG/ML
GLUCOSE SERPL-MCNC: 131 MG/DL
IRON SATN MFR SERPL: 18 %
IRON SERPL-MCNC: 52 UG/DL
POTASSIUM SERPL-SCNC: 5 MMOL/L
PROT SERPL-MCNC: 5.8 G/DL
SODIUM SERPL-SCNC: 139 MMOL/L
TIBC SERPL-MCNC: 292 UG/DL
UIBC SERPL-MCNC: 240 UG/DL
VIT B12 SERPL-MCNC: 409 PG/ML

## 2025-06-24 ENCOUNTER — APPOINTMENT (OUTPATIENT)
Dept: SURGERY | Facility: CLINIC | Age: 80
End: 2025-06-24
Payer: MEDICARE

## 2025-06-24 PROCEDURE — 36415 COLL VENOUS BLD VENIPUNCTURE: CPT

## 2025-06-24 PROCEDURE — G2211 COMPLEX E/M VISIT ADD ON: CPT

## 2025-06-24 PROCEDURE — 99213 OFFICE O/P EST LOW 20 MIN: CPT

## 2025-06-26 ENCOUNTER — NON-APPOINTMENT (OUTPATIENT)
Age: 80
End: 2025-06-26

## 2025-06-26 LAB — CALCIT SERPL-MCNC: 31.5 PG/ML

## 2025-07-08 ENCOUNTER — APPOINTMENT (OUTPATIENT)
Dept: CT IMAGING | Facility: CLINIC | Age: 80
End: 2025-07-08
Payer: MEDICARE

## 2025-07-08 ENCOUNTER — APPOINTMENT (OUTPATIENT)
Dept: ULTRASOUND IMAGING | Facility: CLINIC | Age: 80
End: 2025-07-08
Payer: MEDICARE

## 2025-07-08 ENCOUNTER — OUTPATIENT (OUTPATIENT)
Dept: OUTPATIENT SERVICES | Facility: HOSPITAL | Age: 80
LOS: 1 days | End: 2025-07-08
Payer: MEDICARE

## 2025-07-08 DIAGNOSIS — Z90.79 ACQUIRED ABSENCE OF OTHER GENITAL ORGAN(S): Chronic | ICD-10-CM

## 2025-07-08 DIAGNOSIS — Z90.5 ACQUIRED ABSENCE OF KIDNEY: Chronic | ICD-10-CM

## 2025-07-08 DIAGNOSIS — Z96.651 PRESENCE OF RIGHT ARTIFICIAL KNEE JOINT: Chronic | ICD-10-CM

## 2025-07-08 DIAGNOSIS — Z98.890 OTHER SPECIFIED POSTPROCEDURAL STATES: Chronic | ICD-10-CM

## 2025-07-08 DIAGNOSIS — C73 MALIGNANT NEOPLASM OF THYROID GLAND: ICD-10-CM

## 2025-07-08 DIAGNOSIS — E89.0 POSTPROCEDURAL HYPOTHYROIDISM: Chronic | ICD-10-CM

## 2025-07-08 PROCEDURE — 76536 US EXAM OF HEAD AND NECK: CPT

## 2025-07-08 PROCEDURE — 76536 US EXAM OF HEAD AND NECK: CPT | Mod: 26

## 2025-07-17 ENCOUNTER — RESULT REVIEW (OUTPATIENT)
Age: 80
End: 2025-07-17

## 2025-07-17 ENCOUNTER — APPOINTMENT (OUTPATIENT)
Dept: HEMATOLOGY ONCOLOGY | Facility: CLINIC | Age: 80
End: 2025-07-17
Payer: MEDICARE

## 2025-07-17 ENCOUNTER — APPOINTMENT (OUTPATIENT)
Dept: INFUSION THERAPY | Facility: HOSPITAL | Age: 80
End: 2025-07-17

## 2025-07-17 VITALS
BODY MASS INDEX: 30.56 KG/M2 | HEART RATE: 68 BPM | SYSTOLIC BLOOD PRESSURE: 149 MMHG | WEIGHT: 212.99 LBS | RESPIRATION RATE: 16 BRPM | OXYGEN SATURATION: 98 % | DIASTOLIC BLOOD PRESSURE: 82 MMHG | TEMPERATURE: 97.3 F

## 2025-07-17 LAB
ANISOCYTOSIS BLD QL: SLIGHT — SIGNIFICANT CHANGE UP
BASOPHILS # BLD AUTO: 0 K/UL — SIGNIFICANT CHANGE UP (ref 0–0.2)
BASOPHILS NFR BLD AUTO: 0 % — SIGNIFICANT CHANGE UP (ref 0–2)
DACRYOCYTES BLD QL SMEAR: SLIGHT — SIGNIFICANT CHANGE UP
ELLIPTOCYTES BLD QL SMEAR: SLIGHT — SIGNIFICANT CHANGE UP
EOSINOPHIL # BLD AUTO: 1.81 K/UL — HIGH (ref 0–0.5)
EOSINOPHIL NFR BLD AUTO: 3 % — SIGNIFICANT CHANGE UP (ref 0–6)
HCT VFR BLD CALC: 34.6 % — LOW (ref 39–50)
HGB BLD-MCNC: 10.6 G/DL — LOW (ref 13–17)
LYMPHOCYTES # BLD AUTO: 52.49 K/UL — HIGH (ref 1–3.3)
LYMPHOCYTES # BLD AUTO: 87 % — HIGH (ref 13–44)
MCHC RBC-ENTMCNC: 30.6 G/DL — LOW (ref 32–36)
MCHC RBC-ENTMCNC: 31.5 PG — SIGNIFICANT CHANGE UP (ref 27–34)
MCV RBC AUTO: 103 FL — HIGH (ref 80–100)
MONOCYTES # BLD AUTO: 0 K/UL — SIGNIFICANT CHANGE UP (ref 0–0.9)
MONOCYTES NFR BLD AUTO: 0 % — LOW (ref 2–14)
NEUTROPHILS # BLD AUTO: 6.03 K/UL — SIGNIFICANT CHANGE UP (ref 1.8–7.4)
NEUTROPHILS NFR BLD AUTO: 10 % — LOW (ref 43–77)
NRBC # BLD: 0 /100 WBCS — SIGNIFICANT CHANGE UP (ref 0–0)
NRBC BLD AUTO-RTO: SIGNIFICANT CHANGE UP /100 WBCS (ref 0–0)
NRBC BLD-RTO: 0 /100 WBCS — SIGNIFICANT CHANGE UP (ref 0–0)
PLAT MORPH BLD: NORMAL — SIGNIFICANT CHANGE UP
PLATELET # BLD AUTO: 149 K/UL — LOW (ref 150–400)
POIKILOCYTOSIS BLD QL AUTO: SLIGHT — SIGNIFICANT CHANGE UP
POLYCHROMASIA BLD QL SMEAR: SLIGHT — SIGNIFICANT CHANGE UP
RBC # BLD: 3.36 M/UL — LOW (ref 4.2–5.8)
RBC # FLD: 16.3 % — HIGH (ref 10.3–14.5)
RBC BLD AUTO: ABNORMAL
SCHISTOCYTES BLD QL AUTO: SLIGHT — SIGNIFICANT CHANGE UP
SMUDGE CELLS # BLD: PRESENT — SIGNIFICANT CHANGE UP
WBC # BLD: 60.33 K/UL — CRITICAL HIGH (ref 3.8–10.5)
WBC # FLD AUTO: 60.33 K/UL — CRITICAL HIGH (ref 3.8–10.5)

## 2025-07-17 PROCEDURE — 99214 OFFICE O/P EST MOD 30 MIN: CPT

## 2025-07-18 DIAGNOSIS — D80.1 NONFAMILIAL HYPOGAMMAGLOBULINEMIA: ICD-10-CM

## 2025-07-18 LAB
ALBUMIN SERPL ELPH-MCNC: 4.2 G/DL
ALP BLD-CCNC: 67 U/L
ALT SERPL-CCNC: 19 U/L
ANION GAP SERPL CALC-SCNC: 14 MMOL/L
AST SERPL-CCNC: 17 U/L
BILIRUB SERPL-MCNC: 0.4 MG/DL
BUN SERPL-MCNC: 20 MG/DL
CALCIUM SERPL-MCNC: 9.8 MG/DL
CHLORIDE SERPL-SCNC: 100 MMOL/L
CO2 SERPL-SCNC: 24 MMOL/L
CREAT SERPL-MCNC: 1 MG/DL
EGFRCR SERPLBLD CKD-EPI 2021: 77 ML/MIN/1.73M2
GLUCOSE SERPL-MCNC: 179 MG/DL
POTASSIUM SERPL-SCNC: 4.5 MMOL/L
PROT SERPL-MCNC: 6.1 G/DL
SODIUM SERPL-SCNC: 138 MMOL/L

## 2025-07-21 ENCOUNTER — APPOINTMENT (OUTPATIENT)
Dept: PSYCHIATRY | Facility: CLINIC | Age: 80
End: 2025-07-21
Payer: MEDICARE

## 2025-07-21 PROCEDURE — 90791 PSYCH DIAGNOSTIC EVALUATION: CPT

## 2025-07-22 LAB
ALBUMIN MFR SERPL ELPH: 63.2 %
ALBUMIN SERPL-MCNC: 3.7 G/DL
ALBUMIN/GLOB SERPL: 1.7 RATIO
ALPHA1 GLOB MFR SERPL ELPH: 4.9 %
ALPHA1 GLOB SERPL ELPH-MCNC: 0.3 G/DL
ALPHA2 GLOB MFR SERPL ELPH: 12.9 %
ALPHA2 GLOB SERPL ELPH-MCNC: 0.8 G/DL
B-GLOBULIN MFR SERPL ELPH: 11.4 %
B-GLOBULIN SERPL ELPH-MCNC: 0.7 G/DL
DEPRECATED KAPPA LC FREE/LAMBDA SER: 1.28 RATIO
GAMMA GLOB FLD ELPH-MCNC: 0.4 G/DL
GAMMA GLOB MFR SERPL ELPH: 7.6 %
IGA SERPL-MCNC: 51 MG/DL
IGG SERPL-MCNC: 451 MG/DL
IGM SERPL-MCNC: 14 MG/DL
INTERPRETATION SERPL IEP-IMP: NORMAL
KAPPA LC CSF-MCNC: 1.66 MG/DL
KAPPA LC SERPL-MCNC: 2.13 MG/DL
M PROTEIN SPEC IFE-MCNC: NORMAL
PROT SERPL-MCNC: 5.9 G/DL
PROT SERPL-MCNC: 5.9 G/DL

## 2025-08-07 ENCOUNTER — APPOINTMENT (OUTPATIENT)
Dept: PSYCHIATRY | Facility: CLINIC | Age: 80
End: 2025-08-07
Payer: MEDICARE

## 2025-08-07 PROCEDURE — 90837 PSYTX W PT 60 MINUTES: CPT

## 2025-08-13 ENCOUNTER — NON-APPOINTMENT (OUTPATIENT)
Age: 80
End: 2025-08-13

## 2025-08-13 ENCOUNTER — APPOINTMENT (OUTPATIENT)
Dept: HEMATOLOGY ONCOLOGY | Facility: CLINIC | Age: 80
End: 2025-08-13
Payer: MEDICARE

## 2025-08-13 ENCOUNTER — APPOINTMENT (OUTPATIENT)
Dept: INFUSION THERAPY | Facility: HOSPITAL | Age: 80
End: 2025-08-13

## 2025-08-13 ENCOUNTER — RESULT REVIEW (OUTPATIENT)
Age: 80
End: 2025-08-13

## 2025-08-13 ENCOUNTER — APPOINTMENT (OUTPATIENT)
Dept: PSYCHIATRY | Facility: CLINIC | Age: 80
End: 2025-08-13
Payer: MEDICARE

## 2025-08-13 VITALS
DIASTOLIC BLOOD PRESSURE: 74 MMHG | SYSTOLIC BLOOD PRESSURE: 113 MMHG | OXYGEN SATURATION: 98 % | WEIGHT: 207.9 LBS | BODY MASS INDEX: 29.83 KG/M2 | HEART RATE: 61 BPM | TEMPERATURE: 97 F | RESPIRATION RATE: 16 BRPM

## 2025-08-13 DIAGNOSIS — D46.9 MYELODYSPLASTIC SYNDROME, UNSPECIFIED: ICD-10-CM

## 2025-08-13 PROBLEM — F43.23 ADJUSTMENT DISORDER WITH MIXED ANXIETY AND DEPRESSED MOOD: Status: ACTIVE | Noted: 2025-07-23

## 2025-08-13 PROCEDURE — 99214 OFFICE O/P EST MOD 30 MIN: CPT

## 2025-08-13 PROCEDURE — 90837 PSYTX W PT 60 MINUTES: CPT

## 2025-08-15 ENCOUNTER — OUTPATIENT (OUTPATIENT)
Dept: OUTPATIENT SERVICES | Facility: HOSPITAL | Age: 80
LOS: 1 days | End: 2025-08-15
Payer: MEDICARE

## 2025-08-15 ENCOUNTER — APPOINTMENT (OUTPATIENT)
Dept: WOUND CARE | Facility: HOSPITAL | Age: 80
End: 2025-08-15
Payer: MEDICARE

## 2025-08-15 VITALS
SYSTOLIC BLOOD PRESSURE: 114 MMHG | BODY MASS INDEX: 29.63 KG/M2 | TEMPERATURE: 98.2 F | HEART RATE: 59 BPM | WEIGHT: 207 LBS | RESPIRATION RATE: 15 BRPM | OXYGEN SATURATION: 96 % | DIASTOLIC BLOOD PRESSURE: 69 MMHG | HEIGHT: 70 IN

## 2025-08-15 DIAGNOSIS — S41.109A UNSPECIFIED OPEN WOUND OF UNSPECIFIED UPPER ARM, INITIAL ENCOUNTER: ICD-10-CM

## 2025-08-15 DIAGNOSIS — Z96.651 PRESENCE OF RIGHT ARTIFICIAL KNEE JOINT: Chronic | ICD-10-CM

## 2025-08-15 DIAGNOSIS — Z98.890 OTHER SPECIFIED POSTPROCEDURAL STATES: Chronic | ICD-10-CM

## 2025-08-15 DIAGNOSIS — E89.0 POSTPROCEDURAL HYPOTHYROIDISM: Chronic | ICD-10-CM

## 2025-08-15 DIAGNOSIS — Z90.5 ACQUIRED ABSENCE OF KIDNEY: Chronic | ICD-10-CM

## 2025-08-15 DIAGNOSIS — Z95.0 PRESENCE OF CARDIAC PACEMAKER: ICD-10-CM

## 2025-08-15 DIAGNOSIS — Z90.79 ACQUIRED ABSENCE OF OTHER GENITAL ORGAN(S): Chronic | ICD-10-CM

## 2025-08-15 PROBLEM — S51.811D: Status: ACTIVE | Noted: 2025-08-15

## 2025-08-15 PROCEDURE — 99213 OFFICE O/P EST LOW 20 MIN: CPT

## 2025-08-15 PROCEDURE — G0463: CPT

## 2025-08-15 RX ORDER — LACTOBACILLUS ACIDOPHILUS 500MM CELL
CAPSULE ORAL
Refills: 0 | Status: ACTIVE | COMMUNITY

## 2025-08-15 RX ORDER — ALPRAZOLAM 0.25 MG/1
0.25 TABLET ORAL
Refills: 0 | Status: ACTIVE | COMMUNITY

## 2025-08-15 RX ORDER — ROSUVASTATIN CALCIUM 20 MG/1
20 TABLET, FILM COATED ORAL
Refills: 0 | Status: ACTIVE | COMMUNITY

## 2025-08-15 RX ORDER — PANTOPRAZOLE SODIUM 40 MG/1
40 GRANULE, DELAYED RELEASE ORAL
Refills: 0 | Status: ACTIVE | COMMUNITY

## 2025-08-15 RX ORDER — ALBUTEROL SULFATE 1.25 MG/3ML
1.25 SOLUTION RESPIRATORY (INHALATION)
Refills: 0 | Status: ACTIVE | COMMUNITY

## 2025-08-15 RX ORDER — LORATADINE 10 MG
CAPSULE ORAL
Refills: 0 | Status: ACTIVE | COMMUNITY

## 2025-08-15 RX ORDER — FUROSEMIDE 20 MG/1
20 TABLET ORAL
Refills: 0 | Status: ACTIVE | COMMUNITY

## 2025-08-15 RX ORDER — HYDROCORTISONE ACETATE 0.5 %
CREAM (GRAM) TOPICAL
Refills: 0 | Status: ACTIVE | COMMUNITY

## 2025-08-15 RX ORDER — TOPIRAMATE 100 MG
100 TABLET ORAL
Refills: 0 | Status: ACTIVE | COMMUNITY

## 2025-08-15 RX ORDER — ACETAMINOPHEN 650 MG/1
TABLET, FILM COATED, EXTENDED RELEASE ORAL
Refills: 0 | Status: ACTIVE | COMMUNITY

## 2025-08-15 RX ORDER — CEPHALEXIN 500 MG/1
500 CAPSULE ORAL
Refills: 0 | Status: ACTIVE | COMMUNITY

## 2025-08-17 DIAGNOSIS — C91.90 LYMPHOID LEUKEMIA, UNSPECIFIED NOT HAVING ACHIEVED REMISSION: ICD-10-CM

## 2025-08-17 DIAGNOSIS — Z98.1 ARTHRODESIS STATUS: ICD-10-CM
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
## 2025-08-17 DIAGNOSIS — Z96.659 PRESENCE OF UNSPECIFIED ARTIFICIAL KNEE JOINT: ICD-10-CM

## 2025-08-17 DIAGNOSIS — F43.23 ADJUSTMENT DISORDER WITH MIXED ANXIETY AND DEPRESSED MOOD: ICD-10-CM
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
## 2025-08-17 DIAGNOSIS — S51.811D LACERATION WITHOUT FOREIGN BODY OF RIGHT FOREARM, SUBSEQUENT ENCOUNTER: ICD-10-CM

## 2025-08-17 DIAGNOSIS — Z80.42 FAMILY HISTORY OF MALIGNANT NEOPLASM OF PROSTATE: ICD-10-CM

## 2025-08-17 DIAGNOSIS — Z88.5 ALLERGY STATUS TO NARCOTIC AGENT: ICD-10-CM

## 2025-08-17 DIAGNOSIS — E03.9 HYPOTHYROIDISM, UNSPECIFIED: ICD-10-CM

## 2025-08-17 DIAGNOSIS — Z85.850 PERSONAL HISTORY OF MALIGNANT NEOPLASM OF THYROID: ICD-10-CM

## 2025-08-17 DIAGNOSIS — Z85.46 PERSONAL HISTORY OF MALIGNANT NEOPLASM OF PROSTATE: ICD-10-CM

## 2025-08-17 DIAGNOSIS — Z85.528 PERSONAL HISTORY OF OTHER MALIGNANT NEOPLASM OF KIDNEY: ICD-10-CM
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
## 2025-08-17 DIAGNOSIS — Z98.890 OTHER SPECIFIED POSTPROCEDURAL STATES: ICD-10-CM

## 2025-08-17 DIAGNOSIS — Z90.5 ACQUIRED ABSENCE OF KIDNEY: ICD-10-CM

## 2025-08-17 DIAGNOSIS — Z95.0 PRESENCE OF CARDIAC PACEMAKER: ICD-10-CM

## 2025-08-17 DIAGNOSIS — Z82.61 FAMILY HISTORY OF ARTHRITIS: ICD-10-CM

## 2025-08-17 DIAGNOSIS — Z80.0 FAMILY HISTORY OF MALIGNANT NEOPLASM OF DIGESTIVE ORGANS: ICD-10-CM

## 2025-08-17 DIAGNOSIS — Z79.890 HORMONE REPLACEMENT THERAPY: ICD-10-CM
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
## 2025-08-17 DIAGNOSIS — J45.909 UNSPECIFIED ASTHMA, UNCOMPLICATED: ICD-10-CM

## 2025-08-17 DIAGNOSIS — E78.00 PURE HYPERCHOLESTEROLEMIA, UNSPECIFIED: ICD-10-CM

## 2025-08-17 DIAGNOSIS — Z83.49 FAMILY HISTORY OF OTHER ENDOCRINE, NUTRITIONAL AND METABOLIC DISEASES: ICD-10-CM

## 2025-08-17 DIAGNOSIS — Z87.891 PERSONAL HISTORY OF NICOTINE DEPENDENCE: ICD-10-CM

## 2025-08-17 DIAGNOSIS — Z79.899 OTHER LONG TERM (CURRENT) DRUG THERAPY: ICD-10-CM

## 2025-08-19 ENCOUNTER — APPOINTMENT (OUTPATIENT)
Dept: WOUND CARE | Facility: HOSPITAL | Age: 80
End: 2025-08-19
Payer: MEDICARE

## 2025-08-19 ENCOUNTER — OUTPATIENT (OUTPATIENT)
Dept: OUTPATIENT SERVICES | Facility: HOSPITAL | Age: 80
LOS: 1 days | End: 2025-08-19
Payer: MEDICARE

## 2025-08-19 VITALS
OXYGEN SATURATION: 97 % | DIASTOLIC BLOOD PRESSURE: 72 MMHG | TEMPERATURE: 98.4 F | HEART RATE: 60 BPM | WEIGHT: 207 LBS | BODY MASS INDEX: 29.63 KG/M2 | RESPIRATION RATE: 15 BRPM | HEIGHT: 70 IN | SYSTOLIC BLOOD PRESSURE: 129 MMHG

## 2025-08-19 DIAGNOSIS — Z90.5 ACQUIRED ABSENCE OF KIDNEY: Chronic | ICD-10-CM

## 2025-08-19 DIAGNOSIS — Z96.651 PRESENCE OF RIGHT ARTIFICIAL KNEE JOINT: Chronic | ICD-10-CM

## 2025-08-19 DIAGNOSIS — Z98.890 OTHER SPECIFIED POSTPROCEDURAL STATES: Chronic | ICD-10-CM

## 2025-08-19 DIAGNOSIS — S41.109A UNSPECIFIED OPEN WOUND OF UNSPECIFIED UPPER ARM, INITIAL ENCOUNTER: ICD-10-CM

## 2025-08-19 DIAGNOSIS — E89.0 POSTPROCEDURAL HYPOTHYROIDISM: Chronic | ICD-10-CM

## 2025-08-19 DIAGNOSIS — Z90.79 ACQUIRED ABSENCE OF OTHER GENITAL ORGAN(S): Chronic | ICD-10-CM

## 2025-08-19 PROCEDURE — G0463: CPT

## 2025-08-19 PROCEDURE — 99213 OFFICE O/P EST LOW 20 MIN: CPT

## 2025-08-20 DIAGNOSIS — Z95.0 PRESENCE OF CARDIAC PACEMAKER: ICD-10-CM

## 2025-08-20 DIAGNOSIS — Z88.5 ALLERGY STATUS TO NARCOTIC AGENT: ICD-10-CM

## 2025-08-20 DIAGNOSIS — Z82.61 FAMILY HISTORY OF ARTHRITIS: ICD-10-CM
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
## 2025-08-20 DIAGNOSIS — Z87.891 PERSONAL HISTORY OF NICOTINE DEPENDENCE: ICD-10-CM

## 2025-08-20 DIAGNOSIS — E78.00 PURE HYPERCHOLESTEROLEMIA, UNSPECIFIED: ICD-10-CM

## 2025-08-20 DIAGNOSIS — Z98.1 ARTHRODESIS STATUS: ICD-10-CM

## 2025-08-20 DIAGNOSIS — Z79.899 OTHER LONG TERM (CURRENT) DRUG THERAPY: ICD-10-CM

## 2025-08-20 DIAGNOSIS — S51.811D LACERATION WITHOUT FOREIGN BODY OF RIGHT FOREARM, SUBSEQUENT ENCOUNTER: ICD-10-CM

## 2025-08-20 DIAGNOSIS — Z85.46 PERSONAL HISTORY OF MALIGNANT NEOPLASM OF PROSTATE: ICD-10-CM
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
## 2025-08-20 DIAGNOSIS — Z85.850 PERSONAL HISTORY OF MALIGNANT NEOPLASM OF THYROID: ICD-10-CM

## 2025-08-20 DIAGNOSIS — F43.23 ADJUSTMENT DISORDER WITH MIXED ANXIETY AND DEPRESSED MOOD: ICD-10-CM

## 2025-08-20 DIAGNOSIS — Z80.0 FAMILY HISTORY OF MALIGNANT NEOPLASM OF DIGESTIVE ORGANS: ICD-10-CM

## 2025-08-20 DIAGNOSIS — Z85.528 PERSONAL HISTORY OF OTHER MALIGNANT NEOPLASM OF KIDNEY: ICD-10-CM
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
## 2025-08-20 DIAGNOSIS — Z80.42 FAMILY HISTORY OF MALIGNANT NEOPLASM OF PROSTATE: ICD-10-CM

## 2025-08-20 DIAGNOSIS — Z96.659 PRESENCE OF UNSPECIFIED ARTIFICIAL KNEE JOINT: ICD-10-CM

## 2025-08-20 DIAGNOSIS — E03.9 HYPOTHYROIDISM, UNSPECIFIED: ICD-10-CM

## 2025-08-20 DIAGNOSIS — C91.90 LYMPHOID LEUKEMIA, UNSPECIFIED NOT HAVING ACHIEVED REMISSION: ICD-10-CM

## 2025-08-20 DIAGNOSIS — Z79.890 HORMONE REPLACEMENT THERAPY: ICD-10-CM

## 2025-08-20 DIAGNOSIS — J45.909 UNSPECIFIED ASTHMA, UNCOMPLICATED: ICD-10-CM

## 2025-08-20 DIAGNOSIS — Z98.890 OTHER SPECIFIED POSTPROCEDURAL STATES: ICD-10-CM

## 2025-08-20 DIAGNOSIS — Z83.49 FAMILY HISTORY OF OTHER ENDOCRINE, NUTRITIONAL AND METABOLIC DISEASES: ICD-10-CM

## 2025-08-20 DIAGNOSIS — Z90.5 ACQUIRED ABSENCE OF KIDNEY: ICD-10-CM

## 2025-08-20 LAB
ALBUMIN MFR SERPL ELPH: 61.5 %
ALBUMIN SERPL ELPH-MCNC: 4.3 G/DL
ALBUMIN SERPL-MCNC: 3.8 G/DL
ALBUMIN/GLOB SERPL: 1.6 RATIO
ALP BLD-CCNC: 73 U/L
ALPHA1 GLOB MFR SERPL ELPH: 5.8 %
ALPHA1 GLOB SERPL ELPH-MCNC: 0.4 G/DL
ALPHA2 GLOB MFR SERPL ELPH: 13.7 %
ALPHA2 GLOB SERPL ELPH-MCNC: 0.8 G/DL
ALT SERPL-CCNC: 19 U/L
ANION GAP SERPL CALC-SCNC: 13 MMOL/L
AST SERPL-CCNC: 17 U/L
B-GLOBULIN MFR SERPL ELPH: 11.3 %
B-GLOBULIN SERPL ELPH-MCNC: 0.7 G/DL
BILIRUB SERPL-MCNC: 0.2 MG/DL
BUN SERPL-MCNC: 18 MG/DL
CALCIUM SERPL-MCNC: 9.8 MG/DL
CHLORIDE SERPL-SCNC: 103 MMOL/L
CO2 SERPL-SCNC: 23 MMOL/L
CREAT SERPL-MCNC: 1.03 MG/DL
DEPRECATED KAPPA LC FREE/LAMBDA SER: 1.12 RATIO
EGFRCR SERPLBLD CKD-EPI 2021: 74 ML/MIN/1.73M2
GAMMA GLOB FLD ELPH-MCNC: 0.5 G/DL
GAMMA GLOB MFR SERPL ELPH: 7.7 %
GLUCOSE SERPL-MCNC: 122 MG/DL
IGA SERPL-MCNC: 50 MG/DL
IGG SERPL-MCNC: 463 MG/DL
IGM SERPL-MCNC: 26 MG/DL
INTERPRETATION SERPL IEP-IMP: NORMAL
KAPPA LC CSF-MCNC: 1.74 MG/DL
KAPPA LC SERPL-MCNC: 1.95 MG/DL
M PROTEIN SPEC IFE-MCNC: NORMAL
POTASSIUM SERPL-SCNC: 4.2 MMOL/L
PROT SERPL-MCNC: 6.2 G/DL
PSA FREE SERPL-MCNC: 0.05 NG/ML
SODIUM SERPL-SCNC: 139 MMOL/L

## 2025-08-21 ENCOUNTER — APPOINTMENT (OUTPATIENT)
Dept: PSYCHIATRY | Facility: CLINIC | Age: 80
End: 2025-08-21
Payer: MEDICARE

## 2025-08-21 DIAGNOSIS — F43.23 ADJUSTMENT DISORDER WITH MIXED ANXIETY AND DEPRESSED MOOD: ICD-10-CM

## 2025-08-21 PROCEDURE — 90837 PSYTX W PT 60 MINUTES: CPT

## 2025-08-22 ENCOUNTER — APPOINTMENT (OUTPATIENT)
Dept: WOUND CARE | Facility: HOSPITAL | Age: 80
End: 2025-08-22
Payer: MEDICARE

## 2025-08-22 ENCOUNTER — OUTPATIENT (OUTPATIENT)
Dept: OUTPATIENT SERVICES | Facility: HOSPITAL | Age: 80
LOS: 1 days | End: 2025-08-22
Payer: MEDICARE

## 2025-08-22 VITALS
BODY MASS INDEX: 29.63 KG/M2 | DIASTOLIC BLOOD PRESSURE: 77 MMHG | TEMPERATURE: 98.7 F | HEART RATE: 65 BPM | OXYGEN SATURATION: 94 % | RESPIRATION RATE: 18 BRPM | HEIGHT: 70 IN | SYSTOLIC BLOOD PRESSURE: 149 MMHG | WEIGHT: 207 LBS

## 2025-08-22 DIAGNOSIS — Z90.5 ACQUIRED ABSENCE OF KIDNEY: Chronic | ICD-10-CM

## 2025-08-22 DIAGNOSIS — Z96.651 PRESENCE OF RIGHT ARTIFICIAL KNEE JOINT: Chronic | ICD-10-CM

## 2025-08-22 DIAGNOSIS — Z98.890 OTHER SPECIFIED POSTPROCEDURAL STATES: Chronic | ICD-10-CM

## 2025-08-22 DIAGNOSIS — S41.109A UNSPECIFIED OPEN WOUND OF UNSPECIFIED UPPER ARM, INITIAL ENCOUNTER: ICD-10-CM

## 2025-08-22 DIAGNOSIS — Z90.79 ACQUIRED ABSENCE OF OTHER GENITAL ORGAN(S): Chronic | ICD-10-CM

## 2025-08-22 PROCEDURE — G0463: CPT

## 2025-08-22 PROCEDURE — ZZZZZ: CPT

## 2025-08-23 DIAGNOSIS — S51.811D LACERATION WITHOUT FOREIGN BODY OF RIGHT FOREARM, SUBSEQUENT ENCOUNTER: ICD-10-CM
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
## 2025-08-23 DIAGNOSIS — S61.511D LACERATION WITHOUT FOREIGN BODY OF RIGHT WRIST, SUBSEQUENT ENCOUNTER: ICD-10-CM
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
## 2025-08-26 ENCOUNTER — NON-APPOINTMENT (OUTPATIENT)
Age: 80
End: 2025-08-26

## 2025-08-27 ENCOUNTER — OUTPATIENT (OUTPATIENT)
Dept: OUTPATIENT SERVICES | Facility: HOSPITAL | Age: 80
LOS: 1 days | End: 2025-08-27
Payer: MEDICARE

## 2025-08-27 ENCOUNTER — APPOINTMENT (OUTPATIENT)
Dept: WOUND CARE | Facility: HOSPITAL | Age: 80
End: 2025-08-27
Payer: MEDICARE

## 2025-08-27 VITALS
OXYGEN SATURATION: 95 % | SYSTOLIC BLOOD PRESSURE: 115 MMHG | WEIGHT: 207 LBS | HEIGHT: 70 IN | BODY MASS INDEX: 29.63 KG/M2 | RESPIRATION RATE: 18 BRPM | HEART RATE: 61 BPM | DIASTOLIC BLOOD PRESSURE: 70 MMHG | TEMPERATURE: 98.2 F

## 2025-08-27 DIAGNOSIS — S41.109D UNSPECIFIED OPEN WOUND OF UNSPECIFIED UPPER ARM, SUBSEQUENT ENCOUNTER: ICD-10-CM

## 2025-08-27 DIAGNOSIS — Z79.890 HORMONE REPLACEMENT THERAPY: ICD-10-CM

## 2025-08-27 DIAGNOSIS — Z88.5 ALLERGY STATUS TO NARCOTIC AGENT: ICD-10-CM

## 2025-08-27 DIAGNOSIS — Z80.0 FAMILY HISTORY OF MALIGNANT NEOPLASM OF DIGESTIVE ORGANS: ICD-10-CM
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
## 2025-08-27 DIAGNOSIS — Z87.891 PERSONAL HISTORY OF NICOTINE DEPENDENCE: ICD-10-CM

## 2025-08-27 DIAGNOSIS — C91.90 LYMPHOID LEUKEMIA, UNSPECIFIED NOT HAVING ACHIEVED REMISSION: ICD-10-CM

## 2025-08-27 DIAGNOSIS — Z98.890 OTHER SPECIFIED POSTPROCEDURAL STATES: Chronic | ICD-10-CM

## 2025-08-27 DIAGNOSIS — Z85.850 PERSONAL HISTORY OF MALIGNANT NEOPLASM OF THYROID: ICD-10-CM

## 2025-08-27 DIAGNOSIS — Z90.5 ACQUIRED ABSENCE OF KIDNEY: Chronic | ICD-10-CM

## 2025-08-27 DIAGNOSIS — Z98.890 OTHER SPECIFIED POSTPROCEDURAL STATES: ICD-10-CM

## 2025-08-27 DIAGNOSIS — E78.00 PURE HYPERCHOLESTEROLEMIA, UNSPECIFIED: ICD-10-CM

## 2025-08-27 DIAGNOSIS — Z90.5 ACQUIRED ABSENCE OF KIDNEY: ICD-10-CM

## 2025-08-27 DIAGNOSIS — Z85.46 PERSONAL HISTORY OF MALIGNANT NEOPLASM OF PROSTATE: ICD-10-CM

## 2025-08-27 DIAGNOSIS — E03.9 HYPOTHYROIDISM, UNSPECIFIED: ICD-10-CM

## 2025-08-27 DIAGNOSIS — Z83.49 FAMILY HISTORY OF OTHER ENDOCRINE, NUTRITIONAL AND METABOLIC DISEASES: ICD-10-CM
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
## 2025-08-27 DIAGNOSIS — Z82.61 FAMILY HISTORY OF ARTHRITIS: ICD-10-CM

## 2025-08-27 DIAGNOSIS — Z98.1 ARTHRODESIS STATUS: ICD-10-CM

## 2025-08-27 DIAGNOSIS — E89.0 POSTPROCEDURAL HYPOTHYROIDISM: Chronic | ICD-10-CM

## 2025-08-27 DIAGNOSIS — Z96.659 PRESENCE OF UNSPECIFIED ARTIFICIAL KNEE JOINT: ICD-10-CM

## 2025-08-27 DIAGNOSIS — S51.811D LACERATION WITHOUT FOREIGN BODY OF RIGHT FOREARM, SUBSEQUENT ENCOUNTER: ICD-10-CM

## 2025-08-27 DIAGNOSIS — F43.23 ADJUSTMENT DISORDER WITH MIXED ANXIETY AND DEPRESSED MOOD: ICD-10-CM

## 2025-08-27 DIAGNOSIS — S61.511D LACERATION WITHOUT FOREIGN BODY OF RIGHT WRIST, SUBSEQUENT ENCOUNTER: ICD-10-CM
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
## 2025-08-27 DIAGNOSIS — Z95.0 PRESENCE OF CARDIAC PACEMAKER: ICD-10-CM
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
## 2025-08-27 DIAGNOSIS — Z85.528 PERSONAL HISTORY OF OTHER MALIGNANT NEOPLASM OF KIDNEY: ICD-10-CM

## 2025-08-27 DIAGNOSIS — Z96.651 PRESENCE OF RIGHT ARTIFICIAL KNEE JOINT: Chronic | ICD-10-CM

## 2025-08-27 DIAGNOSIS — Z90.79 ACQUIRED ABSENCE OF OTHER GENITAL ORGAN(S): Chronic | ICD-10-CM

## 2025-08-27 DIAGNOSIS — J45.909 UNSPECIFIED ASTHMA, UNCOMPLICATED: ICD-10-CM

## 2025-08-27 DIAGNOSIS — Z80.42 FAMILY HISTORY OF MALIGNANT NEOPLASM OF PROSTATE: ICD-10-CM

## 2025-08-27 PROCEDURE — 99203 OFFICE O/P NEW LOW 30 MIN: CPT

## 2025-08-27 PROCEDURE — G0463: CPT

## 2025-08-29 ENCOUNTER — APPOINTMENT (OUTPATIENT)
Dept: WOUND CARE | Facility: HOSPITAL | Age: 80
End: 2025-08-29
Payer: MEDICARE

## 2025-08-29 ENCOUNTER — OUTPATIENT (OUTPATIENT)
Dept: OUTPATIENT SERVICES | Facility: HOSPITAL | Age: 80
LOS: 1 days | End: 2025-08-29
Payer: MEDICARE

## 2025-08-29 DIAGNOSIS — C91.90 LYMPHOID LEUKEMIA, UNSPECIFIED NOT HAVING ACHIEVED REMISSION: ICD-10-CM

## 2025-08-29 DIAGNOSIS — Z98.890 OTHER SPECIFIED POSTPROCEDURAL STATES: Chronic | ICD-10-CM

## 2025-08-29 DIAGNOSIS — E89.0 POSTPROCEDURAL HYPOTHYROIDISM: Chronic | ICD-10-CM

## 2025-08-29 DIAGNOSIS — Z96.651 PRESENCE OF RIGHT ARTIFICIAL KNEE JOINT: Chronic | ICD-10-CM

## 2025-08-29 DIAGNOSIS — Z90.79 ACQUIRED ABSENCE OF OTHER GENITAL ORGAN(S): Chronic | ICD-10-CM

## 2025-08-29 DIAGNOSIS — S41.109A UNSPECIFIED OPEN WOUND OF UNSPECIFIED UPPER ARM, INITIAL ENCOUNTER: ICD-10-CM

## 2025-08-29 PROBLEM — S61.511D: Status: ACTIVE | Noted: 2025-08-27

## 2025-08-29 PROCEDURE — 99213 OFFICE O/P EST LOW 20 MIN: CPT

## 2025-08-29 PROCEDURE — G0463: CPT

## 2025-09-01 DIAGNOSIS — Z82.61 FAMILY HISTORY OF ARTHRITIS: ICD-10-CM

## 2025-09-01 DIAGNOSIS — S51.811D LACERATION WITHOUT FOREIGN BODY OF RIGHT FOREARM, SUBSEQUENT ENCOUNTER: ICD-10-CM

## 2025-09-01 DIAGNOSIS — Z83.49 FAMILY HISTORY OF OTHER ENDOCRINE, NUTRITIONAL AND METABOLIC DISEASES: ICD-10-CM

## 2025-09-01 DIAGNOSIS — S61.511D LACERATION WITHOUT FOREIGN BODY OF RIGHT WRIST, SUBSEQUENT ENCOUNTER: ICD-10-CM

## 2025-09-01 DIAGNOSIS — Z80.0 FAMILY HISTORY OF MALIGNANT NEOPLASM OF DIGESTIVE ORGANS: ICD-10-CM

## 2025-09-01 DIAGNOSIS — Z87.891 PERSONAL HISTORY OF NICOTINE DEPENDENCE: ICD-10-CM
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
## 2025-09-01 DIAGNOSIS — Z88.5 ALLERGY STATUS TO NARCOTIC AGENT: ICD-10-CM

## 2025-09-01 DIAGNOSIS — F43.23 ADJUSTMENT DISORDER WITH MIXED ANXIETY AND DEPRESSED MOOD: ICD-10-CM

## 2025-09-01 DIAGNOSIS — Z95.0 PRESENCE OF CARDIAC PACEMAKER: ICD-10-CM

## 2025-09-01 DIAGNOSIS — Z85.528 PERSONAL HISTORY OF OTHER MALIGNANT NEOPLASM OF KIDNEY: ICD-10-CM

## 2025-09-01 DIAGNOSIS — Z98.890 OTHER SPECIFIED POSTPROCEDURAL STATES: ICD-10-CM
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
## 2025-09-01 DIAGNOSIS — Z80.42 FAMILY HISTORY OF MALIGNANT NEOPLASM OF PROSTATE: ICD-10-CM

## 2025-09-01 DIAGNOSIS — Z85.46 PERSONAL HISTORY OF MALIGNANT NEOPLASM OF PROSTATE: ICD-10-CM

## 2025-09-01 DIAGNOSIS — Z98.1 ARTHRODESIS STATUS: ICD-10-CM

## 2025-09-01 DIAGNOSIS — Z79.890 HORMONE REPLACEMENT THERAPY: ICD-10-CM
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
## 2025-09-01 DIAGNOSIS — J45.909 UNSPECIFIED ASTHMA, UNCOMPLICATED: ICD-10-CM

## 2025-09-01 DIAGNOSIS — E78.00 PURE HYPERCHOLESTEROLEMIA, UNSPECIFIED: ICD-10-CM

## 2025-09-01 DIAGNOSIS — Z79.899 OTHER LONG TERM (CURRENT) DRUG THERAPY: ICD-10-CM

## 2025-09-01 DIAGNOSIS — C91.90 LYMPHOID LEUKEMIA, UNSPECIFIED NOT HAVING ACHIEVED REMISSION: ICD-10-CM

## 2025-09-01 DIAGNOSIS — E03.9 HYPOTHYROIDISM, UNSPECIFIED: ICD-10-CM

## 2025-09-01 DIAGNOSIS — Z96.659 PRESENCE OF UNSPECIFIED ARTIFICIAL KNEE JOINT: ICD-10-CM

## 2025-09-01 DIAGNOSIS — Z90.5 ACQUIRED ABSENCE OF KIDNEY: ICD-10-CM

## 2025-09-01 DIAGNOSIS — Z85.850 PERSONAL HISTORY OF MALIGNANT NEOPLASM OF THYROID: ICD-10-CM
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
## 2025-09-02 ENCOUNTER — OUTPATIENT (OUTPATIENT)
Dept: OUTPATIENT SERVICES | Facility: HOSPITAL | Age: 80
LOS: 1 days | End: 2025-09-02
Payer: MEDICARE

## 2025-09-02 ENCOUNTER — APPOINTMENT (OUTPATIENT)
Dept: WOUND CARE | Facility: HOSPITAL | Age: 80
End: 2025-09-02
Payer: MEDICARE

## 2025-09-02 VITALS
OXYGEN SATURATION: 95 % | HEIGHT: 70 IN | HEART RATE: 60 BPM | RESPIRATION RATE: 18 BRPM | TEMPERATURE: 98.7 F | DIASTOLIC BLOOD PRESSURE: 59 MMHG | SYSTOLIC BLOOD PRESSURE: 100 MMHG | WEIGHT: 207 LBS | BODY MASS INDEX: 29.63 KG/M2
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
## 2025-09-02 DIAGNOSIS — Z98.890 OTHER SPECIFIED POSTPROCEDURAL STATES: Chronic | ICD-10-CM

## 2025-09-02 DIAGNOSIS — Z96.651 PRESENCE OF RIGHT ARTIFICIAL KNEE JOINT: Chronic | ICD-10-CM

## 2025-09-02 DIAGNOSIS — S51.811D LACERATION WITHOUT FOREIGN BODY OF RIGHT FOREARM, SUBSEQUENT ENCOUNTER: ICD-10-CM

## 2025-09-02 DIAGNOSIS — S41.109D UNSPECIFIED OPEN WOUND OF UNSPECIFIED UPPER ARM, SUBSEQUENT ENCOUNTER: ICD-10-CM

## 2025-09-02 DIAGNOSIS — S61.511D LACERATION WITHOUT FOREIGN BODY OF RIGHT WRIST, SUBSEQUENT ENCOUNTER: ICD-10-CM
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
## 2025-09-02 DIAGNOSIS — Z90.5 ACQUIRED ABSENCE OF KIDNEY: Chronic | ICD-10-CM

## 2025-09-02 DIAGNOSIS — E89.0 POSTPROCEDURAL HYPOTHYROIDISM: Chronic | ICD-10-CM

## 2025-09-02 DIAGNOSIS — Z90.79 ACQUIRED ABSENCE OF OTHER GENITAL ORGAN(S): Chronic | ICD-10-CM
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
## 2025-09-02 PROCEDURE — G0463: CPT

## 2025-09-02 PROCEDURE — ZZZZZ: CPT

## 2025-09-03 ENCOUNTER — APPOINTMENT (OUTPATIENT)
Dept: PSYCHIATRY | Facility: CLINIC | Age: 80
End: 2025-09-03
Payer: MEDICARE

## 2025-09-03 DIAGNOSIS — F43.23 ADJUSTMENT DISORDER WITH MIXED ANXIETY AND DEPRESSED MOOD: ICD-10-CM

## 2025-09-03 PROCEDURE — 90837 PSYTX W PT 60 MINUTES: CPT

## 2025-09-04 ENCOUNTER — OUTPATIENT (OUTPATIENT)
Dept: OUTPATIENT SERVICES | Facility: HOSPITAL | Age: 80
LOS: 1 days | End: 2025-09-04
Payer: MEDICARE

## 2025-09-04 ENCOUNTER — APPOINTMENT (OUTPATIENT)
Dept: WOUND CARE | Facility: HOSPITAL | Age: 80
End: 2025-09-04
Payer: MEDICARE

## 2025-09-04 VITALS
BODY MASS INDEX: 29.63 KG/M2 | WEIGHT: 207 LBS | HEIGHT: 70 IN | HEART RATE: 60 BPM | OXYGEN SATURATION: 98 % | SYSTOLIC BLOOD PRESSURE: 130 MMHG | TEMPERATURE: 98.1 F | RESPIRATION RATE: 18 BRPM | DIASTOLIC BLOOD PRESSURE: 77 MMHG

## 2025-09-04 DIAGNOSIS — E89.0 POSTPROCEDURAL HYPOTHYROIDISM: Chronic | ICD-10-CM

## 2025-09-04 DIAGNOSIS — Z98.890 OTHER SPECIFIED POSTPROCEDURAL STATES: Chronic | ICD-10-CM

## 2025-09-04 DIAGNOSIS — S51.811D LACERATION W/OUT FOREIGN BODY OF RIGHT FOREARM, SUBSEQUENT ENCOUNTER: ICD-10-CM

## 2025-09-04 DIAGNOSIS — S41.109A UNSPECIFIED OPEN WOUND OF UNSPECIFIED UPPER ARM, INITIAL ENCOUNTER: ICD-10-CM

## 2025-09-04 DIAGNOSIS — S61.511D LACERATION W/OUT FOREIGN BODY OF RIGHT WRIST, SUBSEQUENT ENCOUNTER: ICD-10-CM

## 2025-09-04 DIAGNOSIS — Z96.651 PRESENCE OF RIGHT ARTIFICIAL KNEE JOINT: Chronic | ICD-10-CM

## 2025-09-04 PROCEDURE — G0463: CPT

## 2025-09-04 PROCEDURE — 99213 OFFICE O/P EST LOW 20 MIN: CPT
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
## 2025-09-05 DIAGNOSIS — Z88.5 ALLERGY STATUS TO NARCOTIC AGENT: ICD-10-CM

## 2025-09-05 DIAGNOSIS — C91.90 LYMPHOID LEUKEMIA, UNSPECIFIED NOT HAVING ACHIEVED REMISSION: ICD-10-CM

## 2025-09-05 DIAGNOSIS — Z80.0 FAMILY HISTORY OF MALIGNANT NEOPLASM OF DIGESTIVE ORGANS: ICD-10-CM

## 2025-09-05 DIAGNOSIS — E03.9 HYPOTHYROIDISM, UNSPECIFIED: ICD-10-CM

## 2025-09-05 DIAGNOSIS — Z80.42 FAMILY HISTORY OF MALIGNANT NEOPLASM OF PROSTATE: ICD-10-CM

## 2025-09-05 DIAGNOSIS — Z98.890 OTHER SPECIFIED POSTPROCEDURAL STATES: ICD-10-CM

## 2025-09-05 DIAGNOSIS — Z85.850 PERSONAL HISTORY OF MALIGNANT NEOPLASM OF THYROID: ICD-10-CM
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
## 2025-09-05 DIAGNOSIS — Z87.891 PERSONAL HISTORY OF NICOTINE DEPENDENCE: ICD-10-CM

## 2025-09-05 DIAGNOSIS — S51.811D LACERATION WITHOUT FOREIGN BODY OF RIGHT FOREARM, SUBSEQUENT ENCOUNTER: ICD-10-CM

## 2025-09-05 DIAGNOSIS — Z83.49 FAMILY HISTORY OF OTHER ENDOCRINE, NUTRITIONAL AND METABOLIC DISEASES: ICD-10-CM

## 2025-09-05 DIAGNOSIS — J45.909 UNSPECIFIED ASTHMA, UNCOMPLICATED: ICD-10-CM

## 2025-09-05 DIAGNOSIS — Z90.5 ACQUIRED ABSENCE OF KIDNEY: ICD-10-CM
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
## 2025-09-05 DIAGNOSIS — Z82.61 FAMILY HISTORY OF ARTHRITIS: ICD-10-CM

## 2025-09-05 DIAGNOSIS — Z79.899 OTHER LONG TERM (CURRENT) DRUG THERAPY: ICD-10-CM

## 2025-09-05 DIAGNOSIS — Z85.46 PERSONAL HISTORY OF MALIGNANT NEOPLASM OF PROSTATE: ICD-10-CM

## 2025-09-05 DIAGNOSIS — Z98.1 ARTHRODESIS STATUS: ICD-10-CM

## 2025-09-05 DIAGNOSIS — Z85.528 PERSONAL HISTORY OF OTHER MALIGNANT NEOPLASM OF KIDNEY: ICD-10-CM

## 2025-09-05 DIAGNOSIS — Z79.890 HORMONE REPLACEMENT THERAPY: ICD-10-CM
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
## 2025-09-05 DIAGNOSIS — S61.511D LACERATION WITHOUT FOREIGN BODY OF RIGHT WRIST, SUBSEQUENT ENCOUNTER: ICD-10-CM

## 2025-09-05 DIAGNOSIS — E78.00 PURE HYPERCHOLESTEROLEMIA, UNSPECIFIED: ICD-10-CM

## 2025-09-05 DIAGNOSIS — Z96.659 PRESENCE OF UNSPECIFIED ARTIFICIAL KNEE JOINT: ICD-10-CM

## 2025-09-05 DIAGNOSIS — F43.23 ADJUSTMENT DISORDER WITH MIXED ANXIETY AND DEPRESSED MOOD: ICD-10-CM

## 2025-09-05 DIAGNOSIS — Z95.0 PRESENCE OF CARDIAC PACEMAKER: ICD-10-CM

## 2025-09-08 ENCOUNTER — APPOINTMENT (OUTPATIENT)
Dept: WOUND CARE | Facility: HOSPITAL | Age: 80
End: 2025-09-08
Payer: MEDICARE

## 2025-09-08 VITALS
WEIGHT: 207 LBS | HEART RATE: 60 BPM | RESPIRATION RATE: 18 BRPM | TEMPERATURE: 98.7 F | SYSTOLIC BLOOD PRESSURE: 134 MMHG | BODY MASS INDEX: 29.63 KG/M2 | HEIGHT: 70 IN | OXYGEN SATURATION: 98 % | DIASTOLIC BLOOD PRESSURE: 76 MMHG

## 2025-09-08 PROCEDURE — ZZZZZ: CPT

## 2025-09-10 ENCOUNTER — APPOINTMENT (OUTPATIENT)
Dept: WOUND CARE | Facility: HOSPITAL | Age: 80
End: 2025-09-10
Payer: MEDICARE

## 2025-09-10 VITALS
HEIGHT: 70 IN | OXYGEN SATURATION: 95 % | TEMPERATURE: 98.4 F | BODY MASS INDEX: 29.63 KG/M2 | HEART RATE: 61 BPM | DIASTOLIC BLOOD PRESSURE: 74 MMHG | WEIGHT: 207 LBS | RESPIRATION RATE: 18 BRPM | SYSTOLIC BLOOD PRESSURE: 125 MMHG

## 2025-09-10 PROCEDURE — ZZZZZ: CPT

## 2025-09-11 ENCOUNTER — APPOINTMENT (OUTPATIENT)
Dept: HEMATOLOGY ONCOLOGY | Facility: CLINIC | Age: 80
End: 2025-09-11
Payer: MEDICARE

## 2025-09-11 ENCOUNTER — APPOINTMENT (OUTPATIENT)
Dept: INFUSION THERAPY | Facility: HOSPITAL | Age: 80
End: 2025-09-11

## 2025-09-11 ENCOUNTER — RESULT REVIEW (OUTPATIENT)
Age: 80
End: 2025-09-11

## 2025-09-11 ENCOUNTER — NON-APPOINTMENT (OUTPATIENT)
Age: 80
End: 2025-09-11

## 2025-09-11 VITALS
HEIGHT: 70 IN | TEMPERATURE: 97.1 F | DIASTOLIC BLOOD PRESSURE: 77 MMHG | WEIGHT: 207 LBS | BODY MASS INDEX: 29.63 KG/M2 | RESPIRATION RATE: 18 BRPM | OXYGEN SATURATION: 99 % | HEART RATE: 101 BPM | SYSTOLIC BLOOD PRESSURE: 130 MMHG

## 2025-09-11 DIAGNOSIS — C91.10 CHRONIC LYMPHOCYTIC LEUKEMIA OF B-CELL TYPE NOT HAVING ACHIEVED REMISSION: ICD-10-CM

## 2025-09-11 PROCEDURE — 93000 ELECTROCARDIOGRAM COMPLETE: CPT

## 2025-09-11 PROCEDURE — 99214 OFFICE O/P EST MOD 30 MIN: CPT

## 2025-09-12 ENCOUNTER — APPOINTMENT (OUTPATIENT)
Dept: WOUND CARE | Facility: HOSPITAL | Age: 80
End: 2025-09-12
Payer: MEDICARE

## 2025-09-12 VITALS
HEART RATE: 60 BPM | RESPIRATION RATE: 18 BRPM | WEIGHT: 207 LBS | DIASTOLIC BLOOD PRESSURE: 70 MMHG | SYSTOLIC BLOOD PRESSURE: 113 MMHG | BODY MASS INDEX: 29.63 KG/M2 | HEIGHT: 70 IN | TEMPERATURE: 98.2 F | OXYGEN SATURATION: 95 %

## 2025-09-12 LAB
ALBUMIN SERPL ELPH-MCNC: 4.3 G/DL
ALP BLD-CCNC: 70 U/L
ALT SERPL-CCNC: 17 U/L
ANION GAP SERPL CALC-SCNC: 12 MMOL/L
AST SERPL-CCNC: 16 U/L
BILIRUB SERPL-MCNC: 0.2 MG/DL
BUN SERPL-MCNC: 22 MG/DL
CALCIUM SERPL-MCNC: 9.8 MG/DL
CHLORIDE SERPL-SCNC: 104 MMOL/L
CO2 SERPL-SCNC: 24 MMOL/L
CREAT SERPL-MCNC: 1.01 MG/DL
EGFRCR SERPLBLD CKD-EPI 2021: 76 ML/MIN/1.73M2
GLUCOSE SERPL-MCNC: 163 MG/DL
POTASSIUM SERPL-SCNC: 4.3 MMOL/L
PROT SERPL-MCNC: 6.2 G/DL
SODIUM SERPL-SCNC: 140 MMOL/L

## 2025-09-12 PROCEDURE — ZZZZZ: CPT

## 2025-09-15 ENCOUNTER — APPOINTMENT (OUTPATIENT)
Dept: WOUND CARE | Facility: HOSPITAL | Age: 80
End: 2025-09-15
Payer: MEDICARE

## 2025-09-15 ENCOUNTER — APPOINTMENT (OUTPATIENT)
Dept: PSYCHIATRY | Facility: CLINIC | Age: 80
End: 2025-09-15
Payer: MEDICARE

## 2025-09-15 VITALS
WEIGHT: 207 LBS | SYSTOLIC BLOOD PRESSURE: 117 MMHG | BODY MASS INDEX: 29.63 KG/M2 | TEMPERATURE: 98.1 F | OXYGEN SATURATION: 95 % | HEIGHT: 70 IN | RESPIRATION RATE: 18 BRPM | DIASTOLIC BLOOD PRESSURE: 72 MMHG | HEART RATE: 60 BPM

## 2025-09-15 DIAGNOSIS — S51.811D LACERATION W/OUT FOREIGN BODY OF RIGHT FOREARM, SUBSEQUENT ENCOUNTER: ICD-10-CM

## 2025-09-15 DIAGNOSIS — S61.511D LACERATION W/OUT FOREIGN BODY OF RIGHT WRIST, SUBSEQUENT ENCOUNTER: ICD-10-CM

## 2025-09-15 PROCEDURE — 90837 PSYTX W PT 60 MINUTES: CPT

## 2025-09-15 PROCEDURE — 99213 OFFICE O/P EST LOW 20 MIN: CPT

## 2025-09-16 LAB
ALBUMIN MFR SERPL ELPH: 63.4 %
ALBUMIN SERPL-MCNC: 4 G/DL
ALBUMIN/GLOB SERPL: 1.7 RATIO
ALPHA1 GLOB MFR SERPL ELPH: 5 %
ALPHA1 GLOB SERPL ELPH-MCNC: 0.3 G/DL
ALPHA2 GLOB MFR SERPL ELPH: 12.8 %
ALPHA2 GLOB SERPL ELPH-MCNC: 0.8 G/DL
B-GLOBULIN MFR SERPL ELPH: 11.1 %
B-GLOBULIN SERPL ELPH-MCNC: 0.7 G/DL
DEPRECATED KAPPA LC FREE/LAMBDA SER: 1.04 RATIO
GAMMA GLOB FLD ELPH-MCNC: 0.5 G/DL
GAMMA GLOB MFR SERPL ELPH: 7.7 %
IGA SERPL-MCNC: 51 MG/DL
IGG SERPL-MCNC: 486 MG/DL
IGM SERPL-MCNC: 21 MG/DL
INTERPRETATION SERPL IEP-IMP: NORMAL
KAPPA LC CSF-MCNC: 1.83 MG/DL
KAPPA LC SERPL-MCNC: 1.91 MG/DL
M PROTEIN SPEC IFE-MCNC: NORMAL
PROT SERPL-MCNC: 6.3 G/DL
PROT SERPL-MCNC: 6.3 G/DL

## 2025-09-17 ENCOUNTER — APPOINTMENT (OUTPATIENT)
Dept: WOUND CARE | Facility: HOSPITAL | Age: 80
End: 2025-09-17
Payer: MEDICARE

## 2025-09-17 VITALS
OXYGEN SATURATION: 98 % | DIASTOLIC BLOOD PRESSURE: 65 MMHG | HEIGHT: 70 IN | TEMPERATURE: 98.4 F | RESPIRATION RATE: 18 BRPM | HEART RATE: 59 BPM | WEIGHT: 207 LBS | SYSTOLIC BLOOD PRESSURE: 125 MMHG | BODY MASS INDEX: 29.63 KG/M2

## 2025-09-17 PROCEDURE — ZZZZZ: CPT

## 2025-09-19 ENCOUNTER — APPOINTMENT (OUTPATIENT)
Dept: WOUND CARE | Facility: HOSPITAL | Age: 80
End: 2025-09-19
Payer: MEDICARE

## 2025-09-19 VITALS
BODY MASS INDEX: 29.63 KG/M2 | WEIGHT: 207 LBS | HEIGHT: 70 IN | TEMPERATURE: 98.1 F | SYSTOLIC BLOOD PRESSURE: 133 MMHG | RESPIRATION RATE: 18 BRPM | DIASTOLIC BLOOD PRESSURE: 73 MMHG | HEART RATE: 63 BPM | OXYGEN SATURATION: 96 %

## 2025-09-19 PROCEDURE — ZZZZZ: CPT
